# Patient Record
Sex: FEMALE | Race: WHITE | NOT HISPANIC OR LATINO | Employment: OTHER | URBAN - METROPOLITAN AREA
[De-identification: names, ages, dates, MRNs, and addresses within clinical notes are randomized per-mention and may not be internally consistent; named-entity substitution may affect disease eponyms.]

---

## 2017-01-09 ENCOUNTER — TELEPHONE (OUTPATIENT)
Dept: FAMILY MEDICINE | Facility: OTHER | Age: 72
End: 2017-01-09

## 2017-01-09 DIAGNOSIS — K21.9 GASTROESOPHAGEAL REFLUX DISEASE, ESOPHAGITIS PRESENCE NOT SPECIFIED: Primary | ICD-10-CM

## 2017-01-09 RX ORDER — RABEPRAZOLE SODIUM 20 MG/1
20 TABLET, DELAYED RELEASE ORAL DAILY
Qty: 90 TABLET | Refills: 3 | Status: SHIPPED | OUTPATIENT
Start: 2017-01-09 | End: 2017-03-24

## 2017-01-09 NOTE — TELEPHONE ENCOUNTER
Reason for call:  Medication    1. Medication Name?  PRILOSEC) 40 MG   2. Is this request for a refill? No  3. What Pharmacy do you use? On file  4. Have you contacted your pharmacy? No    5. If yes, when? n/a  (Please note that the turn-around-time for prescriptions is 72 business hours; I am sending your request at this time. SEND TO  [facility]  )  Description: Patient is taking PRILOSEC) 40 MG and it is upsetting her stomach.  Is there any other medication she could take? Also, patient states that she has terrible dry mouth could you recommend something for that.  Was an appointment offered for this a call? No   Preferred method for responding to this messageTelephone Call  If we cannot reach you directly, may we leave a detailed response at the number you provided? Yes  Can this message wait until your PCP/Provider returns if not available today? No applicable, provider is in

## 2017-01-10 DIAGNOSIS — F41.9 ANXIETY: Primary | ICD-10-CM

## 2017-01-11 NOTE — TELEPHONE ENCOUNTER
Zoloft     Last Written Prescription Date: 12/30/2016  Last Fill Quantity: 30, # refills: 5  Last Office Visit with [facility] primary care provider:  12/30/2016   Next 5 appointments (look out 90 days)     Mar 24, 2017  8:30 AM   (Arrive by 8:15 AM)   SHORT with Randi Haddad NP   [facility] ([facility] )    [address]  [facility] [address]   [phone]                   Last PHQ-9 score on record=   PHQ-9 SCORE 12/16/2016   Total Score -   Total Score 2

## 2017-02-02 ENCOUNTER — TELEPHONE (OUTPATIENT)
Dept: FAMILY MEDICINE | Facility: OTHER | Age: 72
End: 2017-02-02

## 2017-02-02 DIAGNOSIS — E78.5 HYPERLIPIDEMIA LDL GOAL <100: Primary | ICD-10-CM

## 2017-02-02 RX ORDER — LOVASTATIN 20 MG
20 TABLET ORAL AT BEDTIME
Qty: 90 TABLET | Refills: 1 | Status: SHIPPED | OUTPATIENT
Start: 2017-02-02 | End: 2017-04-24

## 2017-02-02 NOTE — TELEPHONE ENCOUNTER
Looking for alternative does not want to even try 1/2 the dose of crestor. States feels much better off the med

## 2017-02-02 NOTE — TELEPHONE ENCOUNTER
"Reason for call:  Medication    1. Medication Name? rosuvastatin (CRESTOR) 10 MG tablet  2. Is this request for a refill? No  3. What Pharmacy do you use? N/a  4. Have you contacted your pharmacy? N/a    5. If yes, when?  (Please note that the turn-around-time for prescriptions is 72 business hours; I am sending your request at this time. SEND TO  [facility]  )  Description: Pt is calling because she is looking for another alternative medication to the one listed.  Pt states that this medication is making her \"inside parts\" not feel well and she was constantly urinating.  Pt notifies writer that she has been off of the medication for about a week now.  Nurse, please advise.   Was an appointment offered for this a call? No   Preferred method for responding to this messageTelephone Call: [phone]  If we cannot reach you directly, may we leave a detailed response at the number you provided? Yes  Can this message wait until your PCP/Provider returns if not available today? Not applicable, provider Aroldo Lomeli is in today. Pt said that Yani prescribed medication.    "

## 2017-02-02 NOTE — TELEPHONE ENCOUNTER
If she has been off crestor for 1 week, she should be feeling better.  Question UTI.  Should be seen for evaluation.

## 2017-03-24 ENCOUNTER — OFFICE VISIT (OUTPATIENT)
Dept: FAMILY MEDICINE | Facility: OTHER | Age: 72
End: 2017-03-24
Attending: NURSE PRACTITIONER
Payer: COMMERCIAL

## 2017-03-24 VITALS
BODY MASS INDEX: 37.21 KG/M2 | SYSTOLIC BLOOD PRESSURE: 122 MMHG | DIASTOLIC BLOOD PRESSURE: 70 MMHG | HEART RATE: 64 BPM | HEIGHT: 59 IN | WEIGHT: 184.6 LBS | TEMPERATURE: 97.9 F | RESPIRATION RATE: 14 BRPM

## 2017-03-24 DIAGNOSIS — E78.5 HYPERLIPIDEMIA LDL GOAL <100: Primary | ICD-10-CM

## 2017-03-24 DIAGNOSIS — G62.9 NEUROPATHY: ICD-10-CM

## 2017-03-24 DIAGNOSIS — Z79.899 ON STATIN THERAPY: ICD-10-CM

## 2017-03-24 DIAGNOSIS — K21.9 GASTROESOPHAGEAL REFLUX DISEASE WITHOUT ESOPHAGITIS: ICD-10-CM

## 2017-03-24 LAB
ALBUMIN SERPL-MCNC: 3.5 G/DL (ref 3.4–5)
ALP SERPL-CCNC: 110 U/L (ref 40–150)
ALT SERPL W P-5'-P-CCNC: 68 U/L (ref 0–50)
ANION GAP SERPL CALCULATED.3IONS-SCNC: 9 MMOL/L (ref 3–14)
AST SERPL W P-5'-P-CCNC: 46 U/L (ref 0–45)
BILIRUB DIRECT SERPL-MCNC: 0.2 MG/DL (ref 0–0.2)
BILIRUB SERPL-MCNC: 0.7 MG/DL (ref 0.2–1.3)
BUN SERPL-MCNC: 17 MG/DL (ref 7–30)
CALCIUM SERPL-MCNC: 8.8 MG/DL (ref 8.5–10.1)
CHLORIDE SERPL-SCNC: 109 MMOL/L (ref 94–109)
CHOLEST SERPL-MCNC: 160 MG/DL
CO2 SERPL-SCNC: 26 MMOL/L (ref 20–32)
CREAT SERPL-MCNC: 0.96 MG/DL (ref 0.52–1.04)
GFR SERPL CREATININE-BSD FRML MDRD: 57 ML/MIN/1.7M2
GLUCOSE SERPL-MCNC: 98 MG/DL (ref 70–99)
HDLC SERPL-MCNC: 54 MG/DL
LDLC SERPL CALC-MCNC: 76 MG/DL
NONHDLC SERPL-MCNC: 106 MG/DL
POTASSIUM SERPL-SCNC: 4.4 MMOL/L (ref 3.4–5.3)
PROT SERPL-MCNC: 7.2 G/DL (ref 6.8–8.8)
SODIUM SERPL-SCNC: 144 MMOL/L (ref 133–144)
TRIGL SERPL-MCNC: 152 MG/DL
TSH SERPL DL<=0.005 MIU/L-ACNC: 4 MU/L (ref 0.4–4)

## 2017-03-24 PROCEDURE — 99212 OFFICE O/P EST SF 10 MIN: CPT

## 2017-03-24 PROCEDURE — 80061 LIPID PANEL: CPT | Performed by: NURSE PRACTITIONER

## 2017-03-24 PROCEDURE — 80048 BASIC METABOLIC PNL TOTAL CA: CPT | Performed by: NURSE PRACTITIONER

## 2017-03-24 PROCEDURE — 36415 COLL VENOUS BLD VENIPUNCTURE: CPT | Performed by: NURSE PRACTITIONER

## 2017-03-24 PROCEDURE — 99214 OFFICE O/P EST MOD 30 MIN: CPT | Performed by: NURSE PRACTITIONER

## 2017-03-24 PROCEDURE — 80076 HEPATIC FUNCTION PANEL: CPT | Performed by: NURSE PRACTITIONER

## 2017-03-24 PROCEDURE — 84443 ASSAY THYROID STIM HORMONE: CPT | Performed by: NURSE PRACTITIONER

## 2017-03-24 ASSESSMENT — PAIN SCALES - GENERAL: PAINLEVEL: NO PAIN (0)

## 2017-03-24 NOTE — PATIENT INSTRUCTIONS
1. Hyperlipidemia LDL goal <100  - Lipid Profile  - TSH with free T4 reflex  - Basic metabolic panel    2. Neuropathy (H)  Continue current plna    3. Gastroesophageal reflux disease without esophagitis  Start over the counter probiotic per package directions    4. On statin therapy  - Hepatic panel    FUTURE APPOINTMENTS:       - Follow-up visit in 6 months or as needed for acute concerns    Randi Haddad, NP  [facility] ([facility])

## 2017-03-24 NOTE — PROGRESS NOTES
SUBJECTIVE:                                                    Nella Lemon is a 71 year old female who presents to clinic today for the following health issues:      Hyperlipidemia Follow-Up      Rate your low fat/cholesterol diet?: good    Taking statin?  Yes, no muscle aches from statin    Other lipid medications/supplements?:  Fish oil/Omega 3, dose 2 without side effects       Depression and Anxiety Follow-Up    Status since last visit: Improved did not think zoloft was helping so stopped, feels she is doing well at this time    Other associated symptoms:None    Complicating factors:     Significant life event: No     Current substance abuse: None    PHQ-9 SCORE 2/22/2016 6/17/2016 12/16/2016   Total Score - - -   Total Score 2 3 2     LA NENA-7 SCORE 12/16/2016   Total Score 10        PHQ-9  English      PHQ-9   Any Language     GAD7       Amount of exercise or physical activity: walking at times.  Did join [employer] - last week.      Problems taking medications regularly: Yes,  As above    Medication side effects: none    Diet: regular (no restrictions)    GERD:  Stopped aciphex, did not like how it made her feel, does not believe she needs anything else.      Neuropathy - continues to take gabapentin, doing well.          Problem list and histories reviewed & adjusted, as indicated.  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, left     Family history of colon cancer     ACP (advance care planning)     Hyperlipidemia LDL goal <100     Past Surgical History:   Procedure Laterality Date      ADENOIDECTOMY       CHOLECYSTECTOMY  1981     COLONOSCOPY  2002     COLONOSCOPY  2012     COLONOSCOPY N/A 9/22/2014    Procedure: COLONOSCOPY;  Surgeon: Lavell Pavon DO;  Location: [facility]     CYSTOSCOPY  2007    Cystitis chronic     ORTHOPEDIC SURGERY  2002    carpal tunnel; RT, LT     TONSILLECTOMY         Social History   Substance Use Topics     Smoking status: Never Smoker     Smokeless tobacco: Never Used     Alcohol use Yes      Comment: once a year     Family History   Problem Relation Age of Onset     Breast Cancer Mother      Alcohol/Drug Mother      Cirrhosis     Other - See Comments Mother      goiter     CEREBROVASCULAR DISEASE Father      Circulatory Father      Alcohol/Drug Son      Cancer - colorectal Brother      Unknown/Adopted No family hx of      Asthma No family hx of      C.A.D. No family hx of      DIABETES No family hx of      Hypertension No family hx of      Prostate Cancer No family hx of      Allergies No family hx of      Alzheimer Disease No family hx of      Anesthesia Reaction No family hx of      Arthritis No family hx of      Blood Disease No family hx of      Cardiovascular No family hx of      Congenital Anomalies No family hx of      Connective Tissue Disorder No family hx of      Depression No family hx of      Endocrine Disease No family hx of      Eye Disorder No family hx of      Genetic Disorder No family hx of      GASTROINTESTINAL DISEASE No family hx of      Genitourinary Problems No family hx of      Gynecology No family hx of      HEART DISEASE No family hx of      Lipids No family hx of      Musculoskeletal Disorder No family hx of      Neurologic Disorder No family hx of      Obesity No family hx of      OSTEOPOROSIS No family hx of      Psychotic Disorder No family hx of      Respiratory No family hx of      Thyroid Disease No family hx of      Family History Negative No family hx of      Hearing Loss No family hx of          Current Outpatient Prescriptions    Medication Sig Dispense Refill     lovastatin (MEVACOR) 20 MG tablet Take 1 tablet (20 mg) by mouth At Bedtime 90 tablet 1     gabapentin (NEURONTIN) 300 MG capsule TAKE 2 CAPSULES (600 MG) BY MOUTH 4 TIMES DAILY 240 capsule 5     Ginkgo Biloba (GINKOBA PO) Take 40 mg by mouth daily       Glucosamine 750 MG TABS Take 2 tablets by mouth daily       Omega-3 Fatty Acids (OMEGA-3 FISH OIL PO) Take 2 g by mouth daily        Allergies   Allergen Reactions     Atorvastatin      Ezetimibe      Gentamicin      Hydroxyzine      Lorazepam      Ranitidine      Simvastatin      Lopid [Gemfibrozil] GI Disturbance     Bloating, constipation,      Pravastatin Muscle Pain (Myalgia)     Recent Labs   Lab Test  12/30/16   0851  12/16/16   1434  07/20/16   0904  06/22/15   0854  07/12/13   1041   05/15/13   1109   LDL  47   --   97  138*  124   < >   --    HDL  57   --   44*  49*  47   < >   --    TRIG  128   --   223*  112  223*   < >   --    ALT   --   42   --   29  38   --    --    CR   --   0.88  0.84  1.00  0.98   < >   --    GFRESTIMATED   --   63  67  55*  57*   < >   --    GFRESTBLACK   --   76  81  67  69   < >   --    POTASSIUM   --   3.7  4.0  4.1  3.8   < >   --    TSH   --    --   3.21   --    --    --   1.96    < > = values in this interval not displayed.      BP Readings from Last 3 Encounters:   03/24/17 122/70   12/30/16 132/74   12/16/16 122/68    Wt Readings from Last 3 Encounters:   03/24/17 184 lb 9.6 oz (83.7 kg)   12/30/16 183 lb (83 kg)   12/16/16 184 lb (83.5 kg)                    Reviewed and updated as needed this visit by clinical staff  Tobacco  Allergies  Meds  Med Hx  Surg Hx  Fam Hx  Soc Hx      Reviewed and updated as needed this visit by Provider         ROS:  C: NEGATIVE for fever, chills, change in weight  I: NEGATIVE for worrisome rashes, moles or lesions  E: NEGATIVE for vision changes or irritation  E/M: NEGATIVE for ear, mouth and throat problems  R: NEGATIVE for significant cough or  "SOB  CV: NEGATIVE for chest pain, palpitations or peripheral edema  GI: NEGATIVE for nausea, abdominal pain, positive heartburn, less than before but does notice gas and bloating.   : NEGATIVE for frequency, dysuria, or hematuria  M: NEGATIVE for significant arthralgias or myalgia  N: NEGATIVE for weakness, dizziness or paresthesias  E: NEGATIVE for temperature intolerance, skin/hair changes  H: NEGATIVE for bleeding problems  P: NEGATIVE for changes in mood or affect    OBJECTIVE:                                                    /70 (BP Location: Left arm, Patient Position: Chair, Cuff Size: Adult Regular)  Pulse 64  Temp 97.9  F (36.6  C) (Tympanic)  Resp 14  Ht 4' 11\" (1.499 m)  Wt 184 lb 9.6 oz (83.7 kg)  BMI 37.28 kg/m2  Body mass index is 37.28 kg/(m^2).  GENERAL: healthy, alert and no distress  NECK: no adenopathy, no asymmetry, masses, or scars and thyroid normal to palpation, no catroti bruit  RESP: lungs clear to auscultation - no rales, rhonchi or wheezes  CV: regular rate and rhythm, normal S1 S2, no S3 or S4, no murmur, click or rub, no peripheral edema and peripheral pulses strong  PSYCH: mentation appears normal, affect normal/bright         ASSESSMENT/PLAN:                                                    Hyperlipidemia; changed from crestor to mevacor, labs due to day   Plan:  No changes in the patient's current treatment plan  Start 81mg aspirin    Depression; improved   Associated with the following complications:       Plan:  Stop zoloft      BMI:   Estimated body mass index is 37.28 kg/(m^2) as calculated from the following:    Height as of this encounter: 4' 11\" (1.499 m).    Weight as of this encounter: 184 lb 9.6 oz (83.7 kg).   Increase excercise      1. Hyperlipidemia LDL goal <100  - Lipid Profile  - TSH with free T4 reflex  - Basic metabolic panel    2. Neuropathy (H)  Continue current plan    3. Gastroesophageal reflux disease without esophagitis  Start over the counter " probiotic per package directions    4. On statin therapy  - Hepatic panel    FUTURE APPOINTMENTS:       - Follow-up visit in 6 months or as needed for acute concerns    Randi Haddad, NP  [facility]

## 2017-03-24 NOTE — MR AVS SNAPSHOT
After Visit Summary   3/24/2017    Nella Lemon    MRN: [id number]           Patient Information     Date Of Birth          1945        Visit Information        Provider Department      3/24/2017 8:30 AM Randi Haddad, NP [facility]        Today's Diagnoses     Hyperlipidemia LDL goal <100    -  1    Neuropathy (H)        Gastroesophageal reflux disease without esophagitis        On statin therapy          Care Instructions        1. Hyperlipidemia LDL goal <100  - Lipid Profile  - TSH with free T4 reflex  - Basic metabolic panel    2. Neuropathy (H)  Continue current plna    3. Gastroesophageal reflux disease without esophagitis  Start over the counter probiotic per package directions    4. On statin therapy  - Hepatic panel    FUTURE APPOINTMENTS:       - Follow-up visit in 6 months or as needed for acute concerns    Randi Haddad NP  [facility]            Follow-ups after your visit        Follow-up notes from your care team     Return in about 6 months (around 9/24/2017), or if symptoms worsen or fail to improve.      Your next 10 appointments already scheduled     Aug 14, 2017  9:00 AM CDT   Hearing Evaluation with Trini Dougherty, Joss   [facility] ([facility])    [address]  [place] [address]   [phone]           Please see your medical professional for ear cleaning prior to this appointment if you believe wax buildup may be an issue. All patients are required to have a physician's order stating the medical reason for the hearing test. Your doctor can send an electronic order, use their own form or we have provided a form (called Physician's Order for Audiology Services). It states that there is a medical reason for your exam. Without an order you may need to be rescheduled until the order can be obtained.              Who to contact     If you have questions or need follow up information about  "today's clinic visit or your schedule please contact [facility] directly at [phone].  Normal or non-critical lab and imaging results will be communicated to you by MyChart, letter or phone within 4 business days after the clinic has received the results. If you do not hear from us within 7 days, please contact the clinic through MyChart or phone. If you have a critical or abnormal lab result, we will notify you by phone as soon as possible.  Submit refill requests through [employer] or call your pharmacy and they will forward the refill request to us. Please allow 3 business days for your refill to be completed.          Additional Information About Your Visit        [employer] Information     [employer] lets you send messages to your doctor, view your test results, renew your prescriptions, schedule appointments and more. To sign up, go to [email or web address] . Click on \"Log in\" on the left side of the screen, which will take you to the Welcome page. Then click on \"Sign up Now\" on the right side of the page.     You will be asked to enter the access code listed below, as well as some personal information. Please follow the directions to create your username and password.     Your access code is: [email or web address]  Expires: 2017  8:49 AM     Your access code will  in 90 days. If you need help or a new code, please call your [place] clinic or [phone].        Care EveryWhere ID     This is your Care EveryWhere ID. This could be used by other organizations to access your [place] medical records  [id number]        Your Vitals Were     Pulse Temperature Respirations Height BMI (Body Mass Index)       64 97.9  F (36.6  C) (Tympanic) 14 4' 11\" (1.499 m) 37.28 kg/m2        Blood Pressure from Last 3 Encounters:   17 122/70   16 132/74   16 122/68    Weight from Last 3 Encounters:   17 184 lb 9.6 oz (83.7 kg)   16 183 lb (83 kg)   16 184 lb (83.5 kg)            "   We Performed the Following     Basic metabolic panel     Hepatic panel     Lipid Profile     TSH with free T4 reflex          Today's Medication Changes          These changes are accurate as of: 3/24/17  8:49 AM.  If you have any questions, ask your nurse or doctor.               Stop taking these medicines if you haven't already. Please contact your care team if you have questions.     fluticasone 50 MCG/ACT spray   Commonly known as:  FLONASE   Stopped by:  Randi Haddad NP           RABEprazole 20 MG EC tablet   Commonly known as:  ACIPHEX   Stopped by:  Randi Haddad NP           sertraline 50 MG tablet   Commonly known as:  ZOLOFT   Stopped by:  Randi Haddad NP           VITAMIN D3 PO   Stopped by:  Randi Haddad NP                    Primary Care Provider Office Phone # Fax #    Laila PradhanCECILY marc [phone]       [address] [address]        Thank you!     Thank you for choosing [facility]  for your care. Our goal is always to provide you with excellent care. Hearing back from our patients is one way we can continue to improve our services. Please take a few minutes to complete the written survey that you may receive in the mail after your visit with us. Thank you!             Your Updated Medication List - Protect others around you: Learn how to safely use, store and throw away your medicines at www.disposemymeds.org.          This list is accurate as of: 3/24/17  8:49 AM.  Always use your most recent med list.                   Brand Name Dispense Instructions for use    gabapentin 300 MG capsule    NEURONTIN    240 capsule    TAKE 2 CAPSULES (600 MG) BY MOUTH 4 TIMES DAILY       GINKOBA PO      Take 40 mg by mouth daily       Glucosamine 750 MG Tabs      Take 2 tablets by mouth daily       lovastatin 20 MG tablet    MEVACOR    90 tablet    Take 1 tablet (20 mg) by mouth At Bedtime       OMEGA-3 FISH  OIL PO      Take 2 g by mouth daily

## 2017-03-24 NOTE — NURSING NOTE
"Chief Complaint   Patient presents with     Hypertension     Anxiety     stopped zoloft 1 month ago     NEUROPATHY     questions about gabapentin     Lipids     questions about new medication       Initial /70 (BP Location: Left arm, Patient Position: Chair, Cuff Size: Adult Regular)  Pulse 64  Temp 97.9  F (36.6  C) (Tympanic)  Resp 14  Ht 4' 11\" (1.499 m)  Wt 184 lb 9.6 oz (83.7 kg)  BMI 37.28 kg/m2 Estimated body mass index is 37.28 kg/(m^2) as calculated from the following:    Height as of this encounter: 4' 11\" (1.499 m).    Weight as of this encounter: 184 lb 9.6 oz (83.7 kg).  Medication Reconciliation: sanjuana GARNICA      "

## 2017-03-27 ENCOUNTER — TELEPHONE (OUTPATIENT)
Dept: FAMILY MEDICINE | Facility: OTHER | Age: 72
End: 2017-03-27

## 2017-03-27 NOTE — TELEPHONE ENCOUNTER
8:48 AM    Reason for Call: Phone Call    Description: Pt called and states that she went off her cholesterol pills because she feels like this is what is causing her stomach pain, was wondering if she should be concerned about getting off of them, would like a call back regarding this.    Was an appointment offered for this call? No    Preferred method for responding to this message: Telephone Call    If we cannot reach you directly, may we leave a detailed response at the number you provided? Yes    Can this message wait until your PCP/provider returns, if available today? Not applicable,     Jackie Eric

## 2017-03-27 NOTE — TELEPHONE ENCOUNTER
Did abdominal pain resolve?  If not restart and will need to be seen to evaluate abdominal pain.  Lipids are much better since starting statin (mevacor).

## 2017-03-27 NOTE — TELEPHONE ENCOUNTER
"Abdominal pain much better off the statin . Is  using the probiotic you suggested for \"gas\" and feels it is working too.would like to stay off statin for 3 months to see how that goes  "

## 2017-04-24 ENCOUNTER — OFFICE VISIT (OUTPATIENT)
Dept: FAMILY MEDICINE | Facility: OTHER | Age: 72
End: 2017-04-24
Attending: NURSE PRACTITIONER
Payer: COMMERCIAL

## 2017-04-24 VITALS
DIASTOLIC BLOOD PRESSURE: 64 MMHG | HEIGHT: 61 IN | WEIGHT: 190.36 LBS | TEMPERATURE: 97 F | SYSTOLIC BLOOD PRESSURE: 130 MMHG | BODY MASS INDEX: 35.94 KG/M2 | HEART RATE: 64 BPM | RESPIRATION RATE: 14 BRPM

## 2017-04-24 DIAGNOSIS — Z23 NEED FOR VACCINATION: Primary | ICD-10-CM

## 2017-04-24 DIAGNOSIS — H10.31 ACUTE BACTERIAL CONJUNCTIVITIS OF RIGHT EYE: ICD-10-CM

## 2017-04-24 PROCEDURE — 99212 OFFICE O/P EST SF 10 MIN: CPT | Mod: 25

## 2017-04-24 PROCEDURE — G0009 ADMIN PNEUMOCOCCAL VACCINE: HCPCS | Performed by: NURSE PRACTITIONER

## 2017-04-24 PROCEDURE — 99213 OFFICE O/P EST LOW 20 MIN: CPT | Performed by: NURSE PRACTITIONER

## 2017-04-24 PROCEDURE — 90670 PCV13 VACCINE IM: CPT | Performed by: NURSE PRACTITIONER

## 2017-04-24 RX ORDER — CIPROFLOXACIN HYDROCHLORIDE 3.5 MG/ML
1 SOLUTION/ DROPS TOPICAL 4 TIMES DAILY
Qty: 1.4 ML | Refills: 0 | Status: SHIPPED | OUTPATIENT
Start: 2017-04-24 | End: 2017-05-01

## 2017-04-24 ASSESSMENT — PAIN SCALES - GENERAL: PAINLEVEL: MILD PAIN (2)

## 2017-04-24 NOTE — PROGRESS NOTES
SUBJECTIVE:  Nella Lemon, 71 year old, female presents with the following Chief Complaint(s) with HPI to follow:  Chief Complaint   Patient presents with     Red Eye     right eye red denies itching           HPI:  Nella presents today with the above concern.      Right eye:    Onset of symptoms: 3 days ago, symptoms are worsening  Location of symptoms:  Right eye  Timing of symptoms: acute onset, getting worse  Duration: constant  Cause/Injury:  Denies any injury.  Has had this several times in the past, usually about this time of year.  Quality of symptoms: irritated, red eye, ache  Associated symptoms: denies vision changes, thin watery discharge.  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, left     Family history of colon cancer     ACP (advance care planning)     Hyperlipidemia LDL goal <100     Past Surgical History:   Procedure Laterality Date     ADENOIDECTOMY       CHOLECYSTECTOMY  1981     COLONOSCOPY  2002     COLONOSCOPY  2012     COLONOSCOPY N/A 9/22/2014    Procedure: COLONOSCOPY;  Surgeon: Lavell Pavon DO;  Location: [facility]     CYSTOSCOPY  2007    Cystitis chronic     ORTHOPEDIC SURGERY  2002    carpal tunnel; RT, LT     TONSILLECTOMY         Social History   Substance Use Topics     Smoking status: Never Smoker     Smokeless tobacco: Never Used     Alcohol use Yes      Comment: once a year     Family History   Problem Relation Age of Onset     Breast Cancer Mother       Alcohol/Drug Mother      Cirrhosis     Other - See Comments Mother      goiter     CEREBROVASCULAR DISEASE Father      Circulatory Father      Alcohol/Drug Son      Cancer - colorectal Brother      Unknown/Adopted No family hx of      Asthma No family hx of      C.A.D. No family hx of      DIABETES No family hx of      Hypertension No family hx of      Prostate Cancer No family hx of      Allergies No family hx of      Alzheimer Disease No family hx of      Anesthesia Reaction No family hx of      Arthritis No family hx of      Blood Disease No family hx of      Cardiovascular No family hx of      Congenital Anomalies No family hx of      Connective Tissue Disorder No family hx of      Depression No family hx of      Endocrine Disease No family hx of      Eye Disorder No family hx of      Genetic Disorder No family hx of      GASTROINTESTINAL DISEASE No family hx of      Genitourinary Problems No family hx of      Gynecology No family hx of      HEART DISEASE No family hx of      Lipids No family hx of      Musculoskeletal Disorder No family hx of      Neurologic Disorder No family hx of      Obesity No family hx of      OSTEOPOROSIS No family hx of      Psychotic Disorder No family hx of      Respiratory No family hx of      Thyroid Disease No family hx of      Family History Negative No family hx of      Hearing Loss No family hx of          Current Outpatient Prescriptions   Medication Sig Dispense Refill     gabapentin (NEURONTIN) 300 MG capsule TAKE 2 CAPSULES (600 MG) BY MOUTH 4 TIMES DAILY 240 capsule 5     Ginkgo Biloba (GINKOBA PO) Take 40 mg by mouth daily       Glucosamine 750 MG TABS Take 2 tablets by mouth daily       Omega-3 Fatty Acids (OMEGA-3 FISH OIL PO) Take 3 g by mouth daily        Allergies   Allergen Reactions     Atorvastatin      Ezetimibe      Gentamicin      Hydroxyzine      Lorazepam      Ranitidine      Simvastatin      Lopid [Gemfibrozil] GI Disturbance     Bloating, constipation,       "Pravastatin Muscle Pain (Myalgia)     Recent Labs   Lab Test  03/24/17   0848  12/30/16   0851  12/16/16   1434  07/20/16   0904  06/22/15   0854   LDL  76  47   --   97  138*   HDL  54  57   --   44*  49*   TRIG  152*  128   --   223*  112   ALT  68*   --   42   --   29   CR  0.96   --   0.88  0.84  1.00   GFRESTIMATED  57*   --   63  67  55*   GFRESTBLACK  69   --   76  81  67   POTASSIUM  4.4   --   3.7  4.0  4.1   TSH  4.00   --    --   3.21   --       BP Readings from Last 3 Encounters:   04/24/17 130/64   03/24/17 122/70   12/30/16 132/74    Wt Readings from Last 3 Encounters:   04/24/17 190 lb 5.8 oz (86.3 kg)   03/24/17 184 lb 9.6 oz (83.7 kg)   12/30/16 183 lb (83 kg)                    REVIEW OF SYSTEMS  Skin: negative  Eyes: as above  Ears/Nose/Throat: negative  Respiratory: No shortness of breath, dyspnea on exertion, cough, or hemoptysis  Cardiovascular: negative  Gastrointestinal: negative  Genitourinary: negative  Musculoskeletal: negative  Neurologic: negative  Psychiatric: negative  Hematologic/Lymphatic/Immunologic: negative  Endocrine: negative    OBJECTIVE:    /64 (BP Location: Left arm, Patient Position: Chair, Cuff Size: Adult Large)  Pulse 64  Temp 97  F (36.1  C) (Tympanic)  Resp 14  Ht 5' 1\" (1.549 m)  Wt 190 lb 5.8 oz (86.3 kg)  BMI 35.97 kg/m2  Constitutional: healthy, alert and no distress  Head: Normocephalic. No masses, lesions, tenderness or abnormalities  ENT: ENT exam normal, no neck nodes or sinus tenderness  Eyes:  Left normal.  Right sclera erythematous, worse at outer canthus.  Thin stringy drainage noted.  Vision grossly intact.   Neck: neck supple, no lymphadenopathy, trachea midline, thyroid symmetrical with out nodules noted.  Carotid arteries without bruit  Cardiovascular: RRR. No murmurs, clicks gallops or rub  Respiratory:  Good diaphragmatic excursion. Lungs clear  Psychiatric: mentation appears normal and affect normal/bright      ASSESSMENT / PLAN:  1. Need " for vaccination  routie  - Pneumococcal vaccine 13 valent PCV13 IM (Prevnar) [33993]  - ADMIN MEDICARE: Pneumococcal Vaccine ()    2. Acute bacterial conjunctivitis of right eye  Symptomatic  - warm compress  - ciprofloxacin (CILOXAN) 0.3 % ophthalmic solution; Place 1 drop into the right eye 4 times daily for 7 days  Dispense: 1.4 mL; Refill: 0      Follow-up if no improvement or any worsening or as needed for acute concerns    Randi Haddad,   Certified Adult Nurse Practitioner  [phone]

## 2017-04-24 NOTE — MR AVS SNAPSHOT
After Visit Summary   4/24/2017    Nella Lemon    MRN: [id number]           Patient Information     Date Of Birth          1945        Visit Information        Provider Department      4/24/2017 9:30 AM Randi Haddad, NP [facility]        Today's Diagnoses     Need for vaccination    -  1    Acute bacterial conjunctivitis of right eye          Care Instructions      ASSESSMENT / PLAN:  1. Need for vaccination  routie  - Pneumococcal vaccine 13 valent PCV13 IM (Prevnar) [[id number]]  - ADMIN MEDICARE: Pneumococcal Vaccine ()    2. Acute bacterial conjunctivitis of right eye  Symptomatic  - warm compress  - ciprofloxacin (CILOXAN) 0.3 % ophthalmic solution; Place 1 drop into the right eye 4 times daily for 7 days  Dispense: 1.4 mL; Refill: 0      Follow-up if no improvement or any worsening or as needed for acute concerns    Randi Haddad,   Certified Adult Nurse Practitioner  [phone]  Conjunctivitis Caused by Infection  Infections are caused by viruses or germs (bacteria). Treatment includes keeping your eyes and hands clean. Your health care provider may prescribe eye drops, and tell you to stay home from work or school if you re contagious. Untreated infections can be serious. It's important to see your provider for a diagnosis.    Viral infections  A cold, flu, or other virus can spread to your eyes. This causes a watery discharge. Your eyes may burn or itch and get red. Your eyelids may also be puffy and sore.  Treatment  Most viral infections go away on their own. Artificial tears and warm compresses can relieve symptoms. Your provider may also prescribe eye drops. A viral infection can be very contagious and spreads quickly. To prevent this, wash your hands often. Use a separate tissue to wipe each eye. Don t touch your eyes or share bedding or towels.   Bacterial infections  Bacterial infections often occur in one eye. There may be a watery or a  thick discharge from the eye. These infections can cause serious damage to your eye if not treated promptly.  Treatment  Your provider may prescribe eye drops or ointment to kill the bacteria. Warm compresses can help keep the eyelids clean. To keep the bacteria from spreading, wash your hands often. Use a separate tissue to wipe each eye. Don t touch your eyes or share bedding or towels.    6913-1227 [employer]. [address] [place] [address], [place] [address]. All rights reserved. This information is not intended as a substitute for professional medical care. Always follow your healthcare professional's instructions.              Follow-ups after your visit        Your next 10 appointments already scheduled     Aug 14, 2017  9:00 AM CDT   Hearing Evaluation with Joss Palafox   [facility] ([facility])    [address]  [facility] [address]   [phone]           Please see your medical professional for ear cleaning prior to this appointment if you believe wax buildup may be an issue. All patients are required to have a physician's order stating the medical reason for the hearing test. Your doctor can send an electronic order, use their own form or we have provided a form (called Physician's Order for Audiology Services). It states that there is a medical reason for your exam. Without an order you may need to be rescheduled until the order can be obtained.            Sep 25, 2017  8:30 AM CDT   (Arrive by 8:15 AM)   SHORT with Randi Haddad NP   [facility] ([facility] )    [address]  [facility] [address]   [phone]              Who to contact     If you have questions or need follow up information about today's clinic visit or your schedule please contact [facility] directly at [phone].  Normal or non-critical lab and imaging results will be communicated to you by MyChart, letter or phone within 4  "business days after the clinic has received the results. If you do not hear from us within 7 days, please contact the clinic through Pocket or phone. If you have a critical or abnormal lab result, we will notify you by phone as soon as possible.  Submit refill requests through Pocket or call your pharmacy and they will forward the refill request to us. Please allow 3 business days for your refill to be completed.          Additional Information About Your Visit        Pocket Information     Pocket lets you send messages to your doctor, view your test results, renew your prescriptions, schedule appointments and more. To sign up, go to [email or web address] . Click on \"Log in\" on the left side of the screen, which will take you to the Welcome page. Then click on \"Sign up Now\" on the right side of the page.     You will be asked to enter the access code listed below, as well as some personal information. Please follow the directions to create your username and password.     Your access code is: [email or web address]  Expires: 2017  8:49 AM     Your access code will  in 90 days. If you need help or a new code, please call your [facility] clinic or [phone].        Care EveryWhere ID     This is your Care EveryWhere ID. This could be used by other organizations to access your [facility] medical records  [id number]        Your Vitals Were     Pulse Temperature Respirations Height BMI (Body Mass Index)       64 97  F (36.1  C) (Tympanic) 14 5' 1\" (1.549 m) 35.97 kg/m2        Blood Pressure from Last 3 Encounters:   17 130/64   17 122/70   16 132/74    Weight from Last 3 Encounters:   17 190 lb 5.8 oz (86.3 kg)   17 184 lb 9.6 oz (83.7 kg)   16 183 lb (83 kg)              We Performed the Following     ADMIN MEDICARE: Pneumococcal Vaccine ()     Pneumococcal vaccine 13 valent PCV13 IM (Prevnar) [[id number]]          Today's Medication Changes          These changes are accurate " as of: 4/24/17  9:42 AM.  If you have any questions, ask your nurse or doctor.               Start taking these medicines.        Dose/Directions    ciprofloxacin 0.3 % ophthalmic solution   Commonly known as:  CILOXAN   Used for:  Acute bacterial conjunctivitis of right eye   Started by:  Randi Haddad NP        Dose:  1 drop   Place 1 drop into the right eye 4 times daily for 7 days   Quantity:  1.4 mL   Refills:  0         Stop taking these medicines if you haven't already. Please contact your care team if you have questions.     lovastatin 20 MG tablet   Commonly known as:  MEVACOR   Stopped by:  Randi Haddad NP                Where to get your medicines      These medications were sent to [facility] - [place], [place] - [address]  [address] [place] [address]     Phone:  [phone]     ciprofloxacin 0.3 % ophthalmic solution                Primary Care Provider Office Phone # Fax #    Laila CECILY Tyler [phone]       [address] [address]        Thank you!     Thank you for choosing [facility]  for your care. Our goal is always to provide you with excellent care. Hearing back from our patients is one way we can continue to improve our services. Please take a few minutes to complete the written survey that you may receive in the mail after your visit with us. Thank you!             Your Updated Medication List - Protect others around you: Learn how to safely use, store and throw away your medicines at www.disposemymeds.org.          This list is accurate as of: 4/24/17  9:42 AM.  Always use your most recent med list.                   Brand Name Dispense Instructions for use    ciprofloxacin 0.3 % ophthalmic solution    CILOXAN    1.4 mL    Place 1 drop into the right eye 4 times daily for 7 days       gabapentin 300 MG capsule    NEURONTIN    240 capsule    TAKE 2 CAPSULES (600 MG) BY MOUTH 4 TIMES DAILY       GINKOBA PO       Take 40 mg by mouth daily       Glucosamine 750 MG Tabs      Take 2 tablets by mouth daily       OMEGA-3 FISH OIL PO      Take 3 g by mouth daily

## 2017-04-24 NOTE — NURSING NOTE
"Chief Complaint   Patient presents with     Red Eye     right eye red denies itching        Initial /64 (BP Location: Left arm, Patient Position: Chair, Cuff Size: Adult Large)  Pulse 64  Temp 97  F (36.1  C) (Tympanic)  Resp 14  Ht 5' 1\" (1.549 m)  Wt 190 lb 5.8 oz (86.3 kg)  BMI 35.97 kg/m2 Estimated body mass index is 35.97 kg/(m^2) as calculated from the following:    Height as of this encounter: 5' 1\" (1.549 m).    Weight as of this encounter: 190 lb 5.8 oz (86.3 kg).  Medication Reconciliation: sanjuana GARNICA      "

## 2017-04-24 NOTE — PATIENT INSTRUCTIONS
ASSESSMENT / PLAN:  1. Need for vaccination  routie  - Pneumococcal vaccine 13 valent PCV13 IM (Prevnar) [[id number]]  - ADMIN MEDICARE: Pneumococcal Vaccine ()    2. Acute bacterial conjunctivitis of right eye  Symptomatic  - warm compress  - ciprofloxacin (CILOXAN) 0.3 % ophthalmic solution; Place 1 drop into the right eye 4 times daily for 7 days  Dispense: 1.4 mL; Refill: 0      Follow-up if no improvement or any worsening or as needed for acute concerns    Randi Haddad,   Certified Adult Nurse Practitioner  [phone]  Conjunctivitis Caused by Infection  Infections are caused by viruses or germs (bacteria). Treatment includes keeping your eyes and hands clean. Your health care provider may prescribe eye drops, and tell you to stay home from work or school if you re contagious. Untreated infections can be serious. It's important to see your provider for a diagnosis.    Viral infections  A cold, flu, or other virus can spread to your eyes. This causes a watery discharge. Your eyes may burn or itch and get red. Your eyelids may also be puffy and sore.  Treatment  Most viral infections go away on their own. Artificial tears and warm compresses can relieve symptoms. Your provider may also prescribe eye drops. A viral infection can be very contagious and spreads quickly. To prevent this, wash your hands often. Use a separate tissue to wipe each eye. Don t touch your eyes or share bedding or towels.   Bacterial infections  Bacterial infections often occur in one eye. There may be a watery or a thick discharge from the eye. These infections can cause serious damage to your eye if not treated promptly.  Treatment  Your provider may prescribe eye drops or ointment to kill the bacteria. Warm compresses can help keep the eyelids clean. To keep the bacteria from spreading, wash your hands often. Use a separate tissue to wipe each eye. Don t touch your eyes or share bedding or towels.    2209-2200 [facility]  [employer]. [address] [place] [address], [place] [address]. All rights reserved. This information is not intended as a substitute for professional medical care. Always follow your healthcare professional's instructions.

## 2017-06-02 ENCOUNTER — OFFICE VISIT (OUTPATIENT)
Dept: FAMILY MEDICINE | Facility: OTHER | Age: 72
End: 2017-06-02
Attending: NURSE PRACTITIONER
Payer: COMMERCIAL

## 2017-06-02 VITALS
HEIGHT: 61 IN | DIASTOLIC BLOOD PRESSURE: 68 MMHG | BODY MASS INDEX: 34.81 KG/M2 | TEMPERATURE: 98.2 F | OXYGEN SATURATION: 94 % | WEIGHT: 184.4 LBS | HEART RATE: 71 BPM | RESPIRATION RATE: 16 BRPM | SYSTOLIC BLOOD PRESSURE: 130 MMHG

## 2017-06-02 DIAGNOSIS — R07.89 CHEST HEAVINESS: ICD-10-CM

## 2017-06-02 DIAGNOSIS — R42 DIZZINESS: Primary | ICD-10-CM

## 2017-06-02 PROCEDURE — 99214 OFFICE O/P EST MOD 30 MIN: CPT | Performed by: NURSE PRACTITIONER

## 2017-06-02 PROCEDURE — 99212 OFFICE O/P EST SF 10 MIN: CPT | Mod: 25

## 2017-06-02 PROCEDURE — 93010 ELECTROCARDIOGRAM REPORT: CPT | Performed by: INTERNAL MEDICINE

## 2017-06-02 PROCEDURE — 93005 ELECTROCARDIOGRAM TRACING: CPT

## 2017-06-02 PROCEDURE — 71020 ZZHC CHEST TWO VIEWS, FRONT/LAT: CPT | Mod: TC

## 2017-06-02 ASSESSMENT — ANXIETY QUESTIONNAIRES

## 2017-06-02 ASSESSMENT — PATIENT HEALTH QUESTIONNAIRE - PHQ9: 5. POOR APPETITE OR OVEREATING: NOT AT ALL

## 2017-06-02 ASSESSMENT — PAIN SCALES - GENERAL: PAINLEVEL: MILD PAIN (2)

## 2017-06-02 NOTE — NURSING NOTE
"Chief Complaint   Patient presents with     Headache     pulsating in head for a couple months when waking up in am and laying down swishing sound       Initial /68 (BP Location: Left arm, Patient Position: Chair, Cuff Size: Adult Large)  Pulse 71  Temp 98.2  F (36.8  C) (Tympanic)  Resp 16  Ht 5' 1\" (1.549 m)  Wt 184 lb 6.4 oz (83.6 kg)  SpO2 94%  BMI 34.84 kg/m2 Estimated body mass index is 34.84 kg/(m^2) as calculated from the following:    Height as of this encounter: 5' 1\" (1.549 m).    Weight as of this encounter: 184 lb 6.4 oz (83.6 kg).  Medication Reconciliation: complete   Pamela M Lechevalier LPN      "

## 2017-06-02 NOTE — PROGRESS NOTES
"  SUBJECTIVE:                                                    Nella Lemon is a 71 year old female who presents to clinic today for the following health issues:  Chief Complaint   Patient presents with     Headache     pulsating in head for a couple months when waking up in am and laying down swishing sound     Nella comes in today with the above concern.  States she just \"feels weird.\"  She notes chest heaviness, a swishing/pounding in her head that is worse in the morning.  Denies feeling short of breath, denies changes in activity tolerance.  States her breathing just feels heavy.  Denies swelling in foot or ankles.      She also describes pulsating in the back of her head, blurry vision at times - last eye exam was 1 year ago.      Denies syncope but reports dizziness \"like tipping over.\"  Continues stating she \"just don't know how to explain it.\"        Denies ear pain or change in hearing or tinnitus.   Reports post nasal drip but no cough, nasla congestion or sinus pressure.  States all symptoms are vague and that she \"is just not feeling right.\"      Problem list and histories reviewed & adjusted, as indicated.  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, left     Family history of colon cancer     ACP (advance care planning)     Hyperlipidemia LDL goal <100     Past Surgical History:   Procedure Laterality Date     ADENOIDECTOMY       CHOLECYSTECTOMY  1981     COLONOSCOPY  2002     COLONOSCOPY  2012     COLONOSCOPY N/A 9/22/2014    Procedure: COLONOSCOPY;  Surgeon: Lavell Pavon " D, DO;  Location: [facility]     CYSTOSCOPY  2007    Cystitis chronic     ORTHOPEDIC SURGERY  2002    carpal tunnel; RT, LT     TONSILLECTOMY         Social History   Substance Use Topics     Smoking status: Never Smoker     Smokeless tobacco: Never Used     Alcohol use Yes      Comment: once a year     Family History   Problem Relation Age of Onset     Breast Cancer Mother      Alcohol/Drug Mother      Cirrhosis     Other - See Comments Mother      goiter     CEREBROVASCULAR DISEASE Father      Circulatory Father      Alcohol/Drug Son      Cancer - colorectal Brother      Unknown/Adopted No family hx of      Asthma No family hx of      C.A.D. No family hx of      DIABETES No family hx of      Hypertension No family hx of      Prostate Cancer No family hx of      Allergies No family hx of      Alzheimer Disease No family hx of      Anesthesia Reaction No family hx of      Arthritis No family hx of      Blood Disease No family hx of      Cardiovascular No family hx of      Congenital Anomalies No family hx of      Connective Tissue Disorder No family hx of      Depression No family hx of      Endocrine Disease No family hx of      Eye Disorder No family hx of      Genetic Disorder No family hx of      GASTROINTESTINAL DISEASE No family hx of      Genitourinary Problems No family hx of      Gynecology No family hx of      HEART DISEASE No family hx of      Lipids No family hx of      Musculoskeletal Disorder No family hx of      Neurologic Disorder No family hx of      Obesity No family hx of      OSTEOPOROSIS No family hx of      Psychotic Disorder No family hx of      Respiratory No family hx of      Thyroid Disease No family hx of      Family History Negative No family hx of      Hearing Loss No family hx of          Current Outpatient Prescriptions   Medication Sig Dispense Refill     gabapentin (NEURONTIN) 300 MG capsule TAKE 2 CAPSULES (600 MG) BY MOUTH 4 TIMES DAILY 240 capsule 5     Ginkgo Biloba (GINKOBA PO) Take  "40 mg by mouth daily       Glucosamine 750 MG TABS Take 2 tablets by mouth daily       Omega-3 Fatty Acids (OMEGA-3 FISH OIL PO) Take 3 g by mouth daily        Allergies   Allergen Reactions     Atorvastatin      Ezetimibe      Gentamicin      Hydroxyzine      Lorazepam      Ranitidine      Simvastatin      Lopid [Gemfibrozil] GI Disturbance     Bloating, constipation,      Pravastatin Muscle Pain (Myalgia)     Recent Labs   Lab Test  03/24/17   0848  12/30/16   0851  12/16/16   1434  07/20/16   0904  06/22/15   0854   LDL  76  47   --   97  138*   HDL  54  57   --   44*  49*   TRIG  152*  128   --   223*  112   ALT  68*   --   42   --   29   CR  0.96   --   0.88  0.84  1.00   GFRESTIMATED  57*   --   63  67  55*   GFRESTBLACK  69   --   76  81  67   POTASSIUM  4.4   --   3.7  4.0  4.1   TSH  4.00   --    --   3.21   --       BP Readings from Last 3 Encounters:   06/02/17 130/68   04/24/17 130/64   03/24/17 122/70    Wt Readings from Last 3 Encounters:   06/02/17 184 lb 6.4 oz (83.6 kg)   04/24/17 190 lb 5.8 oz (86.3 kg)   03/24/17 184 lb 9.6 oz (83.7 kg)                    Reviewed and updated as needed this visit by clinical staff  Allergies  Meds  Problems       Reviewed and updated as needed this visit by Provider         ROS:  Constitutional, HEENT, cardiovascular, pulmonary, gi and gu systems are negative, except as otherwise noted.    OBJECTIVE:                                                    /68 (BP Location: Left arm, Patient Position: Chair, Cuff Size: Adult Large)  Pulse 71  Temp 98.2  F (36.8  C) (Tympanic)  Resp 16  Ht 5' 1\" (1.549 m)  Wt 184 lb 6.4 oz (83.6 kg)  SpO2 94%  BMI 34.84 kg/m2  Body mass index is 34.84 kg/(m^2).  GENERAL: healthy, alert and no distress  NECK: no adenopathy, no asymmetry, masses, or scars and thyroid normal to palpation  RESP: lungs clear to auscultation - no rales, rhonchi or wheezes  CV: regular rate and rhythm, normal S1 S2, no S3 or S4, no murmur, click " or rub, no peripheral edema and peripheral pulses strong  ABDOMEN: soft, nontender, no hepatosplenomegaly, no masses and bowel sounds normal  MS: no gross musculoskeletal defects noted, no edema  PSYCH: mentation appears normal, affect normal/bright    Results for orders placed or performed in visit on 06/02/17   XR CHEST 2 VW (Clinic Performed)    Narrative    CHEST TWO VIEWS    FINDINGS:  The lungs are clear.  The heart and pulmonary vessels are  within normal limits.    IMPRESSION:  NO EVIDENCE OF ACTIVE CARDIOPULMONARY DISEASE.  Exam Date: Jun 02, 2017 02:48:00 PM  Author: FILI MONSON  This report is final and null       EKG:  No acute ST changes noted.       ASSESSMENT/PLAN:                                                          1. Dizziness  - US Carotid Bilateral    2. Chest heaviness  - XR CHEST 2 VW (Clinic Performed); Future  - EKG 12-lead complete w/read - (Clinic Performed)  - XR CHEST 2 VW (Clinic Performed)  - NM Exercise stress test; Future  - NM Exercise stress test    FUTURE APPOINTMENTS:       - Follow-up visit as needed - to the ED with acute symptoms    Randi Haddad NP  [facility]

## 2017-06-02 NOTE — MR AVS SNAPSHOT
After Visit Summary   6/2/2017    Nella Lemon    MRN: [id number]           Patient Information     Date Of Birth          1945        Visit Information        Provider Department      6/2/2017 2:15 PM Randi Haddad NP [facility]        Today's Diagnoses     Dizziness    -  1    Chest heaviness           Follow-ups after your visit        Your next 10 appointments already scheduled     Aug 14, 2017  9:00 AM CDT   Hearing Evaluation with Joss Palafox   [facility] [place] ([facility] [place] [facility])    [address]  [place] [address]   [phone]           Please see your medical professional for ear cleaning prior to this appointment if you believe wax buildup may be an issue. All patients are required to have a physician's order stating the medical reason for the hearing test. Your doctor can send an electronic order, use their own form or we have provided a form (called Physician's Order for Audiology Services). It states that there is a medical reason for your exam. Without an order you may need to be rescheduled until the order can be obtained.            Sep 25, 2017  8:30 AM CDT   (Arrive by 8:15 AM)   SHORT with Randi Haddad NP   [facility] ([facility] )    [address] [address]   [phone]              Who to contact     If you have questions or need follow up information about today's clinic visit or your schedule please contact [facility] directly at [phone].  Normal or non-critical lab and imaging results will be communicated to you by MyChart, letter or phone within 4 business days after the clinic has received the results. If you do not hear from us within 7 days, please contact the clinic through [employer] or phone. If you have a critical or abnormal lab result, we will notify you by phone as soon as possible.  Submit refill requests through [employer] or  "call your pharmacy and they will forward the refill request to us. Please allow 3 business days for your refill to be completed.          Additional Information About Your Visit        MyChart Information     [employer] lets you send messages to your doctor, view your test results, renew your prescriptions, schedule appointments and more. To sign up, go to [email or web address] . Click on \"Log in\" on the left side of the screen, which will take you to the Welcome page. Then click on \"Sign up Now\" on the right side of the page.     You will be asked to enter the access code listed below, as well as some personal information. Please follow the directions to create your username and password.     Your access code is: [email or web address]  Expires: 2017  8:49 AM     Your access code will  in 90 days. If you need help or a new code, please call your [place] clinic or [phone].        Care EveryWhere ID     This is your [facility] EveryWhere ID. This could be used by other organizations to access your [facility] medical records  [id number]        Your Vitals Were     Pulse Temperature Respirations Height Pulse Oximetry BMI (Body Mass Index)    71 98.2  F (36.8  C) (Tympanic) 16 5' 1\" (1.549 m) 94% 34.84 kg/m2       Blood Pressure from Last 3 Encounters:   17 130/68   17 130/64   17 122/70    Weight from Last 3 Encounters:   17 184 lb 6.4 oz (83.6 kg)   17 190 lb 5.8 oz (86.3 kg)   17 184 lb 9.6 oz (83.7 kg)              We Performed the Following     EKG 12-lead complete w/read - (Clinic Performed)     US Carotid Bilateral     XR CHEST 2 VW (Clinic Performed)        Primary Care Provider Office Phone # Fax #    Laila Tyler [phone]       [facility] [address] [address]        Thank you!     Thank you for choosing [facility]  for your care. Our goal is always to provide you with excellent care. Hearing back from our patients is one " way we can continue to improve our services. Please take a few minutes to complete the written survey that you may receive in the mail after your visit with us. Thank you!             Your Updated Medication List - Protect others around you: Learn how to safely use, store and throw away your medicines at www.disposemymeds.org.          This list is accurate as of: 6/2/17 11:59 PM.  Always use your most recent med list.                   Brand Name Dispense Instructions for use    gabapentin 300 MG capsule    NEURONTIN    240 capsule    TAKE 2 CAPSULES (600 MG) BY MOUTH 4 TIMES DAILY       GINKOBA PO      Take 40 mg by mouth daily       Glucosamine 750 MG Tabs      Take 2 tablets by mouth daily       OMEGA-3 FISH OIL PO      Take 3 g by mouth daily

## 2017-06-03 ASSESSMENT — PATIENT HEALTH QUESTIONNAIRE - PHQ9: SUM OF ALL RESPONSES TO PHQ QUESTIONS 1-9: 1

## 2017-06-03 ASSESSMENT — ANXIETY QUESTIONNAIRES: GAD7 TOTAL SCORE: 0

## 2017-06-07 DIAGNOSIS — M79.10 MYALGIA: ICD-10-CM

## 2017-06-07 RX ORDER — GABAPENTIN 300 MG/1
CAPSULE ORAL
Qty: 240 CAPSULE | Refills: 5 | Status: SHIPPED | OUTPATIENT
Start: 2017-06-07 | End: 2017-11-22

## 2017-06-12 DIAGNOSIS — R07.89 OTHER CHEST PAIN: Primary | ICD-10-CM

## 2017-06-13 ENCOUNTER — HOSPITAL ENCOUNTER (OUTPATIENT)
Dept: NUCLEAR MEDICINE | Facility: HOSPITAL | Age: 72
Discharge: HOME | End: 2017-06-13
Attending: NURSE PRACTITIONER | Admitting: NURSE PRACTITIONER
Payer: COMMERCIAL

## 2017-06-13 PROCEDURE — 93017 CV STRESS TEST TRACING ONLY: CPT | Mod: TC

## 2017-06-13 PROCEDURE — A9500 TC99M SESTAMIBI: HCPCS | Mod: TC

## 2017-06-13 PROCEDURE — 93018 CV STRESS TEST I&R ONLY: CPT | Performed by: INTERNAL MEDICINE

## 2017-06-13 PROCEDURE — 93016 CV STRESS TEST SUPVJ ONLY: CPT | Performed by: INTERNAL MEDICINE

## 2017-06-13 PROCEDURE — 78452 HT MUSCLE IMAGE SPECT MULT: CPT | Mod: TC

## 2017-06-14 ENCOUNTER — HOSPITAL ENCOUNTER (OUTPATIENT)
Dept: ULTRASOUND IMAGING | Facility: HOSPITAL | Age: 72
Discharge: HOME | End: 2017-06-14
Attending: NURSE PRACTITIONER | Admitting: NURSE PRACTITIONER
Payer: COMMERCIAL

## 2017-06-14 ENCOUNTER — APPOINTMENT (OUTPATIENT)
Dept: AUDIOLOGY | Facility: OTHER | Age: 72
End: 2017-06-14
Attending: AUDIOLOGIST
Payer: COMMERCIAL

## 2017-06-14 PROCEDURE — 93880 EXTRACRANIAL BILAT STUDY: CPT | Mod: TC

## 2017-07-10 DIAGNOSIS — H91.93 DECREASED HEARING, BILATERAL: Primary | ICD-10-CM

## 2017-08-25 ENCOUNTER — OFFICE VISIT (OUTPATIENT)
Dept: AUDIOLOGY | Facility: OTHER | Age: 72
End: 2017-08-25
Attending: NURSE PRACTITIONER
Payer: COMMERCIAL

## 2017-08-25 DIAGNOSIS — H90.3 SENSORINEURAL HEARING LOSS, BILATERAL: Primary | ICD-10-CM

## 2017-08-25 PROCEDURE — 92593 ZZHC HEARING AID CHECK, BINAURAL: CPT | Performed by: AUDIOLOGIST

## 2017-08-25 PROCEDURE — 92556 SPEECH AUDIOMETRY COMPLETE: CPT | Performed by: AUDIOLOGIST

## 2017-08-25 PROCEDURE — 92552 PURE TONE AUDIOMETRY AIR: CPT | Performed by: AUDIOLOGIST

## 2017-08-25 NOTE — PROGRESS NOTES
Audiology Evaluation Completed.   Hearing aid check compeltd.  Please refer SCANNED AUDIOGRAM and/or TYMPANOGRAM for BACKGROUND, RESULTS, RECOMMENDATIONS.      Trini BIANCHI, [facility]  Audiologist #[id number]

## 2017-08-25 NOTE — MR AVS SNAPSHOT
"              After Visit Summary   8/25/2017    Nella Lemon    MRN: [id number]           Patient Information     Date Of Birth          1945        Visit Information        Provider Department      8/25/2017 9:15 AM Trini Dougherty AuD [facility]        Today's Diagnoses     Sensorineural hearing loss, bilateral    -  1       Follow-ups after your visit        Your next 10 appointments already scheduled     Aug 25, 2017  9:15 AM CDT   (Arrive by 9:00 AM)   Hearing Eval with Joss Palafox   [facility] ([facility] )    [address]  [facility] [address]   [phone]            Sep 25, 2017  8:30 AM CDT   (Arrive by 8:15 AM)   SHORT with Randi Haddad NP   [facility] ([facility] )    [address] [address]   [phone]              Who to contact     If you have questions or need follow up information about today's clinic visit or your schedule please contact [facility] directly at [phone].  Normal or non-critical lab and imaging results will be communicated to you by MyChart, letter or phone within 4 business days after the clinic has received the results. If you do not hear from us within 7 days, please contact the clinic through MyChart or phone. If you have a critical or abnormal lab result, we will notify you by phone as soon as possible.  Submit refill requests through [employer] or call your pharmacy and they will forward the refill request to us. Please allow 3 business days for your refill to be completed.          Additional Information About Your Visit        MyChart Information     [facility] lets you send messages to your doctor, view your test results, renew your prescriptions, schedule appointments and more. To sign up, go to [email or web address] . Click on \"Log in\" on the left side of the screen, which will take you to the Welcome " "page. Then click on \"Sign up Now\" on the right side of the page.     You will be asked to enter the access code listed below, as well as some personal information. Please follow the directions to create your username and password.     Your access code is: [email or web address]  Expires: 2017  9:11 AM     Your access code will  in 90 days. If you need help or a new code, please call your [place] clinic or [phone].        Care EveryWhere ID     This is your Care EveryWhere ID. This could be used by other organizations to access your [place] medical records  [id number]         Blood Pressure from Last 3 Encounters:   17 130/68   17 130/64   17 122/70    Weight from Last 3 Encounters:   17 184 lb 6.4 oz (83.6 kg)   17 190 lb 5.8 oz (86.3 kg)   17 184 lb 9.6 oz (83.7 kg)              We Performed the Following     AUDIOGRAM/TYMPANOGRAM - INTERFACE        Primary Care Provider Office Phone # Fax #    Randi Haddad [phone]       [facility] [address] [place]  [facility] [address]        Equal Access to Services     LUCIA GREENFIELD AH: Hadii aad ku hadasho Soomaali, waaxda luqadaha, qaybta kaalmada adeegyada, waxay deepthi haymartine raman . So [facility] [phone].    ATENCIÓN: Si habla español, tiene a mcclure disposición servicios gratuitos de asistencia lingüística. Llame al [phone].    We comply with applicable federal civil rights laws and [place] laws. We do not discriminate on the basis of race, color, national origin, age, disability sex, sexual orientation or gender identity.            Thank you!     Thank you for choosing [facility]  [facility] your [facility]. Our goal is always to provide you with excellent care. Hearing back from our patients is one way we can continue to improve our services. Please take a few minutes to complete the written survey that you may receive in the mail after your visit with us. Thank " you!             Your Updated Medication List - Protect others around you: Learn how to safely use, store and throw away your medicines at www.disposemymeds.org.          This list is accurate as of: 8/25/17  9:11 AM.  Always use your most recent med list.                   Brand Name Dispense Instructions for use Diagnosis    gabapentin 300 MG capsule    NEURONTIN    240 capsule    TAKE 2 CAPSULES (600 MG) BY MOUTH 4 TIMES DAILY    Myalgia       GINKOBA PO      Take 40 mg by mouth daily        Glucosamine 750 MG Tabs      Take 2 tablets by mouth daily        OMEGA-3 FISH OIL PO      Take 3 g by mouth daily

## 2017-09-25 ENCOUNTER — OFFICE VISIT (OUTPATIENT)
Dept: FAMILY MEDICINE | Facility: OTHER | Age: 72
End: 2017-09-25
Attending: NURSE PRACTITIONER
Payer: COMMERCIAL

## 2017-09-25 VITALS
WEIGHT: 184 LBS | DIASTOLIC BLOOD PRESSURE: 88 MMHG | HEIGHT: 62 IN | TEMPERATURE: 97.3 F | SYSTOLIC BLOOD PRESSURE: 136 MMHG | BODY MASS INDEX: 33.86 KG/M2 | RESPIRATION RATE: 16 BRPM | OXYGEN SATURATION: 98 % | HEART RATE: 53 BPM

## 2017-09-25 DIAGNOSIS — Z11.59 NEED FOR HEPATITIS C SCREENING TEST: ICD-10-CM

## 2017-09-25 DIAGNOSIS — Z12.31 ENCOUNTER FOR SCREENING MAMMOGRAM FOR BREAST CANCER: ICD-10-CM

## 2017-09-25 DIAGNOSIS — E78.5 HYPERLIPIDEMIA LDL GOAL <100: Primary | ICD-10-CM

## 2017-09-25 DIAGNOSIS — G62.9 NEUROPATHY: ICD-10-CM

## 2017-09-25 LAB
CHOLEST SERPL-MCNC: 202 MG/DL
HDLC SERPL-MCNC: 41 MG/DL
LDLC SERPL CALC-MCNC: 120 MG/DL
NONHDLC SERPL-MCNC: 161 MG/DL
T4 FREE SERPL-MCNC: 0.99 NG/DL (ref 0.76–1.46)
TRIGL SERPL-MCNC: 206 MG/DL
TSH SERPL DL<=0.005 MIU/L-ACNC: 5.63 MU/L (ref 0.4–4)

## 2017-09-25 PROCEDURE — 84443 ASSAY THYROID STIM HORMONE: CPT | Mod: ZL | Performed by: NURSE PRACTITIONER

## 2017-09-25 PROCEDURE — 86803 HEPATITIS C AB TEST: CPT | Mod: ZL | Performed by: NURSE PRACTITIONER

## 2017-09-25 PROCEDURE — 84439 ASSAY OF FREE THYROXINE: CPT | Mod: ZL | Performed by: NURSE PRACTITIONER

## 2017-09-25 PROCEDURE — 99000 SPECIMEN HANDLING OFFICE-LAB: CPT | Performed by: NURSE PRACTITIONER

## 2017-09-25 PROCEDURE — 99212 OFFICE O/P EST SF 10 MIN: CPT

## 2017-09-25 PROCEDURE — 99213 OFFICE O/P EST LOW 20 MIN: CPT

## 2017-09-25 PROCEDURE — 36415 COLL VENOUS BLD VENIPUNCTURE: CPT | Mod: ZL | Performed by: NURSE PRACTITIONER

## 2017-09-25 PROCEDURE — 99214 OFFICE O/P EST MOD 30 MIN: CPT | Performed by: NURSE PRACTITIONER

## 2017-09-25 PROCEDURE — 80061 LIPID PANEL: CPT | Mod: ZL | Performed by: NURSE PRACTITIONER

## 2017-09-25 ASSESSMENT — ANXIETY QUESTIONNAIRES
7. FEELING AFRAID AS IF SOMETHING AWFUL MIGHT HAPPEN: NOT AT ALL
2. NOT BEING ABLE TO STOP OR CONTROL WORRYING: SEVERAL DAYS
3. WORRYING TOO MUCH ABOUT DIFFERENT THINGS: NOT AT ALL
GAD7 TOTAL SCORE: 2
1. FEELING NERVOUS, ANXIOUS, OR ON EDGE: SEVERAL DAYS
6. BECOMING EASILY ANNOYED OR IRRITABLE: NOT AT ALL
5. BEING SO RESTLESS THAT IT IS HARD TO SIT STILL: NOT AT ALL
IF YOU CHECKED OFF ANY PROBLEMS ON THIS QUESTIONNAIRE, HOW DIFFICULT HAVE THESE PROBLEMS MADE IT FOR YOU TO DO YOUR WORK, TAKE CARE OF THINGS AT HOME, OR GET ALONG WITH OTHER PEOPLE: NOT DIFFICULT AT ALL

## 2017-09-25 ASSESSMENT — PAIN SCALES - GENERAL: PAINLEVEL: NO PAIN (0)

## 2017-09-25 ASSESSMENT — PATIENT HEALTH QUESTIONNAIRE - PHQ9
SUM OF ALL RESPONSES TO PHQ QUESTIONS 1-9: 3
5. POOR APPETITE OR OVEREATING: NOT AT ALL

## 2017-09-25 NOTE — PROGRESS NOTES
SUBJECTIVE:   Nella Lemon is a 72 year old female who presents to clinic today for the following health issues:      Hypertension Follow-up      Outpatient blood pressures are not being checked.    Low Salt Diet: not monitoring salt  BP Readings from Last 6 Encounters:   09/25/17 136/88   06/02/17 130/68   04/24/17 130/64   03/24/17 122/70   12/30/16 132/74   12/16/16 122/68                 Anxiety Follow-Up    Status since last visit: No change    Other associated symptoms:None    Complicating factors:   Significant life event: Yes-  Grandson is on the 5th floor in the hospital   Current substance abuse: None  Depression symptoms: No  LA NENA-7 SCORE 12/16/2016 6/2/2017 9/25/2017   Total Score 10 0 2     PHQ-9 SCORE 12/16/2016 6/2/2017 9/25/2017   Total Score - - -   Total Score 2 1 3       GAD7      She has been taking gabapentin for neuropathy which has been helping.  .              Amount of exercise or physical activity: 6-7 days/week for an average of 30-45 minutes    Problems taking medications regularly: No    Medication side effects: none  Diet: regular (no restrictions)    Lipids:  She is due for repeat labs today.  She did not start lipitor, did increase fish oil to three per day.      The 10-year ASCVD risk score (Danie PANCHITO Jr, et al., 2013) is: 11.8%    Values used to calculate the score:      Age: 72 years      Sex: Female      Is Non- : No      Diabetic: No      Tobacco smoker: No      Systolic Blood Pressure: 140 mmHg      Is BP treated: No      HDL Cholesterol: 54 mg/dL      Total Cholesterol: 160 mg/dL      Problem list and histories reviewed & adjusted, as indicated.  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, left     Family history of colon cancer     ACP (advance care planning)     Hyperlipidemia LDL goal <100     Past Surgical History:   Procedure Laterality Date     ADENOIDECTOMY       CHOLECYSTECTOMY  1981     COLONOSCOPY  2002     COLONOSCOPY  2012     COLONOSCOPY N/A 9/22/2014    Procedure: COLONOSCOPY;  Surgeon: Lavell Pavon DO;  Location: [facility]     CYSTOSCOPY  2007    Cystitis chronic     ORTHOPEDIC SURGERY  2002    carpal tunnel; RT, LT     TONSILLECTOMY         Social History   Substance Use Topics     Smoking status: Never Smoker     Smokeless tobacco: Never Used     Alcohol use Yes      Comment: once a year     Family History   Problem Relation Age of Onset     Breast Cancer Mother      Alcohol/Drug Mother      Cirrhosis     Other - See Comments Mother      goiter     CEREBROVASCULAR DISEASE Father      Circulatory Father      Alcohol/Drug Son      Cancer - colorectal Brother      Unknown/Adopted No family hx of      Asthma No family hx of      C.A.D. No family hx of      DIABETES No family hx of      Hypertension No family hx of      Prostate Cancer No family hx of      Allergies No family hx of      Alzheimer Disease No family hx of      Anesthesia Reaction No family hx of      Arthritis No family hx of      Blood Disease No family hx of      Cardiovascular No family hx of      Congenital Anomalies No family hx of      Connective Tissue Disorder No family hx of      Depression No family hx of      Endocrine Disease No family hx of      Eye Disorder No family hx of      Genetic Disorder No family hx of      GASTROINTESTINAL DISEASE No family hx of      Genitourinary Problems No family hx of      Gynecology No family hx of      HEART DISEASE No family hx of      Lipids No family hx of      Musculoskeletal Disorder No family hx of      Neurologic  Disorder No family hx of      Obesity No family hx of      OSTEOPOROSIS No family hx of      Psychotic Disorder No family hx of      Respiratory No family hx of      Thyroid Disease No family hx of      Family History Negative No family hx of      Hearing Loss No family hx of          Current Outpatient Prescriptions   Medication Sig Dispense Refill     Glucosamine Sulfate 1000 MG TABS Take 1,000 mg by mouth       gabapentin (NEURONTIN) 300 MG capsule TAKE 2 CAPSULES (600 MG) BY MOUTH 4 TIMES DAILY 240 capsule 5     Ginkgo Biloba (GINKOBA PO) Take 40 mg by mouth daily       Omega-3 Fatty Acids (OMEGA-3 FISH OIL PO) Take 3 g by mouth daily        Glucosamine 750 MG TABS Take 2 tablets by mouth daily       Allergies   Allergen Reactions     Atorvastatin      Ezetimibe      Gentamicin      Hydroxyzine      Lorazepam      Ranitidine      Simvastatin      Lopid [Gemfibrozil] GI Disturbance     Bloating, constipation,      Pravastatin Muscle Pain (Myalgia)     Recent Labs   Lab Test  03/24/17   0848  12/30/16   0851  12/16/16   1434  07/20/16   0904  06/22/15   0854   LDL  76  47   --   97  138*   HDL  54  57   --   44*  49*   TRIG  152*  128   --   223*  112   ALT  68*   --   42   --   29   CR  0.96   --   0.88  0.84  1.00   GFRESTIMATED  57*   --   63  67  55*   GFRESTBLACK  69   --   76  81  67   POTASSIUM  4.4   --   3.7  4.0  4.1   TSH  4.00   --    --   3.21   --       BP Readings from Last 3 Encounters:   09/25/17 140/84   06/02/17 130/68   04/24/17 130/64    Wt Readings from Last 3 Encounters:   09/25/17 184 lb (83.5 kg)   06/02/17 184 lb 6.4 oz (83.6 kg)   04/24/17 190 lb 5.8 oz (86.3 kg)            Reviewed and updated as needed this visit by clinical staffTobacco  Allergies  Meds  Med Hx  Surg Hx  Fam Hx  Soc Hx      Reviewed and updated as needed this visit by Provider         ROS:  Constitutional, HEENT, cardiovascular, pulmonary, gi and gu systems are negative, except as otherwise  "noted.      OBJECTIVE:   /84 (BP Location: Left arm, Patient Position: Chair, Cuff Size: Adult Regular)  Pulse 53  Temp 97.3  F (36.3  C) (Tympanic)  Resp 16  Ht 5' 2\" (1.575 m)  Wt 184 lb (83.5 kg)  SpO2 98%  BMI 33.65 kg/m2  Body mass index is 33.65 kg/(m^2).  GENERAL: healthy, alert and no distress  NECK: no adenopathy, no asymmetry, masses, or scars, thyroid normal to palpation and no carotid bruits  RESP: lungs clear to auscultation - no rales, rhonchi or wheezes  CV: regular rate and rhythm, normal S1 S2, no S3 or S4, no murmur, click or rub, no peripheral edema and peripheral pulses strong  MS: no gross musculoskeletal defects noted, no edema  PSYCH: mentation appears normal, affect normal/bright      ASSESSMENT/PLAN:       1. Hyperlipidemia LDL goal <100  chronic  - TSH with free T4 reflex  - Lipid Profile    2. Need for hepatitis C screening test  - Hepatitis C Screen Reflex to HCV RNA Quant and Genotype    3. Neuropathy (H)  Chronic, continue current plan    4. Encounter for screening mammogram for breast cancer  Due in October - MA Digital Screening Bilateral      FUTURE APPOINTMENTS:       - Follow-up visit annually or as needed for acute concerns    Randi Haddad NP  [facility]  "

## 2017-09-25 NOTE — MR AVS SNAPSHOT
After Visit Summary   9/25/2017    Nella Lemon    MRN: [id number]           Patient Information     Date Of Birth          1945        Visit Information        Provider Department      9/25/2017 8:30 AM Randi Haddad NP [facility]        Today's Diagnoses     Hyperlipidemia LDL goal <100    -  1    Need for hepatitis C screening test        Neuropathy (H)        Encounter for screening mammogram for breast cancer          Care Instructions        ASSESSMENT/PLAN:       1. Hyperlipidemia LDL goal <100  chronic  - TSH with free T4 reflex  - Lipid Profile    2. Need for hepatitis C screening test  - Hepatitis C Screen Reflex to HCV RNA Quant and Genotype    3. Neuropathy (H)  Chronic, continue current plan    4. Encounter for screening mammogram for breast cancer  Due in October - MA Digital Screening Bilateral      FUTURE APPOINTMENTS:       - Follow-up visit annually or as needed for acute concerns    Randi Haddad NP  [facility]          Follow-ups after your visit        Follow-up notes from your care team     Return in about 1 year (around 9/25/2018), or if symptoms worsen or fail to improve.      Your next 10 appointments already scheduled     Aug 27, 2018  9:30 AM CDT   (Arrive by 9:15 AM)   Hearing Eval with Joss Palafox   [facility] ([facility] )    [address] [place] [address]   [phone]              Who to contact     If you have questions or need follow up information about today's clinic visit or your schedule please contact [facility] directly at [phone].  Normal or non-critical lab and imaging results will be communicated to you by MyChart, letter or phone within 4 business days after the clinic has received the results. If you do not hear from us within 7 days, please contact the clinic through MyChart or phone. If you have a critical or abnormal lab  "result, we will notify you by phone as soon as possible.  Submit refill requests through [employer] or call your pharmacy and they will forward the refill request to us. Please allow 3 business days for your refill to be completed.          Additional Information About Your Visit        [employer] Information     [employer] lets you send messages to your doctor, view your test results, renew your prescriptions, schedule appointments and more. To sign up, go to [email or web address] . Click on \"Log in\" on the left side of the screen, which will take you to the Welcome page. Then click on \"Sign up Now\" on the right side of the page.     You will be asked to enter the access code listed below, as well as some personal information. Please follow the directions to create your username and password.     Your access code is: [email or web address]  Expires: 2017  9:11 AM     Your access code will  in 90 days. If you need help or a new code, please call your [place] clinic or [phone].        Care EveryWhere ID     This is your Care EveryWhere ID. This could be used by other organizations to access your [place] medical records  [id number]        Your Vitals Were     Pulse Temperature Respirations Height Pulse Oximetry BMI (Body Mass Index)    53 97.3  F (36.3  C) (Tympanic) 16 5' 2\" (1.575 m) 98% 33.65 kg/m2       Blood Pressure from Last 3 Encounters:   17 140/84   17 130/68   17 130/64    Weight from Last 3 Encounters:   17 184 lb (83.5 kg)   17 184 lb 6.4 oz (83.6 kg)   17 190 lb 5.8 oz (86.3 kg)              We Performed the Following     Hepatitis C Screen Reflex to HCV RNA Quant and Genotype     Lipid Profile     MA Digital Screening Bilateral     TSH with free T4 reflex        Primary Care Provider Office Phone # Fax #    Randi Haddda [phone]       [facility] [address] [place]  [facility] [address]        Equal Access to Services  "    LUCIA GREENFIELD : Hadii aad ku beena Bal, waaxda luqadaha, qaybta kaalmada deionanders, cristiane deepthi haymartine salinasgabriellapam raman . So [facility] [phone].    ATENCIÓN: Si habla español, tiene a mcclure disposición servicios gratuitos de asistencia lingüística. Llame al [phone].    We comply with applicable federal civil rights laws and [place] laws. We do not discriminate on the basis of race, color, national origin, age, disability sex, sexual orientation or gender identity.            Thank you!     Thank you for choosing [facility]  for your care. Our goal is always to provide you with excellent care. Hearing back from our patients is one way we can continue to improve our services. Please take a few minutes to complete the written survey that you may receive in the mail after your visit with us. Thank you!             Your Updated Medication List - Protect others around you: Learn how to safely use, store and throw away your medicines at www.disposemymeds.org.          This list is accurate as of: 9/25/17  8:53 AM.  Always use your most recent med list.                   Brand Name Dispense Instructions for use Diagnosis    gabapentin 300 MG capsule    NEURONTIN    240 capsule    TAKE 2 CAPSULES (600 MG) BY MOUTH 4 TIMES DAILY    Myalgia       GINKOBA PO      Take 40 mg by mouth daily        Glucosamine 750 MG Tabs      Take 2 tablets by mouth daily        Glucosamine Sulfate 1000 MG Tabs      Take 1,000 mg by mouth        OMEGA-3 FISH OIL PO      Take 3 g by mouth daily

## 2017-09-25 NOTE — PATIENT INSTRUCTIONS
ASSESSMENT/PLAN:       1. Hyperlipidemia LDL goal <100  chronic  - TSH with free T4 reflex  - Lipid Profile    2. Need for hepatitis C screening test  - Hepatitis C Screen Reflex to HCV RNA Quant and Genotype    3. Neuropathy (H)  Chronic, continue current plan    4. Encounter for screening mammogram for breast cancer  Due in October - MA Digital Screening Bilateral      FUTURE APPOINTMENTS:       - Follow-up visit annually or as needed for acute concerns    Randi Haddad NP  [facility]

## 2017-09-25 NOTE — NURSING NOTE
"Chief Complaint   Patient presents with     Hypertension     Follow up     Anxiety     Follow up       Initial /84 (BP Location: Left arm, Patient Position: Chair, Cuff Size: Adult Regular)  Pulse 53  Temp 97.3  F (36.3  C) (Tympanic)  Resp 16  Ht 5' 2\" (1.575 m)  Wt 184 lb (83.5 kg)  SpO2 98%  BMI 33.65 kg/m2 Estimated body mass index is 33.65 kg/(m^2) as calculated from the following:    Height as of this encounter: 5' 2\" (1.575 m).    Weight as of this encounter: 184 lb (83.5 kg).  Medication Reconciliation: complete   Kelli Martinez LPN    "

## 2017-09-26 LAB — HCV AB SERPL QL IA: NONREACTIVE

## 2017-09-26 ASSESSMENT — ANXIETY QUESTIONNAIRES: GAD7 TOTAL SCORE: 2

## 2017-10-03 ENCOUNTER — ALLIED HEALTH/NURSE VISIT (OUTPATIENT)
Dept: FAMILY MEDICINE | Facility: OTHER | Age: 72
End: 2017-10-03
Attending: NURSE PRACTITIONER
Payer: COMMERCIAL

## 2017-10-03 VITALS — DIASTOLIC BLOOD PRESSURE: 84 MMHG | SYSTOLIC BLOOD PRESSURE: 146 MMHG

## 2017-10-03 DIAGNOSIS — Z01.30 BLOOD PRESSURE CHECK: Primary | ICD-10-CM

## 2017-10-03 PROCEDURE — 99207 ZZC NO CHARGE NURSE ONLY: CPT

## 2017-10-03 NOTE — MR AVS SNAPSHOT
After Visit Summary   10/3/2017    Nella Lemon    MRN: [id number]           Patient Information     Date Of Birth          1945        Visit Information        Provider Department      10/3/2017 8:30 AM [facility] NURSE [facility]        Today's Diagnoses     Blood pressure check    -  1       Follow-ups after your visit        Your next 10 appointments already scheduled     Nov 01, 2017  9:30 AM CDT   MA SCREENING DIGITAL BILATERAL with MTMA1   [facility] ([facility] )    [address] [facility] [address]   [phone]           Do not use any powder, lotion or deodorant under your arms or on your breast. If you do, we will ask you to remove it before your exam.  Wear comfortable, two-piece clothing.  If you have any allergies, tell your care team.  Bring any previous mammograms from other facilities or have them mailed to the breast center.            Aug 27, 2018  9:30 AM CDT   (Arrive by 9:15 AM)   Hearing Eval with Joss Palafox   [facility] ([facility] )    [address] [place] [address]   [phone]              Future tests that were ordered for you today     Open Future Orders        Priority Expected Expires Ordered    MA Screening Digital Bilateral Routine  10/2/2018 10/2/2017            Who to contact     If you have questions or need follow up information about today's clinic visit or your schedule please contact [facility] directly at [phone].  Normal or non-critical lab and imaging results will be communicated to you by MyChart, letter or phone within 4 business days after the clinic has received the results. If you do not hear from us within 7 days, please contact the clinic through [employer] or phone. If you have a critical or abnormal lab result, we will notify you by phone as soon as possible.  Submit refill requests through [employer]  "or call your pharmacy and they will forward the refill request to us. Please allow 3 business days for your refill to be completed.          Additional Information About Your Visit        MyChart Information     [employer] lets you send messages to your doctor, view your test results, renew your prescriptions, schedule appointments and more. To sign up, go to [email or web address] . Click on \"Log in\" on the left side of the screen, which will take you to the Welcome page. Then click on \"Sign up Now\" on the right side of the page.     You will be asked to enter the access code listed below, as well as some personal information. Please follow the directions to create your username and password.     Your access code is: [email or web address]  Expires: 2017  9:11 AM     Your access code will  in 90 days. If you need help or a new code, please call your [place] clinic or [phone].        Care EveryWhere ID     This is your Care EveryWhere ID. This could be used by other organizations to access your [place] medical records  [id number]         Blood Pressure from Last 3 Encounters:   10/03/17 146/84   17 136/88   17 130/68    Weight from Last 3 Encounters:   17 184 lb (83.5 kg)   17 184 lb 6.4 oz (83.6 kg)   17 190 lb 5.8 oz (86.3 kg)              Today, you had the following     No orders found for display       Primary Care Provider Office Phone # Fax #    Randi Haddad [phone]       [facility] [address] [place]  [facility] [address]        Equal Access to Services     LUCIA GREENFIELD : Hadii william Bal, wahiral lulindsayadaha, qaybta kaalmada anamaria, cristiane chun. So [facility] [phone].    ATENCIÓN: Si habla español, tiene a mcclure disposición servicios gratuitos de asistencia lingüística. Llame al [phone].    We comply with applicable federal civil rights laws and [place] laws. We do not " discriminate on the basis of race, color, national origin, age, disability, sex, sexual orientation, or gender identity.            Thank you!     Thank you for choosing [facility]  for your care. Our goal is always to provide you with excellent care. Hearing back from our patients is one way we can continue to improve our services. Please take a few minutes to complete the written survey that you may receive in the mail after your visit with us. Thank you!             Your Updated Medication List - Protect others around you: Learn how to safely use, store and throw away your medicines at www.disposemymeds.org.          This list is accurate as of: 10/3/17  8:57 AM.  Always use your most recent med list.                   Brand Name Dispense Instructions for use Diagnosis    gabapentin 300 MG capsule    NEURONTIN    240 capsule    TAKE 2 CAPSULES (600 MG) BY MOUTH 4 TIMES DAILY    Myalgia       GINKOBA PO      Take 40 mg by mouth daily        Glucosamine 750 MG Tabs      Take 2 tablets by mouth daily        Glucosamine Sulfate 1000 MG Tabs      Take 1,000 mg by mouth        OMEGA-3 FISH OIL PO      Take 3 g by mouth daily

## 2017-10-03 NOTE — NURSING NOTE
"Chief Complaint   Patient presents with     Allied Health Visit     BP Check       Initial /84 (BP Location: Left arm, Patient Position: Sitting, Cuff Size: Adult Regular) Estimated body mass index is 33.65 kg/(m^2) as calculated from the following:    Height as of 9/25/17: 5' 2\" (1.575 m).    Weight as of 9/25/17: 184 lb (83.5 kg).  Medication Reconciliation: sanjuana Bosch      "

## 2017-10-09 ENCOUNTER — TELEPHONE (OUTPATIENT)
Dept: FAMILY MEDICINE | Facility: OTHER | Age: 72
End: 2017-10-09

## 2017-10-09 DIAGNOSIS — I10 BENIGN ESSENTIAL HYPERTENSION: Primary | ICD-10-CM

## 2017-10-09 RX ORDER — LOSARTAN POTASSIUM 25 MG/1
12.5 TABLET ORAL DAILY
Qty: 15 TABLET | Refills: 1 | Status: SHIPPED | OUTPATIENT
Start: 2017-10-09 | End: 2017-10-16

## 2017-10-09 NOTE — TELEPHONE ENCOUNTER
Pt called and bp was 170/90 this am was wondering about bp med and also cholesterol medication if needed.  Pamela M Lechevalier LPN

## 2017-10-09 NOTE — TELEPHONE ENCOUNTER
Pt verbalized and will start tomorrow she is in the [facility] today noted to make appointment for follow up stated she dont remember what reaction she had with statin just thinks she wasn't feeling well when on them  Pamela M Lechevalier LPN

## 2017-10-11 ENCOUNTER — HOSPITAL ENCOUNTER (EMERGENCY)
Facility: HOSPITAL | Age: 72
Discharge: HOME | End: 2017-10-11
Admitting: INTERNAL MEDICINE
Payer: COMMERCIAL

## 2017-10-11 VITALS
BODY MASS INDEX: 32.92 KG/M2 | TEMPERATURE: 98.9 F | WEIGHT: 180 LBS | HEART RATE: 98 BPM | DIASTOLIC BLOOD PRESSURE: 102 MMHG | RESPIRATION RATE: 18 BRPM | SYSTOLIC BLOOD PRESSURE: 176 MMHG | OXYGEN SATURATION: 97 %

## 2017-10-11 DIAGNOSIS — Q18.1 EAR CYSTS: ICD-10-CM

## 2017-10-11 DIAGNOSIS — H66.92 ACUTE OTITIS MEDIA, LEFT: ICD-10-CM

## 2017-10-11 PROCEDURE — 99283 EMERGENCY DEPT VISIT LOW MDM: CPT

## 2017-10-11 PROCEDURE — 99284 EMERGENCY DEPT VISIT MOD MDM: CPT | Performed by: INTERNAL MEDICINE

## 2017-10-11 PROCEDURE — 25000132 ZZH RX MED GY IP 250 OP 250 PS 637: Performed by: INTERNAL MEDICINE

## 2017-10-11 RX ORDER — OXYCODONE HYDROCHLORIDE AND ACETAMINOPHEN 5; 325 MG/1; MG/1
1 TABLET ORAL EVERY 8 HOURS PRN
Qty: 6 TABLET | Refills: 0 | Status: SHIPPED | OUTPATIENT
Start: 2017-10-11 | End: 2017-11-22

## 2017-10-11 RX ORDER — OXYCODONE HYDROCHLORIDE AND ACETAMINOPHEN 5; 325 MG/1; MG/1
TABLET ORAL
Status: DISCONTINUED
Start: 2017-10-11 | End: 2017-10-11 | Stop reason: HOSPADM

## 2017-10-11 RX ORDER — NAPROXEN 500 MG/1
500 TABLET ORAL ONCE
Status: COMPLETED | OUTPATIENT
Start: 2017-10-11 | End: 2017-10-11

## 2017-10-11 RX ADMIN — AMOXICILLIN AND CLAVULANATE POTASSIUM 1 TABLET: 875; 125 TABLET, FILM COATED ORAL at 01:58

## 2017-10-11 RX ADMIN — NAPROXEN 500 MG: 500 TABLET ORAL at 01:58

## 2017-10-11 NOTE — DISCHARGE INSTRUCTIONS
Middle Ear Infection (Adult)  You have an infection of the middle ear, the space behind the eardrum. This is also called acute otitis media (AOM). Sometimes it is caused by the common cold. This is because congestion can block the internal passage (eustachian tube) that drains fluid from the middle ear. When the middle ear fills with fluid, bacteria can grow there and cause an infection. Oral antibiotics are used to treat this illness, not ear drops. Symptoms usually start to improve within 1 to 2 days of treatment.    Home care  The following are general care guidelines:    Finish all of the antibiotic medicine given, even though you may feel better after the first few days.    You may use over-the-counter medicine, such as acetaminophen or ibuprofen, to control pain and fever, unless something else was prescribed. If you have chronic liver or kidney disease or have ever had a stomach ulcer or gastrointestinal bleeding, talk with your healthcare provider before using these medicines. Do not give aspirin to anyone under 18 years of age who has a fever. It may cause severe illness or death.  Follow-up care  Follow up with your healthcare provider, or as advised, in 2 weeks if all symptoms have not gotten better, or if hearing doesn't go back to normal within 1 month.  When to seek medical advice  Call your healthcare provider right away if any of these occur:    Ear pain gets worse or does not improve after 3 days of treatment    Unusual drowsiness or confusion    Neck pain, stiff neck, or headache    Fluid or blood draining from the ear canal    Fever of 100.4 F (38 C) or as advised     Seizure  Date Last Reviewed: 6/1/2016 2000-2017 [employer]. [address] [place] [address], [place] [address]. All rights reserved. This information is not intended as a substitute for professional medical care. Always follow your healthcare professional's instructions.

## 2017-10-11 NOTE — ED AVS SNAPSHOT
[facility]    [address] [address]    Phone:  [phone]                                       Nella Lemon   MRN: [id number]    Department:  [facility]   Date of Visit:  10/11/2017           After Visit Summary Signature Page     I have received my discharge instructions, and my questions have been answered. I have discussed any challenges I see with this plan with the nurse or doctor.    ..........................................................................................................................................  Patient/Patient Representative Signature      ..........................................................................................................................................  Patient Representative Print Name and Relationship to Patient    ..................................................               ................................................  Date                                            Time    ..........................................................................................................................................  Reviewed by Signature/Title    ...................................................              ..............................................  Date                                                            Time

## 2017-10-16 ENCOUNTER — ALLIED HEALTH/NURSE VISIT (OUTPATIENT)
Dept: FAMILY MEDICINE | Facility: OTHER | Age: 72
End: 2017-10-16
Attending: NURSE PRACTITIONER
Payer: COMMERCIAL

## 2017-10-16 VITALS — DIASTOLIC BLOOD PRESSURE: 78 MMHG | SYSTOLIC BLOOD PRESSURE: 128 MMHG | HEART RATE: 80 BPM

## 2017-10-16 DIAGNOSIS — I10 BENIGN ESSENTIAL HYPERTENSION: Primary | ICD-10-CM

## 2017-10-16 PROCEDURE — 99207 ZZC NO CHARGE NURSE ONLY: CPT

## 2017-10-16 NOTE — PROGRESS NOTES
Pt here for blood pressure check, started Cozaar 9-11-17 , 12.5mg daily.  Blood pressures at home are running 170/88.  Today 128/78, pulse 80    Adina Morris

## 2017-10-16 NOTE — MR AVS SNAPSHOT
After Visit Summary   10/16/2017    Nella Lemon    MRN: [id number]           Patient Information     Date Of Birth          1945        Visit Information        Provider Department      10/16/2017 9:30 AM [facility] NURSE [facility]        Today's Diagnoses     Benign essential hypertension    -  1       Follow-ups after your visit        Your next 10 appointments already scheduled     Nov 01, 2017  9:30 AM CDT   MA SCREENING DIGITAL BILATERAL with MTMA1   [facility] ([facility] )    [address] [facility] [address]   [phone]           Do not use any powder, lotion or deodorant under your arms or on your breast. If you do, we will ask you to remove it before your exam.  Wear comfortable, two-piece clothing.  If you have any allergies, tell your care team.  Bring any previous mammograms from other facilities or have them mailed to the breast center.            Nov 09, 2017  1:15 PM CST   (Arrive by 1:00 PM)   Return Visit with Mildred Pineda MD   [facility] ([facility] )    [address]  [facility] [address]   [phone]            Aug 27, 2018  9:30 AM CDT   (Arrive by 9:15 AM)   Hearing Eval with Joss Palafox   [facility] )    [address]  [facility] [address]   [phone]              Who to contact     If you have questions or need follow up information about today's clinic visit or your schedule please contact [facility] directly at [phone].  Normal or non-critical lab and imaging results will be communicated to you by MyChart, letter or phone within 4 business days after the clinic has received the results. If you do not hear from us within 7 days, please contact the clinic through MyChart or phone. If you have a critical or abnormal lab result, we will notify you by phone as soon as  "possible.  Submit refill requests through [employer] or call your pharmacy and they will forward the refill request to us. Please allow 3 business days for your refill to be completed.          Additional Information About Your Visit        [employer] Information     [employer] lets you send messages to your doctor, view your test results, renew your prescriptions, schedule appointments and more. To sign up, go to [email or web address] . Click on \"Log in\" on the left side of the screen, which will take you to the Welcome page. Then click on \"Sign up Now\" on the right side of the page.     You will be asked to enter the access code listed below, as well as some personal information. Please follow the directions to create your username and password.     Your access code is: [email or web address]  Expires: 2017  9:11 AM     Your access code will  in 90 days. If you need help or a new code, please call your [place] clinic or [phone].        Care EveryWhere ID     This is your Care EveryWhere ID. This could be used by other organizations to access your [place] medical records  [id number]        Your Vitals Were     Pulse                   80            Blood Pressure from Last 3 Encounters:   10/16/17 128/78   10/11/17 (!) 176/102   10/03/17 146/84    Weight from Last 3 Encounters:   10/11/17 180 lb (81.6 kg)   17 184 lb (83.5 kg)   17 184 lb 6.4 oz (83.6 kg)              Today, you had the following     No orders found for display         Today's Medication Changes          These changes are accurate as of: 10/16/17  4:41 PM.  If you have any questions, ask your nurse or doctor.               These medicines have changed or have updated prescriptions.        Dose/Directions    GLUCOSAMINE SULFATE PO   This may have changed:  Another medication with the same name was removed. Continue taking this medication, and follow the directions you see here.        Dose:  1000 mg   Take 1,000 mg by mouth 2 times " daily   Refills:  0       LOSARTAN POTASSIUM PO   This may have changed:  Another medication with the same name was removed. Continue taking this medication, and follow the directions you see here.        Dose:  25 mg   Take 25 mg by mouth 1/2 tab daily   Refills:  0         Stop taking these medicines if you haven't already. Please contact your care team if you have questions.     Glucosamine 750 MG Tabs                    Primary Care Provider Office Phone # Fax #    Randi REIS CECILY Haddad [phone]       [facility] [address]  [facility] [address]        Equal Access to Services     [facility]: Hadii aad ku hadasho Soomaali, waaxda luqadaha, qaybta kaalmada adeegyada, cristiane raman . So [facility] [phone].    ATENCIÓN: Si habla español, tiene a mcclure disposición servicios gratuitos de asistencia lingüística. [facility] [phone].    We comply with applicable federal civil rights laws and [place] laws. We do not discriminate on the basis of race, color, national origin, age, disability, sex, sexual orientation, or gender identity.            Thank you!     Thank you for choosing [facility]  for your care. Our goal is always to provide you with excellent care. Hearing back from our patients is one way we can continue to improve our services. Please take a few minutes to complete the written survey that you may receive in the mail after your visit with us. Thank you!             Your Updated Medication List - Protect others around you: Learn how to safely use, store and throw away your medicines at www.disposemymeds.org.          This list is accurate as of: 10/16/17  4:41 PM.  Always use your most recent med list.                   Brand Name Dispense Instructions for use Diagnosis    amoxicillin-clavulanate 875-125 MG per tablet    AUGMENTIN    14 tablet    Take 1 tablet by mouth 2 times daily for 7 days        gabapentin 300 MG  capsule    NEURONTIN    240 capsule    TAKE 2 CAPSULES (600 MG) BY MOUTH 4 TIMES DAILY    Myalgia       GINKOBA PO      Take 40 mg by mouth daily        GLUCOSAMINE SULFATE PO      Take 1,000 mg by mouth 2 times daily        LOSARTAN POTASSIUM PO      Take 25 mg by mouth 1/2 tab daily        OMEGA-3 FISH OIL PO      Take 3 g by mouth daily        oxyCODONE-acetaminophen 5-325 MG per tablet    PERCOCET    6 tablet    Take 1 tablet by mouth every 8 hours as needed        VITAMIN D (CHOLECALCIFEROL) PO      Take by mouth daily

## 2017-10-16 NOTE — NURSING NOTE
"Chief Complaint   Patient presents with     Hypertension       Initial There were no vitals taken for this visit. Estimated body mass index is 32.92 kg/(m^2) as calculated from the following:    Height as of 9/25/17: 5' 2\" (1.575 m).    Weight as of 10/11/17: 180 lb (81.6 kg).  Medication Reconciliation: complete     Adina Morris      "

## 2017-10-16 NOTE — PROGRESS NOTES
BP Readings from Last 6 Encounters:   10/16/17 128/78   10/11/17 (!) 176/102   10/03/17 146/84   09/25/17 136/88   06/02/17 130/68   04/24/17 130/64     BP here today is normal, has she brought her cuff in for comparison?     Continue losartan.

## 2017-10-24 ENCOUNTER — OFFICE VISIT (OUTPATIENT)
Dept: OTOLARYNGOLOGY | Facility: OTHER | Age: 72
End: 2017-10-24
Attending: OTOLARYNGOLOGY
Payer: COMMERCIAL

## 2017-10-24 VITALS
DIASTOLIC BLOOD PRESSURE: 76 MMHG | SYSTOLIC BLOOD PRESSURE: 144 MMHG | HEART RATE: 74 BPM | HEIGHT: 61 IN | BODY MASS INDEX: 33.99 KG/M2 | TEMPERATURE: 97.5 F | WEIGHT: 180 LBS

## 2017-10-24 DIAGNOSIS — H69.92 DYSFUNCTION OF LEFT EUSTACHIAN TUBE: ICD-10-CM

## 2017-10-24 DIAGNOSIS — Q18.1 EAR CYSTS: ICD-10-CM

## 2017-10-24 DIAGNOSIS — H90.3 SENSORINEURAL HEARING LOSS (SNHL) OF BOTH EARS: ICD-10-CM

## 2017-10-24 DIAGNOSIS — H65.92 OME (OTITIS MEDIA WITH EFFUSION), LEFT: Primary | ICD-10-CM

## 2017-10-24 DIAGNOSIS — H90.8 MIXED HEARING LOSS, UNILATERAL: ICD-10-CM

## 2017-10-24 PROCEDURE — 2894A VOIDCORRECT: CPT | Mod: 25 | Performed by: OTOLARYNGOLOGY

## 2017-10-24 PROCEDURE — 99212 OFFICE O/P EST SF 10 MIN: CPT

## 2017-10-24 PROCEDURE — 99214 OFFICE O/P EST MOD 30 MIN: CPT | Mod: 25 | Performed by: OTOLARYNGOLOGY

## 2017-10-24 PROCEDURE — 31575 DIAGNOSTIC LARYNGOSCOPY: CPT | Performed by: OTOLARYNGOLOGY

## 2017-10-24 PROCEDURE — 92504 EAR MICROSCOPY EXAMINATION: CPT | Performed by: OTOLARYNGOLOGY

## 2017-10-24 ASSESSMENT — PAIN SCALES - GENERAL: PAINLEVEL: NO PAIN (0)

## 2017-10-24 NOTE — PROGRESS NOTES
"Otolaryngology Consultation    Patient: Nella Lemon  : 1945    Patient presents with:  RECHECK: Left Ear Cyst; Referred back by the ER      HPI:  Nella Lemon is a 72 year old female seen today for a left ear cyst and left hearing loss.  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    I noted \"left pinna and tragus without erythema or tenderness, no preauricular or parotid swelling.  There is a left  1.0 cm cystic mass at the intertragal notch, no ulceration.  Remainder of left canal without edema or erythema, non tender, left TM similarly with anterior retraction that improves with valsalva, normal bony landmarks appreciated. '      Hx of mild sloping to moderate to severe bilateral SNHL, SRT 35 dB  Audiogram 17 reviewed       Received: 2016   Reported: 2016 14:11   Ordering Phy(s): TITA GONZALEZ       SPECIMEN(S):   Skin, left branchial cleft cyst     FINAL DIAGNOSIS:     Skin, left neck, cyst wall, biopsy   - Inflamed squamous mucosa consistent with the clinical history of   branchial cleft cyst     Electronically signed out by:       Current Outpatient Rx   Medication Sig Dispense Refill     GLUCOSAMINE SULFATE PO Take 1,000 mg by mouth 2 times daily       LOSARTAN POTASSIUM PO Take 25 mg by mouth 1/2 tab daily       VITAMIN D, CHOLECALCIFEROL, PO Take by mouth daily       oxyCODONE-acetaminophen (PERCOCET) 5-325 MG per tablet Take 1 tablet by mouth every 8 hours as needed 6 tablet 0     gabapentin (NEURONTIN) 300 MG capsule TAKE 2 CAPSULES (600 MG) BY MOUTH 4 " "TIMES DAILY 240 capsule 5     Ginkgo Biloba (GINKOBA PO) Take 40 mg by mouth daily       Omega-3 Fatty Acids (OMEGA-3 FISH OIL PO) Take 3 g by mouth daily          Allergies: Atorvastatin; Ezetimibe; Gentamicin; Hydroxyzine; Lorazepam; Ranitidine; Simvastatin; Lopid [gemfibrozil]; and Pravastatin     Past Medical History:   Diagnosis Date     Anxiety state, unspecified 09/25/2003     Carpal tunnel syndrome 07/02/2002     Diarrhea 08/23/2002     Endometrial hyperplasia 01/27/2003     Esophageal reflux 09/28/2001     Family history of colon cancer 8/2/2016     Family history of malignant neoplasm of breast 11/13/2006     Hearing problem      Hyperlipidemia LDL goal <100 12/30/2016     Metrorrhagia 10/22/2003     Myalgia and myositis, unspecified 05/21/2002     Nonallopathic lesion of thoracic region, not elsewhere classified 05/19/2003     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     Palpitations 04/11/2001     Postmenopausal bleeding 09/09/2002     Precordial pain 04/05/2001     Unspecified essential hypertension 09/14/2001     Urgency of urination 06/06/2007       Past Surgical History:   Procedure Laterality Date     ADENOIDECTOMY       CHOLECYSTECTOMY  1981     COLONOSCOPY  2002     COLONOSCOPY  2012     COLONOSCOPY N/A 9/22/2014    Procedure: COLONOSCOPY;  Surgeon: Lavell Pavon DO;  Location: [facility]     CYSTOSCOPY  2007    Cystitis chronic     ORTHOPEDIC SURGERY  2002    carpal tunnel; RT, LT     TONSILLECTOMY         ENT family history reviewed    Social History   Substance Use Topics     Smoking status: Never Smoker     Smokeless tobacco: Never Used     Alcohol use Yes      Comment: once a year     10 point ROS negative other than that mentioned in the HPI and joint pain, weight gain    Physical Exam  /76 (Cuff Size: Adult Regular)  Pulse 74  Temp 97.5  F (36.4  C) (Tympanic)  Ht 5' 1\" (1.549 m)  Wt 180 lb (81.6 kg)  BMI 34.01 kg/m2  General - The patient is well nourished and " well developed, and appears to have good nutritional status.  Alert and oriented to person and place, answers questions and cooperates with examination appropriately.   Head and Face - Normocephalic and atraumatic, with no gross asymmetry noted.  The facial nerve is intact, with strong symmetric movements.  Voice and Breathing - The patient was breathing comfortably without the use of accessory muscles. There was no wheezing, stridor, or stertor.  The patients voice was clear and strong, and had appropriate pitch and quality.  Ears -examined under microscopy bilaterally  the external auditory canals are patent, the right tympanic membrane is intact without effusion, retraction or mass.  Bony landmarks are intact.  Left mastoid non tender, pinna non tender, no ear cysts or masses, no parotid masses.  Tragus and intertragal notch without cyst, edema or tenderness  Left EAC clear, no edema or tenderness, no otorrhea  Left MT dull with serous effusion, minimal movement with valsalva  Normal bony landmarks appreciated  Eyes - Extraocular movements intact, and the pupils were reactive to light.  Sclera were not icteric or injected, conjunctiva were pink and moist.  Mouth - Examination of the oral cavity showed pink, healthy oral mucosa. No lesions or ulcerations noted.  The tongue was mobile and midline, and the dentition were in good condition.    Throat - The walls of the oropharynx were smooth, pink, moist, symmetric, and had no lesions or ulcerations.  The tonsillar pillars and soft palate were symmetric.  The uvula was midline on elevation.  Small rest of tonsillar tissue regrowth mid to upper pole, no mass, 1.0 cm patch or so which is erythematous without exudate  Neck - Normal midline excursion of the laryngotracheal complex during swallowing.  Full range of motion on passive movement.  Palpation of the occipital, submental, submandibular, internal jugular chain, and supraclavicular nodes did not demonstrate any  abnormal lymph nodes or masses.  Palpation of the thyroid was soft and smooth, with no nodules or goiter appreciated.  The trachea was mobile and midline.  Nose - External contour is symmetric, no gross deflection or scars.  Nasal mucosa is pink and moist with no abnormal mucus.  The septum and turbinates were evaluated: non obstructive.  No polyps, masses, or purulence noted on examination.    Attempts at mirror laryngoscopy were not possible due to gag reflex.  Therefore I proceeded with a fiberoptic examination after informed consent.  First I sprayed both sides of the nose with a mixture of lidocaine and neosynephrine.  I then passed the scope through the nasal cavity.     The nasopharynx was mucosally covered and symmetric.  There is erythema and mild edema at the ET mucosa, no mass, NP patent.   The eustachian tube openings were unobstructed.  Going further down I had a clear view of the base of tongue which had normal appearing lingual tonsillar tissue.   There is no supraglottic mass or cystic drainage.  The base of tongue was free of lesions, and the vallecula was open.  The epiglottis was smooth and mucosally covered.  The supraglottic larynx was then clearly visualized.  The vocal cords moved smoothly and symmetrically and were pearly white.  The pyriform sinuses were open and without mayra mass or pooling of secretions upon valsalva, and the limited view of the postcricoid region did not show any lesions.  The patient tolerated the procedure well.        Impression and Plan- Nella Lemon is a 72 year old female with:    ICD-10-CM    1. OME (otitis media with effusion), left H65.92    2. Dysfunction of left eustachian tube H69.82    3. Mixed hearing loss, unilateral H90.8    4. Sensorineural hearing loss (SNHL) of both ears H90.3    5. 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   Alternatives to tympanostomy tube insertion were discussed, and are largely limited to surveillance.  Medical therapy (antibiotics, antihistamines, decongestants, systemic steroids, and topical nasal steroids) are ineffective for bilateral chronic otitis media with effusion, and not recommended.  Antibiotics are indicated in recurrent acute otitis media during active infections.  All questions were answered and the patient/and or guardian wishes are to proceed with surgical intervention.     Consider Endoscopic dilation ET, also d/w today, if persistent    No indication of a branchial cleft cyst today, although this could have resolved with abx treatment      Mildred Pineda D.O.  Otolaryngology/Head and Neck Surgery  Allergy

## 2017-10-24 NOTE — NURSING NOTE
"Chief Complaint   Patient presents with     RECHECK     Left Ear Cyst; Referred back by the ER       Initial /76 (Cuff Size: Adult Regular)  Pulse 74  Temp 97.5  F (36.4  C) (Tympanic)  Ht 5' 1\" (1.549 m)  Wt 180 lb (81.6 kg)  BMI 34.01 kg/m2 Estimated body mass index is 34.01 kg/(m^2) as calculated from the following:    Height as of this encounter: 5' 1\" (1.549 m).    Weight as of this encounter: 180 lb (81.6 kg).  Medication Reconciliation: complete   Melissa Bosch      "

## 2017-10-24 NOTE — MR AVS SNAPSHOT
After Visit Summary   10/24/2017    Nella Lemon    MRN: [id number]           Patient Information     Date Of Birth          1945        Visit Information        Provider Department      10/24/2017 1:45 PM Mildred Pineda MD [facility]        Care Instructions    Thank you for allowing Dr. Pineda and our ENT team to participate in your care.  If you have a scheduling or an appointment question please contact [facility] our Health Unit Coordinator at their direct line [phone].   ALL nursing questions or concerns can be directed to your ENT nurse at: [phone] - Melissa    Complete Valsalva Exercises 3-4 times daily  Complete Audiogram if you wish to pursue tubes  Follow up with ENT as needed          Follow-ups after your visit        Your next 10 appointments already scheduled     Nov 01, 2017  9:30 AM CDT   MA SCREENING DIGITAL BILATERAL with MTMA1   [facility] ([facility] )    [address] [facility] [address] [facility] [address]   [phone]           Do not use any powder, lotion or deodorant under your arms or on your breast. If you do, we will ask you to remove it before your exam.  Wear comfortable, two-piece clothing.  If you have any allergies, tell your care team.  Bring any previous mammograms from other facilities or have them mailed to the breast center.            Aug 27, 2018  9:30 AM CDT   (Arrive by 9:15 AM)   Hearing Eval with Joss Palafox   [facility] ([facility] )    [address]  [facility] [address]   [phone]              Who to contact     If you have questions or need follow up information about today's clinic visit or your schedule please contact [facility] directly at [phone].  Normal or non-critical lab and imaging results will be communicated to you by MyChart, letter or phone within 4 business days after the clinic has  "received the results. If you do not hear from us within 7 days, please contact the clinic through [employer] or phone. If you have a critical or abnormal lab result, we will notify you by phone as soon as possible.  Submit refill requests through [employer] or call your pharmacy and they will forward the refill request to us. Please allow 3 business days for your refill to be completed.          Additional Information About Your Visit        [employer] Information     [employer] lets you send messages to your doctor, view your test results, renew your prescriptions, schedule appointments and more. To sign up, go to [email or web address] . Click on \"Log in\" on the left side of the screen, which will take you to the Welcome page. Then click on \"Sign up Now\" on the right side of the page.     You will be asked to enter the access code listed below, as well as some personal information. Please follow the directions to create your username and password.     Your access code is: [email or web address]  Expires: 2017  9:11 AM     Your access code will  in 90 days. If you need help or a new code, please call your [facility] clinic or [phone].        Care EveryWhere ID     This is your Care EveryWhere ID. This could be used by other organizations to access your [facility] medical records  [id number]        Your Vitals Were     Pulse Temperature Height BMI (Body Mass Index)          74 97.5  F (36.4  C) (Tympanic) 5' 1\" (1.549 m) 34.01 kg/m2         Blood Pressure from Last 3 Encounters:   10/24/17 144/76   10/16/17 128/78   10/11/17 (!) 176/102    Weight from Last 3 Encounters:   10/24/17 180 lb (81.6 kg)   10/11/17 180 lb (81.6 kg)   17 184 lb (83.5 kg)              Today, you had the following     No orders found for display       Primary Care Provider Office Phone # Fax #    Randi Haddad [phone]       [facility] [address] [place]  [facility] [address]        Equal Access to " Services     [facility]: Hadii william Bal, waprasadda luqadaha, qaybta kaalmaanders conrad, cristiane raman . So [facility] [phone].    ATENCIÓN: Si habla ruth, tiene a mcclure disposición servicios gratuitos de asistencia lingüística. Llame al [phone].    We comply with applicable federal civil rights laws and [place] laws. We do not discriminate on the basis of race, color, national origin, age, disability, sex, sexual orientation, or gender identity.            Thank you!     Thank you for choosing [facility]  for your care. Our goal is always to provide you with excellent care. Hearing back from our patients is one way we can continue to improve our services. Please take a few minutes to complete the written survey that you may receive in the mail after your visit with us. Thank you!             Your Updated Medication List - Protect others around you: Learn how to safely use, store and throw away your medicines at www.disposemymeds.org.          This list is accurate as of: 10/24/17  2:26 PM.  Always use your most recent med list.                   Brand Name Dispense Instructions for use Diagnosis    gabapentin 300 MG capsule    NEURONTIN    240 capsule    TAKE 2 CAPSULES (600 MG) BY MOUTH 4 TIMES DAILY    Myalgia       GINKOBA PO      Take 40 mg by mouth daily        GLUCOSAMINE SULFATE PO      Take 1,000 mg by mouth 2 times daily        LOSARTAN POTASSIUM PO      Take 25 mg by mouth 1/2 tab daily        OMEGA-3 FISH OIL PO      Take 3 g by mouth daily        oxyCODONE-acetaminophen 5-325 MG per tablet    PERCOCET    6 tablet    Take 1 tablet by mouth every 8 hours as needed        VITAMIN D (CHOLECALCIFEROL) PO      Take by mouth daily

## 2017-10-24 NOTE — PATIENT INSTRUCTIONS
Thank you for allowing Dr. Pineda and our ENT team to participate in your care.  If you have a scheduling or an appointment question please contact [facility] Unit Coordinator at their direct line [phone].   ALL nursing questions or concerns can be directed to your ENT nurse at: [phone] Bridgette Torres    Complete Valsalva Exercises 3-4 times daily  Complete Audiogram if you wish to pursue tubes  Follow up with ENT as needed

## 2017-10-27 ENCOUNTER — TELEPHONE (OUTPATIENT)
Dept: ALLERGY | Facility: OTHER | Age: 72
End: 2017-10-27

## 2017-10-27 NOTE — TELEPHONE ENCOUNTER
Pt called to say that she is doing great with the valsalva exercises.  She does not want to do any further procedures at this time.

## 2017-10-30 ASSESSMENT — ENCOUNTER SYMPTOMS
VOICE CHANGE: 0
WHEEZING: 0
FEVER: 0
DYSURIA: 0
BACK PAIN: 0
SHORTNESS OF BREATH: 0
COLOR CHANGE: 0
CHILLS: 0
ARTHRALGIAS: 0
HEADACHES: 0
VOMITING: 0
DIAPHORESIS: 0
CONFUSION: 0
DIZZINESS: 0
NUMBNESS: 0
COUGH: 0
HEMATURIA: 0
PALPITATIONS: 0
NECK STIFFNESS: 0
ANAL BLEEDING: 0
ABDOMINAL DISTENTION: 0
NECK PAIN: 0
ABDOMINAL PAIN: 0
NAUSEA: 0
MYALGIAS: 0
CHEST TIGHTNESS: 0
LIGHT-HEADEDNESS: 0
BLOOD IN STOOL: 0
FLANK PAIN: 0
WOUND: 0

## 2017-10-31 NOTE — ED PROVIDER NOTES
History     Chief Complaint   Patient presents with     Otalgia     left side     Patient is a 72 year old female presenting with ear pain. The history is provided by the patient.   Ear Problem   Location:  Left  Behind ear:  No abnormality  Quality:  Aching  Onset quality:  Gradual  Timing:  Constant  Chronicity:  Recurrent  Associated symptoms: hearing loss    Associated symptoms: no abdominal pain, no cough, no fever, no headaches, no neck pain, no rash and no vomiting        Problem List:    Patient Active Problem List    Diagnosis Date Noted     Hyperlipidemia LDL goal <100 12/30/2016     Priority: Medium     ACP (advance care planning) 12/16/2016     Priority: Medium     Advance Care Planning 12/16/2016: ACP Review of Chart / Resources Provided:  Reviewed chart for advance care plan.  Nella Lemon has been provided information and resources to begin or update their advance care plan.  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  Nella Lemon has no plan or code status on file however states presence of ACP document. Copy requested. Confirmed code status reflects current choices pending receipt of document/advance care plan review.  Confirmed/documented legally designated decision makers.  Added by Fiona Orellana             Family history of malignant neoplasm of breast 11/13/2006     Priority: Medium     Anxiety state 09/25/2003     Priority: Medium     Neuropathy 05/21/2002      Priority: Medium     Gastroesophageal reflux disease without esophagitis 09/28/2001     Priority: Medium     Benign essential hypertension 09/14/2001     Priority: Medium     Palpitations 04/11/2001     Priority: Medium        Past Medical History:    Past Medical History:   Diagnosis Date     Anxiety state, unspecified 09/25/2003     Carpal tunnel syndrome 07/02/2002     Diarrhea 08/23/2002     Endometrial hyperplasia 01/27/2003     Esophageal reflux 09/28/2001     Family history of colon cancer 8/2/2016     Family history of malignant neoplasm of breast 11/13/2006     Hearing problem      Hyperlipidemia LDL goal <100 12/30/2016     Metrorrhagia 10/22/2003     Myalgia and myositis, unspecified 05/21/2002     Nonallopathic lesion of thoracic region, not elsewhere classified 05/19/2003     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     Palpitations 04/11/2001     Postmenopausal bleeding 09/09/2002     Precordial pain 04/05/2001     Unspecified essential hypertension 09/14/2001     Urgency of urination 06/06/2007       Past Surgical History:    Past Surgical History:   Procedure Laterality Date     ADENOIDECTOMY       CHOLECYSTECTOMY  1981     COLONOSCOPY  2002     COLONOSCOPY  2012     COLONOSCOPY N/A 9/22/2014    Procedure: COLONOSCOPY;  Surgeon: Lavell Pavon DO;  Location: [facility]     CYSTOSCOPY  2007    Cystitis chronic     ORTHOPEDIC SURGERY  2002    carpal tunnel; RT, LT     TONSILLECTOMY         Family History:    Family History   Problem Relation Age of Onset     Breast Cancer Mother      Alcohol/Drug Mother      Cirrhosis     Other - See Comments Mother      goiter     CEREBROVASCULAR DISEASE Father      Circulatory Father      Alcohol/Drug Son      Cancer - colorectal Brother      Unknown/Adopted No family hx of      Asthma No family hx of      C.A.D. No family hx of      DIABETES No family hx of      Hypertension No family hx of      Prostate Cancer No family hx of      Allergies No  family hx of      Alzheimer Disease No family hx of      Anesthesia Reaction No family hx of      Arthritis No family hx of      Blood Disease No family hx of      Cardiovascular No family hx of      Congenital Anomalies No family hx of      Connective Tissue Disorder No family hx of      Depression No family hx of      Endocrine Disease No family hx of      Eye Disorder No family hx of      Genetic Disorder No family hx of      GASTROINTESTINAL DISEASE No family hx of      Genitourinary Problems No family hx of      Gynecology No family hx of      HEART DISEASE No family hx of      Lipids No family hx of      Musculoskeletal Disorder No family hx of      Neurologic Disorder No family hx of      Obesity No family hx of      OSTEOPOROSIS No family hx of      Psychotic Disorder No family hx of      Respiratory No family hx of      Thyroid Disease No family hx of      Family History Negative No family hx of      Hearing Loss No family hx of        Social History:  Marital Status:  Legally  [3]  Social History   Substance Use Topics     Smoking status: Never Smoker     Smokeless tobacco: Never Used     Alcohol use Yes      Comment: once a year        Medications:      VITAMIN D, CHOLECALCIFEROL, PO   oxyCODONE-acetaminophen (PERCOCET) 5-325 MG per tablet   gabapentin (NEURONTIN) 300 MG capsule   Ginkgo Biloba (GINKOBA PO)   Omega-3 Fatty Acids (OMEGA-3 FISH OIL PO)   GLUCOSAMINE SULFATE PO   LOSARTAN POTASSIUM PO         Review of Systems   Constitutional: Negative for chills, diaphoresis and fever.   HENT: Positive for ear pain and hearing loss. Negative for voice change.    Eyes: Negative for visual disturbance.   Respiratory: Negative for cough, chest tightness, shortness of breath and wheezing.    Cardiovascular: Negative for chest pain, palpitations and leg swelling.   Gastrointestinal: Negative for abdominal distention, abdominal pain, anal bleeding, blood in stool, nausea and vomiting.   Genitourinary:  Negative for decreased urine volume, dysuria, flank pain and hematuria.   Musculoskeletal: Negative for arthralgias, back pain, gait problem, myalgias, neck pain and neck stiffness.   Skin: Negative for color change, pallor, rash and wound.   Neurological: Negative for dizziness, syncope, light-headedness, numbness and headaches.   Psychiatric/Behavioral: Negative for confusion and suicidal ideas.       Physical Exam   BP: (!) 176/102  Pulse: 98  Temp: 98.9  F (37.2  C)  Resp: 18  Weight: 81.6 kg (180 lb)  SpO2: 97 %      Physical Exam   Constitutional: She is oriented to person, place, and time. She appears well-developed and well-nourished.   HENT:   Head: Normocephalic and atraumatic.   Right Ear: Tympanic membrane is not erythematous and not retracted. Tympanic membrane mobility is normal.   Left Ear: Tympanic membrane is injected, erythematous and bulging.   Mouth/Throat: No oropharyngeal exudate.   Eyes: Conjunctivae are normal. Pupils are equal, round, and reactive to light.   Neck: Normal range of motion. Neck supple. No JVD present. No tracheal deviation present. No thyromegaly present.   Cardiovascular: Normal rate, regular rhythm, normal heart sounds and intact distal pulses.  Exam reveals no gallop and no friction rub.    No murmur heard.  Pulmonary/Chest: Effort normal and breath sounds normal. No stridor. No respiratory distress. She has no wheezes. She has no rales. She exhibits no tenderness.   Abdominal: Soft. Bowel sounds are normal. She exhibits no distension and no mass. There is no tenderness. There is no rebound and no guarding.   Musculoskeletal: Normal range of motion. She exhibits no edema or tenderness.   Lymphadenopathy:     She has no cervical adenopathy.   Neurological: She is alert and oriented to person, place, and time.   Skin: Skin is warm and dry. No rash noted. No erythema. No pallor.   Psychiatric: Her behavior is normal.   Nursing note and vitals reviewed.      ED Course     ED  Course     Procedures                   Labs Ordered and Resulted from Time of ED Arrival Up to the Time of Departure from the ED - No data to display    Assessments & Plan (with Medical Decision Making)   Left otitis media + left ear cyst  augmentin + pain controll  Fu with ENT clinic  She undrestood and agreed  I have reviewed the nursing notes.    I have reviewed the findings, diagnosis, plan and need for follow up with the patient.      Discharge Medication List as of 10/11/2017  2:01 AM      START taking these medications    Details   oxyCODONE-acetaminophen (PERCOCET) 5-325 MG per tablet Take 1 tablet by mouth every 8 hours as needed, Disp-6 tablet, R-0, Local Print      amoxicillin-clavulanate (AUGMENTIN) 875-125 MG per tablet Take 1 tablet by mouth 2 times daily for 7 days, Disp-14 tablet, R-0, Local Print             Final diagnoses:   Acute otitis media, left   Ear cysts       10/11/2017   [facility]     Nikos Vences MD  10/30/17 [id number]

## 2017-11-01 ENCOUNTER — RADIANT APPOINTMENT (OUTPATIENT)
Dept: MAMMOGRAPHY | Facility: OTHER | Age: 72
End: 2017-11-01
Attending: NURSE PRACTITIONER
Payer: COMMERCIAL

## 2017-11-01 DIAGNOSIS — Z12.31 VISIT FOR SCREENING MAMMOGRAM: ICD-10-CM

## 2017-11-01 PROCEDURE — G0202 SCR MAMMO BI INCL CAD: HCPCS | Mod: TC

## 2017-11-02 ENCOUNTER — TELEPHONE (OUTPATIENT)
Dept: MAMMOGRAPHY | Facility: OTHER | Age: 72
End: 2017-11-02

## 2017-11-02 NOTE — TELEPHONE ENCOUNTER
"Called patient to give her normal screening mammogram results.  She said well, \"sometimes I have discomfort in the right side, a dull ache\".  She said she hasn't talked with Aroldo about this before.  She said if she wears a sports bra it helps with the discomfort.  She also was wondering if it had to do with gas as she has been belchy lately and also says she has had some medication changes.  I transferred patient to [facility] to make an appointment with Aroldo Lomeli.  "

## 2017-11-09 DIAGNOSIS — I10 BENIGN ESSENTIAL HYPERTENSION: ICD-10-CM

## 2017-11-09 NOTE — TELEPHONE ENCOUNTER
Losartan      Last Written Prescription Date: unknown, not prescribed here  Last Fill Quantity: unknown, # refills: unknown  Last Office Visit with [facility], [facility] or [facility] prescribing provider: 9/25/17  Next 5 appointments (look out 90 days)     Nov 22, 2017 10:00 AM CST   (Arrive by 9:45 AM)   SHORT with Randi Haddad NP   [facility] ([facility] )    [address]  [facility] [address]   [phone]                   Potassium   Date Value Ref Range Status   03/24/2017 4.4 3.4 - 5.3 mmol/L Final     Creatinine   Date Value Ref Range Status   03/24/2017 0.96 0.52 - 1.04 mg/dL Final     BP Readings from Last 3 Encounters:   10/24/17 144/76   10/16/17 128/78   10/11/17 (!) 176/102

## 2017-11-13 RX ORDER — LOSARTAN POTASSIUM 25 MG/1
TABLET ORAL
Qty: 15 TABLET | Refills: 1 | Status: SHIPPED | OUTPATIENT
Start: 2017-11-13 | End: 2018-01-03

## 2017-11-22 ENCOUNTER — OFFICE VISIT (OUTPATIENT)
Dept: FAMILY MEDICINE | Facility: OTHER | Age: 72
End: 2017-11-22
Attending: NURSE PRACTITIONER
Payer: COMMERCIAL

## 2017-11-22 VITALS
SYSTOLIC BLOOD PRESSURE: 131 MMHG | RESPIRATION RATE: 18 BRPM | BODY MASS INDEX: 33.99 KG/M2 | WEIGHT: 180 LBS | HEIGHT: 61 IN | DIASTOLIC BLOOD PRESSURE: 75 MMHG | HEART RATE: 81 BPM | OXYGEN SATURATION: 98 % | TEMPERATURE: 97.9 F

## 2017-11-22 DIAGNOSIS — E78.5 HYPERLIPIDEMIA LDL GOAL <100: Primary | ICD-10-CM

## 2017-11-22 DIAGNOSIS — M79.10 MYALGIA: ICD-10-CM

## 2017-11-22 DIAGNOSIS — N64.4 BREAST TENDERNESS IN FEMALE: ICD-10-CM

## 2017-11-22 PROCEDURE — 99214 OFFICE O/P EST MOD 30 MIN: CPT | Performed by: NURSE PRACTITIONER

## 2017-11-22 PROCEDURE — 99212 OFFICE O/P EST SF 10 MIN: CPT

## 2017-11-22 RX ORDER — GABAPENTIN 300 MG/1
CAPSULE ORAL
Qty: 270 CAPSULE | Refills: 5 | Status: SHIPPED | OUTPATIENT
Start: 2017-11-22 | End: 2018-05-07

## 2017-11-22 RX ORDER — ROSUVASTATIN CALCIUM 5 MG/1
5 TABLET, COATED ORAL DAILY
Qty: 90 TABLET | Refills: 1 | Status: SHIPPED | OUTPATIENT
Start: 2017-11-22 | End: 2018-02-13

## 2017-11-22 ASSESSMENT — ANXIETY QUESTIONNAIRES
GAD7 TOTAL SCORE: 3
IF YOU CHECKED OFF ANY PROBLEMS ON THIS QUESTIONNAIRE, HOW DIFFICULT HAVE THESE PROBLEMS MADE IT FOR YOU TO DO YOUR WORK, TAKE CARE OF THINGS AT HOME, OR GET ALONG WITH OTHER PEOPLE: NOT DIFFICULT AT ALL
5. BEING SO RESTLESS THAT IT IS HARD TO SIT STILL: NOT AT ALL
7. FEELING AFRAID AS IF SOMETHING AWFUL MIGHT HAPPEN: NOT AT ALL
3. WORRYING TOO MUCH ABOUT DIFFERENT THINGS: SEVERAL DAYS
4. TROUBLE RELAXING: NOT AT ALL
2. NOT BEING ABLE TO STOP OR CONTROL WORRYING: SEVERAL DAYS
1. FEELING NERVOUS, ANXIOUS, OR ON EDGE: SEVERAL DAYS
6. BECOMING EASILY ANNOYED OR IRRITABLE: NOT AT ALL

## 2017-11-22 ASSESSMENT — PAIN SCALES - GENERAL: PAINLEVEL: NO PAIN (0)

## 2017-11-22 ASSESSMENT — PATIENT HEALTH QUESTIONNAIRE - PHQ9: SUM OF ALL RESPONSES TO PHQ QUESTIONS 1-9: 2

## 2017-11-22 NOTE — PATIENT INSTRUCTIONS
ASSESSMENT/PLAN:       1. Hyperlipidemia LDL goal <100  chronic  - start  rosuvastatin (CRESTOR) 5 MG tablet; Take 1 tablet (5 mg) by mouth daily  Dispense: 90 tablet; Refill: 1    2. Myalgia  chronic  - increase gabapentin (NEURONTIN) 300 MG capsule; Take 3 capsules three times daily  Dispense: 270 capsule; Refill: 5    3. Breast tenderness in female  symptomatic  - US Breast Bilateral Limited 1-3 Quadrants; Future    FUTURE APPOINTMENTS:       - Follow-up visit in 3 months or as needed for acute concerns.     Randi Haddad, NP  [facility]

## 2017-11-22 NOTE — MR AVS SNAPSHOT
After Visit Summary   11/22/2017    Nella Lemon    MRN: [id number]           Patient Information     Date Of Birth          1945        Visit Information        Provider Department      11/22/2017 10:00 AM Randi Haddad NP [facility]        Today's Diagnoses     Hyperlipidemia LDL goal <100    -  1    Myalgia        Breast tenderness in female          Care Instructions        ASSESSMENT/PLAN:       1. Hyperlipidemia LDL goal <100  chronic  - start  rosuvastatin (CRESTOR) 5 MG tablet; Take 1 tablet (5 mg) by mouth daily  Dispense: 90 tablet; Refill: 1    2. Myalgia  chronic  - increase gabapentin (NEURONTIN) 300 MG capsule; Take 3 capsules three times daily  Dispense: 270 capsule; Refill: 5    3. Breast tenderness in female  symptomatic  - US Breast Bilateral Limited 1-3 Quadrants; Future    FUTURE APPOINTMENTS:       - Follow-up visit in 3 months or as needed for acute concerns.     Randi Haddad NP  [facility]              Follow-ups after your visit        Follow-up notes from your care team     Return in about 3 months (around 2/22/2018), or if symptoms worsen or fail to improve.      Your next 10 appointments already scheduled     Feb 26, 2018  8:45 AM CST   (Arrive by 8:30 AM)   SHORT with Randi Haddad NP   [facility] ([facility] )    [address] [address]   [phone]            Aug 27, 2018  9:30 AM CDT   (Arrive by 9:15 AM)   Hearing Eval with Joss Palafox   [facility] ([facility] )    [address] [place] [address]   [phone]              Future tests that were ordered for you today     Open Future Orders        Priority Expected Expires Ordered    US Breast Bilateral Limited 1-3 Quadrants Routine  11/22/2018 11/22/2017            Who to contact     If you have questions or need follow up  "information about today's clinic visit or your schedule please contact [facility] directly at [phone].  Normal or non-critical lab and imaging results will be communicated to you by MyChart, letter or phone within 4 business days after the clinic has received the results. If you do not hear from us within 7 days, please contact the clinic through [employer] or phone. If you have a critical or abnormal lab result, we will notify you by phone as soon as possible.  Submit refill requests through [employer] or call your pharmacy and they will forward the refill request to us. Please allow 3 business days for your refill to be completed.          Additional Information About Your Visit        MyChart Information     [employer] lets you send messages to your doctor, view your test results, renew your prescriptions, schedule appointments and more. To sign up, go to [email or web address] . Click on \"Log in\" on the left side of the screen, which will take you to the Welcome page. Then click on \"Sign up Now\" on the right side of the page.     You will be asked to enter the access code listed below, as well as some personal information. Please follow the directions to create your username and password.     Your access code is: [email or web address]  Expires: 2017  8:11 AM     Your access code will  in 90 days. If you need help or a new code, please call your [place] clinic or [phone].        Care EveryWhere ID     This is your Care EveryWhere ID. This could be used by other organizations to access your [place] medical records  [id number]        Your Vitals Were     Pulse Temperature Respirations Height Pulse Oximetry BMI (Body Mass Index)    81 97.9  F (36.6  C) (Tympanic) 18 5' 1\" (1.549 m) 98% 34.01 kg/m2       Blood Pressure from Last 3 Encounters:   17 131/75   10/24/17 144/76   10/16/17 128/78    Weight from Last 3 Encounters:   17 180 lb (81.6 kg)   10/24/17 180 lb (81.6 kg)   10/11/17 " 180 lb (81.6 kg)                 Today's Medication Changes          These changes are accurate as of: 11/22/17 10:50 AM.  If you have any questions, ask your nurse or doctor.               Start taking these medicines.        Dose/Directions    rosuvastatin 5 MG tablet   Commonly known as:  CRESTOR   Used for:  Hyperlipidemia LDL goal <100   Started by:  Randi Haddad NP        Dose:  5 mg   Take 1 tablet (5 mg) by mouth daily   Quantity:  90 tablet   Refills:  1         These medicines have changed or have updated prescriptions.        Dose/Directions    gabapentin 300 MG capsule   Commonly known as:  NEURONTIN   This may have changed:  additional instructions   Used for:  Myalgia   Changed by:  Randi Haddad NP        Take 3 capsules three times daily   Quantity:  270 capsule   Refills:  5       losartan 25 MG tablet   Commonly known as:  COZAAR   This may have changed:  Another medication with the same name was removed. Continue taking this medication, and follow the directions you see here.   Used for:  Benign essential hypertension   Changed by:  Randi Haddad NP        TAKE 1/2 TABLET (12.5 MG) BY MOUTH DAILY FOR BLOOD PRESSURE   Quantity:  15 tablet   Refills:  1            Where to get your medicines      These medications were sent to [facility] - [place], [place] - [address]  [address] [place] [address]     Phone:  [phone]     gabapentin 300 MG capsule    rosuvastatin 5 MG tablet                Primary Care Provider Office Phone # Fax #    Randi Haddad [phone]       [address] [place] [id number]        Equal Access to Services     [facility] AH: Hadii william hutchison hadasho Soomaali, waaxda luqadaha, qaybta kaalmada adeegyada, cristiane chun. So [facility] [phone].    ATENCIÓN: Si habla español, tiene a mcclure disposición servicios gratuitos de asistencia lingüística. Llame al [phone].    We  comply with applicable federal civil rights laws and [place] laws. We do not discriminate on the basis of race, color, national origin, age, disability, sex, sexual orientation, or gender identity.            Thank you!     Thank you for choosing [facility]  for your care. Our goal is always to provide you with excellent care. Hearing back from our patients is one way we can continue to improve our services. Please take a few minutes to complete the written survey that you may receive in the mail after your visit with us. Thank you!             Your Updated Medication List - Protect others around you: Learn how to safely use, store and throw away your medicines at www.disposemymeds.org.          This list is accurate as of: 11/22/17 10:50 AM.  Always use your most recent med list.                   Brand Name Dispense Instructions for use Diagnosis    gabapentin 300 MG capsule    NEURONTIN    270 capsule    Take 3 capsules three times daily    Myalgia       GINKOBA PO      Take 40 mg by mouth daily        GLUCOSAMINE SULFATE PO      Take 1,000 mg by mouth 2 times daily        losartan 25 MG tablet    COZAAR    15 tablet    TAKE 1/2 TABLET (12.5 MG) BY MOUTH DAILY FOR BLOOD PRESSURE    Benign essential hypertension       OMEGA-3 FISH OIL PO      Take 3 g by mouth daily        rosuvastatin 5 MG tablet    CRESTOR    90 tablet    Take 1 tablet (5 mg) by mouth daily    Hyperlipidemia LDL goal <100       VITAMIN D (CHOLECALCIFEROL) PO      Take by mouth daily

## 2017-11-22 NOTE — NURSING NOTE
"Chief Complaint   Patient presents with     Consult     ache right breast- few months- comes and goes / Mammo normal       Initial /75 (BP Location: Right arm, Patient Position: Chair, Cuff Size: Adult Regular)  Pulse 81  Temp 97.9  F (36.6  C) (Tympanic)  Resp 18  Ht 5' 1\" (1.549 m)  Wt 180 lb (81.6 kg)  SpO2 98%  BMI 34.01 kg/m2 Estimated body mass index is 34.01 kg/(m^2) as calculated from the following:    Height as of this encounter: 5' 1\" (1.549 m).    Weight as of this encounter: 180 lb (81.6 kg).  Medication Reconciliation: complete   Libia Clements    "

## 2017-11-22 NOTE — PROGRESS NOTES
SUBJECTIVE:   Nella Lemon is a 72 year old female who presents to clinic today for the following health issues:      Concern - Ache on right breast  Onset: 9/2017    Description:   Dull aching that comes and goes - states is not a pain, feels more like swelling that is intermittent.  Denies skin changes, nipple discharge.   Mother did have breast cancer.      Intensity: mild    Progression of Symptoms:  same    Accompanying Signs & Symptoms:  None    Previous history of similar problem:   None noted    Precipitating factors:   Worsened by: None     Alleviating factors:  Improved by: None     Therapies Tried and outcome: none      Mammogram current; results as follows:   EXAM: MA SCREENING DIGITAL BILATERAL, 11/1/2017 9:43 AM     COMPARISONS: 2014     HISTORY: Routine screening     FINDINGS: Breasts are heterogeneously dense. Comparison is made with  previous examination 2014. No new dominant masses or malignant  calcifications are seen.     BREAST DENSITY: Heterogeneously dense.         IMPRESSION: BI-RADS CATEGORY: 1 -  Negative.     RECOMMENDED FOLLOW-UP: Annual Mammography.        FILI MONSON MD    Hyperlipidemia Follow-Up      Rate your low fat/cholesterol diet?: fair    Taking statin?  No has tried several and developed myalgia.  She has not tried crestor.      Other lipid medications/supplements?:  none    Hypertension Follow-up      Outpatient blood pressures are being checked at home.  Results are 130/70's.    Low Salt Diet: not monitoring salt  BP Readings from Last 6 Encounters:   11/22/17 131/75   10/24/17 144/76   10/16/17 128/78   10/11/17 (!) 176/102   10/03/17 146/84   09/25/17 136/88           Problem list and histories reviewed & adjusted, as indicated.  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, left     Family history of colon cancer     ACP (advance care planning)     Hyperlipidemia LDL goal <100     Past Surgical History:   Procedure Laterality Date     ADENOIDECTOMY       CHOLECYSTECTOMY  1981     COLONOSCOPY  2002     COLONOSCOPY  2012     COLONOSCOPY N/A 9/22/2014    Procedure: COLONOSCOPY;  Surgeon: Lavell Pavon DO;  Location: [facility]     CYSTOSCOPY  2007    Cystitis chronic     ORTHOPEDIC SURGERY  2002    carpal tunnel; RT, LT     TONSILLECTOMY         Social History   Substance Use Topics     Smoking status: Never Smoker     Smokeless tobacco: Never Used     Alcohol use Yes      Comment: once a year     Family History   Problem Relation Age of Onset     Breast Cancer Mother      Alcohol/Drug Mother      Cirrhosis     Other - See Comments Mother      goiter     CEREBROVASCULAR DISEASE Father      Circulatory Father      Alcohol/Drug Son      Cancer - colorectal Brother      Unknown/Adopted No family hx of      Asthma No family hx of      C.A.D. No family hx of      DIABETES No family hx of      Hypertension No family hx of      Prostate Cancer No family hx of      Allergies No family hx of      Alzheimer Disease No family hx of      Anesthesia Reaction No family hx of      Arthritis No family hx of      Blood Disease No family hx of      Cardiovascular No family hx of      Congenital Anomalies No family hx of      Connective Tissue Disorder No family hx of      Depression No family hx of      Endocrine Disease No family hx of      Eye Disorder No family hx of      Genetic Disorder No family hx of      GASTROINTESTINAL DISEASE No family hx of      Genitourinary Problems No family hx of      Gynecology No family hx of      HEART DISEASE No family hx of      Lipids No family  hx of      Musculoskeletal Disorder No family hx of      Neurologic Disorder No family hx of      Obesity No family hx of      OSTEOPOROSIS No family hx of      Psychotic Disorder No family hx of      Respiratory No family hx of      Thyroid Disease No family hx of      Family History Negative No family hx of      Hearing Loss No family hx of          Current Outpatient Prescriptions   Medication Sig Dispense Refill     losartan (COZAAR) 25 MG tablet TAKE 1/2 TABLET (12.5 MG) BY MOUTH DAILY FOR BLOOD PRESSURE 15 tablet 1     GLUCOSAMINE SULFATE PO Take 1,000 mg by mouth 2 times daily       VITAMIN D, CHOLECALCIFEROL, PO Take by mouth daily       gabapentin (NEURONTIN) 300 MG capsule TAKE 2 CAPSULES (600 MG) BY MOUTH 4 TIMES DAILY 240 capsule 5     Ginkgo Biloba (GINKOBA PO) Take 40 mg by mouth daily       Omega-3 Fatty Acids (OMEGA-3 FISH OIL PO) Take 3 g by mouth daily        [DISCONTINUED] LOSARTAN POTASSIUM PO Take 25 mg by mouth 1/2 tab daily       Allergies   Allergen Reactions     Atorvastatin      Ezetimibe      Gentamicin      Hydroxyzine      Lorazepam      Ranitidine      Simvastatin      Lopid [Gemfibrozil] GI Disturbance     Bloating, constipation,      Pravastatin Muscle Pain (Myalgia)     Recent Labs   Lab Test  09/25/17   0916  03/24/17   0848  12/30/16   0851  12/16/16   1434   06/22/15   0854   LDL  120*  76  47   --    < >  138*   HDL  41*  54  57   --    < >  49*   TRIG  206*  152*  128   --    < >  112   ALT   --   68*   --   42   --   29   CR   --   0.96   --   0.88   < >  1.00   GFRESTIMATED   --   57*   --   63   < >  55*   GFRESTBLACK   --   69   --   76   < >  67   POTASSIUM   --   4.4   --   3.7   < >  4.1   TSH  5.63*  4.00   --    --    < >   --     < > = values in this interval not displayed.      BP Readings from Last 3 Encounters:   11/22/17 131/75   10/24/17 144/76   10/16/17 128/78    Wt Readings from Last 3 Encounters:   11/22/17 180 lb (81.6 kg)   10/24/17 180 lb (81.6 kg)  "  10/11/17 180 lb (81.6 kg)              Reviewed and updated as needed this visit by clinical staffTobacco  Allergies  Meds       Reviewed and updated as needed this visit by Provider         ROS:  Constitutional, HEENT, cardiovascular, pulmonary, gi and gu systems are negative, except as otherwise noted.      OBJECTIVE:   /75 (BP Location: Right arm, Patient Position: Chair, Cuff Size: Adult Regular)  Pulse 81  Temp 97.9  F (36.6  C) (Tympanic)  Resp 18  Ht 5' 1\" (1.549 m)  Wt 180 lb (81.6 kg)  SpO2 98%  BMI 34.01 kg/m2  Body mass index is 34.01 kg/(m^2).  GENERAL: healthy, alert and no distress  NECK: no adenopathy, no asymmetry, masses, or scars and thyroid normal to palpation  RESP: lungs clear to auscultation - no rales, rhonchi or wheezes  BREAST: normal without masses, tenderness or nipple discharge and no palpable axillary masses or adenopathy  CV: regular rate and rhythm, normal S1 S2, no S3 or S4, no murmur, click or rub, no peripheral edema and peripheral pulses strong  ABDOMEN: soft, nontender, no hepatosplenomegaly, no masses and bowel sounds normal  PSYCH: mentation appears normal, affect normal/bright        ASSESSMENT/PLAN:       1. Hyperlipidemia LDL goal <100  chronic  - start  rosuvastatin (CRESTOR) 5 MG tablet; Take 1 tablet (5 mg) by mouth daily  Dispense: 90 tablet; Refill: 1    2. Myalgia  chronic  - increase gabapentin (NEURONTIN) 300 MG capsule; Take 3 capsules three times daily  Dispense: 270 capsule; Refill: 5    3. Breast tenderness in female  Symptomatic - normal mammogram  - US Breast Bilateral Limited 1-3 Quadrants; Future    Declined flu vaccine today    FUTURE APPOINTMENTS:       - Follow-up visit in 3 months or as needed for acute concerns.     Randi Haddad, NP  [facility]    "

## 2017-11-23 ASSESSMENT — ANXIETY QUESTIONNAIRES: GAD7 TOTAL SCORE: 3

## 2018-01-04 ENCOUNTER — TELEPHONE (OUTPATIENT)
Dept: FAMILY MEDICINE | Facility: OTHER | Age: 73
End: 2018-01-04

## 2018-01-04 NOTE — TELEPHONE ENCOUNTER
Talked to kylie and order was put in for bilateral breast us changed to right us   Pamela M Lechevalier LPN

## 2018-01-05 ENCOUNTER — HOSPITAL ENCOUNTER (OUTPATIENT)
Dept: ULTRASOUND IMAGING | Facility: HOSPITAL | Age: 73
Discharge: HOME | End: 2018-01-05
Attending: NURSE PRACTITIONER | Admitting: NURSE PRACTITIONER
Payer: COMMERCIAL

## 2018-01-05 DIAGNOSIS — N64.4 BREAST TENDERNESS IN FEMALE: ICD-10-CM

## 2018-01-05 PROCEDURE — 76642 ULTRASOUND BREAST LIMITED: CPT | Mod: TC,RT

## 2018-01-05 NOTE — LETTER
[facility]  [address] [place] [address] [place] [address]           Nella Jayantlynneclaudianavi  [address] [place] [address]      January 5, 2018  Date of Exam: 1/5/18      Dear Nella:    Thank you for your recent visit.    Breast Imaging Result: Based on your recent breast imaging, you have a suspicious area that usually requires a biopsy, at which time a small tissue sample would be taken from your breast.      Breast Density: Your mammogram shows that you have dense breast tissue. This means you have a slightly higher risk of getting breast cancer. It also means your mammograms will be harder to read, but it doesn't mean that mammograms aren t useful. In fact, yearly mammograms are even more important for women at higher risk.    If you have already made these arrangements, please disregard this letter.    A report of your breast imaging results was sent to: Randi Haddad    Your breast imaging will become part of your medical file here at [place] for at least 10 years. You are responsible for informing any new health care provider or breast imaging facility of the date and location of this examination.    We appreciate the opportunity to participate in your health care.    Sincerely,    Roberto Kennedy MD  Interpreting Radiologist  [facility]

## 2018-01-05 NOTE — PROGRESS NOTES
Dr. Dow notified of patient's biopsy recommendation and family history of breast cancer (mother in 40s).  Ok to schedule biopsy.  Patient education performed on what to expect with breast biopsy.  Questions answered.  Patient requests 1/17/18 for biopsy.  Time given for arrival at 0930.

## 2018-01-11 ENCOUNTER — OFFICE VISIT (OUTPATIENT)
Dept: FAMILY MEDICINE | Facility: OTHER | Age: 73
End: 2018-01-11
Attending: NURSE PRACTITIONER
Payer: COMMERCIAL

## 2018-01-11 VITALS
OXYGEN SATURATION: 98 % | BODY MASS INDEX: 34.55 KG/M2 | HEART RATE: 66 BPM | RESPIRATION RATE: 20 BRPM | DIASTOLIC BLOOD PRESSURE: 72 MMHG | SYSTOLIC BLOOD PRESSURE: 132 MMHG | WEIGHT: 183 LBS | TEMPERATURE: 98 F | HEIGHT: 61 IN

## 2018-01-11 DIAGNOSIS — Z79.899 ON STATIN THERAPY: ICD-10-CM

## 2018-01-11 DIAGNOSIS — K21.9 GASTROESOPHAGEAL REFLUX DISEASE, ESOPHAGITIS PRESENCE NOT SPECIFIED: Primary | ICD-10-CM

## 2018-01-11 DIAGNOSIS — R00.2 PALPITATIONS: ICD-10-CM

## 2018-01-11 DIAGNOSIS — D72.829 LEUKOCYTOSIS, UNSPECIFIED TYPE: ICD-10-CM

## 2018-01-11 DIAGNOSIS — R53.83 FATIGUE, UNSPECIFIED TYPE: ICD-10-CM

## 2018-01-11 DIAGNOSIS — F41.9 ANXIETY: ICD-10-CM

## 2018-01-11 LAB
ALBUMIN SERPL-MCNC: 3.5 G/DL (ref 3.4–5)
ALP SERPL-CCNC: 107 U/L (ref 40–150)
ALT SERPL W P-5'-P-CCNC: 37 U/L (ref 0–50)
ANION GAP SERPL CALCULATED.3IONS-SCNC: 8 MMOL/L (ref 3–14)
AST SERPL W P-5'-P-CCNC: 21 U/L (ref 0–45)
BASOPHILS # BLD AUTO: 0 10E9/L (ref 0–0.2)
BASOPHILS NFR BLD AUTO: 0.2 %
BILIRUB DIRECT SERPL-MCNC: 0.1 MG/DL (ref 0–0.2)
BILIRUB SERPL-MCNC: 0.6 MG/DL (ref 0.2–1.3)
BUN SERPL-MCNC: 12 MG/DL (ref 7–30)
CALCIUM SERPL-MCNC: 8.7 MG/DL (ref 8.5–10.1)
CHLORIDE SERPL-SCNC: 111 MMOL/L (ref 94–109)
CO2 SERPL-SCNC: 24 MMOL/L (ref 20–32)
CREAT SERPL-MCNC: 0.81 MG/DL (ref 0.52–1.04)
DIFFERENTIAL METHOD BLD: ABNORMAL
EOSINOPHIL # BLD AUTO: 0.1 10E9/L (ref 0–0.7)
EOSINOPHIL NFR BLD AUTO: 0.6 %
ERYTHROCYTE [DISTWIDTH] IN BLOOD BY AUTOMATED COUNT: 13.3 % (ref 10–15)
GFR SERPL CREATININE-BSD FRML MDRD: 69 ML/MIN/1.7M2
GLUCOSE SERPL-MCNC: 100 MG/DL (ref 70–99)
HCT VFR BLD AUTO: 40.9 % (ref 35–47)
HGB BLD-MCNC: 13.5 G/DL (ref 11.7–15.7)
LYMPHOCYTES # BLD AUTO: 2.4 10E9/L (ref 0.8–5.3)
LYMPHOCYTES NFR BLD AUTO: 15.7 %
MCH RBC QN AUTO: 28.7 PG (ref 26.5–33)
MCHC RBC AUTO-ENTMCNC: 33 G/DL (ref 31.5–36.5)
MCV RBC AUTO: 87 FL (ref 78–100)
MONOCYTES # BLD AUTO: 0.8 10E9/L (ref 0–1.3)
MONOCYTES NFR BLD AUTO: 5 %
NEUTROPHILS # BLD AUTO: 12.1 10E9/L (ref 1.6–8.3)
NEUTROPHILS NFR BLD AUTO: 78.5 %
PLATELET # BLD AUTO: 352 10E9/L (ref 150–450)
POTASSIUM SERPL-SCNC: 3.9 MMOL/L (ref 3.4–5.3)
PROT SERPL-MCNC: 7.4 G/DL (ref 6.8–8.8)
RBC # BLD AUTO: 4.7 10E12/L (ref 3.8–5.2)
SODIUM SERPL-SCNC: 143 MMOL/L (ref 133–144)
T4 FREE SERPL-MCNC: 1.01 NG/DL (ref 0.76–1.46)
TSH SERPL DL<=0.005 MIU/L-ACNC: 4.02 MU/L (ref 0.4–4)
WBC # BLD AUTO: 15.4 10E9/L (ref 4–11)

## 2018-01-11 PROCEDURE — 36415 COLL VENOUS BLD VENIPUNCTURE: CPT | Mod: ZL | Performed by: NURSE PRACTITIONER

## 2018-01-11 PROCEDURE — 80048 BASIC METABOLIC PNL TOTAL CA: CPT | Mod: ZL | Performed by: NURSE PRACTITIONER

## 2018-01-11 PROCEDURE — 84443 ASSAY THYROID STIM HORMONE: CPT | Mod: ZL | Performed by: NURSE PRACTITIONER

## 2018-01-11 PROCEDURE — 85025 COMPLETE CBC W/AUTO DIFF WBC: CPT | Mod: ZL | Performed by: NURSE PRACTITIONER

## 2018-01-11 PROCEDURE — 84439 ASSAY OF FREE THYROXINE: CPT | Mod: ZL | Performed by: NURSE PRACTITIONER

## 2018-01-11 PROCEDURE — 80076 HEPATIC FUNCTION PANEL: CPT | Mod: ZL | Performed by: NURSE PRACTITIONER

## 2018-01-11 PROCEDURE — G0463 HOSPITAL OUTPT CLINIC VISIT: HCPCS

## 2018-01-11 PROCEDURE — 99214 OFFICE O/P EST MOD 30 MIN: CPT | Performed by: NURSE PRACTITIONER

## 2018-01-11 RX ORDER — OMEPRAZOLE 40 MG/1
40 CAPSULE, DELAYED RELEASE ORAL DAILY
Qty: 90 CAPSULE | Refills: 1 | Status: SHIPPED | OUTPATIENT
Start: 2018-01-11 | End: 2018-04-06

## 2018-01-11 ASSESSMENT — ANXIETY QUESTIONNAIRES
2. NOT BEING ABLE TO STOP OR CONTROL WORRYING: NEARLY EVERY DAY
7. FEELING AFRAID AS IF SOMETHING AWFUL MIGHT HAPPEN: NEARLY EVERY DAY
GAD7 TOTAL SCORE: 12
1. FEELING NERVOUS, ANXIOUS, OR ON EDGE: NEARLY EVERY DAY
4. TROUBLE RELAXING: SEVERAL DAYS
3. WORRYING TOO MUCH ABOUT DIFFERENT THINGS: SEVERAL DAYS
IF YOU CHECKED OFF ANY PROBLEMS ON THIS QUESTIONNAIRE, HOW DIFFICULT HAVE THESE PROBLEMS MADE IT FOR YOU TO DO YOUR WORK, TAKE CARE OF THINGS AT HOME, OR GET ALONG WITH OTHER PEOPLE: SOMEWHAT DIFFICULT
5. BEING SO RESTLESS THAT IT IS HARD TO SIT STILL: SEVERAL DAYS
6. BECOMING EASILY ANNOYED OR IRRITABLE: NOT AT ALL

## 2018-01-11 ASSESSMENT — PATIENT HEALTH QUESTIONNAIRE - PHQ9: SUM OF ALL RESPONSES TO PHQ QUESTIONS 1-9: 6

## 2018-01-11 ASSESSMENT — PAIN SCALES - GENERAL: PAINLEVEL: NO PAIN (0)

## 2018-01-11 NOTE — PATIENT INSTRUCTIONS
ASSESSMENT/PLAN:       1. Gastroesophageal reflux disease, esophagitis presence not specified  symptomatic  - omeprazole (PRILOSEC) 40 MG capsule; Take 1 capsule (40 mg) by mouth daily Take 30-60 minutes before a meal.  Dispense: 90 capsule; Refill: 1    2. Palpitations  symptomatic  - Zio Patch Holter; Future    3. Fatigue, unspecified type  - TSH with free T4 reflex  - Basic metabolic panel  - CBC with platelets differential    4. On statin therapy  - Hepatic panel    5. Anxiety  Restart zoloft  - sertraline (ZOLOFT) 50 MG tablet; Take 1 tablet (50 mg) by mouth daily  Dispense: 30 tablet; Refill: 1        FUTURE APPOINTMENTS:       - Follow-up visit in 1 month or as needed for acute concerns.     Randi Haddad NP  [facility]

## 2018-01-11 NOTE — MR AVS SNAPSHOT
After Visit Summary   1/11/2018    Nella Lemon    MRN: [id number]           Patient Information     Date Of Birth          1945        Visit Information        Provider Department      1/11/2018 9:30 AM Randi Haddad, NP [facility]        Today's Diagnoses     Gastroesophageal reflux disease, esophagitis presence not specified    -  1    Palpitations        Fatigue, unspecified type        On statin therapy        Anxiety          Care Instructions      ASSESSMENT/PLAN:       1. Gastroesophageal reflux disease, esophagitis presence not specified  symptomatic  - omeprazole (PRILOSEC) 40 MG capsule; Take 1 capsule (40 mg) by mouth daily Take 30-60 minutes before a meal.  Dispense: 90 capsule; Refill: 1    2. Palpitations  symptomatic  - Zio Patch Holter; Future    3. Fatigue, unspecified type  - TSH with free T4 reflex  - Basic metabolic panel  - CBC with platelets differential    4. On statin therapy  - Hepatic panel    5. Anxiety  Restart zoloft  - sertraline (ZOLOFT) 50 MG tablet; Take 1 tablet (50 mg) by mouth daily  Dispense: 30 tablet; Refill: 1        FUTURE APPOINTMENTS:       - Follow-up visit in 1 month or as needed for acute concerns.     Randi Haddad, NP  [facility]          Follow-ups after your visit        Follow-up notes from your care team     Return in about 4 weeks (around 2/8/2018), or if symptoms worsen or fail to improve.      Your next 10 appointments already scheduled     Jan 17, 2018 10:00 AM CST   US BREAST BIOPSY VACUUM RIGHT with HIUS1, HIBRRN, HIUSIRRAD   [facility] ([facility] )    [address] [place] [id number] [phone]           Tell us in advance if there s any chance you may be pregnant.  Bring a list of your medicines to the exam. Include vitamins, minerals and over-the-counter drugs.  If you take blood thinners, you may need to stop taking them a few days before treatment.  Talk to your doctor before stopping these medicines. You will need a blood test the morning of your exam.   Stop taking Coumadin (warfarin) 3 days before your exam. Restart the day after your exam.   If you take aspirin, you may need to stop taking it 3 days before your scan.   If you take Plavix, Ticlid, Pletal or Persantine, you may need to stop taking them 5 days before your scan. Please talk to your doctor before stopping these medicines.  If you will receive sedation for this test (medicine to help you relax):   See your family doctor for an exam within 30 days of treatment.   Plan for an adult to drive you home and stay with you for at least 6 hours.   Follow the eating and drinking guidelines checked below:   No eating or drinking for 4 hours before your test. You may take medicine with small sips of water.   If you have diabetes:If you take insulin, call your diabetes care team. Do not take diabetes pills on the morning of your test. If you take metformin (Avandamet, Glucophage, Glucovance, Metaglip) and received contrast, wait 48 hours before re-starting this medicine.  Please call the Imaging Department at your exam site with any questions.            Jan 18, 2018 11:30 AM CST   ZIOPATCH MONITOR with HI STRESS RM1   [facility] ([facility] )    [address] [facility] [address]   [phone]            Feb 12, 2018  9:45 AM CST   (Arrive by 9:30 AM)   SHORT with Randi Haddad NP   [facility] ([facility] )    [address]  [facility] [address]   [phone]            Aug 27, 2018  9:30 AM CDT   (Arrive by 9:15 AM)   Hearing Eval with Joss Palafox   [facility] ([facility] )    [address]  [facility] [address]   [phone]              Future tests that were ordered for you today     Open Future Orders        Priority Expected Expires Ordered    Zio Patch Holter Routine   "2018            Who to contact     If you have questions or need follow up information about today's clinic visit or your schedule please contact [facility] directly at [phone].  Normal or non-critical lab and imaging results will be communicated to you by MyChart, letter or phone within 4 business days after the clinic has received the results. If you do not hear from us within 7 days, please contact the clinic through MyChart or phone. If you have a critical or abnormal lab result, we will notify you by phone as soon as possible.  Submit refill requests through [employer] or call your pharmacy and they will forward the refill request to us. Please allow 3 business days for your refill to be completed.          Additional Information About Your Visit        Getaround[employer]V Information     [employer] lets you send messages to your doctor, view your test results, renew your prescriptions, schedule appointments and more. To sign up, go to [email or web address] . Click on \"Log in\" on the left side of the screen, which will take you to the Welcome page. Then click on \"Sign up Now\" on the right side of the page.     You will be asked to enter the access code listed below, as well as some personal information. Please follow the directions to create your username and password.     Your access code is: [email or web address]  Expires: 2018  1:46 PM     Your access code will  in 90 days. If you need help or a new code, please call your [facility] or [phone].        Care EveryWhere ID     This is your Care EveryWhere ID. This could be used by other organizations to access your [place] medical records  [id number]        Your Vitals Were     Pulse Temperature Respirations Height Pulse Oximetry BMI (Body Mass Index)    66 98  F (36.7  C) (Tympanic) 20 5' 1\" (1.549 m) 98% 34.58 kg/m2       Blood Pressure from Last 3 Encounters:   18 132/72   17 131/75   10/24/17 144/76    Weight " from Last 3 Encounters:   01/11/18 183 lb (83 kg)   11/22/17 180 lb (81.6 kg)   10/24/17 180 lb (81.6 kg)              We Performed the Following     Basic metabolic panel     CBC with platelets differential     DEPRESSION ACTION PLAN (DAP)     Hepatic panel     TSH with free T4 reflex          Today's Medication Changes          These changes are accurate as of: 1/11/18  1:46 PM.  If you have any questions, ask your nurse or doctor.               Start taking these medicines.        Dose/Directions    omeprazole 40 MG capsule   Commonly known as:  priLOSEC   Used for:  Gastroesophageal reflux disease, esophagitis presence not specified   Started by:  Randi Haddad NP        Dose:  40 mg   Take 1 capsule (40 mg) by mouth daily Take 30-60 minutes before a meal.   Quantity:  90 capsule   Refills:  1       sertraline 50 MG tablet   Commonly known as:  ZOLOFT   Used for:  Anxiety   Started by:  Randi Haddad NP        Dose:  50 mg   Take 1 tablet (50 mg) by mouth daily   Quantity:  30 tablet   Refills:  1            Where to get your medicines      These medications were sent to [facility] [place] - [address]  [address] [facility] [address]     Phone:  [phone]     omeprazole 40 MG capsule    sertraline 50 MG tablet                Primary Care Provider Office Phone # Fax #    Randi Haddad [phone] [address] [place] [address]        Equal Access to Services     LUCIA GREENFIELD AH: Hadii william hargrove Soandrews, waaxda luqadaha, qaybta kaalmada adeegyada, cristiane chun. So [facility] [phone].    ATENCIÓN: Si habla español, tiene a mcclure disposición servicios gratuitos de asistencia lingüística. Llame al [phone].    We comply with applicable federal civil rights laws and [place] laws. We do not discriminate on the basis of race, color, national origin, age, disability, sex, sexual orientation, or gender  identity.            Thank you!     Thank you for choosing [facility]  for your care. Our goal is always to provide you with excellent care. Hearing back from our patients is one way we can continue to improve our services. Please take a few minutes to complete the written survey that you may receive in the mail after your visit with us. Thank you!             Your Updated Medication List - Protect others around you: Learn how to safely use, store and throw away your medicines at www.disposemymeds.org.          This list is accurate as of: 1/11/18  1:46 PM.  Always use your most recent med list.                   Brand Name Dispense Instructions for use Diagnosis    gabapentin 300 MG capsule    NEURONTIN    270 capsule    Take 3 capsules three times daily    Myalgia       GINKOBA PO      Take 40 mg by mouth daily        GLUCOSAMINE SULFATE PO      Take 1,000 mg by mouth 2 times daily        losartan 25 MG tablet    COZAAR    15 tablet    TAKE 1/2 TABLET (12.5 MG) BY MOUTH DAILY FOR BLOOD PRESSURE    Benign essential hypertension       OMEGA-3 FISH OIL PO      Take 3 g by mouth daily        omeprazole 40 MG capsule    priLOSEC    90 capsule    Take 1 capsule (40 mg) by mouth daily Take 30-60 minutes before a meal.    Gastroesophageal reflux disease, esophagitis presence not specified       rosuvastatin 5 MG tablet    CRESTOR    90 tablet    Take 1 tablet (5 mg) by mouth daily    Hyperlipidemia LDL goal <100       sertraline 50 MG tablet    ZOLOFT    30 tablet    Take 1 tablet (50 mg) by mouth daily    Anxiety       VITAMIN D (CHOLECALCIFEROL) PO      Take by mouth daily

## 2018-01-11 NOTE — LETTER
My Depression Action Plan  Name: Nella Lemon   Date of Birth 1945  Date: 1/11/2018    My doctor: Randi Haddad   My clinic: [facility]  [address]  [place] [address]  [phone]          GREEN    ZONE   Good Control    What it looks like:     Things are going generally well. You have normal up s and down s. You may even feel depressed from time to time, but bad moods usually last less than a day.   What you need to do:  1. Continue to care for yourself (see self care plan)  2. Check your depression survival kit and update it as needed  3. Follow your physician s recommendations including any medication.  4. Do not stop taking medication unless you consult with your physician first.           YELLOW         ZONE Getting Worse    What it looks like:     Depression is starting to interfere with your life.     It may be hard to get out of bed; you may be starting to isolate yourself from others.    Symptoms of depression are starting to last most all day and this has happened for several days.     You may have suicidal thoughts but they are not constant.   What you need to do:     1. Call your care team, your response to treatment will improve if you keep your care team informed of your progress. Yellow periods are signs an adjustment may need to be made.     2. Continue your self-care, even if you have to fake it!    3. Talk to someone in your support network    4. Open up your depression survival kit           RED    ZONE Medical Alert - Get Help    What it looks like:     Depression is seriously interfering with your life.     You may experience these or other symptoms: You can t get out of bed most days, can t work or engage in other necessary activities, you have trouble taking care of basic hygiene, or basic responsibilities, thoughts of suicide or death that will not go away, self-injurious behavior.     What you need to do:  1. Call your  care team and request a same-day appointment. If they are not available (weekends or after hours) call your local crisis line, emergency room or 911.      Electronically signed by: Pamela M. Lechevalier, January 11, 2018    Depression Self Care Plan / Survival Kit    Self-Care for Depression  Here s the deal. Your body and mind are really not as separate as most people think.  What you do and think affects how you feel and how you feel influences what you do and think. This means if you do things that people who feel good do, it will help you feel better.  Sometimes this is all it takes.  There is also a place for medication and therapy depending on how severe your depression is, so be sure to consult with your medical provider and/ or Behavioral Health Consultant if your symptoms are worsening or not improving.     In order to better manage my stress, I will:    Exercise  Get some form of exercise, every day. This will help reduce pain and release endorphins, the  feel good  chemicals in your brain. This is almost as good as taking antidepressants!  This is not the same as joining a gym and then never going! (they count on that by the way ) It can be as simple as just going for a walk or doing some gardening, anything that will get you moving.      Hygiene   Maintain good hygiene (Get out of bed in the morning, Make your bed, Brush your teeth, Take a shower, and Get dressed like you were going to work, even if you are unemployed).  If your clothes don't fit try to get ones that do.    Diet  I will strive to eat foods that are good for me, drink plenty of water, and avoid excessive sugar, caffeine, alcohol, and other mood-altering substances.  Some foods that are helpful in depression are: complex carbohydrates, B vitamins, flaxseed, fish or fish oil, fresh fruits and vegetables.    Psychotherapy  I agree to participate in Individual Therapy (if recommended).    Medication  If prescribed medications, I agree to take  them.  Missing doses can result in serious side effects.  I understand that drinking alcohol, or other illicit drug use, may cause potential side effects.  I will not stop my medication abruptly without first discussing it with my provider.    Staying Connected With Others  I will stay in touch with my friends, family members, and my primary care provider/team.    Use your imagination  Be creative.  We all have a creative side; it doesn t matter if it s oil painting, sand castles, or mud pies! This will also kick up the endorphins.    Witness Beauty  (AKA stop and smell the roses) Take a look outside, even in mid-winter. Notice colors, textures. Watch the squirrels and birds.     Service to others  Be of service to others.  There is always someone else in need.  By helping others we can  get out of ourselves  and remember the really important things.  This also provides opportunities for practicing all the other parts of the program.    Humor  Laugh and be silly!  Adjust your TV habits for less news and crime-drama and more comedy.    Control your stress  Try breathing deep, massage therapy, biofeedback, and meditation. Find time to relax each day.     My support system    Clinic Contact:  Phone number:    Contact 1:  Phone number:    Contact 2:  Phone number:    Druze/:  Phone number:    Therapist:  Phone number:    Local crisis center:    Phone number:    Other community support:  Phone number:

## 2018-01-11 NOTE — PROGRESS NOTES
SUBJECTIVE:   Nella Lemon is a 72 year old female who presents to clinic today for the following health issues:  Throat issues of no taste and abdominal feels funny    Concern - throat issues with no taste  Onset: a few months ago    Description:   Feels like theres no taste, acid taste in mouth    Intensity: mild    Progression of Symptoms:  worsening    Accompanying Signs & Symptoms:  Bloating, belching and nausea denies cramping.  Reports intermittent heart palpitations.      Previous history of similar problem:   no    Precipitating factors:   Worsened by: nothing    Alleviating factors:  Improved by: eating but nothing taste good    Therapies Tried and outcome: none      She is scheduled for right breast biopsy next week.     Anxiety is worsening; has been on zoloft in the past which did work quite well.  She cannot tolerate benzo's or hydroxyzine.  She would like to restart.      Problem list and histories reviewed & adjusted, as indicated.  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, left     Family history of colon cancer     ACP (advance care planning)     Hyperlipidemia LDL goal <100     Past Surgical History:   Procedure Laterality Date     ADENOIDECTOMY       CHOLECYSTECTOMY  1981     COLONOSCOPY  2002     COLONOSCOPY  2012     COLONOSCOPY N/A 9/22/2014    Procedure: COLONOSCOPY;  Surgeon: Lavell Pavon DO;  Location: [facility]     CYSTOSCOPY  2007    Cystitis chronic     ORTHOPEDIC SURGERY  2002    carpal tunnel; RT, LT     TONSILLECTOMY         Social History   Substance Use Topics      Smoking status: Never Smoker     Smokeless tobacco: Never Used     Alcohol use Yes      Comment: once a year     Family History   Problem Relation Age of Onset     Breast Cancer Mother      Alcohol/Drug Mother      Cirrhosis     Other - See Comments Mother      goiter     CEREBROVASCULAR DISEASE Father      Circulatory Father      Alcohol/Drug Son      Cancer - colorectal Brother      Unknown/Adopted No family hx of      Asthma No family hx of      C.A.D. No family hx of      DIABETES No family hx of      Hypertension No family hx of      Prostate Cancer No family hx of      Allergies No family hx of      Alzheimer Disease No family hx of      Anesthesia Reaction No family hx of      Arthritis No family hx of      Blood Disease No family hx of      Cardiovascular No family hx of      Congenital Anomalies No family hx of      Connective Tissue Disorder No family hx of      Depression No family hx of      Endocrine Disease No family hx of      Eye Disorder No family hx of      Genetic Disorder No family hx of      GASTROINTESTINAL DISEASE No family hx of      Genitourinary Problems No family hx of      Gynecology No family hx of      HEART DISEASE No family hx of      Lipids No family hx of      Musculoskeletal Disorder No family hx of      Neurologic Disorder No family hx of      Obesity No family hx of      OSTEOPOROSIS No family hx of      Psychotic Disorder No family hx of      Respiratory No family hx of      Thyroid Disease No family hx of      Family History Negative No family hx of      Hearing Loss No family hx of          Current Outpatient Prescriptions   Medication Sig Dispense Refill     losartan (COZAAR) 25 MG tablet TAKE 1/2 TABLET (12.5 MG) BY MOUTH DAILY FOR BLOOD PRESSURE 15 tablet 1     rosuvastatin (CRESTOR) 5 MG tablet Take 1 tablet (5 mg) by mouth daily 90 tablet 1     gabapentin (NEURONTIN) 300 MG capsule Take 3 capsules three times daily 270 capsule 5     GLUCOSAMINE SULFATE PO Take 1,000  "mg by mouth 2 times daily       Ginkgo Biloba (GINKOBA PO) Take 40 mg by mouth daily       Omega-3 Fatty Acids (OMEGA-3 FISH OIL PO) Take 3 g by mouth daily        VITAMIN D, CHOLECALCIFEROL, PO Take by mouth daily       Allergies   Allergen Reactions     Atorvastatin      Ezetimibe      Gentamicin      Hydroxyzine      Lorazepam      Ranitidine      Simvastatin      Lopid [Gemfibrozil] GI Disturbance     Bloating, constipation,      Pravastatin Muscle Pain (Myalgia)     Recent Labs   Lab Test  09/25/17   0916  03/24/17   0848  12/30/16   0851  12/16/16   1434   06/22/15   0854   LDL  120*  76  47   --    < >  138*   HDL  41*  54  57   --    < >  49*   TRIG  206*  152*  128   --    < >  112   ALT   --   68*   --   42   --   29   CR   --   0.96   --   0.88   < >  1.00   GFRESTIMATED   --   57*   --   63   < >  55*   GFRESTBLACK   --   69   --   76   < >  67   POTASSIUM   --   4.4   --   3.7   < >  4.1   TSH  5.63*  4.00   --    --    < >   --     < > = values in this interval not displayed.      BP Readings from Last 3 Encounters:   01/11/18 132/72   11/22/17 131/75   10/24/17 144/76    Wt Readings from Last 3 Encounters:   01/11/18 183 lb (83 kg)   11/22/17 180 lb (81.6 kg)   10/24/17 180 lb (81.6 kg)            Reviewed and updated as needed this visit by clinical staffTobacco  Allergies       Reviewed and updated as needed this visit by Provider         ROS:  Constitutional, HEENT, cardiovascular, pulmonary, gi and gu systems are negative, except as otherwise noted.      OBJECTIVE:   /72 (BP Location: Left arm, Patient Position: Chair, Cuff Size: Adult Large)  Pulse 66  Temp 98  F (36.7  C) (Tympanic)  Resp 20  Ht 5' 1\" (1.549 m)  Wt 183 lb (83 kg)  SpO2 98%  BMI 34.58 kg/m2  Body mass index is 34.58 kg/(m^2).  GENERAL: healthy, alert and no distress  NECK: no adenopathy, no asymmetry, masses, or scars and thyroid normal to palpation  RESP: lungs clear to auscultation - no rales, rhonchi or " wheezes  CV: regular rate and rhythm, normal S1 S2, no S3 or S4, no murmur, click or rub, no peripheral edema and peripheral pulses strong  ABDOMEN: bowel sounds normal, tenderness of epigastric area.  No masses or organomegaly noted.   MS: no gross musculoskeletal defects noted, no edema  PSYCH: mentation appears normal, affect normal/bright      ASSESSMENT/PLAN:       1. Gastroesophageal reflux disease, esophagitis presence not specified  symptomatic  - omeprazole (PRILOSEC) 40 MG capsule; Take 1 capsule (40 mg) by mouth daily Take 30-60 minutes before a meal.  Dispense: 90 capsule; Refill: 1    2. Palpitations  symptomatic  - Zio Patch Holter; Future    3. Fatigue, unspecified type  - TSH with free T4 reflex  - Basic metabolic panel  - CBC with platelets differential    4. On statin therapy  - Hepatic panel    5. Anxiety  Restart zoloft  - sertraline (ZOLOFT) 50 MG tablet; Take 1 tablet (50 mg) by mouth daily  Dispense: 30 tablet; Refill: 1        FUTURE APPOINTMENTS:       - Follow-up visit in 1 month or as needed for acute concerns.     Randi Haddad, NP  [facility]

## 2018-01-11 NOTE — NURSING NOTE
"Chief Complaint   Patient presents with     Throat Problem     with funny felling in abd tast in throat not good.        Initial /72 (BP Location: Left arm, Patient Position: Chair, Cuff Size: Adult Large)  Pulse 66  Temp 98  F (36.7  C) (Tympanic)  Resp 20  Ht 5' 1\" (1.549 m)  Wt 183 lb (83 kg)  SpO2 98%  BMI 34.58 kg/m2 Estimated body mass index is 34.58 kg/(m^2) as calculated from the following:    Height as of this encounter: 5' 1\" (1.549 m).    Weight as of this encounter: 183 lb (83 kg).  Medication Reconciliation: complete   Pamela M Lechevalier LPN      "

## 2018-01-12 ASSESSMENT — ANXIETY QUESTIONNAIRES: GAD7 TOTAL SCORE: 12

## 2018-01-17 ENCOUNTER — HOSPITAL ENCOUNTER (OUTPATIENT)
Dept: ULTRASOUND IMAGING | Facility: HOSPITAL | Age: 73
Discharge: HOME | End: 2018-01-17
Attending: SURGERY | Admitting: SURGERY
Payer: COMMERCIAL

## 2018-01-17 DIAGNOSIS — D72.829 LEUKOCYTOSIS, UNSPECIFIED TYPE: ICD-10-CM

## 2018-01-17 DIAGNOSIS — N63.11 MASS OF UPPER OUTER QUADRANT OF RIGHT BREAST: ICD-10-CM

## 2018-01-17 LAB
BASOPHILS # BLD AUTO: 0 10E9/L (ref 0–0.2)
BASOPHILS NFR BLD AUTO: 0.3 %
DIFFERENTIAL METHOD BLD: ABNORMAL
EOSINOPHIL # BLD AUTO: 0.1 10E9/L (ref 0–0.7)
EOSINOPHIL NFR BLD AUTO: 0.8 %
ERYTHROCYTE [DISTWIDTH] IN BLOOD BY AUTOMATED COUNT: 12.8 % (ref 10–15)
HCT VFR BLD AUTO: 43.8 % (ref 35–47)
HGB BLD-MCNC: 14.8 G/DL (ref 11.7–15.7)
IMM GRANULOCYTES # BLD: 0 10E9/L (ref 0–0.4)
IMM GRANULOCYTES NFR BLD: 0.3 %
LYMPHOCYTES # BLD AUTO: 2.3 10E9/L (ref 0.8–5.3)
LYMPHOCYTES NFR BLD AUTO: 16 %
MCH RBC QN AUTO: 28.6 PG (ref 26.5–33)
MCHC RBC AUTO-ENTMCNC: 33.8 G/DL (ref 31.5–36.5)
MCV RBC AUTO: 85 FL (ref 78–100)
MONOCYTES # BLD AUTO: 0.7 10E9/L (ref 0–1.3)
MONOCYTES NFR BLD AUTO: 4.8 %
NEUTROPHILS # BLD AUTO: 11.1 10E9/L (ref 1.6–8.3)
NEUTROPHILS NFR BLD AUTO: 77.8 %
NRBC # BLD AUTO: 0 10*3/UL
NRBC BLD AUTO-RTO: 0 /100
PLATELET # BLD AUTO: 351 10E9/L (ref 150–450)
RBC # BLD AUTO: 5.18 10E12/L (ref 3.8–5.2)
RETICS # AUTO: 83.9 10E9/L (ref 25–95)
RETICS/RBC NFR AUTO: 1.6 % (ref 0.5–2)
WBC # BLD AUTO: 14.3 10E9/L (ref 4–11)

## 2018-01-17 PROCEDURE — 85045 AUTOMATED RETICULOCYTE COUNT: CPT | Mod: ZL | Performed by: NURSE PRACTITIONER

## 2018-01-17 PROCEDURE — 40000847 ZZHCL STATISTIC MORPHOLOGY W/INTERP HISTOLOGY TC 85060: Mod: ZL | Performed by: NURSE PRACTITIONER

## 2018-01-17 PROCEDURE — 85025 COMPLETE CBC W/AUTO DIFF WBC: CPT | Mod: ZL | Performed by: NURSE PRACTITIONER

## 2018-01-17 PROCEDURE — 88173 CYTOPATH EVAL FNA REPORT: CPT | Mod: TC | Performed by: SURGERY

## 2018-01-17 PROCEDURE — 76942 ECHO GUIDE FOR BIOPSY: CPT | Mod: TC

## 2018-01-17 PROCEDURE — 25000125 ZZHC RX 250: Performed by: RADIOLOGY

## 2018-01-17 PROCEDURE — 36415 COLL VENOUS BLD VENIPUNCTURE: CPT | Mod: ZL | Performed by: NURSE PRACTITIONER

## 2018-01-17 RX ORDER — LIDOCAINE HYDROCHLORIDE AND EPINEPHRINE 15; 5 MG/ML; UG/ML
20 INJECTION, SOLUTION EPIDURAL ONCE
Status: DISCONTINUED | OUTPATIENT
Start: 2018-01-17 | End: 2018-01-18 | Stop reason: HOSPADM

## 2018-01-17 RX ORDER — LIDOCAINE HYDROCHLORIDE 10 MG/ML
20 INJECTION, SOLUTION EPIDURAL; INFILTRATION; INTRACAUDAL; PERINEURAL ONCE
Status: COMPLETED | OUTPATIENT
Start: 2018-01-17 | End: 2018-01-17

## 2018-01-17 RX ORDER — LIDOCAINE HYDROCHLORIDE 10 MG/ML
INJECTION, SOLUTION EPIDURAL; INFILTRATION; INTRACAUDAL; PERINEURAL
Status: DISPENSED
Start: 2018-01-17 | End: 2018-01-17

## 2018-01-17 RX ADMIN — LIDOCAINE HYDROCHLORIDE 30 MG: 10 INJECTION, SOLUTION EPIDURAL; INFILTRATION; INTRACAUDAL; PERINEURAL at 10:14

## 2018-01-17 NOTE — IP AVS SNAPSHOT
MRN:[id number]                      After Visit Summary   1/17/2018    Nella Lemon    MRN: [id number]           Visit Information        Provider Department      1/17/2018 10:00 AM VANESSA; [facility]; HIUS1 [facility]           Review of your medicines      UNREVIEWED medicines. Ask your doctor about these medicines        Dose / Directions    gabapentin 300 MG capsule   Commonly known as:  NEURONTIN   Used for:  Myalgia        Take 3 capsules three times daily   Quantity:  270 capsule   Refills:  5       GINKOBA PO        Dose:  40 mg   Take 40 mg by mouth daily   Refills:  0       GLUCOSAMINE SULFATE PO        Dose:  1000 mg   Take 1,000 mg by mouth 2 times daily   Refills:  0       losartan 25 MG tablet   Commonly known as:  COZAAR   Used for:  Benign essential hypertension        TAKE 1/2 TABLET (12.5 MG) BY MOUTH DAILY FOR BLOOD PRESSURE   Quantity:  15 tablet   Refills:  1       OMEGA-3 FISH OIL PO        Dose:  3 g   Take 3 g by mouth daily   Refills:  0       omeprazole 40 MG capsule   Commonly known as:  priLOSEC   Used for:  Gastroesophageal reflux disease, esophagitis presence not specified        Dose:  40 mg   Take 1 capsule (40 mg) by mouth daily Take 30-60 minutes before a meal.   Quantity:  90 capsule   Refills:  1       rosuvastatin 5 MG tablet   Commonly known as:  CRESTOR   Used for:  Hyperlipidemia LDL goal <100        Dose:  5 mg   Take 1 tablet (5 mg) by mouth daily   Quantity:  90 tablet   Refills:  1       sertraline 50 MG tablet   Commonly known as:  ZOLOFT   Used for:  Anxiety        Dose:  50 mg   Take 1 tablet (50 mg) by mouth daily   Quantity:  30 tablet   Refills:  1       VITAMIN D (CHOLECALCIFEROL) PO        Take by mouth daily   Refills:  0                Protect others around you: Learn how to safely use, store and throw away your medicines at www.disposemymeds.org.         Follow-ups after your visit        Your next 10 appointments already scheduled      Jan 17, 2018 10:00 AM CST   US BREAST BIOPSY VACUUM RIGHT with HIUS1, HIBRRN, HIUSIRRAD   [facility] ([facility] )    [address]  [address]  [facility] [address]   [phone]           Tell us in advance if there s any chance you may be pregnant.  Bring a list of your medicines to the exam. Include vitamins, minerals and over-the-counter drugs.  If you take blood thinners, you may need to stop taking them a few days before treatment. Talk to your doctor before stopping these medicines. You will need a blood test the morning of your exam.   Stop taking Coumadin (warfarin) 3 days before your exam. Restart the day after your exam.   If you take aspirin, you may need to stop taking it 3 days before your scan.   If you take Plavix, Ticlid, Pletal or Persantine, you may need to stop taking them 5 days before your scan. Please talk to your doctor before stopping these medicines.  If you will receive sedation for this test (medicine to help you relax):   See your family doctor for an exam within 30 days of treatment.   Plan for an adult to drive you home and stay with you for at least 6 hours.   Follow the eating and drinking guidelines checked below:   No eating or drinking for 4 hours before your test. You may take medicine with small sips of water.   If you have diabetes:If you take insulin, call your diabetes care team. Do not take diabetes pills on the morning of your test. If you take metformin (Avandamet, Glucophage, Glucovance, Metaglip) and received contrast, wait 48 hours before re-starting this medicine.  Please call the Imaging Department at your exam site with any questions.            Jan 18, 2018 11:30 AM CST   ZIOPATCH MONITOR with HI STRESS RM1   [facility] Electrocardiology ([facility] )    [address] [facility] [address]   [phone]            Feb 12, 2018  9:45 AM CST   (Arrive by 9:30 AM)   SHORT with Randi Haddad NP   [facility] )    [address]  [place] [address]   [phone]            Aug 27, 2018  9:30 AM CDT   (Arrive by 9:15 AM)   Hearing Eval with Joss Palafox   [facility] ([facility] )    [address] [place] [address]   [phone]               Care Instructions        Further instructions from your care team         DISCHARGE INSTRUCTIONS FOR  BREAST BIOPSY    BREAST BIOPSY  A needle was used to locate the breast tissue. Tissue was removed to check the cells and be examined by a Pathologist. This will help your doctor plan any further treatment or follow-up. Your doctor will tell you the results of the tests in 3 to 5 days.    Follow these instructions:    Climb stairs slowly and use the hand rail. Avoid extra trips. No running or jumping.    No pushing, pulling or straining (vacuuming, shoveling, sweeping etc.)    You may shower tomorrow, pat the are dry around the tegaderm dressing.    Wear a bra at all times until your breast is fully healed.    Apply ice to the breast during the first few hours following the procedure to relieve swelling and bruising.    Steri strips stay in place for 7-10 days or until they fall off. Do not pull them off.    May remove pressure dressing after 24 hours.    Call your doctor if you have:    increased bleeding or drainage from your incision.    swelling, redness or opening of your incision.    foul smell from your incision or dressing.    red streaks from your incision.    chills or fever over 101 degrees (by mouth).    pain not helped by your pain medication.    trouble or pain with breathing.    any new problems or concerns.     Additional Information About Your Visit        myVBO Information     myVBO lets you send messages to your doctor, view your test results, renew your prescriptions, schedule appointments and more. To sign up, go to [email or web address] . Click on \"Log in\" on the left side of " "the screen, which will take you to the Welcome page. Then click on \"Sign up Now\" on the right side of the page.     You will be asked to enter the access code listed below, as well as some personal information. Please follow the directions to create your username and password.     Your access code is: [email or web address]  Expires: 2018  1:46 PM     Your access code will  in 90 days. If you need help or a new code, please call your [facility] clinic or [phone].        Care EveryWhere ID     This is your Care EveryWhere ID. This could be used by other organizations to access your [facility] medical records  [id number]         Primary Care Provider Office Phone # Fax #    Randi REESEWenceslao Haddad [phone]      Equal Access to Services     [facility]: Hadii william hargrove Soandrews, waaxda luqadaha, qaybta kaalmaanders conrad, cristiane raman . So [facility] [phone].    ATENCIÓN: Si habla español, tiene a mcclure disposición servicios gratuitos de asistencia lingüística. Llame al [phone].    We comply with applicable federal civil rights laws and [place] laws. We do not discriminate on the basis of race, color, national origin, age, disability, sex, sexual orientation, or gender identity.            Thank you!     Thank you for choosing [facility] for your care. Our goal is always to provide you with excellent care. Hearing back from our patients is one way we can continue to improve our services. Please take a few minutes to complete the written survey that you may receive in the mail after you visit with us. Thank you!             Medication List: This is a list of all your medications and when to take them. Check marks below indicate your daily home schedule. Keep this list as a reference.      Medications           Morning Afternoon Evening Bedtime As Needed    gabapentin 300 MG capsule   Commonly known as:  NEURONTIN   Take 3 capsules three times daily          "                       GINKOBA PO   Take 40 mg by mouth daily                                GLUCOSAMINE SULFATE PO   Take 1,000 mg by mouth 2 times daily                                losartan 25 MG tablet   Commonly known as:  COZAAR   TAKE 1/2 TABLET (12.5 MG) BY MOUTH DAILY FOR BLOOD PRESSURE                                OMEGA-3 FISH OIL PO   Take 3 g by mouth daily                                omeprazole 40 MG capsule   Commonly known as:  priLOSEC   Take 1 capsule (40 mg) by mouth daily Take 30-60 minutes before a meal.                                rosuvastatin 5 MG tablet   Commonly known as:  CRESTOR   Take 1 tablet (5 mg) by mouth daily                                sertraline 50 MG tablet   Commonly known as:  ZOLOFT   Take 1 tablet (50 mg) by mouth daily                                VITAMIN D (CHOLECALCIFEROL) PO   Take by mouth daily

## 2018-01-17 NOTE — PROGRESS NOTES
Patient here for ultrasound right breast biopsy.  Procedure explained by myself and by radiologist and all questions answered.  Consent signed.  Pt to biopsy room.  Radioloigist scans and decides he will attempt fine needle aspirations.  Time out done and aspiration performed.   Pressure held to site until bleeding completely stopped.  Bandaid and tegaderm applied to site.  Verbal and written discharge instructions given to patient.  Patient states understanding of all discharge instructions.  Pt was discharged to home in stable condition and without evidence of bleeding.  Lissette CEDENO

## 2018-01-17 NOTE — IP AVS SNAPSHOT
[facility]    [address] [place] [address]    Phone:  [phone]                                       After Visit Summary   1/17/2018    Nella Lemon    MRN: [id number]           After Visit Summary Signature Page     I have received my discharge instructions, and my questions have been answered. I have discussed any challenges I see with this plan with the nurse or doctor.    ..........................................................................................................................................  Patient/Patient Representative Signature      ..........................................................................................................................................  Patient Representative Print Name and Relationship to Patient    ..................................................               ................................................  Date                                            Time    ..........................................................................................................................................  Reviewed by Signature/Title    ...................................................              ..............................................  Date                                                            Time

## 2018-01-18 ENCOUNTER — HOSPITAL ENCOUNTER (OUTPATIENT)
Dept: CARDIOLOGY | Facility: HOSPITAL | Age: 73
Discharge: HOME | End: 2018-01-18
Attending: NURSE PRACTITIONER | Admitting: NURSE PRACTITIONER
Payer: COMMERCIAL

## 2018-01-18 ENCOUNTER — TELEPHONE (OUTPATIENT)
Dept: MAMMOGRAPHY | Facility: OTHER | Age: 73
End: 2018-01-18

## 2018-01-18 DIAGNOSIS — R00.2 PALPITATIONS: ICD-10-CM

## 2018-01-18 PROCEDURE — 0298T ZZC EXT ECG > 48HR TO 21 DAY REVIEW AND INTERPRETATN: CPT | Performed by: INTERNAL MEDICINE

## 2018-01-18 PROCEDURE — 0296T ZIO PATCH HOLTER: CPT

## 2018-01-18 NOTE — TELEPHONE ENCOUNTER
There were  no complications and patient has no symptoms..      Called patient to check post-procedural status.      Procedure: Ultrasound fine needle aspiration breast  Radiologist(s): Dr. Roberto Kennedy  Date of Procedure: Visit date not found    The patient had no questions or concerns.     Post-procedure pain score as of today is: 0    Does the patient need additional interventions? No    Patient will contact provider, Dr. Dow if there are any issues and for the results.    Jeannie Munoz RN

## 2018-01-19 ENCOUNTER — TELEPHONE (OUTPATIENT)
Dept: MAMMOGRAPHY | Facility: OTHER | Age: 73
End: 2018-01-19

## 2018-01-19 LAB
COPATH REPORT: NORMAL
COPATH REPORT: NORMAL

## 2018-01-19 NOTE — TELEPHONE ENCOUNTER
Dr. Dow gives the ok to give patient her results and order 3-6 month follow up ultrasound.  Patient has no questions.  DI schedulers notified to call patient in June to schedule ultrasound (patient will call earlier if she wants to schedule before then).

## 2018-02-02 DIAGNOSIS — F41.9 ANXIETY: ICD-10-CM

## 2018-02-12 ENCOUNTER — OFFICE VISIT (OUTPATIENT)
Dept: FAMILY MEDICINE | Facility: OTHER | Age: 73
End: 2018-02-12
Attending: NURSE PRACTITIONER
Payer: COMMERCIAL

## 2018-02-12 VITALS
RESPIRATION RATE: 14 BRPM | WEIGHT: 177 LBS | OXYGEN SATURATION: 98 % | SYSTOLIC BLOOD PRESSURE: 128 MMHG | HEART RATE: 60 BPM | BODY MASS INDEX: 33.42 KG/M2 | DIASTOLIC BLOOD PRESSURE: 74 MMHG | HEIGHT: 61 IN | TEMPERATURE: 97.2 F

## 2018-02-12 DIAGNOSIS — I10 BENIGN ESSENTIAL HYPERTENSION: Primary | ICD-10-CM

## 2018-02-12 DIAGNOSIS — E03.4 HYPOTHYROIDISM DUE TO ACQUIRED ATROPHY OF THYROID: ICD-10-CM

## 2018-02-12 DIAGNOSIS — Z79.899 ON STATIN THERAPY: ICD-10-CM

## 2018-02-12 DIAGNOSIS — I48.0 PAROXYSMAL ATRIAL FIBRILLATION (H): ICD-10-CM

## 2018-02-12 DIAGNOSIS — K21.9 GASTROESOPHAGEAL REFLUX DISEASE WITHOUT ESOPHAGITIS: ICD-10-CM

## 2018-02-12 DIAGNOSIS — E78.5 HYPERLIPIDEMIA LDL GOAL <100: ICD-10-CM

## 2018-02-12 LAB
ALBUMIN SERPL-MCNC: 3.6 G/DL (ref 3.4–5)
ALP SERPL-CCNC: 105 U/L (ref 40–150)
ALT SERPL W P-5'-P-CCNC: 30 U/L (ref 0–50)
ANION GAP SERPL CALCULATED.3IONS-SCNC: 10 MMOL/L (ref 3–14)
AST SERPL W P-5'-P-CCNC: 20 U/L (ref 0–45)
BILIRUB DIRECT SERPL-MCNC: 0.2 MG/DL (ref 0–0.2)
BILIRUB SERPL-MCNC: 0.6 MG/DL (ref 0.2–1.3)
BUN SERPL-MCNC: 17 MG/DL (ref 7–30)
CALCIUM SERPL-MCNC: 8.9 MG/DL (ref 8.5–10.1)
CHLORIDE SERPL-SCNC: 109 MMOL/L (ref 94–109)
CHOLEST SERPL-MCNC: 148 MG/DL
CO2 SERPL-SCNC: 24 MMOL/L (ref 20–32)
CREAT SERPL-MCNC: 0.87 MG/DL (ref 0.52–1.04)
GFR SERPL CREATININE-BSD FRML MDRD: 64 ML/MIN/1.7M2
GLUCOSE SERPL-MCNC: 91 MG/DL (ref 70–99)
HDLC SERPL-MCNC: 49 MG/DL
LDLC SERPL CALC-MCNC: 71 MG/DL
NONHDLC SERPL-MCNC: 99 MG/DL
POTASSIUM SERPL-SCNC: 4 MMOL/L (ref 3.4–5.3)
PROT SERPL-MCNC: 7.6 G/DL (ref 6.8–8.8)
SODIUM SERPL-SCNC: 143 MMOL/L (ref 133–144)
T4 FREE SERPL-MCNC: 0.96 NG/DL (ref 0.76–1.46)
TRIGL SERPL-MCNC: 138 MG/DL
TSH SERPL DL<=0.005 MIU/L-ACNC: 4.54 MU/L (ref 0.4–4)

## 2018-02-12 PROCEDURE — 84443 ASSAY THYROID STIM HORMONE: CPT | Mod: ZL | Performed by: NURSE PRACTITIONER

## 2018-02-12 PROCEDURE — G0463 HOSPITAL OUTPT CLINIC VISIT: HCPCS

## 2018-02-12 PROCEDURE — 80076 HEPATIC FUNCTION PANEL: CPT | Mod: ZL | Performed by: NURSE PRACTITIONER

## 2018-02-12 PROCEDURE — 80048 BASIC METABOLIC PNL TOTAL CA: CPT | Mod: ZL | Performed by: NURSE PRACTITIONER

## 2018-02-12 PROCEDURE — 84439 ASSAY OF FREE THYROXINE: CPT | Mod: ZL | Performed by: NURSE PRACTITIONER

## 2018-02-12 PROCEDURE — 99213 OFFICE O/P EST LOW 20 MIN: CPT | Performed by: NURSE PRACTITIONER

## 2018-02-12 PROCEDURE — 80061 LIPID PANEL: CPT | Mod: ZL | Performed by: NURSE PRACTITIONER

## 2018-02-12 PROCEDURE — 36415 COLL VENOUS BLD VENIPUNCTURE: CPT | Mod: ZL | Performed by: NURSE PRACTITIONER

## 2018-02-12 RX ORDER — LOSARTAN POTASSIUM 25 MG/1
TABLET ORAL
Qty: 45 TABLET | Refills: 3 | Status: SHIPPED | OUTPATIENT
Start: 2018-02-12 | End: 2018-03-28

## 2018-02-12 ASSESSMENT — ANXIETY QUESTIONNAIRES
7. FEELING AFRAID AS IF SOMETHING AWFUL MIGHT HAPPEN: NOT AT ALL
1. FEELING NERVOUS, ANXIOUS, OR ON EDGE: NOT AT ALL
2. NOT BEING ABLE TO STOP OR CONTROL WORRYING: SEVERAL DAYS
GAD7 TOTAL SCORE: 2
6. BECOMING EASILY ANNOYED OR IRRITABLE: NOT AT ALL
3. WORRYING TOO MUCH ABOUT DIFFERENT THINGS: SEVERAL DAYS
5. BEING SO RESTLESS THAT IT IS HARD TO SIT STILL: NOT AT ALL

## 2018-02-12 ASSESSMENT — PATIENT HEALTH QUESTIONNAIRE - PHQ9: 5. POOR APPETITE OR OVEREATING: NOT AT ALL

## 2018-02-12 NOTE — NURSING NOTE
"Chief Complaint   Patient presents with     Hyperlipidemia     Patient is fasting.     Gastrophageal Reflux     Hypertension     Anxiety       Initial /74 (BP Location: Left arm, Patient Position: Sitting, Cuff Size: Adult Large)  Pulse 60  Temp 97.2  F (36.2  C) (Tympanic)  Resp 14  Ht 5' 1\" (1.549 m)  Wt 177 lb (80.3 kg)  SpO2 98%  BMI 33.44 kg/m2 Estimated body mass index is 33.44 kg/(m^2) as calculated from the following:    Height as of this encounter: 5' 1\" (1.549 m).    Weight as of this encounter: 177 lb (80.3 kg).  Medication Reconciliation: complete   Fiona Orellana      "

## 2018-02-12 NOTE — MR AVS SNAPSHOT
After Visit Summary   2/12/2018    Nella Lemon    MRN: [id number]           Patient Information     Date Of Birth          1945        Visit Information        Provider Department      2/12/2018 9:45 AM Randi Haddad, NP [facility]        Today's Diagnoses     Benign essential hypertension    -  1    Hyperlipidemia LDL goal <100        Gastroesophageal reflux disease without esophagitis        On statin therapy          Care Instructions      ASSESSMENT/PLAN:       1. Benign essential hypertension  chronic  - TSH with free T4 reflex  - Basic metabolic panel    2. Hyperlipidemia LDL goal <100  chronic  - Lipid Profile    3. Gastroesophageal reflux disease without esophagitis  chronic    4. On statin therapy  - Hepatic panel    FUTURE APPOINTMENTS:       - Follow-up visit in 6 months or as needed for acute concerns.     Randi Haddad NP  [facility]              Follow-ups after your visit        Follow-up notes from your care team     Return in about 6 months (around 8/12/2018), or if symptoms worsen or fail to improve.      Your next 10 appointments already scheduled     Aug 27, 2018  9:30 AM CDT   (Arrive by 9:15 AM)   Hearing Eval with Joss Palafox   [facility] ([facility] [place] )    [address] [place] [address] Ave  [place] [address]   [phone]              Who to contact     If you have questions or need follow up information about today's clinic visit or your schedule please contact [facility] directly at [phone].  Normal or non-critical lab and imaging results will be communicated to you by MyChart, letter or phone within 4 business days after the clinic has received the results. If you do not hear from us within 7 days, please contact the clinic through MyChart or phone. If you have a critical or abnormal lab result, we will notify you by phone as soon as possible.  Submit refill requests  "through [employer] or call your pharmacy and they will forward the refill request to us. Please allow 3 business days for your refill to be completed.          Additional Information About Your Visit        [employer] Information     [employer] lets you send messages to your doctor, view your test results, renew your prescriptions, schedule appointments and more. To sign up, go to [email or web address] . Click on \"Log in\" on the left side of the screen, which will take you to the Welcome page. Then click on \"Sign up Now\" on the right side of the page.     You will be asked to enter the access code listed below, as well as some personal information. Please follow the directions to create your username and password.     Your access code is: [email or web address]  Expires: 2018  1:46 PM     Your access code will  in 90 days. If you need help or a new code, please call your [facility] clinic or [phone].        Care EveryWhere ID     This is your Care EveryWhere ID. This could be used by other organizations to access your [facility] medical records  [id number]        Your Vitals Were     Pulse Temperature Respirations Height Pulse Oximetry BMI (Body Mass Index)    60 97.2  F (36.2  C) (Tympanic) 14 5' 1\" (1.549 m) 98% 33.44 kg/m2       Blood Pressure from Last 3 Encounters:   18 128/74   18 132/72   17 131/75    Weight from Last 3 Encounters:   18 177 lb (80.3 kg)   18 183 lb (83 kg)   17 180 lb (81.6 kg)              We Performed the Following     Basic metabolic panel     Hepatic panel     Lipid Profile     TSH with free T4 reflex          Today's Medication Changes          These changes are accurate as of 18  9:47 AM.  If you have any questions, ask your nurse or doctor.               These medicines have changed or have updated prescriptions.        Dose/Directions    losartan 25 MG tablet   Commonly known as:  COZAAR   This may have changed:  See the new instructions.   Used " for:  Benign essential hypertension   Changed by:  Randi Haddad, NP        TAKE 1/2 TABLET (12.5 MG) BY MOUTH DAILY FOR BLOOD PRESSURE   Quantity:  45 tablet   Refills:  3            Where to get your medicines      These medications were sent to [facility] - [place], [place] - [address]  [address] [place] [address]     Phone:  [phone]     losartan 25 MG tablet                Primary Care Provider Office Phone # Fax #    Randi Haddad [phone] [facility] [address]        Equal Access to Services     [facility]: Hadii aad ku hadasho Soomaali, waaxda luqadaha, qaybta kaalmada adeegyada, cristiane raman . So [facility] [phone].    ATENCIÓN: Si habla español, tiene a mcclure disposición servicios gratuitos de asistencia lingüística. Llame al [phone].    We comply with applicable federal civil rights laws and [place] laws. We do not discriminate on the basis of race, color, national origin, age, disability, sex, sexual orientation, or gender identity.            Thank you!     Thank you for choosing [facility]  for your care. Our goal is always to provide you with excellent care. Hearing back from our patients is one way we can continue to improve our services. Please take a few minutes to complete the written survey that you may receive in the mail after your visit with us. Thank you!             Your Updated Medication List - Protect others around you: Learn how to safely use, store and throw away your medicines at www.disposemymeds.org.          This list is accurate as of 2/12/18  9:47 AM.  Always use your most recent med list.                   Brand Name Dispense Instructions for use Diagnosis    gabapentin 300 MG capsule    NEURONTIN    270 capsule    Take 3 capsules three times daily    Myalgia       GINKOBA PO      Take 40 mg by mouth daily        GLUCOSAMINE SULFATE PO      Take 1,000 mg  by mouth 2 times daily        losartan 25 MG tablet    COZAAR    45 tablet    TAKE 1/2 TABLET (12.5 MG) BY MOUTH DAILY FOR BLOOD PRESSURE    Benign essential hypertension       OMEGA-3 FISH OIL PO      Take 3 g by mouth daily        omeprazole 40 MG capsule    priLOSEC    90 capsule    Take 1 capsule (40 mg) by mouth daily Take 30-60 minutes before a meal.    Gastroesophageal reflux disease, esophagitis presence not specified       rosuvastatin 5 MG tablet    CRESTOR    90 tablet    Take 1 tablet (5 mg) by mouth daily    Hyperlipidemia LDL goal <100       sertraline 50 MG tablet    ZOLOFT    30 tablet    TAKE 1 TABLET (50 MG) BY MOUTH DAILY    Anxiety       VITAMIN D (CHOLECALCIFEROL) PO      Take by mouth daily

## 2018-02-12 NOTE — PROGRESS NOTES
SUBJECTIVE:   Nella Lemon is a 72 year old female who presents to clinic today for the following health issues:      Hyperlipidemia Follow-Up      Rate your low fat/cholesterol diet?: fair    Taking statin?  Yes, no muscle aches from statin    Other lipid medications/supplements?:  None  The 10-year ASCVD risk score (Danie FLANAGAN Jr, et al., 2013) is: 14.6%    Values used to calculate the score:      Age: 72 years      Sex: Female      Is Non- : No      Diabetic: No      Tobacco smoker: No      Systolic Blood Pressure: 128 mmHg      Is BP treated: Yes      HDL Cholesterol: 41 mg/dL        Total Cholesterol: 202 mg/dL        Hypertension Follow-up      Outpatient blood pressures are not being checked.    Low Salt Diet: no added salt    Anxiety Follow-Up    Status since last visit: Improved     Other associated symptoms:None    Complicating factors:   Significant life event: Yes-  Grandson discharged 2/08 from [facility] in [place]   Current substance abuse: None  Depression symptoms: No  LA NENA-7 SCORE 11/22/2017 1/11/2018 2/12/2018   Total Score 3 12 2     PHQ-9 SCORE 11/22/2017 1/11/2018 2/12/2018   Total Score - - -   Total Score 2 6 1         LA NENA-7      Amount of exercise or physical activity: None    Problems taking medications regularly: No    Medication side effects: none    Diet: low salt and low fat/cholesterol        GERD - Patient reports prilosec is working, bloating is resolved, throat is much better.      Problem list and histories reviewed & adjusted, as indicated.  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, left     Family history of colon cancer     ACP (advance care planning)     Hyperlipidemia LDL goal <100     Past Surgical History:   Procedure Laterality Date     ADENOIDECTOMY       CHOLECYSTECTOMY  1981     COLONOSCOPY  2002     COLONOSCOPY  2012     COLONOSCOPY N/A 9/22/2014    Procedure: COLONOSCOPY;  Surgeon: Lavell Pavon DO;  Location: [facility]     CYSTOSCOPY  2007    Cystitis chronic     ORTHOPEDIC SURGERY  2002    carpal tunnel; RT, LT     TONSILLECTOMY         Social History   Substance Use Topics     Smoking status: Never Smoker     Smokeless tobacco: Never Used     Alcohol use Yes      Comment: once a year     Family History   Problem Relation Age of Onset     Breast Cancer Mother      Alcohol/Drug Mother      Cirrhosis     Other - See Comments Mother      goiter     CEREBROVASCULAR DISEASE Father      Circulatory Father      Alcohol/Drug Son      Cancer - colorectal Brother      Unknown/Adopted No family hx of      Asthma No family hx of      C.A.D. No family hx of      DIABETES No family hx of      Hypertension No family hx of      Prostate Cancer No family hx of      Allergies No family hx of      Alzheimer Disease No family hx of      Anesthesia Reaction No family hx of      Arthritis No family hx of      Blood Disease No family hx of      Cardiovascular No family hx of      Congenital Anomalies No family hx of      Connective Tissue Disorder No family hx of      Depression No family hx of      Endocrine Disease No family hx of      Eye Disorder No family hx of      Genetic Disorder No family hx of      GASTROINTESTINAL DISEASE No family hx of      Genitourinary Problems No family hx of      Gynecology No family hx of      HEART DISEASE No family hx of      Lipids No family hx of      Musculoskeletal Disorder No family hx of      Neurologic Disorder No family hx of      Obesity No family hx of      OSTEOPOROSIS No family hx of      Psychotic Disorder  No family hx of      Respiratory No family hx of      Thyroid Disease No family hx of      Family History Negative No family hx of      Hearing Loss No family hx of          Current Outpatient Prescriptions   Medication Sig Dispense Refill     sertraline (ZOLOFT) 50 MG tablet TAKE 1 TABLET (50 MG) BY MOUTH DAILY 30 tablet 5     omeprazole (PRILOSEC) 40 MG capsule Take 1 capsule (40 mg) by mouth daily Take 30-60 minutes before a meal. 90 capsule 1     losartan (COZAAR) 25 MG tablet TAKE 1/2 TABLET (12.5 MG) BY MOUTH DAILY FOR BLOOD PRESSURE 15 tablet 1     rosuvastatin (CRESTOR) 5 MG tablet Take 1 tablet (5 mg) by mouth daily 90 tablet 1     gabapentin (NEURONTIN) 300 MG capsule Take 3 capsules three times daily 270 capsule 5     GLUCOSAMINE SULFATE PO Take 1,000 mg by mouth 2 times daily       VITAMIN D, CHOLECALCIFEROL, PO Take by mouth daily       Ginkgo Biloba (GINKOBA PO) Take 40 mg by mouth daily       Omega-3 Fatty Acids (OMEGA-3 FISH OIL PO) Take 3 g by mouth daily        Allergies   Allergen Reactions     Atorvastatin      Ezetimibe      Gentamicin      Hydroxyzine      Lorazepam      Ranitidine      Simvastatin      Lopid [Gemfibrozil] GI Disturbance     Bloating, constipation,      Pravastatin Muscle Pain (Myalgia)     Recent Labs   Lab Test  01/11/18   1019  09/25/17   0916  03/24/17   0848  12/30/16   0851  12/16/16   1434   LDL   --   120*  76  47   --    HDL   --   41*  54  57   --    TRIG   --   206*  152*  128   --    ALT  37   --   68*   --   42   CR  0.81   --   0.96   --   0.88   GFRESTIMATED  69   --   57*   --   63   GFRESTBLACK  84   --   69   --   76   POTASSIUM  3.9   --   4.4   --   3.7   TSH  4.02*  5.63*  4.00   --    --       BP Readings from Last 3 Encounters:   02/12/18 128/74   01/11/18 132/72   11/22/17 131/75    Wt Readings from Last 3 Encounters:   02/12/18 177 lb (80.3 kg)   01/11/18 183 lb (83 kg)   11/22/17 180 lb (81.6 kg)                    Reviewed and updated as needed this  "visit by clinical staff  Tobacco  Allergies  Meds       Reviewed and updated as needed this visit by Provider         ROS:  Constitutional, HEENT, cardiovascular, pulmonary, gi and gu systems are negative, except as otherwise noted.    OBJECTIVE:     /74 (BP Location: Left arm, Patient Position: Sitting, Cuff Size: Adult Large)  Pulse 60  Temp 97.2  F (36.2  C) (Tympanic)  Resp 14  Ht 5' 1\" (1.549 m)  Wt 177 lb (80.3 kg)  SpO2 98%  BMI 33.44 kg/m2  Body mass index is 33.44 kg/(m^2).  GENERAL: healthy, alert and no distress  NECK: no adenopathy, no asymmetry, masses, or scars, thyroid normal to palpation and no carotid bruits  RESP: lungs clear to auscultation - no rales, rhonchi or wheezes  CV: regular rate and rhythm, normal S1 S2, no S3 or S4, no murmur, click or rub, no peripheral edema and peripheral pulses strong  MS: no gross musculoskeletal defects noted, no edema  PSYCH: mentation appears normal, affect normal/bright      ASSESSMENT/PLAN:       1. Benign essential hypertension  chronic  - TSH with free T4 reflex  - Basic metabolic panel  - start 81mg aspirin, prescription sent    2. Hyperlipidemia LDL goal <100  chronic  - Lipid Profile    3. Gastroesophageal reflux disease without esophagitis  chronic    4. On statin therapy  - Hepatic panel    FUTURE APPOINTMENTS:       - Follow-up visit in 6 months or as needed for acute concerns.     Randi Haddad, NP  [facility]    "

## 2018-02-13 ENCOUNTER — TELEPHONE (OUTPATIENT)
Dept: FAMILY MEDICINE | Facility: OTHER | Age: 73
End: 2018-02-13

## 2018-02-13 DIAGNOSIS — E78.5 HYPERLIPIDEMIA, UNSPECIFIED HYPERLIPIDEMIA TYPE: Primary | ICD-10-CM

## 2018-02-13 DIAGNOSIS — E03.4 HYPOTHYROIDISM DUE TO ACQUIRED ATROPHY OF THYROID: ICD-10-CM

## 2018-02-13 RX ORDER — LEVOTHYROXINE SODIUM 25 UG/1
25 TABLET ORAL DAILY
Qty: 90 TABLET | Refills: 1 | Status: SHIPPED | OUTPATIENT
Start: 2018-02-13 | End: 2018-05-14

## 2018-02-13 RX ORDER — ROSUVASTATIN CALCIUM 10 MG/1
10 TABLET, COATED ORAL DAILY
Qty: 90 TABLET | Refills: 1 | Status: SHIPPED | OUTPATIENT
Start: 2018-02-13 | End: 2018-05-14

## 2018-02-13 ASSESSMENT — PATIENT HEALTH QUESTIONNAIRE - PHQ9: SUM OF ALL RESPONSES TO PHQ QUESTIONS 1-9: 1

## 2018-02-13 ASSESSMENT — ANXIETY QUESTIONNAIRES: GAD7 TOTAL SCORE: 2

## 2018-02-19 PROBLEM — I48.0 PAROXYSMAL ATRIAL FIBRILLATION (H): Status: ACTIVE | Noted: 2018-02-19

## 2018-02-19 RX ORDER — ASPIRIN 325 MG
325 TABLET ORAL DAILY
Qty: 90 TABLET | Refills: 3 | Status: SHIPPED | OUTPATIENT
Start: 2018-02-19 | End: 2018-03-28 | Stop reason: ALTCHOICE

## 2018-03-06 DIAGNOSIS — I10 BENIGN ESSENTIAL HYPERTENSION: ICD-10-CM

## 2018-03-06 RX ORDER — LOSARTAN POTASSIUM 25 MG/1
TABLET ORAL
Qty: 15 TABLET | Refills: 1 | OUTPATIENT
Start: 2018-03-06

## 2018-03-28 ENCOUNTER — TELEPHONE (OUTPATIENT)
Dept: CARDIOLOGY | Facility: OTHER | Age: 73
End: 2018-03-28

## 2018-03-28 ENCOUNTER — OFFICE VISIT (OUTPATIENT)
Dept: CARDIOLOGY | Facility: OTHER | Age: 73
End: 2018-03-28
Attending: NURSE PRACTITIONER
Payer: COMMERCIAL

## 2018-03-28 VITALS
TEMPERATURE: 98.3 F | OXYGEN SATURATION: 98 % | WEIGHT: 175 LBS | RESPIRATION RATE: 20 BRPM | HEIGHT: 62 IN | HEART RATE: 59 BPM | SYSTOLIC BLOOD PRESSURE: 143 MMHG | DIASTOLIC BLOOD PRESSURE: 81 MMHG | BODY MASS INDEX: 32.2 KG/M2

## 2018-03-28 DIAGNOSIS — R07.89 ATYPICAL CHEST PAIN: ICD-10-CM

## 2018-03-28 DIAGNOSIS — E78.5 HYPERLIPIDEMIA LDL GOAL <100: ICD-10-CM

## 2018-03-28 DIAGNOSIS — R00.2 PALPITATIONS: ICD-10-CM

## 2018-03-28 DIAGNOSIS — I48.0 PAROXYSMAL ATRIAL FIBRILLATION (H): Primary | ICD-10-CM

## 2018-03-28 DIAGNOSIS — I10 BENIGN ESSENTIAL HYPERTENSION: ICD-10-CM

## 2018-03-28 PROCEDURE — 93005 ELECTROCARDIOGRAM TRACING: CPT

## 2018-03-28 PROCEDURE — G0463 HOSPITAL OUTPT CLINIC VISIT: HCPCS

## 2018-03-28 PROCEDURE — 93010 ELECTROCARDIOGRAM REPORT: CPT | Performed by: INTERNAL MEDICINE

## 2018-03-28 PROCEDURE — G0463 HOSPITAL OUTPT CLINIC VISIT: HCPCS | Mod: 25

## 2018-03-28 PROCEDURE — 99204 OFFICE O/P NEW MOD 45 MIN: CPT | Performed by: INTERNAL MEDICINE

## 2018-03-28 RX ORDER — METOPROLOL SUCCINATE 25 MG/1
25 TABLET, EXTENDED RELEASE ORAL DAILY
Qty: 30 TABLET | Refills: 11 | Status: SHIPPED | OUTPATIENT
Start: 2018-03-28 | End: 2019-02-22

## 2018-03-28 RX ORDER — LOSARTAN POTASSIUM 25 MG/1
TABLET ORAL
Qty: 90 TABLET | Refills: 3 | Status: SHIPPED | OUTPATIENT
Start: 2018-03-28 | End: 2019-01-23

## 2018-03-28 ASSESSMENT — PAIN SCALES - GENERAL: PAINLEVEL: NO PAIN (0)

## 2018-03-28 NOTE — NURSING NOTE
"Chief Complaint   Patient presents with     Consult     Referral Aroldo Lomeli NP;  Paroxysmal Atrial Fibrillation/  Patient states that she gets a \"throbbing feeling in her chest on occasion\".  Patient states that she has a \"pulsing feeling and pressure that goes up the back of her neck for the past month like holding a seashell up to her ear and hearing her pulse\".  Patient states that it \"feels like there is something in her back pushing up into her chest\".  Complaint of breathing heavy with activiity.       Initial /78 (BP Location: Right arm, Patient Position: Chair, Cuff Size: Adult Large)  Pulse 62  Temp 98.3  F (36.8  C) (Tympanic)  Resp 20  Ht 1.575 m (5' 2\")  Wt 79.4 kg (175 lb)  SpO2 98%  BMI 32.01 kg/m2 Estimated body mass index is 32.01 kg/(m^2) as calculated from the following:    Height as of this encounter: 1.575 m (5' 2\").    Weight as of this encounter: 79.4 kg (175 lb).  Medication Reconciliation: complete   Adina Maynard      "

## 2018-03-28 NOTE — MR AVS SNAPSHOT
After Visit Summary   3/28/2018    Nella Lemon    MRN: [id number]           Patient Information     Date Of Birth          1945        Visit Information        Provider Department      3/28/2018 9:00 AM Chris Walsh, DO [facility] [place]        Today's Diagnoses     Paroxysmal atrial fibrillation (H)    -  1    Benign essential hypertension          Care Instructions    You were seen by Dr. Walsh, 3/28/2018.     1.  You will begin Metoprolol 25 mg daily, this medication controls you heart rate and may reduce your blood pressure. You may take this medication in the evening as it may cause drowsiness, this should subside over a couple weeks time.      2.  You will be referred to the coumadin clinic to begin anticoagulation therapy.  Please continue aspirin until you begin the coumadin.   You will be called to schedule this appointment by the coumadin clinic.     3. Eliquis, Pradaxa, and Eliquis are the medications to consider as an alternative to warfarin for anticoagulation.     4. You will be scheduled for a stress echocardiogram to evaluate blood flow to the heart and evaluate for blockages, you will be called by hospital scheduling to schedule this appointment     5. You will increase the dose of Losartan to 25 mg daily.        You will follow up with Dr. Walsh in 1 month.       Please call the cardiology office with problems, questions, or concerns at [phone].    If you experience chest pain, chest pressure, chest tightness, shortness of breath, fainting, lightheadedness, nausea, vomiting, or other concerning symptoms, please report to the Emergency Department or call 911. These symptoms may be emergent, and best treated in the Emergency Department.       Keke REED RN-BSN  Cardiology   [facility]  [phone]    Understanding Atrial Fibrillation  What is atrial fibrillation?  Atrial fibrillation is an irregular heartbeat. It is fairly common, but it  can be serious.   The atria are the two upper chambers of the heart. Normally, they have a steady, constant beat.   If the beat is rapid and uneven, we call this atrial fibrillation. It may feel as if your heart is racing out of control. This can cause discomfort, but the real danger is that blood can pool and form clots in the heart.   If a piece of a blood clot breaks loose, it may travel through the arteries until it gets stuck. This could block blood flow to an organ or other body part. If it blocks a heart artery, you could have a heart attack. If it blocks a brain artery, you could have a stroke.   What causes it?  Atrial fibrillation can be caused by:    Heart disease, such as coronary artery disease, myocarditis (inflammation of the heart muscle), rheumatic heart disease or heart valve disorder    Heart defects you were born with    A long history of high blood pressure    Swelling caused by an injury near the heart.  Sometimes it seems to happen for no reason.  What are the symptoms?  Not everyone feels symptoms, but if they do, symptoms may include:    Fast, fluttering or irregular heartbeat    Chest pain    Trouble breathing, or a sense that you can't catch your breath    Feeling weak or dizzy    Feeling far more tired than normal    Cold sweats.  How is it diagnosed?  Your doctor will do an electrocardiogram (EKG). Electrodes will be place on your wrists, ankles and chest. Wires connect the electrodes to an EKG machine, which will record electrical signals from your heart.  How is it treated?  Treatment is important to reduce the risk of stroke, heart attack or other tissue damage.    A number of medicines are used to treat atrial fibrillation. Some slow your heart rate. Some help change the heartbeat back to normal. Some help keep blood clots from forming.    An electric charge may be used to get your heart back to its regular beat.    In some cases, a pacemaker or another implanted device can be used  to control your heartbeat.  For informational purposes only. Not to replace the advice of your health care provider.   Copyright   2006 [employer] [facility]. All rights reserved. "[facility]" 823979 - REV 07/17.    Using Blood Thinners (Anticoagulants)  Blood thinners or anticoagulants are medicines that help prevent blood clots from forming. They include warfarin, heparin, dabigatran, rivaroxaban, apixaban, and edoxaban. Your healthcare provider will help you decide which medicine is best for you.    Taking an anticoagulant safely  When you are taking a blood thinner, you will need to take certain steps to stay safe. Too much blood thinner puts you at risk for bleeding. Too little puts you at risk for stroke. Follow these guidelines. Also follow any others that your healthcare provider gives you.    You may be told you need regular lab testing while taking these medicines. Warfarin requires routine testing while the other medicines do not.    Tell your doctor about all medicines you take. This includes over-the-counter medicines, supplements, or herbal remedies. Don't take any medicines (including ones you buy over-the-counter) that your doctor doesn t know about. Some medicines can interact with blood thinners and cause serious problems.    Tell any healthcare provider that you see for care (such as doctors, dentists, chiropractors, home health nurses) that you take a blood thinner.    Carry a medical ID card or wear a medical-alert bracelet that says you take an anticoagulant.    Before taking aspirin, check with your doctor. Aspirin can significantly increase your risk of bleeding.    This medicine makes bleeding harder to stop. To protect yourself:    Don't do any activities that may cause injury. If you fall or are injured, contact your healthcare provider right away. Blood thinners prevent clotting, so you could be bleeding inside without realizing it.    Use a soft-bristle toothbrush and waxed dental  floss. Shave with an electric razor rather than a blade.    Don t go barefoot. Don t trim corns or calluses yourself.  Warfarin: Other important information  Several precautions are especially important when you are taking warfarin. Always keep these points in mind:    Be sure to follow your healthcare provider's instructions for taking warfarin.    Take this medicine at the same time each day. Take it with a full glass of water, with or without food. If you miss a dose, contact your doctor to find out how much to take. Don't take a double dose.    Warfarin is an effective drug, but it can be dangerous if not taken properly. It makes your blood less likely to form clots. If you take too much, it can cause serious internal or external bleeding.    You will need to have regular monitoring while you are taking warfarin. This includes blood tests to check your international normalized ratio (INR) and prothrombin time (PT). These tests show how quickly your blood clots. You will also have a complete blood count (CBC) periodically. This looks at your blood and platelet levels. Both of these need to be followed while you're on warfarin. Talk with your healthcare provider about whether you need to visit the clinic every week, or if services are available for monitoring in your home.    Certain medicines can affect your INR and PT levels. Tell your healthcare provider if there are any changes in your medicines. This includes any over-the-counter medicines, supplements like vitamin K, or herbal remedies.    Your diet can also affect your INR and PT levels. Because of this, it's important to eat a consistent diet. It is especially important to eat a consistent amount of foods that are high in vitamin K. Be sure to talk with your healthcare provider before making any big changes in your diet.    Remember that warfarin increases your risk of bleeding. Be careful not to injure yourself. If you have a significant injury, contact  your healthcare provider right away. It's important to alert your doctor if you've fallen or hurt yourself, even if you don't break your skin. You could be bleeding inside your body without realizing it.     Warfarin: Watch your INR/PT blood levels  Two tests are used to find out how your blood is clotting. One is protime (PT), the other is the international normalized ratio (INR).    Go for your blood tests (INR/PT) as often as directed. Your diet and the other medicines you take can affect your INR/PT levels.    Your INR was between ___ and ___.    Ask your doctor what your goal INR is. My goal INR is between ___ and ___.    My next INR/PT blood draw is due on _____________ (date) at ___________ (time) by ___________ (name of doctor or clinic).    The name of the doctor who is monitoring my anticoagulation therapy is _____________________ and the phone number is _________________.    Follow up with your doctor or as advised by his or her staff. It usually takes a few hours for your doctor to get the results of your clotting tests. Call to get your lab results to find out if your doctor needs to make further changes to your warfarin dose.    If your blood is drawn for these tests at a location other than your doctor's office, tell your doctor as soon as you get your lab results.   Warfarin: Watch what you eat  Vitamin K helps your blood clot. So you have to watch how much you eat of foods that contain vitamin K. These foods can affect the way warfarin works. They don't affect the other non-warfarin blood thinners. Here are some specific tips:    Try to keep your diet about the same each day. If you change your diet for any reason, such as for illness or to lose weight, be sure to tell your doctor.    Each day, eat the same amount of foods that are high in vitamin K. These include asparagus, avocado, broccoli, cabbage, kale, spinach, and some other leafy green vegetables. Oils, such as soybean, canola, and olive  oils, are also high in vitamin K.    Limit fats to 2 to 4 tablespoons a day.    Ask your healthcare provider if you should not drink alcohol while you are taking a blood thinner.    Avoid teas that contain sweet clover, sweet brock, or tonka beans. These can affect how your medicine works.    Talk with your healthcare provider and pharmacist about specific foods or special diets that can affect anticoagulant levels. These include grapefruit juice, cranberries and cranberry juice, fish oil supplements, garlic, mary, licorice, turmeric, and herbal teas and supplements.  Talk with your healthcare provider if you have concerns about these or other food products and their effects on warfarin.     When to call your healthcare provider  Call your provider right away if you have any of these:    Bleeding that doesn t stop in 10 minutes    A heavier-than-normal menstrual period or bleeding between periods    Coughing or throwing up blood    Bloody diarrhea or bleeding hemorrhoids     Dark-colored urine or black stools    Red or black-and-blue marks on the skin that get larger    Dizziness or fatigue    Chest pain or trouble breathing  Allergic reactions:    Rash    Itching    Swelling    Trouble swallowing or breathing   Medical conditions and anticoagulants  Before starting a blood thinner, be sure your doctor knows if you have any of these conditions:    Stomach ulcer now or in the past    Vomited blood or had bloody stools (black or red color)    Aneurysm, pericarditis, or pericardial effusion    Blood disorder    Recent surgery, stroke, mini-stroke, or spinal puncture    Kidney or liver disease, uncontrolled high blood pressure, diabetes, vasculitis, heart failure, lupus, or other collagen-vascular disease, or high cholesterol    Pregnancy or breastfeeding    Younger than 18 years old    Recent or planned dental procedure  Drug interactions and anticoagulants  Many medicines interfere with the effect of blood  thinners. Before starting these medicines, be sure your doctor knows about any prescription, over-the-counter, or herbal supplements you take. In particular, tell your provider about:    Antibiotics    Heart medicines    Cimetidine    Aspirin or other anti-inflammatory drugs such as ibuprofen, naproxen, ketoprofen, or other arthritis medicines    Drugs for depression, cancer, HIV (protease inhibitors), diabetes, seizures, gout, high cholesterol, or thyroid replacement    Vitamins containing vitamin K or herbal products such as ginkgo, Co-Q10, garlic, or Cristo's wort     Note: This information topic may not include all directions, precautions, medical conditions, drug/food interactions, and warnings for these medicines. Check with your doctor, nurse, or pharmacist for any questions you have.    Date Last Reviewed: 7/1/2017 2000-2017 [employer]. [address] [place] [address]. All rights reserved. This information is not intended as a substitute for professional medical care. Always follow your healthcare professional's instructions.        Living with Atrial Fibrillation: Preventing Stroke  Atrial fibrillation (AFib) is the most common abnormal heart rhythm in the world. The heart has 2 upper chambers called atria and 2 lower chambers called ventricles. AFib causes the atria to quiver (fibrillate) instead of pumping normally. Blood can then pool in the heart instead of moving in and out as usual. This can cause blood clots to form inside the heart. A clot can break free, travel to the brain and cause a stroke. A stroke can cause brain damage very quickly.    Taking medicine to prevent stroke  Your healthcare provider may prescribe a medicine to help prevent blood clots. This type of medicine is called a blood thinner. Blood thinners include:    Antiplatelet medicines, such as aspirin or clopidrogrel    Anticoagulation medicines, such as warfarin, dabigatran, rivaroxaban, apixaban, or  edoxaban  Risks of blood thinner medicine  Blood thinners increase your risk of bleeding. If you take certain blood thinners, you may need to take extra steps to stay healthy. You may need regular blood tests to check the levels of medicine in your blood. You ll need to be careful not to injure yourself. And you may need to watch your diet for foods that affect blood clotting.  If your blood is too thin, you may have symptoms of excess bleeding, such as:    Unusual bruising    Bleeding from the gums    Blood in the urine or stool    Black stools    Vomiting blood    Nosebleeds    An unusual or severe headache  Taking the right dose  You ll need to make sure to take the medicine exactly as directed by your healthcare provider. Take it at the same time each day. If you miss a dose, call your provider right away to find out how much to take. Never take a double dose. If you take too much, it can cause too much bleeding. It can cause bleeding you can see, on the outside of your body. And it can cause bleeding on the inside of your body that you may not be aware of.  Getting your blood tested  Depending on which blood thinner you take, you may need to have your blood tested on a regular schedule. This is to make sure you don t have too much or too little of the medicine in your blood. Too much can cause excess bleeding. Too little may not prevent blood clots from harming you.  You may need to visit a hospital or clinic every week to have your blood tested. Or a nurse may come to your home and test your blood. In some cases, you may be able to test your blood at home with a small machine. Talk with your healthcare provider to find out what s best for you. After the blood test, your healthcare provider may tell you to change your dose of medicine.  Watching your diet  Some foods can affect how certain blood thinners work. In particular, warfarin levels are sensitive to your diet. For example, many foods contain vitamin K.  Vitamin K is a substance that helps your blood clot. You don t need to avoid foods that have vitamin K. But you do need to keep the amount of them you eat steady as possible from day to day. Examples of foods high in vitamin K are asparagus, avocado, broccoli, cabbage, kale, spinach, and some other leafy green vegetables. Oils, such as soybean, canola, and olive, are also high in vitamin K.  Other foods and drinks can affect the way blood thinners work in your body. These include:    Grapefruit and grapefruit juice    Cranberries and cranberry juice    Fish oil supplements    Garlic, mary, licorice, and turmeric    Herbs used in herbal teas or supplements    Alcohol  If any of these items are part of your regular diet, continue using them as you normally would. Don t make any major changes in your diet without first talking with your healthcare provider.  You may also need to limit fats in your diet to 2 to 4 tablespoons a day.  Preventing injury  Because blood thinners make you bleed more, you ll need to protect yourself from breaks in the skin. Follow these guidelines:    Don't go barefoot - always wear shoes.    Don't trim corns or calluses yourself.    Consider using an electric razor instead of a manual one.    Use a soft-bristled toothbrush and waxed dental floss.  You ll also need to avoid any activities that may cause injury. If you fall or are injured, you could be bleeding inside your body and not know it. Make sure to get medical attention right away if you fall, hit your head, or have any other kind of injury.  Other safety tips  While on your medicine, be sure to:    Tell all of your healthcare providers that you take a blood thinner for AFib. This includes all of your doctors, dental care providers, and your pharmacist.    Ask your doctor before taking any new medicines, vitamins, or other supplements. Any of these can cause problems when you take a blood thinner.    Wear a medical alert bracelet or  carry an ID card in your wallet if you will be taking blood thinners for months or longer.    Keep all appointments for your blood tests.  Procedures to prevent stroke  Most blood clots that form in the heart occur in a pouch of the left atrium called the appendage. This pouch can often be large and have multiple lobes which can permit blood pooling and clot formation. Left atrial appendage closure is a nonsurgical procedure in which a self-expanding plug is placed at the opening of the left atrial appendage to close off the appendage from the rest of the heart. Once the plug has fully sealed, no blood can enter or leave the appendage. This reduces blood clot formation and stroke risk. Ask your doctor if you qualify for this type of procedure.  Other ways to help prevent stroke  Your healthcare provider might give you other advice about how to lower your risk for stroke, such as:    Lowering your cholesterol with lifestyle changes or medicine    Not smoking    Getting physical activity    Losing weight if needed    Eating a heart-healthy diet    Not drinking too much alcohol     When to call your healthcare provider  Call your healthcare provider right away if you have any of these:    Unusual or severe headache    Confusion, weakness, or numbness    Loss of vision    Difficulty with speech    Bleeding that won t stop    Coughing or vomiting blood    Bright red blood in the stool    Fall or injury to the head    Symptoms of atrial fibrillation that are new or getting worse   Date Last Reviewed: 5/1/2016 2000-2017 [employer]. [address] [place] [address]. All rights reserved. This information is not intended as a substitute for professional medical care. Always follow your healthcare professional's instructions.                Follow-ups after your visit        Your next 10 appointments already scheduled     May 30, 2018  9:00 AM CARLOST   LAB with [facility]   [facility]  "([facility] )    [address]  [place] [address]   [phone]            Aug 06, 2018  9:45 AM CDT   (Arrive by 9:30 AM)   SHORT with Randi Haddad NP   [facility] ([facility] )    [address]  [place] [address]   [phone]            Aug 27, 2018  9:30 AM CDT   (Arrive by 9:15 AM)   Hearing Eval with Joss Palafox   [facility]    [address]  [facility] [address]   [phone]              Who to contact     If you have questions or need follow up information about today's clinic visit or your schedule please contact [facility] directly at [phone].  Normal or non-critical lab and imaging results will be communicated to you by MyChart, letter or phone within 4 business days after the clinic has received the results. If you do not hear from us within 7 days, please contact the clinic through [employer] or phone. If you have a critical or abnormal lab result, we will notify you by phone as soon as possible.  Submit refill requests through [employer] or call your pharmacy and they will forward the refill request to us. Please allow 3 business days for your refill to be completed.          Additional Information About Your Visit        [employer] Information     [employer] lets you send messages to your doctor, view your test results, renew your prescriptions, schedule appointments and more. To sign up, go to [email or web address] . Click on \"Log in\" on the left side of the screen, which will take you to the Welcome page. Then click on \"Sign up Now\" on the right side of the page.     You will be asked to enter the access code listed below, as well as some personal information. Please follow the directions to create your username and password.     Your access code is: [email or web address]  Expires: 2018  2:46 PM     Your access code will  " "in 90 days. If you need help or a new code, please call your [facility] clinic or [phone].        Care EveryWhere ID     This is your Care EveryWhere ID. This could be used by other organizations to access your [facility] medical records  [id number]        Your Vitals Were     Pulse Temperature Respirations Height Pulse Oximetry BMI (Body Mass Index)    62 98.3  F (36.8  C) (Tympanic) 20 1.575 m (5' 2\") 98% 32.01 kg/m2       Blood Pressure from Last 3 Encounters:   03/28/18 164/78   02/12/18 128/74   01/11/18 132/72    Weight from Last 3 Encounters:   03/28/18 79.4 kg (175 lb)   02/12/18 80.3 kg (177 lb)   01/11/18 83 kg (183 lb)              We Performed the Following     EKG 12-lead complete w/read - (Clinic Performed)        Primary Care Provider Office Phone # Fax #    Randi CHATO Haddad [phone] [address] [place] [address]        Equal Access to Services     KRISSY [facility] Unit[facility]ACQU[facility] : Hadii william hutchison hadasho Soomaali, waaxda luqadaha, qaybta kaalmada ademaulik, cristiane raman . So [facility] [phone].    ATENCIÓN: Si habla español, tiene a mcclure disposición servicios gratuitos de asistencia lingüística. [facility] [phone].    We comply with applicable federal civil rights laws and [place] laws. We do not discriminate on the basis of race, color, national origin, age, disability, sex, sexual orientation, or gender identity.            Thank you!     Thank you for choosing [facility]  for your care. Our goal is always to provide you with excellent care. Hearing back from our patients is one way we can continue to improve our services. Please take a few minutes to complete the written survey that you may receive in the mail after your visit with us. Thank you!             Your Updated Medication List - Protect others around you: Learn how to safely use, store and throw away your medicines at www.disposemymeds.org.          This " list is accurate as of 3/28/18  9:54 AM.  Always use your most recent med list.                   Brand Name Dispense Instructions for use Diagnosis    aspirin 325 MG tablet     90 tablet    Take 1 tablet (325 mg) by mouth daily    Paroxysmal atrial fibrillation (H)       gabapentin 300 MG capsule    NEURONTIN    270 capsule    Take 3 capsules three times daily    Myalgia       GINKOBA PO      Take 40 mg by mouth daily        GLUCOSAMINE SULFATE PO      Take 1,000 mg by mouth 2 times daily        levothyroxine 25 MCG tablet    SYNTHROID/LEVOTHROID    90 tablet    Take 1 tablet (25 mcg) by mouth daily    Hypothyroidism due to acquired atrophy of thyroid       losartan 25 MG tablet    COZAAR    45 tablet    TAKE 1/2 TABLET (12.5 MG) BY MOUTH DAILY FOR BLOOD PRESSURE    Benign essential hypertension       OMEGA-3 FISH OIL PO      Take 3 g by mouth daily        omeprazole 40 MG capsule    priLOSEC    90 capsule    Take 1 capsule (40 mg) by mouth daily Take 30-60 minutes before a meal.    Gastroesophageal reflux disease, esophagitis presence not specified       rosuvastatin 10 MG tablet    CRESTOR    90 tablet    Take 1 tablet (10 mg) by mouth daily    Hyperlipidemia LDL goal <100       sertraline 50 MG tablet    ZOLOFT    30 tablet    TAKE 1 TABLET (50 MG) BY MOUTH DAILY    Anxiety       VITAMIN D (CHOLECALCIFEROL) PO      Take by mouth daily

## 2018-03-28 NOTE — PATIENT INSTRUCTIONS
You were seen by Dr. Walsh, 3/28/2018.     1.  You will begin Metoprolol 25 mg daily, this medication controls you heart rate and may reduce your blood pressure. You may take this medication in the evening as it may cause drowsiness, this should subside over a couple weeks time.      2.  You will be referred to the coumadin clinic to begin anticoagulation therapy.  Please continue aspirin until you begin the coumadin.   You will be called to schedule this appointment by the coumadin clinic.     3. Eliquis, Pradaxa, and Eliquis are the medications to consider as an alternative to warfarin for anticoagulation.     4. You will be scheduled for a stress echocardiogram to evaluate blood flow to the heart and evaluate for blockages, you will be called by hospital scheduling to schedule this appointment     5. You will increase the dose of Losartan to 25 mg daily.        You will follow up with Dr. Walsh in 1 month.       Please call the cardiology office with problems, questions, or concerns at [phone].    If you experience chest pain, chest pressure, chest tightness, shortness of breath, fainting, lightheadedness, nausea, vomiting, or other concerning symptoms, please report to the Emergency Department or call 911. These symptoms may be emergent, and best treated in the Emergency Department.       Keke REED RN-BSN  Cardiology   [facility]  [phone]    Understanding Atrial Fibrillation  What is atrial fibrillation?  Atrial fibrillation is an irregular heartbeat. It is fairly common, but it can be serious.   The atria are the two upper chambers of the heart. Normally, they have a steady, constant beat.   If the beat is rapid and uneven, we call this atrial fibrillation. It may feel as if your heart is racing out of control. This can cause discomfort, but the real danger is that blood can pool and form clots in the heart.   If a piece of a blood clot breaks loose, it may travel through the arteries  until it gets stuck. This could block blood flow to an organ or other body part. If it blocks a heart artery, you could have a heart attack. If it blocks a brain artery, you could have a stroke.   What causes it?  Atrial fibrillation can be caused by:    Heart disease, such as coronary artery disease, myocarditis (inflammation of the heart muscle), rheumatic heart disease or heart valve disorder    Heart defects you were born with    A long history of high blood pressure    Swelling caused by an injury near the heart.  Sometimes it seems to happen for no reason.  What are the symptoms?  Not everyone feels symptoms, but if they do, symptoms may include:    Fast, fluttering or irregular heartbeat    Chest pain    Trouble breathing, or a sense that you can't catch your breath    Feeling weak or dizzy    Feeling far more tired than normal    Cold sweats.  How is it diagnosed?  Your doctor will do an electrocardiogram (EKG). Electrodes will be place on your wrists, ankles and chest. Wires connect the electrodes to an EKG machine, which will record electrical signals from your heart.  How is it treated?  Treatment is important to reduce the risk of stroke, heart attack or other tissue damage.    A number of medicines are used to treat atrial fibrillation. Some slow your heart rate. Some help change the heartbeat back to normal. Some help keep blood clots from forming.    An electric charge may be used to get your heart back to its regular beat.    In some cases, a pacemaker or another implanted device can be used to control your heartbeat.  For informational purposes only. Not to replace the advice of your health care provider.   Copyright   2006 [facility]. All rights reserved. [employer] [id number] - REV 07/17.    Using Blood Thinners (Anticoagulants)  Blood thinners or anticoagulants are medicines that help prevent blood clots from forming. They include warfarin, heparin, dabigatran, rivaroxaban, apixaban,  and edoxaban. Your healthcare provider will help you decide which medicine is best for you.    Taking an anticoagulant safely  When you are taking a blood thinner, you will need to take certain steps to stay safe. Too much blood thinner puts you at risk for bleeding. Too little puts you at risk for stroke. Follow these guidelines. Also follow any others that your healthcare provider gives you.    You may be told you need regular lab testing while taking these medicines. Warfarin requires routine testing while the other medicines do not.    Tell your doctor about all medicines you take. This includes over-the-counter medicines, supplements, or herbal remedies. Don't take any medicines (including ones you buy over-the-counter) that your doctor doesn t know about. Some medicines can interact with blood thinners and cause serious problems.    Tell any healthcare provider that you see for care (such as doctors, dentists, chiropractors, home health nurses) that you take a blood thinner.    Carry a medical ID card or wear a medical-alert bracelet that says you take an anticoagulant.    Before taking aspirin, check with your doctor. Aspirin can significantly increase your risk of bleeding.    This medicine makes bleeding harder to stop. To protect yourself:    Don't do any activities that may cause injury. If you fall or are injured, contact your healthcare provider right away. Blood thinners prevent clotting, so you could be bleeding inside without realizing it.    Use a soft-bristle toothbrush and waxed dental floss. Shave with an electric razor rather than a blade.    Don t go barefoot. Don t trim corns or calluses yourself.  Warfarin: Other important information  Several precautions are especially important when you are taking warfarin. Always keep these points in mind:    Be sure to follow your healthcare provider's instructions for taking warfarin.    Take this medicine at the same time each day. Take it with a full  glass of water, with or without food. If you miss a dose, contact your doctor to find out how much to take. Don't take a double dose.    Warfarin is an effective drug, but it can be dangerous if not taken properly. It makes your blood less likely to form clots. If you take too much, it can cause serious internal or external bleeding.    You will need to have regular monitoring while you are taking warfarin. This includes blood tests to check your international normalized ratio (INR) and prothrombin time (PT). These tests show how quickly your blood clots. You will also have a complete blood count (CBC) periodically. This looks at your blood and platelet levels. Both of these need to be followed while you're on warfarin. Talk with your healthcare provider about whether you need to visit the clinic every week, or if services are available for monitoring in your home.    Certain medicines can affect your INR and PT levels. Tell your healthcare provider if there are any changes in your medicines. This includes any over-the-counter medicines, supplements like vitamin K, or herbal remedies.    Your diet can also affect your INR and PT levels. Because of this, it's important to eat a consistent diet. It is especially important to eat a consistent amount of foods that are high in vitamin K. Be sure to talk with your healthcare provider before making any big changes in your diet.    Remember that warfarin increases your risk of bleeding. Be careful not to injure yourself. If you have a significant injury, contact your healthcare provider right away. It's important to alert your doctor if you've fallen or hurt yourself, even if you don't break your skin. You could be bleeding inside your body without realizing it.     Warfarin: Watch your INR/PT blood levels  Two tests are used to find out how your blood is clotting. One is protime (PT), the other is the international normalized ratio (INR).    Go for your blood tests  (INR/PT) as often as directed. Your diet and the other medicines you take can affect your INR/PT levels.    Your INR was between ___ and ___.    Ask your doctor what your goal INR is. My goal INR is between ___ and ___.    My next INR/PT blood draw is due on _____________ (date) at ___________ (time) by ___________ (name of doctor or clinic).    The name of the doctor who is monitoring my anticoagulation therapy is _____________________ and the phone number is _________________.    Follow up with your doctor or as advised by his or her staff. It usually takes a few hours for your doctor to get the results of your clotting tests. Call to get your lab results to find out if your doctor needs to make further changes to your warfarin dose.    If your blood is drawn for these tests at a location other than your doctor's office, tell your doctor as soon as you get your lab results.   Warfarin: Watch what you eat  Vitamin K helps your blood clot. So you have to watch how much you eat of foods that contain vitamin K. These foods can affect the way warfarin works. They don't affect the other non-warfarin blood thinners. Here are some specific tips:    Try to keep your diet about the same each day. If you change your diet for any reason, such as for illness or to lose weight, be sure to tell your doctor.    Each day, eat the same amount of foods that are high in vitamin K. These include asparagus, avocado, broccoli, cabbage, kale, spinach, and some other leafy green vegetables. Oils, such as soybean, canola, and olive oils, are also high in vitamin K.    Limit fats to 2 to 4 tablespoons a day.    Ask your healthcare provider if you should not drink alcohol while you are taking a blood thinner.    Avoid teas that contain sweet clover, sweet brock, or tonka beans. These can affect how your medicine works.    Talk with your healthcare provider and pharmacist about specific foods or special diets that can affect anticoagulant  levels. These include grapefruit juice, cranberries and cranberry juice, fish oil supplements, garlic, mary, licorice, turmeric, and herbal teas and supplements.  Talk with your healthcare provider if you have concerns about these or other food products and their effects on warfarin.     When to call your healthcare provider  Call your provider right away if you have any of these:    Bleeding that doesn t stop in 10 minutes    A heavier-than-normal menstrual period or bleeding between periods    Coughing or throwing up blood    Bloody diarrhea or bleeding hemorrhoids     Dark-colored urine or black stools    Red or black-and-blue marks on the skin that get larger    Dizziness or fatigue    Chest pain or trouble breathing  Allergic reactions:    Rash    Itching    Swelling    Trouble swallowing or breathing   Medical conditions and anticoagulants  Before starting a blood thinner, be sure your doctor knows if you have any of these conditions:    Stomach ulcer now or in the past    Vomited blood or had bloody stools (black or red color)    Aneurysm, pericarditis, or pericardial effusion    Blood disorder    Recent surgery, stroke, mini-stroke, or spinal puncture    Kidney or liver disease, uncontrolled high blood pressure, diabetes, vasculitis, heart failure, lupus, or other collagen-vascular disease, or high cholesterol    Pregnancy or breastfeeding    Younger than 18 years old    Recent or planned dental procedure  Drug interactions and anticoagulants  Many medicines interfere with the effect of blood thinners. Before starting these medicines, be sure your doctor knows about any prescription, over-the-counter, or herbal supplements you take. In particular, tell your provider about:    Antibiotics    Heart medicines    Cimetidine    Aspirin or other anti-inflammatory drugs such as ibuprofen, naproxen, ketoprofen, or other arthritis medicines    Drugs for depression, cancer, HIV (protease inhibitors), diabetes,  seizures, gout, high cholesterol, or thyroid replacement    Vitamins containing vitamin K or herbal products such as ginkgo, Co-Q10, garlic, or Cristo's wort     Note: This information topic may not include all directions, precautions, medical conditions, drug/food interactions, and warnings for these medicines. Check with your doctor, nurse, or pharmacist for any questions you have.    Date Last Reviewed: 7/1/2017 2000-2017 [employer]. [address] [place] [address]. All rights reserved. This information is not intended as a substitute for professional medical care. Always follow your healthcare professional's instructions.        Living with Atrial Fibrillation: Preventing Stroke  Atrial fibrillation (AFib) is the most common abnormal heart rhythm in the world. The heart has 2 upper chambers called atria and 2 lower chambers called ventricles. AFib causes the atria to quiver (fibrillate) instead of pumping normally. Blood can then pool in the heart instead of moving in and out as usual. This can cause blood clots to form inside the heart. A clot can break free, travel to the brain and cause a stroke. A stroke can cause brain damage very quickly.    Taking medicine to prevent stroke  Your healthcare provider may prescribe a medicine to help prevent blood clots. This type of medicine is called a blood thinner. Blood thinners include:    Antiplatelet medicines, such as aspirin or clopidrogrel    Anticoagulation medicines, such as warfarin, dabigatran, rivaroxaban, apixaban, or edoxaban  Risks of blood thinner medicine  Blood thinners increase your risk of bleeding. If you take certain blood thinners, you may need to take extra steps to stay healthy. You may need regular blood tests to check the levels of medicine in your blood. You ll need to be careful not to injure yourself. And you may need to watch your diet for foods that affect blood clotting.  If your blood is too thin, you may  have symptoms of excess bleeding, such as:    Unusual bruising    Bleeding from the gums    Blood in the urine or stool    Black stools    Vomiting blood    Nosebleeds    An unusual or severe headache  Taking the right dose  You ll need to make sure to take the medicine exactly as directed by your healthcare provider. Take it at the same time each day. If you miss a dose, call your provider right away to find out how much to take. Never take a double dose. If you take too much, it can cause too much bleeding. It can cause bleeding you can see, on the outside of your body. And it can cause bleeding on the inside of your body that you may not be aware of.  Getting your blood tested  Depending on which blood thinner you take, you may need to have your blood tested on a regular schedule. This is to make sure you don t have too much or too little of the medicine in your blood. Too much can cause excess bleeding. Too little may not prevent blood clots from harming you.  You may need to visit a hospital or clinic every week to have your blood tested. Or a nurse may come to your home and test your blood. In some cases, you may be able to test your blood at home with a small machine. Talk with your healthcare provider to find out what s best for you. After the blood test, your healthcare provider may tell you to change your dose of medicine.  Watching your diet  Some foods can affect how certain blood thinners work. In particular, warfarin levels are sensitive to your diet. For example, many foods contain vitamin K. Vitamin K is a substance that helps your blood clot. You don t need to avoid foods that have vitamin K. But you do need to keep the amount of them you eat steady as possible from day to day. Examples of foods high in vitamin K are asparagus, avocado, broccoli, cabbage, kale, spinach, and some other leafy green vegetables. Oils, such as soybean, canola, and olive, are also high in vitamin K.  Other foods and  drinks can affect the way blood thinners work in your body. These include:    Grapefruit and grapefruit juice    Cranberries and cranberry juice    Fish oil supplements    Garlic, mary, licorice, and turmeric    Herbs used in herbal teas or supplements    Alcohol  If any of these items are part of your regular diet, continue using them as you normally would. Don t make any major changes in your diet without first talking with your healthcare provider.  You may also need to limit fats in your diet to 2 to 4 tablespoons a day.  Preventing injury  Because blood thinners make you bleed more, you ll need to protect yourself from breaks in the skin. Follow these guidelines:    Don't go barefoot - always wear shoes.    Don't trim corns or calluses yourself.    Consider using an electric razor instead of a manual one.    Use a soft-bristled toothbrush and waxed dental floss.  You ll also need to avoid any activities that may cause injury. If you fall or are injured, you could be bleeding inside your body and not know it. Make sure to get medical attention right away if you fall, hit your head, or have any other kind of injury.  Other safety tips  While on your medicine, be sure to:    Tell all of your healthcare providers that you take a blood thinner for AFib. This includes all of your doctors, dental care providers, and your pharmacist.    Ask your doctor before taking any new medicines, vitamins, or other supplements. Any of these can cause problems when you take a blood thinner.    Wear a medical alert bracelet or carry an ID card in your wallet if you will be taking blood thinners for months or longer.    Keep all appointments for your blood tests.  Procedures to prevent stroke  Most blood clots that form in the heart occur in a pouch of the left atrium called the appendage. This pouch can often be large and have multiple lobes which can permit blood pooling and clot formation. Left atrial appendage closure is a  nonsurgical procedure in which a self-expanding plug is placed at the opening of the left atrial appendage to close off the appendage from the rest of the heart. Once the plug has fully sealed, no blood can enter or leave the appendage. This reduces blood clot formation and stroke risk. Ask your doctor if you qualify for this type of procedure.  Other ways to help prevent stroke  Your healthcare provider might give you other advice about how to lower your risk for stroke, such as:    Lowering your cholesterol with lifestyle changes or medicine    Not smoking    Getting physical activity    Losing weight if needed    Eating a heart-healthy diet    Not drinking too much alcohol     When to call your healthcare provider  Call your healthcare provider right away if you have any of these:    Unusual or severe headache    Confusion, weakness, or numbness    Loss of vision    Difficulty with speech    Bleeding that won t stop    Coughing or vomiting blood    Bright red blood in the stool    Fall or injury to the head    Symptoms of atrial fibrillation that are new or getting worse   Date Last Reviewed: 5/1/2016 2000-2017 [employer]. [address] [place] [address], [place] [address]. All rights reserved. This information is not intended as a substitute for professional medical care. Always follow your healthcare professional's instructions.

## 2018-03-28 NOTE — TELEPHONE ENCOUNTER
Patient calls stating she has checked with her insurance and Eliquis, Pradaxa, and Xarelto are covered by her plan. She would like to start Eliquis and forgo Coumadin.   Please advise plan. Order for Eliquis is pending in the meds and orders tab.   Thanks, Keke

## 2018-03-28 NOTE — PROGRESS NOTES
"[facility] HEART CARE   CARDIOLOGY CONSULT     Nella Lemon   1945  [id number]    Couture-Randi Lomeli     Chief Complaint   Patient presents with     Consult     Referral Aroldo Lomeli NP;  Paroxysmal Atrial Fibrillation/  Patient states that she gets a \"throbbing feeling in her chest on occasion\".  Patient states that she has a \"pulsing feeling and pressure that goes up the back of her neck for the past month like holding a seashell up to her ear and hearing her pulse\".  Patient states that it \"feels like there is something in her back pushing up into her chest\".  Complaint of breathing heavy with activiity.          HPI:   Mrs. Lemon is a 72-year-old female who is being seen by cardiology secondary to a new diagnosis of paroxysmal atrial fibrillation.  She also has a history of hyperlipidemia, palpitations, atypical chest pain, and hypertension.    She reports having had palpitations for the last 8 months.  She was been found to have paroxysmal atrial ablation. Her CHADSVASc score is 3.  She has a difficult time describing her symptoms and it is difficult for me to fully assess her symptoms.  She has a discomfort to her chest which sounds like palpitations but she describes them as a \"throbbing\" sensation to her chest.  She will also have some discomfort to the back of her neck.  She describes her neck discomfort much like what you would hear when listening to a sea shell.  She is not sure if her chest discomfort correlates with her throbbing sensation in her chest.  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  She says her symptoms are more noticeable when sitting on her couch at night after she has completed her work.  Her discomfort is seen to be accelerated when sitting on her couch. Sitting on the couch seem to accelerate her symptoms.  Her symptoms are at their worst when she is resting against the crease on the couch.  If she is standing, her symptoms are better.  She does not seem to notice " it when she is busy at work.  Does not seem to be exacerbated by activity.  And multiple different times, she said it was not a pain to her chest.  When asked if her symptoms were a, dull, stabbing, pressure, or a discomfort to her chest she denied any of those.  Over and over again, she said it was difficult to describe her symptoms.  She did have her symptoms when she was wearing the Zio patch in which she did push the trigger.  This did correspond atrial fibrillation.  Her EKG today does support sinus rhythm.  She was started on full dose aspirin at 325 mg but no rate controlling medications.    She did have a Zio patch completed on 18.  This showed an atrial fibrillation burden of 31%, the longest episode of atrial fibrillation lasted 1 day and 20 hours with heart rate ranging from 46 bpm to 167 bpm.  There were 3 triggered events and 3 diary entries which all corresponded to atrial fibrillation.  She also had some supraventricular and ventricular ectopy.    It appears that she has uncontrolled hypertension.  She describes checking her blood pressure last night.  It was 181/??.  Today her blood pressure is 164/70 with a heart rate of 62.  On repeat, her blood pressure is 143/81 with heart rate of 59.  He is currently on losartan 12.5 mg daily.    As noted, she is having discomfort to her chest which is difficult to decipher.  Her responses are somewhat vague in her description.  She did have a stress test on 17 that was without personable disease and an EF of 71%.  These symptoms are new compared to this date.  It was suggested she have a stress test and she was agreeable.  She denies ever using tobacco but was exposed to secondhand smoke, has hypertension, has hyperlipidemia, but denies significant family history for heart disease.        IMAGING RESULTS:   Patient:  Nella Lemon  Chart: [id number]  :  1945  Age:  72 year old  Sex:  female       Procedure:  ZioPatch  Monitor.        Technician performing hook-up:  Kristie Abbott      This is a Zio XT patch report on patient Nella Lemon ordered by Aroldo Haddad for palpitations.    The enrollment period was from 1/18/18 through 2/1/18.  There were 13 days and 20 hours of analysis time available.    Patient has predominantly sinus rhythm.    The minimum heart rate is 46, maximum heart rate 167 and average heart rate 71 bpm.    There is no significant bradycardia pauses or heart block seen.    There is evidence of atrial fibrillation.  During the enrollment.  31% of the time the patient was in atrial fibrillation.  The average heart rate for the A. fib was 88 bpm.  The heart rate varied between  bpm.  The longest period of A. fib lasted 1 day and 20 hours.    There were rare PACs seen.  With 7 runs of supraventricular tachycardia lasting 4 beats or more.  The longest one was for 9 beats with average heart rate of 123 bpm.    There was a very rare PVC seen no V. tach.      There were 3 triggered events all of them she had atrial fibrillation.    There were 3 diary entries also for fluttering and racing all 3 of them also had atrial fibrillation.  The heart rate varied between  bpm.    Sterling Rashid MD      CAROTID ULTRASOUND     FINDINGS:  Duplex ultrasound of the carotids was performed.  There is  minimal plaquing at both carotid bifurcations.     On the right in the common carotid artery, peak systolic velocity  measured 88 cm/second, end diastolic velocity measured 10 cm/second;  in the internal carotid artery, peak systolic velocity measured 54  cm/second, end diastolic velocity measured 15 cm/second.  Forward flow  is demonstrated in the right vertebral.     On the left in the common carotid artery, peak systolic velocity  measured 88 cm/second, end diastolic velocity measured 14 cm/second;  in the internal carotid artery, peak systolic velocity measured 89  cm/second, end diastolic velocity measured 28  cm/second.  Forward flow  is demonstrated in the left vertebral.     IMPRESSION:  NO STENOSES ARE IDENTIFIED IN EITHER CAROTID SYSTEM.  Exam Date: Jun 14, 2017 09:17:00 AM  Author: FILI MONSON  This report is final and signed      Nuclear exercise stress test on 6/13/17.  FINDINGS:   There is excellent radiotracer uptake by the left  ventricular.  The left ventricular volume is normal.  No site of  diminished reversible myocardial perfusion are identified to suggest  myocardial ischemia.  No fixed defects are identified.  Myocardial  motility is preserved.  The ejection fraction is preserved, estimated  at 79%.     IMPRESSION:  Negative myocardial perfusion and gated SPECT imaging.    ALLERGIES:   Allergies   Allergen Reactions     Atorvastatin      Ezetimibe      Gentamicin      Hydroxyzine      Lorazepam      Ranitidine      Simvastatin      Lopid [Gemfibrozil] GI Disturbance     Bloating, constipation,      Pravastatin Muscle Pain (Myalgia)        PAST MEDICAL HISTORY:   Past Medical History:   Diagnosis Date     Anxiety state, unspecified 09/25/2003     Carpal tunnel syndrome 07/02/2002     Diarrhea 08/23/2002     Endometrial hyperplasia 01/27/2003     Esophageal reflux 09/28/2001     Family history of colon cancer 8/2/2016     Family history of malignant neoplasm of breast 11/13/2006     Hearing problem      Hyperlipidemia LDL goal <100 12/30/2016     Metrorrhagia 10/22/2003     Myalgia and myositis, unspecified 05/21/2002     Nonallopathic lesion of thoracic region, not elsewhere classified 05/19/2003     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     Palpitations 04/11/2001     Postmenopausal bleeding 09/09/2002     Precordial pain 04/05/2001     Unspecified essential hypertension 09/14/2001     Urgency of urination 06/06/2007        PAST SURGICAL HISTORY:   Past Surgical History:   Procedure Laterality Date     ADENOIDECTOMY       CHOLECYSTECTOMY  1981     COLONOSCOPY  2002     COLONOSCOPY   2012     COLONOSCOPY N/A 9/22/2014    Procedure: COLONOSCOPY;  Surgeon: Lavell Pavon DO;  Location: [facility] OR     CYSTOSCOPY  2007    Cystitis chronic     ORTHOPEDIC SURGERY  2002    carpal tunnel; RT, LT     TONSILLECTOMY          FAMILY HISTORY:   Family History   Problem Relation Age of Onset     Breast Cancer Mother      Alcohol/Drug Mother      Cirrhosis     Other - See Comments Mother      goiter     CEREBROVASCULAR DISEASE Father      Circulatory Father      Alcohol/Drug Son      Cancer - colorectal Brother      Unknown/Adopted No family hx of      Asthma No family hx of      C.A.D. No family hx of      DIABETES No family hx of      Hypertension No family hx of      Prostate Cancer No family hx of      Allergies No family hx of      Alzheimer Disease No family hx of      Anesthesia Reaction No family hx of      Arthritis No family hx of      Blood Disease No family hx of      Cardiovascular No family hx of      Congenital Anomalies No family hx of      Connective Tissue Disorder No family hx of      Depression No family hx of      Endocrine Disease No family hx of      Eye Disorder No family hx of      Genetic Disorder No family hx of      GASTROINTESTINAL DISEASE No family hx of      Genitourinary Problems No family hx of      Gynecology No family hx of      HEART DISEASE No family hx of      Lipids No family hx of      Musculoskeletal Disorder No family hx of      Neurologic Disorder No family hx of      Obesity No family hx of      OSTEOPOROSIS No family hx of      Psychotic Disorder No family hx of      Respiratory No family hx of      Thyroid Disease No family hx of      Family History Negative No family hx of      Hearing Loss No family hx of         SOCIAL HISTORY:   Social History     Social History     Marital status: Legally      Spouse name: N/A     Number of children: N/A     Years of education: N/A     Occupational History     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 No     Blood Transfusions Yes     Caffeine Concern No     Exercise Yes     walking daily     Social History Narrative         CURRENT MEDICATIONS:   Current Outpatient Prescriptions   Medication     metoprolol succinate (TOPROL-XL) 25 MG 24 hr tablet     losartan (COZAAR) 25 MG tablet     levothyroxine (SYNTHROID/LEVOTHROID) 25 MCG tablet     rosuvastatin (CRESTOR) 10 MG tablet     sertraline (ZOLOFT) 50 MG tablet     omeprazole (PRILOSEC) 40 MG capsule     gabapentin (NEURONTIN) 300 MG capsule     GLUCOSAMINE SULFATE PO     VITAMIN D, CHOLECALCIFEROL, PO     Ginkgo Biloba (GINKOBA PO)     Omega-3 Fatty Acids (OMEGA-3 FISH OIL PO)     [DISCONTINUED] losartan (COZAAR) 25 MG tablet     No current facility-administered medications for this visit.           ROS:   CONSTITUTIONAL: No weight loss, fever, chills, weakness or fatigue.   HEENT: Eyes: No visual changes. Ears, Nose, Throat: No hearing loss, congestion or difficulty swallowing.   CARDIOVASCULAR: No chest pain, chest pressure she does have chest discomfort.  It sounds like she has palpitations but this not completely clear, she does not have lower extremity edema.   RESPIRATORY: (+) shortness of breath, dyspnea upon exertion, cough or sputum production.   GASTROINTESTINAL: Abdominal pain. No anorexia, nausea, vomiting or diarrhea.   NEUROLOGICAL: No headache, lightheadedness, dizziness, syncope, ataxia or weakness.   HEMATOLOGIC: No anemia, bleeding or bruising.   PSYCHIATRIC: No history of depression or anxiety.   ENDOCRINOLOGIC: No reports of sweating, cold or heat intolerance. No polyuria or polydipsia.   SKIN: No abnormal rashes or itching.       PHYSICAL EXAM:   GENERAL: The patient is a well-developed,  well-nourished, in no apparent distress. Alert and oriented x3.   HEENT: Head is normocephalic and atraumatic. Eyes are symmetrical with normal visual tracking.  HEART: Regular rate and rhythm, S1S2 present without murmur, rub or gallop.   LUNGS: Respirations regular and unlabored. Clear to auscultation.   GI: Abdomen is soft and non distended.   EXTREMITIES: No peripheral edema present.   MUSCULOSKELETAL: No joint swelling.   NEUROLOGIC: Alert and oriented X3.    SKIN: No jaundice. No rashes or visible skin lesions present.       EKG:    EKG today 3/20/18.  This shows sinus rhythm without evidence for atrial fibrillation.      LAB RESULTS:   Office Visit on 02/12/2018   Component Date Value Ref Range Status     Cholesterol 02/12/2018 148  <200 mg/dL Final     Triglycerides 02/12/2018 138  <150 mg/dL Final     HDL Cholesterol 02/12/2018 49* >49 mg/dL Final     LDL Cholesterol Calculated 02/12/2018 71  <100 mg/dL Final     Non HDL Cholesterol 02/12/2018 99  <130 mg/dL Final     TSH 02/12/2018 4.54* 0.40 - 4.00 mU/L Final     Sodium 02/12/2018 143  133 - 144 mmol/L Final     Potassium 02/12/2018 4.0  3.4 - 5.3 mmol/L Final     Chloride 02/12/2018 109  94 - 109 mmol/L Final     Carbon Dioxide 02/12/2018 24  20 - 32 mmol/L Final     Anion Gap 02/12/2018 10  3 - 14 mmol/L Final     Glucose 02/12/2018 91  70 - 99 mg/dL Final     Urea Nitrogen 02/12/2018 17  7 - 30 mg/dL Final     Creatinine 02/12/2018 0.87  0.52 - 1.04 mg/dL Final     GFR Estimate 02/12/2018 64  >60 mL/min/1.7m2 Final     GFR Estimate If Black 02/12/2018 77  >60 mL/min/1.7m2 Final     Calcium 02/12/2018 8.9  8.5 - 10.1 mg/dL Final     Bilirubin Direct 02/12/2018 0.2  0.0 - 0.2 mg/dL Final     Bilirubin Total 02/12/2018 0.6  0.2 - 1.3 mg/dL Final     Albumin 02/12/2018 3.6  3.4 - 5.0 g/dL Final     Protein Total 02/12/2018 7.6  6.8 - 8.8 g/dL Final     Alkaline Phosphatase 02/12/2018 105  40 - 150 U/L Final     ALT 02/12/2018 30  0 - 50 U/L Final     AST  02/12/2018 20  0 - 45 U/L Final     T4 Free 02/12/2018 0.96  0.76 - 1.46 ng/dL Final            ASSESSMENT:       ICD-10-CM    1. Paroxysmal atrial fibrillation (H) I48.0 EKG 12-lead complete w/read - (Clinic Performed)     metoprolol succinate (TOPROL-XL) 25 MG 24 hr tablet     INR/ANTICOAG REFERRAL   2. Benign essential hypertension I10 losartan (COZAAR) 25 MG tablet   3. Hyperlipidemia LDL goal <100 E78.5    4. Palpitations R00.2    5. Atypical chest pain R07.89 Exercise Stress Echocardiogram         PLAN:   1.  She was given the option of starting Coumadin versus a DOAC.  Secondary to cost and its long track record, she has chosen Coumadin.  2. She will start Coumadin with a CHADSVASC score of 3.  3.  She will be started on metoprolol 25 mg to be taken daily.  4.  She will discontinue aspirin 325 mg when she starts Coumadin.  5.  She has been referred to the Coumadin clinic.  6.  It is difficult to understand her chest discomfort.  As result, she will do an exercise stress echo.  7.  Losartan will be increased from 12.5 mg to 25 mg daily with her elevated blood pressure.  8.  She will be seen approximately 1 month follow-up.      Thank you for allowing me to participate in the care of your patient. Please do not hesitate to contact me if you have any questions.     Chris Walsh, DO

## 2018-03-29 ENCOUNTER — ANTICOAGULATION THERAPY VISIT (OUTPATIENT)
Dept: ANTICOAGULATION | Facility: OTHER | Age: 73
End: 2018-03-29
Payer: COMMERCIAL

## 2018-03-29 NOTE — PROGRESS NOTES
"  ANTICOAGULATION FOLLOW-UP CLINIC VISIT    Patient Name:  Nella Lemon  Date:  3/29/2018  Contact Type:  Telephone    SUBJECTIVE:     Patient Findings     Comments Call placed to patient and spoke to her re: INR referral. She states that she was told if she went on \"one of the other ones\" that she would not have to have the INR's done. She states her insurance did cover the other anticoagulant so she is actually picking it up today and starting it.  She will not need anticoagulation services.            OBJECTIVE    No results found for: INR, [id number], [id number], 2AGN, F2    ASSESSMENT / PLAN  No question data found.        See the Encounter Report to view Anticoagulation Flowsheet and Dosing Calendar (Go to Encounters tab in chart review, and find the Anticoagulation Therapy Visit)        Scarlet Sanchez RN               "

## 2018-03-29 NOTE — MR AVS SNAPSHOT
Nella Lemon   3/29/2018   Anticoagulation Therapy Visit    Description:  72 year old female   Provider:  Randi Haddad NP   Department:  [facility]agulation

## 2018-04-23 ENCOUNTER — HOSPITAL ENCOUNTER (OUTPATIENT)
Dept: CARDIOLOGY | Facility: HOSPITAL | Age: 73
Discharge: HOME | End: 2018-04-23
Attending: INTERNAL MEDICINE | Admitting: INTERNAL MEDICINE
Payer: COMMERCIAL

## 2018-04-23 DIAGNOSIS — R07.89 ATYPICAL CHEST PAIN: ICD-10-CM

## 2018-04-23 PROCEDURE — 93321 DOPPLER ECHO F-UP/LMTD STD: CPT | Mod: 26 | Performed by: INTERNAL MEDICINE

## 2018-04-23 PROCEDURE — 93018 CV STRESS TEST I&R ONLY: CPT | Performed by: INTERNAL MEDICINE

## 2018-04-23 PROCEDURE — 93325 DOPPLER ECHO COLOR FLOW MAPG: CPT | Mod: 26 | Performed by: INTERNAL MEDICINE

## 2018-04-23 PROCEDURE — 93321 DOPPLER ECHO F-UP/LMTD STD: CPT | Mod: TC

## 2018-04-23 PROCEDURE — 93350 STRESS TTE ONLY: CPT | Mod: 26 | Performed by: INTERNAL MEDICINE

## 2018-04-23 PROCEDURE — 93017 CV STRESS TEST TRACING ONLY: CPT

## 2018-04-23 PROCEDURE — 93016 CV STRESS TEST SUPVJ ONLY: CPT | Performed by: INTERNAL MEDICINE

## 2018-04-25 ENCOUNTER — OFFICE VISIT (OUTPATIENT)
Dept: CARDIOLOGY | Facility: OTHER | Age: 73
End: 2018-04-25
Attending: INTERNAL MEDICINE
Payer: COMMERCIAL

## 2018-04-25 VITALS
SYSTOLIC BLOOD PRESSURE: 133 MMHG | HEIGHT: 62 IN | BODY MASS INDEX: 32.2 KG/M2 | TEMPERATURE: 99 F | RESPIRATION RATE: 16 BRPM | WEIGHT: 175 LBS | HEART RATE: 55 BPM | OXYGEN SATURATION: 93 % | DIASTOLIC BLOOD PRESSURE: 46 MMHG

## 2018-04-25 DIAGNOSIS — I48.0 PAROXYSMAL ATRIAL FIBRILLATION (H): Primary | ICD-10-CM

## 2018-04-25 DIAGNOSIS — E78.5 HYPERLIPIDEMIA LDL GOAL <100: ICD-10-CM

## 2018-04-25 DIAGNOSIS — I10 BENIGN ESSENTIAL HYPERTENSION: ICD-10-CM

## 2018-04-25 DIAGNOSIS — R07.89 ATYPICAL CHEST PAIN: ICD-10-CM

## 2018-04-25 DIAGNOSIS — R00.2 PALPITATIONS: ICD-10-CM

## 2018-04-25 PROCEDURE — 99214 OFFICE O/P EST MOD 30 MIN: CPT | Performed by: INTERNAL MEDICINE

## 2018-04-25 PROCEDURE — G0463 HOSPITAL OUTPT CLINIC VISIT: HCPCS

## 2018-04-25 ASSESSMENT — PAIN SCALES - GENERAL: PAINLEVEL: NO PAIN (0)

## 2018-04-25 NOTE — PATIENT INSTRUCTIONS
You were seen by Dr. Walsh, 4/25/2018.     1.  Please continue all medications as they have been prescribed.      2. If your symptoms become worse or change, please call the cardiology department as you may need to be seen sooner.       You will follow up with Dr. Walsh in 6 months, sooner with concerns.       Please call the cardiology office with problems, questions, or concerns at [phone].    If you experience chest pain, chest pressure, chest tightness, shortness of breath, fainting, lightheadedness, nausea, vomiting, or other concerning symptoms, please report to the Emergency Department or call 911. These symptoms may be emergent, and best treated in the Emergency Department.       Keke REED RN-BSN  Cardiology   [facility]  [phone]    Understanding Atrial Fibrillation  What is atrial fibrillation?  Atrial fibrillation is an irregular heartbeat. It is fairly common, but it can be serious.   The atria are the two upper chambers of the heart. Normally, they have a steady, constant beat.   If the beat is rapid and uneven, we call this atrial fibrillation. It may feel as if your heart is racing out of control. This can cause discomfort, but the real danger is that blood can pool and form clots in the heart.   If a piece of a blood clot breaks loose, it may travel through the arteries until it gets stuck. This could block blood flow to an organ or other body part. If it blocks a heart artery, you could have a heart attack. If it blocks a brain artery, you could have a stroke.   What causes it?  Atrial fibrillation can be caused by:    Heart disease, such as coronary artery disease, myocarditis (inflammation of the heart muscle), rheumatic heart disease or heart valve disorder    Heart defects you were born with    A long history of high blood pressure    Swelling caused by an injury near the heart.  Sometimes it seems to happen for no reason.  What are the symptoms?  Not everyone feels  symptoms, but if they do, symptoms may include:    Fast, fluttering or irregular heartbeat    Chest pain    Trouble breathing, or a sense that you can't catch your breath    Feeling weak or dizzy    Feeling far more tired than normal    Cold sweats.  How is it diagnosed?  Your doctor will do an electrocardiogram (EKG). Electrodes will be place on your wrists, ankles and chest. Wires connect the electrodes to an EKG machine, which will record electrical signals from your heart.  How is it treated?  Treatment is important to reduce the risk of stroke, heart attack or other tissue damage.    A number of medicines are used to treat atrial fibrillation. Some slow your heart rate. Some help change the heartbeat back to normal. Some help keep blood clots from forming.    An electric charge may be used to get your heart back to its regular beat.    In some cases, a pacemaker or another implanted device can be used to control your heartbeat.  For informational purposes only. Not to replace the advice of your health care provider.   Copyright   2006 [facility]. All rights reserved. [employer] [id number] - REV 07/17.

## 2018-04-25 NOTE — MR AVS SNAPSHOT
After Visit Summary   4/25/2018    Nella Lemon    MRN: [id number]           Patient Information     Date Of Birth          1945        Visit Information        Provider Department      4/25/2018 10:30 AM Chris Walsh, DO [facility] [place] [facility]        Care Instructions    You were seen by Dr. Walsh, 4/25/2018.     1.  Please continue all medications as they have been prescribed.      2. If your symptoms become worse or change, please call the cardiology department as you may need to be seen sooner.       You will follow up with Dr. Walsh in 6 months, sooner with concerns.       Please call the cardiology office with problems, questions, or concerns at [phone].    If you experience chest pain, chest pressure, chest tightness, shortness of breath, fainting, lightheadedness, nausea, vomiting, or other concerning symptoms, please report to the Emergency Department or call 911. These symptoms may be emergent, and best treated in the Emergency Department.       Keke REED RN-BSN  Cardiology   [facility]  [phone]    Understanding Atrial Fibrillation  What is atrial fibrillation?  Atrial fibrillation is an irregular heartbeat. It is fairly common, but it can be serious.   The atria are the two upper chambers of the heart. Normally, they have a steady, constant beat.   If the beat is rapid and uneven, we call this atrial fibrillation. It may feel as if your heart is racing out of control. This can cause discomfort, but the real danger is that blood can pool and form clots in the heart.   If a piece of a blood clot breaks loose, it may travel through the arteries until it gets stuck. This could block blood flow to an organ or other body part. If it blocks a heart artery, you could have a heart attack. If it blocks a brain artery, you could have a stroke.   What causes it?  Atrial fibrillation can be caused by:    Heart disease, such as coronary artery disease,  myocarditis (inflammation of the heart muscle), rheumatic heart disease or heart valve disorder    Heart defects you were born with    A long history of high blood pressure    Swelling caused by an injury near the heart.  Sometimes it seems to happen for no reason.  What are the symptoms?  Not everyone feels symptoms, but if they do, symptoms may include:    Fast, fluttering or irregular heartbeat    Chest pain    Trouble breathing, or a sense that you can't catch your breath    Feeling weak or dizzy    Feeling far more tired than normal    Cold sweats.  How is it diagnosed?  Your doctor will do an electrocardiogram (EKG). Electrodes will be place on your wrists, ankles and chest. Wires connect the electrodes to an EKG machine, which will record electrical signals from your heart.  How is it treated?  Treatment is important to reduce the risk of stroke, heart attack or other tissue damage.    A number of medicines are used to treat atrial fibrillation. Some slow your heart rate. Some help change the heartbeat back to normal. Some help keep blood clots from forming.    An electric charge may be used to get your heart back to its regular beat.    In some cases, a pacemaker or another implanted device can be used to control your heartbeat.  For informational purposes only. Not to replace the advice of your health care provider.   Copyright   2006 [employer]. All rights reserved. [employer] [id number] - REV 07/17.            Follow-ups after your visit        Your next 10 appointments already scheduled     May 30, 2018  9:00 AM CDT   LAB with [facility] ([facility] )    [address]  [place] [address]   [phone]            Aug 06, 2018  9:45 AM CDT   (Arrive by 9:30 AM)   SHORT with Randi Haddad NP   [facility] ([facility] )    [address]  [place] [address]   [phone]     "        Aug 27, 2018  9:30 AM CDT   (Arrive by 9:15 AM)   Hearing Eval with Joss Palafox   [facility] ([facility] )    [place] [address]   [phone]              Who to contact     If you have questions or need follow up information about today's clinic visit or your schedule please contact [facility] directly at [phone].  Normal or non-critical lab and imaging results will be communicated to you by MyChart, letter or phone within 4 business days after the clinic has received the results. If you do not hear from us within 7 days, please contact the clinic through BuildForgehart or phone. If you have a critical or abnormal lab result, we will notify you by phone as soon as possible.  Submit refill requests through Riboxx or call your pharmacy and they will forward the refill request to us. Please allow 3 business days for your refill to be completed.          Additional Information About Your Visit        [facility] Information     Riboxx lets you send messages to your doctor, view your test results, renew your prescriptions, schedule appointments and more. To sign up, go to [email or web address] . Click on \"Log in\" on the left side of the screen, which will take you to the Welcome page. Then click on \"Sign up Now\" on the right side of the page.     You will be asked to enter the access code listed below, as well as some personal information. Please follow the directions to create your username and password.     Your access code is: [email or web address]  Expires: 2018 11:15 AM     Your access code will  in 90 days. If you need help or a new code, please call your [place] clinic or [phone].        Care EveryWhere ID     This is your Care EveryWhere ID. This could be used by other organizations to access your [place] medical records  [id number]        Your Vitals Were     Pulse Temperature Respirations Height Pulse Oximetry BMI " "(Body Mass Index)    55 99  F (37.2  C) (Tympanic) 16 1.575 m (5' 2\") 93% 32.01 kg/m2       Blood Pressure from Last 3 Encounters:   04/25/18 133/46   03/28/18 143/81   02/12/18 128/74    Weight from Last 3 Encounters:   04/25/18 79.4 kg (175 lb)   03/28/18 79.4 kg (175 lb)   02/12/18 80.3 kg (177 lb)              Today, you had the following     No orders found for display       Primary Care Provider Office Phone # Fax #    Randi MathiasBridgetteYani, CECILY [phone] [address] [facility] [address]        Equal Access to Services     LUCIA GREENFIELD : Corey hargrove Soandrews, waaxda luqadaha, qaybta kaalmada adeegyada, cristiane raman . So [facility] [phone].    ATENCIÓN: Si habla español, tiene a mcclure disposición servicios gratuitos de asistencia lingüística. Llame al [phone].    We comply with applicable federal civil rights laws and [place] laws. We do not discriminate on the basis of race, color, national origin, age, disability, sex, sexual orientation, or gender identity.            Thank you!     Thank you for choosing [facility]3] [facility]  [facility] your [facility]. Our goal is always to provide you with excellent care. Hearing back from our patients is one way we can continue to improve our services. Please take a few minutes to complete the written survey that you may receive in the mail after your visit with us. Thank you!             Your Updated Medication List - Protect others around you: Learn how to safely use, store and throw away your medicines at www.disposemymeds.org.          This list is accurate as of 4/25/18 11:15 AM.  Always use your most recent med list.                   Brand Name Dispense Instructions for use Diagnosis    apixaban ANTICOAGULANT 5 MG tablet    ELIQUIS    60 tablet    Take 1 tablet (5 mg) by mouth 2 times daily    Paroxysmal atrial fibrillation (H)       gabapentin 300 MG capsule    NEURONTIN    270 capsule    " Take 3 capsules three times daily    Myalgia       GINKOBA PO      Take 40 mg by mouth daily        GLUCOSAMINE SULFATE PO      Take 1,000 mg by mouth 2 times daily        levothyroxine 25 MCG tablet    SYNTHROID/LEVOTHROID    90 tablet    Take 1 tablet (25 mcg) by mouth daily    Hypothyroidism due to acquired atrophy of thyroid       losartan 25 MG tablet    COZAAR    90 tablet    Take 1 tablet by mouth daily.    Benign essential hypertension       metoprolol succinate 25 MG 24 hr tablet    TOPROL-XL    30 tablet    Take 1 tablet (25 mg) by mouth daily    Paroxysmal atrial fibrillation (H)       OMEGA-3 FISH OIL PO      Take 3 g by mouth daily        omeprazole 40 MG capsule    priLOSEC    90 capsule    TAKE 1 CAPSULE (40 MG) BY MOUTH DAILY TAKE 30-60 MINUTES BEFORE A MEAL.    Gastroesophageal reflux disease, esophagitis presence not specified       rosuvastatin 10 MG tablet    CRESTOR    90 tablet    Take 1 tablet (10 mg) by mouth daily    Hyperlipidemia LDL goal <100       sertraline 50 MG tablet    ZOLOFT    30 tablet    TAKE 1 TABLET (50 MG) BY MOUTH DAILY    Anxiety       VITAMIN D (CHOLECALCIFEROL) PO      Take by mouth daily

## 2018-04-25 NOTE — NURSING NOTE
"Chief Complaint   Patient presents with     RECHECK     1 month follow-up       Initial /46 (BP Location: Left arm, Patient Position: Chair, Cuff Size: Adult Large)  Pulse 55  Temp 99  F (37.2  C) (Tympanic)  Resp 16  Ht 1.575 m (5' 2\")  Wt 79.4 kg (175 lb)  SpO2 93%  BMI 32.01 kg/m2 Estimated body mass index is 32.01 kg/(m^2) as calculated from the following:    Height as of this encounter: 1.575 m (5' 2\").    Weight as of this encounter: 79.4 kg (175 lb).  Medication Reconciliation: complete   Adina Maynard      "

## 2018-04-25 NOTE — PROGRESS NOTES
"    Cardiology Progress Note     Assessment & Plan   Nella Lemon is a 72 year old female who is being seen in follow-up to visit from 3/28/18.  She had been seen secondary to paroxysmal atrial fibrillation, hypertension, and atypical chest pain.  She had been previously started on Eliquis and metoprolol 25 mg daily. She was taken off aspirin 325 as well as Coumadin.  Losartan was increased from 12.5 mg at 25 mg.  She is here for one-month follow-up.    Impression:  1.  Paroxysmal atrial fibrillation.  2.  Hypertension.  3.  Hyperlipidemia.  4.  Palpitations.  5.  Atypical chest pain.    Plan:   1.  Now that she is on Eliquis and metoprolol her symptoms are about 85% better.  She is bradycardic with heart rates in the 50's on metoprolol 25 mg.  As result, no changes were made.  2.  She will continue Eliquis 5 mg twice a day and metoprolol 25 mg daily.  3.  Her pressures here and at home have been less than 140 systolically.  She will continue metoprolol 25 mg daily and losartan 25 mg daily.  4.  We did go over her stress echo today.  The stress echo was largely unremarkable.  4.  She was given the option of following up in 6 months or year.  She will be seen in 6 months follow-up to reevaluate her blood pressure as well as treatment for atrial fibrillation.          Chris Walsh    Interval History   Nella is a 72-year-old female who is being seen in follow-up to visit from 3/28/18.  She has been followed by cardiology secondary to atrial fibrillation and hypertension.  She was having some non-specific complaints which involved a \"throbbing\" to her chest.  She said it was not a tightness, pressure, or bandlike symptoms.      She previously had a Zio patch on 2/1/18 that showed an atrial fib burden of 31% with the longest duration being 1 day and 20 hours.  Her events corresponded to atrial fibrillation.    She denied specific complaints of chest discomfort. Her symptoms were somewhat hard to decipher. " " She described her symptoms as a \"throbbing\". She also had some discomfort to her neck.  She was having an unusual sound in her ear.  Her chest symptoms were accelerated when sitting on the couch and particularly leaning against the seem of the cushion.  Her symptoms were better when she was active.  She was given a CHADSVASC score of 3.    Today, with the initiation of Eliquis and metoprolol 25 mg daily.  Her symptoms have declined by about 85%.  She is feeling much better.  She no longer has the symptoms while sitting on her couch.    With the increase of her losartan and the initiation of metoprolol, her blood pressures have been better controlled.  Previously, her blood pressures were as high as 180's.  She has no additional complaints.    Physical Exam   Temp: 99  F (37.2  C) Temp src: Tympanic BP: 133/46 Pulse: 55   Resp: 16 SpO2: 93 %      Vitals:    04/25/18 1027   Weight: 79.4 kg (175 lb)     Vital Signs with Ranges  Temp:  [99  F (37.2  C)] 99  F (37.2  C)  Pulse:  [55] 55  Resp:  [16] 16  BP: (133)/(46) 133/46  SpO2:  [93 %] 93 %  ROS is negative except that which is noted in HPI.         Constitutional: awake, alert, cooperative, no apparent distress, and appears stated age  Eyes: Lids and lashes normal,sclera clear, conjunctiva normal  ENT: Normocephalic, without obvious abnormality, atraumatic.  Respiratory: No increased work of breathing, good air exchange, clear to auscultation bilaterally, no crackles or wheezing  Cardiovascular: Normal apical impulse, regular rate and rhythm, normal S1 and S2, no S3 or S4, and no murmur noted  GI: No scars, normal bowel sounds, soft.  Musculoskeletal: no lower extremity pitting edema present  Neurologic: Awake, alert, oriented to name, place and time.   Neuropsychiatric: General: normal, calm and normal eye contact    Medications         Data   No results found for this or any previous visit (from the past 24 hour(s)).      "

## 2018-05-07 DIAGNOSIS — M79.10 MYALGIA: ICD-10-CM

## 2018-05-07 RX ORDER — GABAPENTIN 300 MG/1
CAPSULE ORAL
Qty: 270 CAPSULE | Refills: 3 | Status: SHIPPED | OUTPATIENT
Start: 2018-05-07 | End: 2018-09-04

## 2018-05-07 NOTE — TELEPHONE ENCOUNTER
Gabapentin  Last Written Prescription Date:  11/22/17  Last Fill Qty:  270, # Refills:  5  Last Office Visit:  2/12/18

## 2018-05-14 DIAGNOSIS — E78.5 HYPERLIPIDEMIA LDL GOAL <100: ICD-10-CM

## 2018-05-14 DIAGNOSIS — E03.4 HYPOTHYROIDISM DUE TO ACQUIRED ATROPHY OF THYROID: ICD-10-CM

## 2018-05-16 RX ORDER — ROSUVASTATIN CALCIUM 10 MG/1
TABLET, COATED ORAL
Qty: 90 TABLET | Refills: 2 | Status: SHIPPED | OUTPATIENT
Start: 2018-05-16 | End: 2019-02-11

## 2018-05-16 RX ORDER — LEVOTHYROXINE SODIUM 25 UG/1
TABLET ORAL
Qty: 90 TABLET | Refills: 1 | Status: SHIPPED | OUTPATIENT
Start: 2018-05-16 | End: 2018-11-12

## 2018-05-30 DIAGNOSIS — E03.4 HYPOTHYROIDISM DUE TO ACQUIRED ATROPHY OF THYROID: ICD-10-CM

## 2018-05-30 DIAGNOSIS — E78.5 HYPERLIPIDEMIA, UNSPECIFIED HYPERLIPIDEMIA TYPE: ICD-10-CM

## 2018-05-30 LAB
CHOLEST SERPL-MCNC: 119 MG/DL
HDLC SERPL-MCNC: 42 MG/DL
LDLC SERPL CALC-MCNC: 52 MG/DL
NONHDLC SERPL-MCNC: 77 MG/DL
TRIGL SERPL-MCNC: 127 MG/DL
TSH SERPL DL<=0.005 MIU/L-ACNC: 3.65 MU/L (ref 0.4–4)

## 2018-05-30 PROCEDURE — 80061 LIPID PANEL: CPT | Mod: ZL | Performed by: NURSE PRACTITIONER

## 2018-05-30 PROCEDURE — 36415 COLL VENOUS BLD VENIPUNCTURE: CPT | Mod: ZL | Performed by: NURSE PRACTITIONER

## 2018-05-30 PROCEDURE — 84443 ASSAY THYROID STIM HORMONE: CPT | Mod: ZL | Performed by: NURSE PRACTITIONER

## 2018-07-05 ENCOUNTER — TELEPHONE (OUTPATIENT)
Dept: FAMILY MEDICINE | Facility: OTHER | Age: 73
End: 2018-07-05

## 2018-07-05 NOTE — TELEPHONE ENCOUNTER
Patient called and she has been taking her BP's at home and have been getting 150's/70's with HR of 55-and higher.She asked to come in for BP tomorrow.Please note.Thank you.

## 2018-07-06 ENCOUNTER — ALLIED HEALTH/NURSE VISIT (OUTPATIENT)
Dept: FAMILY MEDICINE | Facility: OTHER | Age: 73
End: 2018-07-06
Attending: NURSE PRACTITIONER
Payer: COMMERCIAL

## 2018-07-06 VITALS — SYSTOLIC BLOOD PRESSURE: 138 MMHG | HEART RATE: 56 BPM | DIASTOLIC BLOOD PRESSURE: 84 MMHG

## 2018-07-06 DIAGNOSIS — I10 BENIGN ESSENTIAL HYPERTENSION: Primary | ICD-10-CM

## 2018-07-06 PROCEDURE — 99207 ZZC NO CHARGE NURSE ONLY: CPT

## 2018-07-06 NOTE — MR AVS SNAPSHOT
After Visit Summary   7/6/2018    Nella Lemon    MRN: [id number]           Patient Information     Date Of Birth          1945        Visit Information        Provider Department      7/6/2018 8:30 AM [facility] NURSE [facility]        Today's Diagnoses     Benign essential hypertension    -  1       Follow-ups after your visit        Your next 10 appointments already scheduled     Jul 20, 2018 11:15 AM CDT   (Arrive by 11:00 AM)   SHORT with Randi Haddad NP   [facility] ([facility] )    [address]  [facility] [address]   [phone]            Aug 06, 2018  9:45 AM CDT   (Arrive by 9:30 AM)   SHORT with Randi Haddad NP   [facility] ([facility] )    [address]  [facility] [address]   [phone]            Aug 27, 2018  9:30 AM CDT   (Arrive by 9:15 AM)   Hearing Eval with Trini Dougherty, Joss   [facility] [place] ([facility] [place] )    [address]  [place] [address]   [phone]            Oct 29, 2018 10:00 AM CDT   (Arrive by 9:45 AM)   Return Visit with Chris Walsh,    [facility] [place] ([facility] [place] )    [address]  [place] [address]   [phone]              Who to contact     If you have questions or need follow up information about today's clinic visit or your schedule please contact [facility] directly at [phone].  Normal or non-critical lab and imaging results will be communicated to you by MyChart, letter or phone within 4 business days after the clinic has received the results. If you do not hear from us within 7 days, please contact the clinic through MyChart or phone. If you have a critical or abnormal lab result, we will notify you by phone as soon as possible.  Submit refill requests through crossvertiset or call your pharmacy and they will forward  the refill request to us. Please allow 3 business days for your refill to be completed.          Additional Information About Your Visit        Care EveryWhere ID     This is your Care EveryWhere ID. This could be used by other organizations to access your [place] medical records  [id number]        Your Vitals Were     Pulse                   56            Blood Pressure from Last 3 Encounters:   07/06/18 138/84   04/25/18 133/46   03/28/18 143/81    Weight from Last 3 Encounters:   04/25/18 175 lb (79.4 kg)   03/28/18 175 lb (79.4 kg)   02/12/18 177 lb (80.3 kg)              Today, you had the following     No orders found for display       Primary Care Provider Office Phone # Fax #    Randi REESEWenceslao Haddad [phone] [address] [facility] [address]        Equal Access to Services     [facility]: Hadii aad ku hadasho Soomaali, waaxda luqadaha, qaybta kaalmada adeegyada, waxay yaain haymartine raman . So [facility] [phone].    ATENCIÓN: Si habla español, tiene a mcclure disposición servicios gratuitos de asistencia lingüística. Kt al [phone].    We comply with applicable federal civil rights laws and [place] laws. We do not discriminate on the basis of race, color, national origin, age, disability, sex, sexual orientation, or gender identity.            Thank you!     Thank you for choosing [facility]  for your care. Our goal is always to provide you with excellent care. Hearing back from our patients is one way we can continue to improve our services. Please take a few minutes to complete the written survey that you may receive in the mail after your visit with us. Thank you!             Your Updated Medication List - Protect others around you: Learn how to safely use, store and throw away your medicines at www.disposemymeds.org.          This list is accurate as of 7/6/18  8:42 AM.  Always use your most recent med list.                    Brand Name Dispense Instructions for use Diagnosis    apixaban ANTICOAGULANT 5 MG tablet    ELIQUIS    60 tablet    Take 1 tablet (5 mg) by mouth 2 times daily    Paroxysmal atrial fibrillation (H)       gabapentin 300 MG capsule    NEURONTIN    270 capsule    TAKE 3 CAPSULES THREE TIMES DAILY    Myalgia       GINKOBA PO      Take 40 mg by mouth daily        GLUCOSAMINE SULFATE PO      Take 1,000 mg by mouth 2 times daily        levothyroxine 25 MCG tablet    SYNTHROID/LEVOTHROID    90 tablet    TAKE 1 TABLET (25 MCG) BY MOUTH DAILY    Hypothyroidism due to acquired atrophy of thyroid       losartan 25 MG tablet    COZAAR    90 tablet    Take 1 tablet by mouth daily.    Benign essential hypertension       metoprolol succinate 25 MG 24 hr tablet    TOPROL-XL    30 tablet    Take 1 tablet (25 mg) by mouth daily    Paroxysmal atrial fibrillation (H)       OMEGA-3 FISH OIL PO      Take 3 g by mouth daily        omeprazole 40 MG capsule    priLOSEC    90 capsule    TAKE 1 CAPSULE (40 MG) BY MOUTH DAILY TAKE 30-60 MINUTES BEFORE A MEAL.    Gastroesophageal reflux disease, esophagitis presence not specified       rosuvastatin 10 MG tablet    CRESTOR    90 tablet    TAKE 1 TABLET (10 MG) BY MOUTH DAILY    Hyperlipidemia LDL goal <100       sertraline 50 MG tablet    ZOLOFT    30 tablet    TAKE 1 TABLET (50 MG) BY MOUTH DAILY    Anxiety       VITAMIN D (CHOLECALCIFEROL) PO      Take by mouth daily

## 2018-07-20 NOTE — PROGRESS NOTES
SUBJECTIVE:   Nella Lemon is a 73 year old female who presents to clinic today for the following health issues:      Hypertension Follow-up      Outpatient blood pressures are being checked at home.  Results are 160/60.    Low Salt Diet: low salt      Amount of exercise or physical activity: 6-7 days/week for an average of 30-45 minutes    Problems taking medications regularly: No    Medication side effects: none    Diet: regular (no restrictions)    A-Fib:  Is following with cardiology, has been prescribed eliquis.  Has not noticed any episodes of palpitations.     Depression and Anxiety Follow-Up    Status since last visit: stable    Other associated symptoms:None    Complicating factors:     Significant life event: grandson with mental health issues     Current substance abuse: None    PHQ-9 1/11/2018 2/12/2018 7/23/2018   Total Score 6 1 5   Q9: Suicide Ideation Not at all Not at all Not at all     LA NENA-7 SCORE 1/11/2018 2/12/2018 7/23/2018   Total Score 12 2 5     PHQ-9  English  PHQ-9   Any Language  LA NENA-7  Suicide Assessment Five-step Evaluation and Treatment (SAFE-T)      Hypothyroidism Follow-up      Since last visit, patient describes the following symptoms: Weight stable, no hair loss, no skin changes, no constipation, no loose stools      Problem list and histories reviewed & adjusted, as indicated.  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, left     Family history of colon cancer     ACP (advance care planning)     Hyperlipidemia LDL goal <100     Hypothyroidism due to acquired atrophy  of thyroid     Paroxysmal atrial fibrillation (H)     Atypical chest pain     Past Surgical History:   Procedure Laterality Date     ADENOIDECTOMY       CHOLECYSTECTOMY  1981     COLONOSCOPY  2002     COLONOSCOPY  2012     COLONOSCOPY N/A 9/22/2014    Procedure: COLONOSCOPY;  Surgeon: Lavell Pavon DO;  Location: [facility]     CYSTOSCOPY  2007    Cystitis chronic     ORTHOPEDIC SURGERY  2002    carpal tunnel; RT, LT     TONSILLECTOMY         Social History   Substance Use Topics     Smoking status: Never Smoker     Smokeless tobacco: Never Used     Alcohol use Yes      Comment: once a year     Family History   Problem Relation Age of Onset     Breast Cancer Mother      Alcohol/Drug Mother      Cirrhosis     Other - See Comments Mother      goiter     Cerebrovascular Disease Father      Circulatory Father      Alcohol/Drug Son      Cancer - colorectal Brother      Unknown/Adopted No family hx of      Asthma No family hx of      C.A.D. No family hx of      Diabetes No family hx of      Hypertension No family hx of      Prostate Cancer No family hx of      Allergies No family hx of      Alzheimer Disease No family hx of      Anesthesia Reaction No family hx of      Arthritis No family hx of      Blood Disease No family hx of      Cardiovascular No family hx of      Congenital Anomalies No family hx of      Connective Tissue Disorder No family hx of      Depression No family hx of      Endocrine Disease No family hx of      Eye Disorder No family hx of      Genetic Disorder No family hx of      GASTROINTESTINAL DISEASE No family hx of      Genitourinary Problems No family hx of      Gynecology No family hx of      HEART DISEASE No family hx of      Lipids No family hx of      Musculoskeletal Disorder No family hx of      Neurologic Disorder No family hx of      Obesity No family hx of      Osteoperosis No family hx of      Psychotic Disorder No family hx of      Respiratory No family hx of      Thyroid Disease No  family hx of      Family History Negative No family hx of      Hearing Loss No family hx of          Current Outpatient Prescriptions   Medication Sig Dispense Refill     apixaban ANTICOAGULANT (ELIQUIS) 5 MG tablet Take 1 tablet (5 mg) by mouth 2 times daily 60 tablet 11     gabapentin (NEURONTIN) 300 MG capsule TAKE 3 CAPSULES THREE TIMES DAILY 270 capsule 3     Ginkgo Biloba (GINKOBA PO) Take 40 mg by mouth daily       GLUCOSAMINE SULFATE PO Take 1,000 mg by mouth 2 times daily       levothyroxine (SYNTHROID/LEVOTHROID) 25 MCG tablet TAKE 1 TABLET (25 MCG) BY MOUTH DAILY 90 tablet 1     losartan (COZAAR) 25 MG tablet Take 1 tablet by mouth daily. 90 tablet 3     metoprolol succinate (TOPROL-XL) 25 MG 24 hr tablet Take 1 tablet (25 mg) by mouth daily 30 tablet 11     Omega-3 Fatty Acids (OMEGA-3 FISH OIL PO) Take 3 g by mouth daily        omeprazole (PRILOSEC) 40 MG capsule TAKE 1 CAPSULE (40 MG) BY MOUTH DAILY TAKE 30-60 MINUTES BEFORE A MEAL. 90 capsule 2     rosuvastatin (CRESTOR) 10 MG tablet TAKE 1 TABLET (10 MG) BY MOUTH DAILY 90 tablet 2     sertraline (ZOLOFT) 50 MG tablet TAKE 1 TABLET (50 MG) BY MOUTH DAILY 30 tablet 5     VITAMIN D, CHOLECALCIFEROL, PO Take by mouth daily       Allergies   Allergen Reactions     Atorvastatin      Ezetimibe      Gentamicin      Hydroxyzine      Lorazepam      Ranitidine      Simvastatin      Lopid [Gemfibrozil] GI Disturbance     Bloating, constipation,      Pravastatin Muscle Pain (Myalgia)     Recent Labs   Lab Test  05/30/18   0925  02/12/18   0955  01/11/18   1019  09/25/17   0916  03/24/17   0848   LDL  52  71   --   120*  76   HDL  42*  49*   --   41*  54   TRIG  127  138   --   206*  152*   ALT   --   30  37   --   68*   CR   --   0.87  0.81   --   0.96   GFRESTIMATED   --   64  69   --   57*   GFRESTBLACK   --   77  84   --   69   POTASSIUM   --   4.0  3.9   --   4.4   TSH  3.65  4.54*  4.02*  5.63*  4.00      BP Readings from Last 3 Encounters:   07/23/18 120/70  "  07/06/18 138/84   04/25/18 133/46    Wt Readings from Last 3 Encounters:   07/23/18 172 lb 9.6 oz (78.3 kg)   04/25/18 175 lb (79.4 kg)   03/28/18 175 lb (79.4 kg)                    Reviewed and updated as needed this visit by clinical staff       Reviewed and updated as needed this visit by Provider         ROS:  Constitutional, HEENT, cardiovascular, pulmonary, gi and gu systems are negative, except as otherwise noted.    OBJECTIVE:     /70  Pulse 60  Temp 97.9  F (36.6  C)  Ht 5' 2\" (1.575 m)  Wt 172 lb 9.6 oz (78.3 kg)  SpO2 97%  BMI 31.57 kg/m2  Body mass index is 31.57 kg/(m^2).  GENERAL: healthy, alert and no distress  NECK: no adenopathy, no asymmetry, masses, or scars, thyroid normal to palpation and no carotid bruits  RESP: lungs clear to auscultation - no rales, rhonchi or wheezes  CV: regular rate and rhythm, normal S1 S2, no S3 or S4, no murmur, click or rub, no peripheral edema and peripheral pulses strong  MS: no gross musculoskeletal defects noted, no edema  NEURO: Normal strength and tone, mentation intact and speech normal  PSYCH: mentation appears normal, affect normal/bright        ASSESSMENT/PLAN:       1. Hyperlipidemia LDL goal <100  chronic  - Lipid Profile    2. Hypothyroidism due to acquired atrophy of thyroid  chronic  - TSH with free T4 reflex    3. Benign essential hypertension  chronic  - Comprehensive metabolic panel    4. Paroxysmal atrial fibrillation (H)  Continue current plan    5. Gastroesophageal reflux disease without esophagitis  Continue current plan      FUTURE APPOINTMENTS:       - Follow-up visit in 6 months or as needed for acute concerns     Randi Haddad, NP  [facility]  "

## 2018-07-23 ENCOUNTER — OFFICE VISIT (OUTPATIENT)
Dept: FAMILY MEDICINE | Facility: OTHER | Age: 73
End: 2018-07-23
Attending: NURSE PRACTITIONER
Payer: COMMERCIAL

## 2018-07-23 VITALS
BODY MASS INDEX: 31.76 KG/M2 | HEIGHT: 62 IN | DIASTOLIC BLOOD PRESSURE: 70 MMHG | SYSTOLIC BLOOD PRESSURE: 120 MMHG | OXYGEN SATURATION: 97 % | TEMPERATURE: 97.9 F | WEIGHT: 172.6 LBS | HEART RATE: 60 BPM

## 2018-07-23 DIAGNOSIS — I10 BENIGN ESSENTIAL HYPERTENSION: ICD-10-CM

## 2018-07-23 DIAGNOSIS — E78.5 HYPERLIPIDEMIA LDL GOAL <100: Primary | ICD-10-CM

## 2018-07-23 DIAGNOSIS — K21.9 GASTROESOPHAGEAL REFLUX DISEASE WITHOUT ESOPHAGITIS: ICD-10-CM

## 2018-07-23 DIAGNOSIS — E03.4 HYPOTHYROIDISM DUE TO ACQUIRED ATROPHY OF THYROID: ICD-10-CM

## 2018-07-23 DIAGNOSIS — I48.0 PAROXYSMAL ATRIAL FIBRILLATION (H): ICD-10-CM

## 2018-07-23 DIAGNOSIS — H91.93 DECREASED HEARING OF BOTH EARS: Primary | ICD-10-CM

## 2018-07-23 LAB
ALBUMIN SERPL-MCNC: 3.8 G/DL (ref 3.4–5)
ALP SERPL-CCNC: 110 U/L (ref 40–150)
ALT SERPL W P-5'-P-CCNC: 31 U/L (ref 0–50)
ANION GAP SERPL CALCULATED.3IONS-SCNC: 9 MMOL/L (ref 3–14)
AST SERPL W P-5'-P-CCNC: 21 U/L (ref 0–45)
BILIRUB SERPL-MCNC: 0.8 MG/DL (ref 0.2–1.3)
BUN SERPL-MCNC: 15 MG/DL (ref 7–30)
CALCIUM SERPL-MCNC: 8.6 MG/DL (ref 8.5–10.1)
CHLORIDE SERPL-SCNC: 111 MMOL/L (ref 94–109)
CHOLEST SERPL-MCNC: 130 MG/DL
CO2 SERPL-SCNC: 23 MMOL/L (ref 20–32)
CREAT SERPL-MCNC: 0.87 MG/DL (ref 0.52–1.04)
GFR SERPL CREATININE-BSD FRML MDRD: 64 ML/MIN/1.7M2
GLUCOSE SERPL-MCNC: 93 MG/DL (ref 70–99)
HDLC SERPL-MCNC: 49 MG/DL
LDLC SERPL CALC-MCNC: 53 MG/DL
NONHDLC SERPL-MCNC: 81 MG/DL
POTASSIUM SERPL-SCNC: 3.9 MMOL/L (ref 3.4–5.3)
PROT SERPL-MCNC: 7.5 G/DL (ref 6.8–8.8)
SODIUM SERPL-SCNC: 143 MMOL/L (ref 133–144)
TRIGL SERPL-MCNC: 142 MG/DL
TSH SERPL DL<=0.005 MIU/L-ACNC: 3.63 MU/L (ref 0.4–4)

## 2018-07-23 PROCEDURE — G0463 HOSPITAL OUTPT CLINIC VISIT: HCPCS

## 2018-07-23 PROCEDURE — 80061 LIPID PANEL: CPT | Mod: ZL | Performed by: NURSE PRACTITIONER

## 2018-07-23 PROCEDURE — 36415 COLL VENOUS BLD VENIPUNCTURE: CPT | Mod: ZL | Performed by: NURSE PRACTITIONER

## 2018-07-23 PROCEDURE — 80053 COMPREHEN METABOLIC PANEL: CPT | Mod: ZL | Performed by: NURSE PRACTITIONER

## 2018-07-23 PROCEDURE — 84443 ASSAY THYROID STIM HORMONE: CPT | Mod: ZL | Performed by: NURSE PRACTITIONER

## 2018-07-23 PROCEDURE — 99213 OFFICE O/P EST LOW 20 MIN: CPT | Performed by: NURSE PRACTITIONER

## 2018-07-23 ASSESSMENT — ANXIETY QUESTIONNAIRES
7. FEELING AFRAID AS IF SOMETHING AWFUL MIGHT HAPPEN: NOT AT ALL
3. WORRYING TOO MUCH ABOUT DIFFERENT THINGS: MORE THAN HALF THE DAYS
1. FEELING NERVOUS, ANXIOUS, OR ON EDGE: SEVERAL DAYS
GAD7 TOTAL SCORE: 5
4. TROUBLE RELAXING: NOT AT ALL
2. NOT BEING ABLE TO STOP OR CONTROL WORRYING: MORE THAN HALF THE DAYS
6. BECOMING EASILY ANNOYED OR IRRITABLE: NOT AT ALL
5. BEING SO RESTLESS THAT IT IS HARD TO SIT STILL: NOT AT ALL
IF YOU CHECKED OFF ANY PROBLEMS ON THIS QUESTIONNAIRE, HOW DIFFICULT HAVE THESE PROBLEMS MADE IT FOR YOU TO DO YOUR WORK, TAKE CARE OF THINGS AT HOME, OR GET ALONG WITH OTHER PEOPLE: NOT DIFFICULT AT ALL

## 2018-07-23 ASSESSMENT — PAIN SCALES - GENERAL: PAINLEVEL: NO PAIN (0)

## 2018-07-23 NOTE — PATIENT INSTRUCTIONS
ASSESSMENT/PLAN:       1. Hyperlipidemia LDL goal <100  chronic  - Lipid Profile    2. Hypothyroidism due to acquired atrophy of thyroid  chronic  - TSH with free T4 reflex    3. Benign essential hypertension  chronic  - Comprehensive metabolic panel    4. Paroxysmal atrial fibrillation (H)  Continue current plan    5. Gastroesophageal reflux disease without esophagitis  Continue current plan      FUTURE APPOINTMENTS:       - Follow-up visit in 6 months or as needed for acute concerns     Randi Haddad NP  [facility]

## 2018-07-23 NOTE — NURSING NOTE
"Chief Complaint   Patient presents with     Hypertension       Initial /70  Pulse 60  Temp 97.9  F (36.6  C)  Ht 5' 2\" (1.575 m)  Wt 172 lb 9.6 oz (78.3 kg)  SpO2 97%  BMI 31.57 kg/m2 Estimated body mass index is 31.57 kg/(m^2) as calculated from the following:    Height as of this encounter: 5' 2\" (1.575 m).    Weight as of this encounter: 172 lb 9.6 oz (78.3 kg).  Medication Reconciliation: complete    KEVEN Almazan    "
76

## 2018-07-23 NOTE — MR AVS SNAPSHOT
After Visit Summary   7/23/2018    Nella Lemon    MRN: [id number]           Patient Information     Date Of Birth          1945        Visit Information        Provider Department      7/23/2018 10:00 AM Randi Haddad NP [facility]        Today's Diagnoses     Hyperlipidemia LDL goal <100    -  1    Hypothyroidism due to acquired atrophy of thyroid        Benign essential hypertension        Paroxysmal atrial fibrillation (H)        Gastroesophageal reflux disease without esophagitis          Care Instructions        ASSESSMENT/PLAN:       1. Hyperlipidemia LDL goal <100  chronic  - Lipid Profile    2. Hypothyroidism due to acquired atrophy of thyroid  chronic  - TSH with free T4 reflex    3. Benign essential hypertension  chronic  - Comprehensive metabolic panel    4. Paroxysmal atrial fibrillation (H)  Continue current plan    5. Gastroesophageal reflux disease without esophagitis  Continue current plan      FUTURE APPOINTMENTS:       - Follow-up visit in 6 months or as needed for acute concerns     Randi Haddad NP  [facility]          Follow-ups after your visit        Follow-up notes from your care team     Return in about 6 months (around 1/23/2019).      Your next 10 appointments already scheduled     Aug 27, 2018  9:30 AM CDT   (Arrive by 9:15 AM)   Hearing Eval with Joss Palafox   [facility] [place] ([facility] [place] )    [address]  [place] [address]   [phone]            Oct 29, 2018 10:00 AM CDT   (Arrive by 9:45 AM)   Return Visit with Chris Walsh,    [facility] [place] ([facility] [place] )    [address]  [place] [address]   [phone]              Who to contact     If you have questions or need follow up information about today's clinic visit or your schedule please contact [facility] directly at [phone].  Normal or non-critical  "lab and imaging results will be communicated to you by MyChart, letter or phone within 4 business days after the clinic has received the results. If you do not hear from us within 7 days, please contact the clinic through MyChart or phone. If you have a critical or abnormal lab result, we will notify you by phone as soon as possible.  Submit refill requests through [employer] or call your pharmacy and they will forward the refill request to us. Please allow 3 business days for your refill to be completed.          Additional Information About Your Visit        Care EveryWhere ID     This is your Care EveryWhere ID. This could be used by other organizations to access your [facility] medical records  [id number]        Your Vitals Were     Pulse Temperature Height Pulse Oximetry BMI (Body Mass Index)       60 97.9  F (36.6  C) 5' 2\" (1.575 m) 97% 31.57 kg/m2        Blood Pressure from Last 3 Encounters:   07/23/18 120/70   07/06/18 138/84   04/25/18 133/46    Weight from Last 3 Encounters:   07/23/18 172 lb 9.6 oz (78.3 kg)   04/25/18 175 lb (79.4 kg)   03/28/18 175 lb (79.4 kg)              We Performed the Following     Comprehensive metabolic panel     Lipid Profile     TSH with free T4 reflex        Primary Care Provider Office Phone # Fax #    Randi MathiasBridgetteYaniCECILY [phone] Pending sale to [facility] [id number]        Equal Access to Services     KRISSY [facility] HospitalJACQUES : Hadii william gilo Valeriano, waaxda luqadaha, qaybta kaalmada anamaria, cristiane lackey adekatie raman . So [facility] [phone].    ATENCIÓN: Si habla español, tiene a mcclure disposición servicios gratuitos de asistencia lingüística. Kt al [phone].    We comply with applicable federal civil rights laws and [place] laws. We do not discriminate on the basis of race, color, national origin, age, disability, sex, sexual orientation, or gender identity.            Thank you!     Thank you for choosing " [facility]  for your care. Our goal is always to provide you with excellent care. Hearing back from our patients is one way we can continue to improve our services. Please take a few minutes to complete the written survey that you may receive in the mail after your visit with us. Thank you!             Your Updated Medication List - Protect others around you: Learn how to safely use, store and throw away your medicines at www.disposemymeds.org.          This list is accurate as of 7/23/18 10:10 AM.  Always use your most recent med list.                   Brand Name Dispense Instructions for use Diagnosis    apixaban ANTICOAGULANT 5 MG tablet    ELIQUIS    60 tablet    Take 1 tablet (5 mg) by mouth 2 times daily    Paroxysmal atrial fibrillation (H)       gabapentin 300 MG capsule    NEURONTIN    270 capsule    TAKE 3 CAPSULES THREE TIMES DAILY    Myalgia       GINKOBA PO      Take 40 mg by mouth daily        GLUCOSAMINE SULFATE PO      Take 1,000 mg by mouth 2 times daily        levothyroxine 25 MCG tablet    SYNTHROID/LEVOTHROID    90 tablet    TAKE 1 TABLET (25 MCG) BY MOUTH DAILY    Hypothyroidism due to acquired atrophy of thyroid       losartan 25 MG tablet    COZAAR    90 tablet    Take 1 tablet by mouth daily.    Benign essential hypertension       metoprolol succinate 25 MG 24 hr tablet    TOPROL-XL    30 tablet    Take 1 tablet (25 mg) by mouth daily    Paroxysmal atrial fibrillation (H)       OMEGA-3 FISH OIL PO      Take 3 g by mouth daily        omeprazole 40 MG capsule    priLOSEC    90 capsule    TAKE 1 CAPSULE (40 MG) BY MOUTH DAILY TAKE 30-60 MINUTES BEFORE A MEAL.    Gastroesophageal reflux disease, esophagitis presence not specified       rosuvastatin 10 MG tablet    CRESTOR    90 tablet    TAKE 1 TABLET (10 MG) BY MOUTH DAILY    Hyperlipidemia LDL goal <100       sertraline 50 MG tablet    ZOLOFT    30 tablet    TAKE 1 TABLET (50 MG) BY MOUTH DAILY    Anxiety       VITAMIN D  (CHOLECALCIFEROL) PO      Take by mouth daily

## 2018-07-24 ASSESSMENT — ANXIETY QUESTIONNAIRES: GAD7 TOTAL SCORE: 5

## 2018-07-24 ASSESSMENT — PATIENT HEALTH QUESTIONNAIRE - PHQ9: SUM OF ALL RESPONSES TO PHQ QUESTIONS 1-9: 5

## 2018-08-16 ENCOUNTER — HOSPITAL ENCOUNTER (OUTPATIENT)
Dept: ULTRASOUND IMAGING | Facility: HOSPITAL | Age: 73
Discharge: HOME | End: 2018-08-16
Attending: SURGERY | Admitting: SURGERY
Payer: COMMERCIAL

## 2018-08-16 DIAGNOSIS — N63.11 MASS OF UPPER OUTER QUADRANT OF RIGHT BREAST: ICD-10-CM

## 2018-08-16 PROCEDURE — 76642 ULTRASOUND BREAST LIMITED: CPT | Mod: TC,RT

## 2018-08-16 NOTE — LETTER
Nella Lemon  [address] [place] [address]    August 16, 2018  Date of Exam: 8/16/18    Dear Nella:    Thank you for your recent visit.  Breast Imaging Result: We are pleased to inform you that the results of your recent breast imaging show no evidence of malignancy (cancer).    Breast Density: Your mammogram shows that you have dense breast tissue. This means you have a slightly higher risk of getting breast cancer. It also means your mammograms will be harder to read but it doesn't mean that mammograms aren t useful. In fact, yearly mammograms are even more important for women at higher risk.    If you are experiencing any breast problems such as a lump or localized pain we request that you discuss this with your health care provider if you haven t already done so, as additional testing may be necessary.    As you know, early detection of cancer is very important. Although mammography is the most accurate method for early detection, not all cancers are found through mammography. A thorough examination includes a combination of mammography, physical examination and breast self-examination. Currently the American College of Radiology and the Society of Breast Imaging recommend an annual mammogram for all women beginning at the age of 40.    A report of your breast imaging results was sent to: Sandro Dow    Your breast imaging will become part of your medical file here at [place] for at least 10 years. You are responsible for informing any new health care provider or breast imaging facility of the date and location of this examination.    We appreciate the opportunity to participate in your health care.    Sincerely,    Roberto Kennedy MD  Interpreting Radiologist  [facility]

## 2018-08-27 ENCOUNTER — OFFICE VISIT (OUTPATIENT)
Dept: AUDIOLOGY | Facility: OTHER | Age: 73
End: 2018-08-27
Attending: AUDIOLOGIST
Payer: COMMERCIAL

## 2018-08-27 DIAGNOSIS — H90.3 SENSORINEURAL HEARING LOSS, BILATERAL: Primary | ICD-10-CM

## 2018-08-27 PROCEDURE — 92593 ZZHC HEARING AID CHECK, BINAURAL: CPT | Performed by: AUDIOLOGIST

## 2018-08-27 PROCEDURE — V5266 BATTERY FOR HEARING DEVICE: HCPCS | Performed by: AUDIOLOGIST

## 2018-08-27 PROCEDURE — 92567 TYMPANOMETRY: CPT | Performed by: AUDIOLOGIST

## 2018-08-27 PROCEDURE — 92557 COMPREHENSIVE HEARING TEST: CPT | Performed by: AUDIOLOGIST

## 2018-08-27 NOTE — MR AVS SNAPSHOT
After Visit Summary   8/27/2018    Nella Lemon    MRN: [id number]           Patient Information     Date Of Birth          1945        Visit Information        Provider Department      8/27/2018 9:15 AM Trini Dougherty [facility]        Today's Diagnoses     Sensorineural hearing loss, bilateral    -  1       Follow-ups after your visit        Your next 10 appointments already scheduled     Oct 29, 2018 10:00 AM CDT   (Arrive by 9:45 AM)   Return Visit with Chris Walsh,    [facility] ([facility] [place] )    [address]  [facility] [address]   [phone]            Jan 23, 2019  9:45 AM CST   (Arrive by 9:30 AM)   SHORT with Randi Haddad NP   [facility] ([facility] )    [address]  [facility] [address]   [phone]              Who to contact     If you have questions or need follow up information about today's clinic visit or your schedule please contact [facility] directly at [phone].  Normal or non-critical lab and imaging results will be communicated to you by MyChart, letter or phone within 4 business days after the clinic has received the results. If you do not hear from us within 7 days, please contact the clinic through MyChart or phone. If you have a critical or abnormal lab result, we will notify you by phone as soon as possible.  Submit refill requests through [employer] or call your pharmacy and they will forward the refill request to us. Please allow 3 business days for your refill to be completed.          Additional Information About Your Visit        Care EveryWhere ID     This is your Care EveryWhere ID. This could be used by other organizations to access your [place] medical records  [id number]         Blood Pressure from Last 3 Encounters:   07/23/18 120/70   07/06/18 138/84   04/25/18 133/46    Weight from Last 3  Encounters:   07/23/18 172 lb 9.6 oz (78.3 kg)   04/25/18 175 lb (79.4 kg)   03/28/18 175 lb (79.4 kg)              We Performed the Following     AUDIOGRAM/TYMPANOGRAM - INTERFACE        Primary Care Provider Office Phone # Fax #    Randi MathiasBridgetteYaniCECILY [phone] [facility] [address]        Equal Access to Services     LUCIA GREENFIELD : Hadii aad ku hadasho Soomaali, waaxda luqadaha, qaybta kaalmada adeegyada, waxay idiin hayaan adeeg kharash la'cristofern . So [facility] [phone].    ATENCIÓN: Si habla español, tiene a mcclure disposición servicios gratuitos de asistencia lingüística. [facility] [phone].    We comply with applicable federal civil rights laws and [place] laws. We do not discriminate on the basis of race, color, national origin, age, disability, sex, sexual orientation, or gender identity.            Thank you!     [facility]  [facility] your [facility]. Our goal is always to provide you with excellent care. Hearing back from our patients is one way we can continue to improve our services. Please take a few minutes to complete the written survey that you may receive in the mail after your visit with us. Thank you!             Your Updated Medication List - Protect others around you: Learn how to safely use, store and throw away your medicines at www.disposemymeds.org.          This list is accurate as of 8/27/18  9:48 AM.  Always use your most recent med list.                   Brand Name Dispense Instructions for use Diagnosis    apixaban ANTICOAGULANT 5 MG tablet    ELIQUIS    60 tablet    Take 1 tablet (5 mg) by mouth 2 times daily    Paroxysmal atrial fibrillation (H)       gabapentin 300 MG capsule    NEURONTIN    270 capsule    TAKE 3 CAPSULES THREE TIMES DAILY    Myalgia       GINKOBA PO      Take 40 mg by mouth daily        GLUCOSAMINE SULFATE PO      Take 1,000 mg by mouth 2 times daily        levothyroxine 25 MCG  tablet    SYNTHROID/LEVOTHROID    90 tablet    TAKE 1 TABLET (25 MCG) BY MOUTH DAILY    Hypothyroidism due to acquired atrophy of thyroid       losartan 25 MG tablet    COZAAR    90 tablet    Take 1 tablet by mouth daily.    Benign essential hypertension       metoprolol succinate 25 MG 24 hr tablet    TOPROL-XL    30 tablet    Take 1 tablet (25 mg) by mouth daily    Paroxysmal atrial fibrillation (H)       OMEGA-3 FISH OIL PO      Take 3 g by mouth daily        omeprazole 40 MG capsule    priLOSEC    90 capsule    TAKE 1 CAPSULE (40 MG) BY MOUTH DAILY TAKE 30-60 MINUTES BEFORE A MEAL.    Gastroesophageal reflux disease, esophagitis presence not specified       rosuvastatin 10 MG tablet    CRESTOR    90 tablet    TAKE 1 TABLET (10 MG) BY MOUTH DAILY    Hyperlipidemia LDL goal <100       sertraline 50 MG tablet    ZOLOFT    30 tablet    TAKE 1 TABLET (50 MG) BY MOUTH DAILY    Anxiety       VITAMIN D (CHOLECALCIFEROL) PO      Take by mouth daily

## 2018-08-27 NOTE — PROGRESS NOTES
Audiology Evaluation Completed.   Heairng aid check: Good visual inspection and listening check. She asked for batteries reporting she still has [facility] [employer] insurance. 32 cells provided.  She also reported she has gone to [place] to get them to as our records show she has not asked for them for a year.    Please refer SCANNED AUDIOGRAM and/or TYMPANOGRAM for BACKGROUND, RESULTS, RECOMMENDATIONS.  Trini Dougherty M.S., [facility]  Audiologist #[id number]

## 2018-09-04 DIAGNOSIS — M79.10 MYALGIA: ICD-10-CM

## 2018-09-04 NOTE — TELEPHONE ENCOUNTER
Gabapentin  Last Written Prescription Date: 5/7/18  Last Fill Quantity: 270 # of Refills: 3  Last Office Visit: 7/23/18

## 2018-09-06 RX ORDER — GABAPENTIN 300 MG/1
CAPSULE ORAL
Qty: 270 CAPSULE | Refills: 3 | Status: SHIPPED | OUTPATIENT
Start: 2018-09-06 | End: 2019-02-04

## 2018-10-31 ENCOUNTER — OFFICE VISIT (OUTPATIENT)
Dept: CARDIOLOGY | Facility: OTHER | Age: 73
End: 2018-10-31
Attending: INTERNAL MEDICINE
Payer: COMMERCIAL

## 2018-10-31 VITALS
OXYGEN SATURATION: 97 % | HEART RATE: 63 BPM | DIASTOLIC BLOOD PRESSURE: 60 MMHG | SYSTOLIC BLOOD PRESSURE: 130 MMHG | RESPIRATION RATE: 16 BRPM | HEIGHT: 62 IN | WEIGHT: 182 LBS | BODY MASS INDEX: 33.49 KG/M2

## 2018-10-31 DIAGNOSIS — I48.0 PAROXYSMAL ATRIAL FIBRILLATION (H): Primary | ICD-10-CM

## 2018-10-31 DIAGNOSIS — E03.4 HYPOTHYROIDISM DUE TO ACQUIRED ATROPHY OF THYROID: ICD-10-CM

## 2018-10-31 DIAGNOSIS — R00.2 PALPITATIONS: ICD-10-CM

## 2018-10-31 DIAGNOSIS — E78.5 HYPERLIPIDEMIA LDL GOAL <100: ICD-10-CM

## 2018-10-31 DIAGNOSIS — R07.89 ATYPICAL CHEST PAIN: ICD-10-CM

## 2018-10-31 DIAGNOSIS — I10 BENIGN ESSENTIAL HYPERTENSION: ICD-10-CM

## 2018-10-31 PROCEDURE — G0463 HOSPITAL OUTPT CLINIC VISIT: HCPCS

## 2018-10-31 PROCEDURE — 99214 OFFICE O/P EST MOD 30 MIN: CPT | Performed by: INTERNAL MEDICINE

## 2018-10-31 ASSESSMENT — PAIN SCALES - GENERAL: PAINLEVEL: NO PAIN (0)

## 2018-10-31 NOTE — PATIENT INSTRUCTIONS
You were seen by Dr. Walsh, 10/31/2018.     1.  Continue current medications as prescribed.       2. Follow-up in 6 months, certainly sooner if you have any issues or concerns.        You will follow up with Dr. Walsh on May 8, 2019, at 9:15am.       Please call the cardiology office with problems, questions, or concerns at [phone].    If you experience chest pain, chest pressure, chest tightness, shortness of breath, fainting, lightheadedness, nausea, vomiting, or other concerning symptoms, please report to the Emergency Department or call 911. These symptoms may be emergent, and best treated in the Emergency Department.     Petty Ridley RN  Cardiology   [facility]  [phone]

## 2018-10-31 NOTE — MR AVS SNAPSHOT
After Visit Summary   10/31/2018    Nella Lemon    MRN: [id number]           Patient Information     Date Of Birth          1945        Visit Information        Provider Department      10/31/2018 9:30 AM Chris Walsh,  [facility]        Today's Diagnoses     Paroxysmal atrial fibrillation (H)    -  1    Palpitations        Benign essential hypertension        Hyperlipidemia LDL goal <100        Hypothyroidism due to acquired atrophy of thyroid        Atypical chest pain          Care Instructions    You were seen by Dr. Walsh, 10/31/2018.     1.  Continue current medications as prescribed.       2. Follow-up in 6 months, certainly sooner if you have any issues or concerns.        You will follow up with Dr. Walsh on May 8, 2019, at 9:15am.       Please call the cardiology office with problems, questions, or concerns at [phone].    If you experience chest pain, chest pressure, chest tightness, shortness of breath, fainting, lightheadedness, nausea, vomiting, or other concerning symptoms, please report to the Emergency Department or call 911. These symptoms may be emergent, and best treated in the Emergency Department.     Petty Ridley RN  Cardiology   [facility]  [phone]              Follow-ups after your visit        Your next 10 appointments already scheduled     Jan 23, 2019  9:45 AM CST   (Arrive by 9:30 AM)   SHORT with Randi Haddad NP   [facility] ([facility] )    [address] [address]   [phone]            May 08, 2019  9:30 AM CDT   (Arrive by 9:15 AM)   Return Visit with Chris Walsh DO   [facility]    [address] [place] [address]   [phone]            Aug 28, 2019  8:30 AM CDT   (Arrive by 8:15 AM)   Return Visit with Joss Palafox   [facility]  "[facility] - [place] ([facility] )    [address] [place] [address]   [phone]              Who to contact     If you have questions or need follow up information about today's clinic visit or your schedule please contact [facility] directly at [phone].  Normal or non-critical lab and imaging results will be communicated to you by MyChart, letter or phone within 4 business days after the clinic has received the results. If you do not hear from us within 7 days, please contact the clinic through MyChart or phone. If you have a critical or abnormal lab result, we will notify you by phone as soon as possible.  Submit refill requests through [employer] or call your pharmacy and they will forward the refill request to us. Please allow 3 business days for your refill to be completed.          Additional Information About Your Visit        Care EveryWhere ID     This is your Care EveryWhere ID. This could be used by other organizations to access your [facility] medical records  [id number]        Your Vitals Were     Pulse Respirations Height Pulse Oximetry BMI (Body Mass Index)       63 16 1.575 m (5' 2\") 97% 33.29 kg/m2        Blood Pressure from Last 3 Encounters:   10/31/18 130/60   07/23/18 120/70   07/06/18 138/84    Weight from Last 3 Encounters:   10/31/18 82.6 kg (182 lb)   07/23/18 78.3 kg (172 lb 9.6 oz)   04/25/18 79.4 kg (175 lb)              Today, you had the following     No orders found for display       Primary Care Provider Office Phone # Fax #    Randi Haddad [phone] [address] [address]        Equal Access to Services     KRISSY GREENFIELD : Corey Bal, chantale andres, edwardo thompsonalcristiane snyder. So [facility] [phone].    ATENCIÓN: Si habla español, tiene a mcclure disposición servicios gratuitos de asistencia lingüística. Llame " al [phone].    We comply with applicable federal civil rights laws and [place] laws. We do not discriminate on the basis of race, color, national origin, age, disability, sex, sexual orientation, or gender identity.            Thank you!     Thank you for choosing [facility]  for your care. Our goal is always to provide you with excellent care. Hearing back from our patients is one way we can continue to improve our services. Please take a few minutes to complete the written survey that you may receive in the mail after your visit with us. Thank you!             Your Updated Medication List - Protect others around you: Learn how to safely use, store and throw away your medicines at www.disposemymeds.org.          This list is accurate as of 10/31/18 10:13 AM.  Always use your most recent med list.                   Brand Name Dispense Instructions for use Diagnosis    apixaban ANTICOAGULANT 5 MG tablet    ELIQUIS    60 tablet    Take 1 tablet (5 mg) by mouth 2 times daily    Paroxysmal atrial fibrillation (H)       gabapentin 300 MG capsule    NEURONTIN    270 capsule    TAKE 3 CAPSULES THREE TIMES DAILY    Myalgia       GINKOBA PO      Take 40 mg by mouth daily        GLUCOSAMINE SULFATE PO      Take 1,000 mg by mouth 2 times daily        levothyroxine 25 MCG tablet    SYNTHROID/LEVOTHROID    90 tablet    TAKE 1 TABLET (25 MCG) BY MOUTH DAILY    Hypothyroidism due to acquired atrophy of thyroid       losartan 25 MG tablet    COZAAR    90 tablet    Take 1 tablet by mouth daily.    Benign essential hypertension       metoprolol succinate 25 MG 24 hr tablet    TOPROL-XL    30 tablet    Take 1 tablet (25 mg) by mouth daily    Paroxysmal atrial fibrillation (H)       OMEGA-3 FISH OIL PO      Take 3 g by mouth daily        omeprazole 40 MG capsule    priLOSEC    90 capsule    TAKE 1 CAPSULE (40 MG) BY MOUTH DAILY TAKE 30-60 MINUTES BEFORE A MEAL.    Gastroesophageal reflux disease,  esophagitis presence not specified       rosuvastatin 10 MG tablet    CRESTOR    90 tablet    TAKE 1 TABLET (10 MG) BY MOUTH DAILY    Hyperlipidemia LDL goal <100       sertraline 50 MG tablet    ZOLOFT    30 tablet    TAKE 1 TABLET (50 MG) BY MOUTH DAILY    Anxiety       VITAMIN D (CHOLECALCIFEROL) PO      Take by mouth daily

## 2018-10-31 NOTE — NURSING NOTE
"Chief Complaint   Patient presents with     RECHECK     6 month f/u.       Initial /60 (BP Location: Right arm, Patient Position: Chair, Cuff Size: Adult Regular)  Pulse 63  Resp 16  Ht 1.575 m (5' 2\")  Wt 82.6 kg (182 lb)  SpO2 97%  BMI 33.29 kg/m2 Estimated body mass index is 33.29 kg/(m^2) as calculated from the following:    Height as of this encounter: 1.575 m (5' 2\").    Weight as of this encounter: 82.6 kg (182 lb).  Medication Reconciliation: complete    GABRIELLE ZAMORA LPN    "

## 2018-10-31 NOTE — PROGRESS NOTES
"    Cardiology Progress Note     Assessment & Plan   Nella Lemon is a 72 year old female who is being seen in follow-up to visit from 4/25/18. She had been seen secondary to paroxysmal atrial fibrillation, hypertension, and atypical chest pain. She continues on Eliquis and metoprolol 25 mg daily. She was taken off aspirin 325 as well as Coumadin. She remains on losartan 25 mg daily and metoprolol 25 mg daily for hypertension. She is here for a six-month follow-up. Since initiating Eliquis and metoprolol, her symptoms have completely resolved. She has not had any palpitations as her last visit in 4/25/18.  She has no additional concerns or complaints.    Impression:  1.  Paroxysmal atrial fibrillation.  2.  Hypertension.  3.  Hyperlipidemia.  4.  Palpitations.  5.  Atypical chest pain.     Plan:   1.  She will continue Eliquis 5 mgs twice a day and metoprolol 25 mg daily for atrial fibrillation.  2.  She will continue losartan 25 mg daily and metoprolol 25 mg daily for her blood pressure.  3.  We did revisit her Zio patch results from 2/1/18 that shows 100% atrial fibrillation and her stress echo from 4/23/18.  4.  She is to be seen in approximately 6 months follow-up.      Chris Walsh    Interval History   Nella is a 72-year-old female who is being seen in follow-up to visit from 4/25/18. She has been followed by cardiology secondary to atrial fibrillation and hypertension. She was having some non-specific complaints which involved a \"throbbing\" to her chest.  She said it was not a tightness, pressure, or bandlike symptoms.  This \"throbbing\" surrounding her palpitations and has completely resolved.     She previously had a Zio patch on 2/1/18 that showed an atrial fib burden of 31% with the longest duration being 1 day and 20 hours.  Her events corresponded to atrial fibrillation.     She denied specific complaints of chest discomfort. Her symptoms were somewhat hard to decipher.  She described her " "symptoms as a \"throbbing\". She also had some discomfort to her neck.  She was having an unusual sound in her ear.  Her chest symptoms were accelerated when sitting on the couch and particularly leaning against the seem of the cushion.  Her symptoms were better when she was active.  She was given a CHADSVASC score of 3.     She continues on Eliquis and metoprolol 25 mg daily.  Her symptoms have completely resolved.  She is feeling much better.  She no longer has the symptoms while sitting on her couch.  Her chest discomfort is gone.     With the increase of her losartan and the initiation of metoprolol, her blood pressures have been better controlled.  Previously, her blood pressures were as high as 180's now at.  She has no additional complaints.    Physical Exam       BP: 130/60 Pulse: 63   Resp: 16 SpO2: 97 %      Vitals:    10/31/18 0920   Weight: 82.6 kg (182 lb)     Vital Signs with Ranges  Pulse:  [63] 63  Resp:  [16] 16  BP: (130)/(60) 130/60  SpO2:  [97 %] 97 %  ROS is negative except that which was noted in the HPI.         Constitutional: awake, alert, cooperative, no apparent distress, and appears stated age  Eyes: Lids and lashes normal,sclera clear, conjunctiva normal  ENT: Normocephalic, without obvious abnormality, atraumatic.  Respiratory: No increased work of breathing, good air exchange, clear to auscultation bilaterally, no crackles or wheezing  Cardiovascular: Normal apical impulse, regular rate and rhythm, normal S1 and S2, no S3 or S4, and no murmur noted  GI: No scars, normal bowel sounds, soft.  Musculoskeletal: no lower extremity pitting edema present  Neurologic: Awake, alert, oriented to name, place and time.   Neuropsychiatric: General: normal, calm and normal eye contact    Medications         Data   No results found for this or any previous visit (from the past 24 hour(s)).  No results found for this or any previous visit (from the past 24 hour(s)).    "

## 2018-11-23 DIAGNOSIS — I10 BENIGN ESSENTIAL HYPERTENSION: ICD-10-CM

## 2018-11-26 RX ORDER — LOSARTAN POTASSIUM 25 MG/1
TABLET ORAL
Qty: 90 TABLET | Refills: 3 | OUTPATIENT
Start: 2018-11-26

## 2018-11-26 NOTE — TELEPHONE ENCOUNTER
Refill request for: LOSARTAN 25 MG TA 25 TAB   From: TESSA Juarez   Rx written date: 03/28/2018 #90 with 3 refills  LOV: 10/31/2018 with [facility]  Next appt: 05/0/2019 with [facility]  Protocol: Angiotensin-II Receptors Passed     Call to pharmacy who confirm pt has available refills and this requested can be disregarded. Refill refused per pharmacy. Jaycee Faust RN on 11/26/2018 at 10:41 AM

## 2019-01-04 DIAGNOSIS — K21.9 GASTROESOPHAGEAL REFLUX DISEASE, ESOPHAGITIS PRESENCE NOT SPECIFIED: ICD-10-CM

## 2019-01-04 NOTE — TELEPHONE ENCOUNTER
OMEPRAZOLE DR 40 MG CAPSULE 40 CAP      Last Written Prescription Date:  4/9/18  Last Fill Quantity: 90,   # refills: 2  Last Office Visit: 7/23/18  Future Office visit:

## 2019-01-07 RX ORDER — OMEPRAZOLE 40 MG/1
CAPSULE, DELAYED RELEASE ORAL
Qty: 90 CAPSULE | Refills: 1 | Status: SHIPPED | OUTPATIENT
Start: 2019-01-07 | End: 2019-07-05

## 2019-01-21 NOTE — PROGRESS NOTES
SUBJECTIVE:   Nella Lemon is a 73 year old female who presents to clinic today for the following health issues:      Hypertension Follow-up      Outpatient blood pressures are being checked at home.  Results are 150-155\70's.    Low Salt Diet: low salt  BP Readings from Last 3 Encounters:   01/23/19 180/80   10/31/18 130/60   07/23/18 120/70             Amount of exercise or physical activity: 6-7 days/week for an average of 15-30 minutes    Problems taking medications regularly: No    Medication side effects: not sure having issues with bowels     Diet: regular (no restrictions)        Depression and Anxiety Follow-Up    Status since last visit: No change    Other associated symptoms:None    Complicating factors:     Significant life event: No     Current substance abuse: None    PHQ 2/12/2018 7/23/2018 1/23/2019   PHQ-9 Total Score 1 5 3   Q9: Suicide Ideation Not at all Not at all Not at all     LA NENA-7 SCORE 2/12/2018 7/23/2018 1/23/2019   Total Score 2 5 0     PHQ-9  English  PHQ-9   Any Language  LA NENA-7  Suicide Assessment Five-step Evaluation and Treatment (SAFE-T)      Hyperlipidemia Follow-Up      Rate your low fat/cholesterol diet?: good    Taking statin?  Yes, no muscle aches from statin    Other lipid medications/supplements?:  None  The 10-year ASCVD risk score (Danierik FLANAGAN Jr., et al., 2013) is: 26.6%    Values used to calculate the score:      Age: 73 years      Sex: Female      Is Non- : No      Diabetic: No      Tobacco smoker: No      Systolic Blood Pressure: 180 mmHg      Is BP treated: Yes      HDL Cholesterol: 49 mg/dL        Total Cholesterol: 130 mg/dL        Hypothyroidism Follow-up      Since last visit, patient describes the following symptoms: Weight stable, no hair loss, no skin changes, no constipation, no loose stools         Problem list and histories reviewed & adjusted, as indicated.  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, left     Family history of colon cancer     ACP (advance care planning)     Hyperlipidemia LDL goal <100     Hypothyroidism due to acquired atrophy of thyroid     Paroxysmal atrial fibrillation (H)     Atypical chest pain     Past Surgical History:   Procedure Laterality Date     ADENOIDECTOMY       CHOLECYSTECTOMY  1981     COLONOSCOPY  2002     COLONOSCOPY  2012     COLONOSCOPY N/A 9/22/2014    Procedure: COLONOSCOPY;  Surgeon: Lavell Pavon DO;  Location: [facility]     CYSTOSCOPY  2007    Cystitis chronic     ORTHOPEDIC SURGERY  2002    carpal tunnel; RT, LT     TONSILLECTOMY         Social History     Tobacco Use     Smoking status: Never Smoker     Smokeless tobacco: Never Used   Substance Use Topics     Alcohol use: Yes     Comment: once a year     Family History   Problem Relation Age of Onset     Breast Cancer Mother      Alcohol/Drug Mother         Cirrhosis     Other - See Comments Mother         goiter     Cerebrovascular Disease Father      Circulatory Father      Alcohol/Drug Son      Cancer - colorectal Brother      Unknown/Adopted No family hx of      Asthma No family hx of      C.A.D. No family hx of      Diabetes No family hx of      Hypertension No family hx of      Prostate Cancer No family hx of      Allergies No family hx of      Alzheimer Disease No family hx of      Anesthesia Reaction No family hx of      Arthritis No family hx of      Blood Disease No family hx of      Cardiovascular No family hx of      Congenital Anomalies No family hx of      Connective Tissue Disorder No family hx of      Depression  No family hx of      Endocrine Disease No family hx of      Eye Disorder No family hx of      Genetic Disorder No family hx of      Gastrointestinal Disease No family hx of      Genitourinary Problems No family hx of      Gynecology No family hx of      Heart Disease No family hx of      Lipids No family hx of      Musculoskeletal Disorder No family hx of      Neurologic Disorder No family hx of      Obesity No family hx of      Osteoporosis No family hx of      Psychotic Disorder No family hx of      Respiratory No family hx of      Thyroid Disease No family hx of      Family History Negative No family hx of      Hearing Loss No family hx of          Current Outpatient Medications   Medication Sig Dispense Refill     apixaban ANTICOAGULANT (ELIQUIS) 5 MG tablet Take 1 tablet (5 mg) by mouth 2 times daily 60 tablet 11     gabapentin (NEURONTIN) 300 MG capsule TAKE 3 CAPSULES THREE TIMES DAILY 270 capsule 3     Ginkgo Biloba (GINKOBA PO) Take 40 mg by mouth daily       GLUCOSAMINE SULFATE PO Take 1,000 mg by mouth 2 times daily       levothyroxine (SYNTHROID/LEVOTHROID) 25 MCG tablet TAKE 1 TABLET DAILY BEST ON EMPTY STOMACH FOR THYROID 90 tablet 2     losartan (COZAAR) 25 MG tablet Take 1 tablet by mouth daily. 90 tablet 3     metoprolol succinate (TOPROL-XL) 25 MG 24 hr tablet Take 1 tablet (25 mg) by mouth daily 30 tablet 11     Omega-3 Fatty Acids (OMEGA-3 FISH OIL PO) Take 3 g by mouth daily        omeprazole (PRILOSEC) 40 MG DR capsule TAKE 1 CAPSULE (40 MG) BY MOUTH DAILY TAKE 30-60 MINUTES BEFORE A MEAL. 90 capsule 1     rosuvastatin (CRESTOR) 10 MG tablet TAKE 1 TABLET (10 MG) BY MOUTH DAILY 90 tablet 2     sertraline (ZOLOFT) 50 MG tablet TAKE 1 TABLET DAILY 30 tablet 8     VITAMIN D, CHOLECALCIFEROL, PO Take by mouth daily       Allergies   Allergen Reactions     Atorvastatin      Ezetimibe      Gentamicin      Hydroxyzine      Lorazepam      Ranitidine      Simvastatin      Lopid [Gemfibrozil] GI  "Disturbance     Bloating, constipation,      Pravastatin Muscle Pain (Myalgia)     Recent Labs   Lab Test 07/23/18  1013 05/30/18  0925 02/12/18  0955 01/11/18  1019   LDL 53 52 71  --    HDL 49* 42* 49*  --    TRIG 142 127 138  --    ALT 31  --  30 37   CR 0.87  --  0.87 0.81   GFRESTIMATED 64  --  64 69   GFRESTBLACK 77  --  77 84   POTASSIUM 3.9  --  4.0 3.9   TSH 3.63 3.65 4.54* 4.02*      BP Readings from Last 3 Encounters:   01/23/19 180/80   10/31/18 130/60   07/23/18 120/70    Wt Readings from Last 3 Encounters:   01/23/19 85.5 kg (188 lb 6.4 oz)   10/31/18 82.6 kg (182 lb)   07/23/18 78.3 kg (172 lb 9.6 oz)                    Reviewed and updated as needed this visit by clinical staff       Reviewed and updated as needed this visit by Provider         ROS:  Constitutional, HEENT, cardiovascular, pulmonary, gi and gu systems are negative, except as otherwise noted.    OBJECTIVE:     /80 (BP Location: Right arm, Patient Position: Chair, Cuff Size: Adult Large)   Pulse 58   Resp 16   Ht 1.575 m (5' 2\")   Wt 85.5 kg (188 lb 6.4 oz)   SpO2 96%   BMI 34.46 kg/m    Body mass index is 34.46 kg/m .  GENERAL: healthy, alert and no distress  NECK: no adenopathy, no asymmetry, masses, or scars, thyroid normal to palpation and no carotid bruits  RESP: lungs clear to auscultation - no rales, rhonchi or wheezes  CV: regular rate and rhythm, normal S1 S2, no S3 or S4, no murmur, click or rub, no peripheral edema and peripheral pulses strong  ABDOMEN: soft, nontender, no hepatosplenomegaly, no masses and bowel sounds normal  MS: no gross musculoskeletal defects noted, no edema  PSYCH: mentation appears normal, affect normal/bright        ASSESSMENT/PLAN:       1. Benign essential hypertension  Increase losartan, continue metoprolol 25mg daily  - losartan (COZAAR) 50 MG tablet; Take 1 tablet by mouth daily.  Dispense: 90 tablet; Refill: 1  - Comprehensive metabolic panel  - CBC with platelets    2. Hyperlipidemia " LDL goal <100  chronic  - Lipid Profile    3. Hypothyroidism due to acquired atrophy of thyroid  chronic  - TSH with free T4 reflex    4. Paroxysmal atrial fibrillation (H)  Continue eliquis    5. On statin therapy  - Comprehensive metabolic panel    6. Urinary urgency  If no infection will consider medicaiton  - *UA reflex to Microscopic and Culture - [facility]    7. Breast cancer screening  - [place] Screen Bilateral w/[place]; Future    Of note, at repeat BP check she requested varicella titer as she does not know if she has had chicken pox - considering vaccine.      FUTURE APPOINTMENTS:       - Follow-up visit in 1 month or as needed for acute concerns    Randi Haddad NP  [facility] [place] [facility]

## 2019-01-23 ENCOUNTER — OFFICE VISIT (OUTPATIENT)
Dept: FAMILY MEDICINE | Facility: OTHER | Age: 74
End: 2019-01-23
Attending: NURSE PRACTITIONER
Payer: COMMERCIAL

## 2019-01-23 VITALS
HEART RATE: 58 BPM | HEIGHT: 62 IN | RESPIRATION RATE: 16 BRPM | BODY MASS INDEX: 34.67 KG/M2 | SYSTOLIC BLOOD PRESSURE: 180 MMHG | WEIGHT: 188.4 LBS | DIASTOLIC BLOOD PRESSURE: 80 MMHG | OXYGEN SATURATION: 96 %

## 2019-01-23 DIAGNOSIS — Z12.39 BREAST CANCER SCREENING: ICD-10-CM

## 2019-01-23 DIAGNOSIS — Z01.84 IMMUNITY STATUS TESTING: ICD-10-CM

## 2019-01-23 DIAGNOSIS — E78.5 HYPERLIPIDEMIA LDL GOAL <100: Primary | ICD-10-CM

## 2019-01-23 DIAGNOSIS — I10 BENIGN ESSENTIAL HYPERTENSION: ICD-10-CM

## 2019-01-23 DIAGNOSIS — D72.820 LYMPHOCYTOSIS: ICD-10-CM

## 2019-01-23 DIAGNOSIS — Z79.899 ON STATIN THERAPY: ICD-10-CM

## 2019-01-23 DIAGNOSIS — I48.0 PAROXYSMAL ATRIAL FIBRILLATION (H): ICD-10-CM

## 2019-01-23 DIAGNOSIS — E03.4 HYPOTHYROIDISM DUE TO ACQUIRED ATROPHY OF THYROID: ICD-10-CM

## 2019-01-23 DIAGNOSIS — R39.15 URINARY URGENCY: ICD-10-CM

## 2019-01-23 LAB
ALBUMIN SERPL-MCNC: 3.4 G/DL (ref 3.4–5)
ALBUMIN UR-MCNC: NEGATIVE MG/DL
ALP SERPL-CCNC: 121 U/L (ref 40–150)
ALT SERPL W P-5'-P-CCNC: 26 U/L (ref 0–50)
ANION GAP SERPL CALCULATED.3IONS-SCNC: 8 MMOL/L (ref 3–14)
APPEARANCE UR: CLEAR
AST SERPL W P-5'-P-CCNC: 13 U/L (ref 0–45)
BACTERIA #/AREA URNS HPF: ABNORMAL /HPF
BILIRUB SERPL-MCNC: 0.6 MG/DL (ref 0.2–1.3)
BILIRUB UR QL STRIP: NEGATIVE
BUN SERPL-MCNC: 11 MG/DL (ref 7–30)
CALCIUM SERPL-MCNC: 8.4 MG/DL (ref 8.5–10.1)
CHLORIDE SERPL-SCNC: 108 MMOL/L (ref 94–109)
CHOLEST SERPL-MCNC: 125 MG/DL
CO2 SERPL-SCNC: 26 MMOL/L (ref 20–32)
COLOR UR AUTO: YELLOW
CREAT SERPL-MCNC: 0.87 MG/DL (ref 0.52–1.04)
ERYTHROCYTE [DISTWIDTH] IN BLOOD BY AUTOMATED COUNT: 13 % (ref 10–15)
GFR SERPL CREATININE-BSD FRML MDRD: 66 ML/MIN/{1.73_M2}
GLUCOSE SERPL-MCNC: 102 MG/DL (ref 70–99)
GLUCOSE UR STRIP-MCNC: NEGATIVE MG/DL
HCT VFR BLD AUTO: 42.2 % (ref 35–47)
HDLC SERPL-MCNC: 47 MG/DL
HGB BLD-MCNC: 13.8 G/DL (ref 11.7–15.7)
HGB UR QL STRIP: NEGATIVE
KETONES UR STRIP-MCNC: NEGATIVE MG/DL
LDLC SERPL CALC-MCNC: 56 MG/DL
LEUKOCYTE ESTERASE UR QL STRIP: ABNORMAL
MCH RBC QN AUTO: 27.9 PG (ref 26.5–33)
MCHC RBC AUTO-ENTMCNC: 32.7 G/DL (ref 31.5–36.5)
MCV RBC AUTO: 85 FL (ref 78–100)
MUCOUS THREADS #/AREA URNS LPF: PRESENT /LPF
NITRATE UR QL: NEGATIVE
NON-SQ EPI CELLS #/AREA URNS LPF: ABNORMAL /LPF
NONHDLC SERPL-MCNC: 78 MG/DL
PH UR STRIP: 6 PH (ref 5–7)
PLATELET # BLD AUTO: 326 10E9/L (ref 150–450)
POTASSIUM SERPL-SCNC: 4.1 MMOL/L (ref 3.4–5.3)
PROT SERPL-MCNC: 6.9 G/DL (ref 6.8–8.8)
RBC # BLD AUTO: 4.94 10E12/L (ref 3.8–5.2)
RBC #/AREA URNS AUTO: ABNORMAL /HPF
SODIUM SERPL-SCNC: 142 MMOL/L (ref 133–144)
SOURCE: ABNORMAL
SP GR UR STRIP: 1.02 (ref 1–1.03)
T4 FREE SERPL-MCNC: 1.02 NG/DL (ref 0.76–1.46)
TRIGL SERPL-MCNC: 112 MG/DL
TSH SERPL DL<=0.005 MIU/L-ACNC: 4.41 MU/L (ref 0.4–4)
UROBILINOGEN UR STRIP-ACNC: 0.2 EU/DL (ref 0.2–1)
WBC # BLD AUTO: 13.4 10E9/L (ref 4–11)
WBC #/AREA URNS AUTO: ABNORMAL /HPF

## 2019-01-23 PROCEDURE — 80053 COMPREHEN METABOLIC PANEL: CPT | Mod: ZL | Performed by: NURSE PRACTITIONER

## 2019-01-23 PROCEDURE — 81001 URINALYSIS AUTO W/SCOPE: CPT | Mod: ZL | Performed by: NURSE PRACTITIONER

## 2019-01-23 PROCEDURE — 85027 COMPLETE CBC AUTOMATED: CPT | Mod: ZL | Performed by: NURSE PRACTITIONER

## 2019-01-23 PROCEDURE — 36415 COLL VENOUS BLD VENIPUNCTURE: CPT | Mod: ZL | Performed by: NURSE PRACTITIONER

## 2019-01-23 PROCEDURE — 84443 ASSAY THYROID STIM HORMONE: CPT | Mod: ZL | Performed by: NURSE PRACTITIONER

## 2019-01-23 PROCEDURE — 99213 OFFICE O/P EST LOW 20 MIN: CPT | Performed by: NURSE PRACTITIONER

## 2019-01-23 PROCEDURE — 80061 LIPID PANEL: CPT | Mod: ZL | Performed by: NURSE PRACTITIONER

## 2019-01-23 PROCEDURE — 84439 ASSAY OF FREE THYROXINE: CPT | Mod: ZL | Performed by: NURSE PRACTITIONER

## 2019-01-23 PROCEDURE — 99000 SPECIMEN HANDLING OFFICE-LAB: CPT | Performed by: NURSE PRACTITIONER

## 2019-01-23 PROCEDURE — 86787 VARICELLA-ZOSTER ANTIBODY: CPT | Mod: ZL | Performed by: NURSE PRACTITIONER

## 2019-01-23 PROCEDURE — G0463 HOSPITAL OUTPT CLINIC VISIT: HCPCS

## 2019-01-23 RX ORDER — LOSARTAN POTASSIUM 50 MG/1
TABLET ORAL
Qty: 90 TABLET | Refills: 1 | Status: SHIPPED | OUTPATIENT
Start: 2019-01-23 | End: 2019-02-25

## 2019-01-23 ASSESSMENT — ANXIETY QUESTIONNAIRES
3. WORRYING TOO MUCH ABOUT DIFFERENT THINGS: NOT AT ALL
GAD7 TOTAL SCORE: 0
IF YOU CHECKED OFF ANY PROBLEMS ON THIS QUESTIONNAIRE, HOW DIFFICULT HAVE THESE PROBLEMS MADE IT FOR YOU TO DO YOUR WORK, TAKE CARE OF THINGS AT HOME, OR GET ALONG WITH OTHER PEOPLE: NOT DIFFICULT AT ALL
6. BECOMING EASILY ANNOYED OR IRRITABLE: NOT AT ALL
7. FEELING AFRAID AS IF SOMETHING AWFUL MIGHT HAPPEN: NOT AT ALL
4. TROUBLE RELAXING: NOT AT ALL
5. BEING SO RESTLESS THAT IT IS HARD TO SIT STILL: NOT AT ALL
2. NOT BEING ABLE TO STOP OR CONTROL WORRYING: NOT AT ALL
1. FEELING NERVOUS, ANXIOUS, OR ON EDGE: NOT AT ALL

## 2019-01-23 ASSESSMENT — PAIN SCALES - GENERAL: PAINLEVEL: NO PAIN (0)

## 2019-01-23 ASSESSMENT — MIFFLIN-ST. JEOR: SCORE: 1317.83

## 2019-01-23 ASSESSMENT — PATIENT HEALTH QUESTIONNAIRE - PHQ9: SUM OF ALL RESPONSES TO PHQ QUESTIONS 1-9: 3

## 2019-01-23 NOTE — PATIENT INSTRUCTIONS
ASSESSMENT/PLAN:       1. Benign essential hypertension  Increase losartan, continue metoprolol 25mg daily  - losartan (COZAAR) 50 MG tablet; Take 1 tablet by mouth daily.  Dispense: 90 tablet; Refill: 1  - Comprehensive metabolic panel  - CBC with platelets    2. Hyperlipidemia LDL goal <100  chronic  - Lipid Profile    3. Hypothyroidism due to acquired atrophy of thyroid  chronic  - TSH with free T4 reflex    4. Paroxysmal atrial fibrillation (H)  Continue eliquis    5. On statin therapy  - Comprehensive metabolic panel    6. Urinary urgency  If no infection iwll consider medicaiton  - *UA reflex to Microscopic and Culture - [facility]/[place]    7. Breast cancer screening  - [place] Screen Bilateral w/[place]; Future    FUTURE APPOINTMENTS:       - Follow-up visit in 1 month or as needed for acute concerns    Randi Haddad NP  [facility] [place] [facility]

## 2019-01-23 NOTE — LETTER
My Depression Action Plan  Name: Nella Lemon   Date of Birth 1945  Date: 1/21/2019    My doctor: Randi Haddad   My clinic: [facility]  [address]  [place] [address]  [phone]          GREEN    ZONE   Good Control    What it looks like:     Things are going generally well. You have normal up s and down s. You may even feel depressed from time to time, but bad moods usually last less than a day.   What you need to do:  1. Continue to care for yourself (see self care plan)  2. Check your depression survival kit and update it as needed  3. Follow your physician s recommendations including any medication.  4. Do not stop taking medication unless you consult with your physician first.           YELLOW         ZONE Getting Worse    What it looks like:     Depression is starting to interfere with your life.     It may be hard to get out of bed; you may be starting to isolate yourself from others.    Symptoms of depression are starting to last most all day and this has happened for several days.     You may have suicidal thoughts but they are not constant.   What you need to do:     1. Call your care team, your response to treatment will improve if you keep your care team informed of your progress. Yellow periods are signs an adjustment may need to be made.     2. Continue your self-care, even if you have to fake it!    3. Talk to someone in your support network    4. Open up your depression survival kit           RED    ZONE Medical Alert - Get Help    What it looks like:     Depression is seriously interfering with your life.     You may experience these or other symptoms: You can t get out of bed most days, can t work or engage in other necessary activities, you have trouble taking care of basic hygiene, or basic responsibilities, thoughts of suicide or death that will not go away, self-injurious behavior.     What you need to  do:  1. Call your care team and request a same-day appointment. If they are not available (weekends or after hours) call your local crisis line, emergency room or 911.            Depression Self Care Plan / Survival Kit    Self-Care for Depression  Here s the deal. Your body and mind are really not as separate as most people think.  What you do and think affects how you feel and how you feel influences what you do and think. This means if you do things that people who feel good do, it will help you feel better.  Sometimes this is all it takes.  There is also a place for medication and therapy depending on how severe your depression is, so be sure to consult with your medical provider and/ or Behavioral Health Consultant if your symptoms are worsening or not improving.     In order to better manage my stress, I will:    Exercise  Get some form of exercise, every day. This will help reduce pain and release endorphins, the  feel good  chemicals in your brain. This is almost as good as taking antidepressants!  This is not the same as joining a gym and then never going! (they count on that by the way ) It can be as simple as just going for a walk or doing some gardening, anything that will get you moving.      Hygiene   Maintain good hygiene (Get out of bed in the morning, Make your bed, Brush your teeth, Take a shower, and Get dressed like you were going to work, even if you are unemployed).  If your clothes don't fit try to get ones that do.    Diet  I will strive to eat foods that are good for me, drink plenty of water, and avoid excessive sugar, caffeine, alcohol, and other mood-altering substances.  Some foods that are helpful in depression are: complex carbohydrates, B vitamins, flaxseed, fish or fish oil, fresh fruits and vegetables.    Psychotherapy  I agree to participate in Individual Therapy (if recommended).    Medication  If prescribed medications, I agree to take them.  Missing doses can result in serious  side effects.  I understand that drinking alcohol, or other illicit drug use, may cause potential side effects.  I will not stop my medication abruptly without first discussing it with my provider.    Staying Connected With Others  I will stay in touch with my friends, family members, and my primary care provider/team.    Use your imagination  Be creative.  We all have a creative side; it doesn t matter if it s oil painting, sand castles, or mud pies! This will also kick up the endorphins.    Witness Beauty  (AKA stop and smell the roses) Take a look outside, even in mid-winter. Notice colors, textures. Watch the squirrels and birds.     Service to others  Be of service to others.  There is always someone else in need.  By helping others we can  get out of ourselves  and remember the really important things.  This also provides opportunities for practicing all the other parts of the program.    Humor  Laugh and be silly!  Adjust your TV habits for less news and crime-drama and more comedy.    Control your stress  Try breathing deep, massage therapy, biofeedback, and meditation. Find time to relax each day.     My support system    Clinic Contact:  Phone number:    Contact 1:  Phone number:    Contact 2:  Phone number:    Congregational/:  Phone number:    Therapist:  Phone number:    Local crisis center:    Phone number:    Other community support:  Phone number:

## 2019-01-23 NOTE — Clinical Note
Went to close but could not - notification of incomplete note by you???  I could not find it.  If you do, please close...thanks!

## 2019-01-23 NOTE — NURSING NOTE
"Chief Complaint   Patient presents with     Depression     Anxiety     Hypertension       Initial /80 (BP Location: Right arm, Patient Position: Chair, Cuff Size: Adult Large)   Pulse 58   Resp 16   Ht 1.575 m (5' 2\")   Wt 85.5 kg (188 lb 6.4 oz)   SpO2 96%   BMI 34.46 kg/m   Estimated body mass index is 34.46 kg/m  as calculated from the following:    Height as of this encounter: 1.575 m (5' 2\").    Weight as of this encounter: 85.5 kg (188 lb 6.4 oz).  Medication Reconciliation: complete    Pamela M. Lechevalier, LPN      "

## 2019-01-24 LAB — VZV IGG SER QL IA: 3.6 AI (ref 0–0.8)

## 2019-01-24 ASSESSMENT — ANXIETY QUESTIONNAIRES: GAD7 TOTAL SCORE: 0

## 2019-01-28 DIAGNOSIS — D72.820 LYMPHOCYTOSIS: ICD-10-CM

## 2019-01-28 LAB
BASOPHILS # BLD AUTO: 0 10E9/L (ref 0–0.2)
BASOPHILS NFR BLD AUTO: 0.3 %
DIFFERENTIAL METHOD BLD: ABNORMAL
EOSINOPHIL # BLD AUTO: 0.2 10E9/L (ref 0–0.7)
EOSINOPHIL NFR BLD AUTO: 1.2 %
ERYTHROCYTE [DISTWIDTH] IN BLOOD BY AUTOMATED COUNT: 13.2 % (ref 10–15)
HCT VFR BLD AUTO: 44.8 % (ref 35–47)
HGB BLD-MCNC: 14.5 G/DL (ref 11.7–15.7)
LYMPHOCYTES # BLD AUTO: 2.6 10E9/L (ref 0.8–5.3)
LYMPHOCYTES NFR BLD AUTO: 20.2 %
MCH RBC QN AUTO: 27.8 PG (ref 26.5–33)
MCHC RBC AUTO-ENTMCNC: 32.4 G/DL (ref 31.5–36.5)
MCV RBC AUTO: 86 FL (ref 78–100)
MONOCYTES # BLD AUTO: 0.6 10E9/L (ref 0–1.3)
MONOCYTES NFR BLD AUTO: 4.6 %
NEUTROPHILS # BLD AUTO: 9.4 10E9/L (ref 1.6–8.3)
NEUTROPHILS NFR BLD AUTO: 73.7 %
PLATELET # BLD AUTO: 342 10E9/L (ref 150–450)
RBC # BLD AUTO: 5.22 10E12/L (ref 3.8–5.2)
RETICS # AUTO: 111.1 10E9/L (ref 25–95)
RETICS/RBC NFR AUTO: 2.1 % (ref 0.5–2)
WBC # BLD AUTO: 12.8 10E9/L (ref 4–11)

## 2019-01-28 PROCEDURE — 40000847 ZZHCL STATISTIC MORPHOLOGY W/INTERP HISTOLOGY TC 85060: Mod: ZL | Performed by: NURSE PRACTITIONER

## 2019-01-28 PROCEDURE — 85025 COMPLETE CBC W/AUTO DIFF WBC: CPT | Mod: ZL | Performed by: NURSE PRACTITIONER

## 2019-01-28 PROCEDURE — 85045 AUTOMATED RETICULOCYTE COUNT: CPT | Mod: ZL | Performed by: NURSE PRACTITIONER

## 2019-01-28 PROCEDURE — 36415 COLL VENOUS BLD VENIPUNCTURE: CPT | Mod: ZL | Performed by: NURSE PRACTITIONER

## 2019-01-29 LAB — COPATH REPORT: NORMAL

## 2019-02-04 DIAGNOSIS — M79.10 MYALGIA: ICD-10-CM

## 2019-02-04 RX ORDER — GABAPENTIN 300 MG/1
CAPSULE ORAL
Qty: 270 CAPSULE | Refills: 0 | Status: SHIPPED | OUTPATIENT
Start: 2019-02-04 | End: 2019-03-07

## 2019-02-04 NOTE — TELEPHONE ENCOUNTER
Gabapentin  Last Written Prescription Date: 9/6/18  Last Fill Quantity: 270 # of Refills: 3  Last Office Visit: 1/23/19

## 2019-02-11 DIAGNOSIS — E78.5 HYPERLIPIDEMIA LDL GOAL <100: ICD-10-CM

## 2019-02-11 RX ORDER — ROSUVASTATIN CALCIUM 10 MG/1
TABLET, COATED ORAL
Qty: 90 TABLET | Refills: 2 | Status: SHIPPED | OUTPATIENT
Start: 2019-02-11 | End: 2019-08-12 | Stop reason: ALTCHOICE

## 2019-02-11 NOTE — TELEPHONE ENCOUNTER
Crestor  Last Written Prescription Date: 5/16/18  Last Fill Quantity: 90 # of Refills: 2  Last Office Visit: 1/23/19

## 2019-02-20 NOTE — PROGRESS NOTES
"  SUBJECTIVE:                                                    Nella Lemon is a 73 year old female who presents to clinic today for the following health issues:    Hyperlipidemia Follow-Up      Rate your low fat/cholesterol diet?: fair    Taking statin?  Yes, no muscle aches from statin    Other lipid medications/supplements?:  none    Hypertension Follow-up      Outpatient blood pressures are being checked at home.  Results are .  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      Problem list and histories reviewed & adjusted, as indicated.  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, left     Family history of colon cancer     ACP (advance care planning)     Hyperlipidemia LDL goal <100     Hypothyroidism due to acquired atrophy of thyroid     Paroxysmal atrial fibrillation (H) "     Atypical chest pain     Past Surgical History:   Procedure Laterality Date     ADENOIDECTOMY       CHOLECYSTECTOMY  1981     COLONOSCOPY  2002     COLONOSCOPY  2012     COLONOSCOPY N/A 9/22/2014    Procedure: COLONOSCOPY;  Surgeon: Lavell Pavon DO;  Location: [facility] OR     CYSTOSCOPY  2007    Cystitis chronic     ORTHOPEDIC SURGERY  2002    carpal tunnel; RT, LT     TONSILLECTOMY         Social History     Tobacco Use     Smoking status: Never Smoker     Smokeless tobacco: Never Used   Substance Use Topics     Alcohol use: Yes     Comment: once a year     Family History   Problem Relation Age of Onset     Breast Cancer Mother      Alcohol/Drug Mother         Cirrhosis     Other - See Comments Mother         goiter     Cerebrovascular Disease Father      Circulatory Father      Alcohol/Drug Son      Cancer - colorectal Brother      Unknown/Adopted No family hx of      Asthma No family hx of      C.A.D. No family hx of      Diabetes No family hx of      Hypertension No family hx of      Prostate Cancer No family hx of      Allergies No family hx of      Alzheimer Disease No family hx of      Anesthesia Reaction No family hx of      Arthritis No family hx of      Blood Disease No family hx of      Cardiovascular No family hx of      Congenital Anomalies No family hx of      Connective Tissue Disorder No family hx of      Depression No family hx of      Endocrine Disease No family hx of      Eye Disorder No family hx of      Genetic Disorder No family hx of      Gastrointestinal Disease No family hx of      Genitourinary Problems No family hx of      Gynecology No family hx of      Heart Disease No family hx of      Lipids No family hx of      Musculoskeletal Disorder No family hx of      Neurologic Disorder No family hx of      Obesity No family hx of      Osteoporosis No family hx of      Psychotic Disorder No family hx of      Respiratory No family hx of      Thyroid Disease No family hx of      Family  History Negative No family hx of      Hearing Loss No family hx of          Current Outpatient Medications   Medication Sig Dispense Refill     apixaban ANTICOAGULANT (ELIQUIS) 5 MG tablet Take 1 tablet (5 mg) by mouth 2 times daily 60 tablet 11     gabapentin (NEURONTIN) 300 MG capsule TAKE 3 CAPSULES THREE TIMES DAILY 270 capsule 0     Ginkgo Biloba (GINKOBA PO) Take 40 mg by mouth daily       GLUCOSAMINE SULFATE PO Take 1,000 mg by mouth 2 times daily       levothyroxine (SYNTHROID/LEVOTHROID) 25 MCG tablet TAKE 1 TABLET DAILY BEST ON EMPTY STOMACH FOR THYROID 90 tablet 2     losartan (COZAAR) 50 MG tablet Take 1 tablet by mouth daily. 90 tablet 1     metoprolol succinate (TOPROL-XL) 25 MG 24 hr tablet Take 1 tablet (25 mg) by mouth daily 30 tablet 11     Omega-3 Fatty Acids (OMEGA-3 FISH OIL PO) Take 3 g by mouth daily        omeprazole (PRILOSEC) 40 MG DR capsule TAKE 1 CAPSULE (40 MG) BY MOUTH DAILY TAKE 30-60 MINUTES BEFORE A MEAL. 90 capsule 1     rosuvastatin (CRESTOR) 10 MG tablet TAKE 1 TABLET DAILY TO HELP LOWER CHOLESTEROL 90 tablet 2     sertraline (ZOLOFT) 50 MG tablet TAKE 1 TABLET DAILY 30 tablet 8     VITAMIN D, CHOLECALCIFEROL, PO Take by mouth daily       Allergies   Allergen Reactions     Atorvastatin      Ezetimibe      Gentamicin      Hydroxyzine      Lorazepam      Ranitidine      Simvastatin      Lopid [Gemfibrozil] GI Disturbance     Bloating, constipation,      Pravastatin Muscle Pain (Myalgia)     Recent Labs   Lab Test 01/23/19  0952 07/23/18  1013 05/30/18  0925 02/12/18  0955   LDL 56 53 52 71   HDL 47* 49* 42* 49*   TRIG 112 142 127 138   ALT 26 31  --  30   CR 0.87 0.87  --  0.87   GFRESTIMATED 66 64  --  64   GFRESTBLACK 77 77  --  77   POTASSIUM 4.1 3.9  --  4.0   TSH 4.41* 3.63 3.65 4.54*      BP Readings from Last 3 Encounters:   02/25/19 178/78   01/23/19 180/80   10/31/18 130/60    Wt Readings from Last 3 Encounters:   02/25/19 81.6 kg (180 lb)   01/23/19 85.5 kg (188 lb 6.4  "oz)   10/31/18 82.6 kg (182 lb)                    ROS:  Constitutional, HEENT, cardiovascular, pulmonary, gi and gu systems are negative, except as otherwise noted.    OBJECTIVE:     /78 (BP Location: Left arm, Patient Position: Chair, Cuff Size: Adult Regular)   Pulse 55   Temp 97.7  F (36.5  C) (Tympanic)   Ht 1.575 m (5' 2\")   Wt 81.6 kg (180 lb)   SpO2 96%   BMI 32.92 kg/m    Body mass index is 32.92 kg/m .  GENERAL: healthy, alert and no distress  NECK: no adenopathy, no asymmetry, masses, or scars and thyroid normal to palpation  RESP: lungs clear to auscultation - no rales, rhonchi or wheezes  CV: regular rate and rhythm, normal S1 S2, no S3 or S4, no murmur, click or rub, no peripheral edema and peripheral pulses strong  MS: left knee has cyctic mass just inferior to patella.    PSYCH: mentation appears normal, affect normal/bright    PROCEDURE:  XR KNEE BILATERAL G/E 4 VW     HISTORY: bilateral knee pain, fell a few months ago; Chronic pain of  both knees; Chronic pain of both knees; Chronic pain of both knees;  Mass of knee, left     COMPARISON:  None.     TECHNIQUE:  3 views of the both knees were obtained.     FINDINGS:  No fracture or dislocation is identified. The joint spaces  are preserved.  There are no knee effusions. There are some  calcifications seen in the distal quadriceps tendon on the right..  There are rounded calcifications seen in the posterior aspect of the  right knee possibly intra-articular loose bodies.                                                                      IMPRESSION: Possible intra-articular loose bodies right knee. No knee  effusions.       FILI MONSON MD    ASSESSMENT/PLAN:       1. Leukocytosis, unspecified type  - ONC/HEME ADULT REFERRAL    2. Benign essential hypertension  continue metoprolol 25mg once daily  - increase losartan (COZAAR) 100 MG tablet; Take 1 tablet by mouth daily.  Dispense: 90 tablet; Refill: 1    3. Chronic pain of both " knees  - XR Knee Bilateral G/E 4 Views  - ORTHOPEDICS ADULT REFERRAL    4. Mass of knee, left  - XR Knee Bilateral G/E 4 Views  - ORTHOPEDICS ADULT REFERRAL    FUTURE APPOINTMENTS:       - Follow-up visit in 1 month or as needed for acute concerns     Randi Haddad, NP  [facility]

## 2019-02-22 DIAGNOSIS — I48.0 PAROXYSMAL ATRIAL FIBRILLATION (H): ICD-10-CM

## 2019-02-25 ENCOUNTER — ANCILLARY PROCEDURE (OUTPATIENT)
Dept: MAMMOGRAPHY | Facility: OTHER | Age: 74
End: 2019-02-25
Attending: NURSE PRACTITIONER
Payer: COMMERCIAL

## 2019-02-25 ENCOUNTER — ANCILLARY PROCEDURE (OUTPATIENT)
Dept: GENERAL RADIOLOGY | Facility: OTHER | Age: 74
End: 2019-02-25
Attending: NURSE PRACTITIONER
Payer: COMMERCIAL

## 2019-02-25 ENCOUNTER — OFFICE VISIT (OUTPATIENT)
Dept: FAMILY MEDICINE | Facility: OTHER | Age: 74
End: 2019-02-25
Attending: NURSE PRACTITIONER
Payer: COMMERCIAL

## 2019-02-25 VITALS
DIASTOLIC BLOOD PRESSURE: 78 MMHG | OXYGEN SATURATION: 96 % | TEMPERATURE: 97.7 F | SYSTOLIC BLOOD PRESSURE: 178 MMHG | WEIGHT: 180 LBS | BODY MASS INDEX: 33.13 KG/M2 | HEART RATE: 55 BPM | HEIGHT: 62 IN

## 2019-02-25 DIAGNOSIS — R22.42 MASS OF KNEE, LEFT: ICD-10-CM

## 2019-02-25 DIAGNOSIS — I10 BENIGN ESSENTIAL HYPERTENSION: ICD-10-CM

## 2019-02-25 DIAGNOSIS — M25.561 CHRONIC PAIN OF BOTH KNEES: ICD-10-CM

## 2019-02-25 DIAGNOSIS — D72.829 LEUKOCYTOSIS, UNSPECIFIED TYPE: Primary | ICD-10-CM

## 2019-02-25 DIAGNOSIS — G89.29 CHRONIC PAIN OF BOTH KNEES: ICD-10-CM

## 2019-02-25 DIAGNOSIS — M25.562 CHRONIC PAIN OF BOTH KNEES: ICD-10-CM

## 2019-02-25 DIAGNOSIS — Z12.39 BREAST CANCER SCREENING: ICD-10-CM

## 2019-02-25 PROCEDURE — 77063 BREAST TOMOSYNTHESIS BI: CPT | Mod: TC

## 2019-02-25 PROCEDURE — G0463 HOSPITAL OUTPT CLINIC VISIT: HCPCS

## 2019-02-25 PROCEDURE — 99214 OFFICE O/P EST MOD 30 MIN: CPT | Performed by: NURSE PRACTITIONER

## 2019-02-25 PROCEDURE — 73564 X-RAY EXAM KNEE 4 OR MORE: CPT | Mod: TC,50,FY

## 2019-02-25 RX ORDER — LOSARTAN POTASSIUM 100 MG/1
TABLET ORAL
Qty: 90 TABLET | Refills: 1 | Status: SHIPPED | OUTPATIENT
Start: 2019-02-25 | End: 2019-08-17

## 2019-02-25 RX ORDER — APIXABAN 5 MG/1
TABLET, FILM COATED ORAL
Qty: 60 TABLET | Refills: 0 | Status: SHIPPED | OUTPATIENT
Start: 2019-02-25 | End: 2019-04-23

## 2019-02-25 RX ORDER — METOPROLOL SUCCINATE 25 MG/1
25 TABLET, EXTENDED RELEASE ORAL DAILY
Qty: 30 TABLET | Refills: 0 | Status: SHIPPED | OUTPATIENT
Start: 2019-02-25 | End: 2019-03-27

## 2019-02-25 ASSESSMENT — PAIN SCALES - GENERAL: PAINLEVEL: MILD PAIN (2)

## 2019-02-25 ASSESSMENT — MIFFLIN-ST. JEOR: SCORE: 1279.72

## 2019-02-25 NOTE — NURSING NOTE
"Chief Complaint   Patient presents with     Hypertension     Hyperlipidemia     Thyroid Problem       Initial /78 (BP Location: Left arm, Patient Position: Chair, Cuff Size: Adult Regular)   Pulse 55   Temp 97.7  F (36.5  C) (Tympanic)   Ht 1.575 m (5' 2\")   Wt 81.6 kg (180 lb)   SpO2 96%   BMI 32.92 kg/m   Estimated body mass index is 32.92 kg/m  as calculated from the following:    Height as of this encounter: 1.575 m (5' 2\").    Weight as of this encounter: 81.6 kg (180 lb).  Medication Reconciliation: complete    Shelbie Allred LPN  "

## 2019-02-25 NOTE — PATIENT INSTRUCTIONS
ASSESSMENT/PLAN:       1. Leukocytosis, unspecified type  - ONC/HEME ADULT REFERRAL    2. Benign essential hypertension  continue metoprolol 25mg once daily  - increase losartan (COZAAR) 100 MG tablet; Take 1 tablet by mouth daily.  Dispense: 90 tablet; Refill: 1    3. Chronic pain of both knees  - XR Knee Bilateral G/E 4 Views  - ORTHOPEDICS ADULT REFERRAL    4. Mass of knee, left  - XR Knee Bilateral G/E 4 Views  - ORTHOPEDICS ADULT REFERRAL    FUTURE APPOINTMENTS:       - Follow-up visit in 1 month or as needed for acute concerns     Randi Haddad, NP  [facility]

## 2019-03-01 ENCOUNTER — TRANSFERRED RECORDS (OUTPATIENT)
Dept: HEALTH INFORMATION MANAGEMENT | Facility: CLINIC | Age: 74
End: 2019-03-01

## 2019-03-07 DIAGNOSIS — M79.10 MYALGIA: ICD-10-CM

## 2019-03-07 NOTE — TELEPHONE ENCOUNTER
gabapentin      Last Written Prescription Date:  2/4/19  Last Fill Quantity: 270,   # refills: 0  Last Office Visit: 2/25/19  Future Office visit:    Next 5 appointments (look out 90 days)    Mar 27, 2019 10:00 AM CDT  (Arrive by 9:45 AM)  SHORT with Randi Haddad NP  [facility] ([facility] ) [address] [address]  [phone]   May 08, 2019  9:30 AM CDT  (Arrive by 9:15 AM)  Return Visit with Chris Walsh DO  [facility] ([facility] ) [address]  [place] [address]  [phone]

## 2019-03-08 RX ORDER — GABAPENTIN 300 MG/1
CAPSULE ORAL
Qty: 270 CAPSULE | Refills: 0 | Status: SHIPPED | OUTPATIENT
Start: 2019-03-08 | End: 2019-04-03

## 2019-03-12 NOTE — PROGRESS NOTES
SUBJECTIVE:   Nella Lemon is a 73 year old female who presents to clinic today for the following health issues:      Hypertension Follow-up      Outpatient blood pressures 170S OR HIGFHER    Low Salt Diet: no added salt    Depression Followup    Status since last visit: Stable     See PHQ-9 for current symptoms.  Other associated symptoms: None    Complicating factors:   Significant life event:  No   Current substance abuse:  None  Anxiety or Panic symptoms:  No    PHQ 2/12/2018 7/23/2018 1/23/2019   PHQ-9 Total Score 1 5 3   Q9: Suicide Ideation Not at all Not at all Not at all       PHQ-9  English  PHQ-9   Any Language  Suicide Assessment Five-step Evaluation and Treatment (SAFE-T)    Amount of exercise or physical activity: 1 day/week for an average of less than 15 minutes    Problems taking medications regularly: No    Medication side effects: muscle aches - shoulders as below.  Feels it started after crestor was increased.     Diet: regular (no restrictions)        Musculoskeletal problem/pain      Duration: 2 MONTHS    Description  Location: BILATERAL SHOULDERS    Intensity:  8/10    Accompanying signs and symptoms: none    History  Previous similar problem: no   Previous evaluation:  none    Precipitating or alleviating factors:  Trauma or overuse: no   Aggravating factors include: Intermittent pain but driving makes it worse    Therapies tried and outcome: rest/inactivity      Problem list and histories reviewed & adjusted, as indicated.  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, left      Family history of colon cancer     ACP (advance care planning)     Hyperlipidemia LDL goal <100     Hypothyroidism due to acquired atrophy of thyroid     Paroxysmal atrial fibrillation (H)     Atypical chest pain     Past Surgical History:   Procedure Laterality Date     ADENOIDECTOMY       CHOLECYSTECTOMY  1981     COLONOSCOPY  2002     COLONOSCOPY  2012     COLONOSCOPY N/A 9/22/2014    Procedure: COLONOSCOPY;  Surgeon: Lavell Pavon DO;  Location: [facility]     CYSTOSCOPY  2007    Cystitis chronic     ORTHOPEDIC SURGERY  2002    carpal tunnel; RT, LT     TONSILLECTOMY         Social History     Tobacco Use     Smoking status: Never Smoker     Smokeless tobacco: Never Used   Substance Use Topics     Alcohol use: Yes     Comment: once a year     Family History   Problem Relation Age of Onset     Breast Cancer Mother      Alcohol/Drug Mother         Cirrhosis     Other - See Comments Mother         goiter     Cerebrovascular Disease Father      Circulatory Father      Alcohol/Drug Son      Cancer - colorectal Brother      Unknown/Adopted No family hx of      Asthma No family hx of      C.A.D. No family hx of      Diabetes No family hx of      Hypertension No family hx of      Prostate Cancer No family hx of      Allergies No family hx of      Alzheimer Disease No family hx of      Anesthesia Reaction No family hx of      Arthritis No family hx of      Blood Disease No family hx of      Cardiovascular No family hx of      Congenital Anomalies No family hx of      Connective Tissue Disorder No family hx of      Depression No family hx of      Endocrine Disease No family hx of      Eye Disorder No family hx of      Genetic Disorder No family hx of      Gastrointestinal Disease No family hx of      Genitourinary Problems No family hx of      Gynecology No family hx of      Heart Disease No family hx of      Lipids No family hx of      Musculoskeletal Disorder No family hx of      Neurologic Disorder No family hx of       Obesity No family hx of      Osteoporosis No family hx of      Psychotic Disorder No family hx of      Respiratory No family hx of      Thyroid Disease No family hx of      Family History Negative No family hx of      Hearing Loss No family hx of          Current Outpatient Medications   Medication Sig Dispense Refill     ELIQUIS 5 MG tablet TAKE 1 TABLET (5 MG) BY MOUTH 2 TIMES DAILY 60 tablet 0     gabapentin (NEURONTIN) 300 MG capsule TAKE 3 CAPSULES THREE TIMES DAILY 270 capsule 0     GLUCOSAMINE SULFATE PO Take 1,000 mg by mouth 2 times daily       levothyroxine (SYNTHROID/LEVOTHROID) 25 MCG tablet TAKE 1 TABLET DAILY BEST ON EMPTY STOMACH FOR THYROID 90 tablet 2     losartan (COZAAR) 100 MG tablet Take 1 tablet by mouth daily. 90 tablet 1     metoprolol succinate ER (TOPROL-XL) 25 MG 24 hr tablet TAKE 1 TABLET (25 MG) BY MOUTH DAILY 30 tablet 0     Omega-3 Fatty Acids (OMEGA-3 FISH OIL PO) Take 3 g by mouth daily        omeprazole (PRILOSEC) 40 MG DR capsule TAKE 1 CAPSULE (40 MG) BY MOUTH DAILY TAKE 30-60 MINUTES BEFORE A MEAL. 90 capsule 1     rosuvastatin (CRESTOR) 10 MG tablet TAKE 1 TABLET DAILY TO HELP LOWER CHOLESTEROL 90 tablet 2     sertraline (ZOLOFT) 50 MG tablet TAKE 1 TABLET DAILY 30 tablet 8     VITAMIN D, CHOLECALCIFEROL, PO Take by mouth daily       Allergies   Allergen Reactions     Atorvastatin      Ezetimibe      Gentamicin      Hydroxyzine      Lorazepam      Ranitidine      Simvastatin      Lopid [Gemfibrozil] GI Disturbance     Bloating, constipation,      Pravastatin Muscle Pain (Myalgia)     Recent Labs   Lab Test 01/23/19  0952 07/23/18  1013 05/30/18  0925 02/12/18  0955   LDL 56 53 52 71   HDL 47* 49* 42* 49*   TRIG 112 142 127 138   ALT 26 31  --  30   CR 0.87 0.87  --  0.87   GFRESTIMATED 66 64  --  64   GFRESTBLACK 77 77  --  77   POTASSIUM 4.1 3.9  --  4.0   TSH 4.41* 3.63 3.65 4.54*      BP Readings from Last 3 Encounters:   03/27/19 142/74   02/25/19 178/78   01/23/19  "180/80    Wt Readings from Last 3 Encounters:   03/27/19 85 kg (187 lb 8 oz)   02/25/19 81.6 kg (180 lb)   01/23/19 85.5 kg (188 lb 6.4 oz)               Reviewed and updated as needed this visit by clinical staff       Reviewed and updated as needed this visit by Provider         ROS:  Constitutional, HEENT, cardiovascular, pulmonary, gi and gu systems are negative, except as otherwise noted.    OBJECTIVE:     /74 (BP Location: Left arm, Patient Position: Sitting, Cuff Size: Adult Regular)   Pulse 76   Temp 98.7  F (37.1  C) (Tympanic)   Ht 1.575 m (5' 2\")   Wt 85 kg (187 lb 8 oz)   BMI 34.29 kg/m    Body mass index is 34.29 kg/m .  GENERAL: healthy, alert and no distress  NECK: no adenopathy, no asymmetry, masses, or scars and thyroid normal to palpation  RESP: lungs clear to auscultation - no rales, rhonchi or wheezes  CV: regular rate and rhythm, normal S1 S2, no S3 or S4, no murmur, click or rub, no peripheral edema and peripheral pulses strong  MS: no gross musculoskeletal defects noted, ROM of bilateral shoulders are intact - side arch, internal and external rotation, empty can test and cross body are all normal    PSYCH: mentation appears normal, affect normal/bright        ASSESSMENT/PLAN:     1. Paroxysmal atrial fibrillation (H)  - metoprolol succinate ER (TOPROL-XL) 50 MG 24 hr tablet; Take 1 tablet (50 mg) by mouth daily  Dispense: 30 tablet; Refill: 1    2. Myalgia  -  CK total  - Erythrocyte sedimentation rate auto  - CRP inflammation    3. Benign essential hypertension  Increase:   - metoprolol succinate ER (TOPROL-XL) 50 MG 24 hr tablet; Take 1 tablet (50 mg) by mouth daily  Dispense: 30 tablet; Refill: 1      FUTURE APPOINTMENTS:       - Follow-up visit in 1 month or as needed for acute concerns.     Randi Haddad, NP  [facility]  "

## 2019-03-27 ENCOUNTER — OFFICE VISIT (OUTPATIENT)
Dept: FAMILY MEDICINE | Facility: OTHER | Age: 74
End: 2019-03-27
Attending: NURSE PRACTITIONER
Payer: COMMERCIAL

## 2019-03-27 VITALS
DIASTOLIC BLOOD PRESSURE: 74 MMHG | HEIGHT: 62 IN | HEART RATE: 76 BPM | TEMPERATURE: 98.7 F | BODY MASS INDEX: 34.5 KG/M2 | SYSTOLIC BLOOD PRESSURE: 142 MMHG | WEIGHT: 187.5 LBS

## 2019-03-27 DIAGNOSIS — I48.0 PAROXYSMAL ATRIAL FIBRILLATION (H): ICD-10-CM

## 2019-03-27 DIAGNOSIS — I10 BENIGN ESSENTIAL HYPERTENSION: ICD-10-CM

## 2019-03-27 DIAGNOSIS — M79.10 MYALGIA: Primary | ICD-10-CM

## 2019-03-27 LAB
CK SERPL-CCNC: 162 U/L (ref 30–225)
CRP SERPL-MCNC: 3.1 MG/L (ref 0–8)
ERYTHROCYTE [SEDIMENTATION RATE] IN BLOOD BY WESTERGREN METHOD: 11 MM/H (ref 0–30)

## 2019-03-27 PROCEDURE — 36415 COLL VENOUS BLD VENIPUNCTURE: CPT | Mod: ZL | Performed by: NURSE PRACTITIONER

## 2019-03-27 PROCEDURE — G0463 HOSPITAL OUTPT CLINIC VISIT: HCPCS

## 2019-03-27 PROCEDURE — 85652 RBC SED RATE AUTOMATED: CPT | Mod: ZL | Performed by: NURSE PRACTITIONER

## 2019-03-27 PROCEDURE — 99214 OFFICE O/P EST MOD 30 MIN: CPT | Performed by: NURSE PRACTITIONER

## 2019-03-27 PROCEDURE — 86140 C-REACTIVE PROTEIN: CPT | Mod: ZL | Performed by: NURSE PRACTITIONER

## 2019-03-27 PROCEDURE — 82550 ASSAY OF CK (CPK): CPT | Mod: ZL | Performed by: NURSE PRACTITIONER

## 2019-03-27 RX ORDER — METOPROLOL SUCCINATE 50 MG/1
50 TABLET, EXTENDED RELEASE ORAL DAILY
Qty: 30 TABLET | Refills: 1 | Status: SHIPPED | OUTPATIENT
Start: 2019-03-27 | End: 2019-04-23

## 2019-03-27 ASSESSMENT — ANXIETY QUESTIONNAIRES
3. WORRYING TOO MUCH ABOUT DIFFERENT THINGS: NOT AT ALL
2. NOT BEING ABLE TO STOP OR CONTROL WORRYING: MORE THAN HALF THE DAYS
IF YOU CHECKED OFF ANY PROBLEMS ON THIS QUESTIONNAIRE, HOW DIFFICULT HAVE THESE PROBLEMS MADE IT FOR YOU TO DO YOUR WORK, TAKE CARE OF THINGS AT HOME, OR GET ALONG WITH OTHER PEOPLE: NOT DIFFICULT AT ALL
1. FEELING NERVOUS, ANXIOUS, OR ON EDGE: NOT AT ALL
6. BECOMING EASILY ANNOYED OR IRRITABLE: NOT AT ALL
7. FEELING AFRAID AS IF SOMETHING AWFUL MIGHT HAPPEN: NOT AT ALL
5. BEING SO RESTLESS THAT IT IS HARD TO SIT STILL: NOT AT ALL
GAD7 TOTAL SCORE: 2

## 2019-03-27 ASSESSMENT — PATIENT HEALTH QUESTIONNAIRE - PHQ9
SUM OF ALL RESPONSES TO PHQ QUESTIONS 1-9: 2
5. POOR APPETITE OR OVEREATING: NOT AT ALL

## 2019-03-27 ASSESSMENT — PAIN SCALES - GENERAL: PAINLEVEL: EXTREME PAIN (8)

## 2019-03-27 ASSESSMENT — MIFFLIN-ST. JEOR: SCORE: 1313.74

## 2019-03-27 NOTE — NURSING NOTE
"Chief Complaint   Patient presents with     Hypertension     Musculoskeletal Problem       Initial /74 (BP Location: Left arm, Patient Position: Sitting, Cuff Size: Adult Regular)   Pulse 76   Temp 98.7  F (37.1  C) (Tympanic)   Ht 1.575 m (5' 2\")   Wt 85 kg (187 lb 8 oz)   BMI 34.29 kg/m   Estimated body mass index is 34.29 kg/m  as calculated from the following:    Height as of this encounter: 1.575 m (5' 2\").    Weight as of this encounter: 85 kg (187 lb 8 oz).  Medication Reconciliation: complete    Patricia Field LPN    "

## 2019-03-27 NOTE — PATIENT INSTRUCTIONS
ASSESSMENT/PLAN:     1. Paroxysmal atrial fibrillation (H)  - metoprolol succinate ER (TOPROL-XL) 50 MG 24 hr tablet; Take 1 tablet (50 mg) by mouth daily  Dispense: 30 tablet; Refill: 1    2. Myalgia  -  CK total  - Erythrocyte sedimentation rate auto  - CRP inflammation    3. Benign essential hypertension  Increase:   - metoprolol succinate ER (TOPROL-XL) 50 MG 24 hr tablet; Take 1 tablet (50 mg) by mouth daily  Dispense: 30 tablet; Refill: 1  - continue losartan 100mg daily       FUTURE APPOINTMENTS:       - Follow-up visit in 1 month or as needed for acute concerns.     Randi Haddad, NP  [facility]

## 2019-03-28 ASSESSMENT — ANXIETY QUESTIONNAIRES: GAD7 TOTAL SCORE: 2

## 2019-04-03 DIAGNOSIS — M79.10 MYALGIA: ICD-10-CM

## 2019-04-03 RX ORDER — GABAPENTIN 300 MG/1
CAPSULE ORAL
Qty: 270 CAPSULE | Refills: 0 | Status: SHIPPED | OUTPATIENT
Start: 2019-04-03 | End: 2019-05-02

## 2019-04-05 ENCOUNTER — TRANSFERRED RECORDS (OUTPATIENT)
Dept: HEALTH INFORMATION MANAGEMENT | Facility: CLINIC | Age: 74
End: 2019-04-05

## 2019-04-15 ENCOUNTER — ONCOLOGY VISIT (OUTPATIENT)
Dept: ONCOLOGY | Facility: OTHER | Age: 74
End: 2019-04-15
Attending: NURSE PRACTITIONER
Payer: COMMERCIAL

## 2019-04-15 VITALS
RESPIRATION RATE: 18 BRPM | HEIGHT: 62 IN | HEART RATE: 59 BPM | WEIGHT: 188 LBS | TEMPERATURE: 98.1 F | DIASTOLIC BLOOD PRESSURE: 62 MMHG | SYSTOLIC BLOOD PRESSURE: 128 MMHG | BODY MASS INDEX: 34.6 KG/M2 | OXYGEN SATURATION: 95 %

## 2019-04-15 DIAGNOSIS — D72.829 LEUKOCYTOSIS, UNSPECIFIED TYPE: Primary | ICD-10-CM

## 2019-04-15 LAB
ALBUMIN SERPL-MCNC: 3.7 G/DL (ref 3.4–5)
ALP SERPL-CCNC: 118 U/L (ref 40–150)
ALT SERPL W P-5'-P-CCNC: 27 U/L (ref 0–50)
ANION GAP SERPL CALCULATED.3IONS-SCNC: 3 MMOL/L (ref 3–14)
AST SERPL W P-5'-P-CCNC: 16 U/L (ref 0–45)
BASOPHILS # BLD AUTO: 0 10E9/L (ref 0–0.2)
BASOPHILS NFR BLD AUTO: 0.3 %
BILIRUB SERPL-MCNC: 0.8 MG/DL (ref 0.2–1.3)
BUN SERPL-MCNC: 16 MG/DL (ref 7–30)
CALCIUM SERPL-MCNC: 8.8 MG/DL (ref 8.5–10.1)
CHLORIDE SERPL-SCNC: 109 MMOL/L (ref 94–109)
CO2 SERPL-SCNC: 27 MMOL/L (ref 20–32)
CREAT SERPL-MCNC: 0.91 MG/DL (ref 0.52–1.04)
DIFFERENTIAL METHOD BLD: ABNORMAL
EOSINOPHIL # BLD AUTO: 0.1 10E9/L (ref 0–0.7)
EOSINOPHIL NFR BLD AUTO: 0.9 %
ERYTHROCYTE [DISTWIDTH] IN BLOOD BY AUTOMATED COUNT: 13.1 % (ref 10–15)
ERYTHROCYTE [SEDIMENTATION RATE] IN BLOOD BY WESTERGREN METHOD: 10 MM/H (ref 0–30)
GFR SERPL CREATININE-BSD FRML MDRD: 62 ML/MIN/{1.73_M2}
GLUCOSE SERPL-MCNC: 95 MG/DL (ref 70–99)
HCT VFR BLD AUTO: 42.9 % (ref 35–47)
HGB BLD-MCNC: 14.4 G/DL (ref 11.7–15.7)
IMM GRANULOCYTES # BLD: 0 10E9/L (ref 0–0.4)
IMM GRANULOCYTES NFR BLD: 0.3 %
LDH SERPL L TO P-CCNC: 219 U/L (ref 81–234)
LYMPHOCYTES # BLD AUTO: 2.1 10E9/L (ref 0.8–5.3)
LYMPHOCYTES NFR BLD AUTO: 17 %
MCH RBC QN AUTO: 28.5 PG (ref 26.5–33)
MCHC RBC AUTO-ENTMCNC: 33.6 G/DL (ref 31.5–36.5)
MCV RBC AUTO: 85 FL (ref 78–100)
MONOCYTES # BLD AUTO: 0.7 10E9/L (ref 0–1.3)
MONOCYTES NFR BLD AUTO: 5.5 %
NEUTROPHILS # BLD AUTO: 9.5 10E9/L (ref 1.6–8.3)
NEUTROPHILS NFR BLD AUTO: 76 %
NRBC # BLD AUTO: 0 10*3/UL
NRBC BLD AUTO-RTO: 0 /100
PLATELET # BLD AUTO: 308 10E9/L (ref 150–450)
POTASSIUM SERPL-SCNC: 4.4 MMOL/L (ref 3.4–5.3)
PROT SERPL-MCNC: 7.2 G/DL (ref 6.8–8.8)
RBC # BLD AUTO: 5.06 10E12/L (ref 3.8–5.2)
SODIUM SERPL-SCNC: 139 MMOL/L (ref 133–144)
WBC # BLD AUTO: 12.5 10E9/L (ref 4–11)

## 2019-04-15 PROCEDURE — 83615 LACTATE (LD) (LDH) ENZYME: CPT | Mod: ZL | Performed by: INTERNAL MEDICINE

## 2019-04-15 PROCEDURE — 99203 OFFICE O/P NEW LOW 30 MIN: CPT | Performed by: INTERNAL MEDICINE

## 2019-04-15 PROCEDURE — 86618 LYME DISEASE ANTIBODY: CPT | Mod: ZL | Performed by: INTERNAL MEDICINE

## 2019-04-15 PROCEDURE — 86039 ANTINUCLEAR ANTIBODIES (ANA): CPT | Mod: ZL | Performed by: INTERNAL MEDICINE

## 2019-04-15 PROCEDURE — G0463 HOSPITAL OUTPT CLINIC VISIT: HCPCS

## 2019-04-15 PROCEDURE — 86665 EPSTEIN-BARR CAPSID VCA: CPT | Mod: ZL | Performed by: INTERNAL MEDICINE

## 2019-04-15 PROCEDURE — 81207 BCR/ABL1 GENE MINOR BP: CPT | Mod: ZL | Performed by: INTERNAL MEDICINE

## 2019-04-15 PROCEDURE — 86038 ANTINUCLEAR ANTIBODIES: CPT | Mod: ZL | Performed by: INTERNAL MEDICINE

## 2019-04-15 PROCEDURE — 85025 COMPLETE CBC W/AUTO DIFF WBC: CPT | Mod: ZL | Performed by: INTERNAL MEDICINE

## 2019-04-15 PROCEDURE — 85652 RBC SED RATE AUTOMATED: CPT | Mod: ZL | Performed by: INTERNAL MEDICINE

## 2019-04-15 PROCEDURE — 00000402 ZZHCL STATISTIC TOTAL PROTEIN: Mod: ZL | Performed by: INTERNAL MEDICINE

## 2019-04-15 PROCEDURE — 36415 COLL VENOUS BLD VENIPUNCTURE: CPT | Mod: ZL | Performed by: INTERNAL MEDICINE

## 2019-04-15 PROCEDURE — 86431 RHEUMATOID FACTOR QUANT: CPT | Mod: ZL | Performed by: INTERNAL MEDICINE

## 2019-04-15 PROCEDURE — 86665 EPSTEIN-BARR CAPSID VCA: CPT | Mod: ZL,59 | Performed by: INTERNAL MEDICINE

## 2019-04-15 PROCEDURE — 99000 SPECIMEN HANDLING OFFICE-LAB: CPT | Performed by: INTERNAL MEDICINE

## 2019-04-15 PROCEDURE — 81206 BCR/ABL1 GENE MAJOR BP: CPT | Mod: ZL | Performed by: INTERNAL MEDICINE

## 2019-04-15 PROCEDURE — 84165 PROTEIN E-PHORESIS SERUM: CPT | Mod: ZL | Performed by: INTERNAL MEDICINE

## 2019-04-15 PROCEDURE — 80053 COMPREHEN METABOLIC PANEL: CPT | Mod: ZL | Performed by: INTERNAL MEDICINE

## 2019-04-15 SDOH — HEALTH STABILITY: MENTAL HEALTH: HOW OFTEN DO YOU HAVE A DRINK CONTAINING ALCOHOL?: NEVER

## 2019-04-15 ASSESSMENT — MIFFLIN-ST. JEOR: SCORE: 1316.01

## 2019-04-15 ASSESSMENT — PAIN SCALES - GENERAL: PAINLEVEL: NO PAIN (0)

## 2019-04-15 ASSESSMENT — PATIENT HEALTH QUESTIONNAIRE - PHQ9: SUM OF ALL RESPONSES TO PHQ QUESTIONS 1-9: 0

## 2019-04-15 NOTE — PATIENT INSTRUCTIONS
We will see you back in 2-3 weeks. You have been scheduled for a CT Scan of you chest abdomen and pelvis. We will see you after this to go over results.

## 2019-04-15 NOTE — NURSING NOTE
"Chief Complaint   Patient presents with     Consult     leukocytosis / Aroldo antoine referring       Initial /62   Pulse 59   Temp 98.1  F (36.7  C) (Tympanic)   Resp 18   Ht 1.575 m (5' 2\")   Wt 85.3 kg (188 lb)   SpO2 95%   BMI 34.39 kg/m   Estimated body mass index is 34.39 kg/m  as calculated from the following:    Height as of this encounter: 1.575 m (5' 2\").    Weight as of this encounter: 85.3 kg (188 lb).  Medication Reconciliation: complete     Patient was assessed using the NCCN psychosocial distress thermometer. Patient rated the score as a 0/10. Patient rated current stressors as none. Stressors will be brought to the attention of provider or Oncology RN Care Coordinator for a score of 6 or greater or per nurses discretion.         Dyan Spence, RN    "

## 2019-04-16 LAB
ALBUMIN SERPL ELPH-MCNC: 4 G/DL (ref 3.7–5.1)
ALPHA1 GLOB SERPL ELPH-MCNC: 0.3 G/DL (ref 0.2–0.4)
ALPHA2 GLOB SERPL ELPH-MCNC: 0.8 G/DL (ref 0.5–0.9)
ANA PAT SER IF-IMP: ABNORMAL
ANA SER QL IF: ABNORMAL
ANA TITR SER IF: ABNORMAL {TITER}
B BURGDOR IGG+IGM SER QL: <0.01 (ref 0–0.89)
B-GLOBULIN SERPL ELPH-MCNC: 0.8 G/DL (ref 0.6–1)
EBV VCA IGG SER QL IA: >8 AI (ref 0–0.8)
EBV VCA IGM SER QL IA: <0.2 AI (ref 0–0.8)
GAMMA GLOB SERPL ELPH-MCNC: 1 G/DL (ref 0.7–1.6)
M PROTEIN SERPL ELPH-MCNC: 0 G/DL
PROT PATTERN SERPL ELPH-IMP: NORMAL
RHEUMATOID FACT SER NEPH-ACNC: <20 IU/ML (ref 0–20)

## 2019-04-17 LAB — COPATH REPORT: NORMAL

## 2019-04-19 LAB — COPATH REPORT: NORMAL

## 2019-04-23 DIAGNOSIS — I48.0 PAROXYSMAL ATRIAL FIBRILLATION (H): ICD-10-CM

## 2019-04-24 RX ORDER — APIXABAN 5 MG/1
TABLET, FILM COATED ORAL
Qty: 60 TABLET | Refills: 1 | Status: SHIPPED | OUTPATIENT
Start: 2019-04-24 | End: 2019-05-22

## 2019-04-25 NOTE — PROGRESS NOTES
SUBJECTIVE:   Nella Lemon is a 73 year old female who presents to clinic today for the following   health issues:      Hypertension Follow-up      Outpatient blood pressures are being checked at home.  Results are 150/70's pulse 59-61    Low Salt Diet: low salt  The ASCVD Risk score (Danie FLANAGAN Jr., et al., 2013) failed to calculate for the following reasons:      The valid total cholesterol range is 130 to 320 mg/dL  BP Readings from Last 6 Encounters:   04/30/19 122/60   04/15/19 128/62   03/27/19 142/74   02/25/19 178/78   01/23/19 180/80   10/31/18 130/60             Depression and Anxiety Follow-Up    Status since last visit: Worsened a little more nervous     Other associated symptoms:None    Complicating factors:     Significant life event: NO     Current substance abuse: None    PHQ 3/27/2019 4/15/2019 4/30/2019   PHQ-9 Total Score 2 0 1   Q9: Thoughts of better off dead/self-harm past 2 weeks Not at all Not at all Not at all     LA NENA-7 SCORE 1/23/2019 3/27/2019 4/30/2019   Total Score 0 2 3     PHQ-9  English  PHQ-9   Any Language  LA NENA-7  Suicide Assessment Five-step Evaluation and Treatment (SAFE-T)    Amount of exercise or physical activity: 6-7 days/week for an average of 15-30 minutes    Problems taking medications regularly: No    Medication side effects: none    Diet: regular (no restrictions)      Additional history: as documented    Reviewed  and updated as needed this visit by clinical staff         Reviewed and updated as needed this visit by Provider         Patient Active Problem List   Diagnosis     Nasal tenderness     Nasal turbinate hypertrophy     Advanced care planning/counseling discussion     Anxiety state     Benign essential hypertension     Gastroesophageal reflux disease without esophagitis     Neuropathy     Family history of malignant neoplasm of breast     Palpitations     First branchial cleft cyst     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, left     Family  history of colon cancer     ACP (advance care planning)     Hyperlipidemia LDL goal <100     Hypothyroidism due to acquired atrophy of thyroid     Paroxysmal atrial fibrillation (H)     Atypical chest pain     Past Surgical History:   Procedure Laterality Date     ADENOIDECTOMY       CHOLECYSTECTOMY  1981     COLONOSCOPY  2002     COLONOSCOPY  2012     COLONOSCOPY N/A 9/22/2014    Procedure: COLONOSCOPY;  Surgeon: Lavell Pavon DO;  Location: [facility]     CYSTOSCOPY  2007    Cystitis chronic     ORTHOPEDIC SURGERY  2002    carpal tunnel; RT, LT     TONSILLECTOMY         Social History     Tobacco Use     Smoking status: Never Smoker     Smokeless tobacco: Never Used   Substance Use Topics     Alcohol use: Never     Frequency: Never     Family History   Problem Relation Age of Onset     Breast Cancer Mother      Alcohol/Drug Mother         Cirrhosis     Other - See Comments Mother         goiter     Cerebrovascular Disease Father      Circulatory Father      Alcohol/Drug Son      Cancer - colorectal Brother      Unknown/Adopted No family hx of      Asthma No family hx of      C.A.D. No family hx of      Diabetes No family hx of      Hypertension No family hx of      Prostate Cancer No family hx of      Allergies No family hx of      Alzheimer Disease No family hx of      Anesthesia Reaction No family hx of      Arthritis No family hx of      Blood Disease No family hx of      Cardiovascular No family hx of      Congenital Anomalies No family hx of      Connective Tissue Disorder No family hx of      Depression No family hx of      Endocrine Disease No family hx of      Eye Disorder No family hx of      Genetic Disorder No family hx of      Gastrointestinal Disease No family hx of      Genitourinary Problems No family hx of      Gynecology No family hx of      Heart Disease No family hx of      Lipids No family hx of      Musculoskeletal Disorder No family hx of      Neurologic Disorder No family hx of       Obesity No family hx of      Osteoporosis No family hx of      Psychotic Disorder No family hx of      Respiratory No family hx of      Thyroid Disease No family hx of      Family History Negative No family hx of      Hearing Loss No family hx of          Current Outpatient Medications   Medication Sig Dispense Refill     ELIQUIS 5 MG tablet TAKE 1 TABLET (5 MG) BY MOUTH 2 TIMES DAILY 60 tablet 1     gabapentin (NEURONTIN) 300 MG capsule TAKE 3 CAPSULES THREE TIMES DAILY 270 capsule 0     GLUCOSAMINE SULFATE PO Take 1,000 mg by mouth 2 times daily       levothyroxine (SYNTHROID/LEVOTHROID) 25 MCG tablet TAKE 1 TABLET DAILY BEST ON EMPTY STOMACH FOR THYROID 90 tablet 2     losartan (COZAAR) 100 MG tablet Take 1 tablet by mouth daily. 90 tablet 1     metoprolol succinate ER (TOPROL-XL) 50 MG 24 hr tablet TAKE 1 TABLET (50 MG) BY MOUTH DAILY 30 tablet 0     Omega-3 Fatty Acids (OMEGA-3 FISH OIL PO) Take 3 g by mouth daily        omeprazole (PRILOSEC) 40 MG DR capsule TAKE 1 CAPSULE (40 MG) BY MOUTH DAILY TAKE 30-60 MINUTES BEFORE A MEAL. 90 capsule 1     rosuvastatin (CRESTOR) 10 MG tablet TAKE 1 TABLET DAILY TO HELP LOWER CHOLESTEROL 90 tablet 2     sertraline (ZOLOFT) 50 MG tablet TAKE 1 TABLET DAILY 30 tablet 8     VITAMIN D, CHOLECALCIFEROL, PO Take by mouth daily       Allergies   Allergen Reactions     Atorvastatin      Ezetimibe      Gentamicin      Hydroxyzine      Lorazepam      Ranitidine      Simvastatin      Lopid [Gemfibrozil] GI Disturbance     Bloating, constipation,      Pravastatin Muscle Pain (Myalgia)     Recent Labs   Lab Test 04/15/19  1149 01/23/19  0952 07/23/18  1013 05/30/18  0925   LDL  --  56 53 52   HDL  --  47* 49* 42*   TRIG  --  112 142 127   ALT 27 26 31  --    CR 0.91 0.87 0.87  --    GFRESTIMATED 62 66 64  --    GFRESTBLACK 72 77 77  --    POTASSIUM 4.4 4.1 3.9  --    TSH  --  4.41* 3.63 3.65      BP Readings from Last 3 Encounters:   04/30/19 122/60   04/15/19 128/62   03/27/19 142/74  "   Wt Readings from Last 3 Encounters:   04/30/19 85.7 kg (189 lb)   04/15/19 85.3 kg (188 lb)   03/27/19 85 kg (187 lb 8 oz)                    ROS:  Constitutional, HEENT, cardiovascular, pulmonary, gi and gu systems are negative, except as otherwise noted.    OBJECTIVE:     /60 (BP Location: Left arm, Patient Position: Chair, Cuff Size: Adult Large)   Pulse 53   Resp 18   Ht 1.575 m (5' 2\")   Wt 85.7 kg (189 lb)   SpO2 97%   BMI 34.57 kg/m    Body mass index is 34.57 kg/m .  GENERAL: healthy, alert and no distress  NECK: no adenopathy, no asymmetry, masses, or scars, thyroid normal to palpation and no carotid bruits  RESP: lungs clear to auscultation - no rales, rhonchi or wheezes  CV: regular rate and rhythm, normal S1 S2, no S3 or S4, no murmur, click or rub, no peripheral edema and peripheral pulses strong  ABDOMEN: soft, nontender, no hepatosplenomegaly, no masses and bowel sounds normal  MS: no gross musculoskeletal defects noted, no edema  PSYCH: mentation appears normal, affect normal/bright      ASSESSMENT/PLAN:       1. Hyperlipidemia LDL goal <100  chronic    2. Paroxysmal atrial fibrillation (H)  chronic    3. Hypothyroidism due to acquired atrophy of thyroid  chronic    4. Benign essential hypertension  Chronic    Labs are current, continue following with cardiology as scheduled (next week).      FUTURE APPOINTMENTS:       - Follow-up visit in 6 months or as needed for acute concerns.    Randi Haddad, NP  [facility]          "

## 2019-04-29 ENCOUNTER — HOSPITAL ENCOUNTER (OUTPATIENT)
Dept: CT IMAGING | Facility: HOSPITAL | Age: 74
Discharge: HOME | End: 2019-04-29
Attending: INTERNAL MEDICINE | Admitting: INTERNAL MEDICINE
Payer: COMMERCIAL

## 2019-04-29 DIAGNOSIS — D72.829 LEUKOCYTOSIS, UNSPECIFIED TYPE: ICD-10-CM

## 2019-04-29 PROCEDURE — 25500064 ZZH RX 255 OP 636: Performed by: RADIOLOGY

## 2019-04-29 PROCEDURE — 71260 CT THORAX DX C+: CPT | Mod: TC

## 2019-04-29 PROCEDURE — 74177 CT ABD & PELVIS W/CONTRAST: CPT | Mod: TC

## 2019-04-29 RX ORDER — IOPAMIDOL 612 MG/ML
100 INJECTION, SOLUTION INTRAVASCULAR ONCE
Status: COMPLETED | OUTPATIENT
Start: 2019-04-29 | End: 2019-04-29

## 2019-04-29 RX ADMIN — DIATRIZOATE MEGLUMINE AND DIATRIZOATE SODIUM 30 ML: 660; 100 SOLUTION ORAL; RECTAL at 11:42

## 2019-04-29 RX ADMIN — IOPAMIDOL 100 ML: 612 INJECTION, SOLUTION INTRAVENOUS at 11:43

## 2019-04-30 ENCOUNTER — OFFICE VISIT (OUTPATIENT)
Dept: FAMILY MEDICINE | Facility: OTHER | Age: 74
End: 2019-04-30
Attending: NURSE PRACTITIONER
Payer: COMMERCIAL

## 2019-04-30 VITALS
SYSTOLIC BLOOD PRESSURE: 122 MMHG | RESPIRATION RATE: 18 BRPM | HEART RATE: 53 BPM | HEIGHT: 62 IN | BODY MASS INDEX: 34.78 KG/M2 | OXYGEN SATURATION: 97 % | WEIGHT: 189 LBS | DIASTOLIC BLOOD PRESSURE: 60 MMHG

## 2019-04-30 DIAGNOSIS — E03.4 HYPOTHYROIDISM DUE TO ACQUIRED ATROPHY OF THYROID: ICD-10-CM

## 2019-04-30 DIAGNOSIS — I10 BENIGN ESSENTIAL HYPERTENSION: ICD-10-CM

## 2019-04-30 DIAGNOSIS — I48.0 PAROXYSMAL ATRIAL FIBRILLATION (H): ICD-10-CM

## 2019-04-30 DIAGNOSIS — E78.5 HYPERLIPIDEMIA LDL GOAL <100: Primary | ICD-10-CM

## 2019-04-30 PROCEDURE — G0463 HOSPITAL OUTPT CLINIC VISIT: HCPCS

## 2019-04-30 PROCEDURE — 99213 OFFICE O/P EST LOW 20 MIN: CPT | Performed by: NURSE PRACTITIONER

## 2019-04-30 ASSESSMENT — MIFFLIN-ST. JEOR: SCORE: 1320.55

## 2019-04-30 ASSESSMENT — ANXIETY QUESTIONNAIRES
7. FEELING AFRAID AS IF SOMETHING AWFUL MIGHT HAPPEN: NOT AT ALL
6. BECOMING EASILY ANNOYED OR IRRITABLE: NOT AT ALL
3. WORRYING TOO MUCH ABOUT DIFFERENT THINGS: SEVERAL DAYS
4. TROUBLE RELAXING: NOT AT ALL
IF YOU CHECKED OFF ANY PROBLEMS ON THIS QUESTIONNAIRE, HOW DIFFICULT HAVE THESE PROBLEMS MADE IT FOR YOU TO DO YOUR WORK, TAKE CARE OF THINGS AT HOME, OR GET ALONG WITH OTHER PEOPLE: NOT DIFFICULT AT ALL
1. FEELING NERVOUS, ANXIOUS, OR ON EDGE: SEVERAL DAYS
2. NOT BEING ABLE TO STOP OR CONTROL WORRYING: SEVERAL DAYS
GAD7 TOTAL SCORE: 3
5. BEING SO RESTLESS THAT IT IS HARD TO SIT STILL: NOT AT ALL

## 2019-04-30 ASSESSMENT — PATIENT HEALTH QUESTIONNAIRE - PHQ9: SUM OF ALL RESPONSES TO PHQ QUESTIONS 1-9: 1

## 2019-04-30 ASSESSMENT — PAIN SCALES - GENERAL: PAINLEVEL: NO PAIN (0)

## 2019-04-30 NOTE — LETTER
My Depression Action Plan  Name: Nella Lemon   Date of Birth 1945  Date: 4/29/2019    My doctor: Randi Haddad   My clinic: [facility]  [address]  [place] [address]  [phone]          GREEN    ZONE   Good Control    What it looks like:     Things are going generally well. You have normal up s and down s. You may even feel depressed from time to time, but bad moods usually last less than a day.   What you need to do:  1. Continue to care for yourself (see self care plan)  2. Check your depression survival kit and update it as needed  3. Follow your physician s recommendations including any medication.  4. Do not stop taking medication unless you consult with your physician first.           YELLOW         ZONE Getting Worse    What it looks like:     Depression is starting to interfere with your life.     It may be hard to get out of bed; you may be starting to isolate yourself from others.    Symptoms of depression are starting to last most all day and this has happened for several days.     You may have suicidal thoughts but they are not constant.   What you need to do:     1. Call your care team, your response to treatment will improve if you keep your care team informed of your progress. Yellow periods are signs an adjustment may need to be made.     2. Continue your self-care, even if you have to fake it!    3. Talk to someone in your support network    4. Open up your depression survival kit           RED    ZONE Medical Alert - Get Help    What it looks like:     Depression is seriously interfering with your life.     You may experience these or other symptoms: You can t get out of bed most days, can t work or engage in other necessary activities, you have trouble taking care of basic hygiene, or basic responsibilities, thoughts of suicide or death that will not go away, self-injurious behavior.     What you need to  do:  1. Call your care team and request a same-day appointment. If they are not available (weekends or after hours) call your local crisis line, emergency room or 911.            Depression Self Care Plan / Survival Kit    Self-Care for Depression  Here s the deal. Your body and mind are really not as separate as most people think.  What you do and think affects how you feel and how you feel influences what you do and think. This means if you do things that people who feel good do, it will help you feel better.  Sometimes this is all it takes.  There is also a place for medication and therapy depending on how severe your depression is, so be sure to consult with your medical provider and/ or Behavioral Health Consultant if your symptoms are worsening or not improving.     In order to better manage my stress, I will:    Exercise  Get some form of exercise, every day. This will help reduce pain and release endorphins, the  feel good  chemicals in your brain. This is almost as good as taking antidepressants!  This is not the same as joining a gym and then never going! (they count on that by the way ) It can be as simple as just going for a walk or doing some gardening, anything that will get you moving.      Hygiene   Maintain good hygiene (Get out of bed in the morning, Make your bed, Brush your teeth, Take a shower, and Get dressed like you were going to work, even if you are unemployed).  If your clothes don't fit try to get ones that do.    Diet  I will strive to eat foods that are good for me, drink plenty of water, and avoid excessive sugar, caffeine, alcohol, and other mood-altering substances.  Some foods that are helpful in depression are: complex carbohydrates, B vitamins, flaxseed, fish or fish oil, fresh fruits and vegetables.    Psychotherapy  I agree to participate in Individual Therapy (if recommended).    Medication  If prescribed medications, I agree to take them.  Missing doses can result in serious  side effects.  I understand that drinking alcohol, or other illicit drug use, may cause potential side effects.  I will not stop my medication abruptly without first discussing it with my provider.    Staying Connected With Others  I will stay in touch with my friends, family members, and my primary care provider/team.    Use your imagination  Be creative.  We all have a creative side; it doesn t matter if it s oil painting, sand castles, or mud pies! This will also kick up the endorphins.    Witness Beauty  (AKA stop and smell the roses) Take a look outside, even in mid-winter. Notice colors, textures. Watch the squirrels and birds.     Service to others  Be of service to others.  There is always someone else in need.  By helping others we can  get out of ourselves  and remember the really important things.  This also provides opportunities for practicing all the other parts of the program.    Humor  Laugh and be silly!  Adjust your TV habits for less news and crime-drama and more comedy.    Control your stress  Try breathing deep, massage therapy, biofeedback, and meditation. Find time to relax each day.     My support system    Clinic Contact:  Phone number:    Contact 1:  Phone number:    Contact 2:  Phone number:    Anabaptism/:  Phone number:    Therapist:  Phone number:    Local crisis center:    Phone number:    Other community support:  Phone number:

## 2019-04-30 NOTE — NURSING NOTE
"Chief Complaint   Patient presents with     Hypertension       Initial /60 (BP Location: Left arm, Patient Position: Chair, Cuff Size: Adult Large)   Pulse 53   Resp 18   Ht 1.575 m (5' 2\")   Wt 85.7 kg (189 lb)   SpO2 97%   BMI 34.57 kg/m   Estimated body mass index is 34.57 kg/m  as calculated from the following:    Height as of this encounter: 1.575 m (5' 2\").    Weight as of this encounter: 85.7 kg (189 lb).  Medication Reconciliation: complete    Pamela M. Lechevalier, LPN    "

## 2019-05-01 ASSESSMENT — ANXIETY QUESTIONNAIRES: GAD7 TOTAL SCORE: 3

## 2019-05-02 DIAGNOSIS — M79.10 MYALGIA: ICD-10-CM

## 2019-05-02 RX ORDER — GABAPENTIN 300 MG/1
CAPSULE ORAL
Qty: 270 CAPSULE | Refills: 0 | Status: SHIPPED | OUTPATIENT
Start: 2019-05-02 | End: 2019-06-03

## 2019-05-02 NOTE — TELEPHONE ENCOUNTER
gabapentin (NEURONTIN) 300 MG capsule      Last Written Prescription Date:  4-3-19  Last Fill Quantity: 270,   # refills: 0  Last Office Visit: 4-30-19  Future Office visit:    Next 5 appointments (look out 90 days)    May 03, 2019  9:30 AM CDT  (Arrive by 9:15 AM)  Return Visit with Rosa Venegas MD  [facility] [address]  [facility] [place] [facility] [address]  [phone]   May 08, 2019  9:30 AM CDT  (Arrive by 9:15 AM)  Return Visit with Chris Walsh DO  [facility] ([facility] ) [address]  [place] [facility] [place] [facility] [address]  [phone]           Routing refill request to provider for review/approval because:  Drug not on the G, P or [facility] refill protocol or controlled substance

## 2019-05-03 ENCOUNTER — ONCOLOGY VISIT (OUTPATIENT)
Dept: ONCOLOGY | Facility: OTHER | Age: 74
End: 2019-05-03
Attending: INTERNAL MEDICINE
Payer: COMMERCIAL

## 2019-05-03 VITALS
SYSTOLIC BLOOD PRESSURE: 122 MMHG | HEART RATE: 60 BPM | WEIGHT: 188.2 LBS | TEMPERATURE: 98.2 F | HEIGHT: 62 IN | DIASTOLIC BLOOD PRESSURE: 78 MMHG | RESPIRATION RATE: 18 BRPM | BODY MASS INDEX: 34.63 KG/M2 | OXYGEN SATURATION: 98 %

## 2019-05-03 DIAGNOSIS — D72.829 LEUKOCYTOSIS, UNSPECIFIED TYPE: ICD-10-CM

## 2019-05-03 DIAGNOSIS — E04.1 THYROID NODULE: Primary | ICD-10-CM

## 2019-05-03 PROCEDURE — G0463 HOSPITAL OUTPT CLINIC VISIT: HCPCS

## 2019-05-03 PROCEDURE — 99214 OFFICE O/P EST MOD 30 MIN: CPT | Performed by: INTERNAL MEDICINE

## 2019-05-03 ASSESSMENT — PAIN SCALES - GENERAL: PAINLEVEL: MILD PAIN (2)

## 2019-05-03 ASSESSMENT — PATIENT HEALTH QUESTIONNAIRE - PHQ9: SUM OF ALL RESPONSES TO PHQ QUESTIONS 1-9: 2

## 2019-05-03 ASSESSMENT — MIFFLIN-ST. JEOR: SCORE: 1316.92

## 2019-05-03 NOTE — NURSING NOTE
"Chief Complaint   Patient presents with     RECHECK     leukocystosis       Initial /78   Pulse 60   Temp 98.2  F (36.8  C)   Resp 18   Ht 1.575 m (5' 2\")   Wt 85.4 kg (188 lb 3.2 oz)   SpO2 98%   BMI 34.42 kg/m   Estimated body mass index is 34.42 kg/m  as calculated from the following:    Height as of this encounter: 1.575 m (5' 2\").    Weight as of this encounter: 85.4 kg (188 lb 3.2 oz).  Medication Reconciliation: complete    Eleanor Mullins RN    "

## 2019-05-03 NOTE — PATIENT INSTRUCTIONS
We would like to see you back in three weeks after your thyroid ultrasound with labs the same day. Please come 30minute(s) prior for lab work.  We will call you with your appointments.Your prescription has been sent to:   [facility] - [place], [place] - [address]  [address] [address]  Phone: [phone] Fax: [phone]  When you are in need of a refill of your medications, please call your pharmacy and they will send us the request. If you have any questions please call [phone]

## 2019-05-03 NOTE — PROGRESS NOTES
Visit Date:   05/03/2019      HEMATOLOGY/ONCOLOGY CLINIC NOTE      HISTORY OF PRESENT ILLNESS:  Ms. Lemon returns for followup of leukocytosis.  I had seen the patient in consultation at the request of Randi Haddad, nurse practitioner on 04/15/2019.  At that time, she was a 73-year-old white female with a history of hypothyroidism, atrial fibrillation and hypertension whom we were asked to evaluate concerning diagnosis of leukocytosis.  According to the patient, she had this chronically for years.  She has been seen by Dr. Presley approximately 5 years ago.  We noted an elevated white count and treated her empirically with antibiotics.  She said her white count went down, and then it would go back up.  Most recently, she said her white count had been evaluated by Randi Haddad, nurse practitioner, who saw the patient on 06/28/2018 and noted that her white count was elevated on 01/23.  It was drawn, and her white count was 13.4, hemoglobin 13.8, hematocrit 42.2, platelet count 326.  She ordered a peripheral blood smear, and this came back that there was mild neutrophilia, reactive lymphocytes, and mild reticulocytosis.  The patient had a CBC repeated 01/28 and at that time, her white count had dropped to 12.8 with 73.7% neutrophils consistent with neutrophilia.  Hemoglobin was 14.5, hematocrit 44.8, platelet count 342.  The patient did complain of a constant runny nose.  She denied tobacco abuse or secondhand smoke.  She did note that she had palpitations from atrial fibrillation.  There was no family history of leukemia or blood disorders.  When we saw the patient, we felt that she likely had neutrophilia secondary to reactive process, i.e., semi-allergic rhinitis.  Nonetheless, we wanted to rule out other etiologies.  We obtained a BCR-ABL transcript, and this came back negative.  We also obtained a sed rate, rheumatoid factor, MALIA, serum protein, all of which were negative.  Lyme titers were  negative.  Wendy-Barr virus profile was negative.  CT chest, abdomen and pelvis was negative except for there was a 1 cm heavily calcified nodule within the left lobe of the thyroid.  As recommended, a thyroid ultrasound would be obtained.  The patient otherwise states she has essentially had chronic fatigue.  She denies any fevers, night sweats, weight loss, abdominal pain, chest pain, headaches, or bone pain.      PHYSICAL EXAMINATION:   GENERAL:  She is an elderly white female in no acute distress.   VITAL SIGNS:  Blood pressure is 122/78, pulse 60, respirations 18, temperature 98.2.   HEENT:  Atraumatic, normocephalic.  Oropharynx is nonerythematous.   NECK:  Supple.   LUNGS:  Clear to auscultation and percussion.   HEART:  Regular rhythm, S1, S2 normal.   ABDOMEN:  Soft, normoactive bowel sounds.  No mass, nontender.   LYMPHATICS:  No cervical, supraclavicular, axillary or inguinal nodes.   EXTREMITIES:  No edema.   NEUROLOGIC:  Nonfocal.      LABORATORY DATA:  Reveal CBC with white count of 12.5 with 76% neutrophils, H and H of 14.4 and 42.9, platelet count 308.  BUN is 16, creatinine 0.91.  LFTs are normal.      IMPRESSION:     1.  Neutrophilia.  This has improved; suspect reactive process.  We will continue to monitor CBC.   2.  Thyroid nodule.  We will obtain a thyroid ultrasound.  Otherwise, will repeat CBC in approximately 3 weeks.  If white count remains stable, the patient likely has a reactive process.      Forty minutes was spent with the patient with greater than half the time spent in counseling and coordination of care.         JULIO PARSON MD             D: 2019   T: 2019   MT:       Name:     BULL ELI   MRN:      [id number]        Account:      [id number]   :      1945           Visit Date:   2019      Document: [id number]       cc: Randi Haddad NP

## 2019-05-08 ENCOUNTER — OFFICE VISIT (OUTPATIENT)
Dept: CARDIOLOGY | Facility: OTHER | Age: 74
End: 2019-05-08
Attending: INTERNAL MEDICINE
Payer: COMMERCIAL

## 2019-05-08 ENCOUNTER — HOSPITAL ENCOUNTER (OUTPATIENT)
Dept: ULTRASOUND IMAGING | Facility: HOSPITAL | Age: 74
Discharge: HOME | End: 2019-05-08
Attending: INTERNAL MEDICINE | Admitting: INTERNAL MEDICINE
Payer: COMMERCIAL

## 2019-05-08 VITALS
WEIGHT: 189 LBS | RESPIRATION RATE: 20 BRPM | BODY MASS INDEX: 34.78 KG/M2 | HEIGHT: 62 IN | OXYGEN SATURATION: 97 % | SYSTOLIC BLOOD PRESSURE: 145 MMHG | DIASTOLIC BLOOD PRESSURE: 75 MMHG | TEMPERATURE: 97.7 F | HEART RATE: 56 BPM

## 2019-05-08 DIAGNOSIS — E04.1 THYROID NODULE: ICD-10-CM

## 2019-05-08 DIAGNOSIS — R00.2 PALPITATIONS: ICD-10-CM

## 2019-05-08 DIAGNOSIS — R07.89 ATYPICAL CHEST PAIN: ICD-10-CM

## 2019-05-08 DIAGNOSIS — I48.0 PAROXYSMAL ATRIAL FIBRILLATION (H): Primary | ICD-10-CM

## 2019-05-08 DIAGNOSIS — I10 BENIGN ESSENTIAL HYPERTENSION: ICD-10-CM

## 2019-05-08 DIAGNOSIS — N18.2 CKD (CHRONIC KIDNEY DISEASE) STAGE 2, GFR 60-89 ML/MIN: ICD-10-CM

## 2019-05-08 DIAGNOSIS — D72.829 LEUKOCYTOSIS, UNSPECIFIED TYPE: ICD-10-CM

## 2019-05-08 DIAGNOSIS — K21.9 GASTROESOPHAGEAL REFLUX DISEASE WITHOUT ESOPHAGITIS: ICD-10-CM

## 2019-05-08 DIAGNOSIS — E78.2 MIXED HYPERLIPIDEMIA: ICD-10-CM

## 2019-05-08 PROCEDURE — G0463 HOSPITAL OUTPT CLINIC VISIT: HCPCS | Mod: 25

## 2019-05-08 PROCEDURE — 99214 OFFICE O/P EST MOD 30 MIN: CPT | Performed by: INTERNAL MEDICINE

## 2019-05-08 PROCEDURE — G0463 HOSPITAL OUTPT CLINIC VISIT: HCPCS

## 2019-05-08 PROCEDURE — 76536 US EXAM OF HEAD AND NECK: CPT | Mod: TC

## 2019-05-08 ASSESSMENT — MIFFLIN-ST. JEOR: SCORE: 1320.55

## 2019-05-08 ASSESSMENT — PAIN SCALES - GENERAL: PAINLEVEL: NO PAIN (0)

## 2019-05-08 NOTE — PROGRESS NOTES
"    Cardiology Progress Note     Assessment & Plan   Nella Lemon is a 73 year old female who is being seen in follow-up to visit from 10/31/18.     She had been seen secondary to paroxysmal atrial fibrillation, hypertension, and atypical chest pain. She continues on Eliquis and metoprolol 50 mg daily.  Previously, she was on aspirin 325 grams as well as Coumadin. Since initiating Eliquis and metoprolol, her palpitations have improved substantially.  Since last being seen, she has had 2 mild episodes at night.  She has had no episodes during the day.    She remains on losartan 100 mg daily and metoprolol 50 mg XL daily for hypertension.   Forgot to take her blood pressure medications today.  Her blood pressure is elevated.  She does check her blood pressure at home but is uncertain if her cuff is functioning.    She has no additional concerns or complaints.    Impression:  1.  Paroxysmal atrial fibrillation.  2.  Hypertension-controlled.  3.  Hyperlipidemia-controlled on 1/23/2019.  4.  Palpitations.  5.  Atypical chest pain.  6.  CKD 2.  7.  GERD.     Plan:   1.  She will continue metoprolol 50 mg XL daily with a heart rate in the 50's and Eliquis 5 mg twice a day.  2.  She is to continue to check her blood pressure at home and let us know if it remains elevated.  She is maxed on losartan 100 mg the and secondary to low heart rates, metoprolol may be difficult to increase.  She will likely need to be on a third medication which could be hydrochlorothiazide or Norvasc.  3.  She will be seen in 6 months follow-up or sooner with problems.      Chris Walsh    Interval History   Nella is a 72-year-old female who has been followed by cardiology secondary to atrial fibrillation and hypertension.     She was having some non-specific complaints which involved a \"throbbing\" to her chest. She said it was not a tightness, pressure, or bandlike symptoms.  This \"throbbing\" surrounding her palpitations and has " "completely resolved.    Her symptoms were somewhat hard to decipher.  She described her symptoms as a \"throbbing\". She also had some discomfort to her neck.  She was having an unusual sound in her ear.  Her chest symptoms were accelerated when sitting on the couch and particularly leaning against the seem of the cushion.  Her symptoms were better when she was active.     She previously had a Zio patch on 2/1/18 that showed an atrial fib burden of 31% with the longest duration being 1 day and 20 hours. Her events corresponded to atrial fibrillation.  She is currently on Eliquis 5 mg twice a day. She has a CHADSVASC score of 3.     With being on Eliquis and metoprolol 25 mg daily.  Her symptoms have completely resolved.  She is feeling much better.  She no longer has the symptoms while sitting on her couch.  Her chest discomfort is gone.     With the increase of her losartan and the initiation of metoprolol, her blood pressures have been better controlled.  Previously, her blood pressures were as high as 180's.        Physical Exam                      There were no vitals filed for this visit.  Vital Signs with Ranges     ROS is negative except that which was noted in the HPI.         Constitutional: awake, alert, cooperative, no apparent distress, and appears stated age  Eyes: Lids and lashes normal,sclera clear, conjunctiva normal  ENT: Normocephalic, without obvious abnormality, atraumatic.  Respiratory: No increased work of breathing, good air exchange, clear to auscultation bilaterally, no crackles or wheezing  Cardiovascular: Normal apical impulse, regular rate and rhythm, normal S1 and S2, no S3 or S4, and no murmur noted  GI: No scars, normal bowel sounds, soft.  Musculoskeletal: no lower extremity pitting edema present  Neurologic: Awake, alert, oriented to name, place and time.   Neuropsychiatric: General: normal, calm and normal eye contact    Medications         Data   No results found for this or any " previous visit (from the past 24 hour(s)).  No results found for this or any previous visit (from the past 24 hour(s)).

## 2019-05-08 NOTE — PATIENT INSTRUCTIONS
You were seen by Dr. Walsh, 5/8/2019.     1.  Continue Eliquis as prescribed.  This medication reduces the risk of stroke related to the atrial fibrilation.     2.  Please continue all medication as prescribed.  If you develop new or worsening symptoms please call the cardiology office as you may need to be seen sooner.     3. Please monitor your blood pressure at home.  If you have increased blood pressures >140/90 please report these findings to the cardiology nurse as you may need to have an adjustment in your medications.       You will follow up with Dr. Walsh 6 months.       Please call the cardiology office with problems, questions, or concerns at [phone].    If you experience chest pain, chest pressure, chest tightness, shortness of breath, fainting, lightheadedness, nausea, vomiting, or other concerning symptoms, please report to the Emergency Department or call 911. These symptoms may be emergent, and best treated in the Emergency Department.       Keke REED RN-BSN  Cardiology   [facility]  [phone]

## 2019-05-08 NOTE — NURSING NOTE
"Chief Complaint   Patient presents with     RECHECK     6 month follow-up;  Patient states she \"did not take her medications this morning because she was in a hurry\".  Patient states her \"home BP readings have been high but they have been good when she's been in the clinic recently\".        Initial /78 (BP Location: Left arm, Patient Position: Chair, Cuff Size: Adult Large)   Pulse 55   Temp 97.7  F (36.5  C) (Tympanic)   Resp 20   Ht 1.575 m (5' 2\")   Wt 85.7 kg (189 lb)   SpO2 97%   BMI 34.57 kg/m   Estimated body mass index is 34.57 kg/m  as calculated from the following:    Height as of this encounter: 1.575 m (5' 2\").    Weight as of this encounter: 85.7 kg (189 lb).  Medication Reconciliation: complete    Adina Maynard LPN    "

## 2019-05-22 DIAGNOSIS — I48.0 PAROXYSMAL ATRIAL FIBRILLATION (H): ICD-10-CM

## 2019-05-23 RX ORDER — APIXABAN 5 MG/1
TABLET, FILM COATED ORAL
Qty: 60 TABLET | Refills: 1 | Status: SHIPPED | OUTPATIENT
Start: 2019-05-23 | End: 2019-07-22

## 2019-05-31 ENCOUNTER — ONCOLOGY VISIT (OUTPATIENT)
Dept: ONCOLOGY | Facility: OTHER | Age: 74
End: 2019-05-31
Attending: INTERNAL MEDICINE
Payer: COMMERCIAL

## 2019-05-31 ENCOUNTER — TELEPHONE (OUTPATIENT)
Dept: INTERVENTIONAL RADIOLOGY/VASCULAR | Facility: HOSPITAL | Age: 74
End: 2019-05-31

## 2019-05-31 VITALS
TEMPERATURE: 97.3 F | OXYGEN SATURATION: 97 % | SYSTOLIC BLOOD PRESSURE: 142 MMHG | HEIGHT: 62 IN | DIASTOLIC BLOOD PRESSURE: 78 MMHG | HEART RATE: 54 BPM | RESPIRATION RATE: 20 BRPM | WEIGHT: 189 LBS | BODY MASS INDEX: 34.78 KG/M2

## 2019-05-31 DIAGNOSIS — D72.828 OTHER ELEVATED WHITE BLOOD CELL (WBC) COUNT: ICD-10-CM

## 2019-05-31 DIAGNOSIS — D72.829 LEUKOCYTOSIS, UNSPECIFIED TYPE: ICD-10-CM

## 2019-05-31 DIAGNOSIS — E04.1 THYROID NODULE: ICD-10-CM

## 2019-05-31 DIAGNOSIS — E04.1 THYROID NODULE: Primary | ICD-10-CM

## 2019-05-31 LAB
ALBUMIN SERPL-MCNC: 3.6 G/DL (ref 3.4–5)
ALP SERPL-CCNC: 123 U/L (ref 40–150)
ALT SERPL W P-5'-P-CCNC: 29 U/L (ref 0–50)
ANION GAP SERPL CALCULATED.3IONS-SCNC: 6 MMOL/L (ref 3–14)
AST SERPL W P-5'-P-CCNC: 15 U/L (ref 0–45)
BASOPHILS # BLD AUTO: 0 10E9/L (ref 0–0.2)
BASOPHILS NFR BLD AUTO: 0.4 %
BILIRUB SERPL-MCNC: 0.8 MG/DL (ref 0.2–1.3)
BUN SERPL-MCNC: 16 MG/DL (ref 7–30)
CALCIUM SERPL-MCNC: 8.3 MG/DL (ref 8.5–10.1)
CHLORIDE SERPL-SCNC: 110 MMOL/L (ref 94–109)
CO2 SERPL-SCNC: 27 MMOL/L (ref 20–32)
CREAT SERPL-MCNC: 0.96 MG/DL (ref 0.52–1.04)
DIFFERENTIAL METHOD BLD: NORMAL
EOSINOPHIL # BLD AUTO: 0.1 10E9/L (ref 0–0.7)
EOSINOPHIL NFR BLD AUTO: 1.2 %
ERYTHROCYTE [DISTWIDTH] IN BLOOD BY AUTOMATED COUNT: 12.9 % (ref 10–15)
GFR SERPL CREATININE-BSD FRML MDRD: 59 ML/MIN/{1.73_M2}
GLUCOSE SERPL-MCNC: 104 MG/DL (ref 70–99)
HCT VFR BLD AUTO: 42.6 % (ref 35–47)
HGB BLD-MCNC: 14.4 G/DL (ref 11.7–15.7)
IMM GRANULOCYTES # BLD: 0 10E9/L (ref 0–0.4)
IMM GRANULOCYTES NFR BLD: 0.4 %
LDH SERPL L TO P-CCNC: 197 U/L (ref 81–234)
LYMPHOCYTES # BLD AUTO: 2 10E9/L (ref 0.8–5.3)
LYMPHOCYTES NFR BLD AUTO: 18.1 %
MCH RBC QN AUTO: 28.3 PG (ref 26.5–33)
MCHC RBC AUTO-ENTMCNC: 33.8 G/DL (ref 31.5–36.5)
MCV RBC AUTO: 84 FL (ref 78–100)
MONOCYTES # BLD AUTO: 0.6 10E9/L (ref 0–1.3)
MONOCYTES NFR BLD AUTO: 5.9 %
NEUTROPHILS # BLD AUTO: 8.1 10E9/L (ref 1.6–8.3)
NEUTROPHILS NFR BLD AUTO: 74 %
NRBC # BLD AUTO: 0 10*3/UL
NRBC BLD AUTO-RTO: 0 /100
PLATELET # BLD AUTO: 297 10E9/L (ref 150–450)
POTASSIUM SERPL-SCNC: 3.7 MMOL/L (ref 3.4–5.3)
PROT SERPL-MCNC: 7.1 G/DL (ref 6.8–8.8)
RBC # BLD AUTO: 5.09 10E12/L (ref 3.8–5.2)
SODIUM SERPL-SCNC: 143 MMOL/L (ref 133–144)
T4 FREE SERPL-MCNC: 1 NG/DL (ref 0.76–1.46)
TSH SERPL DL<=0.005 MIU/L-ACNC: 4.9 MU/L (ref 0.4–4)
WBC # BLD AUTO: 10.9 10E9/L (ref 4–11)

## 2019-05-31 PROCEDURE — 36415 COLL VENOUS BLD VENIPUNCTURE: CPT | Mod: ZL | Performed by: INTERNAL MEDICINE

## 2019-05-31 PROCEDURE — 85025 COMPLETE CBC W/AUTO DIFF WBC: CPT | Mod: ZL | Performed by: INTERNAL MEDICINE

## 2019-05-31 PROCEDURE — 99213 OFFICE O/P EST LOW 20 MIN: CPT | Performed by: INTERNAL MEDICINE

## 2019-05-31 PROCEDURE — 80053 COMPREHEN METABOLIC PANEL: CPT | Mod: ZL | Performed by: INTERNAL MEDICINE

## 2019-05-31 PROCEDURE — G0463 HOSPITAL OUTPT CLINIC VISIT: HCPCS

## 2019-05-31 PROCEDURE — 84443 ASSAY THYROID STIM HORMONE: CPT | Mod: ZL | Performed by: INTERNAL MEDICINE

## 2019-05-31 PROCEDURE — 83615 LACTATE (LD) (LDH) ENZYME: CPT | Mod: ZL | Performed by: INTERNAL MEDICINE

## 2019-05-31 PROCEDURE — 84439 ASSAY OF FREE THYROXINE: CPT | Mod: ZL | Performed by: INTERNAL MEDICINE

## 2019-05-31 RX ORDER — LIDOCAINE HYDROCHLORIDE 10 MG/ML
10 INJECTION, SOLUTION EPIDURAL; INFILTRATION; INTRACAUDAL; PERINEURAL ONCE
Status: CANCELLED | OUTPATIENT
Start: 2019-06-21

## 2019-05-31 ASSESSMENT — MIFFLIN-ST. JEOR: SCORE: 1320.55

## 2019-05-31 ASSESSMENT — PAIN SCALES - GENERAL: PAINLEVEL: NO PAIN (0)

## 2019-05-31 ASSESSMENT — PATIENT HEALTH QUESTIONNAIRE - PHQ9: SUM OF ALL RESPONSES TO PHQ QUESTIONS 1-9: 2

## 2019-05-31 NOTE — NURSING NOTE
"Chief Complaint   Patient presents with     RECHECK     Folllow up Leukocytosis       Initial /78   Pulse 54   Temp 97.3  F (36.3  C) (Tympanic)   Resp 20   Ht 1.575 m (5' 2\")   Wt 85.7 kg (189 lb)   SpO2 97%   BMI 34.57 kg/m   Estimated body mass index is 34.57 kg/m  as calculated from the following:    Height as of this encounter: 1.575 m (5' 2\").    Weight as of this encounter: 85.7 kg (189 lb).  Medication Reconciliation: complete.  Immunizations and advance directives status reviewed. Pain scale 0 , PHQ-9 =2.              Jina Juarez LPN    "

## 2019-05-31 NOTE — PROGRESS NOTES
Visit Date:   05/31/2019      HEMATOLOGY/ONCOLOGY CLINIC NOTE      HISTORY OF PRESENT ILLNESS:        HISTORY OF PRESENT ILLNESS:  Ms. Lemon returns for followup of leukocytosis.  I had seen the patient in consultation at the request of Randi Haddad NP on 04/15/2019.  At that time, she was a 73-year-old white female with a history of hypothyroidism, atrial fibrillation and hypertension whom we were asked to evaluate concerning diagnosis of leukocytosis.  According to the patient, she had this chronically for years.  She has been seen by Dr. Presley approximately 5 years ago who noted an elevated white count and treated her empirically with antibiotics.  She said her white count went down, and then it would go back up.  Most recently, she said her white count had been evaluated by Randi Haddad NP who saw the patient on 06/28/2018 and noted that her white count was elevated.  On 01/23, CBC was drawn, and her white count was 13.4, hemoglobin 13.8, hematocrit 42.2, platelet count 326.  She ordered a peripheral blood smear and this came back that there was mild neutrophilia, reactive lymphocytes, and mild reticulocytosis.  The patient had a CBC repeated on 01/28 and at that time, her white count had dropped to 12.8 with 73.7% neutrophils consistent with neutrophilia.  Hemoglobin was 14.5, hematocrit 44.8, platelet count 342.  The patient did complain of a constant runny nose.  She denied tobacco abuse or secondhand smoke.  She did note that she had palpitations from atrial fibrillation.  There was no family history of leukemia or blood disorders.        When we saw the patient, we felt that she likely had neutrophilia secondary to reactive process, i.e., allergic rhinitis.  Nonetheless, we wanted to rule out other etiologies.  We obtained a BCR-ABL transcript and this came back negative.  We also obtained a sed rate, rheumatoid factor, MALIA, serum protein electrophoresis, all of which were negative.   Lyme titers were negative.  Wendy-Barr virus profile was negative.  CT chest, abdomen and pelvis was negative except for there was a 1 cm heavily calcified nodule within the left lobe of the thyroid.  Thyroid ultrasound was recommended.  The patient underwent a thyroid ultrasound and it revealed a left thyroid nodule; malignancy could not be ruled out.  Otherwise, the patient states she is doing well.  She has no complaints of fevers, night sweats, weight loss, fatigue.  She says she continues on Synthroid.       PHYSICAL EXAMINATION:   GENERAL:  She is an elderly white female in no acute distress.   VITAL SIGNS:  Blood pressure 142/78, pulse 54, respirations 20, temperature 97.3.   HEENT:  Atraumatic, normocephalic.  Oropharynx nonerythematous.   NECK:  Supple.   LUNGS:  Clear to auscultation and percussion.   HEART:  Regular rhythm, S1, S2 normal.   ABDOMEN:  Soft, normoactive bowel sounds.  No mass or tenderness.   LYMPHATICS:  No cervical, supraclavicular, axillary or inguinal nodes.   EXTREMITIES:  No edema.   NEUROLOGIC:  Nonfocal.      LABORATORY DATA:  CBC with white count 10.9, H and H 14.4 and 42.6, platelet count 297.      IMPRESSION:     1.  Neutrophilia, essentially resolved.  White count is normal.   2.  Thyroid nodule.  We will obtain an ultrasound-guided FNA of the left thyroid nodule.      We will see the patient in approximately 4-5 weeks to discuss results.      Forty minutes were spent with the patient, greater than half that time was spent in counseling and coordination of care.         JULIO PARSON MD             D: 2019   T: 2019   MT: ALLA      Name:     BULL ELI   MRN:      [id number]        Account:      [id number]   :      1945           Visit Date:   2019      Document: [id number]       cc: Randi Haddad NP

## 2019-05-31 NOTE — PATIENT INSTRUCTIONS
Ultrasound-guided FNA of left thyroid nodule to be scheduled in 3 weeks.  Follow up with Dr Venegas in 5 weeks.    Please call [phone] with any questions.

## 2019-06-03 DIAGNOSIS — M79.10 MYALGIA: ICD-10-CM

## 2019-06-05 RX ORDER — GABAPENTIN 300 MG/1
CAPSULE ORAL
Qty: 270 CAPSULE | Refills: 1 | Status: SHIPPED | OUTPATIENT
Start: 2019-06-05 | End: 2019-08-02

## 2019-06-05 NOTE — TELEPHONE ENCOUNTER
Gabapentin  Last Written Prescription Date: 5/2/19  Last Fill Quantity: 270 # of Refills: 0  Last Office Visit: 4/30/19

## 2019-06-14 RX ORDER — LIDOCAINE HYDROCHLORIDE 10 MG/ML
10 INJECTION, SOLUTION EPIDURAL; INFILTRATION; INTRACAUDAL; PERINEURAL ONCE
Status: CANCELLED | OUTPATIENT
Start: 2019-06-21

## 2019-06-18 ENCOUNTER — ANCILLARY PROCEDURE (OUTPATIENT)
Dept: GENERAL RADIOLOGY | Facility: OTHER | Age: 74
End: 2019-06-18
Attending: NURSE PRACTITIONER
Payer: COMMERCIAL

## 2019-06-18 ENCOUNTER — TELEPHONE (OUTPATIENT)
Dept: ONCOLOGY | Facility: OTHER | Age: 74
End: 2019-06-18

## 2019-06-18 ENCOUNTER — OFFICE VISIT (OUTPATIENT)
Dept: FAMILY MEDICINE | Facility: OTHER | Age: 74
End: 2019-06-18
Attending: NURSE PRACTITIONER
Payer: COMMERCIAL

## 2019-06-18 VITALS
SYSTOLIC BLOOD PRESSURE: 128 MMHG | HEART RATE: 56 BPM | DIASTOLIC BLOOD PRESSURE: 70 MMHG | WEIGHT: 194 LBS | BODY MASS INDEX: 35.48 KG/M2 | RESPIRATION RATE: 14 BRPM | OXYGEN SATURATION: 96 % | TEMPERATURE: 97 F

## 2019-06-18 DIAGNOSIS — R60.0 PEDAL EDEMA: ICD-10-CM

## 2019-06-18 DIAGNOSIS — R60.0 PEDAL EDEMA: Primary | ICD-10-CM

## 2019-06-18 LAB
ALBUMIN SERPL-MCNC: 3.4 G/DL (ref 3.4–5)
ALP SERPL-CCNC: 120 U/L (ref 40–150)
ALT SERPL W P-5'-P-CCNC: 30 U/L (ref 0–50)
ANION GAP SERPL CALCULATED.3IONS-SCNC: 9 MMOL/L (ref 3–14)
AST SERPL W P-5'-P-CCNC: 19 U/L (ref 0–45)
BASOPHILS # BLD AUTO: 0 10E9/L (ref 0–0.2)
BASOPHILS NFR BLD AUTO: 0.3 %
BILIRUB SERPL-MCNC: 0.7 MG/DL (ref 0.2–1.3)
BUN SERPL-MCNC: 14 MG/DL (ref 7–30)
CALCIUM SERPL-MCNC: 8.7 MG/DL (ref 8.5–10.1)
CHLORIDE SERPL-SCNC: 111 MMOL/L (ref 94–109)
CO2 SERPL-SCNC: 25 MMOL/L (ref 20–32)
CREAT SERPL-MCNC: 0.96 MG/DL (ref 0.52–1.04)
DIFFERENTIAL METHOD BLD: ABNORMAL
EOSINOPHIL # BLD AUTO: 0.1 10E9/L (ref 0–0.7)
EOSINOPHIL NFR BLD AUTO: 1.1 %
ERYTHROCYTE [DISTWIDTH] IN BLOOD BY AUTOMATED COUNT: 13.2 % (ref 10–15)
GFR SERPL CREATININE-BSD FRML MDRD: 59 ML/MIN/{1.73_M2}
GLUCOSE SERPL-MCNC: 98 MG/DL (ref 70–99)
HCT VFR BLD AUTO: 41.4 % (ref 35–47)
HGB BLD-MCNC: 13.9 G/DL (ref 11.7–15.7)
LYMPHOCYTES # BLD AUTO: 2.2 10E9/L (ref 0.8–5.3)
LYMPHOCYTES NFR BLD AUTO: 18.5 %
MCH RBC QN AUTO: 29.1 PG (ref 26.5–33)
MCHC RBC AUTO-ENTMCNC: 33.6 G/DL (ref 31.5–36.5)
MCV RBC AUTO: 87 FL (ref 78–100)
MONOCYTES # BLD AUTO: 0.6 10E9/L (ref 0–1.3)
MONOCYTES NFR BLD AUTO: 5.4 %
NEUTROPHILS # BLD AUTO: 8.8 10E9/L (ref 1.6–8.3)
NEUTROPHILS NFR BLD AUTO: 74.7 %
PLATELET # BLD AUTO: 281 10E9/L (ref 150–450)
POTASSIUM SERPL-SCNC: 4.1 MMOL/L (ref 3.4–5.3)
PROT SERPL-MCNC: 6.9 G/DL (ref 6.8–8.8)
RBC # BLD AUTO: 4.78 10E12/L (ref 3.8–5.2)
SODIUM SERPL-SCNC: 145 MMOL/L (ref 133–144)
WBC # BLD AUTO: 11.8 10E9/L (ref 4–11)

## 2019-06-18 PROCEDURE — 99214 OFFICE O/P EST MOD 30 MIN: CPT | Mod: 25 | Performed by: NURSE PRACTITIONER

## 2019-06-18 PROCEDURE — 93005 ELECTROCARDIOGRAM TRACING: CPT

## 2019-06-18 PROCEDURE — 80053 COMPREHEN METABOLIC PANEL: CPT | Mod: ZL | Performed by: NURSE PRACTITIONER

## 2019-06-18 PROCEDURE — 93010 ELECTROCARDIOGRAM REPORT: CPT | Performed by: INTERNAL MEDICINE

## 2019-06-18 PROCEDURE — G0463 HOSPITAL OUTPT CLINIC VISIT: HCPCS | Mod: 25

## 2019-06-18 PROCEDURE — 71046 X-RAY EXAM CHEST 2 VIEWS: CPT | Mod: TC,FY

## 2019-06-18 PROCEDURE — 85025 COMPLETE CBC W/AUTO DIFF WBC: CPT | Mod: ZL | Performed by: NURSE PRACTITIONER

## 2019-06-18 PROCEDURE — G0463 HOSPITAL OUTPT CLINIC VISIT: HCPCS

## 2019-06-18 PROCEDURE — 36415 COLL VENOUS BLD VENIPUNCTURE: CPT | Mod: ZL | Performed by: NURSE PRACTITIONER

## 2019-06-18 RX ORDER — FUROSEMIDE 20 MG
20 TABLET ORAL 2 TIMES DAILY
Qty: 10 TABLET | Refills: 0 | Status: SHIPPED | OUTPATIENT
Start: 2019-06-18 | End: 2019-07-10

## 2019-06-18 NOTE — TELEPHONE ENCOUNTER
Called patient back and her feet have been slightly swollen all week. They are both more swollen today. She is seeing Onc for leukocytosis. Pt can see white finger print when asked to touch both top of feet. Denies SOB. Note she is on BP pills.She has not checked her BP's. She states she is urinating. Advised her to see PCP and schedule appt. Please note.PCP PALMER Masterson.

## 2019-06-18 NOTE — TELEPHONE ENCOUNTER
Pt left voice message stating that she has a biopsy scheduled for this Friday.  She notes that her feet have been swelling; wonders if this is concerning with her treatment plan?  Will forward message to Dr Venegas and PAO Webster.  Pt would appreciate a phone call back at [phone].  Adina Plasencia

## 2019-06-18 NOTE — Clinical Note
Pt is scheduled for a thyroid bx this Friday.She is here today with 1+ pitting edema - ankles, feet, calves.  No SOB or orthopnea.  CXR - mild cardiomegaly, EKG sinus bradycardia - labs are pending. I am treating her with lasix 20 BID for 5 days.  May want to push off her Bx, up to you.

## 2019-06-18 NOTE — PROGRESS NOTES
"Nella Lemon is a 74 year old female who presents to clinic today for the following health issues:      Concern - swelling/edema  Onset: 4 days    Description:   bilat lower legs, slight pitting    States both  legs feel tight    Intensity: moderate    Progression of Symptoms:  worsening    Accompanying Signs & Symptoms:  Sore muscles   Gaining wt - Is normally in the 170s lbs, is 194 lbs today  No SOB    Previous history of similar problem:   NO    Precipitating factors:   Worsened by: worse in the morning when wakes up    Alleviating factors:  Improved by: elevating   feet slight relief       Sees Dr. Walsh  for cardiovascular management, atrial Fibrillation  Stress ECHO was completed 4/23/19      Has been waking up with headaches every morning for the past few days  Vision has been more \"filmy, blurry\" - was supposed to go see eye doctor today but had to cancel - was told to follow up for assessment r/t cataracts      Seeing Dr.Provatas Stark for thyroid biopsy      Reviewed and updated as needed this visit by Provider         Review of Systems   ROS COMP: Constitutional, HEENT, cardiovascular, pulmonary, gi and gu systems are negative, except as otherwise noted.        Objective    /70 (BP Location: Left arm, Patient Position: Sitting, Cuff Size: Adult Large)   Pulse 56   Temp 97  F (36.1  C)   Resp 14   Wt 88 kg (194 lb)   SpO2 96%   BMI 35.48 kg/m    Body mass index is 35.48 kg/m .        6/18/19 5/31/19 5/8/19 5/3/19 4/30/19   /70 142/78 145/75 122/78 122/60   Pulse 56 54 56 60 53   Resp 14 20 20 18 18   Temp 97  F (36.1  C) 97.3  F (36.3  C) 97.7  F (36.5  C) 98.2  F (36.8  C) --   Temp src -- Tympanic Tympanic -- --   SpO2 96 % 97 % 97 % 98 % 97 %   Height -- 1.575 m (5' 2\") 1.575 m (5' 2\") 1.575 m (5' 2\") 1.575 m (5' 2\")   Weight 88 kg (194 lb) 85.7 kg (189 lb) 85.7 kg (189 lb) 85.4 kg (188 lb 3.2 oz) 85.7 kg (189 lb)   Pain Score -- 0 (None) 0 (None) 2 (Mild) 0 (None)   Pain Loc " -- -- -- KNEE [right] --   BMI (Calculated) -- 34.57 34.57 34.42 34.57               Physical Exam   GENERAL: healthy, alert and no distress  EYES: Eyes grossly normal to inspection, PERRL and conjunctivae and sclerae normal  NECK: no adenopathy, no asymmetry, masses, or scars and thyroid normal to palpation  RESP: lungs clear to auscultation - no rales, rhonchi or wheezes  CV: regular rate and rhythm, normal S1 S2, no S3 or S4, no murmur, click or rub, no peripheral edema and peripheral pulses strong  CV: regular rates and rhythm and 1+ bilateral - feet, ankles, calves.         PROCEDURE:  XR CHEST 2 VW - 6/18/19     HISTORY:  Pedal edema.      COMPARISON:  None.     FINDINGS:   There is borderline cardiomegaly. The pulmonary vasculature is normal.   There is some linear atelectasis or scarring at the left lung base.  No pleural effusion or pneumothorax.                                                                      IMPRESSION:  Borderline cardiomegaly       FILI MONSON MD              Assessment & Plan     1. Pedal edema  - Comprehensive metabolic panel (BMP + Alb, Alk Phos, ALT, AST, Total. Bili, TP)  - EKG 12-lead complete w/read - (Clinic Performed)  - XR CHEST 2 VW (Clinic Performed); Future  - CBC with platelets differential  - furosemide (LASIX) 20 MG tablet; Take 1 tablet (20 mg) by mouth 2 times daily for 5 days  Dispense: 10 tablet; Refill: 0  - Low salt diet  - Elevate legs as able  - To ER with any new or increased symptoms  - Insure adequate dietary potassium intake    Ii have messaged Dr Hyman and Dr. Walsh regarding todays findings and treatment plan          Laila Tyler NP  [facility]

## 2019-06-18 NOTE — PATIENT INSTRUCTIONS
Assessment & Plan     1. Pedal edema  - Comprehensive metabolic panel (BMP + Alb, Alk Phos, ALT, AST, Total. Bili, TP)  - EKG 12-lead complete w/read - (Clinic Performed)  - XR CHEST 2 VW (Clinic Performed); Future  - CBC with platelets differential  - furosemide (LASIX) 20 MG tablet; Take 1 tablet (20 mg) by mouth 2 times daily for 5 days  Dispense: 10 tablet; Refill: 0  - Low salt diet  - Elevate legs as able  - To ER with any new or increased symptoms  - Insure adequate dietary potassium intake    Ii have messaged Dr Hyman and Dr. Walsh regarding todays findings and treatment plan          Laila Tyler NP  [facility] [place] [facility]

## 2019-06-18 NOTE — PROGRESS NOTES
"Chief Complaint   Patient presents with     Swelling       Initial /70 (BP Location: Left arm, Patient Position: Sitting, Cuff Size: Adult Large)   Pulse 56   Temp 97  F (36.1  C)   Resp 14   Wt 88 kg (194 lb)   SpO2 96%   BMI 35.48 kg/m   Estimated body mass index is 35.48 kg/m  as calculated from the following:    Height as of 5/31/19: 1.575 m (5' 2\").    Weight as of this encounter: 88 kg (194 lb).  Medication Reconciliation: complete     Adina Morris          "

## 2019-06-19 ENCOUNTER — TELEPHONE (OUTPATIENT)
Dept: ONCOLOGY | Facility: OTHER | Age: 74
End: 2019-06-19

## 2019-06-19 NOTE — RESULT ENCOUNTER NOTE
Low sodium diet  WBC count elevated - she did not present with any symptoms of infection.  I treated her with Lasix, Dr. Walsh and Boogie were updated.  If she has any symptoms of infection, fever, or other, please let me know.    She has a short term Follow-up visit with PCP, Aroldo Lomeli, next week.    [facility]  [phone]

## 2019-06-19 NOTE — TELEPHONE ENCOUNTER
Returned pt phone call.  States her appointment with Laila Tyler yesterday went well; all testing was normal.  Placed on Lasix and her feet swelling has gone down; although she did have some cramping last night. She will proceed with Friday's procedure; ultrasound FNA of thyroid.  Adina Plasencia

## 2019-06-20 ENCOUNTER — TELEPHONE (OUTPATIENT)
Dept: INTERVENTIONAL RADIOLOGY/VASCULAR | Facility: HOSPITAL | Age: 74
End: 2019-06-20

## 2019-06-21 ENCOUNTER — HOSPITAL ENCOUNTER (OUTPATIENT)
Dept: ULTRASOUND IMAGING | Facility: HOSPITAL | Age: 74
Discharge: HOME | End: 2019-06-21
Attending: INTERNAL MEDICINE | Admitting: INTERNAL MEDICINE
Payer: COMMERCIAL

## 2019-06-21 VITALS
OXYGEN SATURATION: 94 % | DIASTOLIC BLOOD PRESSURE: 86 MMHG | RESPIRATION RATE: 16 BRPM | TEMPERATURE: 98.3 F | HEART RATE: 56 BPM | SYSTOLIC BLOOD PRESSURE: 128 MMHG

## 2019-06-21 DIAGNOSIS — E04.1 THYROID NODULE: ICD-10-CM

## 2019-06-21 PROCEDURE — 88173 CYTOPATH EVAL FNA REPORT: CPT | Mod: TC | Performed by: RADIOLOGY

## 2019-06-21 PROCEDURE — 10005 FNA BX W/US GDN 1ST LES: CPT | Mod: TC

## 2019-06-21 PROCEDURE — 25000125 ZZHC RX 250

## 2019-06-21 PROCEDURE — 88305 TISSUE EXAM BY PATHOLOGIST: CPT | Mod: TC | Performed by: RADIOLOGY

## 2019-06-21 PROCEDURE — 00000155 ZZHCL STATISTIC H-CELL BLOCK W/STAIN: Performed by: RADIOLOGY

## 2019-06-21 PROCEDURE — 88172 CYTP DX EVAL FNA 1ST EA SITE: CPT | Mod: TC | Performed by: RADIOLOGY

## 2019-06-21 RX ORDER — LIDOCAINE HYDROCHLORIDE 10 MG/ML
INJECTION, SOLUTION EPIDURAL; INFILTRATION; INTRACAUDAL; PERINEURAL
Status: COMPLETED
Start: 2019-06-21 | End: 2019-06-21

## 2019-06-21 RX ORDER — LIDOCAINE HYDROCHLORIDE 10 MG/ML
10 INJECTION, SOLUTION EPIDURAL; INFILTRATION; INTRACAUDAL; PERINEURAL ONCE
Status: COMPLETED | OUTPATIENT
Start: 2019-06-21 | End: 2019-06-21

## 2019-06-21 RX ADMIN — LIDOCAINE HYDROCHLORIDE 3 ML: 10 INJECTION, SOLUTION EPIDURAL; INFILTRATION; INTRACAUDAL; PERINEURAL at 12:48

## 2019-06-21 NOTE — IP AVS SNAPSHOT
[facility]  [address] [place] [address]  Phone:  [phone]                                    After Visit Summary   6/21/2019    Nella Lemon    MRN: [id number]           After Visit Summary Signature Page    I have received my discharge instructions, and my questions have been answered. I have discussed any challenges I see with this plan with the nurse or doctor.    ..........................................................................................................................................  Patient/Patient Representative Signature      ..........................................................................................................................................  Patient Representative Print Name and Relationship to Patient    ..................................................               ................................................  Date                                   Time    ..........................................................................................................................................  Reviewed by Signature/Title    ...................................................              ..............................................  Date                                               Time          22EPIC Rev 08/18

## 2019-06-21 NOTE — IP AVS SNAPSHOT
MRN:[id number]                      After Visit Summary   6/21/2019    Nella Lemon    MRN: [id number]           Visit Information        Provider Department      6/21/2019 12:00 PM [facility]; [facility]; [facility]           Review of your medicines      UNREVIEWED medicines. Ask your doctor about these medicines       Dose / Directions   ELIQUIS 5 MG tablet  Used for:  Paroxysmal atrial fibrillation (H)  Generic drug:  apixaban ANTICOAGULANT      TAKE 1 TABLET (5 MG) BY MOUTH 2 TIMES DAILY  Quantity:  60 tablet  Refills:  1     furosemide 20 MG tablet  Commonly known as:  LASIX  Used for:  Pedal edema      Dose:  20 mg  Take 1 tablet (20 mg) by mouth 2 times daily for 5 days  Quantity:  10 tablet  Refills:  0     gabapentin 300 MG capsule  Commonly known as:  NEURONTIN  Used for:  Myalgia      TAKE 3 CAPSULES THREE TIMES DAILY  Quantity:  270 capsule  Refills:  1     GLUCOSAMINE SULFATE PO      Dose:  1000 mg  Take 1,000 mg by mouth 2 times daily  Refills:  0     levothyroxine 25 MCG tablet  Commonly known as:  SYNTHROID/LEVOTHROID  Used for:  Hypothyroidism due to acquired atrophy of thyroid      TAKE 1 TABLET DAILY BEST ON EMPTY STOMACH FOR THYROID  Quantity:  90 tablet  Refills:  2     losartan 100 MG tablet  Commonly known as:  COZAAR  Used for:  Benign essential hypertension      Take 1 tablet by mouth daily.  Quantity:  90 tablet  Refills:  1     metoprolol succinate ER 50 MG 24 hr tablet  Commonly known as:  TOPROL-XL  Used for:  Paroxysmal atrial fibrillation (H)      Dose:  50 mg  TAKE 1 TABLET (50 MG) BY MOUTH DAILY  Quantity:  30 tablet  Refills:  0     OMEGA-3 FISH OIL PO      Dose:  3 g  Take 3 g by mouth daily  Refills:  0     omeprazole 40 MG DR capsule  Commonly known as:  priLOSEC  Used for:  Gastroesophageal reflux disease, esophagitis presence not specified      TAKE 1 CAPSULE (40 MG) BY MOUTH DAILY TAKE 30-60 MINUTES BEFORE A MEAL.  Quantity:  90 capsule  Refills:   1     rosuvastatin 10 MG tablet  Commonly known as:  CRESTOR  Used for:  Hyperlipidemia LDL goal <100      TAKE 1 TABLET DAILY TO HELP LOWER CHOLESTEROL  Quantity:  90 tablet  Refills:  2     sertraline 50 MG tablet  Commonly known as:  ZOLOFT  Used for:  Anxiety      TAKE 1 TABLET DAILY  Quantity:  30 tablet  Refills:  8     VITAMIN D (CHOLECALCIFEROL) PO      Take by mouth daily  Refills:  0              Protect others around you: Learn how to safely use, store and throw away your medicines at www.disposemymeds.org.       Follow-ups after your visit       Your next 10 appointments already scheduled    Jul 10, 2019  8:15 AM CDT  (Arrive by 8:00 AM)  SHORT with Randi Haddad NP  [facility] ([facility] ) [address]  [place] [address]  [phone]      Jul 22, 2019 10:00 AM CDT  (Arrive by 9:45 AM)  Return Visit with Rosa Venegas MD  [facility] ([facility] [address]  [place] [address]  [phone]      Aug 28, 2019  8:30 AM CDT  (Arrive by 8:15 AM)  Return Visit with Joss Palafox  [facility] ([facility] ) [address]  [place] [address]  [phone]      Oct 30, 2019  9:45 AM CDT  (Arrive by 9:30 AM)  SHORT with Randi Haddad NP  [facility] ([facility] ) [address]  [place] [address]  [phone]      Nov 11, 2019 10:00 AM CST  (Arrive by 9:45 AM)  Return Visit with Chris Walsh DO  [facility] ([facility] ) [address]  [place] [address]  [phone]         Care Instructions       Further instructions from your care team           DISCHARGE INSTRUCTIONS  Needle Aspiration      IMPORTANT: As you prepare for discharge, the following information will help you return to your best level of  "health.               This Information Is About Your Follow Up Care     Call your doctor if you do not get better. Call sooner if you feel worse. You can reach your doctor by calling their clinic phone number.                                    This Information Is About Procedures      NEEDLE ASPIRATION  You have had a needle aspiration.  A needle aspiration (also called a \"fine needle biopsy\") is a procedure used to take a sample of cells or tissues from a lump or mass or other area of concern.  The sample of cells taken during the procedure can then be checked in the lab under a microscope to find out if there is cancer or other diseases.   Sometimes a needle aspiration is done to check the effect of treatment on a known tumor.    When you go home, follow these instructions:    Check the site frequently for bleeding, swelling, redness, or drainage.    Use an ice pack at the site for 20 minutes at a time if needed for pain.    Rest for the remainder of the day.    Don't drive for 24 hours if you received medicine to relax you during the procedure.    Return to your usual diet.    Do not take aspirin or anti-inflammatory medicines like ibuprofen or naproxen (check with your doctor if you take aspirin for heart disease or stroke).    You may take acetaminophen (Tylenol) for pain if needed.    Do not take blood thinner medicines until your doctor tells you to restart them.    Call your doctor if you have:    Continued bleeding    Swelling or a lump at the needle site    Pain not made better by acetaminophen    Fever or chills    Redness or drainage from the needle site    Chest pain or trouble breathing    Any questions or concerns                      Additional Information About Your Visit       [employer] Res[employer] Information    [employer] lets you send messages to your doctor, view your test results, renew your prescriptions, schedule appointments and more. To sign up, go to [email or web address] . Click on \"Log in\" on the " "left side of the screen, which will take you to the Welcome page. Then click on \"Sign up Now\" on the right side of the page.     You will be asked to enter the access code listed below, as well as some personal information. Please follow the directions to create your username and password.     Your access code is: [email or web address]  Expires: 2019  9:45 AM     Your access code will  in 60 days. If you need help or a new code, please call your [facility] clinic or [phone].       Care EveryWhere ID    This is your Care EveryWhere ID. This could be used by other organizations to access your [facility] medical records  [id number]        Primary Care Provider Office Phone # Fax #    Randi REESEWenceslao Yostutanali, CECILY [phone]      Equal Access to Services    [facility]: Hadii william gilo Soandrews, waaxda luqadaha, qaybta kaalmada anamaria, cristiane raman . So [facility] [phone].    ATENCIÓN: Si habla español, tiene a mcclure disposición servicios gratuitos de asistencia lingüística. Kt al [phone].    We comply with applicable federal civil rights laws and [place] laws. We do not discriminate on the basis of race, color, national origin, age, disability, sex, sexual orientation, or gender identity.           Thank you!    Thank you for choosing [facility] for your care. Our goal is always to provide you with excellent care. Hearing back from our patients is one way we can continue to improve our services. Please take a few minutes to complete the written survey that you may receive in the mail after you visit with us. Thank you!            Medication List      ASK your doctor about these medications          Morning Afternoon Evening Bedtime As Needed    ELIQUIS 5 MG tablet  INSTRUCTIONS:  TAKE 1 TABLET (5 MG) BY MOUTH 2 TIMES DAILY  Generic drug:  apixaban ANTICOAGULANT                     furosemide 20 MG tablet  Also known as:  LASIX  INSTRUCTIONS:  Take 1 " tablet (20 mg) by mouth 2 times daily for 5 days                     gabapentin 300 MG capsule  Also known as:  NEURONTIN  INSTRUCTIONS:  TAKE 3 CAPSULES THREE TIMES DAILY                     GLUCOSAMINE SULFATE PO  INSTRUCTIONS:  Take 1,000 mg by mouth 2 times daily                     levothyroxine 25 MCG tablet  Also known as:  SYNTHROID/LEVOTHROID  INSTRUCTIONS:  TAKE 1 TABLET DAILY BEST ON EMPTY STOMACH FOR THYROID                     losartan 100 MG tablet  Also known as:  COZAAR  INSTRUCTIONS:  Take 1 tablet by mouth daily.  Doctor's comments:  Dose increase                     metoprolol succinate ER 50 MG 24 hr tablet  Also known as:  TOPROL-XL  INSTRUCTIONS:  TAKE 1 TABLET (50 MG) BY MOUTH DAILY                     OMEGA-3 FISH OIL PO  INSTRUCTIONS:  Take 3 g by mouth daily                     omeprazole 40 MG DR capsule  Also known as:  priLOSEC  INSTRUCTIONS:  TAKE 1 CAPSULE (40 MG) BY MOUTH DAILY TAKE 30-60 MINUTES BEFORE A MEAL.                     rosuvastatin 10 MG tablet  Also known as:  CRESTOR  INSTRUCTIONS:  TAKE 1 TABLET DAILY TO HELP LOWER CHOLESTEROL                     sertraline 50 MG tablet  Also known as:  ZOLOFT  INSTRUCTIONS:  TAKE 1 TABLET DAILY                     VITAMIN D (CHOLECALCIFEROL) PO  INSTRUCTIONS:  Take by mouth daily

## 2019-06-21 NOTE — DISCHARGE INSTRUCTIONS
"    DISCHARGE INSTRUCTIONS  Needle Aspiration      IMPORTANT: As you prepare for discharge, the following information will help you return to your best level of health.               This Information Is About Your Follow Up Care     Call your doctor if you do not get better. Call sooner if you feel worse. You can reach your doctor by calling their clinic phone number.                                    This Information Is About Procedures      NEEDLE ASPIRATION  You have had a needle aspiration.  A needle aspiration (also called a \"fine needle biopsy\") is a procedure used to take a sample of cells or tissues from a lump or mass or other area of concern.  The sample of cells taken during the procedure can then be checked in the lab under a microscope to find out if there is cancer or other diseases.   Sometimes a needle aspiration is done to check the effect of treatment on a known tumor.    When you go home, follow these instructions:    Check the site frequently for bleeding, swelling, redness, or drainage.    Use an ice pack at the site for 20 minutes at a time if needed for pain.    Rest for the remainder of the day.    Don't drive for 24 hours if you received medicine to relax you during the procedure.    Return to your usual diet.    Do not take aspirin or anti-inflammatory medicines like ibuprofen or naproxen (check with your doctor if you take aspirin for heart disease or stroke).    You may take acetaminophen (Tylenol) for pain if needed.    Do not take blood thinner medicines until your doctor tells you to restart them.    Call your doctor if you have:    Continued bleeding    Swelling or a lump at the needle site    Pain not made better by acetaminophen    Fever or chills    Redness or drainage from the needle site    Chest pain or trouble breathing    Any questions or concerns                    "

## 2019-06-24 ENCOUNTER — TELEPHONE (OUTPATIENT)
Dept: INTERVENTIONAL RADIOLOGY/VASCULAR | Facility: HOSPITAL | Age: 74
End: 2019-06-24

## 2019-06-24 LAB — COPATH REPORT: NORMAL

## 2019-06-24 NOTE — TELEPHONE ENCOUNTER
Pt states she is a little black and blue and that she is a little sore but tolerable.  Pt had no further questions or concerns at this time.

## 2019-06-27 ENCOUNTER — TELEPHONE (OUTPATIENT)
Dept: ONCOLOGY | Facility: OTHER | Age: 74
End: 2019-06-27

## 2019-06-27 NOTE — TELEPHONE ENCOUNTER
Pt needs to see Dr Pineda regarding thyroid per Dr Venegas.  Will make referral when she follows up with Dr MYRNA Plasencia

## 2019-06-28 ASSESSMENT — MIFFLIN-ST. JEOR: SCORE: 1323.71

## 2019-06-28 ASSESSMENT — PAIN SCALES - GENERAL: PAINLEVEL: NO PAIN (0)

## 2019-07-01 ENCOUNTER — PATIENT OUTREACH (OUTPATIENT)
Dept: ONCOLOGY | Facility: OTHER | Age: 74
End: 2019-07-01

## 2019-07-01 ENCOUNTER — ONCOLOGY VISIT (OUTPATIENT)
Dept: ONCOLOGY | Facility: OTHER | Age: 74
End: 2019-07-01
Attending: INTERNAL MEDICINE
Payer: COMMERCIAL

## 2019-07-01 DIAGNOSIS — C73 PAPILLARY THYROID CARCINOMA (H): Primary | ICD-10-CM

## 2019-07-01 DIAGNOSIS — E04.1 THYROID NODULE: Primary | ICD-10-CM

## 2019-07-01 LAB
ALBUMIN SERPL-MCNC: 3.5 G/DL (ref 3.4–5)
ALP SERPL-CCNC: 120 U/L (ref 40–150)
ALT SERPL W P-5'-P-CCNC: 29 U/L (ref 0–50)
ANION GAP SERPL CALCULATED.3IONS-SCNC: 5 MMOL/L (ref 3–14)
AST SERPL W P-5'-P-CCNC: 20 U/L (ref 0–45)
BASOPHILS # BLD AUTO: 0.1 10E9/L (ref 0–0.2)
BASOPHILS NFR BLD AUTO: 0.4 %
BILIRUB SERPL-MCNC: 0.9 MG/DL (ref 0.2–1.3)
BUN SERPL-MCNC: 17 MG/DL (ref 7–30)
CALCIUM SERPL-MCNC: 8.7 MG/DL (ref 8.5–10.1)
CHLORIDE SERPL-SCNC: 110 MMOL/L (ref 94–109)
CO2 SERPL-SCNC: 26 MMOL/L (ref 20–32)
CREAT SERPL-MCNC: 0.93 MG/DL (ref 0.52–1.04)
DIFFERENTIAL METHOD BLD: ABNORMAL
EOSINOPHIL # BLD AUTO: 0.1 10E9/L (ref 0–0.7)
EOSINOPHIL NFR BLD AUTO: 1 %
ERYTHROCYTE [DISTWIDTH] IN BLOOD BY AUTOMATED COUNT: 12.6 % (ref 10–15)
GFR SERPL CREATININE-BSD FRML MDRD: 61 ML/MIN/{1.73_M2}
GLUCOSE SERPL-MCNC: 95 MG/DL (ref 70–99)
HCT VFR BLD AUTO: 43.3 % (ref 35–47)
HGB BLD-MCNC: 14.4 G/DL (ref 11.7–15.7)
IMM GRANULOCYTES # BLD: 0 10E9/L (ref 0–0.4)
IMM GRANULOCYTES NFR BLD: 0.2 %
LDH SERPL L TO P-CCNC: 201 U/L (ref 81–234)
LYMPHOCYTES # BLD AUTO: 2.1 10E9/L (ref 0.8–5.3)
LYMPHOCYTES NFR BLD AUTO: 18.2 %
MCH RBC QN AUTO: 28.2 PG (ref 26.5–33)
MCHC RBC AUTO-ENTMCNC: 33.3 G/DL (ref 31.5–36.5)
MCV RBC AUTO: 85 FL (ref 78–100)
MONOCYTES # BLD AUTO: 0.6 10E9/L (ref 0–1.3)
MONOCYTES NFR BLD AUTO: 5.3 %
NEUTROPHILS # BLD AUTO: 8.6 10E9/L (ref 1.6–8.3)
NEUTROPHILS NFR BLD AUTO: 74.9 %
NRBC # BLD AUTO: 0 10*3/UL
NRBC BLD AUTO-RTO: 0 /100
PLATELET # BLD AUTO: 328 10E9/L (ref 150–450)
POTASSIUM SERPL-SCNC: 4.1 MMOL/L (ref 3.4–5.3)
PROT SERPL-MCNC: 7.2 G/DL (ref 6.8–8.8)
RBC # BLD AUTO: 5.11 10E12/L (ref 3.8–5.2)
SODIUM SERPL-SCNC: 141 MMOL/L (ref 133–144)
WBC # BLD AUTO: 11.5 10E9/L (ref 4–11)

## 2019-07-01 PROCEDURE — G0463 HOSPITAL OUTPT CLINIC VISIT: HCPCS

## 2019-07-01 PROCEDURE — 86800 THYROGLOBULIN ANTIBODY: CPT | Mod: ZL | Performed by: INTERNAL MEDICINE

## 2019-07-01 PROCEDURE — 85025 COMPLETE CBC W/AUTO DIFF WBC: CPT | Mod: ZL | Performed by: INTERNAL MEDICINE

## 2019-07-01 PROCEDURE — 80053 COMPREHEN METABOLIC PANEL: CPT | Mod: ZL | Performed by: INTERNAL MEDICINE

## 2019-07-01 PROCEDURE — 99000 SPECIMEN HANDLING OFFICE-LAB: CPT | Performed by: INTERNAL MEDICINE

## 2019-07-01 PROCEDURE — 99214 OFFICE O/P EST MOD 30 MIN: CPT | Performed by: INTERNAL MEDICINE

## 2019-07-01 PROCEDURE — 36415 COLL VENOUS BLD VENIPUNCTURE: CPT | Mod: ZL | Performed by: INTERNAL MEDICINE

## 2019-07-01 PROCEDURE — 83615 LACTATE (LD) (LDH) ENZYME: CPT | Mod: ZL | Performed by: INTERNAL MEDICINE

## 2019-07-01 ASSESSMENT — ACTIVITIES OF DAILY LIVING (ADL): DEPENDENT_IADLS:: INDEPENDENT

## 2019-07-01 ASSESSMENT — PATIENT HEALTH QUESTIONNAIRE - PHQ9: SUM OF ALL RESPONSES TO PHQ QUESTIONS 1-9: 1

## 2019-07-01 NOTE — PROGRESS NOTES
Visit Date:   2019      HEMATOLOGY/ONCOLOGY CLINIC NOTE      HISTORY OF PRESENT ILLNESS:  Ms. Eli returns for followup of neutrophilia, which essentially has resolved.  For details of history, see previous notes.  She is here now to discuss results of thyroid nodule biopsy, which came back as papillary thyroid carcinoma.  The patient otherwise is asymptomatic.  She does have some fatigue, but no fevers, night sweats, weight loss, shortness of breath, headaches, palpitations.      PHYSICAL EXAMINATION:   GENERAL:  She is an elderly white female in no acute distress.   VITAL SIGNS:  Blood pressure is 154/80, pulse 64, temp 97.1.   HEENT:  Atraumatic, normocephalic.  Oropharynx nonerythematous.   NECK:  Supple.  There is no thyromegaly.   LUNGS:  Clear to auscultation and percussion.   HEART:  Regular rhythm, S1, S2 normal.   ABDOMEN:  Soft, normoactive bowel sounds.  No mass, nontender.   BACK:  No cervical, supraclavicular, axillary or inguinal nodes.   EXTREMITIES:  No edema.   NEUROLOGIC:  Nonfocal.      LABORATORY DATA:  CBC:  White count 11.5, H and H 14.4 and 43.3, platelet count 328.  LFTs are normal.      IMPRESSION:     1.  Neutrophilia reactive, essentially resolved.   2.  Papillary thyroid carcinoma.  We will refer the patient to ENT, Dr. Mildred Pineda, for further management.  Otherwise, we will see the patient in 10 weeks.  Obtain CBC, CMP, LDH and thyroglobulin level.      Forty minutes was spent with the patient, greater than half the time spent in counseling and coordination of care.         JULIO PARSON MD             D: 2019   T: 2019   MT: ALLA      Name:     BULL ELI   MRN:      [id number]        Account:      [id number]   :      1945           Visit Date:   2019      Document: [id number]       cc: Randi Pineda DO

## 2019-07-01 NOTE — PROGRESS NOTES
Face to Face Oncology Visit      The list of resources in the New Patient Folder, represent the items given to the patient to assist the patient during their cancer journey.  During the face to face visit, with the Oncology RN Care Coordination, the patient was given an explanation of the resources.        New Patient Folder -  o Social Work versus Medical Care Coordination Information - discussion  o Listing of Area Psychologist/Counselors  o Honoring Choices - Your Rights Information on advanced health care directives   o Welcome to [facility]   o ACS card  o Palliative Care for Those with Advanced Illness  o [facility] Grid of Services  o [facility]   o Nutrition Eating before, during and after chemotherapy (ACS)  o Aromatherapy   o Understanding Vaccinations and Cancer  o [facility]  o Life After Treatment - The Next Chapter in Your Survivorship Journey (ACS)  o How to Be a Friend to Someone with Cancer (ACS)  o Being a Caregiver (ACS)  o Understanding Clinical Trials  o [facility]      ONCOLOGY DEPARTMENT CONTACT INFORMATION    [facility] -Onc CC business card given.         Visit  o No visit  o [facility] Application - benefit and purpose discussed  o [facility] Application ([facility] only) - benefit and purpose discussed -filled out and will send      Spiritual needs      Nutritional Concerns (describe)  o Weight loss / gain / nausea / vomiting / poor appetite  o Nutrition Referral placed       Pharmacy  o No updated her that she will need to updated ENT that she is on blood thinner and possible Endocrinology appt will be to help manage thyroid medications.      Physical Health Concerns    None    Clinical Trials explained and discussed   Encouraged to call with questions or concerns.    Eleanor Mullins RN   Oncology Care Coordinatior              Rev 4/9/19/[place]

## 2019-07-01 NOTE — NURSING NOTE
"Chief Complaint   Patient presents with     RECHECK     leukocytosis       Initial /80   Pulse 64   Temp 97.1  F (36.2  C) (Tympanic)   Ht 1.575 m (5' 2\")   Wt 86.5 kg (190 lb 12.8 oz)   SpO2 96%   BMI 34.90 kg/m   Estimated body mass index is 34.9 kg/m  as calculated from the following:    Height as of this encounter: 1.575 m (5' 2\").    Weight as of this encounter: 86.5 kg (190 lb 12.8 oz).  Medication Reconciliation: complete   Immunizations reviewed; phq9=1; pain=0; AHD on file.  Adina Plasencia  "

## 2019-07-01 NOTE — PATIENT INSTRUCTIONS
Labs done today.  Dr Pineda appt- ENT.    Follow up with Dr Venegas after.    Please call with any questions at [phone].    Thank you.

## 2019-07-02 VITALS
DIASTOLIC BLOOD PRESSURE: 80 MMHG | TEMPERATURE: 97.1 F | SYSTOLIC BLOOD PRESSURE: 154 MMHG | BODY MASS INDEX: 35.11 KG/M2 | WEIGHT: 190.8 LBS | HEIGHT: 62 IN | HEART RATE: 64 BPM | OXYGEN SATURATION: 96 % | RESPIRATION RATE: 16 BRPM

## 2019-07-02 LAB — THYROGLOB AB SERPL IA-ACNC: <20 IU/ML (ref 0–40)

## 2019-07-09 NOTE — PROGRESS NOTES
Subjective     Nella Lemon is a 74 year old female who presents to clinic today for the following health issues:    HPI   Concern - foot swelling   Onset:     Description: 6/18/19  Pedal edema - saw Laila cohen, was prescribed lasix for a few days, symptoms have resolved.      Intensity: mild        Progression of Symptoms:  improving    Accompanying Signs & Symptoms:  none    Previous history of similar problem:   no    Precipitating factors:   Worsened by: none     Alleviating factors:  Improved by: lasix    Therapies Tried and outcome: lasix helped.  Weight is improved.      Patient would like to discuss thyroid results.  She has been following with Dr Venegas - was diagnosed with left lobe thyroid cancer.  She is scheduled to see Dr Pineda at the end of this month to determine treatment plan.      Blood pressure is again elevated today - will adjust therapy.      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, left     Family history of colon cancer     ACP (advance care planning)     Mixed hyperlipidemia     Hypothyroidism due to acquired atrophy of thyroid     Paroxysmal atrial fibrillation (H)     Atypical chest pain     CKD (chronic kidney disease) stage 2, GFR 60-89 ml/min     Leukocytosis     Obesity (BMI 35.0-39.9) with comorbidity (H)     Past Surgical History:   Procedure Laterality Date     ADENOIDECTOMY       CHOLECYSTECTOMY  1981     COLONOSCOPY  2002     COLONOSCOPY  2012     COLONOSCOPY N/A 9/22/2014    Procedure: COLONOSCOPY;  Surgeon: Lavell Pavon DO;  Location: [facility]     CYSTOSCOPY  2007    Cystitis chronic     ORTHOPEDIC SURGERY  2002    carpal tunnel;  RT, LT     TONSILLECTOMY         Social History     Tobacco Use     Smoking status: Never Smoker     Smokeless tobacco: Never Used   Substance Use Topics     Alcohol use: Never     Frequency: Never     Family History   Problem Relation Age of Onset     Breast Cancer Mother      Alcohol/Drug Mother         Cirrhosis     Other - See Comments Mother         goiter     Cerebrovascular Disease Father      Circulatory Father      Alcohol/Drug Son      Cancer - colorectal Brother      Unknown/Adopted No family hx of      Asthma No family hx of      C.A.D. No family hx of      Diabetes No family hx of      Hypertension No family hx of      Prostate Cancer No family hx of      Allergies No family hx of      Alzheimer Disease No family hx of      Anesthesia Reaction No family hx of      Arthritis No family hx of      Blood Disease No family hx of      Cardiovascular No family hx of      Congenital Anomalies No family hx of      Connective Tissue Disorder No family hx of      Depression No family hx of      Endocrine Disease No family hx of      Eye Disorder No family hx of      Genetic Disorder No family hx of      Gastrointestinal Disease No family hx of      Genitourinary Problems No family hx of      Gynecology No family hx of      Heart Disease No family hx of      Lipids No family hx of      Musculoskeletal Disorder No family hx of      Neurologic Disorder No family hx of      Obesity No family hx of      Osteoporosis No family hx of      Psychotic Disorder No family hx of      Respiratory No family hx of      Thyroid Disease No family hx of      Family History Negative No family hx of      Hearing Loss No family hx of          Current Outpatient Medications   Medication Sig Dispense Refill     ELIQUIS 5 MG tablet TAKE 1 TABLET (5 MG) BY MOUTH 2 TIMES DAILY 60 tablet 1     gabapentin (NEURONTIN) 300 MG capsule TAKE 3 CAPSULES THREE TIMES DAILY 270 capsule 1     GLUCOSAMINE SULFATE PO Take 1,000 mg by mouth 2 times daily        levothyroxine (SYNTHROID/LEVOTHROID) 25 MCG tablet TAKE 1 TABLET DAILY BEST ON EMPTY STOMACH FOR THYROID 90 tablet 2     losartan (COZAAR) 100 MG tablet Take 1 tablet by mouth daily. 90 tablet 1     metoprolol succinate ER (TOPROL-XL) 50 MG 24 hr tablet TAKE 1 TABLET (50 MG) BY MOUTH DAILY 30 tablet 0     Omega-3 Fatty Acids (OMEGA-3 FISH OIL PO) Take 3 g by mouth daily        omeprazole (PRILOSEC) 40 MG DR capsule TAKE 1 CAPSULE (40 MG) BY MOUTH DAILY TAKE 30-60 MINUTES BEFORE A MEAL. 90 capsule 1     rosuvastatin (CRESTOR) 10 MG tablet TAKE 1 TABLET DAILY TO HELP LOWER CHOLESTEROL 90 tablet 2     sertraline (ZOLOFT) 50 MG tablet TAKE 1 TABLET DAILY 30 tablet 8     VITAMIN D, CHOLECALCIFEROL, PO Take by mouth daily       Allergies   Allergen Reactions     Atorvastatin      Ezetimibe      Gentamicin      Hydroxyzine      Lorazepam      Ranitidine      Simvastatin      Lopid [Gemfibrozil] GI Disturbance     Bloating, constipation,      Pravastatin Muscle Pain (Myalgia)     Recent Labs   Lab Test 07/01/19  1142 06/18/19  1022 05/31/19  0928  01/23/19  0952 07/23/18  1013 05/30/18  0925   LDL  --   --   --   --  56 53 52   HDL  --   --   --   --  47* 49* 42*   TRIG  --   --   --   --  112 142 127   ALT 29 30 29   < > 26 31  --    CR 0.93 0.96 0.96   < > 0.87 0.87  --    GFRESTIMATED 61 59* 59*   < > 66 64  --    GFRESTBLACK 71 68 68   < > 77 77  --    POTASSIUM 4.1 4.1 3.7   < > 4.1 3.9  --    TSH  --   --  4.90*  --  4.41* 3.63 3.65    < > = values in this interval not displayed.      BP Readings from Last 3 Encounters:   07/10/19 164/74   06/28/19 154/80   06/21/19 128/86    Wt Readings from Last 3 Encounters:   07/10/19 87.1 kg (192 lb)   06/28/19 86.5 kg (190 lb 12.8 oz)   06/18/19 88 kg (194 lb)               Reviewed and updated as needed this visit by Provider         Review of Systems   ROS COMP: Constitutional, HEENT, cardiovascular, pulmonary, gi and gu systems are negative, except as otherwise noted.     "  Objective    /74 (BP Location: Left arm, Patient Position: Sitting, Cuff Size: Adult Regular)   Pulse 58   Temp 97.1  F (36.2  C) (Tympanic)   Resp 14   Ht 1.575 m (5' 2\")   Wt 87.1 kg (192 lb)   SpO2 97%   BMI 35.12 kg/m    Body mass index is 35.12 kg/m .  Physical Exam   GENERAL: healthy, alert and no distress  NECK: no adenopathy, no asymmetry, masses, or scars, thyroid normal to palpation and no carotid bruits  RESP: lungs clear to auscultation - no rales, rhonchi or wheezes  CV: regular rate and rhythm, normal S1 S2, no S3 or S4, no murmur, click or rub, no peripheral edema and peripheral pulses strong  MS: no gross musculoskeletal defects noted, no edema  PSYCH: mentation appears normal, affect normal/bright          Assessment & Plan     1. Chronic pain of right knee  Dr vallejo  - ORTHOPEDICS ADULT REFERRAL    2. Benign essential hypertension  Continue metoprolol and losartan  Edema is resolved.   - amLODIPine (NORVASC) 5 MG tablet; Take 1 tablet (5 mg) by mouth daily  Dispense: 30 tablet; Refill: 1     BMI:   Estimated body mass index is 35.12 kg/m  as calculated from the following:    Height as of this encounter: 1.575 m (5' 2\").    Weight as of this encounter: 87.1 kg (192 lb).           FUTURE APPOINTMENTS:       - Follow-up visit in 2-3 weeks for repeat blood pressure check or as needed for acute concerns       Randi Haddad NP  [facility]      "

## 2019-07-10 ENCOUNTER — OFFICE VISIT (OUTPATIENT)
Dept: FAMILY MEDICINE | Facility: OTHER | Age: 74
End: 2019-07-10
Attending: NURSE PRACTITIONER
Payer: COMMERCIAL

## 2019-07-10 VITALS
DIASTOLIC BLOOD PRESSURE: 74 MMHG | WEIGHT: 192 LBS | RESPIRATION RATE: 14 BRPM | HEART RATE: 58 BPM | TEMPERATURE: 97.1 F | HEIGHT: 62 IN | BODY MASS INDEX: 35.33 KG/M2 | SYSTOLIC BLOOD PRESSURE: 164 MMHG | OXYGEN SATURATION: 97 %

## 2019-07-10 DIAGNOSIS — M25.561 CHRONIC PAIN OF RIGHT KNEE: Primary | ICD-10-CM

## 2019-07-10 DIAGNOSIS — G89.29 CHRONIC PAIN OF RIGHT KNEE: Primary | ICD-10-CM

## 2019-07-10 DIAGNOSIS — I10 BENIGN ESSENTIAL HYPERTENSION: ICD-10-CM

## 2019-07-10 PROCEDURE — G0463 HOSPITAL OUTPT CLINIC VISIT: HCPCS

## 2019-07-10 PROCEDURE — 99213 OFFICE O/P EST LOW 20 MIN: CPT | Performed by: NURSE PRACTITIONER

## 2019-07-10 RX ORDER — AMLODIPINE BESYLATE 5 MG/1
5 TABLET ORAL DAILY
Qty: 30 TABLET | Refills: 1 | Status: SHIPPED | OUTPATIENT
Start: 2019-07-10 | End: 2019-08-02

## 2019-07-10 ASSESSMENT — PAIN SCALES - GENERAL: PAINLEVEL: MILD PAIN (2)

## 2019-07-10 ASSESSMENT — MIFFLIN-ST. JEOR: SCORE: 1329.16

## 2019-07-10 NOTE — PATIENT INSTRUCTIONS
"  Assessment & Plan     1. Chronic pain of right knee  Dr vallejo  - ORTHOPEDICS ADULT REFERRAL    2. Benign essential hypertension  Continue metoprolol and losartan  Edema is resolved.   - amLODIPine (NORVASC) 5 MG tablet; Take 1 tablet (5 mg) by mouth daily  Dispense: 30 tablet; Refill: 1     BMI:   Estimated body mass index is 35.12 kg/m  as calculated from the following:    Height as of this encounter: 1.575 m (5' 2\").    Weight as of this encounter: 87.1 kg (192 lb).           FUTURE APPOINTMENTS:       - Follow-up visit in 2-3 weeks or as needed for acute concerns       Randi Haddad NP  [facility]        "

## 2019-07-10 NOTE — NURSING NOTE
"Chief Complaint   Patient presents with     RECHECK     Edema     Hypertension     Results       Initial /74 (BP Location: Left arm, Patient Position: Sitting, Cuff Size: Adult Regular)   Pulse 58   Temp 97.1  F (36.2  C) (Tympanic)   Resp 14   Ht 1.575 m (5' 2\")   Wt 87.1 kg (192 lb)   SpO2 97%   BMI 35.12 kg/m   Estimated body mass index is 35.12 kg/m  as calculated from the following:    Height as of this encounter: 1.575 m (5' 2\").    Weight as of this encounter: 87.1 kg (192 lb).  Medication Reconciliation: complete   Fiona Orellana      "

## 2019-07-22 DIAGNOSIS — I48.0 PAROXYSMAL ATRIAL FIBRILLATION (H): ICD-10-CM

## 2019-07-22 RX ORDER — APIXABAN 5 MG/1
TABLET, FILM COATED ORAL
Qty: 60 TABLET | Refills: 1 | Status: SHIPPED | OUTPATIENT
Start: 2019-07-22 | End: 2019-09-30

## 2019-07-23 DIAGNOSIS — H91.93 DECREASED HEARING OF BOTH EARS: Primary | ICD-10-CM

## 2019-07-24 NOTE — PROGRESS NOTES
Otolaryngology Consultation    Patient: Nella Lemon  : 1945    CC: thyroid nodule  Referral:  Dr. Venegas    HPI:  Nella Lemon is a 74 year old female seen today at the request of Dr. Venegas  for evaluation of a thyroid nodule.    This nodule was found incidentally during a chest CT.  A 1 cm calcified nodule was noted in the left lobe.  The patient denies dysphonia, dysphagia or any other compressive symptoms.  She has noted fatigue.   There is no weight loss.  Positive weight gain  There is no family history of thyroid cancer, and no personal history of head or neck irradiation.  No anesthesia or bleeding complications with past surgeries      She has been followed by Dr. Venegas for leukocytosis.    The thyroid ultrasound was personally reviewed dated 19  The dominant nodule(s) measures 1.5 cm LEFT lobe with calcifications    I recommended FNAB prior to her visit with me.  This was performed on  and revealed papillary thyroid carcinoma    Thyroid labs were reviewed.  No recent TSH.  The patient denies tremor, hair loss or weight loss.     History of hypothyroidism    Calcium was 8.7 on     Thyroglobulin Antibody <20    FNA thyroid left lobe.:   Positive for malignancy.   Papillary thyroid carcinoma         PROCEDURE: US THYROID 2019 8:41 AM     HISTORY: Thyroid nodule; Leukocytosis, unspecified type     COMPARISONS: None.     TECHNIQUE: Ultrasound of the thyroid     FINDINGS: The right lobe of the thyroid measures 5.3 x 2.0 x 2.2 cm.  The left lobe of the thyroid measures 5.1 x 1.9 x 1.7 cm. There is a  dominant calcified nodule in the left lobe of the thyroid. This nodule  measures 1.5 x 1.1 x 0.7 cm. The thyroid gland is diffusely  heterogeneous in echotexture.                                                                        IMPRESSION: Calcified left thyroid nodule with acoustic shadowing.  Both benign or malignant thyroid nodules could produce this  appearance     FILI MONSON MD      CYTOLOGIC INTERPRETATION:      FNA-thyroid left lobe.:   Positive for malignancy.   Papillary thyroid carcinoma         Current Outpatient Rx   Medication Sig Dispense Refill     amLODIPine (NORVASC) 5 MG tablet Take 1 tablet (5 mg) by mouth daily 30 tablet 1     ELIQUIS 5 MG tablet TAKE 1 TABLET (5 MG) BY MOUTH 2 TIMES DAILY 60 tablet 1     gabapentin (NEURONTIN) 300 MG capsule TAKE 3 CAPSULES THREE TIMES DAILY 270 capsule 1     GLUCOSAMINE SULFATE PO Take 1,000 mg by mouth 2 times daily       levothyroxine (SYNTHROID/LEVOTHROID) 25 MCG tablet TAKE 1 TABLET DAILY BEST ON EMPTY STOMACH FOR THYROID 90 tablet 2     losartan (COZAAR) 100 MG tablet Take 1 tablet by mouth daily. 90 tablet 1     metoprolol succinate ER (TOPROL-XL) 50 MG 24 hr tablet TAKE 1 TABLET (50 MG) BY MOUTH DAILY 30 tablet 0     Omega-3 Fatty Acids (OMEGA-3 FISH OIL PO) Take 3 g by mouth daily        omeprazole (PRILOSEC) 40 MG DR capsule TAKE 1 CAPSULE (40 MG) BY MOUTH DAILY TAKE 30-60 MINUTES BEFORE A MEAL. 90 capsule 1     rosuvastatin (CRESTOR) 10 MG tablet TAKE 1 TABLET DAILY TO HELP LOWER CHOLESTEROL 90 tablet 2     sertraline (ZOLOFT) 50 MG tablet TAKE 1 TABLET DAILY 30 tablet 8     VITAMIN D, CHOLECALCIFEROL, PO Take by mouth daily         Allergies: Atorvastatin; Ezetimibe; Gentamicin; Hydroxyzine; Lorazepam; Ranitidine; Simvastatin; Lopid [gemfibrozil]; and Pravastatin     Past Medical History:   Diagnosis Date     Anxiety state, unspecified 09/25/2003     Arthritis      Carpal tunnel syndrome 07/02/2002     Coronary artery disease      Endometrial hyperplasia 01/27/2003     Family history of colon cancer 8/2/2016     Family history of malignant neoplasm of breast 11/13/2006     Hearing problem      Hyperlipidemia LDL goal <100 12/30/2016     Metrorrhagia 10/22/2003     Myalgia and myositis, unspecified 05/21/2002     Nonallopathic lesion of thoracic region, not elsewhere classified 05/19/2003      "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     Palpitations 04/11/2001     Postmenopausal bleeding 09/09/2002     Precordial pain 04/05/2001     Thyroid disease      Unspecified essential hypertension 09/14/2001     Urgency of urination 06/06/2007       Past Surgical History:   Procedure Laterality Date     ADENOIDECTOMY       CHOLECYSTECTOMY  1981     COLONOSCOPY  2002     COLONOSCOPY  2012     COLONOSCOPY N/A 9/22/2014    Procedure: COLONOSCOPY;  Surgeon: Lavell Pavon DO;  Location: [facility]     CYSTOSCOPY  2007    Cystitis chronic     ORTHOPEDIC SURGERY  2002    carpal tunnel; RT, LT     TONSILLECTOMY         ENT family history reviewed    Social History     Tobacco Use     Smoking status: Never Smoker     Smokeless tobacco: Never Used   Substance Use Topics     Alcohol use: Never     Frequency: Never     Drug use: No       Review of Systems  ROS: 10 point ROS neg other than the symptoms noted above in the HPI and post nasal drainage , rhinorrhea, hearing loss, palpitations    Physical Exam  /66   Pulse 83   Temp 98  F (36.7  C) (Tympanic)   Resp 16   Ht 1.575 m (5' 2\")   Wt 83 kg (183 lb)   SpO2 94%   BMI 33.47 kg/m    General - The patient is well nourished and well developed, and appears to have good nutritional status.  Alert and oriented to person and place, answers questions and cooperates with examination appropriately.   Head and Face - Normocephalic and atraumatic, with no gross asymmetry noted.  The facial nerve is intact, with strong symmetric movements.  Voice and Breathing - The patient was breathing comfortably without the use of accessory muscles. There was no wheezing, stridor, or stertor.  The patients voice was clear and strong, and had appropriate pitch and quality.  Ears -The external auditory canals are patent, the tympanic membranes are intact without effusion, retraction or mass.  Bony landmarks are intact.  Small retraction pocket post inferior left ear, " normalizes with valsalva  Eyes - Extraocular movements intact, and the pupils were reactive to light.  Sclera were not icteric or injected, conjunctiva were pink and moist.   Mouth - Examination of the oral cavity showed pink, healthy oral mucosa. No lesions or ulcerations noted.  The tongue was mobile and midline, and the dentition were in good condition.    Throat - The walls of the oropharynx were smooth, pink, moist, symmetric, and had no lesions or ulcerations.  The tonsillar pillars and soft palate were symmetric.  The uvula was midline on elevation.    Neck - thick neck.  Normal midline excursion of the laryngotracheal complex during swallowing.  Full range of motion on passive movement.  Palpation of the occipital, submental, submandibular, internal jugular chain, and supraclavicular nodes did not demonstrate any abnormal lymph nodes or masses.  Palpation of the thyroid was without fixed palpable mass.  The trachea was mobile and midline.  Nose - External contour is symmetric, no gross deflection or scars.  Nasal mucosa is pink and moist with no abnormal mucus.  The septum was intact, turbinates of normal size and position.  No polyps, masses, or purulence noted on examination.    Attempts at mirror laryngoscopy were not possible due to gag reflex.  Therefore I proceeded with a fiberoptic examination after informed consent.  First I sprayed both sides of the nose with a mixture of lidocaine and neosynephrine.  I then passed the scope through the nasal cavity.  The nasal cavity was unremarkable.  The nasopharynx was mucosally covered and symmetric.  The eustachian tube openings were unobstructed.  Going further, the base of tongue was free of lesions, and the vallecula was open.  The epiglottis was smooth and mucosally covered.  The supraglottic larynx was then clearly visualized.  The vocal cords moved smoothly and symmetrically.  The pyriform sinuses were open and without mayra mass or pooling of  secretions, and the limited view of the postcricoid region did not show any lesions.  The patient tolerated the procedure well.      Impression and Plan- Nella Lemon is a 74 year old female with:    ICD-10-CM    1. Thyroid nodule, left E04.1 TSH with free T4 reflex     US Head Neck Soft Tissue     Calcium     Vitamin D Deficiency   2. Left papillary thyroid carcinoma (H) C73    3. 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  She is aware that she has a small PTC.  Most thyroid cancers have a very good prognosis with over 95% 5-year survival.      T1 , Stage I  papillary thyroid carcinoma     TSH with reflex pending  Repeat serum Calcium  Vitamin D  Neck US to evaluate for lymphandenopathy    Risks and complications of LEFT thyroid lobectomy with frozens possible total thyroidectomy were discussed including anesthesia, bleeding, infection, injury to the recurrent laryngeal nerve and resulting hoarseness or change in voice, calcium metabolism problems, scar formation: hypertrophic or keloid, benign versus malignant pathology and need for further surgery.  All questions were answered and wishes are proceed with surgery.      If a total thyroidectomy is necessary, which is not expected, she would be started on thyroid hormone replacement (synthroid) and additional oral calcium and vitamin D.  She may require hospitalization.  After recovery from surgery, she may need to see an endocrinologist and if the tumor is larger than expected, discuss radioactive iodine.      Thyroid anatomy was discussed, and thyroid surgery was  discussed.      Eliquis will need to be held preop and  prefer to hold 1 week post op    Audiogram pending, annual  Continue delisa    Spouse, Ben or daughter Mariia would be with her at surgery      Mildred Pineda D.O.  Otolaryngology/Head and Neck Surgery  Allergy

## 2019-07-25 ENCOUNTER — OFFICE VISIT (OUTPATIENT)
Dept: OTOLARYNGOLOGY | Facility: OTHER | Age: 74
End: 2019-07-25
Attending: INTERNAL MEDICINE
Payer: COMMERCIAL

## 2019-07-25 ENCOUNTER — TELEPHONE (OUTPATIENT)
Dept: FAMILY MEDICINE | Facility: OTHER | Age: 74
End: 2019-07-25

## 2019-07-25 VITALS
HEIGHT: 62 IN | SYSTOLIC BLOOD PRESSURE: 132 MMHG | TEMPERATURE: 98 F | HEART RATE: 83 BPM | DIASTOLIC BLOOD PRESSURE: 66 MMHG | BODY MASS INDEX: 33.68 KG/M2 | OXYGEN SATURATION: 94 % | RESPIRATION RATE: 16 BRPM | WEIGHT: 183 LBS

## 2019-07-25 DIAGNOSIS — H73.892 TYMPANIC MEMBRANE RETRACTION, LEFT: ICD-10-CM

## 2019-07-25 DIAGNOSIS — C73 PAPILLARY THYROID CARCINOMA (H): ICD-10-CM

## 2019-07-25 DIAGNOSIS — C73: Primary | ICD-10-CM

## 2019-07-25 DIAGNOSIS — E04.1 THYROID NODULE: Primary | ICD-10-CM

## 2019-07-25 LAB
CALCIUM SERPL-MCNC: 9.1 MG/DL (ref 8.5–10.1)
TSH SERPL DL<=0.005 MIU/L-ACNC: 2.88 MU/L (ref 0.4–4)

## 2019-07-25 PROCEDURE — 84443 ASSAY THYROID STIM HORMONE: CPT | Mod: ZL | Performed by: OTOLARYNGOLOGY

## 2019-07-25 PROCEDURE — 99000 SPECIMEN HANDLING OFFICE-LAB: CPT | Performed by: OTOLARYNGOLOGY

## 2019-07-25 PROCEDURE — 99203 OFFICE O/P NEW LOW 30 MIN: CPT | Mod: 25 | Performed by: OTOLARYNGOLOGY

## 2019-07-25 PROCEDURE — 31575 DIAGNOSTIC LARYNGOSCOPY: CPT | Performed by: OTOLARYNGOLOGY

## 2019-07-25 PROCEDURE — 82310 ASSAY OF CALCIUM: CPT | Mod: ZL | Performed by: OTOLARYNGOLOGY

## 2019-07-25 PROCEDURE — 36415 COLL VENOUS BLD VENIPUNCTURE: CPT | Mod: ZL | Performed by: OTOLARYNGOLOGY

## 2019-07-25 PROCEDURE — G0463 HOSPITAL OUTPT CLINIC VISIT: HCPCS | Mod: 25

## 2019-07-25 PROCEDURE — 82306 VITAMIN D 25 HYDROXY: CPT | Mod: ZL | Performed by: OTOLARYNGOLOGY

## 2019-07-25 ASSESSMENT — MIFFLIN-ST. JEOR: SCORE: 1288.33

## 2019-07-25 ASSESSMENT — PAIN SCALES - GENERAL: PAINLEVEL: NO PAIN (0)

## 2019-07-25 NOTE — PROGRESS NOTES
SUBJECTIVE:   Nella Lemon is a 74 year old female who presents to clinic today for the following   health issues:      Hyperlipidemia Follow-Up      Are you having any of the following symptoms? (Select all that apply)  No complaints of shortness of breath, chest pain or pressure.  No increased sweating or nausea with activity.  No left-sided neck or arm pain.  No complaints of pain in calves when walking 1-2 blocks.    Are you regularly taking any medication or supplement to lower your cholesterol?   Yes- Crestor    Are you having muscle aches or other side effects that you think could be caused by your cholesterol lowering medication?  No   The ASCVD Risk score (Danierik FLANAGAN Jr., et al., 2013) failed to calculate for the following reasons:      The patient has a prior MI or stroke diagnosis          Hypertension Follow-up      Do you check your blood pressure regularly outside of the clinic? No     Are you following a low salt diet? Yes    Are your blood pressures ever more than 140 on the top number (systolic) OR more   than 90 on the bottom number (diastolic), for example 140/90? Yes  Hypothyroidism Follow-up      Since last visit, patient describes the following symptoms: Weight stable, no hair loss, no skin changes, no constipation, no loose stools      Amount of exercise or physical activity: 6-7 days/week for an average of 15-30 minutes    Problems taking medications regularly: No    Medication side effects: none    Diet: low salt    She is scheduled for a pre-op on 8/12/2019 for upcoming for a left lobe thyroidectomy.      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, left      Family history of colon cancer     ACP (advance care planning)     Mixed hyperlipidemia     Hypothyroidism due to acquired atrophy of thyroid     Paroxysmal atrial fibrillation (H)     Atypical chest pain     CKD (chronic kidney disease) stage 2, GFR 60-89 ml/min     Leukocytosis     Obesity (BMI 35.0-39.9) with comorbidity (H)     Past Surgical History:   Procedure Laterality Date     ADENOIDECTOMY       BIOPSY      FNA left thyroid     CHOLECYSTECTOMY  1981     COLONOSCOPY  2002     COLONOSCOPY  2012     COLONOSCOPY N/A 9/22/2014    Procedure: COLONOSCOPY;  Surgeon: Lavell Pavon DO;  Location: [facility]     CYSTOSCOPY  2007    Cystitis chronic     ORTHOPEDIC SURGERY  2002    carpal tunnel; RT, LT     TONSILLECTOMY         Social History     Tobacco Use     Smoking status: Never Smoker     Smokeless tobacco: Never Used   Substance Use Topics     Alcohol use: Never     Frequency: Never     Family History   Problem Relation Age of Onset     Breast Cancer Mother      Alcohol/Drug Mother         Cirrhosis     Other - See Comments Mother         goiter     Cerebrovascular Disease Father      Circulatory Father      Alcohol/Drug Son      Cancer - colorectal Brother      Unknown/Adopted No family hx of      Asthma No family hx of      C.A.D. No family hx of      Diabetes No family hx of      Hypertension No family hx of      Prostate Cancer No family hx of      Allergies No family hx of      Alzheimer Disease No family hx of      Anesthesia Reaction No family hx of      Arthritis No family hx of      Blood Disease No family hx of      Cardiovascular No family hx of      Congenital Anomalies No family hx of      Connective Tissue Disorder No family hx of      Depression No family hx of      Endocrine Disease No family hx of      Eye Disorder No family hx of      Genetic Disorder No family hx of      Gastrointestinal Disease No family hx of      Genitourinary Problems No family hx of      Gynecology No family hx of       Heart Disease No family hx of      Lipids No family hx of      Musculoskeletal Disorder No family hx of      Neurologic Disorder No family hx of      Obesity No family hx of      Osteoporosis No family hx of      Psychotic Disorder No family hx of      Respiratory No family hx of      Thyroid Disease No family hx of      Family History Negative No family hx of      Hearing Loss No family hx of          Current Outpatient Medications   Medication Sig Dispense Refill     amLODIPine (NORVASC) 5 MG tablet Take 1 tablet (5 mg) by mouth daily 30 tablet 1     ELIQUIS 5 MG tablet TAKE 1 TABLET (5 MG) BY MOUTH 2 TIMES DAILY 60 tablet 1     gabapentin (NEURONTIN) 300 MG capsule TAKE 3 CAPSULES THREE TIMES DAILY 270 capsule 1     GLUCOSAMINE SULFATE PO Take 1,000 mg by mouth 2 times daily       levothyroxine (SYNTHROID/LEVOTHROID) 25 MCG tablet TAKE 1 TABLET DAILY BEST ON EMPTY STOMACH FOR THYROID 90 tablet 2     losartan (COZAAR) 100 MG tablet Take 1 tablet by mouth daily. 90 tablet 1     metoprolol succinate ER (TOPROL-XL) 50 MG 24 hr tablet TAKE 1 TABLET (50 MG) BY MOUTH DAILY 30 tablet 0     Omega-3 Fatty Acids (OMEGA-3 FISH OIL PO) Take 3 g by mouth daily        omeprazole (PRILOSEC) 40 MG DR capsule TAKE 1 CAPSULE (40 MG) BY MOUTH DAILY TAKE 30-60 MINUTES BEFORE A MEAL. 90 capsule 1     rosuvastatin (CRESTOR) 10 MG tablet TAKE 1 TABLET DAILY TO HELP LOWER CHOLESTEROL 90 tablet 2     sertraline (ZOLOFT) 50 MG tablet TAKE 1 TABLET DAILY 30 tablet 8     VITAMIN D, CHOLECALCIFEROL, PO Take by mouth daily       Allergies   Allergen Reactions     Atorvastatin      Ezetimibe      Gentamicin      Hydroxyzine      Lorazepam      Ranitidine      Simvastatin      Lopid [Gemfibrozil] GI Disturbance     Bloating, constipation,      Pravastatin Muscle Pain (Myalgia)     Recent Labs   Lab Test 07/25/19  1230 07/01/19  1142 06/18/19  1022 05/31/19  0928  01/23/19  0952 07/23/18  1013 05/30/18  0925   LDL  --   --   --   --   --  56 53  "52   HDL  --   --   --   --   --  47* 49* 42*   TRIG  --   --   --   --   --  112 142 127   ALT  --  29 30 29   < > 26 31  --    CR  --  0.93 0.96 0.96   < > 0.87 0.87  --    GFRESTIMATED  --  61 59* 59*   < > 66 64  --    GFRESTBLACK  --  71 68 68   < > 77 77  --    POTASSIUM  --  4.1 4.1 3.7   < > 4.1 3.9  --    TSH 2.88  --   --  4.90*  --  4.41* 3.63 3.65    < > = values in this interval not displayed.      BP Readings from Last 3 Encounters:   07/30/19 134/70   07/25/19 132/66   07/10/19 164/74    Wt Readings from Last 3 Encounters:   07/30/19 85.9 kg (189 lb 4.8 oz)   07/25/19 83 kg (183 lb)   07/10/19 87.1 kg (192 lb)               Reviewed and updated as needed this visit by Provider         Review of Systems   ROS COMP: Constitutional, HEENT, cardiovascular, pulmonary, gi and gu systems are negative, except as otherwise noted.      Objective    /70 (BP Location: Left arm, Patient Position: Sitting, Cuff Size: Adult Regular)   Pulse 59   Temp 97.2  F (36.2  C) (Tympanic)   Resp 16   Ht 1.575 m (5' 2\")   Wt 85.9 kg (189 lb 4.8 oz)   SpO2 98%   BMI 34.62 kg/m    Body mass index is 34.62 kg/m .  Physical Exam   GENERAL: healthy, alert and no distress  RESP: lungs clear to auscultation - no rales, rhonchi or wheezes  CV: regular rate and rhythm, normal S1 S2, no S3 or S4, no murmur, click or rub, no peripheral edema and peripheral pulses strong  MS: no gross musculoskeletal defects noted, no edema  PSYCH: mentation appears normal, affect normal/bright            Assessment & Plan     1. Hypothyroidism due to acquired atrophy of thyroid    2. Mixed hyperlipidemia    3. Paroxysmal atrial fibrillation (H)    4. Benign essential hypertension      We will complete labs at her upcoming pre-op appt as well as another EKG.  No changes to medications or plan at this time.         BMI:   Estimated body mass index is 34.62 kg/m  as calculated from the following:    Height as of this encounter: 1.575 m (5' " "2\").    Weight as of this encounter: 85.9 kg (189 lb 4.8 oz).           FUTURE APPOINTMENTS:       - Follow-up visit as scheduled.       Randi Haddad NP  [facility]        "

## 2019-07-25 NOTE — NURSING NOTE
"Chief Complaint   Patient presents with     Consult     Thyroid nodule (left) per Provatas.       Initial /66   Pulse 83   Temp 98  F (36.7  C) (Tympanic)   Resp 16   Ht 1.575 m (5' 2\")   Wt 83 kg (183 lb)   SpO2 94%   BMI 33.47 kg/m   Estimated body mass index is 33.47 kg/m  as calculated from the following:    Height as of this encounter: 1.575 m (5' 2\").    Weight as of this encounter: 83 kg (183 lb).  Medication Reconciliation: complete    "

## 2019-07-25 NOTE — LETTER
2019         RE: Nella Lemon  [address] [facility] [address]        Dear Colleague,    Thank you for referring your patient, Nella Lemon, to the [facility]. Please see a copy of my visit note below.      Otolaryngology Consultation    Patient: Nella Lemon  : 1945    CC: thyroid nodule  Referral:  Dr. Venegas    HPI:  Nella Lemon is a 74 year old female seen today at the request of Dr. Venegas  for evaluation of a thyroid nodule.    This nodule was found incidentally during a chest CT.  A 1 cm calcified nodule was noted in the left lobe.  The patient denies dysphonia, dysphagia or any other compressive symptoms.  She has noted fatigue.   There is no weight loss.  Positive weight gain  There is no family history of thyroid cancer, and no personal history of head or neck irradiation.  No anesthesia or bleeding complications with past surgeries      She has been followed by Dr. Venegas for leukocytosis.    The thyroid ultrasound was personally reviewed dated 19  The dominant nodule(s) measures 1.5 cm LEFT lobe with calcifications    I recommended FNAB prior to her visit with me.  This was performed on  and revealed papillary thyroid carcinoma    Thyroid labs were reviewed.  No recent TSH.  The patient denies tremor, hair loss or weight loss.     History of hypothyroidism    Calcium was 8.7 on     Thyroglobulin Antibody <20    FNA thyroid left lobe.:   Positive for malignancy.   Papillary thyroid carcinoma         PROCEDURE: US THYROID 2019 8:41 AM     HISTORY: Thyroid nodule; Leukocytosis, unspecified type     COMPARISONS: None.     TECHNIQUE: Ultrasound of the thyroid     FINDINGS: The right lobe of the thyroid measures 5.3 x 2.0 x 2.2 cm.  The left lobe of the thyroid measures 5.1 x 1.9 x 1.7 cm. There is a  dominant calcified nodule in the left lobe of the thyroid. This nodule  measures 1.5 x 1.1 x 0.7 cm. The  thyroid gland is diffusely  heterogeneous in echotexture.                                                                        IMPRESSION: Calcified left thyroid nodule with acoustic shadowing.  Both benign or malignant thyroid nodules could produce this appearance     FILI MONSON MD      CYTOLOGIC INTERPRETATION:      FNA-thyroid left lobe.:   Positive for malignancy.   Papillary thyroid carcinoma         Current Outpatient Rx   Medication Sig Dispense Refill     amLODIPine (NORVASC) 5 MG tablet Take 1 tablet (5 mg) by mouth daily 30 tablet 1     ELIQUIS 5 MG tablet TAKE 1 TABLET (5 MG) BY MOUTH 2 TIMES DAILY 60 tablet 1     gabapentin (NEURONTIN) 300 MG capsule TAKE 3 CAPSULES THREE TIMES DAILY 270 capsule 1     GLUCOSAMINE SULFATE PO Take 1,000 mg by mouth 2 times daily       levothyroxine (SYNTHROID/LEVOTHROID) 25 MCG tablet TAKE 1 TABLET DAILY BEST ON EMPTY STOMACH FOR THYROID 90 tablet 2     losartan (COZAAR) 100 MG tablet Take 1 tablet by mouth daily. 90 tablet 1     metoprolol succinate ER (TOPROL-XL) 50 MG 24 hr tablet TAKE 1 TABLET (50 MG) BY MOUTH DAILY 30 tablet 0     Omega-3 Fatty Acids (OMEGA-3 FISH OIL PO) Take 3 g by mouth daily        omeprazole (PRILOSEC) 40 MG DR capsule TAKE 1 CAPSULE (40 MG) BY MOUTH DAILY TAKE 30-60 MINUTES BEFORE A MEAL. 90 capsule 1     rosuvastatin (CRESTOR) 10 MG tablet TAKE 1 TABLET DAILY TO HELP LOWER CHOLESTEROL 90 tablet 2     sertraline (ZOLOFT) 50 MG tablet TAKE 1 TABLET DAILY 30 tablet 8     VITAMIN D, CHOLECALCIFEROL, PO Take by mouth daily         Allergies: Atorvastatin; Ezetimibe; Gentamicin; Hydroxyzine; Lorazepam; Ranitidine; Simvastatin; Lopid [gemfibrozil]; and Pravastatin     Past Medical History:   Diagnosis Date     Anxiety state, unspecified 09/25/2003     Arthritis      Carpal tunnel syndrome 07/02/2002     Coronary artery disease      Endometrial hyperplasia 01/27/2003     Family history of colon cancer 8/2/2016     Family history of malignant  "neoplasm of breast 11/13/2006     Hearing problem      Hyperlipidemia LDL goal <100 12/30/2016     Metrorrhagia 10/22/2003     Myalgia and myositis, unspecified 05/21/2002     Nonallopathic lesion of thoracic region, not elsewhere classified 05/19/2003     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     Palpitations 04/11/2001     Postmenopausal bleeding 09/09/2002     Precordial pain 04/05/2001     Thyroid disease      Unspecified essential hypertension 09/14/2001     Urgency of urination 06/06/2007       Past Surgical History:   Procedure Laterality Date     ADENOIDECTOMY       CHOLECYSTECTOMY  1981     COLONOSCOPY  2002     COLONOSCOPY  2012     COLONOSCOPY N/A 9/22/2014    Procedure: COLONOSCOPY;  Surgeon: Lavell Pavon DO;  Location: [facility]     CYSTOSCOPY  2007    Cystitis chronic     ORTHOPEDIC SURGERY  2002    carpal tunnel; RT, LT     TONSILLECTOMY         ENT family history reviewed    Social History     Tobacco Use     Smoking status: Never Smoker     Smokeless tobacco: Never Used   Substance Use Topics     Alcohol use: Never     Frequency: Never     Drug use: No       Review of Systems  ROS: 10 point ROS neg other than the symptoms noted above in the HPI and post nasal drainage , rhinorrhea, hearing loss, palpitations    Physical Exam  /66   Pulse 83   Temp 98  F (36.7  C) (Tympanic)   Resp 16   Ht 1.575 m (5' 2\")   Wt 83 kg (183 lb)   SpO2 94%   BMI 33.47 kg/m     General - The patient is well nourished and well developed, and appears to have good nutritional status.  Alert and oriented to person and place, answers questions and cooperates with examination appropriately.   Head and Face - Normocephalic and atraumatic, with no gross asymmetry noted.  The facial nerve is intact, with strong symmetric movements.  Voice and Breathing - The patient was breathing comfortably without the use of accessory muscles. There was no wheezing, stridor, or stertor.  The patients voice was " clear and strong, and had appropriate pitch and quality.  Ears -The external auditory canals are patent, the tympanic membranes are intact without effusion, retraction or mass.  Bony landmarks are intact.  Small retraction pocket post inferior left ear, normalizes with valsalva  Eyes - Extraocular movements intact, and the pupils were reactive to light.  Sclera were not icteric or injected, conjunctiva were pink and moist.   Mouth - Examination of the oral cavity showed pink, healthy oral mucosa. No lesions or ulcerations noted.  The tongue was mobile and midline, and the dentition were in good condition.    Throat - The walls of the oropharynx were smooth, pink, moist, symmetric, and had no lesions or ulcerations.  The tonsillar pillars and soft palate were symmetric.  The uvula was midline on elevation.    Neck - thick neck.  Normal midline excursion of the laryngotracheal complex during swallowing.  Full range of motion on passive movement.  Palpation of the occipital, submental, submandibular, internal jugular chain, and supraclavicular nodes did not demonstrate any abnormal lymph nodes or masses.  Palpation of the thyroid was without fixed palpable mass.  The trachea was mobile and midline.  Nose - External contour is symmetric, no gross deflection or scars.  Nasal mucosa is pink and moist with no abnormal mucus.  The septum was intact, turbinates of normal size and position.  No polyps, masses, or purulence noted on examination.    Attempts at mirror laryngoscopy were not possible due to gag reflex.  Therefore I proceeded with a fiberoptic examination after informed consent.  First I sprayed both sides of the nose with a mixture of lidocaine and neosynephrine.  I then passed the scope through the nasal cavity.  The nasal cavity was unremarkable.  The nasopharynx was mucosally covered and symmetric.  The eustachian tube openings were unobstructed.  Going further, the base of tongue was free of lesions, and the  vallecula was open.  The epiglottis was smooth and mucosally covered.  The supraglottic larynx was then clearly visualized.  The vocal cords moved smoothly and symmetrically.  The pyriform sinuses were open and without mayra mass or pooling of secretions, and the limited view of the postcricoid region did not show any lesions.  The patient tolerated the procedure well.      Impression and Plan- Nella Lemon is a 74 year old female with:    ICD-10-CM    1. Thyroid nodule, left E04.1 TSH with free T4 reflex     US Head Neck Soft Tissue     Calcium     Vitamin D Deficiency   2. Left papillary thyroid carcinoma (H) C73    3. 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  She is aware that she has a small PTC.  Most thyroid cancers have a very good prognosis with over 95% 5-year survival.      T1 , Stage I  papillary thyroid carcinoma     TSH with reflex pending  Repeat serum Calcium  Vitamin D  Neck US to evaluate for lymphandenopathy    Risks and complications of LEFT thyroid lobectomy with frozens possible total thyroidectomy were discussed including anesthesia, bleeding, infection, injury to the recurrent laryngeal nerve and resulting hoarseness or change in voice, calcium metabolism problems, scar formation: hypertrophic or keloid, benign versus malignant pathology and need for further surgery.  All questions were answered and wishes are proceed with surgery.      If a total thyroidectomy is necessary, which is not expected, she would be started on thyroid hormone replacement (synthroid) and additional oral calcium and vitamin D.  She may require hospitalization.  After recovery from surgery, she may need to see an endocrinologist and if the tumor is larger than expected, discuss radioactive iodine.      Thyroid anatomy was discussed, and thyroid surgery was  discussed.      Eliquis will need to be held preop and  prefer to hold 1 week  post op    Audiogram pending, annual  Continue valsalva    Spouse, Ben or daughter Mariia would be with her at surgery      Mildred Pineda D.O.  Otolaryngology/Head and Neck Surgery  Allergy          Again, thank you for allowing me to participate in the care of your patient.        Sincerely,        Mildred Pineda MD

## 2019-07-25 NOTE — PATIENT INSTRUCTIONS
HOW TO PREPARE-      You need to have a scheduled Pre-Op with your primary care physician within 30 days of your scheduled surgery. You should be set up with this before you leave today.       You need a friend or family member available to drive you home AND stay with you for 24 hours after you leave the hospital. You will not be allowed to drive yourself. IF you need to take a taxi or the bus you MUST have a responsible person to ride with you. YOUR PROCEDURE WILL BE CANCELLED IF YOU DO NOT HAVE A RESPONSIBLE ADULT TO DRIVE YOU HOME.       You CANNOT have anything to eat or drink after midnight the night before your surgery, including water and coffee. Your stomach needs to be completely empty. Do NOT chew gum, suck on hard candy, or smoke. You can brush your teeth the morning of surgery.       The Surgery Education Nurses will call you  1-2 weeks prior to your surgery date at  [phone] or toll free [phone]. Please have your medication and allergy lists ready.      Stop your aspirin or other NSAIDs(Ibuprofen, Motrin, Aleve, Celebrex, Naproxen, etc...) 7 days before your surgery.      Hospital admitting will call you the day before your surgery with your exact arrival time.       Please call your primary care physician if you should become ill within 24 hours of scheduled surgery. (ex.vomiting, diarrhea, fever)          You will need to wash the night before AND the morning of you procedure with a liquid antibacterial soap, like Dial.     Thank you for allowing Dr. Pineda and our ENT team to participate in your care.  If your medications are too expensive, please give the nurse a call.  We can possibly change this medication.  If you have a scheduling or an appointment question please contact our Health Unit Coordinator at their direct line [phone].   ALL nursing questions or concerns can be directed to your ENT nurse at: [phone] Bridgette Torres

## 2019-07-30 ENCOUNTER — OFFICE VISIT (OUTPATIENT)
Dept: FAMILY MEDICINE | Facility: OTHER | Age: 74
End: 2019-07-30
Attending: NURSE PRACTITIONER
Payer: COMMERCIAL

## 2019-07-30 VITALS
BODY MASS INDEX: 34.83 KG/M2 | TEMPERATURE: 97.2 F | HEIGHT: 62 IN | WEIGHT: 189.3 LBS | DIASTOLIC BLOOD PRESSURE: 70 MMHG | SYSTOLIC BLOOD PRESSURE: 134 MMHG | OXYGEN SATURATION: 98 % | HEART RATE: 59 BPM | RESPIRATION RATE: 16 BRPM

## 2019-07-30 DIAGNOSIS — I48.0 PAROXYSMAL ATRIAL FIBRILLATION (H): ICD-10-CM

## 2019-07-30 DIAGNOSIS — E78.2 MIXED HYPERLIPIDEMIA: ICD-10-CM

## 2019-07-30 DIAGNOSIS — E03.4 HYPOTHYROIDISM DUE TO ACQUIRED ATROPHY OF THYROID: Primary | ICD-10-CM

## 2019-07-30 DIAGNOSIS — I10 BENIGN ESSENTIAL HYPERTENSION: ICD-10-CM

## 2019-07-30 LAB — DEPRECATED CALCIDIOL+CALCIFEROL SERPL-MC: 19 UG/L (ref 20–75)

## 2019-07-30 PROCEDURE — 99214 OFFICE O/P EST MOD 30 MIN: CPT | Performed by: NURSE PRACTITIONER

## 2019-07-30 PROCEDURE — G0463 HOSPITAL OUTPT CLINIC VISIT: HCPCS

## 2019-07-30 ASSESSMENT — MIFFLIN-ST. JEOR: SCORE: 1316.91

## 2019-07-30 ASSESSMENT — PAIN SCALES - GENERAL: PAINLEVEL: NO PAIN (0)

## 2019-07-30 NOTE — NURSING NOTE
"Chief Complaint   Patient presents with     Lipids     Hypertension     Thyroid Problem       Initial /70 (BP Location: Left arm, Patient Position: Sitting, Cuff Size: Adult Regular)   Pulse 59   Temp 97.2  F (36.2  C) (Tympanic)   Resp 16   Ht 1.575 m (5' 2\")   Wt 85.9 kg (189 lb 4.8 oz)   SpO2 98%   BMI 34.62 kg/m   Estimated body mass index is 34.62 kg/m  as calculated from the following:    Height as of this encounter: 1.575 m (5' 2\").    Weight as of this encounter: 85.9 kg (189 lb 4.8 oz).  Medication Reconciliation: complete  "

## 2019-07-30 NOTE — TELEPHONE ENCOUNTER
Per Dr. Castellon recent clinic note: Neck US to evaluate for lymphandenopathy. Looks like she did not want just a thyroid US to my understanding. Let me know if I can do anything else, or if you have any other questions!

## 2019-08-01 ENCOUNTER — TELEPHONE (OUTPATIENT)
Dept: OTOLARYNGOLOGY | Facility: OTHER | Age: 74
End: 2019-08-01

## 2019-08-01 NOTE — TELEPHONE ENCOUNTER
Maintain good water intake. Throat should heal and return to baseline in short term.    Does she wish to have lidocaine sent in for short term?     TR

## 2019-08-01 NOTE — TELEPHONE ENCOUNTER
Pt was seen on 7/25 with Dr. Pineda and was scoped.  She calls today and states that ever since then, her throat is really sore and is even wakes her up.  She is wondering what she should do?  Please advise.

## 2019-08-01 NOTE — TELEPHONE ENCOUNTER
Pt was notified.  She states that she is going to wait and see how she does, but will call if she wants something called in.

## 2019-08-02 DIAGNOSIS — M79.10 MYALGIA: ICD-10-CM

## 2019-08-02 DIAGNOSIS — F41.9 ANXIETY: ICD-10-CM

## 2019-08-02 DIAGNOSIS — I10 BENIGN ESSENTIAL HYPERTENSION: ICD-10-CM

## 2019-08-02 DIAGNOSIS — E03.4 HYPOTHYROIDISM DUE TO ACQUIRED ATROPHY OF THYROID: ICD-10-CM

## 2019-08-02 RX ORDER — GABAPENTIN 300 MG/1
CAPSULE ORAL
Qty: 270 CAPSULE | Refills: 1 | Status: SHIPPED | OUTPATIENT
Start: 2019-08-02 | End: 2019-09-05

## 2019-08-02 RX ORDER — LEVOTHYROXINE SODIUM 25 UG/1
TABLET ORAL
Qty: 90 TABLET | Refills: 2 | Status: SHIPPED | OUTPATIENT
Start: 2019-08-02 | End: 2019-10-31

## 2019-08-02 RX ORDER — AMLODIPINE BESYLATE 5 MG/1
5 TABLET ORAL DAILY
Qty: 30 TABLET | Refills: 1 | Status: SHIPPED | OUTPATIENT
Start: 2019-08-02 | End: 2019-10-07

## 2019-08-02 NOTE — TELEPHONE ENCOUNTER
gabapentin (NEURONTIN) 300 MG capsule  Last Written Prescription Date:  6/5/2019  Last Fill Quantity: 270,   # refills: 1  Last Office Visit: 7/30/2019  Future Office visit:    Next 5 appointments (look out 90 days)    Aug 12, 2019 10:30 AM CDT  (Arrive by 10:15 AM)  Pre-Op physical with Randi Haddad NP  [facility] ([facility] ) [address]  [place] [address]  [phone]   Aug 28, 2019  8:30 AM CDT  (Arrive by 8:15 AM)  Return Visit with Joss Palafox  [facility] ([facility] ) [address]  [place] [address]  [phone]   Sep 04, 2019 10:30 AM CDT  (Arrive by 10:15 AM)  Pre-Op physical with Randi Haddad NP  [facility] ([facility] ) [address]  [place] [address]  [phone]   Sep 09, 2019  9:30 AM CDT  (Arrive by 9:15 AM)  Return Visit with Rosa Venegas MD  [facility] ([facility] ) [address]  [place] [address]  [phone]   Oct 30, 2019  9:45 AM CDT  (Arrive by 9:30 AM)  SHORT with Randi Haddad NP  [facility] ([facility] ) [address]  [place] [address]  [phone]           Routing refill request to provider for review/approval because:  Drug not on the [facility], [facility] or  Health refill protocol or controlled substance

## 2019-08-06 NOTE — PROGRESS NOTES
[facility]  [address]  [place] [address]  [phone]  Dept: [phone]    PRE-OP EVALUATION:  Today's date: 2019    Nella Lemon (: 1945) presents for pre-operative evaluation assessment as requested by Dr. Pineda.  She requires evaluation and anesthesia risk assessment prior to undergoing surgery/procedure for treatment ofLEFT THYROID LOBECTOMY WITH FROZENS, POSSIBLE TOTAL THYROIDECTOMY  .    Proposed Surgery/ ProcedureLEFT THYROID LOBECTOMY WITH FROZENS, POSSIBLE TOTAL THYROIDECTOMY:   Date of Surgery/ Procedure: 19  Time of Surgery/ Procedure: [facility]  [place]urgical [place] [facility] [place]    Primary Physician: Randi Haddad  Type of Anesthesia Anticipated: to be determined    Patient has a Health Care Directive or Living Will:  YES     1. NO - Do you have a history of heart attack, stroke, stent, bypass or surgery on an artery in the head, neck, heart or legs?  2. NO - Do you ever have any pain or discomfort in your chest?  3. NO - Do you have a history of  Heart Failure?  4. NO - Are you troubled by shortness of breath when: walking on the level, up a slight hill or at night?  5. YES - Do you currently have a cold, bronchitis or other respiratory infection?  6. YES - Do you have a cough, shortness of breath or wheezing? Since   7. NO - Do you sometimes get pains in the calves of your legs when you walk?  8. YES - Do you or anyone in your family have previous history of blood clots?  9. NO - Do you or does anyone in your family have a serious bleeding problem such as prolonged bleeding following surgeries or cuts?  10. NO - Have you ever had problems with anemia or been told to take iron pills?  11. NO - Have you had any abnormal blood loss such as black, tarry or bloody stools, or abnormal vaginal bleeding?  12. NO - Have you ever had a blood transfusion?  13. NO - Have you or any of your relatives ever had problems with  anesthesia?  14. YES - Do you have sleep apnea, excessive snoring or daytime drowsiness?  15. NO - Do you have any prosthetic heart valves?  16. NO - Do you have prosthetic joints?  17. NO - Is there any chance that you may be pregnant?      HPI:     HPI related to upcoming procedure: thyroid carcinoma.  The decision has been made to proceed with surgical correction.        CAD - Patient has a longstanding history of moderate-severe CAD. Patient denies recent chest pain or NTG use, denies exercise induced dyspnea or PND. Last Stress test 4/2018/,     HYPERLIPIDEMIA - Patient has a long history of significant Hyperlipidemia requiring medication for treatment with recent good control. Patient reports no problems or side effects with the medication.   The ASCVD Risk score ([place] PANCHITO Jr., et al., 2013) failed to calculate for the following reasons:    The patient has a prior MI or stroke diagnosis      HYPERTENSION - Patient has longstanding history of HTN , currently denies any symptoms referable to elevated blood pressure. Specifically denies chest pain, palpitations, dyspnea, orthopnea, PND or peripheral edema. Blood pressure readings have been in normal range. Current medication regimen is as listed below. Patient denies any side effects of medication.     HYPOTHYROIDISM - Patient has a longstanding history of chronic Hypothyroidism. Patient has been doing well, noting no tremor, insomnia, hair loss or changes in skin texture. Continues to take medications as directed, without adverse reactions or side effects. Last TSH   Lab Results   Component Value Date    TSH 2.88 07/25/2019   .      Of note, she has been coughing, denies fever but feels run down.  Mild sore throat with moderate ear pain.  She has not been taking anything to reduce symptoms that started a few weeks ago.        MEDICAL HISTORY:     Patient Active Problem List    Diagnosis Date Noted     Obesity (BMI 35.0-39.9) with comorbidity (H) 06/18/2019      Priority: Medium     Leukocytosis 05/31/2019     Priority: Medium     CKD (chronic kidney disease) stage 2, GFR 60-89 ml/min 05/07/2019     Priority: Medium     Atypical chest pain 03/28/2018     Priority: Medium     Paroxysmal atrial fibrillation (H) 02/19/2018     Priority: Medium     Hypothyroidism due to acquired atrophy of thyroid 02/13/2018     Priority: Medium     Mixed hyperlipidemia 12/30/2016     Priority: Medium     ACP (advance care planning) 12/16/2016     Priority: Medium     Advance Care Planning 12/16/2016: ACP Review of Chart / Resources Provided:  Reviewed chart for advance care plan.  Nella Turpinwardpierre has been provided information and resources to begin or update their advance care plan.  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  Nella DANIELSON Luis Albertopierre has no plan or code status on file however states presence of ACP document. Copy requested. Confirmed code status reflects current choices pending receipt of document/advance care plan review.  Confirmed/documented legally designated decision makers.  Added by Fiona Orellana             Family history of malignant neoplasm of breast 11/13/2006     Priority: Medium     Anxiety state 09/25/2003     Priority: Medium     Neuropathy 05/21/2002     Priority: Medium     GERD 09/28/2001     Priority: Medium     Benign essential hypertension 09/14/2001     Priority: Medium     Palpitations 04/11/2001     Priority: Medium      Past  Medical History:   Diagnosis Date     Anxiety state, unspecified 09/25/2003     Arthritis      Carpal tunnel syndrome 07/02/2002     Coronary artery disease      Endometrial hyperplasia 01/27/2003     Family history of colon cancer 8/2/2016     Family history of malignant neoplasm of breast 11/13/2006     Hearing problem      Hyperlipidemia LDL goal <100 12/30/2016     Metrorrhagia 10/22/2003     Myalgia and myositis, unspecified 05/21/2002     Nonallopathic lesion of thoracic region, not elsewhere classified 05/19/2003     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     Palpitations 04/11/2001     Postmenopausal bleeding 09/09/2002     Precordial pain 04/05/2001     Thyroid disease      Unspecified essential hypertension 09/14/2001     Urgency of urination 06/06/2007     Past Surgical History:   Procedure Laterality Date     ADENOIDECTOMY       BIOPSY      FNA left thyroid     CHOLECYSTECTOMY  1981     COLONOSCOPY  2002     COLONOSCOPY  2012     COLONOSCOPY N/A 9/22/2014    Procedure: COLONOSCOPY;  Surgeon: Lavell Pavon DO;  Location: [facility] OR     CYSTOSCOPY  2007    Cystitis chronic     ORTHOPEDIC SURGERY  2002    carpal tunnel; RT, LT     TONSILLECTOMY       Current Outpatient Medications   Medication Sig Dispense Refill     amLODIPine (NORVASC) 5 MG tablet TAKE 1 TABLET (5 MG) BY MOUTH DAILY 30 tablet 1     ELIQUIS 5 MG tablet TAKE 1 TABLET (5 MG) BY MOUTH 2 TIMES DAILY 60 tablet 1     gabapentin (NEURONTIN) 300 MG capsule TAKE 3 CAPSULES THREE TIMES DAILY 270 capsule 1     GLUCOSAMINE SULFATE PO Take 1,000 mg by mouth 2 times daily       levothyroxine (SYNTHROID/LEVOTHROID) 25 MCG tablet TAKE 1 TABLET DAILY BEST ON EMPTY STOMACH FOR THYROID 90 tablet 2     losartan (COZAAR) 100 MG tablet Take 1 tablet by mouth daily. 90 tablet 1     metoprolol succinate ER (TOPROL-XL) 50 MG 24 hr tablet TAKE 1 TABLET (50 MG) BY MOUTH DAILY 30 tablet 0     Omega-3 Fatty Acids (OMEGA-3 FISH OIL PO) Take 3 g by  "mouth daily        omeprazole (PRILOSEC) 40 MG DR capsule TAKE 1 CAPSULE (40 MG) BY MOUTH DAILY TAKE 30-60 MINUTES BEFORE A MEAL. 90 capsule 1     pravastatin (PRAVACHOL) 20 MG tablet Take 1 tablet (20 mg) by mouth daily 90 tablet 1     sertraline (ZOLOFT) 50 MG tablet TAKE 1 TABLET DAILY 30 tablet 3     sulfamethoxazole-trimethoprim (BACTRIM DS/SEPTRA DS) 800-160 MG tablet Take 1 tablet by mouth 2 times daily for 10 days 20 tablet 0     VITAMIN D, CHOLECALCIFEROL, PO Take by mouth daily       ketorolac (ACULAR) 0.5 % ophthalmic solution INSTILL 1 DROP INTO LEFT EYE FOUR TIMES DAILY. BEGIN 1 DAY PRIOR TO PROCEDURE  0     moxifloxacin (VIGAMOX) 0.5 % ophthalmic solution INSTILL 1 DROP INTO RIGHT EYE THREE TIMES A DAY BEGIN 1 DAY PRIOR TO PROCEDURE  0     prednisoLONE acetate (PRED FORTE) 1 % ophthalmic suspension INSTILL 1 DROP INTO THE LEFT EYE FOUR TIMES DAILY. BEGIN 1 DAY PRIOR TO PROCEDURE.  0     OTC products: None, except as noted above, no recent use of OTC ASA, NSAIDS or Steroids and no use of herbal medications or other supplements    Allergies   Allergen Reactions     Atorvastatin      Ezetimibe      Gentamicin      Hydroxyzine      Lorazepam      Ranitidine      Simvastatin      Lopid [Gemfibrozil] GI Disturbance     Bloating, constipation,      Pravastatin Muscle Pain (Myalgia)      Latex Allergy: NO    Social History     Tobacco Use     Smoking status: Never Smoker     Smokeless tobacco: Never Used   Substance Use Topics     Alcohol use: Never     Frequency: Never     History   Drug Use No       REVIEW OF SYSTEMS:   Constitutional, neuro, ENT, endocrine, pulmonary, cardiac, gastrointestinal, genitourinary, musculoskeletal, integument and psychiatric systems are negative, except as otherwise noted.    EXAM:   /68 (BP Location: Right arm, Patient Position: Sitting, Cuff Size: Adult Regular)   Pulse 60   Temp 98.1  F (36.7  C) (Tympanic)   Resp 14   Ht 1.575 m (5' 2\")   Wt 85.3 kg (188 lb)   " SpO2 97%   BMI 34.39 kg/m      GENERAL APPEARANCE: healthy, alert and no distress     EYES: EOMI, PERRL     HENT: bilateral TM are erythematous     NECK: no adenopathy, no asymmetry, masses, or scars and thyroid normal to palpation     RESP: mild wheezing with bronchospasm on inspiration.       CV: regular rates and rhythm, normal S1 S2, no S3 or S4 and no murmur, click or rub     ABDOMEN:  soft, nontender, no HSM or masses and bowel sounds normal     MS: extremities normal- no gross deformities noted, no evidence of inflammation in joints, FROM in all extremities.     SKIN: no suspicious lesions or rashes     NEURO: Normal strength and tone, sensory exam grossly normal, mentation intact and speech normal     PSYCH: mentation appears normal. and affect normal/bright    DIAGNOSTICS:     Results for orders placed or performed in visit on 08/12/19   CBC with platelets differential   Result Value Ref Range    WBC 12.2 (H) 4.0 - 11.0 10e9/L    RBC Count 5.12 3.8 - 5.2 10e12/L    Hemoglobin 14.6 11.7 - 15.7 g/dL    Hematocrit 42.6 35.0 - 47.0 %    MCV 83 78 - 100 fl    MCH 28.5 26.5 - 33.0 pg    MCHC 34.3 31.5 - 36.5 g/dL    RDW 13.3 10.0 - 15.0 %    Platelet Count 313 150 - 450 10e9/L    % Neutrophils 71.4 %    % Lymphocytes 20.0 %    % Monocytes 7.0 %    % Eosinophils 1.3 %    % Basophils 0.3 %    Absolute Neutrophil 8.7 (H) 1.6 - 8.3 10e9/L    Absolute Lymphocytes 2.4 0.8 - 5.3 10e9/L    Absolute Monocytes 0.9 0.0 - 1.3 10e9/L    Absolute Eosinophils 0.2 0.0 - 0.7 10e9/L    Absolute Basophils 0.0 0.0 - 0.2 10e9/L    Diff Method Automated Method    Comprehensive metabolic panel   Result Value Ref Range    Sodium 141 133 - 144 mmol/L    Potassium 3.9 3.4 - 5.3 mmol/L    Chloride 108 94 - 109 mmol/L    Carbon Dioxide 24 20 - 32 mmol/L    Anion Gap 9 3 - 14 mmol/L    Glucose 88 70 - 99 mg/dL    Urea Nitrogen 15 7 - 30 mg/dL    Creatinine 0.85 0.52 - 1.04 mg/dL    GFR Estimate 68 >60 mL/min/[1.73_m2]    GFR Estimate If  Black 79 >60 mL/min/[1.73_m2]    Calcium 9.0 8.5 - 10.1 mg/dL    Bilirubin Total 0.6 0.2 - 1.3 mg/dL    Albumin 3.6 3.4 - 5.0 g/dL    Protein Total 7.3 6.8 - 8.8 g/dL    Alkaline Phosphatase 127 40 - 150 U/L    ALT 41 0 - 50 U/L    AST 24 0 - 45 U/L   *UA reflex to Microscopic and Culture - [facility]   Result Value Ref Range    Color Urine Yellow     Appearance Urine Clear     Glucose Urine Negative NEG^Negative mg/dL    Bilirubin Urine Negative NEG^Negative    Ketones Urine Negative NEG^Negative mg/dL    Specific Gravity Urine 1.020 1.003 - 1.035    Blood Urine Trace (A) NEG^Negative    pH Urine 5.5 5.0 - 7.0 pH    Protein Albumin Urine Negative NEG^Negative mg/dL    Urobilinogen Urine 0.2 0.2 - 1.0 EU/dL    Nitrite Urine Negative NEG^Negative    Leukocyte Esterase Urine Large (A) NEG^Negative    Source Midstream Urine    Urine Microscopic   Result Value Ref Range    WBC Urine 25-50 (A) OTO5^0 - 5 /HPF    RBC Urine O - 2 OTO2^O - 2 /HPF    Squamous Epithelial /LPF Urine Few FEW^Few /LPF    Bacteria Urine Few (A) NEG^Negative /HPF   Urine Culture Aerobic Bacterial   Result Value Ref Range    Specimen Description Midstream Urine     Culture Micro       10,000 to 50,000 colonies/mL  mixed urogenital linda  No further identification or sensitivity done           Recent Labs   Lab Test 07/01/19  1142 06/18/19  1022   HGB 14.4 13.9    281    145*   POTASSIUM 4.1 4.1   CR 0.93 0.96        IMPRESSION:   Reason for surgery/procedure: thyroid carcinoma  Diagnosis/reason for consult: anesthesia risk assessment.      The proposed surgical procedure is considered INTERMEDIATE risk.    REVISED CARDIAC RISK INDEX  The patient has the following serious cardiovascular risks for perioperative complications such as (MI, PE, VFib and 3  AV Block):  A fib, CKD  INTERPRETATION: 2 risks: Class III (moderate risk - 6.6% complication rate)    The patient has the following additional risks for perioperative  complications:  No identified additional risks  The ASCVD Risk score ([place] PANCHITO Jr., et al., 2013) failed to calculate for the following reasons:    The patient has a prior MI or stroke diagnosis      ICD-10-CM    1. Preop general physical exam Z01.818 CBC with platelets differential     Comprehensive metabolic panel     *UA reflex to Microscopic and Culture - [facility]     XR CHEST 2 VW (Clinic Performed)     EKG 12-lead complete w/read - (Clinic Performed)     Urine Microscopic   2. Malignant neoplasm of thyroid gland (H) C73    3. Hypothyroidism due to acquired atrophy of thyroid E03.4    4. Paroxysmal atrial fibrillation (H) I48.0    5. Mixed hyperlipidemia E78.2 pravastatin (PRAVACHOL) 20 MG tablet   6. CKD (chronic kidney disease) stage 2, GFR 60-89 ml/min N18.2    7. Acute bronchitis with symptoms greater than 10 days J20.9 sulfamethoxazole-trimethoprim (BACTRIM DS/SEPTRA DS) 800-160 MG tablet     DISCONTINUED: azithromycin (ZITHROMAX) 250 MG tablet   8. Abnormal urine findings R82.90 Urine Culture Aerobic Bacterial   9. Acute cystitis without hematuria N30.00 sulfamethoxazole-trimethoprim (BACTRIM DS/SEPTRA DS) 800-160 MG tablet       RECOMMENDATIONS:       Cardiovascular Risk  Performs 4 METs exercise without symptoms (Climb a flight of stairs) .       --Patient is to HOLD all scheduled medications on the day of surgery EXCEPT for modifications listed below.    Not Approved, will return next week for final clearance.       Signed Electronically by: Randi Haddad NP    Copy of this evaluation report is provided to requesting physician.    [facility] Preop Guidelines    Revised Cardiac Risk Index

## 2019-08-06 NOTE — PATIENT INSTRUCTIONS
1. Preop general physical exam  - CBC with platelets differential  - Comprehensive metabolic panel  - *UA reflex to Microscopic and Culture - [facility]  - XR CHEST 2 VW (Clinic Performed); Future  - EKG 12-lead complete w/read - (Clinic Performed)    2. Malignant neoplasm of thyroid gland (H)    3. Hypothyroidism due to acquired atrophy of thyroid    4. Paroxysmal atrial fibrillation (H)    5. Mixed hyperlipidemia  - pravastatin (PRAVACHOL) 20 MG tablet; Take 1 tablet (20 mg) by mouth daily  Dispense: 90 tablet; Refill: 1    6. CKD (chronic kidney disease) stage 2, GFR 60-89 ml/min    7. Acute bronchitis with symptoms greater than 10 days  - azithromycin (ZITHROMAX) 250 MG tablet; Two tablets first day, then one tablet daily for eight days.  Dispense: 10 tablet; Refill: 0'  Before Your Surgery      Call your surgeon if there is any change in your health. This includes signs of a cold or flu (such as a sore throat, runny nose, cough, rash or fever).    Do not smoke, drink alcohol or take over the counter medicine (unless your surgeon or primary care doctor tells you to) for the 24 hours before and after surgery.    If you take prescribed drugs: Follow your doctor s orders about which medicines to take and which to stop until after surgery.    Eating and drinking prior to surgery: follow the instructions from your surgeon    Take a shower or bath the night before surgery. Use the soap your surgeon gave you to gently clean your skin. If you do not have soap from your surgeon, use your regular soap. Do not shave or scrub the surgery site.  Wear clean pajamas and have clean sheets on your bed.

## 2019-08-07 ENCOUNTER — HOSPITAL ENCOUNTER (OUTPATIENT)
Dept: ULTRASOUND IMAGING | Facility: HOSPITAL | Age: 74
Discharge: HOME | End: 2019-08-07
Attending: OTOLARYNGOLOGY | Admitting: OTOLARYNGOLOGY
Payer: COMMERCIAL

## 2019-08-07 DIAGNOSIS — E04.1 THYROID NODULE: ICD-10-CM

## 2019-08-07 PROCEDURE — 76536 US EXAM OF HEAD AND NECK: CPT | Mod: TC

## 2019-08-12 ENCOUNTER — OFFICE VISIT (OUTPATIENT)
Dept: FAMILY MEDICINE | Facility: OTHER | Age: 74
End: 2019-08-12
Attending: NURSE PRACTITIONER
Payer: COMMERCIAL

## 2019-08-12 ENCOUNTER — ANCILLARY PROCEDURE (OUTPATIENT)
Dept: GENERAL RADIOLOGY | Facility: OTHER | Age: 74
End: 2019-08-12
Attending: NURSE PRACTITIONER
Payer: COMMERCIAL

## 2019-08-12 VITALS
HEIGHT: 62 IN | BODY MASS INDEX: 34.6 KG/M2 | WEIGHT: 188 LBS | DIASTOLIC BLOOD PRESSURE: 68 MMHG | HEART RATE: 60 BPM | TEMPERATURE: 98.1 F | OXYGEN SATURATION: 97 % | SYSTOLIC BLOOD PRESSURE: 126 MMHG | RESPIRATION RATE: 14 BRPM

## 2019-08-12 DIAGNOSIS — R82.90 ABNORMAL URINE FINDINGS: ICD-10-CM

## 2019-08-12 DIAGNOSIS — E78.2 MIXED HYPERLIPIDEMIA: ICD-10-CM

## 2019-08-12 DIAGNOSIS — E03.4 HYPOTHYROIDISM DUE TO ACQUIRED ATROPHY OF THYROID: ICD-10-CM

## 2019-08-12 DIAGNOSIS — N18.2 CKD (CHRONIC KIDNEY DISEASE) STAGE 2, GFR 60-89 ML/MIN: ICD-10-CM

## 2019-08-12 DIAGNOSIS — Z01.818 PREOP GENERAL PHYSICAL EXAM: ICD-10-CM

## 2019-08-12 DIAGNOSIS — J20.9 ACUTE BRONCHITIS WITH SYMPTOMS GREATER THAN 10 DAYS: ICD-10-CM

## 2019-08-12 DIAGNOSIS — N30.00 ACUTE CYSTITIS WITHOUT HEMATURIA: ICD-10-CM

## 2019-08-12 DIAGNOSIS — I48.0 PAROXYSMAL ATRIAL FIBRILLATION (H): ICD-10-CM

## 2019-08-12 DIAGNOSIS — C73 MALIGNANT NEOPLASM OF THYROID GLAND (H): ICD-10-CM

## 2019-08-12 DIAGNOSIS — Z01.818 PREOP GENERAL PHYSICAL EXAM: Primary | ICD-10-CM

## 2019-08-12 LAB
ALBUMIN SERPL-MCNC: 3.6 G/DL (ref 3.4–5)
ALBUMIN UR-MCNC: NEGATIVE MG/DL
ALP SERPL-CCNC: 127 U/L (ref 40–150)
ALT SERPL W P-5'-P-CCNC: 41 U/L (ref 0–50)
ANION GAP SERPL CALCULATED.3IONS-SCNC: 9 MMOL/L (ref 3–14)
APPEARANCE UR: CLEAR
AST SERPL W P-5'-P-CCNC: 24 U/L (ref 0–45)
BACTERIA #/AREA URNS HPF: ABNORMAL /HPF
BASOPHILS # BLD AUTO: 0 10E9/L (ref 0–0.2)
BASOPHILS NFR BLD AUTO: 0.3 %
BILIRUB SERPL-MCNC: 0.6 MG/DL (ref 0.2–1.3)
BILIRUB UR QL STRIP: NEGATIVE
BUN SERPL-MCNC: 15 MG/DL (ref 7–30)
CALCIUM SERPL-MCNC: 9 MG/DL (ref 8.5–10.1)
CHLORIDE SERPL-SCNC: 108 MMOL/L (ref 94–109)
CO2 SERPL-SCNC: 24 MMOL/L (ref 20–32)
COLOR UR AUTO: YELLOW
CREAT SERPL-MCNC: 0.85 MG/DL (ref 0.52–1.04)
DIFFERENTIAL METHOD BLD: ABNORMAL
EOSINOPHIL # BLD AUTO: 0.2 10E9/L (ref 0–0.7)
EOSINOPHIL NFR BLD AUTO: 1.3 %
ERYTHROCYTE [DISTWIDTH] IN BLOOD BY AUTOMATED COUNT: 13.3 % (ref 10–15)
GFR SERPL CREATININE-BSD FRML MDRD: 68 ML/MIN/{1.73_M2}
GLUCOSE SERPL-MCNC: 88 MG/DL (ref 70–99)
GLUCOSE UR STRIP-MCNC: NEGATIVE MG/DL
HCT VFR BLD AUTO: 42.6 % (ref 35–47)
HGB BLD-MCNC: 14.6 G/DL (ref 11.7–15.7)
HGB UR QL STRIP: ABNORMAL
KETONES UR STRIP-MCNC: NEGATIVE MG/DL
LEUKOCYTE ESTERASE UR QL STRIP: ABNORMAL
LYMPHOCYTES # BLD AUTO: 2.4 10E9/L (ref 0.8–5.3)
LYMPHOCYTES NFR BLD AUTO: 20 %
MCH RBC QN AUTO: 28.5 PG (ref 26.5–33)
MCHC RBC AUTO-ENTMCNC: 34.3 G/DL (ref 31.5–36.5)
MCV RBC AUTO: 83 FL (ref 78–100)
MONOCYTES # BLD AUTO: 0.9 10E9/L (ref 0–1.3)
MONOCYTES NFR BLD AUTO: 7 %
NEUTROPHILS # BLD AUTO: 8.7 10E9/L (ref 1.6–8.3)
NEUTROPHILS NFR BLD AUTO: 71.4 %
NITRATE UR QL: NEGATIVE
NON-SQ EPI CELLS #/AREA URNS LPF: ABNORMAL /LPF
PH UR STRIP: 5.5 PH (ref 5–7)
PLATELET # BLD AUTO: 313 10E9/L (ref 150–450)
POTASSIUM SERPL-SCNC: 3.9 MMOL/L (ref 3.4–5.3)
PROT SERPL-MCNC: 7.3 G/DL (ref 6.8–8.8)
RBC # BLD AUTO: 5.12 10E12/L (ref 3.8–5.2)
RBC #/AREA URNS AUTO: ABNORMAL /HPF
SODIUM SERPL-SCNC: 141 MMOL/L (ref 133–144)
SOURCE: ABNORMAL
SP GR UR STRIP: 1.02 (ref 1–1.03)
UROBILINOGEN UR STRIP-ACNC: 0.2 EU/DL (ref 0.2–1)
WBC # BLD AUTO: 12.2 10E9/L (ref 4–11)
WBC #/AREA URNS AUTO: ABNORMAL /HPF

## 2019-08-12 PROCEDURE — 80053 COMPREHEN METABOLIC PANEL: CPT | Mod: ZL | Performed by: NURSE PRACTITIONER

## 2019-08-12 PROCEDURE — 87086 URINE CULTURE/COLONY COUNT: CPT | Mod: ZL | Performed by: NURSE PRACTITIONER

## 2019-08-12 PROCEDURE — 85025 COMPLETE CBC W/AUTO DIFF WBC: CPT | Mod: ZL | Performed by: NURSE PRACTITIONER

## 2019-08-12 PROCEDURE — G0463 HOSPITAL OUTPT CLINIC VISIT: HCPCS

## 2019-08-12 PROCEDURE — 93005 ELECTROCARDIOGRAM TRACING: CPT | Performed by: INTERNAL MEDICINE

## 2019-08-12 PROCEDURE — G0463 HOSPITAL OUTPT CLINIC VISIT: HCPCS | Mod: 25

## 2019-08-12 PROCEDURE — 36415 COLL VENOUS BLD VENIPUNCTURE: CPT | Mod: ZL | Performed by: NURSE PRACTITIONER

## 2019-08-12 PROCEDURE — 81001 URINALYSIS AUTO W/SCOPE: CPT | Mod: ZL | Performed by: NURSE PRACTITIONER

## 2019-08-12 PROCEDURE — 99214 OFFICE O/P EST MOD 30 MIN: CPT | Performed by: NURSE PRACTITIONER

## 2019-08-12 PROCEDURE — 71046 X-RAY EXAM CHEST 2 VIEWS: CPT | Mod: TC

## 2019-08-12 PROCEDURE — 93010 ELECTROCARDIOGRAM REPORT: CPT | Performed by: INTERNAL MEDICINE

## 2019-08-12 RX ORDER — SULFAMETHOXAZOLE/TRIMETHOPRIM 800-160 MG
1 TABLET ORAL 2 TIMES DAILY
Qty: 20 TABLET | Refills: 0 | Status: SHIPPED | OUTPATIENT
Start: 2019-08-12 | End: 2019-08-22

## 2019-08-12 RX ORDER — AZITHROMYCIN 250 MG/1
TABLET, FILM COATED ORAL
Qty: 10 TABLET | Refills: 0 | Status: SHIPPED | OUTPATIENT
Start: 2019-08-12 | End: 2019-08-12 | Stop reason: ALTCHOICE

## 2019-08-12 RX ORDER — PRAVASTATIN SODIUM 20 MG
20 TABLET ORAL DAILY
Qty: 90 TABLET | Refills: 1 | Status: SHIPPED | OUTPATIENT
Start: 2019-08-12 | End: 2019-08-28 | Stop reason: SINTOL

## 2019-08-12 RX ORDER — MOXIFLOXACIN 5 MG/ML
SOLUTION/ DROPS OPHTHALMIC
Refills: 0 | COMMUNITY
Start: 2019-08-05 | End: 2019-08-22

## 2019-08-12 RX ORDER — KETOROLAC TROMETHAMINE 5 MG/ML
SOLUTION OPHTHALMIC
Refills: 0 | COMMUNITY
Start: 2019-08-05 | End: 2019-10-30

## 2019-08-12 RX ORDER — PREDNISOLONE ACETATE 10 MG/ML
SUSPENSION/ DROPS OPHTHALMIC
Refills: 0 | COMMUNITY
Start: 2019-08-05 | End: 2019-11-13

## 2019-08-12 ASSESSMENT — MIFFLIN-ST. JEOR: SCORE: 1311.01

## 2019-08-12 ASSESSMENT — PAIN SCALES - GENERAL: PAINLEVEL: NO PAIN (0)

## 2019-08-12 NOTE — NURSING NOTE
"Chief Complaint   Patient presents with     Pre-Op Exam       Initial /68 (BP Location: Right arm, Patient Position: Sitting, Cuff Size: Adult Regular)   Pulse 60   Temp 98.1  F (36.7  C) (Tympanic)   Resp 14   Ht 1.575 m (5' 2\")   Wt 85.3 kg (188 lb)   SpO2 97%   BMI 34.39 kg/m   Estimated body mass index is 34.39 kg/m  as calculated from the following:    Height as of this encounter: 1.575 m (5' 2\").    Weight as of this encounter: 85.3 kg (188 lb).  Medication Reconciliation: complete   Fiona Orellana      "

## 2019-08-14 LAB
BACTERIA SPEC CULT: NORMAL
SPECIMEN SOURCE: NORMAL

## 2019-08-17 DIAGNOSIS — I10 BENIGN ESSENTIAL HYPERTENSION: ICD-10-CM

## 2019-08-17 DIAGNOSIS — I48.0 PAROXYSMAL ATRIAL FIBRILLATION (H): ICD-10-CM

## 2019-08-19 NOTE — PROGRESS NOTES
Subjective     Nella Lemon is a 74 year old female who presents to clinic today for the following health issues:    HPI     She is here today for final clearance of scheduled left thyroid lobectomy on 8/27/2019 with Dr Pineda at [facility].      URINARY TRACT SYMPTOMS  Onset: follow up release for surgery     Description:   Painful urination (Dysuria): no            Frequency: no   Blood in urine (Hematuria): no   Delay in urine (Hesitency): no     Intensity: none    Progression of Symptoms:  No current symptoms    Accompanying Signs & Symptoms:  Fever/chills: no   Flank pain no   Nausea and vomiting: no   Any vaginal symptoms: none  Abdominal/Pelvic Pain: no     History:   History of frequent UTI's: no   History of kidney stones: no   Sexually Active: no   Possibility of pregnancy: No    Precipitating factors:   none    Therapies Tried and outcome: antibiotics complete      All respiratory symptoms have resolved, she is feeling well.      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, left     Family history of colon cancer     ACP (advance care planning)     Mixed hyperlipidemia     Hypothyroidism due to acquired atrophy of thyroid     Paroxysmal atrial fibrillation (H)     Atypical chest pain     CKD (chronic kidney disease) stage 2, GFR 60-89 ml/min     Leukocytosis     Obesity (BMI 35.0-39.9) with comorbidity (H)     Past Surgical History:   Procedure Laterality Date     ADENOIDECTOMY       BIOPSY      FNA left thyroid     CHOLECYSTECTOMY  1981     COLONOSCOPY  2002     COLONOSCOPY  2012     COLONOSCOPY N/A 9/22/2014    Procedure: COLONOSCOPY;  Surgeon:  Lavell Pavon, DO;  Location: [facility]     CYSTOSCOPY  2007    Cystitis chronic     ORTHOPEDIC SURGERY  2002    carpal tunnel; RT, LT     TONSILLECTOMY         Social History     Tobacco Use     Smoking status: Never Smoker     Smokeless tobacco: Never Used   Substance Use Topics     Alcohol use: Never     Frequency: Never     Family History   Problem Relation Age of Onset     Breast Cancer Mother      Alcohol/Drug Mother         Cirrhosis     Other - See Comments Mother         goiter     Cerebrovascular Disease Father      Circulatory Father      Alcohol/Drug Son      Cancer - colorectal Brother      Unknown/Adopted No family hx of      Asthma No family hx of      C.A.D. No family hx of      Diabetes No family hx of      Hypertension No family hx of      Prostate Cancer No family hx of      Allergies No family hx of      Alzheimer Disease No family hx of      Anesthesia Reaction No family hx of      Arthritis No family hx of      Blood Disease No family hx of      Cardiovascular No family hx of      Congenital Anomalies No family hx of      Connective Tissue Disorder No family hx of      Depression No family hx of      Endocrine Disease No family hx of      Eye Disorder No family hx of      Genetic Disorder No family hx of      Gastrointestinal Disease No family hx of      Genitourinary Problems No family hx of      Gynecology No family hx of      Heart Disease No family hx of      Lipids No family hx of      Musculoskeletal Disorder No family hx of      Neurologic Disorder No family hx of      Obesity No family hx of      Osteoporosis No family hx of      Psychotic Disorder No family hx of      Respiratory No family hx of      Thyroid Disease No family hx of      Family History Negative No family hx of      Hearing Loss No family hx of          Current Outpatient Medications   Medication Sig Dispense Refill     amLODIPine (NORVASC) 5 MG tablet TAKE 1 TABLET (5 MG) BY MOUTH DAILY 30 tablet 1     ELIQUIS 5 MG  tablet TAKE 1 TABLET (5 MG) BY MOUTH 2 TIMES DAILY 60 tablet 1     gabapentin (NEURONTIN) 300 MG capsule TAKE 3 CAPSULES THREE TIMES DAILY 270 capsule 1     GLUCOSAMINE SULFATE PO Take 1,000 mg by mouth 2 times daily       levothyroxine (SYNTHROID/LEVOTHROID) 25 MCG tablet TAKE 1 TABLET DAILY BEST ON EMPTY STOMACH FOR THYROID 90 tablet 2     losartan (COZAAR) 100 MG tablet TAKE 1 TABLET BY MOUTH DAILY. 90 tablet 0     metoprolol succinate ER (TOPROL-XL) 50 MG 24 hr tablet TAKE 1 TABLET (50 MG) BY MOUTH DAILY 30 tablet 2     Omega-3 Fatty Acids (OMEGA-3 FISH OIL PO) Take 3 g by mouth daily        omeprazole (PRILOSEC) 40 MG DR capsule TAKE 1 CAPSULE (40 MG) BY MOUTH DAILY TAKE 30-60 MINUTES BEFORE A MEAL. 90 capsule 1     pravastatin (PRAVACHOL) 20 MG tablet Take 1 tablet (20 mg) by mouth daily 90 tablet 1     sertraline (ZOLOFT) 50 MG tablet TAKE 1 TABLET DAILY 30 tablet 3     VITAMIN D, CHOLECALCIFEROL, PO Take by mouth daily       ketorolac (ACULAR) 0.5 % ophthalmic solution INSTILL 1 DROP INTO LEFT EYE FOUR TIMES DAILY. BEGIN 1 DAY PRIOR TO PROCEDURE  0     moxifloxacin (VIGAMOX) 0.5 % ophthalmic solution INSTILL 1 DROP INTO RIGHT EYE THREE TIMES A DAY BEGIN 1 DAY PRIOR TO PROCEDURE  0     prednisoLONE acetate (PRED FORTE) 1 % ophthalmic suspension INSTILL 1 DROP INTO THE LEFT EYE FOUR TIMES DAILY. BEGIN 1 DAY PRIOR TO PROCEDURE.  0     Allergies   Allergen Reactions     Atorvastatin      Ezetimibe      Gentamicin      Hydroxyzine      Lorazepam      Ranitidine      Simvastatin      Lopid [Gemfibrozil] GI Disturbance     Bloating, constipation,      Pravastatin Muscle Pain (Myalgia)     Recent Labs   Lab Test 08/12/19  1120 07/25/19  1230 07/01/19  1142 06/18/19  1022 05/31/19  0928  01/23/19  0952 07/23/18  1013 05/30/18  0925   LDL  --   --   --   --   --   --  56 53 52   HDL  --   --   --   --   --   --  47* 49* 42*   TRIG  --   --   --   --   --   --  112 142 127   ALT 41  --  29 30 29   < > 26 31  --    CR  "0.85  --  0.93 0.96 0.96   < > 0.87 0.87  --    GFRESTIMATED 68  --  61 59* 59*   < > 66 64  --    GFRESTBLACK 79  --  71 68 68   < > 77 77  --    POTASSIUM 3.9  --  4.1 4.1 3.7   < > 4.1 3.9  --    TSH  --  2.88  --   --  4.90*  --  4.41* 3.63 3.65    < > = values in this interval not displayed.      BP Readings from Last 3 Encounters:   08/22/19 124/64   08/12/19 126/68   07/30/19 134/70    Wt Readings from Last 3 Encounters:   08/22/19 84.8 kg (187 lb)   08/12/19 85.3 kg (188 lb)   07/30/19 85.9 kg (189 lb 4.8 oz)                 Reviewed and updated as needed this visit by Provider         Review of Systems   ROS COMP: Constitutional, HEENT, cardiovascular, pulmonary, gi and gu systems are negative, except as otherwise noted.      Objective    /64 (BP Location: Right arm, Patient Position: Sitting, Cuff Size: Adult Regular)   Pulse 57   Temp 97.2  F (36.2  C) (Tympanic)   Resp 14   Ht 1.575 m (5' 2\")   Wt 84.8 kg (187 lb)   SpO2 95%   BMI 34.20 kg/m    Body mass index is 34.2 kg/m .  Physical Exam   GENERAL: healthy, alert and no distress  NECK: no adenopathy, no asymmetry, masses, or scars, thyroid normal to palpation and no carotid bruits  RESP: lungs clear to auscultation - no rales, rhonchi or wheezes  CV: regular rate and rhythm, normal S1 S2, no S3 or S4, no murmur, click or rub, no peripheral edema and peripheral pulses strong  MS: no gross musculoskeletal defects noted, no edema  PSYCH: mentation appears normal, affect normal/bright            Assessment & Plan     1. Abnormal urine findings  resolved  - [facility]sc[facility]  - Urine Culture Aerobic Bacterial    2. Benign essential hypertension  - ASPIRIN NOT PRESCRIBED (INTENTIONAL); Please choose reason not prescribed, below    3. Acute bronchitis, unspecified organism  Resolved    4. Thyroid carcinoma (H)  Approval for scheduled procedure next week with Dr Pineda.  Please see Pre-op dated " "8/12/2019.           BMI:   Estimated body mass index is 34.2 kg/m  as calculated from the following:    Height as of this encounter: 1.575 m (5' 2\").    Weight as of this encounter: 84.8 kg (187 lb).         No follow-ups on file.    Randi Haddad, NP  [facility]      "

## 2019-08-20 ENCOUNTER — ANESTHESIA EVENT (OUTPATIENT)
Dept: SURGERY | Facility: HOSPITAL | Age: 74
End: 2019-08-20
Payer: COMMERCIAL

## 2019-08-20 RX ORDER — METOPROLOL SUCCINATE 50 MG/1
50 TABLET, EXTENDED RELEASE ORAL DAILY
Qty: 30 TABLET | Refills: 2 | Status: SHIPPED | OUTPATIENT
Start: 2019-08-20 | End: 2019-10-19

## 2019-08-20 RX ORDER — LOSARTAN POTASSIUM 100 MG/1
TABLET ORAL
Qty: 90 TABLET | Refills: 0 | Status: SHIPPED | OUTPATIENT
Start: 2019-08-20 | End: 2019-11-13

## 2019-08-20 ASSESSMENT — ENCOUNTER SYMPTOMS: DYSRHYTHMIAS: 1

## 2019-08-20 NOTE — ANESTHESIA PREPROCEDURE EVALUATION
Anesthesia Pre-Procedure Evaluation    Patient: Nella Lemon   MRN: [id number] : 1945          Preoperative Diagnosis: CARCINOMA OF THYROID    Procedure(s):  LEFT THYROID LOBECTOMY WITH FROZENS, POSSIBLE TOTAL THYROIDECTOMY    Past Medical History:   Diagnosis Date     Anxiety state, unspecified 2003     Arthritis      Carpal tunnel syndrome 2002     Coronary artery disease      Endometrial hyperplasia 2003     Family history of colon cancer 2016     Family history of malignant neoplasm of breast 2006     Hearing problem      Hyperlipidemia LDL goal <100 2016     Metrorrhagia 10/22/2003     Myalgia and myositis, unspecified 2002     Nonallopathic lesion of thoracic region, not elsewhere classified 2003     Other specified disease of white blood cells 2006     Palpitations 2001     Postmenopausal bleeding 2002     Precordial pain 2001     Thyroid disease      Unspecified essential hypertension 2001     Urgency of urination 2007     Past Surgical History:   Procedure Laterality Date     ADENOIDECTOMY       BIOPSY      FNA left thyroid     CHOLECYSTECTOMY       COLONOSCOPY       COLONOSCOPY       COLONOSCOPY N/A 2014    Procedure: COLONOSCOPY;  Surgeon: Lavell Pavon DO;  Location: [facility]     CYSTOSCOPY      Cystitis chronic     ORTHOPEDIC SURGERY      carpal tunnel; RT, LT     TONSILLECTOMY         Anesthesia Evaluation     . Pt has had prior anesthetic.     No history of anesthetic complications          ROS/MED HX    ENT/Pulmonary:     (+)ZACHARIAH risk factors hypertension, obese, , . .    Neurologic:  - neg neurologic ROS     Cardiovascular:     (+) Dyslipidemia, hypertension--CAD, angina--. Taking blood thinners : Instructions Given to patient: NOÉ. . . :. dysrhythmias a-fib, Irregular Heartbeat/Palpitations, . Previous cardiac testing date:results:date: results:ECG reviewed date:19  results:Sinus roxanne nonspecific st and t wave abnormalities date: results:          METS/Exercise Tolerance:     Hematologic:  - neg hematologic  ROS       Musculoskeletal:   (+) arthritis,  other musculoskeletal- Fibromyalgia, Chronic Thoracic Back Pain      GI/Hepatic:     (+) GERD       Renal/Genitourinary: Comment: CKD stage 2    (+) chronic renal disease, type: CRI,       Endo:     (+) thyroid problem hypothyroidism, Obesity, .      Psychiatric:  - neg psychiatric ROS       Infectious Disease:  - neg infectious disease ROS       Malignancy:   (+) Malignancy History of Other  Other CA Thyroid CA Active status post         Other:    - neg other ROS                      Physical Exam  Normal systems: cardiovascular and pulmonary    Airway   Mallampati: II  TM distance: >3 FB  Neck ROM: full    Dental   (+) upper dentures, partials and missing    Cardiovascular   Rhythm and rate: regular and normal      Pulmonary    breath sounds clear to auscultation            Lab Results   Component Value Date    WBC 12.2 (H) 08/12/2019    HGB 14.6 08/12/2019    HCT 42.6 08/12/2019     08/12/2019    CRP 3.1 03/27/2019    SED 10 04/15/2019     08/12/2019    POTASSIUM 3.9 08/12/2019    CHLORIDE 108 08/12/2019    CO2 24 08/12/2019    BUN 15 08/12/2019    CR 0.85 08/12/2019    GLC 88 08/12/2019    THOMAS 9.0 08/12/2019    ALBUMIN 3.6 08/12/2019    PROTTOTAL 7.3 08/12/2019    ALT 41 08/12/2019    AST 24 08/12/2019    ALKPHOS 127 08/12/2019    BILITOTAL 0.6 08/12/2019    TSH 2.88 07/25/2019    T4 1.00 05/31/2019       Preop Vitals  BP Readings from Last 3 Encounters:   08/12/19 126/68   07/30/19 134/70   07/25/19 132/66    Pulse Readings from Last 3 Encounters:   08/12/19 60   07/30/19 59   07/25/19 83      Resp Readings from Last 3 Encounters:   08/12/19 14   07/30/19 16   07/25/19 16    SpO2 Readings from Last 3 Encounters:   08/12/19 97%   07/30/19 98%   07/25/19 94%      Temp Readings from Last 1 Encounters:   08/12/19  "98.1  F (36.7  C) (Tympanic)    Ht Readings from Last 1 Encounters:   08/12/19 1.575 m (5' 2\")      Wt Readings from Last 1 Encounters:   08/12/19 85.3 kg (188 lb)    Estimated body mass index is 34.39 kg/m  as calculated from the following:    Height as of 8/12/19: 1.575 m (5' 2\").    Weight as of 8/12/19: 85.3 kg (188 lb).       Anesthesia Plan      History & Physical Review  History and physical reviewed and following examination; no interval change.    ASA Status:  3 .        Plan for General and ETT with Intravenous and Propofol induction. Maintenance will be Balanced.    PONV prophylaxis:  Ondansetron (or other 5HT-3) and Dexamethasone or Solumedrol       Postoperative Care  Postoperative pain management:  IV analgesics and Oral pain medications.      Consents  Anesthetic plan, risks, benefits and alternatives discussed with:  Patient..                 KG Gu CRNA  "

## 2019-08-22 ENCOUNTER — OFFICE VISIT (OUTPATIENT)
Dept: FAMILY MEDICINE | Facility: OTHER | Age: 74
End: 2019-08-22
Attending: NURSE PRACTITIONER
Payer: COMMERCIAL

## 2019-08-22 VITALS
TEMPERATURE: 97.2 F | OXYGEN SATURATION: 95 % | SYSTOLIC BLOOD PRESSURE: 124 MMHG | DIASTOLIC BLOOD PRESSURE: 64 MMHG | HEART RATE: 57 BPM | WEIGHT: 187 LBS | RESPIRATION RATE: 14 BRPM | BODY MASS INDEX: 34.41 KG/M2 | HEIGHT: 62 IN

## 2019-08-22 DIAGNOSIS — C73 THYROID CARCINOMA (H): ICD-10-CM

## 2019-08-22 DIAGNOSIS — I10 BENIGN ESSENTIAL HYPERTENSION: ICD-10-CM

## 2019-08-22 DIAGNOSIS — R82.90 ABNORMAL URINE FINDINGS: Primary | ICD-10-CM

## 2019-08-22 DIAGNOSIS — J20.9 ACUTE BRONCHITIS, UNSPECIFIED ORGANISM: ICD-10-CM

## 2019-08-22 LAB
ALBUMIN UR-MCNC: NEGATIVE MG/DL
APPEARANCE UR: CLEAR
BACTERIA #/AREA URNS HPF: ABNORMAL /HPF
BILIRUB UR QL STRIP: NEGATIVE
COLOR UR AUTO: YELLOW
GLUCOSE UR STRIP-MCNC: NEGATIVE MG/DL
HGB UR QL STRIP: NEGATIVE
KETONES UR STRIP-MCNC: NEGATIVE MG/DL
LEUKOCYTE ESTERASE UR QL STRIP: ABNORMAL
NITRATE UR QL: NEGATIVE
NON-SQ EPI CELLS #/AREA URNS LPF: ABNORMAL /LPF
PH UR STRIP: 6 PH (ref 5–7)
RBC #/AREA URNS AUTO: ABNORMAL /HPF
SOURCE: ABNORMAL
SP GR UR STRIP: 1.02 (ref 1–1.03)
UROBILINOGEN UR STRIP-ACNC: 0.2 EU/DL (ref 0.2–1)
WBC #/AREA URNS AUTO: ABNORMAL /HPF

## 2019-08-22 PROCEDURE — 87086 URINE CULTURE/COLONY COUNT: CPT | Mod: ZL | Performed by: NURSE PRACTITIONER

## 2019-08-22 PROCEDURE — 99214 OFFICE O/P EST MOD 30 MIN: CPT | Performed by: NURSE PRACTITIONER

## 2019-08-22 PROCEDURE — 81001 URINALYSIS AUTO W/SCOPE: CPT | Mod: ZL | Performed by: NURSE PRACTITIONER

## 2019-08-22 PROCEDURE — G0463 HOSPITAL OUTPT CLINIC VISIT: HCPCS

## 2019-08-22 RX ORDER — MOXIFLOXACIN 5 MG/ML
SOLUTION/ DROPS OPHTHALMIC
Qty: 3 ML | Refills: 0 | COMMUNITY
Start: 2019-08-22 | End: 2019-10-30

## 2019-08-22 ASSESSMENT — MIFFLIN-ST. JEOR: SCORE: 1306.48

## 2019-08-22 ASSESSMENT — PAIN SCALES - GENERAL: PAINLEVEL: NO PAIN (0)

## 2019-08-22 NOTE — NURSING NOTE
"Chief Complaint   Patient presents with     Pre-Op Exam     Patient is here for a pre-op follow up       Initial /64 (BP Location: Right arm, Patient Position: Sitting, Cuff Size: Adult Regular)   Pulse 57   Temp 97.2  F (36.2  C) (Tympanic)   Resp 14   Ht 1.575 m (5' 2\")   Wt 84.8 kg (187 lb)   SpO2 95%   BMI 34.20 kg/m   Estimated body mass index is 34.2 kg/m  as calculated from the following:    Height as of this encounter: 1.575 m (5' 2\").    Weight as of this encounter: 84.8 kg (187 lb).  Medication Reconciliation: complete   Fiona Orellana LPN      "

## 2019-08-24 LAB
BACTERIA SPEC CULT: NORMAL
SPECIMEN SOURCE: NORMAL

## 2019-08-27 ENCOUNTER — ANESTHESIA (OUTPATIENT)
Dept: SURGERY | Facility: HOSPITAL | Age: 74
End: 2019-08-27
Payer: COMMERCIAL

## 2019-08-27 ENCOUNTER — TELEPHONE (OUTPATIENT)
Dept: FAMILY MEDICINE | Facility: OTHER | Age: 74
End: 2019-08-27

## 2019-08-27 ENCOUNTER — HOSPITAL ENCOUNTER (OUTPATIENT)
Facility: HOSPITAL | Age: 74
Discharge: HOME | End: 2019-08-27
Attending: OTOLARYNGOLOGY | Admitting: OTOLARYNGOLOGY
Payer: COMMERCIAL

## 2019-08-27 VITALS
OXYGEN SATURATION: 92 % | RESPIRATION RATE: 16 BRPM | TEMPERATURE: 97.5 F | DIASTOLIC BLOOD PRESSURE: 76 MMHG | SYSTOLIC BLOOD PRESSURE: 156 MMHG

## 2019-08-27 DIAGNOSIS — E78.2 MIXED HYPERLIPIDEMIA: Primary | ICD-10-CM

## 2019-08-27 DIAGNOSIS — E89.0 S/P THYROIDECTOMY: Primary | ICD-10-CM

## 2019-08-27 PROCEDURE — 99100 ANES PT EXTEME AGE<1 YR&>70: CPT | Performed by: NURSE ANESTHETIST, CERTIFIED REGISTERED

## 2019-08-27 PROCEDURE — 88331 PATH CONSLTJ SURG 1 BLK 1SPC: CPT | Mod: TC | Performed by: OTOLARYNGOLOGY

## 2019-08-27 PROCEDURE — 25000128 H RX IP 250 OP 636: Performed by: NURSE ANESTHETIST, CERTIFIED REGISTERED

## 2019-08-27 PROCEDURE — 60210 PARTIAL THYROID EXCISION: CPT | Performed by: ANESTHESIOLOGY

## 2019-08-27 PROCEDURE — 37000008 ZZH ANESTHESIA TECHNICAL FEE, 1ST 30 MIN: Performed by: OTOLARYNGOLOGY

## 2019-08-27 PROCEDURE — 99135 ANES COMP CTRLD HYPOTENSION: CPT | Performed by: NURSE ANESTHETIST, CERTIFIED REGISTERED

## 2019-08-27 PROCEDURE — 71000015 ZZH RECOVERY PHASE 1 LEVEL 2 EA ADDTL HR: Performed by: OTOLARYNGOLOGY

## 2019-08-27 PROCEDURE — 27210995 ZZH RX 272: Performed by: OTOLARYNGOLOGY

## 2019-08-27 PROCEDURE — 36000063 ZZH SURGERY LEVEL 4 EA 15 ADDTL MIN: Performed by: OTOLARYNGOLOGY

## 2019-08-27 PROCEDURE — 36000093 ZZH SURGERY LEVEL 4 1ST 30 MIN: Performed by: OTOLARYNGOLOGY

## 2019-08-27 PROCEDURE — 27110028 ZZH OR GENERAL SUPPLY NON-STERILE: Performed by: OTOLARYNGOLOGY

## 2019-08-27 PROCEDURE — 60220 PARTIAL REMOVAL OF THYROID: CPT | Performed by: OTOLARYNGOLOGY

## 2019-08-27 PROCEDURE — 25000125 ZZHC RX 250: Performed by: NURSE ANESTHETIST, CERTIFIED REGISTERED

## 2019-08-27 PROCEDURE — 40000306 ZZH STATISTIC PRE PROC ASSESS II: Performed by: OTOLARYNGOLOGY

## 2019-08-27 PROCEDURE — 88311 DECALCIFY TISSUE: CPT | Mod: TC | Performed by: OTOLARYNGOLOGY

## 2019-08-27 PROCEDURE — 25000132 ZZH RX MED GY IP 250 OP 250 PS 637: Performed by: ANESTHESIOLOGY

## 2019-08-27 PROCEDURE — 27210794 ZZH OR GENERAL SUPPLY STERILE: Performed by: OTOLARYNGOLOGY

## 2019-08-27 PROCEDURE — 71000014 ZZH RECOVERY PHASE 1 LEVEL 2 FIRST HR: Performed by: OTOLARYNGOLOGY

## 2019-08-27 PROCEDURE — 37000009 ZZH ANESTHESIA TECHNICAL FEE, EACH ADDTL 15 MIN: Performed by: OTOLARYNGOLOGY

## 2019-08-27 PROCEDURE — 88307 TISSUE EXAM BY PATHOLOGIST: CPT | Mod: TC | Performed by: OTOLARYNGOLOGY

## 2019-08-27 PROCEDURE — 25000125 ZZHC RX 250: Performed by: OTOLARYNGOLOGY

## 2019-08-27 PROCEDURE — 25800030 ZZH RX IP 258 OP 636: Performed by: NURSE ANESTHETIST, CERTIFIED REGISTERED

## 2019-08-27 PROCEDURE — 71000027 ZZH RECOVERY PHASE 2 EACH 15 MINS: Performed by: OTOLARYNGOLOGY

## 2019-08-27 PROCEDURE — 25000128 H RX IP 250 OP 636: Performed by: ANESTHESIOLOGY

## 2019-08-27 PROCEDURE — 60210 PARTIAL THYROID EXCISION: CPT | Performed by: NURSE ANESTHETIST, CERTIFIED REGISTERED

## 2019-08-27 RX ORDER — SODIUM CHLORIDE, SODIUM LACTATE, POTASSIUM CHLORIDE, CALCIUM CHLORIDE 600; 310; 30; 20 MG/100ML; MG/100ML; MG/100ML; MG/100ML
INJECTION, SOLUTION INTRAVENOUS CONTINUOUS
Status: DISCONTINUED | OUTPATIENT
Start: 2019-08-27 | End: 2019-08-27 | Stop reason: HOSPADM

## 2019-08-27 RX ORDER — FENTANYL CITRATE 50 UG/ML
25-50 INJECTION, SOLUTION INTRAMUSCULAR; INTRAVENOUS
Status: DISCONTINUED | OUTPATIENT
Start: 2019-08-27 | End: 2019-08-27

## 2019-08-27 RX ORDER — ONDANSETRON 4 MG/1
4 TABLET, ORALLY DISINTEGRATING ORAL EVERY 30 MIN PRN
Status: DISCONTINUED | OUTPATIENT
Start: 2019-08-27 | End: 2019-08-27

## 2019-08-27 RX ORDER — NALOXONE HYDROCHLORIDE 0.4 MG/ML
.1-.4 INJECTION, SOLUTION INTRAMUSCULAR; INTRAVENOUS; SUBCUTANEOUS
Status: DISCONTINUED | OUTPATIENT
Start: 2019-08-27 | End: 2019-08-27 | Stop reason: HOSPADM

## 2019-08-27 RX ORDER — ONDANSETRON 2 MG/ML
4 INJECTION INTRAMUSCULAR; INTRAVENOUS EVERY 30 MIN PRN
Status: DISCONTINUED | OUTPATIENT
Start: 2019-08-27 | End: 2019-08-27 | Stop reason: HOSPADM

## 2019-08-27 RX ORDER — DEXAMETHASONE SODIUM PHOSPHATE 10 MG/ML
INJECTION, SOLUTION INTRAMUSCULAR; INTRAVENOUS PRN
Status: DISCONTINUED | OUTPATIENT
Start: 2019-08-27 | End: 2019-08-27

## 2019-08-27 RX ORDER — ONDANSETRON 2 MG/ML
INJECTION INTRAMUSCULAR; INTRAVENOUS PRN
Status: DISCONTINUED | OUTPATIENT
Start: 2019-08-27 | End: 2019-08-27

## 2019-08-27 RX ORDER — ONDANSETRON 4 MG/1
4 TABLET, ORALLY DISINTEGRATING ORAL EVERY 30 MIN PRN
Status: DISCONTINUED | OUTPATIENT
Start: 2019-08-27 | End: 2019-08-27 | Stop reason: HOSPADM

## 2019-08-27 RX ORDER — FENTANYL CITRATE 50 UG/ML
25-50 INJECTION, SOLUTION INTRAMUSCULAR; INTRAVENOUS
Status: DISCONTINUED | OUTPATIENT
Start: 2019-08-27 | End: 2019-08-27 | Stop reason: HOSPADM

## 2019-08-27 RX ORDER — LABETALOL 20 MG/4 ML (5 MG/ML) INTRAVENOUS SYRINGE
PRN
Status: DISCONTINUED | OUTPATIENT
Start: 2019-08-27 | End: 2019-08-27

## 2019-08-27 RX ORDER — ALBUTEROL SULFATE 0.83 MG/ML
2.5 SOLUTION RESPIRATORY (INHALATION) EVERY 4 HOURS PRN
Status: DISCONTINUED | OUTPATIENT
Start: 2019-08-27 | End: 2019-08-27 | Stop reason: HOSPADM

## 2019-08-27 RX ORDER — FENTANYL CITRATE 50 UG/ML
INJECTION, SOLUTION INTRAMUSCULAR; INTRAVENOUS PRN
Status: DISCONTINUED | OUTPATIENT
Start: 2019-08-27 | End: 2019-08-27

## 2019-08-27 RX ORDER — HYDROMORPHONE HYDROCHLORIDE 1 MG/ML
.3-.5 INJECTION, SOLUTION INTRAMUSCULAR; INTRAVENOUS; SUBCUTANEOUS EVERY 10 MIN PRN
Status: DISCONTINUED | OUTPATIENT
Start: 2019-08-27 | End: 2019-08-27

## 2019-08-27 RX ORDER — HYDRALAZINE HYDROCHLORIDE 20 MG/ML
2.5-5 INJECTION INTRAMUSCULAR; INTRAVENOUS EVERY 10 MIN PRN
Status: DISCONTINUED | OUTPATIENT
Start: 2019-08-27 | End: 2019-08-27 | Stop reason: HOSPADM

## 2019-08-27 RX ORDER — LIDOCAINE 40 MG/G
CREAM TOPICAL
Status: DISCONTINUED | OUTPATIENT
Start: 2019-08-27 | End: 2019-08-27 | Stop reason: HOSPADM

## 2019-08-27 RX ORDER — OXYMETAZOLINE HYDROCHLORIDE 0.05 G/100ML
3 SPRAY NASAL
Status: COMPLETED | OUTPATIENT
Start: 2019-08-27 | End: 2019-08-27

## 2019-08-27 RX ORDER — HYDROCODONE BITARTRATE AND ACETAMINOPHEN 7.5; 325 MG/1; MG/1
1 TABLET ORAL EVERY 6 HOURS PRN
Qty: 10 TABLET | Refills: 0 | Status: SHIPPED | OUTPATIENT
Start: 2019-08-27 | End: 2019-09-03

## 2019-08-27 RX ORDER — PROPOFOL 10 MG/ML
INJECTION, EMULSION INTRAVENOUS PRN
Status: DISCONTINUED | OUTPATIENT
Start: 2019-08-27 | End: 2019-08-27

## 2019-08-27 RX ORDER — MEPERIDINE HYDROCHLORIDE 50 MG/ML
12.5 INJECTION INTRAMUSCULAR; INTRAVENOUS; SUBCUTANEOUS
Status: DISCONTINUED | OUTPATIENT
Start: 2019-08-27 | End: 2019-08-27

## 2019-08-27 RX ORDER — MEPERIDINE HYDROCHLORIDE 50 MG/ML
12.5 INJECTION INTRAMUSCULAR; INTRAVENOUS; SUBCUTANEOUS
Status: DISCONTINUED | OUTPATIENT
Start: 2019-08-27 | End: 2019-08-27 | Stop reason: HOSPADM

## 2019-08-27 RX ORDER — ACETAMINOPHEN 325 MG/1
975 TABLET ORAL
Status: DISCONTINUED | OUTPATIENT
Start: 2019-08-27 | End: 2019-08-27 | Stop reason: HOSPADM

## 2019-08-27 RX ORDER — DEXAMETHASONE SODIUM PHOSPHATE 4 MG/ML
4 INJECTION, SOLUTION INTRA-ARTICULAR; INTRALESIONAL; INTRAMUSCULAR; INTRAVENOUS; SOFT TISSUE EVERY 10 MIN PRN
Status: DISCONTINUED | OUTPATIENT
Start: 2019-08-27 | End: 2019-08-27 | Stop reason: HOSPADM

## 2019-08-27 RX ORDER — HYDROMORPHONE HYDROCHLORIDE 1 MG/ML
.3-.5 INJECTION, SOLUTION INTRAMUSCULAR; INTRAVENOUS; SUBCUTANEOUS EVERY 10 MIN PRN
Status: DISCONTINUED | OUTPATIENT
Start: 2019-08-27 | End: 2019-08-27 | Stop reason: HOSPADM

## 2019-08-27 RX ORDER — ONDANSETRON 2 MG/ML
4 INJECTION INTRAMUSCULAR; INTRAVENOUS EVERY 30 MIN PRN
Status: DISCONTINUED | OUTPATIENT
Start: 2019-08-27 | End: 2019-08-27

## 2019-08-27 RX ORDER — LABETALOL 20 MG/4 ML (5 MG/ML) INTRAVENOUS SYRINGE
10
Status: COMPLETED | OUTPATIENT
Start: 2019-08-27 | End: 2019-08-27

## 2019-08-27 RX ORDER — LIDOCAINE HYDROCHLORIDE 20 MG/ML
INJECTION, SOLUTION INFILTRATION; PERINEURAL PRN
Status: DISCONTINUED | OUTPATIENT
Start: 2019-08-27 | End: 2019-08-27

## 2019-08-27 RX ORDER — OXYCODONE HYDROCHLORIDE 5 MG/1
5 TABLET ORAL EVERY 4 HOURS PRN
Status: DISCONTINUED | OUTPATIENT
Start: 2019-08-27 | End: 2019-08-27 | Stop reason: CLARIF

## 2019-08-27 RX ORDER — HYDROCODONE BITARTRATE AND ACETAMINOPHEN 7.5; 325 MG/1; MG/1
1 TABLET ORAL ONCE
Status: COMPLETED | OUTPATIENT
Start: 2019-08-27 | End: 2019-08-27

## 2019-08-27 RX ADMIN — FENTANYL CITRATE 50 MCG: 50 INJECTION, SOLUTION INTRAMUSCULAR; INTRAVENOUS at 11:40

## 2019-08-27 RX ADMIN — SODIUM CHLORIDE, POTASSIUM CHLORIDE, SODIUM LACTATE AND CALCIUM CHLORIDE: 600; 310; 30; 20 INJECTION, SOLUTION INTRAVENOUS at 10:22

## 2019-08-27 RX ADMIN — FENTANYL CITRATE 50 MCG: 50 INJECTION, SOLUTION INTRAMUSCULAR; INTRAVENOUS at 11:27

## 2019-08-27 RX ADMIN — OXYMETAZOLINE HYDROCHLORIDE 3 SPRAY: 0.05 SPRAY NASAL at 08:01

## 2019-08-27 RX ADMIN — FENTANYL CITRATE 50 MCG: 50 INJECTION, SOLUTION INTRAMUSCULAR; INTRAVENOUS at 08:40

## 2019-08-27 RX ADMIN — LIDOCAINE HYDROCHLORIDE 40 MG: 20 INJECTION, SOLUTION INFILTRATION; PERINEURAL at 08:47

## 2019-08-27 RX ADMIN — SODIUM CHLORIDE, POTASSIUM CHLORIDE, SODIUM LACTATE AND CALCIUM CHLORIDE 1000 ML: 600; 310; 30; 20 INJECTION, SOLUTION INTRAVENOUS at 07:51

## 2019-08-27 RX ADMIN — FENTANYL CITRATE 25 MCG: 50 INJECTION, SOLUTION INTRAMUSCULAR; INTRAVENOUS at 09:10

## 2019-08-27 RX ADMIN — HYDROCODONE BITARTRATE AND ACETAMINOPHEN 1 TABLET: 7.5; 325 TABLET ORAL at 12:58

## 2019-08-27 RX ADMIN — OXYMETAZOLINE HYDROCHLORIDE 3 SPRAY: 0.05 SPRAY NASAL at 08:12

## 2019-08-27 RX ADMIN — LABETALOL 20 MG/4 ML (5 MG/ML) INTRAVENOUS SYRINGE 10 MG: at 11:45

## 2019-08-27 RX ADMIN — FENTANYL CITRATE 25 MCG: 50 INJECTION, SOLUTION INTRAMUSCULAR; INTRAVENOUS at 08:47

## 2019-08-27 RX ADMIN — Medication 100 MG: at 08:47

## 2019-08-27 RX ADMIN — PROPOFOL 200 MG: 10 INJECTION, EMULSION INTRAVENOUS at 08:47

## 2019-08-27 RX ADMIN — ONDANSETRON 4 MG: 2 INJECTION INTRAMUSCULAR; INTRAVENOUS at 10:08

## 2019-08-27 RX ADMIN — LABETALOL 20 MG/4 ML (5 MG/ML) INTRAVENOUS SYRINGE 10 MG: at 09:36

## 2019-08-27 RX ADMIN — FENTANYL CITRATE 12.5 MCG: 50 INJECTION, SOLUTION INTRAMUSCULAR; INTRAVENOUS at 13:38

## 2019-08-27 RX ADMIN — DEXAMETHASONE SODIUM PHOSPHATE 12 MG: 10 INJECTION, SOLUTION INTRAMUSCULAR; INTRAVENOUS at 09:17

## 2019-08-27 NOTE — ANESTHESIA CARE TRANSFER NOTE
Patient: Nella Lemon    Procedure(s):  LEFT THYROID LOBECTOMY AND ISTHMUS WITH FROZENS    Diagnosis: CARCINOMA OF THYROID  Diagnosis Additional Information: No value filed.    Anesthesia Type:   General, ETT     Note:  Airway :Face Mask and Oral Airway  Patient transferred to:PACU  Handoff Report: Identifed the Patient, Identified the Reponsible Provider, Reviewed the pertinent medical history, Discussed the surgical course, Reviewed Intra-OP anesthesia mangement and issues during anesthesia, Set expectations for post-procedure period and Allowed opportunity for questions and acknowledgement of understanding      Vitals: (Last set prior to Anesthesia Care Transfer)    CRNA VITALS  8/27/2019 1032 - 8/27/2019 1111      8/27/2019             Resp Rate (set):  8                Electronically Signed By: KG Joaquin CRNA  August 27, 2019  11:11 AM

## 2019-08-27 NOTE — ANESTHESIA POSTPROCEDURE EVALUATION
Patient: Nella Lemon    Procedure(s):  LEFT THYROID LOBECTOMY AND ISTHMUS WITH FROZENS    Diagnosis:CARCINOMA OF THYROID  Diagnosis Additional Information: No value filed.    Anesthesia Type:  General, ETT    Note:  Anesthesia Post Evaluation    Patient location during evaluation: PACU, Phase 2 and Bedside  Patient participation: Able to fully participate in evaluation  Level of consciousness: awake and alert  Pain management: adequate  Airway patency: patent  Cardiovascular status: acceptable  Respiratory status: acceptable  Hydration status: stable  PONV: none     Anesthetic complications: None          Last vitals:  Vitals:    08/27/19 1235 08/27/19 1245 08/27/19 1300   BP: 128/99 157/84 160/77   Resp: 16 16    Temp:      SpO2: 94% 95% 96%         Electronically Signed By: Waylon Argawal MD  August 27, 2019  1:19 PM

## 2019-08-27 NOTE — OP NOTE
Otolaryngology Operative Note     Pre-op Diagnosis: 1.  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  Left thyroid lobectomy  2.  1 hour nerve monitoring of the recurrent laryngeal nerve  Surgeon:  Mildred Pineda D.O.  Anesthesia:  General endotracheal  EBL:  15 ml  Findings: 1.5 cm papillary thyroid carcinoma, no extracapsular invasion  T1NxM0, Stage I PTC  Complications:  none  Condition:  stable     Description of the Procedure  After surgical consent was obtained patient was brought back to the upper room and laid in a comfortable and supine position.  She was administered a general anesthetic by member of anesthesia.  A timeout was taken.  The neck was placed in gentle extension the skin was cleansed with alcohol and I demarcated collar incision.  I anesthetized the skin with 1% lidocaine with 1-200,000 epinephrine.   She was prepped and draped in normal  sterile fashion.  Surgical loops were worn throughout the procedure.  I used a 15 blade to incise the skin down through subcutaneous tissue.  I used electrocautery to continue dissecting deep to platysma.  I then  the strap muscles in the midline and  the sternothyroid muscle from the left thyroid lobe.  I used an Allis to grasp the left thyroid lobe and retracted medially.  I continued to dissect the muscle away from the gland and turned my attention to the superior pole.  I grasped the superior pole with an Allis and retracted gently inferior and medial to identify the avascular plane.  I dissected superficial to the cricothyroid muscle and a capsular fashion and ligated the superior thyroid vessels flush with the thyroid capsule.  The cricothyroid muscle had a strong twitch with stimulation.  Next I placed another Allis and retracted the gland medial.  I was able to palpate a firm nodule in the mid to lower portion of the left lobe.   I  the  visceral fascia bluntly and sharply into the neck and continued to dissect in the capsular plane until I identified the inferior thyroid artery which was quite prominent.  The superior parathyroid gland was identified and preserved the inferior parathyroid gland was identified and preserved.  The recurrent laryngeal nerve was located just above the inferior parathyroid and deep to the inferior thyroid artery.  I continued to dissect keeping the nerve in sight up to its entrance into the neck at the cricothyroid articulation.  I then dissected through the ligament of Berry and included the isthmus.  There was a nodularity at the inferior portion of the isthmus.  The entire left lobe and isthmus was included in the dissection and sent as specimen.  I palpated the specimen there is a nodule within the left lobe which is 2 cm or less firm but there is no extracapsular extension.  Frozen section confirmed papillary thyroid carcinoma without evidence of mayra extracapsular invasion.  Preoperative ultrasound and intraoperative palpation of the nodule revealed this mass was less than 2 cm.  I palpated the right lobe there is no concerning nodularity and preoperative ultrasound revealed no right-sided nodules.  Hemostasis was achieved with scant use of bipolar cautery and is adequate the wound was irrigated and gently suctioned.  The recurrent laryngeal nerve was again visualized and stimulated briskly with the prass probe.  The inferior and superior parathyroid glands are intact with good color.  Half piece of surgical was placed into the wound bed and the strap muscles were reapproximated with 3-0 Vicryl.  The platysma was reapproximated with 4-0 Vicryl.  The subcutaneous tissue was reapproximated 4-0 Vicryl.  The skin was cleansed and dried and closed with Dermabond.  Half-inch Steri-Strips were placed.  The patient was handed over to anesthesia awakened and brought to the recovery room having tolerated the procedure  well.  In the recovery room I performed flexible nasolaryngoscopy and found normal vocal cord mobility with phonation and respiration.

## 2019-08-27 NOTE — OR NURSING
Patient and responsible adult given discharge instructions with no questions regarding instructions. Rani score 20. Pain level 3/10.  Discharged from unit via w/c. Patient discharged to home.

## 2019-08-27 NOTE — DISCHARGE INSTRUCTIONS
Post-Anesthesia Patient Instructions    IMMEDIATELY FOLLOWING SURGERY:  Do not drive or operate machinery for the first twenty four hours after surgery.  Do not make any important decisions for twenty four hours after surgery or while taking narcotic pain medications or sedatives.  If you develop intractable nausea and vomiting or a severe headache please notify your doctor immediately.    FOLLOW-UP:  Please make an appointment with your surgeon as instructed. You do not need to follow up with anesthesia unless specifically instructed to do so.    WOUND CARE INSTRUCTIONS (if applicable):  Keep a dry clean dressing on the anesthesia/puncture wound site if there is drainage.  Once the wound has quit draining you may leave it open to air.  Generally you should leave the bandage intact for twenty four hours unless there is drainage.  If the epidural site drains for more than 36-48 hours please call the anesthesia department.    QUESTIONS?:  Please feel free to call your physician or the hospital  if you have any questions, and they will be happy to assist you.   Thyroidectomy Instructions    Follow up in 1 week with JOJO Landeros PA     Keep the incision and dressing dry and do not soak the wound.    Keep pressure dressing in place for 48 hours.  You may then remove the dressing and replace it with clean fluffs and tape.     Leave pressure dressing on as much as possible x 1 week    Leave the steri-strip bandage in place.  This will be removed at follow up.  You may trim the edges of the steri strip with a clean cosmetic scissors if it starts to curl.    Your sutures are dissolvable.    Take ibuprofen alternated with tylenol as needed for pain.  If necessary, you were sent home with a stronger pain medication.  If an antibiotic was prescribed, complete this as indicated.     No lifting over 10 pounds for 2 weeks.   No heavy cardiovascular activity for 2 weeks.    For any heavy bleeding or excessive swelling  please contact our office at [phone] or present to the emergency room.

## 2019-08-27 NOTE — TELEPHONE ENCOUNTER
3:54 PM    Reason for Call: Phone Call    Description: Nella states that her Pravastatin is giving her muscle pain. Please call Nella to advise    Was an appointment offered for this call?  No    Preferred method for responding to this message: [phone]    If we cannot reach you directly, may we leave a detailed response at the number you provided? Yes        Scarlet Collier

## 2019-08-28 LAB — COPATH REPORT: NORMAL

## 2019-09-03 ENCOUNTER — OFFICE VISIT (OUTPATIENT)
Dept: FAMILY MEDICINE | Facility: OTHER | Age: 74
End: 2019-09-03
Attending: NURSE PRACTITIONER
Payer: COMMERCIAL

## 2019-09-03 VITALS
TEMPERATURE: 97.7 F | DIASTOLIC BLOOD PRESSURE: 66 MMHG | HEIGHT: 62 IN | WEIGHT: 185 LBS | OXYGEN SATURATION: 96 % | SYSTOLIC BLOOD PRESSURE: 126 MMHG | BODY MASS INDEX: 34.04 KG/M2 | RESPIRATION RATE: 14 BRPM | HEART RATE: 66 BPM

## 2019-09-03 DIAGNOSIS — N18.2 CKD (CHRONIC KIDNEY DISEASE) STAGE 2, GFR 60-89 ML/MIN: ICD-10-CM

## 2019-09-03 DIAGNOSIS — E03.4 HYPOTHYROIDISM DUE TO ACQUIRED ATROPHY OF THYROID: ICD-10-CM

## 2019-09-03 DIAGNOSIS — H25.9 AGE-RELATED CATARACT OF BOTH EYES, UNSPECIFIED AGE-RELATED CATARACT TYPE: ICD-10-CM

## 2019-09-03 DIAGNOSIS — Z01.818 PREOP GENERAL PHYSICAL EXAM: Primary | ICD-10-CM

## 2019-09-03 DIAGNOSIS — E78.2 MIXED HYPERLIPIDEMIA: ICD-10-CM

## 2019-09-03 DIAGNOSIS — N39.46 MIXED INCONTINENCE URGE AND STRESS (MALE)(FEMALE): ICD-10-CM

## 2019-09-03 DIAGNOSIS — I48.0 PAROXYSMAL ATRIAL FIBRILLATION (H): ICD-10-CM

## 2019-09-03 DIAGNOSIS — C73 MALIGNANT NEOPLASM OF THYROID GLAND (H): ICD-10-CM

## 2019-09-03 PROCEDURE — 99214 OFFICE O/P EST MOD 30 MIN: CPT | Performed by: NURSE PRACTITIONER

## 2019-09-03 PROCEDURE — G0463 HOSPITAL OUTPT CLINIC VISIT: HCPCS

## 2019-09-03 RX ORDER — SOLIFENACIN SUCCINATE 5 MG/1
5 TABLET, FILM COATED ORAL DAILY
Qty: 90 TABLET | Refills: 1 | Status: SHIPPED | OUTPATIENT
Start: 2019-09-03 | End: 2019-12-05 | Stop reason: ALTCHOICE

## 2019-09-03 ASSESSMENT — PAIN SCALES - GENERAL: PAINLEVEL: NO PAIN (0)

## 2019-09-03 ASSESSMENT — MIFFLIN-ST. JEOR: SCORE: 1297.4

## 2019-09-03 NOTE — PATIENT INSTRUCTIONS
1. Preop general physical exam    2. Age-related cataract of both eyes, unspecified age-related cataract type    3. Malignant neoplasm of thyroid gland (H)    4. Mixed hyperlipidemia    5. Hypothyroidism due to acquired atrophy of thyroid    6. Paroxysmal atrial fibrillation (H)    7. CKD (chronic kidney disease) stage 2, GFR 60-89 ml/min    8. Mixed incontinence urge and stress (male)(female)  - solifenacin (VESICARE) 5 MG tablet; Take 1 tablet (5 mg) by mouth daily  Dispense: 90 tablet; Refill: 1    Before Your Surgery      Call your surgeon if there is any change in your health. This includes signs of a cold or flu (such as a sore throat, runny nose, cough, rash or fever).    Do not smoke, drink alcohol or take over the counter medicine (unless your surgeon or primary care doctor tells you to) for the 24 hours before and after surgery.    If you take prescribed drugs: Follow your doctor s orders about which medicines to take and which to stop until after surgery.    Eating and drinking prior to surgery: follow the instructions from your surgeon    Take a shower or bath the night before surgery. Use the soap your surgeon gave you to gently clean your skin. If you do not have soap from your surgeon, use your regular soap. Do not shave or scrub the surgery site.  Wear clean pajamas and have clean sheets on your bed.

## 2019-09-03 NOTE — PROGRESS NOTES
[facility]  [address]  [place] [address]  [phone]  Dept: [phone]    PRE-OP EVALUATION:  Today's date: 9/3/2019    Nella Lemon (: 1945) presents for pre-operative evaluation assessment as requested by Dr. Liu.  She requires evaluation and anesthesia risk assessment prior to undergoing surgery/procedure for treatment of cataracts .    Proposed Surgery/ Procedure: cataract removal  Date of Surgery/ Procedure: Right eye - 9/10/19 and left eye - 19  Time of Surgery/ Procedure: to be determined  Hospital/Surgical Facility: [place], [place]    Primary Physician: Randi Haddad  Type of Anesthesia Anticipated: to be determined    Patient has a Health Care Directive or Living Will:  YES on file    1. NO - Do you have a history of heart attack, stroke, stent, bypass or surgery on an artery in the head, neck, heart or legs?  2. NO - Do you ever have any pain or discomfort in your chest?  3. NO - Do you have a history of  Heart Failure?  4. NO - Are you troubled by shortness of breath when: walking on the level, up a slight hill or at night?  5. NO - Do you currently have a cold, bronchitis or other respiratory infection?  6. NO - Do you have a cough, shortness of breath or wheezing?  7. NO - Do you sometimes get pains in the calves of your legs when you walk?  8. YES - Do you or anyone in your family have previous history of blood clots? Father   9. NO - Do you or does anyone in your family have a serious bleeding problem such as prolonged bleeding following surgeries or cuts?  10. NO - Have you ever had problems with anemia or been told to take iron pills?  11. NO - Have you had any abnormal blood loss such as black, tarry or bloody stools, or abnormal vaginal bleeding?  12. NO - Have you ever had a blood transfusion?  13. NO - Have you or any of your relatives ever had problems with anesthesia?  14. YES - Do you  have sleep apnea, excessive snoring or daytime drowsiness?  15. NO - Do you have any prosthetic heart valves?  16. NO - Do you have prosthetic joints?  17. NO - Is there any chance that you may be pregnant?      HPI:     HPI related to upcoming procedure: worsening vision over the past several months such that symptoms are now interfering with vision.        HYPERLIPIDEMIA - Patient has a long history of significant Hyperlipidemia requiring medication for treatment with recent good control.  She cannot tolerate statins.     The ASCVD Risk score (Danie PANCHITO Jr., et al., 2013) failed to calculate for the following reasons:    The patient has a prior MI or stroke diagnosis      HYPERTENSION - Patient has longstanding history of HTN , currently denies any symptoms referable to elevated blood pressure. Specifically denies chest pain, palpitations, dyspnea, orthopnea, PND or peripheral edema. Blood pressure readings have been in normal range. Current medication regimen is as listed below. Patient denies any side effects of medication.   BP Readings from Last 3 Encounters:   09/03/19 126/66   08/27/19 156/76   08/22/19 124/64         A. FIB - well controlled with metoprolol. Is taking eliquis daily.     HYPOTHYROIDISM - Patient has a longstanding history of chronic Hypothyroidism. Patient has been doing well, noting no tremor, insomnia, hair loss or changes in skin texture. Continues to take medications as directed, without adverse reactions or side effects. Last TSH   Lab Results   Component Value Date    TSH 2.88 07/25/2019   .      RENAL INSUFFICIENCY - Patient has a longstanding history of moderate-severe chronic renal insufficiency. Last Cr 0.85.       MEDICAL HISTORY:     Patient Active Problem List    Diagnosis Date Noted     Obesity (BMI 35.0-39.9) with comorbidity (H) 06/18/2019     Priority: Medium     Leukocytosis 05/31/2019     Priority: Medium     CKD (chronic kidney disease) stage 2, GFR 60-89 ml/min 05/07/2019      Priority: Medium     Atypical chest pain 03/28/2018     Priority: Medium     Paroxysmal atrial fibrillation (H) 02/19/2018     Priority: Medium     Hypothyroidism due to acquired atrophy of thyroid 02/13/2018     Priority: Medium     Mixed hyperlipidemia 12/30/2016     Priority: Medium     ACP (advance care planning) 12/16/2016     Priority: Medium     Advance Care Planning 12/16/2016: ACP Review of Chart / Resources Provided:  Reviewed chart for advance care plan.  Nella DANIELSON Luis Albertopierre has been provided information and resources to begin or update their advance care plan.  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  Nella DANIELSON Luis Albertopierre has no plan or code status on file however states presence of ACP document. Copy requested. Confirmed code status reflects current choices pending receipt of document/advance care plan review.  Confirmed/documented legally designated decision makers.  Added by Fiona Orellana             Family history of malignant neoplasm of breast 11/13/2006     Priority: Medium     Anxiety state 09/25/2003     Priority: Medium     Neuropathy 05/21/2002     Priority: Medium     GERD 09/28/2001     Priority: Medium     Benign essential hypertension 09/14/2001     Priority: Medium     Palpitations 04/11/2001     Priority: Medium      Past Medical History:   Diagnosis Date     Anxiety state, unspecified 09/25/2003     Arthritis      Carpal tunnel syndrome 07/02/2002     Coronary  artery disease      Endometrial hyperplasia 01/27/2003     Family history of colon cancer 8/2/2016     Family history of malignant neoplasm of breast 11/13/2006     Hearing problem      Hyperlipidemia LDL goal <100 12/30/2016     Metrorrhagia 10/22/2003     Myalgia and myositis, unspecified 05/21/2002     Nonallopathic lesion of thoracic region, not elsewhere classified 05/19/2003     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     Palpitations 04/11/2001     Postmenopausal bleeding 09/09/2002     Precordial pain 04/05/2001     Thyroid disease      Unspecified essential hypertension 09/14/2001     Urgency of urination 06/06/2007     Past Surgical History:   Procedure Laterality Date     ADENOIDECTOMY       BIOPSY      FNA left thyroid     CHOLECYSTECTOMY  1981     COLONOSCOPY  2002     COLONOSCOPY  2012     COLONOSCOPY N/A 9/22/2014    Procedure: COLONOSCOPY;  Surgeon: Lavell Pavon DO;  Location: [facility]     CYSTOSCOPY  2007    Cystitis chronic     ORTHOPEDIC SURGERY  2002    carpal tunnel; RT, LT     THYROIDECTOMY Left 8/27/2019    Procedure: LEFT THYROID LOBECTOMY AND ISTHMUS WITH FROZENS;  Surgeon: Mildred Pineda MD;  Location: [facility] OR     TONSILLECTOMY       Current Outpatient Medications   Medication Sig Dispense Refill     amLODIPine (NORVASC) 5 MG tablet TAKE 1 TABLET (5 MG) BY MOUTH DAILY 30 tablet 1     ELIQUIS 5 MG tablet TAKE 1 TABLET (5 MG) BY MOUTH 2 TIMES DAILY 60 tablet 1     gabapentin (NEURONTIN) 300 MG capsule TAKE 3 CAPSULES THREE TIMES DAILY 270 capsule 1     GLUCOSAMINE SULFATE PO Take 1,000 mg by mouth 2 times daily       levothyroxine (SYNTHROID/LEVOTHROID) 25 MCG tablet TAKE 1 TABLET DAILY BEST ON EMPTY STOMACH FOR THYROID 90 tablet 2     losartan (COZAAR) 100 MG tablet TAKE 1 TABLET BY MOUTH DAILY. 90 tablet 0     metoprolol succinate ER (TOPROL-XL) 50 MG 24 hr tablet TAKE 1 TABLET (50 MG) BY MOUTH DAILY 30 tablet 2     Omega-3 Fatty Acids (OMEGA-3 FISH OIL PO) Take 3  g by mouth daily        omeprazole (PRILOSEC) 40 MG DR capsule TAKE 1 CAPSULE (40 MG) BY MOUTH DAILY TAKE 30-60 MINUTES BEFORE A MEAL. 90 capsule 1     sertraline (ZOLOFT) 50 MG tablet TAKE 1 TABLET DAILY 30 tablet 3     solifenacin (VESICARE) 5 MG tablet Take 1 tablet (5 mg) by mouth daily 90 tablet 1     STATIN NOT PRESCRIBED (INTENTIONAL) Please choose reason not prescribed, below       VITAMIN D, CHOLECALCIFEROL, PO Take by mouth daily       ketorolac (ACULAR) 0.5 % ophthalmic solution INSTILL 1 DROP INTO LEFT EYE FOUR TIMES DAILY. BEGIN 1 DAY PRIOR TO PROCEDURE  0     moxifloxacin (VIGAMOX) 0.5 % ophthalmic solution INSTILL 1 DROP INTO LEFT EYE THREE TIMES A DAY BEGIN 1 DAY PRIOR TO PROCEDURE 3 mL 0     prednisoLONE acetate (PRED FORTE) 1 % ophthalmic suspension INSTILL 1 DROP INTO THE LEFT EYE FOUR TIMES DAILY. BEGIN 1 DAY PRIOR TO PROCEDURE.  0     OTC products: None, except as noted above, no recent use of OTC ASA, NSAIDS or Steroids and no use of herbal medications or other supplements    Allergies   Allergen Reactions     Atorvastatin      Ezetimibe      Gentamicin      Hydroxyzine      Lorazepam      Ranitidine      Simvastatin      Lopid [Gemfibrozil] GI Disturbance     Bloating, constipation,      Pravastatin Muscle Pain (Myalgia)      Latex Allergy: NO    Social History     Tobacco Use     Smoking status: Never Smoker     Smokeless tobacco: Never Used   Substance Use Topics     Alcohol use: Never     Frequency: Never     History   Drug Use No       REVIEW OF SYSTEMS:   CONSTITUTIONAL: NEGATIVE for fever, chills, change in weight  INTEGUMENTARY/SKIN: NEGATIVE for worrisome rashes, moles or lesions  EYES: blurry vision due to cataracts   ENT/MOUTH: NEGATIVE for ear, mouth and throat problems  RESP: NEGATIVE for significant cough or SOB  CV: NEGATIVE for chest pain, palpitations or peripheral edema  GI: NEGATIVE for nausea, abdominal pain, heartburn, or change in bowel habits  : NEGATIVE for frequency,  "dysuria, or hematuria  MUSCULOSKELETAL: NEGATIVE for significant arthralgias or myalgia  NEURO: NEGATIVE for weakness, dizziness or paresthesias  ENDOCRINE: NEGATIVE for temperature intolerance, skin/hair changes  HEME: NEGATIVE for bleeding problems  PSYCHIATRIC: NEGATIVE for changes in mood or affect    EXAM:   /66 (BP Location: Right arm, Patient Position: Sitting, Cuff Size: Adult Regular)   Pulse 66   Temp 97.7  F (36.5  C) (Tympanic)   Resp 14   Ht 1.575 m (5' 2\")   Wt 83.9 kg (185 lb)   SpO2 96%   BMI 33.84 kg/m      GENERAL APPEARANCE: healthy, alert and no distress     EYES: EOMI, PERRL     HENT: ear canals and TM's normal and nose and mouth without ulcers or lesions     NECK: no adenopathy, no asymmetry, masses, or scars and thyroid normal to palpation     RESP: lungs clear to auscultation - no rales, rhonchi or wheezes     CV: regular rates and rhythm, normal S1 S2, no S3 or S4 and no murmur, click or rub     MS: extremities normal- no gross deformities noted, no evidence of inflammation in joints, FROM in all extremities.     SKIN: no suspicious lesions or rashes     NEURO: Normal strength and tone, sensory exam grossly normal, mentation intact and speech normal     PSYCH: mentation appears normal. and affect normal/bright        DIAGNOSTICS:       Recent Labs   Lab Test 08/12/19  1120 07/01/19  1142   HGB 14.6 14.4    328    141   POTASSIUM 3.9 4.1   CR 0.85 0.93        Component      Latest Ref Rng & Units 8/12/2019   WBC      4.0 - 11.0 10e9/L 12.2 (H)   RBC Count      3.8 - 5.2 10e12/L 5.12   Hemoglobin      11.7 - 15.7 g/dL 14.6   Hematocrit      35.0 - 47.0 % 42.6   MCV      78 - 100 fl 83   MCH      26.5 - 33.0 pg 28.5   MCHC      31.5 - 36.5 g/dL 34.3   RDW      10.0 - 15.0 % 13.3   Platelet Count      150 - 450 10e9/L 313   % Neutrophils      % 71.4   % Lymphocytes      % 20.0   % Monocytes      % 7.0   % Eosinophils      % 1.3   % Basophils      % 0.3   Absolute " Neutrophil      1.6 - 8.3 10e9/L 8.7 (H)   Absolute Lymphocytes      0.8 - 5.3 10e9/L 2.4   Absolute Monocytes      0.0 - 1.3 10e9/L 0.9   Absolute Eosinophils      0.0 - 0.7 10e9/L 0.2   Absolute Basophils      0.0 - 0.2 10e9/L 0.0   Diff Method       Automated Method   Sodium      133 - 144 mmol/L 141   Potassium      3.4 - 5.3 mmol/L 3.9   Chloride      94 - 109 mmol/L 108   Carbon Dioxide      20 - 32 mmol/L 24   Anion Gap      3 - 14 mmol/L 9   Glucose      70 - 99 mg/dL 88   Urea Nitrogen      7 - 30 mg/dL 15   Creatinine      0.52 - 1.04 mg/dL 0.85   GFR Estimate      >60 mL/min/1.73:m2 68   GFR Estimate If Black      >60 mL/min/1.73:m2 79   Calcium      8.5 - 10.1 mg/dL 9.0   Bilirubin Total      0.2 - 1.3 mg/dL 0.6   Albumin      3.4 - 5.0 g/dL 3.6   Protein Total      6.8 - 8.8 g/dL 7.3   Alkaline Phosphatase      40 - 150 U/L 127   ALT      0 - 50 U/L 41   AST      0 - 45 U/L 24     IMPRESSION:   Reason for surgery/procedure: bilateral cataracts  Diagnosis/reason for consult: anesthesia risk assessment     The proposed surgical procedure is considered LOW risk.    REVISED CARDIAC RISK INDEX  The patient has the following serious cardiovascular risks for perioperative complications such as (MI, PE, VFib and 3  AV Block):  Coronary Artery Disease  INTERPRETATION: 1 risks: Class II (low risk - 0.9% complication rate)    The patient has the following additional risks for perioperative complications:  No identified additional risks      ICD-10-CM    1. Preop general physical exam Z01.818    2. Age-related cataract of both eyes, unspecified age-related cataract type H25.9    3. Malignant neoplasm of thyroid gland (H) C73    4. Mixed hyperlipidemia E78.2    5. Hypothyroidism due to acquired atrophy of thyroid E03.4    6. Paroxysmal atrial fibrillation (H) I48.0    7. CKD (chronic kidney disease) stage 2, GFR 60-89 ml/min N18.2    8. Mixed incontinence urge and stress (male)(female) N39.46 solifenacin (VESICARE)  5 MG tablet       RECOMMENDATIONS:       Cardiovascular Risk  Performs 4 METs exercise without symptoms (Climb a flight of stairs) .       --Patient is to take all scheduled medications on the day of surgery EXCEPT for modifications listed below.    APPROVAL GIVEN to proceed with proposed procedure, without further diagnostic evaluation       Signed Electronically by: Randi Haddad NP    Copy of this evaluation report is provided to requesting physician.    [facility] [place] Preop Guidelines    Revised Cardiac Risk Index

## 2019-09-03 NOTE — NURSING NOTE
"No chief complaint on file.      Initial /66 (BP Location: Right arm, Patient Position: Sitting, Cuff Size: Adult Regular)   Pulse 66   Temp 97.7  F (36.5  C) (Tympanic)   Resp 14   Ht 1.575 m (5' 2\")   Wt 83.9 kg (185 lb)   SpO2 96%   BMI 33.84 kg/m   Estimated body mass index is 33.84 kg/m  as calculated from the following:    Height as of this encounter: 1.575 m (5' 2\").    Weight as of this encounter: 83.9 kg (185 lb).  Medication Reconciliation: complete   Fiona Orellana LPN      "

## 2019-09-04 ENCOUNTER — OFFICE VISIT (OUTPATIENT)
Dept: OTOLARYNGOLOGY | Facility: OTHER | Age: 74
End: 2019-09-04
Attending: NURSE PRACTITIONER
Payer: COMMERCIAL

## 2019-09-04 VITALS
OXYGEN SATURATION: 96 % | BODY MASS INDEX: 34.04 KG/M2 | SYSTOLIC BLOOD PRESSURE: 128 MMHG | HEART RATE: 60 BPM | WEIGHT: 185 LBS | TEMPERATURE: 97.2 F | DIASTOLIC BLOOD PRESSURE: 64 MMHG | HEIGHT: 62 IN

## 2019-09-04 DIAGNOSIS — C73 PAPILLARY THYROID CARCINOMA (H): ICD-10-CM

## 2019-09-04 DIAGNOSIS — Z98.890 STATUS POST THYROID SURGERY: Primary | ICD-10-CM

## 2019-09-04 PROCEDURE — 99024 POSTOP FOLLOW-UP VISIT: CPT | Performed by: PHYSICIAN ASSISTANT

## 2019-09-04 PROCEDURE — G0463 HOSPITAL OUTPT CLINIC VISIT: HCPCS

## 2019-09-04 ASSESSMENT — PAIN SCALES - GENERAL: PAINLEVEL: NO PAIN (0)

## 2019-09-04 ASSESSMENT — MIFFLIN-ST. JEOR: SCORE: 1297.4

## 2019-09-04 NOTE — LETTER
9/4/2019         RE: Nella Lemon  [address] [facility] [address]        Dear Colleague,    Thank you for referring your patient, Nella Lemon, to [facility]. Please see a copy of my visit note below.      Chief Complaint   Patient presents with     Surgical Followup     Left thyroid lobectomy  8/27/19       [facility] - Nella Lemon is here for first postoperative visit, status post left thyroid lobectomy performed on 8/27/19.  There was the expected amount of discomfort, but controlled with pain medication.    there have been no fevers or chills since surgery.   No bleeding or swelling from incision site.   Normal vocal cord mobility in the recovery room.     Operative Note  Pre-op Diagnosis: 1.  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  Left thyroid lobectomy  2.  1 hour nerve monitoring of the recurrent laryngeal nerve  Surgeon:  Mildred Pineda DMALATHI  Anesthesia:  General endotracheal  EBL:  15 ml  Findings: 1.5 cm papillary thyroid carcinoma, no extracapsular invasion  T1NxM0, Stage I PTC  Complications:  none  Condition:  stable      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     Palpitations 04/11/2001     Postmenopausal bleeding 09/09/2002     Precordial pain 04/05/2001     Thyroid disease      Unspecified essential  "hypertension 09/14/2001     Urgency of urination 06/06/2007        Allergies   Allergen Reactions     Atorvastatin      Ezetimibe      Gentamicin      Hydroxyzine      Lorazepam      Ranitidine      Simvastatin      Lopid [Gemfibrozil] GI Disturbance     Bloating, constipation,      Pravastatin Muscle Pain (Myalgia)     Current Outpatient Medications   Medication     amLODIPine (NORVASC) 5 MG tablet     ELIQUIS 5 MG tablet     gabapentin (NEURONTIN) 300 MG capsule     GLUCOSAMINE SULFATE PO     ketorolac (ACULAR) 0.5 % ophthalmic solution     levothyroxine (SYNTHROID/LEVOTHROID) 25 MCG tablet     losartan (COZAAR) 100 MG tablet     metoprolol succinate ER (TOPROL-XL) 50 MG 24 hr tablet     moxifloxacin (VIGAMOX) 0.5 % ophthalmic solution     Omega-3 Fatty Acids (OMEGA-3 FISH OIL PO)     omeprazole (PRILOSEC) 40 MG DR capsule     prednisoLONE acetate (PRED FORTE) 1 % ophthalmic suspension     sertraline (ZOLOFT) 50 MG tablet     solifenacin (VESICARE) 5 MG tablet     STATIN NOT PRESCRIBED (INTENTIONAL)     VITAMIN D, CHOLECALCIFEROL, PO     No current facility-administered medications for this visit.       ROS: 10 point ROS neg other than the symptoms noted above in the HPI.  /64   Pulse 60   Temp 97.2  F (36.2  C) (Tympanic)   Ht 1.575 m (5' 2\")   Wt 83.9 kg (185 lb)   SpO2 96%   BMI 33.84 kg/m       General - The patient is awake and alert, and answers questions appropriately during the history and physical.  The vocal quality is hypernasal, but there is no dyspnea or stridor noted.  Eyes - The EOMI, there is no conjuncitval or scleral injection.  Pupils are equally round and reactive to light.  Oral - The oral mucosa is pink and moist.  The tongue is mobile and midline on protrusion, no edema noted.  Neck-  Pressure dressing removed. Steri Strips removed from site. Well approximated surgical incision. No seroma or wound. Surgical site looks great. She is happy with results.   Ears- Ear canals clear. " TM's are intact without effusion or retraction.     Patient declined scope.     ASSESSMENT:    ICD-10-CM    1. Status post left thyroid lobectomy Z98.890 US Thyroid   2. Papillary thyroid carcinoma (H) C73     Left nodule- excised.          Reviewed pathology.   Annual Thyroid US and ENT follow up.    Continue with postop instructions.   Neck incision site looks well.   No swelling. Healing well.   May use Aquaphor to site   Apply vitamin E and massage in 5 weeks at incision site.       Rosemary Flower PA-C  ENT  [facility], [place]  [phone]        Again, thank you for allowing me to participate in the care of your patient.        Sincerely,        Rosemary Flower PA-C

## 2019-09-04 NOTE — NURSING NOTE
"Chief Complaint   Patient presents with     Surgical Followup     Left thyroid lobectomy  8/27/19       Initial /64   Pulse 60   Temp 97.2  F (36.2  C) (Tympanic)   Ht 1.575 m (5' 2\")   Wt 83.9 kg (185 lb)   SpO2 96%   BMI 33.84 kg/m   Estimated body mass index is 33.84 kg/m  as calculated from the following:    Height as of this encounter: 1.575 m (5' 2\").    Weight as of this encounter: 83.9 kg (185 lb).  Medication Reconciliation: complete  "

## 2019-09-04 NOTE — PROGRESS NOTES
Chief Complaint   Patient presents with     Surgical Followup     Left thyroid lobectomy  8/27/19       [facility] Nella Lemon is here for first postoperative visit, status post left thyroid lobectomy performed on 8/27/19.  There was the expected amount of discomfort, but controlled with pain medication.    there have been no fevers or chills since surgery.   No bleeding or swelling from incision site.   Normal vocal cord mobility in the recovery room.     Operative Note  Pre-op Diagnosis: 1.  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  Left thyroid lobectomy  2.  1 hour nerve monitoring of the recurrent laryngeal nerve  Surgeon:  Mildred Pineda D.O.  Anesthesia:  General endotracheal  EBL:  15 ml  Findings: 1.5 cm papillary thyroid carcinoma, no extracapsular invasion  T1NxM0, Stage I PTC  Complications:  none  Condition:  stable     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     Palpitations 04/11/2001     Postmenopausal bleeding 09/09/2002     Precordial pain 04/05/2001     Thyroid disease      Unspecified essential hypertension 09/14/2001     Urgency of urination 06/06/2007        Allergies   Allergen Reactions     Atorvastatin      Ezetimibe      Gentamicin      Hydroxyzine      Lorazepam      Ranitidine      Simvastatin      Lopid [Gemfibrozil] GI Disturbance     Bloating,  "constipation,      Pravastatin Muscle Pain (Myalgia)     Current Outpatient Medications   Medication     amLODIPine (NORVASC) 5 MG tablet     ELIQUIS 5 MG tablet     gabapentin (NEURONTIN) 300 MG capsule     GLUCOSAMINE SULFATE PO     ketorolac (ACULAR) 0.5 % ophthalmic solution     levothyroxine (SYNTHROID/LEVOTHROID) 25 MCG tablet     losartan (COZAAR) 100 MG tablet     metoprolol succinate ER (TOPROL-XL) 50 MG 24 hr tablet     moxifloxacin (VIGAMOX) 0.5 % ophthalmic solution     Omega-3 Fatty Acids (OMEGA-3 FISH OIL PO)     omeprazole (PRILOSEC) 40 MG DR capsule     prednisoLONE acetate (PRED FORTE) 1 % ophthalmic suspension     sertraline (ZOLOFT) 50 MG tablet     solifenacin (VESICARE) 5 MG tablet     STATIN NOT PRESCRIBED (INTENTIONAL)     VITAMIN D, CHOLECALCIFEROL, PO     No current facility-administered medications for this visit.       ROS: 10 point ROS neg other than the symptoms noted above in the HPI.  /64   Pulse 60   Temp 97.2  F (36.2  C) (Tympanic)   Ht 1.575 m (5' 2\")   Wt 83.9 kg (185 lb)   SpO2 96%   BMI 33.84 kg/m      General - The patient is awake and alert, and answers questions appropriately during the history and physical.  The vocal quality is hypernasal, but there is no dyspnea or stridor noted.  Eyes - The EOMI, there is no conjuncitval or scleral injection.  Pupils are equally round and reactive to light.  Oral - The oral mucosa is pink and moist.  The tongue is mobile and midline on protrusion, no edema noted.  Neck-  Pressure dressing removed. Steri Strips removed from site. Well approximated surgical incision. No seroma or wound. Surgical site looks great. She is happy with results.   Ears- Ear canals clear. TM's are intact without effusion or retraction.     Patient declined scope.     ASSESSMENT:    ICD-10-CM    1. Status post left thyroid lobectomy Z98.890 US Thyroid   2. Papillary thyroid carcinoma (H) C73     Left nodule- excised.          Reviewed pathology. "   Annual Thyroid US and ENT follow up.    Continue with postop instructions.   Neck incision site looks well.   No swelling. Healing well.   May use Aquaphor to site   Apply vitamin E and massage in 5 weeks at incision site.       Rosemary Flower PA-C  ENT  [facility]  [phone]

## 2019-09-04 NOTE — PATIENT INSTRUCTIONS
Continue w/ postoperative instructions.   Neck incision site looks well.   No swelling. Healing well.   May use Aquaphor to site   Apply vitamin E and massage in 5 weeks at incision site.     Annual Thyroid US and ENT follow up.       Thank you for allowing Rosemary Flower PA-C and our ENT team to participate in your care.  If your medications are too expensive, please give the nurse a call.  We can possibly change this medication.  If you have a scheduling or an appointment question please contact our Health Unit Coordinator at their direct line [phone].   ALL nursing questions or concerns can be directed to your ENT nurse at: [phone] Candida

## 2019-09-05 DIAGNOSIS — M79.10 MYALGIA: ICD-10-CM

## 2019-09-05 RX ORDER — GABAPENTIN 300 MG/1
CAPSULE ORAL
Qty: 270 CAPSULE | Refills: 1 | Status: SHIPPED | OUTPATIENT
Start: 2019-09-05 | End: 2019-10-31

## 2019-09-05 NOTE — TELEPHONE ENCOUNTER
gabapentin (NEURONTIN) 300 MG capsule  Last Written Prescription Date:  8/2/2019  Last Fill Quantity: 270,   # refills: 1  Last Office Visit: 9/4/2019  Future Office visit:    Next 5 appointments (look out 90 days)    Sep 20, 2019  9:30 AM CDT  (Arrive by 9:15 AM)  Return Visit with Rosa Venegas MD  [facility] ([facility] ) [address]  [place] [address]  [phone]   Oct 04, 2019 10:15 AM CDT  (Arrive by 10:00 AM)  Nurse Only with Alis Pitt  [facility] ([facility] ) [address]  [place] [address]  [phone]   Oct 30, 2019  9:45 AM CDT  (Arrive by 9:30 AM)  SHORT with Randi Haddad NP  [facility] ([facility] ) [address] [facility] [address]  [phone]   Nov 11, 2019 10:00 AM CST  (Arrive by 9:45 AM)  Return Visit with Chris Walsh DO  [facility] ([facility] ) [address]  [place] [address]  [phone]           Routing refill request to provider for review/approval because:  Drug not on the [facility], P or  Health refill protocol or controlled substance

## 2019-09-17 ENCOUNTER — TELEPHONE (OUTPATIENT)
Dept: FAMILY MEDICINE | Facility: OTHER | Age: 74
End: 2019-09-17

## 2019-09-17 DIAGNOSIS — R60.0 LOWER EXTREMITY EDEMA: Primary | ICD-10-CM

## 2019-09-17 RX ORDER — POTASSIUM CHLORIDE 1500 MG/1
20 TABLET, EXTENDED RELEASE ORAL DAILY
Qty: 5 TABLET | Refills: 0 | Status: SHIPPED | OUTPATIENT
Start: 2019-09-17 | End: 2019-11-13

## 2019-09-17 RX ORDER — FUROSEMIDE 20 MG
20 TABLET ORAL DAILY
Qty: 5 TABLET | Refills: 0 | Status: SHIPPED | OUTPATIENT
Start: 2019-09-17 | End: 2019-10-30

## 2019-09-17 NOTE — TELEPHONE ENCOUNTER
1:43 PM    Reason for Call: Phone Call    Description: Pt has some questions about the swelling of her feet, would like a call back     Was an appointment offered for this call? No  If yes : Appointment type              Date    Preferred method for responding to this message: Telephone Call  What is your phone number ? [phone] Nella     If we cannot reach you directly, may we leave a detailed response at the number you provided? Yes    Can this message wait until your PCP/provider returns, if available today? Not applicable, PCP is in     [facility] Emilie Parra

## 2019-09-17 NOTE — TELEPHONE ENCOUNTER
Patient states she has been having swelling in her feet the last couple of days. She said this happened once before and was given a water pill which seemed to help. She is wondering what could be causing this and what she can do to help with the swelling.    She also mentioned that she has been having a hard time swallowing and it feels as though there is something stuck in her throat. She did have thyroid surgery a few weeks ago and wasn't sure if that could be why or not, states its like a lot of phlegm is stuck there. Please Advise.  Ashley LeChevalier LPN

## 2019-09-19 ASSESSMENT — PAIN SCALES - GENERAL: PAINLEVEL: NO PAIN (0)

## 2019-09-19 ASSESSMENT — MIFFLIN-ST. JEOR: SCORE: 1321.45

## 2019-09-20 ENCOUNTER — ONCOLOGY VISIT (OUTPATIENT)
Dept: ONCOLOGY | Facility: OTHER | Age: 74
End: 2019-09-20
Attending: INTERNAL MEDICINE
Payer: COMMERCIAL

## 2019-09-20 VITALS
HEART RATE: 66 BPM | HEIGHT: 62 IN | OXYGEN SATURATION: 97 % | WEIGHT: 190.3 LBS | DIASTOLIC BLOOD PRESSURE: 62 MMHG | RESPIRATION RATE: 20 BRPM | TEMPERATURE: 97.7 F | SYSTOLIC BLOOD PRESSURE: 110 MMHG | BODY MASS INDEX: 35.02 KG/M2

## 2019-09-20 DIAGNOSIS — C73 PAPILLARY THYROID CARCINOMA (H): Primary | ICD-10-CM

## 2019-09-20 DIAGNOSIS — D72.829 LEUKOCYTOSIS, UNSPECIFIED TYPE: ICD-10-CM

## 2019-09-20 DIAGNOSIS — E04.1 THYROID NODULE: ICD-10-CM

## 2019-09-20 LAB
ALBUMIN SERPL-MCNC: 3.6 G/DL (ref 3.4–5)
ALP SERPL-CCNC: 138 U/L (ref 40–150)
ALT SERPL W P-5'-P-CCNC: 40 U/L (ref 0–50)
ANION GAP SERPL CALCULATED.3IONS-SCNC: 5 MMOL/L (ref 3–14)
AST SERPL W P-5'-P-CCNC: 22 U/L (ref 0–45)
BASOPHILS # BLD AUTO: 0 10E9/L (ref 0–0.2)
BASOPHILS NFR BLD AUTO: 0.4 %
BILIRUB SERPL-MCNC: 0.5 MG/DL (ref 0.2–1.3)
BUN SERPL-MCNC: 9 MG/DL (ref 7–30)
CALCIUM SERPL-MCNC: 8.8 MG/DL (ref 8.5–10.1)
CHLORIDE SERPL-SCNC: 107 MMOL/L (ref 94–109)
CO2 SERPL-SCNC: 30 MMOL/L (ref 20–32)
CREAT SERPL-MCNC: 0.84 MG/DL (ref 0.52–1.04)
DIFFERENTIAL METHOD BLD: NORMAL
EOSINOPHIL # BLD AUTO: 0.2 10E9/L (ref 0–0.7)
EOSINOPHIL NFR BLD AUTO: 1.7 %
ERYTHROCYTE [DISTWIDTH] IN BLOOD BY AUTOMATED COUNT: 13.4 % (ref 10–15)
GFR SERPL CREATININE-BSD FRML MDRD: 69 ML/MIN/{1.73_M2}
GLUCOSE SERPL-MCNC: 101 MG/DL (ref 70–99)
HCT VFR BLD AUTO: 44.2 % (ref 35–47)
HGB BLD-MCNC: 14.2 G/DL (ref 11.7–15.7)
IMM GRANULOCYTES # BLD: 0 10E9/L (ref 0–0.4)
IMM GRANULOCYTES NFR BLD: 0.4 %
LDH SERPL L TO P-CCNC: 205 U/L (ref 81–234)
LYMPHOCYTES # BLD AUTO: 2.2 10E9/L (ref 0.8–5.3)
LYMPHOCYTES NFR BLD AUTO: 20.1 %
MCH RBC QN AUTO: 27.6 PG (ref 26.5–33)
MCHC RBC AUTO-ENTMCNC: 32.1 G/DL (ref 31.5–36.5)
MCV RBC AUTO: 86 FL (ref 78–100)
MONOCYTES # BLD AUTO: 0.7 10E9/L (ref 0–1.3)
MONOCYTES NFR BLD AUTO: 6.8 %
NEUTROPHILS # BLD AUTO: 7.6 10E9/L (ref 1.6–8.3)
NEUTROPHILS NFR BLD AUTO: 70.6 %
NRBC # BLD AUTO: 0 10*3/UL
NRBC BLD AUTO-RTO: 0 /100
PLATELET # BLD AUTO: 350 10E9/L (ref 150–450)
POTASSIUM SERPL-SCNC: 4.3 MMOL/L (ref 3.4–5.3)
PROT SERPL-MCNC: 7.3 G/DL (ref 6.8–8.8)
RBC # BLD AUTO: 5.14 10E12/L (ref 3.8–5.2)
SODIUM SERPL-SCNC: 142 MMOL/L (ref 133–144)
T4 FREE SERPL-MCNC: 0.88 NG/DL (ref 0.76–1.46)
TSH SERPL DL<=0.005 MIU/L-ACNC: 9.61 MU/L (ref 0.4–4)
WBC # BLD AUTO: 10.7 10E9/L (ref 4–11)

## 2019-09-20 PROCEDURE — 85025 COMPLETE CBC W/AUTO DIFF WBC: CPT | Mod: ZL | Performed by: INTERNAL MEDICINE

## 2019-09-20 PROCEDURE — 84439 ASSAY OF FREE THYROXINE: CPT | Mod: ZL | Performed by: INTERNAL MEDICINE

## 2019-09-20 PROCEDURE — 83615 LACTATE (LD) (LDH) ENZYME: CPT | Mod: ZL | Performed by: INTERNAL MEDICINE

## 2019-09-20 PROCEDURE — 84443 ASSAY THYROID STIM HORMONE: CPT | Mod: ZL | Performed by: INTERNAL MEDICINE

## 2019-09-20 PROCEDURE — 84432 ASSAY OF THYROGLOBULIN: CPT | Mod: ZL | Performed by: INTERNAL MEDICINE

## 2019-09-20 PROCEDURE — 80053 COMPREHEN METABOLIC PANEL: CPT | Mod: ZL | Performed by: INTERNAL MEDICINE

## 2019-09-20 PROCEDURE — 99000 SPECIMEN HANDLING OFFICE-LAB: CPT | Performed by: INTERNAL MEDICINE

## 2019-09-20 PROCEDURE — 86800 THYROGLOBULIN ANTIBODY: CPT | Mod: ZL | Performed by: INTERNAL MEDICINE

## 2019-09-20 PROCEDURE — 36415 COLL VENOUS BLD VENIPUNCTURE: CPT | Mod: ZL | Performed by: INTERNAL MEDICINE

## 2019-09-20 PROCEDURE — G0463 HOSPITAL OUTPT CLINIC VISIT: HCPCS

## 2019-09-20 PROCEDURE — 99213 OFFICE O/P EST LOW 20 MIN: CPT | Performed by: INTERNAL MEDICINE

## 2019-09-20 NOTE — PATIENT INSTRUCTIONS
Schedule appt with ENT for swallowing difficulty.    Follow up in 4 months with labs.    Please call with any questions at [phone].      Thank you

## 2019-09-20 NOTE — NURSING NOTE
"Chief Complaint   Patient presents with     RECHECK     leukocytosis; papillary thyroid carcinoma       Initial /62   Pulse 66   Temp 97.7  F (36.5  C) (Tympanic)   Resp 20   Ht 1.575 m (5' 2\")   Wt 86.3 kg (190 lb 4.8 oz)   SpO2 97%   BMI 34.81 kg/m   Estimated body mass index is 34.81 kg/m  as calculated from the following:    Height as of this encounter: 1.575 m (5' 2\").    Weight as of this encounter: 86.3 kg (190 lb 4.8 oz).  Medication Reconciliation: complete.  Immunizations and advance directives status reviewed. Pain scale 0 , PHQ-9 = Declined.            Jina Juarez LPN    "

## 2019-09-21 NOTE — PROGRESS NOTES
Visit Date:   09/20/2019      HEMATOLOGY ONCOLOGY CLINIC NOTE   HISTORY OF PRESENT ILLNESS:  Mrs. Lemon returns for followup of leukocytosis and newly diagnosed papillary thyroid carcinoma.  We had seen the patient in consultation at the request of Randi Haddad, nurse practitioner, 04/15/2019.  At that time, she was a 73-year-old white female with history of hypothyroidism, atrial fibrillation, hypertension, we were asked to evaluate concerning a diagnosis of leukocytosis.  According to patient, she had this chronically for years.  She had been seen by Dr. Presley approximately 5 years ago who noted an elevated white count and treated her empirically with antibiotics.  She said her white count went down and then it would go back up.  Most recently she had a white count evaluated by Randi Haddad, nurse practitioner, who saw the patient on 06/28/2018 and noted that her white count was elevated.  On 01/23 CBC was drawn.  Her white count was 13.4, hemoglobin 13.8, hematocrit 42.2, platelet count 326.  She ordered peripheral blood smear.  This came back; there was mild neutrophilia, reactive lymphocytes, and mild reticulocytosis.  The patient had a CBC repeated on 01/28.  At that time, her white count had dropped to 12.8 with 73.7% neutrophils consistent with neutrophilia.  Hemoglobin was 14.5, hematocrit 44.8, platelet count 342.  When we saw the patient, we felt she had likely neutrophilia secondary to reactive process, i.e., allergic rhinitis.  Nonetheless, we wanted to rule out other etiologies.  We obtained a BCR-ABL transcript and this came back negative.  We also obtained a sed rate, rheumatoid factor, MALIA, serum protein electrophoresis, all of which were negative.  Lyme titers were negative.  Wendy-Barr virus profile was negative.  CT chest, abdomen and pelvis was negative except that there was a 1 cm heavily calcified nodule within the left lobe of the thyroid.  Thyroid ultrasound was  recommended.  The patient underwent a thyroid ultrasound that revealed a left thyroid nodule; malignancy could not be ruled out.  Subsequently the patient underwent a biopsy which came back as papillary thyroid carcinoma and referred the patient to Dr. Pineda for further management.  The patient underwent a left hemithyroidectomy performed on 08/27 and the findings were that the patient had papillary thyroid carcinoma that measured 1 x 1 x 1 cm.  There was lymphocytic thyroiditis, severe, widespread.  There was no evidence of angiolymphatic invasion.  There was no extrathyroidal extension.  The patient was staged pathologic T1a NX.  The patient comes in today.  She says she is doing relatively well postoperatively.  She had cataract surgery and was able to see from her right eye.  She states she has trouble swallowing pills; they get held up.  She plans to see Dr. Pineda for this.  Otherwise, she continues on Synthroid.  She has not had a recent TSH or thyroglobulin.  Otherwise, she offers no other complaints of shortness of breath, chest pain, abdominal pain, fevers, night sweats, weight loss.   PHYSICAL EXAMINATION:   GENERAL:  She is an elderly white female in no acute distress.   VITAL SIGNS:  Blood pressure 110/62, pulse 66, respirations 20, temperature 97.7.   HEENT:  Atraumatic, normocephalic.   NECK:  Reveals a well-healed thyroidectomy scar.   LUNGS:  Clear to auscultation and percussion.   HEART:  Regular rhythm, S1, S2 normal.   ABDOMEN:  Soft, normoactive bowel sounds, no masses, nontender.   LYMPHATICS:  No cervical, supraclavicular, axillary or inguinal nodes.   EXTREMITIES:  Without edema.   NEUROLOGIC:  Nonfocal.      LABORATORY DATA:  Laboratories reveal a CBC that is within normal limits; white count 10.7, H and H 14.2 and 44.2, platelet count 350.  BUN 9, creatinine 0.84.  LFTs are normal.  LDH is 205.  TSH is 9.61.  Free T4 is 0.88.      IMPRESSION:   1.  Neutrophilia, reactive,  essentially resolved.   2.  Papillary thyroid carcinoma with pathologic T1a status post left hemithyroidectomy.  The patient has no evidence of distant disease.  She continues on Synthroid.  We will monitor thyroglobulin levels every 4 months.  Otherwise, she will follow up with Dr. Pineda for surveillance.  She elected to be due for an ultrasound in a year.  We will see the patient in 4 months.  Obtain CBC, CMP, LDH, thyroglobulin level, TSH, T4.      Forty minutes was spent with the patient, greater than half the time spent in counseling and coordination of care.         JULIO PARSON MD             D: 2019   T: 2019   MT: JOSH      Name:     BULL ELI   MRN:      [id number]        Account:      [id number]   :      1945           Visit Date:   2019      Document: [id number]       cc: Randi Pineda DO

## 2019-09-24 ENCOUNTER — PATIENT OUTREACH (OUTPATIENT)
Dept: ONCOLOGY | Facility: OTHER | Age: 74
End: 2019-09-24

## 2019-09-26 ENCOUNTER — OFFICE VISIT (OUTPATIENT)
Dept: OTOLARYNGOLOGY | Facility: OTHER | Age: 74
End: 2019-09-26
Attending: PHYSICIAN ASSISTANT
Payer: COMMERCIAL

## 2019-09-26 VITALS
HEART RATE: 62 BPM | RESPIRATION RATE: 16 BRPM | TEMPERATURE: 97.7 F | SYSTOLIC BLOOD PRESSURE: 120 MMHG | DIASTOLIC BLOOD PRESSURE: 58 MMHG | OXYGEN SATURATION: 96 %

## 2019-09-26 DIAGNOSIS — Z98.890 STATUS POST THYROID SURGERY: ICD-10-CM

## 2019-09-26 DIAGNOSIS — J39.2 THROAT IRRITATION: ICD-10-CM

## 2019-09-26 DIAGNOSIS — R13.14 PHARYNGOESOPHAGEAL DYSPHAGIA: Primary | ICD-10-CM

## 2019-09-26 DIAGNOSIS — R49.0 HOARSENESS OF VOICE: ICD-10-CM

## 2019-09-26 DIAGNOSIS — C73 PAPILLARY THYROID CARCINOMA (H): ICD-10-CM

## 2019-09-26 PROCEDURE — 99212 OFFICE O/P EST SF 10 MIN: CPT | Mod: 24 | Performed by: PHYSICIAN ASSISTANT

## 2019-09-26 PROCEDURE — 31575 DIAGNOSTIC LARYNGOSCOPY: CPT | Performed by: PHYSICIAN ASSISTANT

## 2019-09-26 PROCEDURE — G0463 HOSPITAL OUTPT CLINIC VISIT: HCPCS | Mod: 25

## 2019-09-26 ASSESSMENT — PAIN SCALES - GENERAL: PAINLEVEL: NO PAIN (0)

## 2019-09-26 NOTE — PROGRESS NOTES
aware of TSH level 9.20.19. No new orders.    Eleanor Mullins RN   Oncology Care Coordinator

## 2019-09-26 NOTE — PROGRESS NOTES
Chief Complaint   Patient presents with     Surgical Followup     S/P thyroidectomy 08/37/2019. Having problems swallowing.        Reports hx of a few years ago, she developed congestion and throat clearing.   Prior to surgery, she had flexible nasopharyngoscope and developed sore throat. She had trouble and went in. She went into her PCP and treated w/ Abx.   Since her surgery, she feels like her voice has been hoarse.   She feels like her medications are getting caught in her throat. She feels like it up high.   She is able to drink liquids and eat solids w/o concerns.   Her pills are the only concern for swallowing at this point.     Nella has continued incision site soreness and irritation. No pain with neck movement.   She feels 'irritation/ lump' in her throat.       No prior concerns in past regarding swallowing.   No aphonia. No fevers, night sweats or weight loss.   Never used tobacco.   She has been Prilosec daily. No breakthrough reflux.   Increase in burping after taking medications.       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     Palpitations 04/11/2001     Postmenopausal bleeding 09/09/2002     Precordial pain 04/05/2001     Thyroid disease      Unspecified essential hypertension 09/14/2001     Urgency of urination 06/06/2007        Allergies   Allergen Reactions     Atorvastatin      Ezetimibe      Gentamicin      Hydroxyzine      Lorazepam      Ranitidine       Simvastatin      Lopid [Gemfibrozil] GI Disturbance     Bloating, constipation,      Pravastatin Muscle Pain (Myalgia)     Current Outpatient Medications   Medication     amLODIPine (NORVASC) 5 MG tablet     ELIQUIS 5 MG tablet     gabapentin (NEURONTIN) 300 MG capsule     GLUCOSAMINE SULFATE PO     ketorolac (ACULAR) 0.5 % ophthalmic solution     levothyroxine (SYNTHROID/LEVOTHROID) 25 MCG tablet     losartan (COZAAR) 100 MG tablet     metoprolol succinate ER (TOPROL-XL) 50 MG 24 hr tablet     moxifloxacin (VIGAMOX) 0.5 % ophthalmic solution     Omega-3 Fatty Acids (OMEGA-3 FISH OIL PO)     omeprazole (PRILOSEC) 40 MG DR capsule     prednisoLONE acetate (PRED FORTE) 1 % ophthalmic suspension     sertraline (ZOLOFT) 50 MG tablet     solifenacin (VESICARE) 5 MG tablet     STATIN NOT PRESCRIBED (INTENTIONAL)     VITAMIN D, CHOLECALCIFEROL, PO     furosemide (LASIX) 20 MG tablet     No current facility-administered medications for this visit.       ROS: 10 point ROS neg other than the symptoms noted above in the HPI.  /58   Pulse 62   Temp 97.7  F (36.5  C) (Tympanic)   Resp 16   SpO2 96%     General - The patient is well nourished and well developed, and appears to have good nutritional status.  Alert and oriented to person and place, answers questions and cooperates with examination appropriately.   Head and Face - Normocephalic and atraumatic, with no gross asymmetry noted.  The facial nerve is intact, with strong symmetric movements.  Voice and Breathing - The patient was breathing comfortably without the use of accessory muscles. There was no wheezing, stridor, or stertor.  The patients voice was clear and strong, and had appropriate pitch and quality.  Ears -The external auditory canals are patent, the tympanic membranes are intact without effusion, retraction or mass.  Bony landmarks are intact.  Small retraction pocket post inferior left ear, normalizes with valsalva  Eyes - Extraocular movements  intact, and the pupils were reactive to light.  Sclera were not icteric or injected, conjunctiva were pink and moist.   Mouth - Examination of the oral cavity showed pink, healthy oral mucosa. No lesions or ulcerations noted.  The tongue was mobile and midline, and the dentition were in good condition.    Throat - The walls of the oropharynx were smooth, pink, moist, symmetric, and had no lesions or ulcerations.  The tonsillar pillars and soft palate were symmetric.  The uvula was midline on elevation.    Neck - thick neck.  Normal midline excursion of the laryngotracheal complex during swallowing.  Full range of motion on passive movement.  Palpation of the occipital, submental, submandibular, internal jugular chain, and supraclavicular nodes did not demonstrate any abnormal lymph nodes or masses.  Palpation of the thyroid was without fixed palpable mass. Well healed incision site, c/w thyroid lobectomy.  The trachea was mobile and midline.  Nose - External contour is symmetric, no gross deflection or scars.  Nasal mucosa is pink and moist with no abnormal mucus.  The septum was intact, turbinates of normal size and position.  No polyps, masses, or purulence noted on examination.     Attempts at mirror laryngoscopy were not possible due to gag reflex.  Therefore I proceeded with a fiberoptic examination after informed consent.  First I sprayed both sides of the nose with a mixture of lidocaine and neosynephrine.  I then passed the scope through the nasal cavity.  The nasal cavity was unremarkable.  The nasopharynx was mucosally covered and symmetric.  The eustachian tube openings were unobstructed.  Going further, the base of tongue was free of lesions, and the vallecula was open.  The epiglottis was smooth and mucosally covered.  The supraglottic larynx was then clearly visualized.  The vocal cords moved smoothly and symmetrically.  The pyriform sinuses were open and without mayra mass or pooling of secretions,  and the limited view of the postcricoid region did not show any lesions.  The patient tolerated the procedure well.      ASSESSMENT:    ICD-10-CM    1. Pharyngoesophageal dysphagia R13.14 magic mouthwash (ENTER INGREDIENTS IN COMMENTS) suspension     XR Esophagram   2. Throat irritation J39.2    3. Hoarseness of voice R49.0    4. Status post left thyroid lobectomy Z98.890    5. Papillary thyroid carcinoma (H) C73        Complete barium swallow.   Continue with prilosec.   Start Magic mouthwash for short term trial for 5-7 days. Then start Soda Salt rinse.     Maintain good water intake.   Follow LPR precautions.     Pending BS results, consider ST. Rosemary Flower PA-C  ENT  [facility] [place]  [phone]

## 2019-09-26 NOTE — PATIENT INSTRUCTIONS
Complete barium swallow.   Continue with prilosec.   Start Magic mouthwash for short term trial for 5-7 days. Then start Soda Salt rinse.     Maintain good water intake.   Follow LPR precautions.       Thank you for allowing Rosemary Flower PA-C and our ENT team to participate in your care.  If your medications are too expensive, please give the nurse a call.  We can possibly change this medication.  If you have a scheduling or an appointment question please contact our Health Unit Coordinator at their direct line [phone].   ALL nursing questions or concerns can be directed to your ENT nurse at: [phone] [facility]    Adult lifestyle changes to prevent LPR reviewed      Avoid eating and drinking within two to three hours prior to bedtime    Do not drink alcohol    Eat small meals and slowly    Limit problem foods:    o Caffeine  o Carbonated drinks  o Chocolate  o Peppermint  o Tomato  o Citrus fruits  o Fatty and fried foods      Lose weight    Quit smoking    Wear loose clothing

## 2019-09-26 NOTE — NURSING NOTE
"Chief Complaint   Patient presents with     Surgical Followup     S/P thyroidectomy 08/37/2019. Having problems swallowing.        Initial /58   Pulse 62   Temp 97.7  F (36.5  C) (Tympanic)   Resp 16   SpO2 96%  Estimated body mass index is 34.81 kg/m  as calculated from the following:    Height as of 9/19/19: 1.575 m (5' 2\").    Weight as of 9/19/19: 86.3 kg (190 lb 4.8 oz).  Medication Reconciliation: complete  Tessie Mayorga LPN    "

## 2019-09-26 NOTE — LETTER
9/26/2019         RE: Nella Lemon  [address] [place] [address]        Dear Colleague,    Thank you for referring your patient, Nella Lemon, to the [facility]. Please see a copy of my visit note below.    Chief Complaint   Patient presents with     Surgical Followup     S/P thyroidectomy 08/37/2019. Having problems swallowing.        Reports hx of a few years ago, she developed congestion and throat clearing.   Prior to surgery, she had flexible nasopharyngoscope and developed sore throat. She had trouble and went in. She went into her PCP and treated w/ Abx.   Since her surgery, she feels like her voice has been hoarse.   She feels like her medications are getting caught in her throat. She feels like it up high.   She is able to drink liquids and eat solids w/o concerns.   Her pills are the only concern for swallowing at this point.     Nella has continued incision site soreness and irritation. No pain with neck movement.   She feels 'irritation/ lump' in her throat.       No prior concerns in past regarding swallowing.   No aphonia. No fevers, night sweats or weight loss.   Never used tobacco.   She has been Prilosec daily. No breakthrough reflux.   Increase in burping after taking medications.       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     Palpitations 04/11/2001     Postmenopausal bleeding 09/09/2002      Precordial pain 04/05/2001     Thyroid disease      Unspecified essential hypertension 09/14/2001     Urgency of urination 06/06/2007        Allergies   Allergen Reactions     Atorvastatin      Ezetimibe      Gentamicin      Hydroxyzine      Lorazepam      Ranitidine      Simvastatin      Lopid [Gemfibrozil] GI Disturbance     Bloating, constipation,      Pravastatin Muscle Pain (Myalgia)     Current Outpatient Medications   Medication     amLODIPine (NORVASC) 5 MG tablet     ELIQUIS 5 MG tablet     gabapentin (NEURONTIN) 300 MG capsule     GLUCOSAMINE SULFATE PO     ketorolac (ACULAR) 0.5 % ophthalmic solution     levothyroxine (SYNTHROID/LEVOTHROID) 25 MCG tablet     losartan (COZAAR) 100 MG tablet     metoprolol succinate ER (TOPROL-XL) 50 MG 24 hr tablet     moxifloxacin (VIGAMOX) 0.5 % ophthalmic solution     Omega-3 Fatty Acids (OMEGA-3 FISH OIL PO)     omeprazole (PRILOSEC) 40 MG DR capsule     prednisoLONE acetate (PRED FORTE) 1 % ophthalmic suspension     sertraline (ZOLOFT) 50 MG tablet     solifenacin (VESICARE) 5 MG tablet     STATIN NOT PRESCRIBED (INTENTIONAL)     VITAMIN D, CHOLECALCIFEROL, PO     furosemide (LASIX) 20 MG tablet     No current facility-administered medications for this visit.       ROS: 10 point ROS neg other than the symptoms noted above in the HPI.  /58   Pulse 62   Temp 97.7  F (36.5  C) (Tympanic)   Resp 16   SpO2 96%     General - The patient is well nourished and well developed, and appears to have good nutritional status.  Alert and oriented to person and place, answers questions and cooperates with examination appropriately.   Head and Face - Normocephalic and atraumatic, with no gross asymmetry noted.  The facial nerve is intact, with strong symmetric movements.  Voice and Breathing - The patient was breathing comfortably without the use of accessory muscles. There was no wheezing, stridor, or stertor.  The patients voice was clear and strong, and had appropriate  pitch and quality.  Ears -The external auditory canals are patent, the tympanic membranes are intact without effusion, retraction or mass.  Bony landmarks are intact.  Small retraction pocket post inferior left ear, normalizes with valsalva  Eyes - Extraocular movements intact, and the pupils were reactive to light.  Sclera were not icteric or injected, conjunctiva were pink and moist.   Mouth - Examination of the oral cavity showed pink, healthy oral mucosa. No lesions or ulcerations noted.  The tongue was mobile and midline, and the dentition were in good condition.    Throat - The walls of the oropharynx were smooth, pink, moist, symmetric, and had no lesions or ulcerations.  The tonsillar pillars and soft palate were symmetric.  The uvula was midline on elevation.    Neck - thick neck.  Normal midline excursion of the laryngotracheal complex during swallowing.  Full range of motion on passive movement.  Palpation of the occipital, submental, submandibular, internal jugular chain, and supraclavicular nodes did not demonstrate any abnormal lymph nodes or masses.  Palpation of the thyroid was without fixed palpable mass.  Well healed incision site, c/w thyroid lobectomy.  The trachea was mobile and midline.  Nose - External contour is symmetric, no gross deflection or scars.  Nasal mucosa is pink and moist with no abnormal mucus.  The septum was intact, turbinates of normal size and position.  No polyps, masses, or purulence noted on examination.     Attempts at mirror laryngoscopy were not possible due to gag reflex.  Therefore I proceeded with a fiberoptic examination after informed consent.  First I sprayed both sides of the nose with a mixture of lidocaine and neosynephrine.  I then passed the scope through the nasal cavity.  The nasal cavity was unremarkable.  The nasopharynx was mucosally covered and symmetric.  The eustachian tube openings were unobstructed.  Going further, the base of tongue was free of  lesions, and the vallecula was open.  The epiglottis was smooth and mucosally covered.  The supraglottic larynx was then clearly visualized.  The vocal cords moved smoothly and symmetrically.  The pyriform sinuses were open and without mayra mass or pooling of secretions, and the limited view of the postcricoid region did not show any lesions.  The patient tolerated the procedure well.      ASSESSMENT:    ICD-10-CM    1. Pharyngoesophageal dysphagia R13.14 magic mouthwash (ENTER INGREDIENTS IN COMMENTS) suspension     XR Esophagram   2. Throat irritation J39.2    3. Hoarseness of voice R49.0    4. Status post left thyroid lobectomy Z98.890    5. Papillary thyroid carcinoma (H) C73        Complete barium swallow.   Continue with prilosec.   Start Magic mouthwash for short term trial for 5-7 days. Then start Soda Salt rinse.     Maintain good water intake.   Follow LPR precautions.     Pending BS results, consider ST.         Rosemary Flower PA-C  ENT  [facility]  [phone]      Again, thank you for allowing me to participate in the care of your patient.        Sincerely,        Rosemary Flower PA-C

## 2019-09-30 DIAGNOSIS — I48.0 PAROXYSMAL ATRIAL FIBRILLATION (H): ICD-10-CM

## 2019-10-02 RX ORDER — APIXABAN 5 MG/1
TABLET, FILM COATED ORAL
Qty: 60 TABLET | Refills: 1 | Status: SHIPPED | OUTPATIENT
Start: 2019-10-02 | End: 2019-11-13

## 2019-10-03 ENCOUNTER — HOSPITAL ENCOUNTER (OUTPATIENT)
Dept: GENERAL RADIOLOGY | Facility: HOSPITAL | Age: 74
Discharge: HOME | End: 2019-10-03
Attending: PHYSICIAN ASSISTANT | Admitting: PHYSICIAN ASSISTANT
Payer: COMMERCIAL

## 2019-10-03 DIAGNOSIS — R13.14 PHARYNGOESOPHAGEAL DYSPHAGIA: ICD-10-CM

## 2019-10-03 PROCEDURE — 74220 X-RAY XM ESOPHAGUS 1CNTRST: CPT | Mod: TC

## 2019-10-04 ENCOUNTER — ALLIED HEALTH/NURSE VISIT (OUTPATIENT)
Dept: AUDIOLOGY | Facility: OTHER | Age: 74
End: 2019-10-04
Attending: AUDIOLOGIST
Payer: COMMERCIAL

## 2019-10-04 DIAGNOSIS — H90.3 SENSORINEURAL HEARING LOSS (SNHL) OF BOTH EARS: Primary | ICD-10-CM

## 2019-10-04 DIAGNOSIS — H90.3 SENSORINEURAL HEARING LOSS, BILATERAL: Primary | ICD-10-CM

## 2019-10-04 PROCEDURE — 92556 SPEECH AUDIOMETRY COMPLETE: CPT | Performed by: AUDIOLOGIST

## 2019-10-04 PROCEDURE — V5266 BATTERY FOR HEARING DEVICE: HCPCS

## 2019-10-04 PROCEDURE — 92552 PURE TONE AUDIOMETRY AIR: CPT | Performed by: AUDIOLOGIST

## 2019-10-04 NOTE — PROGRESS NOTES
HEARING AID CHECK    BACKGROUND:  Nella Lemon, a 74 year old female, was seen today for an hearing aid check.  Ms. Lemon wears [employer] Fit 700 hearing aids.  Ms. Lemon reported noises from both hearing aids.    FINDINGS:  Visual inspection and cleaning provided.   C-stop changed with new. Medium open domes changed with new. Battery was tested and good. Good listening check.    Reviewed cleaning, troubleshooting, and preventative care with patient. Any cleaning tools used were provided as customer courtesy.    Services performed and warranty reviewed with patient.    PLAN:  Patient seen next by audiologist. Ms. Lemon will return to clinic in 12 months, sooner if needed. Patient had no further questions and reports satisfaction.         Berenice Pitt  Audiology Assistant  [facility]  [phone]      Per guidelines of patient's insurance, 32 units of size 312 batteries were dispensed today, Battery Modifier: NU (New). Reviewed that patent is eligible for batteries once every 90 days, and how they can request new batteries.    Berenice Pitt  Audiology Assistant  [facility]  [phone]

## 2019-10-04 NOTE — PROGRESS NOTES
Audiology Evaluation Completed. Please refer SCANNED AUDIOGRAM and/or TYMPANOGRAM for BACKGROUND, RESULTS, RECOMMENDATIONS.      Trini BIANCHI, [facility]  Audiologist #[id number]

## 2019-10-07 DIAGNOSIS — I10 BENIGN ESSENTIAL HYPERTENSION: ICD-10-CM

## 2019-10-08 RX ORDER — AMLODIPINE BESYLATE 5 MG/1
5 TABLET ORAL DAILY
Qty: 30 TABLET | Refills: 0 | Status: SHIPPED | OUTPATIENT
Start: 2019-10-08 | End: 2019-11-13

## 2019-10-17 LAB — LAB SCANNED RESULT: ABNORMAL

## 2019-10-19 DIAGNOSIS — I48.0 PAROXYSMAL ATRIAL FIBRILLATION (H): ICD-10-CM

## 2019-10-23 RX ORDER — METOPROLOL SUCCINATE 50 MG/1
50 TABLET, EXTENDED RELEASE ORAL DAILY
Qty: 30 TABLET | Refills: 2 | Status: SHIPPED | OUTPATIENT
Start: 2019-10-23 | End: 2019-11-13

## 2019-10-28 NOTE — PROGRESS NOTES
Subjective     Nella Lemon is a 74 year old female who presents to clinic today for the following health issues:    HPI   Hypertension Follow-up      Do you check your blood pressure regularly outside of the clinic? No     Are you following a low salt diet? Yes    Are your blood pressures ever more than 140 on the top number (systolic) OR more   than 90 on the bottom number (diastolic), for example 140/90? No      How many servings of fruits and vegetables do you eat daily?  2-3    On average, how many sweetened beverages do you drink each day (soda, juice, sweet tea, etc)?   1    How many days per week do you miss taking your medication? 0        Hyperlipidemia Follow-Up      Are you having any of the following symptoms? (Select all that apply)  No complaints of shortness of breath, chest pain or pressure.  No increased sweating or nausea with activity.  No left-sided neck or arm pain.  No complaints of pain in calves when walking 1-2 blocks.    Are you regularly taking any medication or supplement to lower your cholesterol?   No    Are you having muscle aches or other side effects that you think could be caused by your cholesterol lowering medication?  No is taking fish oil.    Stopped pravachol in August, due for repeat labs today.    The ASCVD Risk score ([place] PANCHITO Jr., et al., 2013) failed to calculate for the following reasons:    The patient has a prior MI or stroke diagnosis        Vascular Disease Follow-up      Are you having any of the following symptoms? (Select all that apply) No complaints of shortness of breath, chest pain or pressure.  No increased sweating or nausea with activity.  No left-sided neck or arm pain.  No complaints of pain in calves when walking 1-2 blocks.    How often do you take nitroglycerin? Never    Do you take an aspirin every day? No    Depression and Anxiety Follow-Up    How are you doing with your depression since your last visit? stable    How are you doing with your  anxiety since your last visit?  stable    Are you having other symptoms that might be associated with depression or anxiety? No    Have you had a significant life event? Grandson with mental health issues and drug abuse     Do you have any concerns with your use of alcohol or other drugs? No    Social History     Tobacco Use     Smoking status: Never Smoker     Smokeless tobacco: Never Used   Substance Use Topics     Alcohol use: Never     Frequency: Never     Drug use: No     PHQ 5/3/2019 5/31/2019 7/1/2019   PHQ-9 Total Score 2 2 1   Q9: Thoughts of better off dead/self-harm past 2 weeks Not at all Not at all Not at all     LA NENA-7 SCORE 1/23/2019 3/27/2019 4/30/2019   Total Score 0 2 3       Suicide Assessment Five-step Evaluation and Treatment (SAFE-T)    Hypothyroidism Follow-up      Since last visit, patient describes the following symptoms: Weight stable, no hair loss, no skin changes, no constipation, no loose stools      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, left     Family history of colon cancer     ACP (advance care planning)     Mixed hyperlipidemia     Hypothyroidism due to acquired atrophy of thyroid     Paroxysmal atrial fibrillation (H)     Atypical chest pain     CKD (chronic kidney disease) stage 2, GFR 60-89 ml/min     Leukocytosis     Obesity (BMI 35.0-39.9) with comorbidity (H)     Past Surgical History:   Procedure Laterality Date     ADENOIDECTOMY       BIOPSY      FNA left thyroid     CHOLECYSTECTOMY  1981     COLONOSCOPY  2002     COLONOSCOPY  2012     COLONOSCOPY N/A 9/22/2014    Procedure: COLONOSCOPY;  Surgeon: Lavell Pavon, DO;   Location: [facility] OR     CYSTOSCOPY  2007    Cystitis chronic     EYE SURGERY      right cataract     ORTHOPEDIC SURGERY  2002    carpal tunnel; RT, LT     THYROIDECTOMY Left 8/27/2019    Procedure: LEFT THYROID LOBECTOMY AND ISTHMUS WITH FROZENS;  Surgeon: Mildred Pineda MD;  Location: [facility] OR     TONSILLECTOMY         Social History     Tobacco Use     Smoking status: Never Smoker     Smokeless tobacco: Never Used   Substance Use Topics     Alcohol use: Never     Frequency: Never     Family History   Problem Relation Age of Onset     Breast Cancer Mother      Alcohol/Drug Mother         Cirrhosis     Other - See Comments Mother         goiter     Cerebrovascular Disease Father      Circulatory Father      Alcohol/Drug Son      Cancer - colorectal Brother      Unknown/Adopted No family hx of      Asthma No family hx of      C.A.D. No family hx of      Diabetes No family hx of      Hypertension No family hx of      Prostate Cancer No family hx of      Allergies No family hx of      Alzheimer Disease No family hx of      Anesthesia Reaction No family hx of      Arthritis No family hx of      Blood Disease No family hx of      Cardiovascular No family hx of      Congenital Anomalies No family hx of      Connective Tissue Disorder No family hx of      Depression No family hx of      Endocrine Disease No family hx of      Eye Disorder No family hx of      Genetic Disorder No family hx of      Gastrointestinal Disease No family hx of      Genitourinary Problems No family hx of      Gynecology No family hx of      Heart Disease No family hx of      Lipids No family hx of      Musculoskeletal Disorder No family hx of      Neurologic Disorder No family hx of      Obesity No family hx of      Osteoporosis No family hx of      Psychotic Disorder No family hx of      Respiratory No family hx of      Thyroid Disease No family hx of      Family History Negative No family hx of      Hearing Loss No family hx of          Current  Outpatient Medications   Medication Sig Dispense Refill     amLODIPine (NORVASC) 5 MG tablet TAKE 1 TABLET (5 MG) BY MOUTH DAILY 30 tablet 0     ELIQUIS ANTICOAGULANT 5 MG tablet TAKE 1 TABLET (5 MG) BY MOUTH 2 TIMES DAILY 60 tablet 1     gabapentin (NEURONTIN) 300 MG capsule TAKE 3 CAPSULES THREE TIMES DAILY 270 capsule 1     GLUCOSAMINE SULFATE PO Take 1,000 mg by mouth 2 times daily       levothyroxine (SYNTHROID/LEVOTHROID) 25 MCG tablet TAKE 1 TABLET DAILY BEST ON EMPTY STOMACH FOR THYROID 90 tablet 2     losartan (COZAAR) 100 MG tablet TAKE 1 TABLET BY MOUTH DAILY. 90 tablet 0     magic mouthwash (ENTER INGREDIENTS IN COMMENTS) suspension Take 10 mLs by mouth every 4 hours as needed (throat irritation) 240 mL 1     metoprolol succinate ER (TOPROL-XL) 50 MG 24 hr tablet TAKE 1 TABLET (50 MG) BY MOUTH DAILY 30 tablet 2     Omega-3 Fatty Acids (OMEGA-3 FISH OIL PO) Take 3 g by mouth daily        omeprazole (PRILOSEC) 40 MG DR capsule TAKE 1 CAPSULE (40 MG) BY MOUTH DAILY TAKE 30-60 MINUTES BEFORE A MEAL. 90 capsule 1     prednisoLONE acetate (PRED FORTE) 1 % ophthalmic suspension INSTILL 1 DROP INTO THE LEFT EYE FOUR TIMES DAILY. BEGIN 1 DAY PRIOR TO PROCEDURE.  0     sertraline (ZOLOFT) 50 MG tablet TAKE 1 TABLET DAILY 30 tablet 3     solifenacin (VESICARE) 5 MG tablet Take 1 tablet (5 mg) by mouth daily 90 tablet 1     STATIN NOT PRESCRIBED (INTENTIONAL) Please choose reason not prescribed, below       VITAMIN D, CHOLECALCIFEROL, PO Take by mouth daily       Allergies   Allergen Reactions     Atorvastatin      Ezetimibe      Gentamicin      Hydroxyzine      Lorazepam      Ranitidine      Simvastatin      Lopid [Gemfibrozil] GI Disturbance     Bloating, constipation,      Pravastatin Muscle Pain (Myalgia)     Recent Labs   Lab Test 09/20/19  0855 08/12/19  1120 07/25/19  1230 07/01/19  1142  01/23/19  0952 07/23/18  1013 05/30/18  0925   LDL  --   --   --   --   --  56 53 52   HDL  --   --   --   --   --  47*  "49* 42*   TRIG  --   --   --   --   --  112 142 127   ALT 40 41  --  29   < > 26 31  --    CR 0.84 0.85  --  0.93   < > 0.87 0.87  --    GFRESTIMATED 69 68  --  61   < > 66 64  --    GFRESTBLACK 80 79  --  71   < > 77 77  --    POTASSIUM 4.3 3.9  --  4.1   < > 4.1 3.9  --    TSH 9.61*  --  2.88  --    < > 4.41* 3.63 3.65    < > = values in this interval not displayed.      BP Readings from Last 3 Encounters:   10/30/19 134/68   09/26/19 120/58   09/19/19 110/62    Wt Readings from Last 3 Encounters:   10/30/19 87.5 kg (193 lb)   09/19/19 86.3 kg (190 lb 4.8 oz)   09/04/19 83.9 kg (185 lb)               Reviewed and updated as needed this visit by Provider         Review of Systems   ROS COMP: Constitutional, HEENT, cardiovascular, pulmonary, gi and gu systems are negative, except as otherwise noted.      Objective    /68 (BP Location: Left arm, Patient Position: Sitting, Cuff Size: Adult Regular)   Pulse 55   Temp 97.1  F (36.2  C) (Tympanic)   Resp 18   Ht 1.575 m (5' 2\")   Wt 87.5 kg (193 lb)   SpO2 97%   BMI 35.30 kg/m    Body mass index is 35.3 kg/m .  Physical Exam   GENERAL: healthy, alert and no distress  NECK: no adenopathy, no asymmetry, masses, or scars, thyroid normal to palpation and no carotid bruits  RESP: lungs clear to auscultation - no rales, rhonchi or wheezes  CV: regular rate and rhythm, normal S1 S2, no S3 or S4, no murmur, click or rub, no peripheral edema and peripheral pulses strong  MS: no gross musculoskeletal defects noted, no edema  PSYCH: mentation appears normal, affect normal/bright            Assessment & Plan     1. Mixed hyperlipidemia  Will recheck lipids, if elevated, will restart pravachol.    - Lipid Profile    2. Hypothyroidism due to acquired atrophy of thyroid  - TSH with free T4 reflex    3. GERD  Continue current plan    4. Paroxysmal atrial fibrillation (H)  Continue eliquis    5. Benign essential hypertension  - Comprehensive metabolic panel    6. CKD " "(chronic kidney disease) stage 2, GFR 60-89 ml/min  Do not use NSAIDS - ibuprofen or naproxen.         BMI:   Estimated body mass index is 35.3 kg/m  as calculated from the following:    Height as of this encounter: 1.575 m (5' 2\").    Weight as of this encounter: 87.5 kg (193 lb).   Weight management plan: Discussed healthy diet and exercise guidelines    Declined flu vaccine today.     Return in about 6 months (around 4/30/2020) for hypertension and lipids, thyroid.    Randi Haddad, NP  [facility]      "

## 2019-10-30 ENCOUNTER — OFFICE VISIT (OUTPATIENT)
Dept: FAMILY MEDICINE | Facility: OTHER | Age: 74
End: 2019-10-30
Attending: NURSE PRACTITIONER
Payer: COMMERCIAL

## 2019-10-30 VITALS
WEIGHT: 193 LBS | RESPIRATION RATE: 18 BRPM | HEIGHT: 62 IN | HEART RATE: 55 BPM | DIASTOLIC BLOOD PRESSURE: 68 MMHG | TEMPERATURE: 97.1 F | OXYGEN SATURATION: 97 % | BODY MASS INDEX: 35.51 KG/M2 | SYSTOLIC BLOOD PRESSURE: 134 MMHG

## 2019-10-30 DIAGNOSIS — I10 BENIGN ESSENTIAL HYPERTENSION: ICD-10-CM

## 2019-10-30 DIAGNOSIS — K21.9 GASTROESOPHAGEAL REFLUX DISEASE WITHOUT ESOPHAGITIS: ICD-10-CM

## 2019-10-30 DIAGNOSIS — N18.2 CKD (CHRONIC KIDNEY DISEASE) STAGE 2, GFR 60-89 ML/MIN: ICD-10-CM

## 2019-10-30 DIAGNOSIS — E78.2 MIXED HYPERLIPIDEMIA: Primary | ICD-10-CM

## 2019-10-30 DIAGNOSIS — E03.4 HYPOTHYROIDISM DUE TO ACQUIRED ATROPHY OF THYROID: ICD-10-CM

## 2019-10-30 DIAGNOSIS — I48.0 PAROXYSMAL ATRIAL FIBRILLATION (H): ICD-10-CM

## 2019-10-30 LAB
ALBUMIN SERPL-MCNC: 3.4 G/DL (ref 3.4–5)
ALP SERPL-CCNC: 128 U/L (ref 40–150)
ALT SERPL W P-5'-P-CCNC: 45 U/L (ref 0–50)
ANION GAP SERPL CALCULATED.3IONS-SCNC: 6 MMOL/L (ref 3–14)
AST SERPL W P-5'-P-CCNC: 26 U/L (ref 0–45)
BILIRUB SERPL-MCNC: 0.6 MG/DL (ref 0.2–1.3)
BUN SERPL-MCNC: 14 MG/DL (ref 7–30)
CALCIUM SERPL-MCNC: 8.3 MG/DL (ref 8.5–10.1)
CHLORIDE SERPL-SCNC: 111 MMOL/L (ref 94–109)
CHOLEST SERPL-MCNC: 224 MG/DL
CO2 SERPL-SCNC: 25 MMOL/L (ref 20–32)
CREAT SERPL-MCNC: 0.83 MG/DL (ref 0.52–1.04)
GFR SERPL CREATININE-BSD FRML MDRD: 70 ML/MIN/{1.73_M2}
GLUCOSE SERPL-MCNC: 93 MG/DL (ref 70–99)
HDLC SERPL-MCNC: 41 MG/DL
LDLC SERPL CALC-MCNC: 145 MG/DL
NONHDLC SERPL-MCNC: 183 MG/DL
POTASSIUM SERPL-SCNC: 3.9 MMOL/L (ref 3.4–5.3)
PROT SERPL-MCNC: 6.9 G/DL (ref 6.8–8.8)
SODIUM SERPL-SCNC: 142 MMOL/L (ref 133–144)
T4 FREE SERPL-MCNC: 0.87 NG/DL (ref 0.76–1.46)
TRIGL SERPL-MCNC: 192 MG/DL
TSH SERPL DL<=0.005 MIU/L-ACNC: 10.11 MU/L (ref 0.4–4)

## 2019-10-30 PROCEDURE — 99214 OFFICE O/P EST MOD 30 MIN: CPT | Performed by: NURSE PRACTITIONER

## 2019-10-30 PROCEDURE — G0463 HOSPITAL OUTPT CLINIC VISIT: HCPCS

## 2019-10-30 PROCEDURE — 84439 ASSAY OF FREE THYROXINE: CPT | Mod: ZL | Performed by: NURSE PRACTITIONER

## 2019-10-30 PROCEDURE — 80053 COMPREHEN METABOLIC PANEL: CPT | Mod: ZL | Performed by: NURSE PRACTITIONER

## 2019-10-30 PROCEDURE — 80061 LIPID PANEL: CPT | Mod: ZL | Performed by: NURSE PRACTITIONER

## 2019-10-30 PROCEDURE — 84443 ASSAY THYROID STIM HORMONE: CPT | Mod: ZL | Performed by: NURSE PRACTITIONER

## 2019-10-30 PROCEDURE — 36415 COLL VENOUS BLD VENIPUNCTURE: CPT | Mod: ZL | Performed by: NURSE PRACTITIONER

## 2019-10-30 ASSESSMENT — ANXIETY QUESTIONNAIRES
4. TROUBLE RELAXING: NOT AT ALL
3. WORRYING TOO MUCH ABOUT DIFFERENT THINGS: SEVERAL DAYS
IF YOU CHECKED OFF ANY PROBLEMS ON THIS QUESTIONNAIRE, HOW DIFFICULT HAVE THESE PROBLEMS MADE IT FOR YOU TO DO YOUR WORK, TAKE CARE OF THINGS AT HOME, OR GET ALONG WITH OTHER PEOPLE: NOT DIFFICULT AT ALL
2. NOT BEING ABLE TO STOP OR CONTROL WORRYING: SEVERAL DAYS
6. BECOMING EASILY ANNOYED OR IRRITABLE: NOT AT ALL
5. BEING SO RESTLESS THAT IT IS HARD TO SIT STILL: NOT AT ALL
GAD7 TOTAL SCORE: 4
1. FEELING NERVOUS, ANXIOUS, OR ON EDGE: SEVERAL DAYS
7. FEELING AFRAID AS IF SOMETHING AWFUL MIGHT HAPPEN: SEVERAL DAYS

## 2019-10-30 ASSESSMENT — PATIENT HEALTH QUESTIONNAIRE - PHQ9: SUM OF ALL RESPONSES TO PHQ QUESTIONS 1-9: 1

## 2019-10-30 ASSESSMENT — PAIN SCALES - GENERAL: PAINLEVEL: NO PAIN (0)

## 2019-10-30 ASSESSMENT — MIFFLIN-ST. JEOR: SCORE: 1333.69

## 2019-10-30 NOTE — PATIENT INSTRUCTIONS
"        Assessment & Plan     1. Mixed hyperlipidemia  Will recheck lipids, if elevated, will restart pravachol.    - Lipid Profile    2. Hypothyroidism due to acquired atrophy of thyroid  - TSH with free T4 reflex    3. GERD  Continue current plan    4. Paroxysmal atrial fibrillation (H)  Continue eliquis    5. Benign essential hypertension  - Comprehensive metabolic panel    6. CKD (chronic kidney disease) stage 2, GFR 60-89 ml/min  Do not use NSAIDS - ibuprofen or naproxen.         BMI:   Estimated body mass index is 35.3 kg/m  as calculated from the following:    Height as of this encounter: 1.575 m (5' 2\").    Weight as of this encounter: 87.5 kg (193 lb).   Weight management plan: Discussed healthy diet and exercise guidelines    Declined flu vaccine today.     Return in about 6 months (around 4/30/2020) for hypertension and lipids, thyroid.    Randi Haddad NP  [facility]      "

## 2019-10-30 NOTE — NURSING NOTE
"Chief Complaint   Patient presents with     Hypertension       Initial /68 (BP Location: Left arm, Patient Position: Sitting, Cuff Size: Adult Regular)   Pulse 55   Temp 97.1  F (36.2  C) (Tympanic)   Resp 18   Ht 1.575 m (5' 2\")   Wt 87.5 kg (193 lb)   SpO2 97%   BMI 35.30 kg/m   Estimated body mass index is 35.3 kg/m  as calculated from the following:    Height as of this encounter: 1.575 m (5' 2\").    Weight as of this encounter: 87.5 kg (193 lb).  Medication Reconciliation: complete  Garima Fink MA  "

## 2019-10-31 DIAGNOSIS — M79.10 MYALGIA: ICD-10-CM

## 2019-10-31 RX ORDER — LEVOTHYROXINE SODIUM 50 UG/1
50 TABLET ORAL DAILY
Qty: 90 TABLET | Refills: 0 | Status: SHIPPED | OUTPATIENT
Start: 2019-10-31 | End: 2019-12-05

## 2019-10-31 RX ORDER — GABAPENTIN 300 MG/1
CAPSULE ORAL
Qty: 270 CAPSULE | Refills: 1 | Status: SHIPPED | OUTPATIENT
Start: 2019-10-31 | End: 2020-01-03

## 2019-10-31 ASSESSMENT — ANXIETY QUESTIONNAIRES: GAD7 TOTAL SCORE: 4

## 2019-10-31 NOTE — TELEPHONE ENCOUNTER
gabapentin (NEURONTIN) 300 MG capsule      Last Written Prescription Date:  9-5-19  Last Fill Quantity: 270,   # refills: 1  Last Office Visit: 10-30-19  Future Office visit:    Next 5 appointments (look out 90 days)    Nov 13, 2019  9:00 AM CST  (Arrive by 8:45 AM)  [facility] [address]  [facility] [address]  [phone]   Jan 20, 2020 10:30 AM CST  (Arrive by 10:15 AM)  Return Visit with Rosa Venegas MD  [facility] [address]  [facility] [address]  [phone]           Routing refill request to provider for review/approval because:  Drug not on the G, P or [facility] refill protocol or controlled substance

## 2019-11-13 ENCOUNTER — OFFICE VISIT (OUTPATIENT)
Dept: CARDIOLOGY | Facility: OTHER | Age: 74
End: 2019-11-13
Attending: INTERNAL MEDICINE
Payer: COMMERCIAL

## 2019-11-13 VITALS
BODY MASS INDEX: 35.7 KG/M2 | WEIGHT: 194 LBS | SYSTOLIC BLOOD PRESSURE: 129 MMHG | OXYGEN SATURATION: 97 % | HEART RATE: 50 BPM | HEIGHT: 62 IN | DIASTOLIC BLOOD PRESSURE: 74 MMHG | TEMPERATURE: 96.9 F

## 2019-11-13 DIAGNOSIS — K21.9 GASTROESOPHAGEAL REFLUX DISEASE WITHOUT ESOPHAGITIS: ICD-10-CM

## 2019-11-13 DIAGNOSIS — I10 BENIGN ESSENTIAL HYPERTENSION: ICD-10-CM

## 2019-11-13 DIAGNOSIS — G47.33 OSA (OBSTRUCTIVE SLEEP APNEA): Primary | ICD-10-CM

## 2019-11-13 DIAGNOSIS — R60.0 LOWER EXTREMITY EDEMA: ICD-10-CM

## 2019-11-13 DIAGNOSIS — I48.0 PAROXYSMAL ATRIAL FIBRILLATION (H): ICD-10-CM

## 2019-11-13 DIAGNOSIS — N18.2 CKD (CHRONIC KIDNEY DISEASE) STAGE 2, GFR 60-89 ML/MIN: ICD-10-CM

## 2019-11-13 DIAGNOSIS — I50.9 CONGESTIVE HEART FAILURE, UNSPECIFIED HF CHRONICITY, UNSPECIFIED HEART FAILURE TYPE (H): ICD-10-CM

## 2019-11-13 DIAGNOSIS — E66.01 MORBID OBESITY (H): ICD-10-CM

## 2019-11-13 DIAGNOSIS — E78.2 MIXED HYPERLIPIDEMIA: ICD-10-CM

## 2019-11-13 DIAGNOSIS — R00.2 PALPITATIONS: ICD-10-CM

## 2019-11-13 PROCEDURE — 99214 OFFICE O/P EST MOD 30 MIN: CPT | Performed by: INTERNAL MEDICINE

## 2019-11-13 PROCEDURE — G0463 HOSPITAL OUTPT CLINIC VISIT: HCPCS

## 2019-11-13 RX ORDER — AMLODIPINE BESYLATE 5 MG/1
5 TABLET ORAL DAILY
Qty: 90 TABLET | Refills: 3 | Status: SHIPPED | OUTPATIENT
Start: 2019-11-13 | End: 2020-08-28

## 2019-11-13 RX ORDER — LOSARTAN POTASSIUM 100 MG/1
100 TABLET ORAL DAILY
Qty: 90 TABLET | Refills: 3 | Status: SHIPPED | OUTPATIENT
Start: 2019-11-13 | End: 2020-08-17

## 2019-11-13 RX ORDER — METOPROLOL SUCCINATE 50 MG/1
50 TABLET, EXTENDED RELEASE ORAL DAILY
Qty: 90 TABLET | Refills: 3 | Status: SHIPPED | OUTPATIENT
Start: 2019-11-13 | End: 2020-08-18

## 2019-11-13 ASSESSMENT — PAIN SCALES - GENERAL: PAINLEVEL: NO PAIN (0)

## 2019-11-13 ASSESSMENT — MIFFLIN-ST. JEOR: SCORE: 1338.23

## 2019-11-13 NOTE — PROGRESS NOTES
Cardiology Progress Note     Assessment & Plan   Nella Lemon is a 74 year old female who is being seen in follow-up to visit from 5/8/19.     She had been seen secondary to paroxysmal atrial fibrillation, hypertension, and atypical chest pain. She continues on Eliquis and metoprolol 50 mg XL daily.  Previously, she was on aspirin 325 mgs as well as Coumadin. Since initiating Eliquis and metoprolol, her palpitations have improved substantially.  Since last being seen, she has had x1 of brief palpitations.  She has had no episodes during the day.    She remains on losartan 100 mg daily, Norvasc 5 mg daily, and metoprolol 50 mg XL daily for hypertension. Her blood pressure is elevated.  She does check her blood pressure at home but is uncertain if her cuff is functioning.    11/13/2019:  Since last being seen, she had one episode of palpitations. Otherwise, she has been free of palpitations with a history of atrial fibrillation. She has not had any problems with bleeding on Eliquis 5 mg daily. She has done well on metoprolol 50 mg XL daily.  She was recently told that she snores at night with potential apneic episodes.  She is due to have her cardiac meds refilled.  She has been having lower extremity edema which could be secondary to amlodipine versus heart failure.  An echo was recommended which she was agreeable to.        Impression:  1.  Paroxysmal atrial fibrillation.  2.  Hypertension-controlled.  3.  Hyperlipidemia-uncontrolled on 10/30/19.  4.  Palpitations.  5.  Atypical chest pain.  6.  CKD 2.  7.  GERD.  8.  Lower extremity edema.  9.  Concern for ZACHARIAH with referral for sleep study 11/13/2019.  10.  CHADSVASC score of 3.     Plan:   1.  She will continue metoprolol 50 mg XL daily with a heart rate in the 50's and Eliquis 5 mg twice a day.  2.  We will refill her cardiac meds which include amlodipine 5 mg daily, Eliquis 5 mg twice a day, losartan 100 mg daily, and metoprolol 50 mg XL daily.  3.   "She will be referred for consideration of a sleep study.  4.  With lower extremity edema, she will have an echocardiogram.  She does not have a history of heart failure.  5.  She will be seen in 6 months follow-up or sooner with problems.      Chris Walsh    Interval History   Nella is a 72-year-old female who has been followed by cardiology secondary to atrial fibrillation and hypertension.     She was having some non-specific complaints which involved a \"throbbing\" to her chest. She said it was not a tightness, pressure, or bandlike symptoms.  This \"throbbing\" around her palpitations and has completely resolved.    Her symptoms were somewhat hard to decipher.  She described her symptoms as a \"throbbing\". She also had some discomfort to her neck.  She was having an unusual sound in her ear.  Her chest symptoms were accelerated when sitting on the couch and particularly leaning against the seem of the cushion.  Her symptoms were better when she was active.     She previously had a Zio patch on 2/1/18 that showed an atrial fib burden of 31% with the longest duration being 1 day and 20 hours. Her events corresponded to atrial fibrillation.  She is currently on Eliquis 5 mg twice a day. She has a CHADSVASC score of 3.     With being on Eliquis and metoprolol 25 mg daily, her symptoms have completely resolved.  She is feeling much better.  She no longer has the symptoms while sitting on her couch.  Her chest discomfort is gone.          Physical Exam   Temp: 96.9  F (36.1  C) Temp src: Tympanic BP: 129/74 Pulse: 50     SpO2: 97 %      Vitals:    11/13/19 0848   Weight: 88 kg (194 lb)     Vital Signs with Ranges  Temp:  [96.9  F (36.1  C)] 96.9  F (36.1  C)  Pulse:  [50] 50  BP: (129-148)/(73-74) 129/74  SpO2:  [97 %] 97 %  ROS is negative except that which was noted in the HPI.    Peripheral IV 08/27/19 Left Wrist (Active)   Site Assessment WDL 8/27/2019 12:30 PM   Line Status Infusing 8/27/2019 12:30 PM "   Phlebitis Scale 0-->no symptoms 8/27/2019 12:30 PM   Infiltration Scale 0 8/27/2019 12:30 PM   Infiltration Site Treatment Method  None 8/27/2019 12:30 PM   Extravasation? No 8/27/2019 12:30 PM   Dressing Intervention New dressing  8/27/2019  7:50 AM   Number of days: 78       Incision/Surgical Site 08/27/19 Neck (Active)   Incision Assessment UTV 8/27/2019  2:09 PM   Closure Sutures;Adhesive strip(s) 8/27/2019 10:33 AM   Dressing Intervention Clean, dry, intact 8/27/2019  2:09 PM   Number of days: 78       Constitutional: awake, alert, cooperative, no apparent distress, and appears stated age  Eyes: Lids and lashes normal,sclera clear, conjunctiva normal  ENT: Normocephalic, without obvious abnormality, atraumatic.  Respiratory: No increased work of breathing, good air exchange, clear to auscultation bilaterally, no crackles or wheezing  Cardiovascular: Normal apical impulse, regular rate and rhythm, normal S1 and S2, no S3 or S4, and no murmur noted  GI: No scars, normal bowel sounds, soft.  Musculoskeletal: no lower extremity pitting edema present  Neurologic: Awake, alert, oriented to name, place and time.   Neuropsychiatric: General: normal, calm and normal eye contact    Medications         Data   No results found for this or any previous visit (from the past 24 hour(s)).  No results found for this or any previous visit (from the past 24 hour(s)).

## 2019-11-13 NOTE — NURSING NOTE
"Chief Complaint   Patient presents with     RECHECK     Six month Recheck       Initial /74   Pulse 50   Temp 96.9  F (36.1  C) (Tympanic)   Ht 1.575 m (5' 2\")   Wt 88 kg (194 lb)   SpO2 97%   BMI 35.48 kg/m   Estimated body mass index is 35.48 kg/m  as calculated from the following:    Height as of this encounter: 1.575 m (5' 2\").    Weight as of this encounter: 88 kg (194 lb).  Medication Reconciliation: complete  Misty Pineda LPN  "

## 2019-11-13 NOTE — PATIENT INSTRUCTIONS
You were seen by Dr. aWlsh, 11/13/2019.     1.  Please continue all medication as prescribed.      2.  If you develop new or worsening symptoms please call the cardiology office as you may need to be seen sooner.     3.  You will be referred for evaluation of sleep apnea. You will be contacted for an appointment.     4.  Your medications will be refilled to your pharmacy.     5. You will have an echocardiogram performed.  This is an ultrasound of the heart, that evaluates heart function.  The hospital scheduling department will call to schedule you for this test.       You will follow up with Dr. Walsh in 6 months.       Please call the cardiology office with problems, questions, or concerns at [phone].    If you experience chest pain, chest pressure, chest tightness, shortness of breath, fainting, lightheadedness, nausea, vomiting, or other concerning symptoms, please report to the Emergency Department or call 911. These symptoms may be emergent, and best treated in the Emergency Department.       Keke REED RN-BSN  Cardiology   [facility]  [phone]

## 2019-11-20 ENCOUNTER — HOSPITAL ENCOUNTER (OUTPATIENT)
Dept: CARDIOLOGY | Facility: HOSPITAL | Age: 74
Discharge: HOME | End: 2019-11-20
Attending: INTERNAL MEDICINE | Admitting: INTERNAL MEDICINE
Payer: COMMERCIAL

## 2019-11-20 DIAGNOSIS — R60.0 LOWER EXTREMITY EDEMA: ICD-10-CM

## 2019-11-20 DIAGNOSIS — I50.9 CONGESTIVE HEART FAILURE, UNSPECIFIED HF CHRONICITY, UNSPECIFIED HEART FAILURE TYPE (H): ICD-10-CM

## 2019-11-20 PROCEDURE — 93306 TTE W/DOPPLER COMPLETE: CPT | Mod: TC

## 2019-11-20 PROCEDURE — 93306 TTE W/DOPPLER COMPLETE: CPT | Mod: 26 | Performed by: INTERNAL MEDICINE

## 2019-11-26 ENCOUNTER — TELEPHONE (OUTPATIENT)
Dept: FAMILY MEDICINE | Facility: OTHER | Age: 74
End: 2019-11-26

## 2019-11-26 DIAGNOSIS — R60.0 LOWER EXTREMITY EDEMA: Primary | ICD-10-CM

## 2019-11-26 RX ORDER — FUROSEMIDE 20 MG
20 TABLET ORAL DAILY
Qty: 30 TABLET | Refills: 1 | Status: SHIPPED | OUTPATIENT
Start: 2019-11-26 | End: 2020-05-13

## 2019-11-26 RX ORDER — POTASSIUM CHLORIDE 1500 MG/1
20 TABLET, EXTENDED RELEASE ORAL DAILY
Qty: 30 TABLET | Refills: 1 | Status: SHIPPED | OUTPATIENT
Start: 2019-11-26 | End: 2020-11-02

## 2019-11-26 NOTE — TELEPHONE ENCOUNTER
Lower leg edema still and feels bloated and was wondering if this could be form her medication. Is elevating her legs now  Pamela M Lechevalier LPN

## 2019-11-26 NOTE — TELEPHONE ENCOUNTER
Start lasix 20mg once daily along with 20 Meq potassium once daily - follow-up next week. Please call to schedule appt.    Low sodium diet, walking as above, elevate lower extremities when sitting.  Does she have compression stockings?    ECHO current and EF 60-65%

## 2019-11-29 DIAGNOSIS — I48.0 PAROXYSMAL ATRIAL FIBRILLATION (H): ICD-10-CM

## 2019-11-29 NOTE — PROGRESS NOTES
Subjective     Nella Lemon is a 74 year old female who presents to clinic today for the following health issues:    HPI   Concern - lower leg edema   Onset: 10-12 days    Description:   Lower leg edema    Intensity: moderate    Progression of Symptoms:  improving    Accompanying Signs & Symptoms:  none    Previous history of similar problem:   Yes    Precipitating factors:   Worsened by: none    Alleviating factors:  Improved by: medication - started lasix daily.      Therapies Tried and outcome: diuretic relief noted    Last echo was done 11/20/2019 - normal EF of 60-65% and no valvular abnormality noted. Has been watching sodium intake, does not have compression stockings.      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, left     Family history of colon cancer     ACP (advance care planning)     Mixed hyperlipidemia     Hypothyroidism due to acquired atrophy of thyroid     Paroxysmal atrial fibrillation (H)     Atypical chest pain     CKD (chronic kidney disease) stage 2, GFR 60-89 ml/min     Leukocytosis     Obesity (BMI 35.0-39.9) with comorbidity (H)     ZACHARIAH (obstructive sleep apnea)     Lower extremity edema     Past Surgical History:   Procedure Laterality Date     ADENOIDECTOMY       BIOPSY      FNA left thyroid     CHOLECYSTECTOMY  1981     COLONOSCOPY  2002     COLONOSCOPY  2012     COLONOSCOPY N/A 9/22/2014    Procedure: COLONOSCOPY;  Surgeon: Lavell Pavon DO;  Location: [facility]     CYSTOSCOPY  2007    Cystitis chronic     EYE SURGERY      right cataract     ORTHOPEDIC SURGERY  2002    carpal tunnel; RT, LT     THYROIDECTOMY Left 8/27/2019     Procedure: LEFT THYROID LOBECTOMY AND ISTHMUS WITH FROZENS;  Surgeon: Mildred Pineda MD;  Location: [facility]     TONSILLECTOMY         Social History     Tobacco Use     Smoking status: Never Smoker     Smokeless tobacco: Never Used   Substance Use Topics     Alcohol use: Never     Frequency: Never     Family History   Problem Relation Age of Onset     Breast Cancer Mother      Alcohol/Drug Mother         Cirrhosis     Other - See Comments Mother         goiter     Cerebrovascular Disease Father      Circulatory Father      Alcohol/Drug Son      Cancer - colorectal Brother      Unknown/Adopted No family hx of      Asthma No family hx of      C.A.D. No family hx of      Diabetes No family hx of      Hypertension No family hx of      Prostate Cancer No family hx of      Allergies No family hx of      Alzheimer Disease No family hx of      Anesthesia Reaction No family hx of      Arthritis No family hx of      Blood Disease No family hx of      Cardiovascular No family hx of      Congenital Anomalies No family hx of      Connective Tissue Disorder No family hx of      Depression No family hx of      Endocrine Disease No family hx of      Eye Disorder No family hx of      Genetic Disorder No family hx of      Gastrointestinal Disease No family hx of      Genitourinary Problems No family hx of      Gynecology No family hx of      Heart Disease No family hx of      Lipids No family hx of      Musculoskeletal Disorder No family hx of      Neurologic Disorder No family hx of      Obesity No family hx of      Osteoporosis No family hx of      Psychotic Disorder No family hx of      Respiratory No family hx of      Thyroid Disease No family hx of      Family History Negative No family hx of      Hearing Loss No family hx of          Current Outpatient Medications   Medication Sig Dispense Refill     amLODIPine (NORVASC) 5 MG tablet Take 1 tablet (5 mg) by mouth daily 90 tablet 3     apixaban ANTICOAGULANT (ELIQUIS  ANTICOAGULANT) 5 MG tablet TAKE 1 TABLET (5 MG) BY MOUTH 2 TIMES DAILY 180 tablet 3     ELIQUIS ANTICOAGULANT 5 MG tablet TAKE 1 TABLET (5 MG) BY MOUTH 2 TIMES DAILY 60 tablet 9     furosemide (LASIX) 20 MG tablet Take 1 tablet (20 mg) by mouth daily 30 tablet 1     gabapentin (NEURONTIN) 300 MG capsule TAKE 3 CAPSULES THREE TIMES DAILY 270 capsule 1     GLUCOSAMINE SULFATE PO Take 1,000 mg by mouth 2 times daily       levothyroxine (SYNTHROID/LEVOTHROID) 50 MCG tablet Take 1 tablet (50 mcg) by mouth daily 90 tablet 0     losartan (COZAAR) 100 MG tablet Take 1 tablet (100 mg) by mouth daily 90 tablet 3     metoprolol succinate ER (TOPROL-XL) 50 MG 24 hr tablet Take 1 tablet (50 mg) by mouth daily 90 tablet 3     Omega-3 Fatty Acids (OMEGA-3 FISH OIL PO) Take 3 g by mouth daily        omeprazole (PRILOSEC) 40 MG DR capsule TAKE 1 CAPSULE (40 MG) BY MOUTH DAILY TAKE 30-60 MINUTES BEFORE A MEAL. 90 capsule 1     potassium chloride ER (K-TAB) 20 MEQ CR tablet Take 1 tablet (20 mEq) by mouth daily 30 tablet 1     sertraline (ZOLOFT) 50 MG tablet TAKE 1 TABLET DAILY 30 tablet 3     STATIN NOT PRESCRIBED (INTENTIONAL) Please choose reason not prescribed, below       VITAMIN D, CHOLECALCIFEROL, PO Take by mouth daily       Allergies   Allergen Reactions     Atorvastatin      Ezetimibe      Gentamicin      Hydroxyzine      Lorazepam      Ranitidine      Simvastatin      Lopid [Gemfibrozil] GI Disturbance     Bloating, constipation,      Pravastatin Muscle Pain (Myalgia)     Recent Labs   Lab Test 10/30/19  1026 09/20/19  0855 08/12/19  1120  01/23/19  0952 07/23/18  1013   *  --   --   --  56 53   HDL 41*  --   --   --  47* 49*   TRIG 192*  --   --   --  112 142   ALT 45 40 41   < > 26 31   CR 0.83 0.84 0.85   < > 0.87 0.87   GFRESTIMATED 70 69 68   < > 66 64   GFRESTBLACK 81 80 79   < > 77 77   POTASSIUM 3.9 4.3 3.9   < > 4.1 3.9   TSH 10.11* 9.61*  --    < > 4.41* 3.63    < > = values in this interval not displayed.  "     BP Readings from Last 3 Encounters:   12/05/19 118/70   11/13/19 129/74   10/30/19 134/68    Wt Readings from Last 3 Encounters:   12/05/19 87.5 kg (193 lb)   11/13/19 88 kg (194 lb)   10/30/19 87.5 kg (193 lb)            Reviewed and updated as needed this visit by Provider         Review of Systems   ROS COMP: Constitutional, HEENT, cardiovascular, pulmonary, gi and gu systems are negative, except as otherwise noted.    Of note, stopped vesicare as she was not feeling well, feels better now.       Objective    /70 (BP Location: Left arm, Patient Position: Sitting, Cuff Size: Adult Regular)   Pulse 51   Temp 96  F (35.6  C) (Tympanic)   Resp 14   Ht 1.575 m (5' 2\")   Wt 87.5 kg (193 lb)   SpO2 97%   BMI 35.30 kg/m    Body mass index is 35.3 kg/m .  Physical Exam   GENERAL: healthy, alert and no distress  NECK: no adenopathy, no asymmetry, masses, or scars, thyroid normal to palpation and no carotid bruits  RESP: lungs clear to auscultation - no rales, rhonchi or wheezes  CV: regular rate and rhythm, normal S1 S2, no S3 or S4, no murmur, click or rub, no peripheral edema and peripheral pulses strong  MS: no gross musculoskeletal defects noted, no edema  PSYCH: mentation appears normal, affect normal/bright    Results for orders placed or performed in visit on 12/05/19   TSH with free T4 reflex     Status: Abnormal   Result Value Ref Range    TSH 15.93 (H) 0.40 - 4.00 mU/L   Basic metabolic panel     Status: Abnormal   Result Value Ref Range    Sodium 143 133 - 144 mmol/L    Potassium 4.1 3.4 - 5.3 mmol/L    Chloride 109 94 - 109 mmol/L    Carbon Dioxide 27 20 - 32 mmol/L    Anion Gap 7 3 - 14 mmol/L    Glucose 99 70 - 99 mg/dL    Urea Nitrogen 17 7 - 30 mg/dL    Creatinine 1.04 0.52 - 1.04 mg/dL    GFR Estimate 53 (L) >60 mL/min/[1.73_m2]    GFR Estimate If Black 62 >60 mL/min/[1.73_m2]    Calcium 9.2 8.5 - 10.1 mg/dL   BNP-N terminal pro     Status: None   Result Value Ref Range    N-Terminal Pro " "Bnp 41 0 - 125 pg/mL   T4 free     Status: None   Result Value Ref Range    T4 Free 0.89 0.76 - 1.46 ng/dL             Assessment & Plan     1. Chronic heart failure with preserved ejection fraction (H)  Continue lasix 20mg daily along with potassium  Continue daily weight - if increases by 2 pounds overnight or 5 pounds in a week call for lasix dose adjustment  Continue less than 2000mg sodium daily  Elevate feet when sitting  Script for compression stockings is provided  - Basic metabolic panel  - BNP-N terminal pro    2. Hypothyroidism due to acquired atrophy of thyroid  Weight gain, will see if TSH is returning to normal  - TSH with free T4 reflex    3. Urinary urgency  Stop vesicare for now,        BMI:   Estimated body mass index is 35.3 kg/m  as calculated from the following:    Height as of this encounter: 1.575 m (5' 2\").    Weight as of this encounter: 87.5 kg (193 lb).   Weight management plan: Discussed healthy diet and exercise guidelines        Return in about 3 months (around 3/5/2020) for hypertension and lipids, thyroid.    Randi Haddad NP  [facility]      "

## 2019-12-02 RX ORDER — APIXABAN 5 MG/1
TABLET, FILM COATED ORAL
Qty: 60 TABLET | Refills: 9 | Status: SHIPPED | OUTPATIENT
Start: 2019-12-02 | End: 2020-03-16

## 2019-12-05 ENCOUNTER — OFFICE VISIT (OUTPATIENT)
Dept: FAMILY MEDICINE | Facility: OTHER | Age: 74
End: 2019-12-05
Attending: NURSE PRACTITIONER
Payer: COMMERCIAL

## 2019-12-05 VITALS
SYSTOLIC BLOOD PRESSURE: 118 MMHG | HEIGHT: 62 IN | WEIGHT: 193 LBS | RESPIRATION RATE: 14 BRPM | HEART RATE: 51 BPM | DIASTOLIC BLOOD PRESSURE: 70 MMHG | OXYGEN SATURATION: 97 % | BODY MASS INDEX: 35.51 KG/M2 | TEMPERATURE: 96 F

## 2019-12-05 DIAGNOSIS — E03.4 HYPOTHYROIDISM DUE TO ACQUIRED ATROPHY OF THYROID: ICD-10-CM

## 2019-12-05 DIAGNOSIS — I50.32 CHRONIC HEART FAILURE WITH PRESERVED EJECTION FRACTION (H): Primary | ICD-10-CM

## 2019-12-05 DIAGNOSIS — R39.15 URINARY URGENCY: ICD-10-CM

## 2019-12-05 LAB
ANION GAP SERPL CALCULATED.3IONS-SCNC: 7 MMOL/L (ref 3–14)
BUN SERPL-MCNC: 17 MG/DL (ref 7–30)
CALCIUM SERPL-MCNC: 9.2 MG/DL (ref 8.5–10.1)
CHLORIDE SERPL-SCNC: 109 MMOL/L (ref 94–109)
CO2 SERPL-SCNC: 27 MMOL/L (ref 20–32)
CREAT SERPL-MCNC: 1.04 MG/DL (ref 0.52–1.04)
GFR SERPL CREATININE-BSD FRML MDRD: 53 ML/MIN/{1.73_M2}
GLUCOSE SERPL-MCNC: 99 MG/DL (ref 70–99)
NT-PROBNP SERPL-MCNC: 41 PG/ML (ref 0–125)
POTASSIUM SERPL-SCNC: 4.1 MMOL/L (ref 3.4–5.3)
SODIUM SERPL-SCNC: 143 MMOL/L (ref 133–144)
T4 FREE SERPL-MCNC: 0.89 NG/DL (ref 0.76–1.46)
TSH SERPL DL<=0.005 MIU/L-ACNC: 15.93 MU/L (ref 0.4–4)

## 2019-12-05 PROCEDURE — 80048 BASIC METABOLIC PNL TOTAL CA: CPT | Mod: ZL | Performed by: NURSE PRACTITIONER

## 2019-12-05 PROCEDURE — 36415 COLL VENOUS BLD VENIPUNCTURE: CPT | Mod: ZL | Performed by: NURSE PRACTITIONER

## 2019-12-05 PROCEDURE — 99214 OFFICE O/P EST MOD 30 MIN: CPT | Performed by: NURSE PRACTITIONER

## 2019-12-05 PROCEDURE — 84443 ASSAY THYROID STIM HORMONE: CPT | Mod: ZL | Performed by: NURSE PRACTITIONER

## 2019-12-05 PROCEDURE — 83880 ASSAY OF NATRIURETIC PEPTIDE: CPT | Mod: ZL | Performed by: NURSE PRACTITIONER

## 2019-12-05 PROCEDURE — G0463 HOSPITAL OUTPT CLINIC VISIT: HCPCS

## 2019-12-05 PROCEDURE — 84439 ASSAY OF FREE THYROXINE: CPT | Mod: ZL | Performed by: NURSE PRACTITIONER

## 2019-12-05 RX ORDER — LEVOTHYROXINE SODIUM 100 UG/1
100 TABLET ORAL DAILY
Qty: 90 TABLET | Refills: 1 | Status: SHIPPED | OUTPATIENT
Start: 2019-12-05 | End: 2020-06-03

## 2019-12-05 ASSESSMENT — MIFFLIN-ST. JEOR: SCORE: 1333.69

## 2019-12-05 ASSESSMENT — PAIN SCALES - GENERAL: PAINLEVEL: NO PAIN (0)

## 2019-12-05 NOTE — PATIENT INSTRUCTIONS
"        Assessment & Plan     1. Chronic heart failure with preserved ejection fraction (H)  Continue lasix 20mg daily along with potassium  Continue daily weight - if increases by 2 pounds overnight or 5 pounds in a week call for lasix dose adjustment  Continue less than 2000mg sodium daily  Elevate feet when sitting  Script for compression stockings is provided  - Basic metabolic panel  - BNP-N terminal pro    2. Hypothyroidism due to acquired atrophy of thyroid  Weight gain, will see if TSH is returning to normal  - TSH with free T4 reflex    3. Urinary urgency  Stop vesicare for now,        BMI:   Estimated body mass index is 35.3 kg/m  as calculated from the following:    Height as of this encounter: 1.575 m (5' 2\").    Weight as of this encounter: 87.5 kg (193 lb).   Weight management plan: Discussed healthy diet and exercise guidelines        Return in about 3 months (around 3/5/2020) for hypertension and lipids, thyroid.    Randi Haddad NP  [facility]      "

## 2019-12-05 NOTE — NURSING NOTE
"Chief Complaint   Patient presents with     Leg Swelling       Initial /70 (BP Location: Left arm, Patient Position: Sitting, Cuff Size: Adult Regular)   Pulse 51   Temp 96  F (35.6  C) (Tympanic)   Resp 14   Ht 1.575 m (5' 2\")   Wt 87.5 kg (193 lb)   SpO2 97%   BMI 35.30 kg/m   Estimated body mass index is 35.3 kg/m  as calculated from the following:    Height as of this encounter: 1.575 m (5' 2\").    Weight as of this encounter: 87.5 kg (193 lb).  Medication Reconciliation: complete  Fiona Orellana LPN    "

## 2019-12-11 ENCOUNTER — TELEPHONE (OUTPATIENT)
Dept: FAMILY MEDICINE | Facility: OTHER | Age: 74
End: 2019-12-11

## 2019-12-11 NOTE — TELEPHONE ENCOUNTER
Patient called stating she just had a BM and there was bright red blood.  States this happened a few years ago and it resolved it's self.   Patient states she is on a blood thinner and she wasn't the last time.  She is wondering if she needs to go in or if its ok to wait to see if it happens again.  Advised to  but she would like to speak to nurse..  Please call her back     [phone]

## 2019-12-11 NOTE — TELEPHONE ENCOUNTER
Left message to call back per pcp wait to see if it happens again if so to call us   Pamela M Lechevalier LPN

## 2019-12-31 DIAGNOSIS — M79.10 MYALGIA: ICD-10-CM

## 2019-12-31 NOTE — TELEPHONE ENCOUNTER
gabapentin (NEURONTIN) 300 MG capsule  Last visit date with prescribing provider: 12-5-2019  Last refill date: 10-  Quantity: 270, Refills: 1    Adina Morris LPN      Next 5 appointments (look out 90 days)    Jan 20, 2020 10:30 AM CST  (Arrive by 10:15 AM)  Return Visit with Rosa Venegas MD  [facility] ([facility] ) [address] [place] [id number]  [phone]   Mar 16, 2020  8:45 AM CDT  (Arrive by 8:30 AM)  SHORT with Randi Haddad NP  [facility] ([facility] ) [address]  [facility] [address]  [phone]

## 2020-01-03 RX ORDER — GABAPENTIN 300 MG/1
CAPSULE ORAL
Qty: 270 CAPSULE | Refills: 1 | Status: SHIPPED | OUTPATIENT
Start: 2020-01-03 | End: 2020-03-10

## 2020-01-10 ENCOUNTER — PATIENT OUTREACH (OUTPATIENT)
Dept: ONCOLOGY | Facility: OTHER | Age: 75
End: 2020-01-10

## 2020-01-14 PROBLEM — F41.1 GAD (GENERALIZED ANXIETY DISORDER): Chronic | Status: ACTIVE | Noted: 2020-01-14

## 2020-01-14 PROBLEM — D72.829 LEUKOCYTOSIS: Chronic | Status: ACTIVE | Noted: 2019-05-31

## 2020-01-14 PROBLEM — I48.0 PAROXYSMAL ATRIAL FIBRILLATION (H): Chronic | Status: ACTIVE | Noted: 2018-02-19

## 2020-01-14 PROBLEM — E03.4 HYPOTHYROIDISM DUE TO ACQUIRED ATROPHY OF THYROID: Status: ACTIVE | Noted: 2018-02-13

## 2020-01-14 PROBLEM — E66.812 CLASS 2 SEVERE OBESITY DUE TO EXCESS CALORIES WITH SERIOUS COMORBIDITY AND BODY MASS INDEX (BMI) OF 35.0 TO 35.9 IN ADULT (H): Status: ACTIVE | Noted: 2019-06-18

## 2020-01-14 PROBLEM — E03.4 HYPOTHYROIDISM DUE TO ACQUIRED ATROPHY OF THYROID: Chronic | Status: ACTIVE | Noted: 2018-02-13

## 2020-01-14 PROBLEM — E66.812 CLASS 2 SEVERE OBESITY DUE TO EXCESS CALORIES WITH SERIOUS COMORBIDITY AND BODY MASS INDEX (BMI) OF 35.0 TO 35.9 IN ADULT (H): Chronic | Status: ACTIVE | Noted: 2019-06-18

## 2020-01-14 PROBLEM — N18.2 CKD (CHRONIC KIDNEY DISEASE) STAGE 2, GFR 60-89 ML/MIN: Status: ACTIVE | Noted: 2019-05-07

## 2020-01-14 PROBLEM — R60.0 LOWER EXTREMITY EDEMA: Status: ACTIVE | Noted: 2019-11-13

## 2020-01-14 PROBLEM — E66.01 CLASS 2 SEVERE OBESITY DUE TO EXCESS CALORIES WITH SERIOUS COMORBIDITY AND BODY MASS INDEX (BMI) OF 35.0 TO 35.9 IN ADULT (H): Status: ACTIVE | Noted: 2019-06-18

## 2020-01-14 PROBLEM — R07.89 ATYPICAL CHEST PAIN: Status: RESOLVED | Noted: 2018-03-28 | Resolved: 2020-01-14

## 2020-01-14 PROBLEM — C73 PAPILLARY THYROID CARCINOMA (H): Chronic | Status: ACTIVE | Noted: 2020-01-14

## 2020-01-14 PROBLEM — E66.01 CLASS 2 SEVERE OBESITY DUE TO EXCESS CALORIES WITH SERIOUS COMORBIDITY AND BODY MASS INDEX (BMI) OF 35.0 TO 35.9 IN ADULT (H): Chronic | Status: ACTIVE | Noted: 2019-06-18

## 2020-01-14 PROBLEM — D72.829 LEUKOCYTOSIS: Status: ACTIVE | Noted: 2019-05-31

## 2020-01-14 PROBLEM — H90.3 SENSORINEURAL HEARING LOSS (SNHL) OF BOTH EARS: Chronic | Status: ACTIVE | Noted: 2020-01-14

## 2020-01-14 PROBLEM — N18.2 CKD (CHRONIC KIDNEY DISEASE) STAGE 2, GFR 60-89 ML/MIN: Chronic | Status: ACTIVE | Noted: 2019-05-07

## 2020-01-20 ENCOUNTER — ONCOLOGY VISIT (OUTPATIENT)
Dept: ONCOLOGY | Facility: OTHER | Age: 75
End: 2020-01-20
Attending: INTERNAL MEDICINE
Payer: COMMERCIAL

## 2020-01-20 VITALS
BODY MASS INDEX: 35.21 KG/M2 | HEART RATE: 54 BPM | SYSTOLIC BLOOD PRESSURE: 112 MMHG | WEIGHT: 191.36 LBS | DIASTOLIC BLOOD PRESSURE: 64 MMHG | HEIGHT: 62 IN | TEMPERATURE: 97.7 F | OXYGEN SATURATION: 97 %

## 2020-01-20 DIAGNOSIS — D72.829 LEUKOCYTOSIS, UNSPECIFIED TYPE: ICD-10-CM

## 2020-01-20 DIAGNOSIS — C73 PAPILLARY THYROID CARCINOMA (H): Primary | ICD-10-CM

## 2020-01-20 DIAGNOSIS — C73 PAPILLARY THYROID CARCINOMA (H): ICD-10-CM

## 2020-01-20 LAB
ALBUMIN SERPL-MCNC: 3.6 G/DL (ref 3.4–5)
ALP SERPL-CCNC: 129 U/L (ref 40–150)
ALT SERPL W P-5'-P-CCNC: 67 U/L (ref 0–50)
ANION GAP SERPL CALCULATED.3IONS-SCNC: 3 MMOL/L (ref 3–14)
AST SERPL W P-5'-P-CCNC: 31 U/L (ref 0–45)
BASOPHILS # BLD AUTO: 0 10E9/L (ref 0–0.2)
BASOPHILS NFR BLD AUTO: 0.4 %
BILIRUB SERPL-MCNC: 0.5 MG/DL (ref 0.2–1.3)
BUN SERPL-MCNC: 18 MG/DL (ref 7–30)
CALCIUM SERPL-MCNC: 8.6 MG/DL (ref 8.5–10.1)
CHLORIDE SERPL-SCNC: 111 MMOL/L (ref 94–109)
CO2 SERPL-SCNC: 28 MMOL/L (ref 20–32)
CREAT SERPL-MCNC: 0.92 MG/DL (ref 0.52–1.04)
DIFFERENTIAL METHOD BLD: ABNORMAL
EOSINOPHIL # BLD AUTO: 0.1 10E9/L (ref 0–0.7)
EOSINOPHIL NFR BLD AUTO: 1 %
ERYTHROCYTE [DISTWIDTH] IN BLOOD BY AUTOMATED COUNT: 13.3 % (ref 10–15)
GFR SERPL CREATININE-BSD FRML MDRD: 61 ML/MIN/{1.73_M2}
GLUCOSE SERPL-MCNC: 105 MG/DL (ref 70–99)
HCT VFR BLD AUTO: 42.8 % (ref 35–47)
HGB BLD-MCNC: 14.1 G/DL (ref 11.7–15.7)
IMM GRANULOCYTES # BLD: 0 10E9/L (ref 0–0.4)
IMM GRANULOCYTES NFR BLD: 0.4 %
LDH SERPL L TO P-CCNC: 205 U/L (ref 81–234)
LYMPHOCYTES # BLD AUTO: 1.7 10E9/L (ref 0.8–5.3)
LYMPHOCYTES NFR BLD AUTO: 15.3 %
MCH RBC QN AUTO: 28 PG (ref 26.5–33)
MCHC RBC AUTO-ENTMCNC: 32.9 G/DL (ref 31.5–36.5)
MCV RBC AUTO: 85 FL (ref 78–100)
MONOCYTES # BLD AUTO: 0.6 10E9/L (ref 0–1.3)
MONOCYTES NFR BLD AUTO: 5.2 %
NEUTROPHILS # BLD AUTO: 8.7 10E9/L (ref 1.6–8.3)
NEUTROPHILS NFR BLD AUTO: 77.7 %
NRBC # BLD AUTO: 0 10*3/UL
NRBC BLD AUTO-RTO: 0 /100
PLATELET # BLD AUTO: 307 10E9/L (ref 150–450)
POTASSIUM SERPL-SCNC: 4 MMOL/L (ref 3.4–5.3)
PROT SERPL-MCNC: 7.3 G/DL (ref 6.8–8.8)
RBC # BLD AUTO: 5.04 10E12/L (ref 3.8–5.2)
SODIUM SERPL-SCNC: 142 MMOL/L (ref 133–144)
T4 FREE SERPL-MCNC: 1.03 NG/DL (ref 0.76–1.46)
TSH SERPL DL<=0.005 MIU/L-ACNC: 4.51 MU/L (ref 0.4–4)
WBC # BLD AUTO: 11.2 10E9/L (ref 4–11)

## 2020-01-20 PROCEDURE — 84432 ASSAY OF THYROGLOBULIN: CPT | Mod: ZL | Performed by: INTERNAL MEDICINE

## 2020-01-20 PROCEDURE — 85025 COMPLETE CBC W/AUTO DIFF WBC: CPT | Mod: ZL | Performed by: INTERNAL MEDICINE

## 2020-01-20 PROCEDURE — 84443 ASSAY THYROID STIM HORMONE: CPT | Mod: ZL | Performed by: INTERNAL MEDICINE

## 2020-01-20 PROCEDURE — 36415 COLL VENOUS BLD VENIPUNCTURE: CPT | Mod: ZL | Performed by: INTERNAL MEDICINE

## 2020-01-20 PROCEDURE — G0463 HOSPITAL OUTPT CLINIC VISIT: HCPCS

## 2020-01-20 PROCEDURE — 99000 SPECIMEN HANDLING OFFICE-LAB: CPT | Performed by: INTERNAL MEDICINE

## 2020-01-20 PROCEDURE — 99213 OFFICE O/P EST LOW 20 MIN: CPT | Performed by: INTERNAL MEDICINE

## 2020-01-20 PROCEDURE — 84439 ASSAY OF FREE THYROXINE: CPT | Mod: ZL | Performed by: INTERNAL MEDICINE

## 2020-01-20 PROCEDURE — 80053 COMPREHEN METABOLIC PANEL: CPT | Mod: ZL | Performed by: INTERNAL MEDICINE

## 2020-01-20 PROCEDURE — 86800 THYROGLOBULIN ANTIBODY: CPT | Mod: ZL | Performed by: INTERNAL MEDICINE

## 2020-01-20 PROCEDURE — 83615 LACTATE (LD) (LDH) ENZYME: CPT | Mod: ZL | Performed by: INTERNAL MEDICINE

## 2020-01-20 ASSESSMENT — MIFFLIN-ST. JEOR: SCORE: 1326.25

## 2020-01-20 ASSESSMENT — PAIN SCALES - GENERAL: PAINLEVEL: NO PAIN (0)

## 2020-01-20 NOTE — PROGRESS NOTES
Visit Date:   01/20/2020      HEMATOLOGY ONCOLOGY CLINIC NOTE      HISTORY OF PRESENT ILLNESS:  Mrs. Lemon returns for followup of leukocytosis and newly diagnosed papillary thyroid carcinoma.  We had seen the patient in consultation at the request of Randi Haddad, nurse practitioner, 04/15/2019.  At that time, she was a 73-year-old white female with history of hypothyroidism, atrial fibrillation, hypertension, we were asked to evaluate concerning a diagnosis of leukocytosis.  According to patient, she had this chronically for years.  She had been seen by Dr. Presley approximately 5 years prior who noted an elevated white count and treated her empirically with antibiotics.  She said her white count went down and then it would go back up.  Most recently she had a white count evaluated by Randi Haddad, nurse practitioner, who noted the patient on 06/28/2018 had a white count that was elevated.  On 01/23 CBC was drawn.  Her white count was 13.4, hemoglobin 13.8, hematocrit 42.2, platelet count 326.  She ordered peripheral blood smear.  This came back; there was mild neutrophilia, reactive lymphocytes, and mild reticulocytosis.  The patient had a CBC repeated on 01/28.  At that time, her white count had dropped to 12.8 with 73.7% neutrophils consistent with neutrophilia.  Hemoglobin was 14.5, hematocrit 44.8, platelet count 342.  When we saw the patient, we felt she had likely neutrophilia secondary to reactive process, i.e., allergic rhinitis.  Nonetheless, we wanted to rule out other etiologies.  We obtained a BCR-ABL transcript and this came back negative.  We also obtained a sed rate, rheumatoid factor, MALIA, serum protein electrophoresis, all of which were negative.  Lyme titers were negative.  Wendy-Barr virus profile was negative.  CT chest, abdomen and pelvis was negative except that there was a 1 cm heavily calcified nodule within the left lobe of the thyroid.  Thyroid ultrasound was  recommended.  The patient underwent a thyroid ultrasound that revealed a left thyroid nodule; malignancy could not be ruled out.  Subsequently the patient underwent a biopsy which came back as papillary thyroid carcinoma and we referred the patient to Dr. Pineda for further management.  The patient underwent a left hemithyroidectomy performed on 08/27 and the findings were that the patient had papillary thyroid carcinoma that measured 1 x 1 x 1 cm.  There was lymphocytic thyroiditis, severe, widespread.  There was no evidence of angiolymphatic invasion.  There was no extrathyroidal extension.  The patient was staged pathologic T1a NX.  The patient underwent recent cataract surgery.   Otherwise, the plan was for a thyroid ultrasound to be done in August of this year.  The patient otherwise denies any fevers, night sweats, weight loss, shortness of breath.  Overall, she is doing well.      PHYSICAL EXAMINATION:   GENERAL:  She is an elderly white female in no acute distress.   VITAL SIGNS:  Blood pressure 110/64, pulse 64, temp 97.7.   HEENT:  Atraumatic, normocephalic.  Oropharynx nonerythematous.   NECK:  Supple.   LUNGS:  Clear to auscultation and percussion.   HEART:  Regular rhythm, S1, S2 normal.   ABDOMEN:  Soft, normoactive bowel sounds, no masses, nontender.   LYMPHATICS:  No cervical, supraclavicular or axillary nodes.   EXTREMITIES:  No edema.   NEUROLOGIC:  Nonfocal.      LABORATORY DATA:  Laboratories reveal CBC with white count 11.2, H and H 14.1 and 42.8, platelet count is 307.  BUN is 10, creatinine 0.92.  LFTs are normal except for ALT of 67.  LDH is normal.  Thyroglobulin antibody is pending.      IMPRESSION:   1.  Neutrophilia reactive, essentially resolved.   2.  Papillary thyroid carcinoma, pathologic T1a, status post left hemithyroidectomy.        The patient has no evidence of distant disease.  She continues on Synthroid.  The patient is scheduled to have an ultrasound of the thyroid in  August of this year.  We will see the patient afterwards.  Obtain CBC, CMP, LDH, TSH, and T4.      Forty minutes was spent with the patient, greater than half the time was spent in counseling and coordination of care.         JULIO PARSON MD             D: 2020   T: 2020   MT: NTS      Name:     BULL ELI   MRN:      [id number]        Account:      [id number]   :      1945           Visit Date:   2020      Document: [id number]       cc: Randi Pineda DO

## 2020-01-20 NOTE — NURSING NOTE
"Chief Complaint   Patient presents with     RECHECK     leukocytosis; papillary thyroid carcinoma       Initial /64   Pulse 54   Temp 97.7  F (36.5  C) (Tympanic)   Ht 1.575 m (5' 2\")   Wt 86.8 kg (191 lb 5.8 oz)   SpO2 97%   BMI 35.00 kg/m   Estimated body mass index is 35 kg/m  as calculated from the following:    Height as of this encounter: 1.575 m (5' 2\").    Weight as of this encounter: 86.8 kg (191 lb 5.8 oz).  Medication Reconciliation: complete   Immunizations and advance directives status reviewed. Pain scale 0 , PHQ-9 -.          Adina Plasencia LPN  "

## 2020-01-23 ENCOUNTER — TELEPHONE (OUTPATIENT)
Dept: SLEEP MEDICINE | Facility: HOSPITAL | Age: 75
End: 2020-01-23

## 2020-01-31 ENCOUNTER — TELEPHONE (OUTPATIENT)
Dept: FAMILY MEDICINE | Facility: OTHER | Age: 75
End: 2020-01-31

## 2020-01-31 DIAGNOSIS — Z12.11 ENCOUNTER FOR SCREENING COLONOSCOPY: Primary | ICD-10-CM

## 2020-01-31 LAB — LAB SCANNED RESULT: ABNORMAL

## 2020-02-19 DIAGNOSIS — E03.4 HYPOTHYROIDISM DUE TO ACQUIRED ATROPHY OF THYROID: ICD-10-CM

## 2020-02-21 ENCOUNTER — PREP FOR PROCEDURE (OUTPATIENT)
Dept: SURGERY | Facility: OTHER | Age: 75
End: 2020-02-21

## 2020-02-21 ENCOUNTER — TELEPHONE (OUTPATIENT)
Dept: SURGERY | Facility: OTHER | Age: 75
End: 2020-02-21

## 2020-02-21 DIAGNOSIS — Z12.11 SPECIAL SCREENING FOR MALIGNANT NEOPLASMS, COLON: Primary | ICD-10-CM

## 2020-02-21 DIAGNOSIS — Z80.0 FAMILY HISTORY OF COLON CANCER: ICD-10-CM

## 2020-02-21 DIAGNOSIS — Z12.11 COLON CANCER SCREENING: Primary | ICD-10-CM

## 2020-02-21 RX ORDER — LEVOTHYROXINE SODIUM 100 UG/1
100 TABLET ORAL DAILY
Qty: 90 TABLET | Refills: 1 | OUTPATIENT
Start: 2020-02-21

## 2020-02-21 NOTE — TELEPHONE ENCOUNTER
Referral received for colonoscopy. Last colonoscopy 2014; family history of colon cancer.   Patient scheduled for colonoscopy on 3/25/2020 with Dr. Pavon at [facility] with Golytely bowel prep due to CKD.   Instructions given via phone and instructions mailed to patient with surgery handbook. Advised patient to get a preop.    Please sign RX for colon prep in this encounter.

## 2020-02-21 NOTE — LETTER
"February 21, 2020          Nella Lemon  [address] [place] [address]      Dear Nella,       We want your Colonoscopy to be as pleasant as possible. Please review the instructions below. If you have questions, you may contact us at any of the following numbers:     [facility] Coordinator: [phone]  Clinic Nurse (Geeta): [phone]  Surgery Education Nurse: [phone]    Date of Procedure: 3/25/2020 with Dr. Pavon  Admit time: Surgery Department will call you the day before your procedure by 5pm with your admit time. If your surgery is on Monday, expect a call on Friday.  If you are not contacted by 5PM, please call admitting at [phone].   After hours or on weekends, call [phone] to postpone.   Call the surgery nurse if you become ill within 1 week of your procedure to reschedule.   Preop appointment needed within the 30 days prior to your procedure--call Aroldo Lomeli's office to see if can use your 3/16/2020 appointment for a preop.    7 DAYS BEFORE THE EXAM:  Fill your prescription(s) at the pharmacy as soon as possible. (call ahead if more than 1 week)  Call the Surgery Education Nurse at [phone] and have a medication list ready.  No Aspirin or NSAIDS (Ibuprofen, Celebrex, Naproxen, etc) 7 days before surgery.   Stop fiber supplements, vitamins, iron and herbals. Do not eat corn, nuts or seeds.  If you are prescribed blood thinners or insulin, talk to your primary care provider for instructions on these medications.    2 DAYS BEFORE THE EXAM:   Low fiber diet. See list of low fiber foods on page 3 of \"Split-Dose Golytely\" packet. Nothing red or purple.   Drink 4-6 large glasses of sports drink today and tomorrow.          AT BEDTIME: take 2 Dulcolax tablets.       1 DAY BEFORE THE EXAM:  No solid foods or milk products after 12:01 am. Drink only clear liquids all day.   See list of clear liquids on page 2 of \"Split-Dose Golytely\" packet. No red, purple, or alcohol.   "       AT 3:00 PM: Take 2 Dulcolax tablets.         AT 6:00 PM: Drink one 8 oz. glass of Golytely every 10-15 minutes until the jug is half empty. Drink each glass quickly. Store the rest in the refrigerator. Stay near a toilet.    DAY OF COLONOSCOPY:           6 HOURS PRIOR TO THE EXAM (set an alarm):  Drink the other half of the Golytely jug-one 8 oz glass every 10-15 minutes until gone.   You may have clear liquids up until 2 hours before your exam. If you must take medicine, you may take it with a sip of water.   Shower before arrival and wear clean,comfortable clothes.   No jewelry, make-up, nail polish, hair spray, lotions, or perfumes.   [place] in Admitting through the [place] Entrance.  You must have a responsible adult available to drive and stay with you for 4 hours at home or you will be cancelled.     TIPS FOR COLON CLEANSING BEFORE YOUR COLONOSCOPY  To get accurate results from your exam, your colon must be completely empty or you may need to repeat the colon prep and exam. If you followed instructions and your stool is clear or yellow liquid, you are ready. If you are not sure if your colon is clean, please call the clinic nurse.    You may use Tucks wipes, hemorrhoid treatments, hydrocortisone cream or alcohol-free baby wipes to ease anal irritation. You may also use Vaseline to help protect the skin.     You can squeeze some lemon juice or add a packet of Crystal Light lemon-lime or ice tea flavor into your preparation. You may try sucking on hard candy or a lemon or lime wedge; or washing your mouth out with water, clear soda or mouthwash.    To chill the solution, put it in your refrigerator or set it in a bowl of ice. Do not add ice in your glass. Remove from the refrigerator 15 minutes before drinking.    Quickly drink each glass. It may help to use a timer. If the liquid is too salty, use a straw. Even when you are sitting on the toilet, keep drinking every 10-15 minutes. If you have nausea or  vomiting, take a break for 15 to 30 minutes and then resume drinking.    You will have loose watery stools and may also have chills. Dress for comfort. Expect to feel bloating, nausea and other discomfort until the stool clears from your colon.       Sincerely,      CHATO Pavon DO/Bhumika SALDANA LPN

## 2020-02-24 ENCOUNTER — HOSPITAL ENCOUNTER (OUTPATIENT)
Facility: HOSPITAL | Age: 75
End: 2020-02-24
Attending: SURGERY | Admitting: SURGERY
Payer: COMMERCIAL

## 2020-02-24 DIAGNOSIS — Z12.11 COLON CANCER SCREENING: ICD-10-CM

## 2020-02-24 RX ORDER — BISACODYL 5 MG/1
TABLET, DELAYED RELEASE ORAL
Qty: 4 TABLET | Refills: 0 | Status: SHIPPED | OUTPATIENT
Start: 2020-02-24 | End: 2020-05-13

## 2020-02-27 ENCOUNTER — TELEPHONE (OUTPATIENT)
Dept: SLEEP MEDICINE | Facility: HOSPITAL | Age: 75
End: 2020-02-27

## 2020-03-05 DIAGNOSIS — M79.10 MYALGIA: ICD-10-CM

## 2020-03-06 NOTE — TELEPHONE ENCOUNTER
Gabapentin 300 MG      Last Written Prescription Date:  1-3-2020  Last Fill Quantity: 270,   # refills: 1  Last Office Visit: 12-5-19  Future Office visit:    Next 5 appointments (look out 90 days)    Mar 16, 2020  8:45 AM CDT  (Arrive by 8:30 AM)  Pre-Op physical with Randi Haddad NP  [facility] ([facility] ) [address]  [place] [address]  [phone]   May 13, 2020 10:00 AM CDT  (Arrive by 9:45 AM)  Return Visit with Chris Walsh DO  [facility] [place] ([facility] ) [address]  [place] [address]  [phone]           Routing refill request to provider for review/approval because:

## 2020-03-10 RX ORDER — GABAPENTIN 300 MG/1
CAPSULE ORAL
Qty: 270 CAPSULE | Refills: 1 | Status: SHIPPED | OUTPATIENT
Start: 2020-03-10 | End: 2020-05-06

## 2020-03-16 ENCOUNTER — OFFICE VISIT (OUTPATIENT)
Dept: FAMILY MEDICINE | Facility: OTHER | Age: 75
End: 2020-03-16
Attending: NURSE PRACTITIONER
Payer: COMMERCIAL

## 2020-03-16 VITALS
WEIGHT: 195 LBS | DIASTOLIC BLOOD PRESSURE: 64 MMHG | OXYGEN SATURATION: 95 % | TEMPERATURE: 97.4 F | HEART RATE: 58 BPM | HEIGHT: 62 IN | BODY MASS INDEX: 35.88 KG/M2 | SYSTOLIC BLOOD PRESSURE: 126 MMHG | RESPIRATION RATE: 14 BRPM

## 2020-03-16 DIAGNOSIS — I48.0 PAROXYSMAL ATRIAL FIBRILLATION (H): Chronic | ICD-10-CM

## 2020-03-16 DIAGNOSIS — Z01.818 PREOP GENERAL PHYSICAL EXAM: Primary | ICD-10-CM

## 2020-03-16 DIAGNOSIS — F41.1 GAD (GENERALIZED ANXIETY DISORDER): Chronic | ICD-10-CM

## 2020-03-16 DIAGNOSIS — R82.79 ABNORMAL FINDINGS ON MICROBIOLOGICAL EXAMINATION OF URINE: ICD-10-CM

## 2020-03-16 DIAGNOSIS — Z12.11 COLON CANCER SCREENING: ICD-10-CM

## 2020-03-16 DIAGNOSIS — I10 BENIGN ESSENTIAL HYPERTENSION: Chronic | ICD-10-CM

## 2020-03-16 DIAGNOSIS — N18.2 CKD (CHRONIC KIDNEY DISEASE) STAGE 2, GFR 60-89 ML/MIN: Chronic | ICD-10-CM

## 2020-03-16 DIAGNOSIS — E78.2 MIXED HYPERLIPIDEMIA: Chronic | ICD-10-CM

## 2020-03-16 DIAGNOSIS — E03.4 HYPOTHYROIDISM DUE TO ACQUIRED ATROPHY OF THYROID: ICD-10-CM

## 2020-03-16 LAB
ALBUMIN UR-MCNC: NEGATIVE MG/DL
ANION GAP SERPL CALCULATED.3IONS-SCNC: 8 MMOL/L (ref 3–14)
APPEARANCE UR: CLEAR
BACTERIA #/AREA URNS HPF: ABNORMAL /HPF
BILIRUB UR QL STRIP: NEGATIVE
BUN SERPL-MCNC: 10 MG/DL (ref 7–30)
CALCIUM SERPL-MCNC: 8.6 MG/DL (ref 8.5–10.1)
CHLORIDE SERPL-SCNC: 109 MMOL/L (ref 94–109)
CO2 SERPL-SCNC: 24 MMOL/L (ref 20–32)
COLOR UR AUTO: YELLOW
CREAT SERPL-MCNC: 0.92 MG/DL (ref 0.52–1.04)
ERYTHROCYTE [DISTWIDTH] IN BLOOD BY AUTOMATED COUNT: 13.9 % (ref 10–15)
GFR SERPL CREATININE-BSD FRML MDRD: 62 ML/MIN/{1.73_M2}
GLUCOSE SERPL-MCNC: 108 MG/DL (ref 70–99)
GLUCOSE UR STRIP-MCNC: NEGATIVE MG/DL
HCT VFR BLD AUTO: 42.2 % (ref 35–47)
HGB BLD-MCNC: 13.9 G/DL (ref 11.7–15.7)
HGB UR QL STRIP: NEGATIVE
KETONES UR STRIP-MCNC: NEGATIVE MG/DL
LEUKOCYTE ESTERASE UR QL STRIP: ABNORMAL
MCH RBC QN AUTO: 28 PG (ref 26.5–33)
MCHC RBC AUTO-ENTMCNC: 32.9 G/DL (ref 31.5–36.5)
MCV RBC AUTO: 85 FL (ref 78–100)
NITRATE UR QL: NEGATIVE
NON-SQ EPI CELLS #/AREA URNS LPF: ABNORMAL /LPF
PH UR STRIP: 6 PH (ref 5–7)
PLATELET # BLD AUTO: 292 10E9/L (ref 150–450)
POTASSIUM SERPL-SCNC: 3.9 MMOL/L (ref 3.4–5.3)
RBC # BLD AUTO: 4.97 10E12/L (ref 3.8–5.2)
RBC #/AREA URNS AUTO: ABNORMAL /HPF
SODIUM SERPL-SCNC: 141 MMOL/L (ref 133–144)
SOURCE: ABNORMAL
SP GR UR STRIP: 1.02 (ref 1–1.03)
T4 FREE SERPL-MCNC: 1.17 NG/DL (ref 0.76–1.46)
TSH SERPL DL<=0.005 MIU/L-ACNC: 6.02 MU/L (ref 0.4–4)
UROBILINOGEN UR STRIP-ACNC: 0.2 EU/DL (ref 0.2–1)
WBC # BLD AUTO: 11.6 10E9/L (ref 4–11)
WBC #/AREA URNS AUTO: ABNORMAL /HPF

## 2020-03-16 PROCEDURE — 87086 URINE CULTURE/COLONY COUNT: CPT | Mod: ZL | Performed by: NURSE PRACTITIONER

## 2020-03-16 PROCEDURE — 80048 BASIC METABOLIC PNL TOTAL CA: CPT | Mod: ZL | Performed by: NURSE PRACTITIONER

## 2020-03-16 PROCEDURE — 93005 ELECTROCARDIOGRAM TRACING: CPT

## 2020-03-16 PROCEDURE — 84443 ASSAY THYROID STIM HORMONE: CPT | Mod: ZL | Performed by: NURSE PRACTITIONER

## 2020-03-16 PROCEDURE — 81001 URINALYSIS AUTO W/SCOPE: CPT | Mod: ZL | Performed by: NURSE PRACTITIONER

## 2020-03-16 PROCEDURE — G0463 HOSPITAL OUTPT CLINIC VISIT: HCPCS

## 2020-03-16 PROCEDURE — 99214 OFFICE O/P EST MOD 30 MIN: CPT | Performed by: NURSE PRACTITIONER

## 2020-03-16 PROCEDURE — 36415 COLL VENOUS BLD VENIPUNCTURE: CPT | Mod: ZL | Performed by: NURSE PRACTITIONER

## 2020-03-16 PROCEDURE — 84439 ASSAY OF FREE THYROXINE: CPT | Mod: ZL | Performed by: NURSE PRACTITIONER

## 2020-03-16 PROCEDURE — G0463 HOSPITAL OUTPT CLINIC VISIT: HCPCS | Mod: 25

## 2020-03-16 PROCEDURE — 85027 COMPLETE CBC AUTOMATED: CPT | Mod: ZL | Performed by: NURSE PRACTITIONER

## 2020-03-16 PROCEDURE — 93010 ELECTROCARDIOGRAM REPORT: CPT | Performed by: INTERNAL MEDICINE

## 2020-03-16 RX ORDER — SOLIFENACIN SUCCINATE 5 MG/1
5 TABLET, FILM COATED ORAL DAILY
COMMUNITY
End: 2020-06-01

## 2020-03-16 ASSESSMENT — MIFFLIN-ST. JEOR: SCORE: 1342.76

## 2020-03-16 ASSESSMENT — PAIN SCALES - GENERAL: PAINLEVEL: NO PAIN (0)

## 2020-03-16 NOTE — NURSING NOTE
"Chief Complaint   Patient presents with     Pre-Op Exam       Initial /64 (BP Location: Right arm, Patient Position: Sitting, Cuff Size: Adult Regular)   Pulse 58   Temp 97.4  F (36.3  C) (Tympanic)   Resp 14   Ht 1.575 m (5' 2\")   Wt 88.5 kg (195 lb)   SpO2 95%   BMI 35.67 kg/m   Estimated body mass index is 35.67 kg/m  as calculated from the following:    Height as of this encounter: 1.575 m (5' 2\").    Weight as of this encounter: 88.5 kg (195 lb).  Medication Reconciliation: complete  Fiona Orellana LPN    "

## 2020-03-16 NOTE — PROGRESS NOTES
[facility]  [address]  [place] [address]  [phone]  Dept: [phone]    PRE-OP EVALUATION:  Today's date: 3/16/2020    Nella Lemon (: 1945) presents for pre-operative evaluation assessment as requested by Dr. Pavon.  She requires evaluation and anesthesia risk assessment prior to undergoing surgery/procedure for treatment of colon screen .    Proposed Surgery/ Procedure: colonoscopy  Date of Surgery/ Procedure: 3/27/20  Time of Surgery/ Procedure: to be determined  Hospital/Surgical Facility: [facility] [place]    Primary Physician: Randi Haddad  Type of Anesthesia Anticipated: to be determined    Patient has a Health Care Directive or Living Will:  YES     1. NO - Do you have a history of heart attack, stroke, stent, bypass or surgery on an artery in the head, neck, heart or legs?  2. NO - Do you ever have any pain or discomfort in your chest?  3. NO - Do you have a history of  Heart Failure?  4. NO - Are you troubled by shortness of breath when: walking on the level, up a slight hill or at night?  5. NO - Do you currently have a cold, bronchitis or other respiratory infection?  6. NO - Do you have a cough, shortness of breath or wheezing?  7. NO - Do you sometimes get pains in the calves of your legs when you walk?  8. YES - Do you or anyone in your family have previous history of blood clots?  9. NO - Do you or does anyone in your family have a serious bleeding problem such as prolonged bleeding following surgeries or cuts?  10. NO - Have you ever had problems with anemia or been told to take iron pills?  11. NO - Have you had any abnormal blood loss such as black, tarry or bloody stools, or abnormal vaginal bleeding?  12. NO - Have you ever had a blood transfusion?  13. NO - Have you or any of your relatives ever had problems with anesthesia?  14. NO - Do you have sleep apnea, excessive snoring or daytime  drowsiness?  15. NO - Do you have any prosthetic heart valves?  16. NO - Do you have prosthetic joints?  17. NO - Is there any chance that you may be pregnant?      HPI:     HPI related to upcoming procedure: Need for colon cancer screening.      ANXIETY - Patient has a long history of anxiety of moderate severity requiring medication for control with recent symptoms being stable.    HYPERLIPIDEMIA - Patient has a long history of significant Hyperlipidemia   The ASCVD Risk score ([place] PANCHITO Jr., et al., 2013) failed to calculate for the following reasons:    The patient has a prior MI or stroke diagnosis      HYPERTENSION - Patient has longstanding history of HTN , currently denies any symptoms referable to elevated blood pressure. Specifically denies chest pain, palpitations, dyspnea, orthopnea, PND or peripheral edema. Blood pressure readings have been in normal range. Current medication regimen is as listed below. Patient denies any side effects of medication.     HYPOTHYROIDISM - Patient has a longstanding history of chronic Hypothyroidism. Patient has been doing well, noting no tremor, insomnia, hair loss or changes in skin texture. Continues to take medications as directed, without adverse reactions or side effects. Last TSH   Lab Results   Component Value Date    TSH 4.51 (H) 01/20/2020   .    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       Colon cancer screening 02/24/2020     Priority: Medium     Added automatically from request for surgery [id number]       LA NENA (generalized anxiety disorder) 01/14/2020     Priority: Medium     Lower extremity edema 11/13/2019     Priority: Medium     Class 2 severe obesity due to excess calories with serious comorbidity and body mass index (BMI) of  35.0 to 35.9 in adult (H) 06/18/2019     Priority: Medium     Leukocytosis 05/31/2019     Priority: Medium     Chronic, mild since at least 2012       CKD (chronic kidney disease) stage 2, GFR 60-89 ml/min 05/07/2019     Priority: Medium     Paroxysmal atrial fibrillation (H) 02/19/2018     Priority: Medium     Mixed hyperlipidemia 12/30/2016     Priority: Medium     ACP (advance care planning) 12/16/2016     Priority: Medium     Advance Care Planning 12/16/2016: ACP Review of Chart / Resources Provided:  Reviewed chart for advance care plan.  Nella Lemon has been provided information and resources to begin or update their advance care plan.  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, left 02/23/2016     Priority: Medium     Nasal turbinate hypertrophy 06/07/2013     Priority: Medium     Family history of malignant neoplasm of breast 11/13/2006     Priority: Medium     Neuropathy 05/21/2002     Priority: Medium     Fibromyalgia 05/21/2002     Priority: Medium     GERD 09/28/2001     Priority: Medium     Benign essential hypertension 09/14/2001     Priority: Medium     Sensorineural hearing loss (SNHL) of both ears 01/14/2020     Priority: Low      Past Medical History:   Diagnosis Date     Anxiety state, unspecified 09/25/2003     Atypical chest pain 3/28/2018     Carpal tunnel syndrome 07/02/2002     Endometrial hyperplasia 01/27/2003     Metrorrhagia 10/22/2003     Nonallopathic lesion of thoracic region, not elsewhere classified 05/19/2003     Palpitations 04/11/2001     Postmenopausal bleeding 09/09/2002     Precordial pain 04/05/2001     Urgency of urination 06/06/2007     Past Surgical History:   Procedure Laterality Date     ADENOIDECTOMY       BIOPSY  2019    FNA left thyroid     CHOLECYSTECTOMY  1981     COLONOSCOPY  2002      COLONOSCOPY  2012     COLONOSCOPY N/A 9/22/2014    Procedure: COLONOSCOPY;  Surgeon: Lavell Pavon DO;  Location: [facility] OR     CYSTOSCOPY  2007    Cystitis chronic     EYE SURGERY      right cataract     ORTHOPEDIC SURGERY  2002    carpal tunnel; RT, LT     THYROIDECTOMY Left 8/27/2019    Procedure: LEFT THYROID LOBECTOMY AND ISTHMUS WITH FROZENS;  Surgeon: Mildred Pineda MD;  Location: [facility] OR     TONSILLECTOMY       Current Outpatient Medications   Medication Sig Dispense Refill     amLODIPine (NORVASC) 5 MG tablet Take 1 tablet (5 mg) by mouth daily 90 tablet 3     apixaban ANTICOAGULANT (ELIQUIS ANTICOAGULANT) 5 MG tablet TAKE 1 TABLET (5 MG) BY MOUTH 2 TIMES DAILY 180 tablet 3     bisacodyl (BISACODYL LAXATIVE) 5 MG EC tablet 2 tabs po at hs 2 night before surgery. 2 tabs at 3pm day before surgery. 4 tablet 0     furosemide (LASIX) 20 MG tablet Take 1 tablet (20 mg) by mouth daily 30 tablet 1     gabapentin (NEURONTIN) 300 MG capsule TAKE 3 CAPSULES THREE TIMES DAILY 270 capsule 1     GLUCOSAMINE SULFATE PO Take 1,000 mg by mouth 2 times daily       levothyroxine (SYNTHROID/LEVOTHROID) 100 MCG tablet Take 1 tablet (100 mcg) by mouth daily 90 tablet 1     losartan (COZAAR) 100 MG tablet Take 1 tablet (100 mg) by mouth daily 90 tablet 3     metoprolol succinate ER (TOPROL-XL) 50 MG 24 hr tablet Take 1 tablet (50 mg) by mouth daily 90 tablet 3     Omega-3 Fatty Acids (OMEGA-3 FISH OIL PO) Take 3 g by mouth daily        omeprazole (PRILOSEC) 40 MG DR capsule TAKE 1 CAPSULE (40 MG) BY MOUTH DAILY TAKE 30-60 MINUTES BEFORE A MEAL. 90 capsule 1     polyethylene glycol (GOLYTELY) 236 g suspension Drink 8 oz q 10 mins until jug is 1/2 empty 6pm night prior to surgery. Refrigerate. Repeat 6 hours prior to surgery. 4000 mL 0     potassium chloride ER (K-TAB) 20 MEQ CR tablet Take 1 tablet (20 mEq) by mouth daily 30 tablet 1     sertraline (ZOLOFT) 50 MG tablet TAKE 1 TABLET DAILY 30 tablet 3     solifenacin  "(VESICARE) 5 MG tablet Take 5 mg by mouth daily       STATIN NOT PRESCRIBED (INTENTIONAL) Please choose reason not prescribed, below       VITAMIN D, CHOLECALCIFEROL, PO Take by mouth daily       OTC products: None, except as noted above, no recent use of OTC ASA, NSAIDS or Steroids and no use of herbal medications or other supplements    Allergies   Allergen Reactions     Atorvastatin      Ezetimibe      Gentamicin      Hydroxyzine      Lorazepam      Ranitidine      Simvastatin      Lopid [Gemfibrozil] GI Disturbance     Bloating, constipation,      Pravastatin Muscle Pain (Myalgia)      Latex Allergy: NO    Social History     Tobacco Use     Smoking status: Never Smoker     Smokeless tobacco: Never Used   Substance Use Topics     Alcohol use: Never     Frequency: Never     History   Drug Use No       REVIEW OF SYSTEMS:   CONSTITUTIONAL: NEGATIVE for fever, chills, change in weight  INTEGUMENTARY/SKIN: NEGATIVE for worrisome rashes, moles or lesions  EYES: NEGATIVE for vision changes or irritation  ENT/MOUTH: NEGATIVE for ear, mouth and throat problems  RESP: NEGATIVE for significant cough or SOB  BREAST: NEGATIVE for masses, tenderness or discharge  CV: NEGATIVE for chest pain, palpitations or peripheral edema  GI: NEGATIVE for nausea, abdominal pain, heartburn, or change in bowel habits  : NEGATIVE for frequency, dysuria, or hematuria  MUSCULOSKELETAL: NEGATIVE for significant arthralgias or myalgia  NEURO: NEGATIVE for weakness, dizziness or paresthesias  ENDOCRINE: NEGATIVE for temperature intolerance, skin/hair changes  HEME: NEGATIVE for bleeding problems  PSYCHIATRIC: NEGATIVE for changes in mood or affect    EXAM:   /64 (BP Location: Right arm, Patient Position: Sitting, Cuff Size: Adult Regular)   Pulse 58   Temp 97.4  F (36.3  C) (Tympanic)   Resp 14   Ht 1.575 m (5' 2\")   Wt 88.5 kg (195 lb)   SpO2 95%   BMI 35.67 kg/m      GENERAL APPEARANCE: healthy, alert and no distress     EYES: " EOMI, PERRL     HENT: ear canals and TM's normal and nose and mouth without ulcers or lesions     NECK: no adenopathy, no asymmetry, masses, or scars and thyroid normal to palpation     RESP: lungs clear to auscultation - no rales, rhonchi or wheezes     CV: regular rates and rhythm, normal S1 S2, no S3 or S4 and no murmur, click or rub     ABDOMEN:  soft, nontender, no HSM or masses and bowel sounds normal     MS: extremities normal- no gross deformities noted, no evidence of inflammation in joints, FROM in all extremities.     SKIN: no suspicious lesions or rashes     NEURO: Normal strength and tone, sensory exam grossly normal, mentation intact and speech normal     PSYCH: mentation appears normal. and affect normal/bright     LYMPHATICS: No cervical adenopathy    DIAGNOSTICS:     Results for orders placed or performed in visit on 03/16/20   Basic metabolic panel     Status: Abnormal   Result Value Ref Range    Sodium 141 133 - 144 mmol/L    Potassium 3.9 3.4 - 5.3 mmol/L    Chloride 109 94 - 109 mmol/L    Carbon Dioxide 24 20 - 32 mmol/L    Anion Gap 8 3 - 14 mmol/L    Glucose 108 (H) 70 - 99 mg/dL    Urea Nitrogen 10 7 - 30 mg/dL    Creatinine 0.92 0.52 - 1.04 mg/dL    GFR Estimate 62 >60 mL/min/[1.73_m2]    GFR Estimate If Black 71 >60 mL/min/[1.73_m2]    Calcium 8.6 8.5 - 10.1 mg/dL   CBC with platelets     Status: Abnormal   Result Value Ref Range    WBC 11.6 (H) 4.0 - 11.0 10e9/L    RBC Count 4.97 3.8 - 5.2 10e12/L    Hemoglobin 13.9 11.7 - 15.7 g/dL    Hematocrit 42.2 35.0 - 47.0 %    MCV 85 78 - 100 fl    MCH 28.0 26.5 - 33.0 pg    MCHC 32.9 31.5 - 36.5 g/dL    RDW 13.9 10.0 - 15.0 %    Platelet Count 292 150 - 450 10e9/L   *UA reflex to Microscopic and Culture - [facility]     Status: Abnormal    Specimen: Midstream Urine   Result Value Ref Range    Color Urine Yellow     Appearance Urine Clear     Glucose Urine Negative NEG^Negative mg/dL    Bilirubin Urine Negative NEG^Negative    Ketones Urine  Negative NEG^Negative mg/dL    Specific Gravity Urine 1.025 1.003 - 1.035    Blood Urine Negative NEG^Negative    pH Urine 6.0 5.0 - 7.0 pH    Protein Albumin Urine Negative NEG^Negative mg/dL    Urobilinogen Urine 0.2 0.2 - 1.0 EU/dL    Nitrite Urine Negative NEG^Negative    Leukocyte Esterase Urine Moderate (A) NEG^Negative    Source Midstream Urine    TSH with free T4 reflex     Status: Abnormal   Result Value Ref Range    TSH 6.02 (H) 0.40 - 4.00 mU/L   Urine Microscopic     Status: Abnormal   Result Value Ref Range    WBC Urine 10-25 (A) OTO5^0 - 5 /HPF    RBC Urine O - 2 OTO2^O - 2 /HPF    Squamous Epithelial /LPF Urine Few FEW^Few /LPF    Bacteria Urine Few (A) NEG^Negative /HPF   T4 free     Status: None   Result Value Ref Range    T4 Free 1.17 0.76 - 1.46 ng/dL         Recent Labs   Lab Test 01/20/20  1015 12/05/19  0846  09/20/19  0855   HGB 14.1  --   --  14.2     --   --  350    143   < > 142   POTASSIUM 4.0 4.1   < > 4.3   CR 0.92 1.04   < > 0.84    < > = values in this interval not displayed.       Interpretation Summary  No pericardial effusion is present.  Global and regional left ventricular function is normal with an EF of 60-65%.  The right ventricle is normal size.  Mild left atrial enlargement is present.  Mild mitral insufficiency is present.  Aortic valve is normal in structure and function.  The aortic valve is tricuspid.  Trace aortic insufficiency is present.  Right ventricular systolic pressure is 30mmHg above the right atrial pressure.  Mild tricuspid insufficiency is present.  The pulmonic valve is normal.  The aorta root is normal.  _____________________________________________________________________________         EKG Interpretation:      Interpreted by Randi Haddad NP    Symptoms at time of EKG: None   Rhythm: Sinus bradycardia  Rate: 56  Axis: Normal  Ectopy: None  Conduction: Normal  ST Segments/ T Waves: Non-specific ST-T wave changes  Q Waves:  None  Comparison to prior: Unchanged from 2019    Clinical Impression: no acute changes, sinus bradycardia.         IMPRESSION:   Reason for surgery/procedure: need for screening colonoscopy  Diagnosis/reason for consult: anesthesia risk assessment     The proposed surgical procedure is considered INTERMEDIATE risk.    REVISED CARDIAC RISK INDEX  The patient has the following serious cardiovascular risks for perioperative complications such as (MI, PE, VFib and 3  AV Block):  Paroxysmal atrial fibrillation.     INTERPRETATION: 1 risks: Class II (low risk - 0.9% complication rate)    The patient has the following additional risks for perioperative complications:  No identified additional risks  The ASCVD Risk score ([place] FLANAGAN Jr., et al., 2013) failed to calculate for the following reasons:    The patient has a prior MI or stroke diagnosis      ICD-10-CM    1. Preop general physical exam  Z01.818 Basic metabolic panel     CBC with platelets     *[facility]     EKG 12-lead complete w/read - (Clinic Performed)   2. Colon cancer screening  Z12.11    3. Mixed hyperlipidemia  E78.2    4. Benign essential hypertension  I10    5. Paroxysmal atrial fibrillation (H)  I48.0    6. CKD (chronic kidney disease) stage 2, GFR 60-89 ml/min  N18.2    7. LA NENA (generalized anxiety disorder)  F41.1        RECOMMENDATIONS:       Cardiovascular Risk  Performs 4 METs exercise without symptoms (Climb a flight of stairs) .       --Patient is to HOLD eliquis 24 hours prior to scheduled procedure.  Hold all medications the day of her procedure.      APPROVAL GIVEN to proceed with proposed procedure, without further diagnostic evaluation       Signed Electronically by: Randi Haddad NP    Copy of this evaluation report is provided to requesting physician.    [facility] Preop Guidelines    Revised Cardiac Risk Index

## 2020-03-16 NOTE — Clinical Note
Please review, she is having a colonoscopy - EKG was sinus roxanne today - does she need to be bridged with Lovenox or stop eliquis for 5 days prior?   Thanks!

## 2020-03-18 LAB
BACTERIA SPEC CULT: ABNORMAL
SPECIMEN SOURCE: ABNORMAL

## 2020-03-19 ENCOUNTER — TELEPHONE (OUTPATIENT)
Dept: SURGERY | Facility: OTHER | Age: 75
End: 2020-03-19

## 2020-03-19 NOTE — TELEPHONE ENCOUNTER
Rescheduled patient for 4/22/2020 due to Covid-19 , patient did have a pre-op prior to writer knowing of rescheduling so writer will try keeping close to pre op date.

## 2020-03-24 ENCOUNTER — TELEPHONE (OUTPATIENT)
Dept: FAMILY MEDICINE | Facility: OTHER | Age: 75
End: 2020-03-24

## 2020-03-24 NOTE — TELEPHONE ENCOUNTER
8:20 AM    Reason for Call: Phone Call    Description: Pt called to ask to please let her provider Randi Haddad know that she had to start taking her prilosec again for her acid reflex. You can call pt back if needed, otherwise she just wanted to inform the provider of this change.    Was an appointment offered for this call? No  If yes : Appointment type              Date    Preferred method for responding to this message: Telephone Call  What is your phone number [phone]    If we cannot reach you directly, may we leave a detailed response at the number you provided? Yes    Can this message wait until your PCP/provider returns, if available today? YES    Adelita Rehman

## 2020-03-30 ENCOUNTER — TELEPHONE (OUTPATIENT)
Dept: SURGERY | Facility: OTHER | Age: 75
End: 2020-03-30

## 2020-03-30 NOTE — TELEPHONE ENCOUNTER
Reminder Patient would like a call back from writer on when a day is okay patient will need pre-op prior to procedure will set both dates when able to do procedure.  Patient would like any day for preop but prefers morning.

## 2020-04-02 DIAGNOSIS — F41.9 ANXIETY: ICD-10-CM

## 2020-04-02 NOTE — TELEPHONE ENCOUNTER
Zoloft  Last Written Prescription Date: 12/5/19  Last Fill Quantity: 30 # of Refills: 3  Last Office Visit: 3/16/20

## 2020-05-05 DIAGNOSIS — M79.10 MYALGIA: ICD-10-CM

## 2020-05-05 NOTE — TELEPHONE ENCOUNTER
gabapentin  Last Written Prescription Date:  3/10/20  Last Fill Quantity: 270,   # refills: 1  Last Office Visit: 3/16/20  Future Office visit:    Next 5 appointments (look out 90 days)    May 13, 2020 10:00 AM CDT  Telephone Visit with Chris Walsh DO  [facility] [address]  [place] [address]  [phone]           Routing refill request to provider for review/approval because:  Drug not on the [facility], [facility] or [facility] refill protocol or controlled substance

## 2020-05-06 ENCOUNTER — TELEPHONE (OUTPATIENT)
Dept: FAMILY MEDICINE | Facility: OTHER | Age: 75
End: 2020-05-06

## 2020-05-06 DIAGNOSIS — I48.0 PAROXYSMAL ATRIAL FIBRILLATION (H): Chronic | ICD-10-CM

## 2020-05-06 DIAGNOSIS — I10 BENIGN ESSENTIAL HYPERTENSION: Primary | Chronic | ICD-10-CM

## 2020-05-06 RX ORDER — GABAPENTIN 300 MG/1
CAPSULE ORAL
Qty: 270 CAPSULE | Refills: 1 | Status: SHIPPED | OUTPATIENT
Start: 2020-05-06 | End: 2020-07-07

## 2020-05-06 NOTE — TELEPHONE ENCOUNTER
Chris Walsh, Adina Garvin, LPN; Adina Araya 8 minutes ago (2:12 PM)      Rx for blood pressure monitor was sent to [facility].  Thanks!     Dr. Walsh    Message text

## 2020-05-06 NOTE — TELEPHONE ENCOUNTER
Patient called in and stated she was in contact with her insurance agency and would like to get an rx sent in for a bp machine. Please call patient back at [phone]

## 2020-05-11 ENCOUNTER — TELEPHONE (OUTPATIENT)
Dept: FAMILY MEDICINE | Facility: OTHER | Age: 75
End: 2020-05-11

## 2020-05-11 DIAGNOSIS — I10 BENIGN ESSENTIAL HYPERTENSION: Primary | Chronic | ICD-10-CM

## 2020-05-11 NOTE — TELEPHONE ENCOUNTER
To: Aroldo Lomeli nurse  Please call patient back today (5/11/2020) to discuss the below.  Thank you

## 2020-05-13 ENCOUNTER — VIRTUAL VISIT (OUTPATIENT)
Dept: CARDIOLOGY | Facility: OTHER | Age: 75
End: 2020-05-13
Attending: INTERNAL MEDICINE
Payer: COMMERCIAL

## 2020-05-13 VITALS — BODY MASS INDEX: 34.75 KG/M2 | WEIGHT: 190 LBS

## 2020-05-13 DIAGNOSIS — N18.2 CKD (CHRONIC KIDNEY DISEASE) STAGE 2, GFR 60-89 ML/MIN: Chronic | ICD-10-CM

## 2020-05-13 DIAGNOSIS — E66.812 CLASS 2 SEVERE OBESITY DUE TO EXCESS CALORIES WITH SERIOUS COMORBIDITY AND BODY MASS INDEX (BMI) OF 35.0 TO 35.9 IN ADULT (H): Chronic | ICD-10-CM

## 2020-05-13 DIAGNOSIS — I10 BENIGN ESSENTIAL HYPERTENSION: Chronic | ICD-10-CM

## 2020-05-13 DIAGNOSIS — R60.0 LOWER EXTREMITY EDEMA: ICD-10-CM

## 2020-05-13 DIAGNOSIS — E66.01 CLASS 2 SEVERE OBESITY DUE TO EXCESS CALORIES WITH SERIOUS COMORBIDITY AND BODY MASS INDEX (BMI) OF 35.0 TO 35.9 IN ADULT (H): Chronic | ICD-10-CM

## 2020-05-13 DIAGNOSIS — I48.0 PAROXYSMAL ATRIAL FIBRILLATION (H): Primary | Chronic | ICD-10-CM

## 2020-05-13 DIAGNOSIS — E78.2 MIXED HYPERLIPIDEMIA: Chronic | ICD-10-CM

## 2020-05-13 PROCEDURE — 99214 OFFICE O/P EST MOD 30 MIN: CPT | Mod: 95 | Performed by: INTERNAL MEDICINE

## 2020-05-13 ASSESSMENT — PAIN SCALES - GENERAL: PAINLEVEL: NO PAIN (0)

## 2020-05-13 NOTE — NURSING NOTE
"Chief Complaint   Patient presents with     RECHECK     6 month        Initial Wt 86.2 kg (190 lb)   BMI 34.75 kg/m   Estimated body mass index is 34.75 kg/m  as calculated from the following:    Height as of 3/16/20: 1.575 m (5' 2\").    Weight as of this encounter: 86.2 kg (190 lb).  Medication Reconciliation: complete  Geeta Galdamez LPN    "

## 2020-05-13 NOTE — PATIENT INSTRUCTIONS
Thank you for allowing Dr. Walsh and our  team to participate in your care. Please call our office at [phone] with scheduling questions or with any other questions or concerns.       If you experience chest pain, chest pressure, chest tightness, shortness of breath, fainting, lightheadedness, nausea, vomiting, or other concerning symptoms, please report to the Emergency Department or call 911. These symptoms may be emergent, and best treated in the Emergency Department.    Follow up in

## 2020-05-13 NOTE — PROGRESS NOTES
"Nella Lemon is a 74 year old female who is being evaluated via a billable telephone visit.      The patient has been notified of following:     \"This telephone visit will be conducted via a call between you and your physician/provider. We have found that certain health care needs can be provided without the need for a physical exam.  This service lets us provide the care you need with a short phone conversation.  If a prescription is necessary we can send it directly to your pharmacy.  If lab work is needed we can place an order for that and you can then stop by our lab to have the test done at a later time.    Telephone visits are billed at different rates depending on your insurance coverage. During this emergency period, for some insurers they may be billed the same as an in-person visit.  Please reach out to your insurance provider with any questions.    If during the course of the call the physician/provider feels a telephone visit is not appropriate, you will not be charged for this service.\"    Patient has given verbal consent for Telephone visit?  Yes    What phone number would you like to be contacted at? [phone]    How would you like to obtain your AVS? Mail a copy     Nella continues to do well.  Since last being seen on 11/13/2019, she has had no palpitations or subjective evidence for atrial fibrillation.  She has been managed well on metoprolol 50 mg XL daily and Eliquis 5 mg twice a day.  She has had no concerns for bleeding.  She has not been able to check her blood pressure and plans to get a prescription for a blood pressure cuff from her primary.  She feels she snores and stops breathing when laying flat. She has been sleeping in a recliner and her snoring and apneic episodes have declined.  She states she feels better.  She has not woken up short of breath with laying flat but feels a tightness to her chest when sleeping in bed.  She was previously referred for a sleep study but " I do not believe that this has been completed at this point.  This is something we should revisit post COVID-19.  She does not need refills today.  Her labs as checked on 3/16/2020 came back normal.  She has no additional concerns.      Impression:  1.  Paroxysmal atrial fibrillation.  2.  Hypertension-controlled.  3.  Hyperlipidemia-uncontrolled on 10/30/19.  4.  Palpitations.  5.  Atypical chest pain.  6.  CKD 2.  7.  GERD.  8.  Lower extremity edema.  9.  Concern for ZACHARIAH with referral for sleep study 11/13/2019.  10.  CHADSVASC score of 3.    Plan:  1.  No changes today.  2.  Continue on amlodipine 5 mg daily, Eliquis 5 mg twice a day, losartan 100 mg daily, metoprolol 50 mg XL daily, and potassium 20 mEq daily.  3.  She plans to obtain a blood pressure monitor to check her blood pressure.  She plans to obtain this through her primary.  4.  Follow-up in 6 months or sooner with issues.    Phone call duration: 13 minutes    Chris Walsh,

## 2020-05-28 DIAGNOSIS — N39.46 MIXED INCONTINENCE URGE AND STRESS (MALE)(FEMALE): Primary | ICD-10-CM

## 2020-05-28 NOTE — TELEPHONE ENCOUNTER
[facility]      Last Written Prescription Date:  3/16/2020  Last Fill Quantity: 0,   # refills: 0  Last Office Visit: 5/13/*2020    Patient reported taking

## 2020-06-01 DIAGNOSIS — E03.4 HYPOTHYROIDISM DUE TO ACQUIRED ATROPHY OF THYROID: ICD-10-CM

## 2020-06-01 RX ORDER — SOLIFENACIN SUCCINATE 5 MG/1
TABLET, FILM COATED ORAL
Qty: 90 TABLET | Refills: 1 | Status: SHIPPED | OUTPATIENT
Start: 2020-06-01 | End: 2020-08-28

## 2020-06-01 NOTE — TELEPHONE ENCOUNTER
[facility]          Routing refill request to provider for review/approval because:  Medication is reported/historical

## 2020-06-02 NOTE — TELEPHONE ENCOUNTER
levothyroxine      Last Written Prescription Date:  12/05/2019  Last Fill Quantity: 90,   # refills: 1  Last Office Visit: 03/16/2020  Future Office visit:

## 2020-06-03 RX ORDER — LEVOTHYROXINE SODIUM 100 UG/1
100 TABLET ORAL DAILY
Qty: 90 TABLET | Refills: 0 | Status: SHIPPED | OUTPATIENT
Start: 2020-06-03 | End: 2020-08-28

## 2020-06-03 NOTE — TELEPHONE ENCOUNTER
TSH   Date Value Ref Range Status   03/16/2020 6.02 (H) 0.40 - 4.00 mU/L Final     See above.    Eleanor Mullins RN

## 2020-06-29 DIAGNOSIS — K21.9 GASTROESOPHAGEAL REFLUX DISEASE, ESOPHAGITIS PRESENCE NOT SPECIFIED: ICD-10-CM

## 2020-06-30 NOTE — TELEPHONE ENCOUNTER
prilosec      Last Written Prescription Date:  12.26.19  Last Fill Quantity: 90,   # refills: 1  Last Office Visit: 3.16.2020

## 2020-07-01 RX ORDER — OMEPRAZOLE 40 MG/1
CAPSULE, DELAYED RELEASE ORAL
Qty: 90 CAPSULE | Refills: 1 | Status: SHIPPED | OUTPATIENT
Start: 2020-07-01 | End: 2020-12-22

## 2020-07-07 DIAGNOSIS — M79.10 MYALGIA: ICD-10-CM

## 2020-07-07 RX ORDER — GABAPENTIN 300 MG/1
CAPSULE ORAL
Qty: 270 CAPSULE | Refills: 0 | Status: SHIPPED | OUTPATIENT
Start: 2020-07-07 | End: 2020-09-08

## 2020-07-07 NOTE — TELEPHONE ENCOUNTER
PCP is Aroldo Lomeli.  Medication is pended if you approve.  Thank you.      gabapentin (NEURONTIN) 300 MG capsule      Last Written Prescription Date:  5/6/20  Last Fill Quantity: 270,   # refills: 1  Last Office Visit: 3/16/20  Future Office visit:    Next 5 appointments (look out 90 days)    Sep 21, 2020 10:30 AM CDT  (Arrive by 10:15 AM)  Return Visit with Rosa Venegas MD  [facility] ) [address] [place] [id number]  [phone]           Routing refill request to provider for review/approval because:  Drug not on the [facility], [facility] or  Health refill protocol or controlled substance

## 2020-07-08 ENCOUNTER — TELEPHONE (OUTPATIENT)
Dept: OTOLARYNGOLOGY | Facility: OTHER | Age: 75
End: 2020-07-08

## 2020-07-08 NOTE — LETTER
July 8, 2020      Nella Lemon  [address] [facility] [id number]        We have been trying to reach you.  We would like you to schedule an annual thyroid ultrasound.  Please call us back at [phone] to have this scheduled.        Sincerely,        Rosemary Flower PA-C

## 2020-08-12 ENCOUNTER — HOSPITAL ENCOUNTER (OUTPATIENT)
Dept: ULTRASOUND IMAGING | Facility: HOSPITAL | Age: 75
Discharge: HOME | End: 2020-08-12
Attending: PHYSICIAN ASSISTANT | Admitting: PHYSICIAN ASSISTANT
Payer: COMMERCIAL

## 2020-08-12 DIAGNOSIS — Z98.890 STATUS POST THYROID SURGERY: Primary | ICD-10-CM

## 2020-08-12 DIAGNOSIS — Z98.890 STATUS POST THYROID SURGERY: ICD-10-CM

## 2020-08-12 DIAGNOSIS — C73 PAPILLARY THYROID CARCINOMA (H): ICD-10-CM

## 2020-08-12 PROCEDURE — 76536 US EXAM OF HEAD AND NECK: CPT | Mod: TC

## 2020-08-17 DIAGNOSIS — I48.0 PAROXYSMAL ATRIAL FIBRILLATION (H): ICD-10-CM

## 2020-08-17 DIAGNOSIS — I10 BENIGN ESSENTIAL HYPERTENSION: ICD-10-CM

## 2020-08-17 DIAGNOSIS — N18.2 CKD (CHRONIC KIDNEY DISEASE) STAGE 2, GFR 60-89 ML/MIN: Primary | Chronic | ICD-10-CM

## 2020-08-17 RX ORDER — LOSARTAN POTASSIUM 100 MG/1
100 TABLET ORAL DAILY
Qty: 90 TABLET | Refills: 3 | Status: SHIPPED | OUTPATIENT
Start: 2020-08-17 | End: 2021-08-10

## 2020-08-17 NOTE — TELEPHONE ENCOUNTER
losartan  Last Written Prescription Date: 11/13/2019  Last Fill Quantity: 90 # of Refills: 3  Last Office Visit: 5/13/2020

## 2020-08-18 RX ORDER — METOPROLOL SUCCINATE 50 MG/1
50 TABLET, EXTENDED RELEASE ORAL DAILY
Qty: 90 TABLET | Refills: 1 | Status: SHIPPED | OUTPATIENT
Start: 2020-08-18 | End: 2021-02-09

## 2020-08-18 NOTE — TELEPHONE ENCOUNTER
Metoprolol      Last Written Prescription Date:  11.13.19  Last Fill Quantity: 90,   # refills: 3  Last Office Visit: 03.16.2020

## 2020-08-26 DIAGNOSIS — E03.4 HYPOTHYROIDISM DUE TO ACQUIRED ATROPHY OF THYROID: ICD-10-CM

## 2020-08-26 DIAGNOSIS — N39.46 MIXED INCONTINENCE URGE AND STRESS (MALE)(FEMALE): ICD-10-CM

## 2020-08-27 NOTE — TELEPHONE ENCOUNTER
Synthroid       Last Written Prescription Date:  6/3/2020  Last Fill Quantity: 90,   # refills: 0    Vesicare       Last Written Prescription Date:  6/1/2020  Last Fill Quantity: 90,   # refills: 1  Last Office Visit: 5/13/2020  Future Office visit:    Next 5 appointments (look out 90 days)    Sep 21, 2020 10:30 AM CDT  (Arrive by 10:15 AM)  Return Visit with Rosa Venegas MD  [facility] ([facility] ) [address] [address]  [phone]   Oct 16, 2020 11:00 AM CDT  (Arrive by 10:45 AM)  Nurse Only with Alis Pitt  [facility] ([facility] ) [address] [address]  [phone]

## 2020-08-28 DIAGNOSIS — I10 BENIGN ESSENTIAL HYPERTENSION: ICD-10-CM

## 2020-08-28 RX ORDER — LEVOTHYROXINE SODIUM 100 UG/1
100 TABLET ORAL DAILY
Qty: 90 TABLET | Refills: 0 | Status: SHIPPED | OUTPATIENT
Start: 2020-08-28 | End: 2020-11-25

## 2020-08-28 RX ORDER — AMLODIPINE BESYLATE 5 MG/1
5 TABLET ORAL DAILY
Qty: 90 TABLET | Refills: 3 | Status: SHIPPED | OUTPATIENT
Start: 2020-08-28 | End: 2021-08-30

## 2020-08-28 RX ORDER — SOLIFENACIN SUCCINATE 5 MG/1
TABLET, FILM COATED ORAL
Qty: 90 TABLET | Refills: 2 | Status: SHIPPED | OUTPATIENT
Start: 2020-08-28 | End: 2021-05-21

## 2020-08-28 NOTE — TELEPHONE ENCOUNTER
amlodipine      Last Written Prescription Date:  11/13/19  Last Fill Quantity: 90,   # refills: 3  Last Office Visit: 5/13/20  Future Office visit:    Next 5 appointments (look out 90 days)    Sep 21, 2020 10:30 AM CDT  (Arrive by 10:15 AM)  Return Visit with Rosa Venegas MD  [facility] ([facility] ) [address] [address]  [phone]   Oct 16, 2020 11:00 AM CDT  (Arrive by 10:45 AM)  Nurse Only with Alis Pitt  [facility] ([facility] ) [address] [address]  [phone]

## 2020-09-04 ENCOUNTER — ALLIED HEALTH/NURSE VISIT (OUTPATIENT)
Dept: AUDIOLOGY | Facility: OTHER | Age: 75
End: 2020-09-04
Attending: AUDIOLOGIST
Payer: COMMERCIAL

## 2020-09-04 DIAGNOSIS — H90.3 SENSORINEURAL HEARING LOSS (SNHL) OF BOTH EARS: Primary | ICD-10-CM

## 2020-09-04 PROCEDURE — V5266 BATTERY FOR HEARING DEVICE: HCPCS

## 2020-09-04 NOTE — PROGRESS NOTES
Per guidelines of patient's insurance, 32 units of size 312 batteries were dispensed today, Battery Modifier: NU (New). Reviewed that patent is eligible for batteries once every 90 days, and how they can request new batteries.    Berenice Pitt  Audiology Assistant  [facility]  [phone]

## 2020-09-05 DIAGNOSIS — M79.10 MYALGIA: ICD-10-CM

## 2020-09-07 NOTE — TELEPHONE ENCOUNTER
gabapentin      Last Written Prescription Date:  7/7/20  Last Fill Quantity: 270,   # refills: 0  Last Office Visit: 3/16/20  Future Office visit:    Next 5 appointments (look out 90 days)    Sep 21, 2020 10:30 AM CDT  (Arrive by 10:15 AM)  Return Visit with Rosa Venegas MD  [facility] ([facility] ) [address] [address]  [phone]   Oct 14, 2020  9:45 AM CDT  (Arrive by 9:30 AM)  Nurse Only with Alis Pitt  [facility] ([facility] ) [address] [address]  [phone]           Routing refill request to provider for review/approval because:  Drug not on the [facility], [facility] or [facility] refill protocol or controlled substance

## 2020-09-08 RX ORDER — GABAPENTIN 300 MG/1
CAPSULE ORAL
Qty: 270 CAPSULE | Refills: 0 | Status: SHIPPED | OUTPATIENT
Start: 2020-09-08 | End: 2020-10-07

## 2020-09-24 DIAGNOSIS — H91.93 DECREASED HEARING OF BOTH EARS: Primary | ICD-10-CM

## 2020-09-28 DIAGNOSIS — F41.9 ANXIETY: ICD-10-CM

## 2020-10-06 DIAGNOSIS — M79.10 MYALGIA: ICD-10-CM

## 2020-10-06 NOTE — TELEPHONE ENCOUNTER
Gabapentin   Last Written Prescription Date: 9/8/20  Last Fill Quantity: 270 # of Refills: 0  Last Office Visit: 3/16/20

## 2020-10-07 RX ORDER — GABAPENTIN 300 MG/1
CAPSULE ORAL
Qty: 270 CAPSULE | Refills: 0 | Status: SHIPPED | OUTPATIENT
Start: 2020-10-07 | End: 2020-10-29

## 2020-10-14 ENCOUNTER — OFFICE VISIT (OUTPATIENT)
Dept: AUDIOLOGY | Facility: OTHER | Age: 75
End: 2020-10-14
Attending: AUDIOLOGIST
Payer: COMMERCIAL

## 2020-10-14 DIAGNOSIS — H91.93 DECREASED HEARING OF BOTH EARS: ICD-10-CM

## 2020-10-14 DIAGNOSIS — H90.3 SENSORINEURAL HEARING LOSS (SNHL) OF BOTH EARS: Primary | ICD-10-CM

## 2020-10-14 PROCEDURE — V5299 HEARING SERVICE: HCPCS

## 2020-10-14 PROCEDURE — 92550 TYMPANOMETRY & REFLEX THRESH: CPT | Performed by: AUDIOLOGIST

## 2020-10-14 PROCEDURE — 92557 COMPREHENSIVE HEARING TEST: CPT | Performed by: AUDIOLOGIST

## 2020-10-14 NOTE — PROGRESS NOTES
Audiology Evaluation Completed. Please refer SCANNED AUDIOGRAM and/or TYMPANOGRAM for BACKGROUND, RESULTS, RECOMMENDATIONS.      Trini BIANCHI, [facility]  Audiologist #[id number]

## 2020-10-14 NOTE — PROGRESS NOTES
HEARING AID CHECK    BACKGROUND:  Nella Lemon, a 75 year old female, was seen today for an hearing aid check.  Ms. Lemon wears Binaural [employer] [place] 700 hearing aids.  Ms. Lemon reported no concerns.    FINDINGS:  Visual inspection and cleaning provided.   C-stop changed with new. Medium open domes changed with new. Battery was tested and good. Good listening check.    Reviewed cleaning, troubleshooting, and preventative care with patient. Any cleaning tools used were provided as customer courtesy.    Services performed and warranty reviewed with patient.    PLAN:  Based on patient report, no audiology appointment for adjustments needed.Ms. Lemon will return to clinic in 12 months, sooner if needed. Patient had no further questions and reports satisfaction.         Berenice Pitt  Audiology Assistant  [facility]  [phone]

## 2020-10-15 ENCOUNTER — TELEPHONE (OUTPATIENT)
Dept: FAMILY MEDICINE | Facility: OTHER | Age: 75
End: 2020-10-15
Payer: COMMERCIAL

## 2020-10-15 NOTE — TELEPHONE ENCOUNTER
8:50 AM    Reason for Call: Phone Call    Description: Pt called and states she was in the [place] clinic yesterday for an Audiology appt and when she got her temp taken, it was pretty low. She states she has been having dizzy spells as well so is wondering if Aroldo Lomeli is able to put in some lab orders for her to make sure everything is okay. Please call patient back.    Was an appointment offered for this call? No  If yes : Appointment type              Date    Preferred method for responding to this message: Telephone Call  What is your phone number ?[phone]    If we cannot reach you directly, may we leave a detailed response at the number you provided? Yes    Can this message wait until your PCP/provider returns, if available today? YES    Elizabeth Green

## 2020-10-15 NOTE — TELEPHONE ENCOUNTER
If this was the temp taken for the screening process, those thermometers are reading low.  This can wait for Aroldo to return next week to address.

## 2020-10-19 NOTE — TELEPHONE ENCOUNTER
Chalo have been fine at all visits here.  She is due for routine follow-up of chronic conditions.  Please schedule appt.  We can discuss her concerns further if she would like.

## 2020-10-22 NOTE — PROGRESS NOTES
Subjective     Nella Lemon is a 75 year old female who presents to clinic today for the following health issues:    HPI         Hyperlipidemia Follow-Up      Are you regularly taking any medication or supplement to lower your cholesterol?   No    Are you having muscle aches or other side effects that you think could be caused by your cholesterol lowering medication?  No    Hypertension Follow-up      Do you check your blood pressure regularly outside of the clinic? Yes     Are you following a low salt diet? Yes    Are your blood pressures ever more than 140 on the top number (systolic) OR more   than 90 on the bottom number (diastolic), for example 140/90? Yes    Chronic Kidney Disease Follow-up    Do you take any over the counter pain medicine?: Yes  What over the counter medicine are you taking for your pain?:  Ibuprofen       How often do you take this medicine?:  rarely       How many servings of fruits and vegetables do you eat daily?  2-3    On average, how many sweetened beverages do you drink each day (Examples: soda, juice, sweet tea, etc.  Do NOT count diet or artificially sweetened beverages)?   0    How many days per week do you exercise enough to make your heart beat faster? 7    How many minutes a day do you exercise enough to make your heart beat faster? 30 - 60    How many days per week do you miss taking your medication? 0    Hypothyroidism Follow-up      Since last visit, patient describes the following symptoms: weight loss of 5 lbs in 6 months, cold sensitivity, and hair loss.     Had previous low temperature taken at audiology clinic 2 weeks ago and at home. She does not remember the numeric value of these temperatures.       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, left     Family history of colon cancer     ACP (advance care planning)     Mixed hyperlipidemia     Paroxysmal atrial fibrillation (H)     CKD (chronic kidney disease) stage 2, GFR 60-89 ml/min     Leukocytosis     Class 2 severe obesity due to excess calories with serious comorbidity and body mass index (BMI) of 35.0 to 35.9 in adult (H)     Lower extremity edema     Fibromyalgia     Sensorineural hearing loss (SNHL) of both ears     Papillary thyroid carcinoma (H)     LA NENA (generalized anxiety disorder)     Colon cancer screening     Past Surgical History:   Procedure Laterality Date     ADENOIDECTOMY       BIOPSY  2019    FNA left thyroid     CHOLECYSTECTOMY  1981     COLONOSCOPY  2002     COLONOSCOPY  2012     COLONOSCOPY N/A 9/22/2014    Procedure: COLONOSCOPY;  Surgeon: Lavell Pavon DO;  Location: [facility] OR     CYSTOSCOPY  2007    Cystitis chronic     EYE SURGERY      right cataract     ORTHOPEDIC SURGERY  2002    carpal tunnel; RT, LT     THYROIDECTOMY Left 8/27/2019    Procedure: LEFT THYROID LOBECTOMY AND ISTHMUS WITH FROZENS;  Surgeon: Mildred Pineda MD;  Location: [facility] OR     TONSILLECTOMY         Social History     Tobacco Use     Smoking status: Never Smoker     Smokeless tobacco: Never Used   Substance Use Topics     Alcohol use: Never     Frequency: Never     Family History   Problem Relation Age of Onset     Breast Cancer Mother      Alcohol/Drug Mother         Cirrhosis     Other - See Comments Mother         goiter     Cerebrovascular Disease Father      Circulatory Father      Alcohol/Drug Son      Cancer - colorectal Brother      Unknown/Adopted No family hx of      Asthma No family hx of      C.A.D. No family hx of      Diabetes No family hx of      Hypertension No family hx of      Prostate Cancer No family hx of      Allergies No family hx of      Alzheimer Disease No family hx of      Anesthesia Reaction No family hx of      Arthritis No family hx of      Blood Disease No  family hx of      Cardiovascular No family hx of      Congenital Anomalies No family hx of      Connective Tissue Disorder No family hx of      Depression No family hx of      Endocrine Disease No family hx of      Eye Disorder No family hx of      Genetic Disorder No family hx of      Gastrointestinal Disease No family hx of      Genitourinary Problems No family hx of      Gynecology No family hx of      Heart Disease No family hx of      Lipids No family hx of      Musculoskeletal Disorder No family hx of      Neurologic Disorder No family hx of      Obesity No family hx of      Osteoporosis No family hx of      Psychotic Disorder No family hx of      Respiratory No family hx of      Thyroid Disease No family hx of      Family History Negative No family hx of      Hearing Loss No family hx of          Current Outpatient Medications   Medication Sig Dispense Refill     amLODIPine (NORVASC) 5 MG tablet TAKE 1 TABLET (5 MG) BY MOUTH DAILY 90 tablet 3     apixaban ANTICOAGULANT (ELIQUIS ANTICOAGULANT) 5 MG tablet TAKE 1 TABLET (5 MG) BY MOUTH 2 TIMES DAILY 180 tablet 3     gabapentin (NEURONTIN) 300 MG capsule TAKE 3 CAPSULES THREE TIMES DAILY 270 capsule 0     GLUCOSAMINE SULFATE PO Take 1,000 mg by mouth 2 times daily       levothyroxine (SYNTHROID/LEVOTHROID) 100 MCG tablet TAKE 1 TABLET (100 MCG) BY MOUTH DAILY 90 tablet 0     losartan (COZAAR) 100 MG tablet TAKE 1 TABLET (100 MG) BY MOUTH DAILY 90 tablet 3     metoprolol succinate ER (TOPROL-XL) 50 MG 24 hr tablet TAKE 1 TABLET (50 MG) BY MOUTH DAILY 90 tablet 1     Multiple Vitamins-Minerals (PRESERVISION AREDS 2 PO)        Omega-3 Fatty Acids (OMEGA-3 FISH OIL PO) Take 3 g by mouth daily        omeprazole (PRILOSEC) 40 MG DR capsule TAKE 1 CAPSULE (40 MG) BY MOUTH DAILY TAKE 30-60 MINUTES BEFORE A MEAL. 90 capsule 1     sertraline (ZOLOFT) 50 MG tablet Take 1 tablet (50 mg) by mouth daily 90 tablet 1     solifenacin (VESICARE) 5 MG tablet TAKE 1 TABLET (5 MG)  "BY MOUTH DAILY 90 tablet 2     STATIN NOT PRESCRIBED (INTENTIONAL) Please choose reason not prescribed, below       VITAMIN D, CHOLECALCIFEROL, PO Take by mouth daily       potassium chloride ER (K-TAB) 20 MEQ CR tablet Take 1 tablet (20 mEq) by mouth daily (Patient not taking: Reported on 5/13/2020) 30 tablet 1     Allergies   Allergen Reactions     Atorvastatin      Ezetimibe      Gentamicin      Hydroxyzine      Lorazepam      Ranitidine      Simvastatin      Lopid [Gemfibrozil] GI Disturbance     Bloating, constipation,      Pravastatin Muscle Pain (Myalgia)     Recent Labs   Lab Test 03/16/20  0856 01/20/20  1015 10/30/19  1026 10/30/19  1026 09/20/19  0855 01/23/19  0952 01/23/19  0952 07/23/18  1013   LDL  --   --   --  145*  --   --  56 53   HDL  --   --   --  41*  --   --  47* 49*   TRIG  --   --   --  192*  --   --  112 142   ALT  --  67*  --  45 40   < > 26 31   CR 0.92 0.92   < > 0.83 0.84   < > 0.87 0.87   GFRESTIMATED 62 61   < > 70 69   < > 66 64   GFRESTBLACK 71 71   < > 81 80   < > 77 77   POTASSIUM 3.9 4.0   < > 3.9 4.3   < > 4.1 3.9   TSH 6.02* 4.51*   < > 10.11* 9.61*   < > 4.41* 3.63    < > = values in this interval not displayed.      BP Readings from Last 3 Encounters:   10/29/20 126/62   03/16/20 126/64   01/20/20 112/64    Wt Readings from Last 3 Encounters:   10/29/20 84.5 kg (186 lb 4.8 oz)   05/13/20 86.2 kg (190 lb)   03/16/20 88.5 kg (195 lb)           Review of Systems   Constitutional, HEENT, cardiovascular, pulmonary, gi and gu systems are negative, except as otherwise noted.      Objective    /62 (BP Location: Left arm, Patient Position: Chair, Cuff Size: Adult Large)   Pulse 64   Temp 97.1  F (36.2  C) (Tympanic)   Resp 16   Ht 1.575 m (5' 2\")   Wt 84.5 kg (186 lb 4.8 oz)   SpO2 97%   BMI 34.07 kg/m    Body mass index is 34.07 kg/m .  Physical Exam   GENERAL: healthy, alert and no distress  HENT: Moist mucous membranes. Nose and mouth without ulcers or lesions.   NECK: " Neck with well healed surgical scar to left side. No adenopathy, no asymmetry, masses, or scars and thyroid normal to palpation.   RESP: Respirations regular and unlabored. lungs clear to auscultation - no rales, rhonchi or wheezes  CV: regular rate and rhythm, normal S1 S2, no S3 or S4, no murmur, click or rub and peripheral pulses strong. No peripheral edema.  MS: no gross musculoskeletal defects noted  SKIN: no suspicious lesions or rashes.   PSYCH: mentation appears normal, affect normal/bright    Results for orders placed or performed in visit on 10/29/20 (from the past 24 hour(s))   Comprehensive metabolic panel   Result Value Ref Range    Sodium 143 133 - 144 mmol/L    Potassium 4.1 3.4 - 5.3 mmol/L    Chloride 111 (H) 94 - 109 mmol/L    Carbon Dioxide 25 20 - 32 mmol/L    Anion Gap 7 3 - 14 mmol/L    Glucose 110 (H) 70 - 99 mg/dL    Urea Nitrogen 17 7 - 30 mg/dL    Creatinine 1.00 0.52 - 1.04 mg/dL    GFR Estimate 55 (L) >60 mL/min/[1.73_m2]    GFR Estimate If Black 64 >60 mL/min/[1.73_m2]    Calcium 8.5 8.5 - 10.1 mg/dL    Bilirubin Total 0.7 0.2 - 1.3 mg/dL    Albumin 3.4 3.4 - 5.0 g/dL    Protein Total 7.5 6.8 - 8.8 g/dL    Alkaline Phosphatase 124 40 - 150 U/L    ALT 34 0 - 50 U/L    AST 22 0 - 45 U/L   TSH with free T4 reflex   Result Value Ref Range    TSH 3.67 0.40 - 4.00 mU/L   Lipid Profile (Chol, Trig, HDL, LDL calc)   Result Value Ref Range    Cholesterol 200 (H) <200 mg/dL    Triglycerides 138 <150 mg/dL    HDL Cholesterol 42 (L) >49 mg/dL    LDL Cholesterol Calculated 130 (H) <100 mg/dL    Non HDL Cholesterol 158 (H) <130 mg/dL   CBC with platelets   Result Value Ref Range    WBC 13.1 (H) 4.0 - 11.0 10e9/L    RBC Count 5.07 3.8 - 5.2 10e12/L    Hemoglobin 13.9 11.7 - 15.7 g/dL    Hematocrit 44.0 35.0 - 47.0 %    MCV 87 78 - 100 fl    MCH 27.4 26.5 - 33.0 pg    MCHC 31.6 31.5 - 36.5 g/dL    RDW 13.8 10.0 - 15.0 %    Platelet Count 328 150 - 450 10e9/L     Results for orders placed or performed in  visit on 10/29/20   Comprehensive metabolic panel     Status: Abnormal   Result Value Ref Range    Sodium 143 133 - 144 mmol/L    Potassium 4.1 3.4 - 5.3 mmol/L    Chloride 111 (H) 94 - 109 mmol/L    Carbon Dioxide 25 20 - 32 mmol/L    Anion Gap 7 3 - 14 mmol/L    Glucose 110 (H) 70 - 99 mg/dL    Urea Nitrogen 17 7 - 30 mg/dL    Creatinine 1.00 0.52 - 1.04 mg/dL    GFR Estimate 55 (L) >60 mL/min/[1.73_m2]    GFR Estimate If Black 64 >60 mL/min/[1.73_m2]    Calcium 8.5 8.5 - 10.1 mg/dL    Bilirubin Total 0.7 0.2 - 1.3 mg/dL    Albumin 3.4 3.4 - 5.0 g/dL    Protein Total 7.5 6.8 - 8.8 g/dL    Alkaline Phosphatase 124 40 - 150 U/L    ALT 34 0 - 50 U/L    AST 22 0 - 45 U/L   TSH with free T4 reflex     Status: None   Result Value Ref Range    TSH 3.67 0.40 - 4.00 mU/L   Lipid Profile (Chol, Trig, HDL, LDL calc)     Status: Abnormal   Result Value Ref Range    Cholesterol 200 (H) <200 mg/dL    Triglycerides 138 <150 mg/dL    HDL Cholesterol 42 (L) >49 mg/dL    LDL Cholesterol Calculated 130 (H) <100 mg/dL    Non HDL Cholesterol 158 (H) <130 mg/dL   CBC with platelets     Status: Abnormal   Result Value Ref Range    WBC 13.1 (H) 4.0 - 11.0 10e9/L    RBC Count 5.07 3.8 - 5.2 10e12/L    Hemoglobin 13.9 11.7 - 15.7 g/dL    Hematocrit 44.0 35.0 - 47.0 %    MCV 87 78 - 100 fl    MCH 27.4 26.5 - 33.0 pg    MCHC 31.6 31.5 - 36.5 g/dL    RDW 13.8 10.0 - 15.0 %    Platelet Count 328 150 - 450 10e9/L           Assessment & Plan     1. Benign essential hypertension  - Comprehensive metabolic panel  - CBC with platelets    2. Mixed hyperlipidemia  - Lipid Profile (Chol, Trig, HDL, LDL calc)    3. CKD (chronic kidney disease) stage 2, GFR 60-89 ml/min  -Comprehensive metabolic panel    4. Hypothyroidism due to acquired atrophy of thyroid  - TSH with free T4 reflex    5. Papillary thyroid carcinoma (H)  - TSH with free T4 reflex    6. Myalgia  - gabapentin (NEURONTIN) 300 MG capsule; TAKE 3 CAPSULES THREE TIMES DAILY  Dispense: 270  "capsule; Refill: 0    7. Anxiety  - sertraline (ZOLOFT) 50 MG tablet; Take 1 tablet (50 mg) by mouth daily  Dispense: 90 tablet; Refill: 1    8. Menopause  - DX Hip/Pelvis/Spine    9. Encounter for screening mammogram for breast cancer  - MA Screen Bilateral w/Osito; Future    10. Encounter for screening colonoscopy  - GENERAL SURG ADULT REFERRAL    11. Immunity status testing  - Varicella Zoster Virus Antibody IgG         BMI:   Estimated body mass index is 34.07 kg/m  as calculated from the following:    Height as of this encounter: 1.575 m (5' 2\").    Weight as of this encounter: 84.5 kg (186 lb 4.8 oz).   Weight management plan: Discussed healthy diet and exercise guidelines         Declined flu vaccine today.     Return in about 6 months (around 4/29/2021) for hypertension and lipids, thyroid.    DANNY Reed student    Patient is seen in conjunction with NP student.  History is reviewed with patient and pertinent portions of the exam are repeated.  Assessment and plan is reviewed with the patient.        Randi Haddad, NP  [facility] [place] [facility]    "

## 2020-10-29 ENCOUNTER — OFFICE VISIT (OUTPATIENT)
Dept: FAMILY MEDICINE | Facility: OTHER | Age: 75
End: 2020-10-29
Attending: NURSE PRACTITIONER
Payer: COMMERCIAL

## 2020-10-29 VITALS
OXYGEN SATURATION: 97 % | TEMPERATURE: 97.1 F | RESPIRATION RATE: 16 BRPM | BODY MASS INDEX: 34.28 KG/M2 | WEIGHT: 186.3 LBS | HEART RATE: 64 BPM | DIASTOLIC BLOOD PRESSURE: 62 MMHG | HEIGHT: 62 IN | SYSTOLIC BLOOD PRESSURE: 126 MMHG

## 2020-10-29 DIAGNOSIS — M79.10 MYALGIA: ICD-10-CM

## 2020-10-29 DIAGNOSIS — Z12.11 ENCOUNTER FOR SCREENING COLONOSCOPY: ICD-10-CM

## 2020-10-29 DIAGNOSIS — Z12.31 ENCOUNTER FOR SCREENING MAMMOGRAM FOR BREAST CANCER: ICD-10-CM

## 2020-10-29 DIAGNOSIS — Z01.84 IMMUNITY STATUS TESTING: ICD-10-CM

## 2020-10-29 DIAGNOSIS — E03.4 HYPOTHYROIDISM DUE TO ACQUIRED ATROPHY OF THYROID: ICD-10-CM

## 2020-10-29 DIAGNOSIS — F41.9 ANXIETY: ICD-10-CM

## 2020-10-29 DIAGNOSIS — C73 PAPILLARY THYROID CARCINOMA (H): Chronic | ICD-10-CM

## 2020-10-29 DIAGNOSIS — I10 BENIGN ESSENTIAL HYPERTENSION: Primary | Chronic | ICD-10-CM

## 2020-10-29 DIAGNOSIS — E78.2 MIXED HYPERLIPIDEMIA: Chronic | ICD-10-CM

## 2020-10-29 DIAGNOSIS — N18.2 CKD (CHRONIC KIDNEY DISEASE) STAGE 2, GFR 60-89 ML/MIN: Chronic | ICD-10-CM

## 2020-10-29 DIAGNOSIS — Z78.0 MENOPAUSE: ICD-10-CM

## 2020-10-29 LAB
ALBUMIN SERPL-MCNC: 3.4 G/DL (ref 3.4–5)
ALP SERPL-CCNC: 124 U/L (ref 40–150)
ALT SERPL W P-5'-P-CCNC: 34 U/L (ref 0–50)
ANION GAP SERPL CALCULATED.3IONS-SCNC: 7 MMOL/L (ref 3–14)
AST SERPL W P-5'-P-CCNC: 22 U/L (ref 0–45)
BILIRUB SERPL-MCNC: 0.7 MG/DL (ref 0.2–1.3)
BUN SERPL-MCNC: 17 MG/DL (ref 7–30)
CALCIUM SERPL-MCNC: 8.5 MG/DL (ref 8.5–10.1)
CHLORIDE SERPL-SCNC: 111 MMOL/L (ref 94–109)
CHOLEST SERPL-MCNC: 200 MG/DL
CO2 SERPL-SCNC: 25 MMOL/L (ref 20–32)
CREAT SERPL-MCNC: 1 MG/DL (ref 0.52–1.04)
ERYTHROCYTE [DISTWIDTH] IN BLOOD BY AUTOMATED COUNT: 13.8 % (ref 10–15)
GFR SERPL CREATININE-BSD FRML MDRD: 55 ML/MIN/{1.73_M2}
GLUCOSE SERPL-MCNC: 110 MG/DL (ref 70–99)
HCT VFR BLD AUTO: 44 % (ref 35–47)
HDLC SERPL-MCNC: 42 MG/DL
HGB BLD-MCNC: 13.9 G/DL (ref 11.7–15.7)
LDLC SERPL CALC-MCNC: 130 MG/DL
MCH RBC QN AUTO: 27.4 PG (ref 26.5–33)
MCHC RBC AUTO-ENTMCNC: 31.6 G/DL (ref 31.5–36.5)
MCV RBC AUTO: 87 FL (ref 78–100)
NONHDLC SERPL-MCNC: 158 MG/DL
PLATELET # BLD AUTO: 328 10E9/L (ref 150–450)
POTASSIUM SERPL-SCNC: 4.1 MMOL/L (ref 3.4–5.3)
PROT SERPL-MCNC: 7.5 G/DL (ref 6.8–8.8)
RBC # BLD AUTO: 5.07 10E12/L (ref 3.8–5.2)
SODIUM SERPL-SCNC: 143 MMOL/L (ref 133–144)
TRIGL SERPL-MCNC: 138 MG/DL
TSH SERPL DL<=0.005 MIU/L-ACNC: 3.67 MU/L (ref 0.4–4)
WBC # BLD AUTO: 13.1 10E9/L (ref 4–11)

## 2020-10-29 PROCEDURE — 85027 COMPLETE CBC AUTOMATED: CPT | Mod: ZL | Performed by: NURSE PRACTITIONER

## 2020-10-29 PROCEDURE — 80053 COMPREHEN METABOLIC PANEL: CPT | Mod: ZL | Performed by: NURSE PRACTITIONER

## 2020-10-29 PROCEDURE — G0463 HOSPITAL OUTPT CLINIC VISIT: HCPCS

## 2020-10-29 PROCEDURE — 80061 LIPID PANEL: CPT | Mod: ZL | Performed by: NURSE PRACTITIONER

## 2020-10-29 PROCEDURE — 36415 COLL VENOUS BLD VENIPUNCTURE: CPT | Mod: ZL | Performed by: NURSE PRACTITIONER

## 2020-10-29 PROCEDURE — 86787 VARICELLA-ZOSTER ANTIBODY: CPT | Mod: ZL | Performed by: NURSE PRACTITIONER

## 2020-10-29 PROCEDURE — 99214 OFFICE O/P EST MOD 30 MIN: CPT | Performed by: NURSE PRACTITIONER

## 2020-10-29 PROCEDURE — G0463 HOSPITAL OUTPT CLINIC VISIT: HCPCS | Mod: 25

## 2020-10-29 PROCEDURE — 84443 ASSAY THYROID STIM HORMONE: CPT | Mod: ZL | Performed by: NURSE PRACTITIONER

## 2020-10-29 RX ORDER — GABAPENTIN 300 MG/1
CAPSULE ORAL
Qty: 270 CAPSULE | Refills: 0 | Status: SHIPPED | OUTPATIENT
Start: 2020-10-29 | End: 2020-12-03

## 2020-10-29 ASSESSMENT — ANXIETY QUESTIONNAIRES
2. NOT BEING ABLE TO STOP OR CONTROL WORRYING: NOT AT ALL
1. FEELING NERVOUS, ANXIOUS, OR ON EDGE: NOT AT ALL
6. BECOMING EASILY ANNOYED OR IRRITABLE: NOT AT ALL
GAD7 TOTAL SCORE: 0
5. BEING SO RESTLESS THAT IT IS HARD TO SIT STILL: NOT AT ALL
3. WORRYING TOO MUCH ABOUT DIFFERENT THINGS: NOT AT ALL
7. FEELING AFRAID AS IF SOMETHING AWFUL MIGHT HAPPEN: NOT AT ALL
4. TROUBLE RELAXING: NOT AT ALL
IF YOU CHECKED OFF ANY PROBLEMS ON THIS QUESTIONNAIRE, HOW DIFFICULT HAVE THESE PROBLEMS MADE IT FOR YOU TO DO YOUR WORK, TAKE CARE OF THINGS AT HOME, OR GET ALONG WITH OTHER PEOPLE: NOT DIFFICULT AT ALL

## 2020-10-29 ASSESSMENT — PATIENT HEALTH QUESTIONNAIRE - PHQ9: SUM OF ALL RESPONSES TO PHQ QUESTIONS 1-9: 0

## 2020-10-29 ASSESSMENT — PAIN SCALES - GENERAL: PAINLEVEL: NO PAIN (0)

## 2020-10-29 ASSESSMENT — MIFFLIN-ST. JEOR: SCORE: 1298.3

## 2020-10-29 NOTE — NURSING NOTE
"Chief Complaint   Patient presents with     Hypertension     Lipids     Chronic Disease Management       Initial /62 (BP Location: Left arm, Patient Position: Chair, Cuff Size: Adult Large)   Pulse 64   Temp 97.1  F (36.2  C) (Tympanic)   Resp 16   Ht 1.575 m (5' 2\")   Wt 84.5 kg (186 lb 4.8 oz)   SpO2 97%   BMI 34.07 kg/m   Estimated body mass index is 34.07 kg/m  as calculated from the following:    Height as of this encounter: 1.575 m (5' 2\").    Weight as of this encounter: 84.5 kg (186 lb 4.8 oz).  Medication Reconciliation: complete  Pamela M. Lechevalier, LPN    "

## 2020-10-29 NOTE — PATIENT INSTRUCTIONS
"  Assessment & Plan       1. Benign essential hypertension  - Comprehensive metabolic panel  - CBC with platelets    2. Anxiety  - sertraline (ZOLOFT) 50 MG tablet; Take 1 tablet (50 mg) by mouth daily  Dispense: 90 tablet; Refill: 1    3. Papillary thyroid carcinoma (H)  - TSH with free T4 reflex    4. LA NENA (generalized anxiety disorder)    5. Myalgia  - gabapentin (NEURONTIN) 300 MG capsule; TAKE 3 CAPSULES THREE TIMES DAILY  Dispense: 270 capsule; Refill: 0    6. Encounter for screening mammogram for breast cancer  - MA Screen Bilateral w/Osito; Future    7. CKD (chronic kidney disease) stage 2, GFR 60-89 ml/min    8. Mixed hyperlipidemia  - Lipid Profile (Chol, Trig, HDL, LDL calc)    9. Neuropathy    10. Hypothyroidism due to acquired atrophy of thyroid  - TSH with free T4 reflex    11. Encounter for screening colonoscopy  - GENERAL SURG ADULT REFERRAL    12. Menopause  - DX Hip/Pelvis/Spine    13. Immunity status testing  - Varicella Zoster Virus Antibody IgG       BMI:   Estimated body mass index is 34.07 kg/m  as calculated from the following:    Height as of this encounter: 1.575 m (5' 2\").    Weight as of this encounter: 84.5 kg (186 lb 4.8 oz).   Weight management plan: Discussed healthy diet and exercise guidelines         Declined flu vaccine today.     Return in about 6 months (around 4/29/2021) for hypertension and lipids, thyroid.    Melissa Mayorga, DAINAP student    Randi Haddad, NP  [facility]    "

## 2020-10-30 LAB — VZV IGG SER QL IA: 4 AI (ref 0–0.8)

## 2020-10-30 ASSESSMENT — ANXIETY QUESTIONNAIRES: GAD7 TOTAL SCORE: 0

## 2020-11-02 ENCOUNTER — ONCOLOGY VISIT (OUTPATIENT)
Dept: ONCOLOGY | Facility: OTHER | Age: 75
End: 2020-11-02
Attending: INTERNAL MEDICINE
Payer: COMMERCIAL

## 2020-11-02 VITALS
OXYGEN SATURATION: 96 % | HEIGHT: 62 IN | RESPIRATION RATE: 18 BRPM | SYSTOLIC BLOOD PRESSURE: 110 MMHG | WEIGHT: 188.71 LBS | DIASTOLIC BLOOD PRESSURE: 64 MMHG | HEART RATE: 66 BPM | TEMPERATURE: 97.8 F | BODY MASS INDEX: 34.73 KG/M2

## 2020-11-02 DIAGNOSIS — C73 PAPILLARY THYROID CARCINOMA (H): ICD-10-CM

## 2020-11-02 DIAGNOSIS — C73 PAPILLARY THYROID CARCINOMA (H): Primary | Chronic | ICD-10-CM

## 2020-11-02 LAB
ALBUMIN SERPL-MCNC: 3.3 G/DL (ref 3.4–5)
ALP SERPL-CCNC: 119 U/L (ref 40–150)
ALT SERPL W P-5'-P-CCNC: 32 U/L (ref 0–50)
ANION GAP SERPL CALCULATED.3IONS-SCNC: 5 MMOL/L (ref 3–14)
AST SERPL W P-5'-P-CCNC: 17 U/L (ref 0–45)
BASOPHILS # BLD AUTO: 0.1 10E9/L (ref 0–0.2)
BASOPHILS NFR BLD AUTO: 0.4 %
BILIRUB SERPL-MCNC: 0.5 MG/DL (ref 0.2–1.3)
BUN SERPL-MCNC: 11 MG/DL (ref 7–30)
CALCIUM SERPL-MCNC: 8.2 MG/DL (ref 8.5–10.1)
CHLORIDE SERPL-SCNC: 110 MMOL/L (ref 94–109)
CO2 SERPL-SCNC: 27 MMOL/L (ref 20–32)
CREAT SERPL-MCNC: 0.93 MG/DL (ref 0.52–1.04)
DIFFERENTIAL METHOD BLD: ABNORMAL
EOSINOPHIL # BLD AUTO: 0.1 10E9/L (ref 0–0.7)
EOSINOPHIL NFR BLD AUTO: 1 %
ERYTHROCYTE [DISTWIDTH] IN BLOOD BY AUTOMATED COUNT: 13.1 % (ref 10–15)
GFR SERPL CREATININE-BSD FRML MDRD: 60 ML/MIN/{1.73_M2}
GLUCOSE SERPL-MCNC: 97 MG/DL (ref 70–99)
HCT VFR BLD AUTO: 43 % (ref 35–47)
HGB BLD-MCNC: 13.9 G/DL (ref 11.7–15.7)
IMM GRANULOCYTES # BLD: 0.1 10E9/L (ref 0–0.4)
IMM GRANULOCYTES NFR BLD: 0.5 %
LDH SERPL L TO P-CCNC: 191 U/L (ref 81–234)
LYMPHOCYTES # BLD AUTO: 2 10E9/L (ref 0.8–5.3)
LYMPHOCYTES NFR BLD AUTO: 15 %
MCH RBC QN AUTO: 27.4 PG (ref 26.5–33)
MCHC RBC AUTO-ENTMCNC: 32.3 G/DL (ref 31.5–36.5)
MCV RBC AUTO: 85 FL (ref 78–100)
MONOCYTES # BLD AUTO: 0.6 10E9/L (ref 0–1.3)
MONOCYTES NFR BLD AUTO: 4.7 %
NEUTROPHILS # BLD AUTO: 10.2 10E9/L (ref 1.6–8.3)
NEUTROPHILS NFR BLD AUTO: 78.4 %
NRBC # BLD AUTO: 0 10*3/UL
NRBC BLD AUTO-RTO: 0 /100
PLATELET # BLD AUTO: 351 10E9/L (ref 150–450)
POTASSIUM SERPL-SCNC: 4.3 MMOL/L (ref 3.4–5.3)
PROT SERPL-MCNC: 7.2 G/DL (ref 6.8–8.8)
RBC # BLD AUTO: 5.08 10E12/L (ref 3.8–5.2)
SODIUM SERPL-SCNC: 142 MMOL/L (ref 133–144)
T4 FREE SERPL-MCNC: 1.2 NG/DL (ref 0.76–1.46)
TSH SERPL DL<=0.005 MIU/L-ACNC: 1.42 MU/L (ref 0.4–4)
WBC # BLD AUTO: 13.1 10E9/L (ref 4–11)

## 2020-11-02 PROCEDURE — 84443 ASSAY THYROID STIM HORMONE: CPT | Mod: ZL | Performed by: INTERNAL MEDICINE

## 2020-11-02 PROCEDURE — 84439 ASSAY OF FREE THYROXINE: CPT | Mod: ZL | Performed by: INTERNAL MEDICINE

## 2020-11-02 PROCEDURE — 99214 OFFICE O/P EST MOD 30 MIN: CPT | Performed by: INTERNAL MEDICINE

## 2020-11-02 PROCEDURE — 36415 COLL VENOUS BLD VENIPUNCTURE: CPT | Mod: ZL | Performed by: INTERNAL MEDICINE

## 2020-11-02 PROCEDURE — 80053 COMPREHEN METABOLIC PANEL: CPT | Mod: ZL | Performed by: INTERNAL MEDICINE

## 2020-11-02 PROCEDURE — 83615 LACTATE (LD) (LDH) ENZYME: CPT | Mod: ZL | Performed by: INTERNAL MEDICINE

## 2020-11-02 PROCEDURE — G0463 HOSPITAL OUTPT CLINIC VISIT: HCPCS

## 2020-11-02 PROCEDURE — 85025 COMPLETE CBC W/AUTO DIFF WBC: CPT | Mod: ZL | Performed by: INTERNAL MEDICINE

## 2020-11-02 ASSESSMENT — MIFFLIN-ST. JEOR: SCORE: 1309.25

## 2020-11-02 ASSESSMENT — PAIN SCALES - GENERAL: PAINLEVEL: NO PAIN (0)

## 2020-11-02 NOTE — NURSING NOTE
"Chief Complaint   Patient presents with     RECHECK     leukocytosis/ papillary thyroid carcinoma       Initial /64   Pulse 66   Temp 97.8  F (36.6  C) (Tympanic)   Resp 18   Ht 1.575 m (5' 2\")   Wt 85.6 kg (188 lb 11.4 oz)   SpO2 96%   BMI 34.52 kg/m   Estimated body mass index is 34.52 kg/m  as calculated from the following:    Height as of this encounter: 1.575 m (5' 2\").    Weight as of this encounter: 85.6 kg (188 lb 11.4 oz).  Medication Reconciliation: complete.  Immunizations and advance directives status reviewed. Pain scale =0 , PHQ-2=0.            Jina Juarez LPN    "

## 2020-11-02 NOTE — PATIENT INSTRUCTIONS
We would like to see you back in 6 months with labs the same day . Please come 30minute(s) prior for lab work.  When you are in need of a refill of your medications, please call your pharmacy and they will send us the request. If you have any questions please call [phone]

## 2020-11-03 NOTE — PROGRESS NOTES
Visit Date:   11/02/2020      HEMATOLOGY/ONCOLOGY CLINIC NOTE      HISTORY OF PRESENT ILLNESS:  Mrs. Lemon returns for followup of leukocytosis and newly diagnosed papillary thyroid carcinoma.  We had seen the patient in consultation at the request of Randi Haddad, nurse practitioner, on 04/15/2019.  At that time she was a 73-year-old white female with history of hypothyroidism, atrial fibrillation and hypertension, we were asked to evaluate concerning a diagnosis of leukocytosis.  According to the patient, she has had this chronically for years.  She had been seen by Dr. Presley approximately 5 years prior who noted an elevated white count and treated her empirically with antibiotics.  She stated her white count went down and then it would go back up.  Also, she had a white count evaluated by Aroldo Haddad, nurse practitioner, who noted the patient on 06/28/2014, had a white count was elevated.   On 01/23 CBC was drawn.  Her white count 13.4, hemoglobin 13.8, hematocrit 42.2.  She ordered a peripheral blood smear.  This came back that there was mild neutrophilia, reactive lymphocytes, and mild reticulocytosis.  The patient had a CBC repeated on 01/28.  At that time her white count had dropped to 12.8 with 72.7% neutrophils, consistent with neutrophilia.  Hemoglobin was 14.5, hematocrit 44.8, platelet count 342.  When we saw the patient, we felt she had likely neutrophilia secondary to reactive process, i.e., allergic rhinitis.  Nonetheless, we wanted to rule out other etiologies.  We obtained BCR-ABL transcript.  This came back negative.  We also obtained a sed rate, rheumatoid factor, MALIA, serum protein electrophoresis, all of which were negative.  Lyme titers were negative.  Wendy-Barr virus profile was negative.  CT chest, abdomen and pelvis were negative except that there was a 1 cm heavily calcified nodule within the left lobe of the thyroid.  Thyroid ultrasound was recommended.  The  patient underwent a thyroid ultrasound that revealed a left thyroid nodule; malignancy could not be ruled out.  Subsequently, the patient underwent a biopsy, which came back as papillary thyroid carcinoma.  I referred the patient to Dr. Pineda for further management.  The patient underwent a left hemithyroidectomy performed on 08/27.  The findings were the patient had papillary thyroid carcinoma that measured 1 x 1 x1 cm.  There was lymphocytic thyroiditis, severe, widespread.  There was no evidence of angiolymphatic invasion.  There was no extranodal extension.  The patient was staged pathologic T1a NX.  The patient underwent cataract surgery.  Otherwise, the plan was for a thyroid ultrasound to be done in 08/2020.  The patient apparently missed her appointment to follow up with us, but nonetheless did have a thyroid ultrasound on 08/12/2020, and the findings were that there was heterogeneous right lobe without discrete nodule noted.  The patient otherwise offers no complaints of fevers, night sweats, weight loss, abdominal pain, chest pain, headaches, bone pain.      PHYSICAL EXAMINATION:   GENERAL:  She is an elderly white female in no acute distress.   VITAL SIGNS:  Blood pressure 110/64, pulse 66, respirations 18, temperature 97.8.   HEENT:  Atraumatic, normocephalic.  Oropharynx nonerythematous.   NECK:  Supple.   LUNGS:  Clear to auscultation and percussion.   HEART:  Regular rhythm, S1, S2 normal.   ABDOMEN:  Soft, normoactive bowel sounds.  No masses, nontender.   LYMPHATICS:  No cervical, supraclavicular, axillary or inguinal nodes.   EXTREMITIES:  Without edema.   NEUROLOGIC:  Nonfocal.      LABORATORY DATA:  Laboratories reveal CBC with white count 13.1 with 78.4% neutrophils, 10.2, neutrophils, H and H 13.9 and 43.0, platelet count 351.  LFTs are normal.  LDH is normal.      IMPRESSION:   1.  Neutrophilia, likely reactive.  We will monitor.  If white count rises above 20 we may consider bone marrow  aspiration biopsy.  For now we will monitor.   2.  Papillary thyroid carcinoma, pathologic T1a, status post left hemithyroidectomy.  Thyroid ultrasound is stable.  The plan is to continue Synthroid.  Otherwise, we will see the patient in 6 months.  Obtain CBC, CMP, LDH.      Forty minutes was spent with the patient, greater than half the time was spent in counseling and coordinating of care.         JULIO PARSON MD             D: 2020   T: 2020   MT: JOSH      Name:     BULL ELI   MRN:      [id number]        Account:      [id number]   :      1945           Visit Date:   2020      Document: [id number]       cc: Randi Pineda DO

## 2020-11-16 ENCOUNTER — TELEPHONE (OUTPATIENT)
Dept: SURGERY | Facility: OTHER | Age: 75
End: 2020-11-16

## 2020-11-16 NOTE — TELEPHONE ENCOUNTER
Referral received for colonoscopy.   Family history of colon cancer in brother.  This patient was NOT approved by surgery education nurses for meet and greet colonoscopy and will  need a preop appointment with Aroldo Haddad.   Patient is on ELIQUIS.   Has CKD 2-will need Golytely.  1st attempt to call patient to schedule. No answer. Left message for patient to return call.    She was previously scheduled for March 2020, but cancelled due to COVID pandemic. She was prescribed Golytely at that time--may still have her prep.

## 2020-11-24 DIAGNOSIS — E03.4 HYPOTHYROIDISM DUE TO ACQUIRED ATROPHY OF THYROID: ICD-10-CM

## 2020-11-25 RX ORDER — LEVOTHYROXINE SODIUM 100 UG/1
100 TABLET ORAL DAILY
Qty: 90 TABLET | Refills: 0 | Status: SHIPPED | OUTPATIENT
Start: 2020-11-25 | End: 2021-03-04

## 2020-12-03 DIAGNOSIS — M79.10 MYALGIA: ICD-10-CM

## 2020-12-03 RX ORDER — GABAPENTIN 300 MG/1
CAPSULE ORAL
Qty: 270 CAPSULE | Refills: 0 | Status: SHIPPED | OUTPATIENT
Start: 2020-12-03 | End: 2021-01-04

## 2020-12-03 NOTE — TELEPHONE ENCOUNTER
GABAPENTIN 300 MG CAPS 300 Capsule        Last Written Prescription Date:  10/29/20  Last Fill Quantity: 270,   # refills: 0  Last Office Visit: 10/29/20  Future Office visit:       Routing refill request to provider for review/approval because:    Drug not on the [facility], P or [facility] refill protocol or controlled substance

## 2020-12-21 ENCOUNTER — HOSPITAL ENCOUNTER (OUTPATIENT)
Dept: BONE DENSITY | Facility: HOSPITAL | Age: 75
End: 2020-12-21
Attending: NURSE PRACTITIONER
Payer: COMMERCIAL

## 2020-12-21 ENCOUNTER — ANCILLARY PROCEDURE (OUTPATIENT)
Dept: MAMMOGRAPHY | Facility: OTHER | Age: 75
End: 2020-12-21
Attending: NURSE PRACTITIONER
Payer: COMMERCIAL

## 2020-12-21 DIAGNOSIS — K21.9 GASTROESOPHAGEAL REFLUX DISEASE: ICD-10-CM

## 2020-12-21 DIAGNOSIS — Z12.31 ENCOUNTER FOR SCREENING MAMMOGRAM FOR BREAST CANCER: ICD-10-CM

## 2020-12-21 PROCEDURE — 77080 DXA BONE DENSITY AXIAL: CPT

## 2020-12-21 PROCEDURE — 77063 BREAST TOMOSYNTHESIS BI: CPT | Mod: TC

## 2020-12-22 RX ORDER — OMEPRAZOLE 40 MG/1
CAPSULE, DELAYED RELEASE ORAL
Qty: 90 CAPSULE | Refills: 1 | Status: SHIPPED | OUTPATIENT
Start: 2020-12-22 | End: 2021-04-28

## 2020-12-24 DIAGNOSIS — F41.9 ANXIETY: ICD-10-CM

## 2020-12-24 NOTE — TELEPHONE ENCOUNTER
Zoloft  Last Written Prescription Date: 10/29/20  Last Fill Quantity: 90 # of Refills: 1  Last Office Visit: 10/29/20

## 2020-12-28 DIAGNOSIS — N18.2 CKD (CHRONIC KIDNEY DISEASE) STAGE 2, GFR 60-89 ML/MIN: Primary | Chronic | ICD-10-CM

## 2020-12-28 DIAGNOSIS — I48.0 PAROXYSMAL ATRIAL FIBRILLATION (H): ICD-10-CM

## 2020-12-29 RX ORDER — APIXABAN 5 MG/1
TABLET, FILM COATED ORAL
Qty: 180 TABLET | Refills: 3 | Status: SHIPPED | OUTPATIENT
Start: 2020-12-29 | End: 2021-12-01

## 2020-12-29 NOTE — TELEPHONE ENCOUNTER
Eliquis      Last Written Prescription Date:  11/13/19  Last Fill Quantity: 180,   # refills: 3  Last Office Visit: 5/13/20  Future Office visit:       Routing refill request to provider for review/approval because:

## 2021-01-02 DIAGNOSIS — M79.10 MYALGIA: ICD-10-CM

## 2021-01-03 NOTE — TELEPHONE ENCOUNTER
Gabapentin 300 mg      Last Written Prescription Date:  12-3-2020  Last Fill Quantity: 270,   # refills: 0  Last Office Visit: 10-

## 2021-01-04 RX ORDER — GABAPENTIN 300 MG/1
CAPSULE ORAL
Qty: 270 CAPSULE | Refills: 0 | Status: SHIPPED | OUTPATIENT
Start: 2021-01-04 | End: 2021-02-03

## 2021-01-13 ENCOUNTER — PREP FOR PROCEDURE (OUTPATIENT)
Dept: SURGERY | Facility: OTHER | Age: 76
End: 2021-01-13

## 2021-01-13 ENCOUNTER — TELEPHONE (OUTPATIENT)
Dept: SURGERY | Facility: OTHER | Age: 76
End: 2021-01-13

## 2021-01-13 DIAGNOSIS — Z12.11 COLON CANCER SCREENING: Primary | ICD-10-CM

## 2021-01-13 DIAGNOSIS — Z01.818 PREOP TESTING: Primary | ICD-10-CM

## 2021-01-13 NOTE — TELEPHONE ENCOUNTER
Returned call to patient. She was previously scheduled for colonoscopy last winter, but was cancelled due to COVID-19. She still has her golytely prep and states it is not  and she still has her written instructions. She states she does not need updated instructions.    Scheduled for colonoscopy 2021 with Dr. Dow. COVID test scheduled.

## 2021-01-14 PROBLEM — Z12.11 COLON CANCER SCREENING: Status: ACTIVE | Noted: 2020-02-24

## 2021-01-25 ENCOUNTER — TELEPHONE (OUTPATIENT)
Dept: SURGERY | Facility: OTHER | Age: 76
End: 2021-01-25

## 2021-01-25 NOTE — TELEPHONE ENCOUNTER
Referral received for colonoscopy.   This patient was approved by surgery education nurses for meet and greet colonoscopy and will need a preop appointment.   Second attempt to call patient to schedule. No answer. Letter being sent. Katie Saavedra LPN

## 2021-01-26 NOTE — H&P (VIEW-ONLY)
[facility]  [address]  [facility] [address]  Phone: [phone]  Primary Provider: Jeremy Guzman  Pre-op Performing Provider: JEREMY GUZMAN    PREOPERATIVE EVALUATION:  Today's date: 2/1/2021    Nella Lemon is a 75 year old female who presents for a preoperative evaluation.    Surgical Information:  Surgery/Procedure: Colonoscopy   Surgery Location: [facility]  Surgeon: Paloma  Surgery Date: 2/11/21  Time of Surgery: TBD  Where patient plans to recover: At home with family  Fax number for surgical facility: Note does not need to be faxed, will be available electronically in Epic.    Type of Anesthesia Anticipated: to be determined    Subjective     HPI related to upcoming procedure: due for screening colonoscopy.        Preop Questions 2/1/2021   1. Have you ever had a heart attack or stroke? No   2. Have you ever had surgery on your heart or blood vessels, such as a stent placement, a coronary artery bypass, or surgery on an artery in your head, neck, heart, or legs? No   3. Do you have chest pain with activity? No   4. Do you have a history of  heart failure? No   5. Do you currently have a cold, bronchitis or symptoms of other infection? No   6. Do you have a cough, shortness of breath, or wheezing? No   7. Do you or anyone in your family have previous history of blood clots? YES - father    8. Do you or does anyone in your family have a serious bleeding problem such as prolonged bleeding following surgeries or cuts? No   9. Have you ever had problems with anemia or been told to take iron pills? No   10. Have you had any abnormal blood loss such as black, tarry or bloody stools, or abnormal vaginal bleeding? No   11. Have you ever had a blood transfusion? No   12. Are you willing to have a blood transfusion if it is medically needed before, during, or after your surgery? Yes   13. Have you or any of your  relatives ever had problems with anesthesia? No   14. Do you have sleep apnea, excessive snoring or daytime drowsiness? No   15. Do you have any artifical heart valves or other implanted medical devices like a pacemaker, defibrillator, or continuous glucose monitor? No   16. Do you have artificial joints? No   17. Are you allergic to latex? No     Health Care Directive:  Patient has a Health Care Directive on file      Preoperative Review of :   reviewed - no record of controlled substances prescribed.      Status of Chronic Conditions:  A-FIB - Patient has a longstanding history of chronic A-fib currently on rate control. Current treatment regimen includes Apixaban for stroke prevention and denies significant symptoms of lightheadedness, palpitations or dyspnea.     HYPERLIPIDEMIA - Patient has a long history of significant Hyperlipidemia requiring medication for treatment with recent good control.   The ASCVD Risk score ([place] PANCHITO Jr., et al., 2013) failed to calculate for the following reasons:    The patient has a prior MI or stroke diagnosis      HYPERTENSION - Patient has longstanding history of HTN , currently denies any symptoms referable to elevated blood pressure. Specifically denies chest pain, palpitations, dyspnea, orthopnea, PND or peripheral edema. Blood pressure readings have been in normal range. Current medication regimen is as listed below. Patient denies any side effects of medication.   BP Readings from Last 3 Encounters:   02/01/21 118/68   11/02/20 110/64   10/29/20 126/62         HYPOTHYROIDISM - Patient has a longstanding history of chronic Hypothyroidism. Patient has been doing well, noting no tremor, insomnia, hair loss or changes in skin texture. Continues to take medications as directed, without adverse reactions or side effects. Last TSH   Lab Results   Component Value Date    TSH 1.42 11/02/2020   .      RENAL INSUFFICIENCY - Patient has a longstanding history of moderate-severe  chronic renal insufficiency.   Creatinine   Date Value Ref Range Status   02/01/2021 1.00 0.52 - 1.04 mg/dL Final          COVID test is scheduled for Sunday 7th, 2021.       Review of Systems  CONSTITUTIONAL: NEGATIVE for fever, chills, change in weight  INTEGUMENTARY/SKIN: NEGATIVE for worrisome rashes, moles or lesions  EYES: NEGATIVE for vision changes or irritation  ENT/MOUTH: NEGATIVE for ear, mouth and throat problems  RESP: NEGATIVE for significant cough or SOB  BREAST: NEGATIVE for masses, tenderness or discharge  CV: NEGATIVE for chest pain, palpitations or peripheral edema  GI: NEGATIVE for nausea, abdominal pain, heartburn, or change in bowel habits  : NEGATIVE for frequency, dysuria, or hematuria  MUSCULOSKELETAL: NEGATIVE for significant arthralgias or myalgia  NEURO: NEGATIVE for weakness, dizziness or paresthesias  ENDOCRINE: NEGATIVE for temperature intolerance, skin/hair changes  HEME: NEGATIVE for bleeding problems  PSYCHIATRIC: NEGATIVE for changes in mood or affect    Patient Active Problem List    Diagnosis Date Noted     Papillary thyroid carcinoma (H) 01/14/2020     Priority: High      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       Colon cancer screening 02/24/2020     Priority: Medium     Added automatically from request for surgery [id number]       LA NENA (generalized anxiety disorder) 01/14/2020     Priority: Medium     Lower extremity edema 11/13/2019     Priority: Medium     Class 2 severe obesity due to excess calories with serious comorbidity and body mass index (BMI) of 35.0 to 35.9 in adult (H) 06/18/2019     Priority: Medium     Leukocytosis 05/31/2019     Priority: Medium     Chronic, mild since at least 2012       CKD (chronic kidney disease) stage 2, GFR 60-89 ml/min 05/07/2019     Priority: Medium     Paroxysmal atrial fibrillation (H) 02/19/2018     Priority: Medium     Mixed hyperlipidemia 12/30/2016     Priority: Medium     ACP  (advance care planning) 12/16/2016     Priority: Medium     Advance Care Planning 12/16/2016: ACP Review of Chart / Resources Provided:  Reviewed chart for advance care plan.  Nella Lemon has been provided information and resources to begin or update their advance care plan.  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, left 02/23/2016     Priority: Medium     Nasal turbinate hypertrophy 06/07/2013     Priority: Medium     Family history of malignant neoplasm of breast 11/13/2006     Priority: Medium     Neuropathy 05/21/2002     Priority: Medium     Fibromyalgia 05/21/2002     Priority: Medium     GERD 09/28/2001     Priority: Medium     Benign essential hypertension 09/14/2001     Priority: Medium     Sensorineural hearing loss (SNHL) of both ears 01/14/2020     Priority: Low      Past Medical History:   Diagnosis Date     Anxiety state, unspecified 09/25/2003     Atypical chest pain 3/28/2018     Carpal tunnel syndrome 07/02/2002     Endometrial hyperplasia 01/27/2003     Metrorrhagia 10/22/2003     Nonallopathic lesion of thoracic region, not elsewhere classified 05/19/2003     Palpitations 04/11/2001     Postmenopausal bleeding 09/09/2002     Precordial pain 04/05/2001     Urgency of urination 06/06/2007     Past Surgical History:   Procedure Laterality Date     ADENOIDECTOMY       BIOPSY  2019    FNA left thyroid     CHOLECYSTECTOMY  1981     COLONOSCOPY  2002     COLONOSCOPY  2012     COLONOSCOPY N/A 9/22/2014    Procedure: COLONOSCOPY;  Surgeon: Lavell Pavon DO;  Location: [facility] OR     CYSTOSCOPY  2007    Cystitis chronic     EYE SURGERY      right cataract     ORTHOPEDIC SURGERY  2002    carpal tunnel; RT, LT     THYROIDECTOMY Left 8/27/2019    Procedure: LEFT THYROID LOBECTOMY AND ISTHMUS WITH FROZENS;  Surgeon: Miriam  MD Mildred;  Location: [facility] OR     TONSILLECTOMY       Current Outpatient Medications   Medication Sig Dispense Refill     amLODIPine (NORVASC) 5 MG tablet TAKE 1 TABLET (5 MG) BY MOUTH DAILY 90 tablet 3     ELIQUIS ANTICOAGULANT 5 MG tablet TAKE 1 TABLET (5 MG) BY MOUTH 2 TIMES DAILY 180 tablet 3     gabapentin (NEURONTIN) 300 MG capsule TAKE 3 CAPSULES THREE TIMES DAILY 270 capsule 0     GLUCOSAMINE SULFATE PO Take 1,000 mg by mouth 2 times daily       levothyroxine (SYNTHROID/LEVOTHROID) 100 MCG tablet TAKE 1 TABLET (100 MCG) BY MOUTH DAILY 90 tablet 0     losartan (COZAAR) 100 MG tablet TAKE 1 TABLET (100 MG) BY MOUTH DAILY 90 tablet 3     metoprolol succinate ER (TOPROL-XL) 50 MG 24 hr tablet TAKE 1 TABLET (50 MG) BY MOUTH DAILY 90 tablet 1     Omega-3 Fatty Acids (OMEGA-3 FISH OIL PO) Take 3 g by mouth daily        omeprazole (PRILOSEC) 40 MG DR capsule TAKE 1 CAPSULE (40 MG) BY MOUTH DAILY TAKE 30-60 MINUTES BEFORE A MEAL. 90 capsule 1     sertraline (ZOLOFT) 50 MG tablet TAKE 1 TABLET DAILY 30 tablet 2     solifenacin (VESICARE) 5 MG tablet TAKE 1 TABLET (5 MG) BY MOUTH DAILY 90 tablet 2     STATIN NOT PRESCRIBED (INTENTIONAL) Please choose reason not prescribed, below       VITAMIN D, CHOLECALCIFEROL, PO Take by mouth daily       Multiple Vitamins-Minerals (PRESERVISION AREDS 2 PO)          Allergies   Allergen Reactions     Atorvastatin      Ezetimibe      Gentamicin      Hydroxyzine      Lorazepam      Ranitidine      Simvastatin      Lopid [Gemfibrozil] GI Disturbance     Bloating, constipation,      No Clinical Screening - See Comments      anticholesterol medicine caused muscle aches     Pravastatin Muscle Pain (Myalgia)        Social History     Tobacco Use     Smoking status: Never Smoker     Smokeless tobacco: Never Used   Substance Use Topics     Alcohol use: Never     Frequency: Never       History   Drug Use No         Objective     /68 (BP Location: Left arm, Patient Position: Chair, Cuff  "Size: Adult Large)   Pulse 53   Temp 96.8  F (36  C) (Tympanic)   Resp 16   Ht 1.575 m (5' 2\")   Wt 84.3 kg (185 lb 12.8 oz)   SpO2 97%   BMI 33.98 kg/m      Physical Exam    GENERAL APPEARANCE: healthy, alert and no distress     EYES: EOMI, PERRL     HENT: ear canals and TM's normal and nose and mouth without ulcers or lesions     NECK: no adenopathy, no asymmetry, masses, or scars and thyroid normal to palpation     RESP: lungs clear to auscultation - no rales, rhonchi or wheezes     CV: regular rates and rhythm, normal S1 S2, no S3 or S4 and no murmur, click or rub     ABDOMEN:  soft, nontender, no HSM or masses and bowel sounds normal     MS: extremities normal- no gross deformities noted, no evidence of inflammation in joints, FROM in all extremities.     SKIN: no suspicious lesions or rashes     NEURO: Normal strength and tone, sensory exam grossly normal, mentation intact and speech normal     PSYCH: mentation appears normal. and affect normal/bright     LYMPHATICS: No cervical adenopathy    Recent Labs   Lab Test 11/02/20  1320 10/29/20  0931   HGB 13.9 13.9    328    143   POTASSIUM 4.3 4.1   CR 0.93 1.00        Diagnostics:  Results for orders placed or performed in visit on 02/01/21   XR CHEST 2 VW (Clinic Performed)     Status: None    Narrative    PROCEDURE: XR CHEST 2 VW 2/1/2021 11:28 AM    HISTORY: Pre-operative examination    COMPARISONS: 8/12/2019.    TECHNIQUE: 2 views.    FINDINGS: Heart is stable in size. There is stable elongation of the  thoracic aorta. Lungs are clear and no pleural effusion is seen.    Moderate degenerative changes seen in the mid thoracic spine and there  is a mild left convex scoliosis.         Impression    IMPRESSION: No acute infiltrate.    LEOPOLDO BORJAS MD   Results for orders placed or performed in visit on 02/01/21   Comprehensive metabolic panel     Status: Abnormal   Result Value Ref Range    Sodium 143 133 - 144 mmol/L    Potassium 4.3 3.4 " - 5.3 mmol/L    Chloride 113 (H) 94 - 109 mmol/L    Carbon Dioxide 25 20 - 32 mmol/L    Anion Gap 5 3 - 14 mmol/L    Glucose 95 70 - 99 mg/dL    Urea Nitrogen 17 7 - 30 mg/dL    Creatinine 1.00 0.52 - 1.04 mg/dL    GFR Estimate 55 (L) >60 mL/min/[1.73_m2]    GFR Estimate If Black 64 >60 mL/min/[1.73_m2]    Calcium 8.7 8.5 - 10.1 mg/dL    Bilirubin Total 0.7 0.2 - 1.3 mg/dL    Albumin 3.4 3.4 - 5.0 g/dL    Protein Total 7.1 6.8 - 8.8 g/dL    Alkaline Phosphatase 124 40 - 150 U/L    ALT 27 0 - 50 U/L    AST 15 0 - 45 U/L   CBC with platelets differential     Status: Abnormal   Result Value Ref Range    WBC 12.9 (H) 4.0 - 11.0 10e9/L    RBC Count 5.33 (H) 3.8 - 5.2 10e12/L    Hemoglobin 14.6 11.7 - 15.7 g/dL    Hematocrit 44.8 35.0 - 47.0 %    MCV 84 78 - 100 fl    MCH 27.4 26.5 - 33.0 pg    MCHC 32.6 31.5 - 36.5 g/dL    RDW 13.5 10.0 - 15.0 %    Platelet Count 262 150 - 450 10e9/L    % Neutrophils 77.3 %    % Lymphocytes 15.6 %    % Monocytes 5.8 %    % Eosinophils 0.9 %    % Basophils 0.4 %    Absolute Neutrophil 10.0 (H) 1.6 - 8.3 10e9/L    Absolute Lymphocytes 2.0 0.8 - 5.3 10e9/L    Absolute Monocytes 0.8 0.0 - 1.3 10e9/L    Absolute Eosinophils 0.1 0.0 - 0.7 10e9/L    Absolute Basophils 0.1 0.0 - 0.2 10e9/L    Diff Method Automated Method    *UA reflex to Microscopic and Culture - [facility]     Status: Abnormal    Specimen: Midstream Urine   Result Value Ref Range    Color Urine Yellow     Appearance Urine Clear     Glucose Urine Negative NEG^Negative mg/dL    Bilirubin Urine Negative NEG^Negative    Ketones Urine Negative NEG^Negative mg/dL    Specific Gravity Urine 1.025 1.003 - 1.035    Blood Urine Negative NEG^Negative    pH Urine 5.5 5.0 - 7.0 pH    Protein Albumin Urine Negative NEG^Negative mg/dL    Urobilinogen Urine 0.2 0.2 - 1.0 EU/dL    Nitrite Urine Negative NEG^Negative    Leukocyte Esterase Urine Small (A) NEG^Negative    Source Midstream Urine    Urine Microscopic     Status: None   Result  Value Ref Range    WBC Urine 0 - 5 OTO5^0 - 5 /HPF    RBC Urine O - 2 OTO2^O - 2 /HPF        EKG: sinus bradycardia, nonspecific ST-T changes, unchanged from previous tracings    Revised Cardiac Risk Index (RCRI):  The patient has the following serious cardiovascular risks for perioperative complications:   A-fib    RCRI Interpretation: 0 points: Class I (very low risk - 0.4% complication rate)           Assessment & Plan   The proposed surgical procedure is considered INTERMEDIATE risk.    1. Pre-operative examination  - Comprehensive metabolic panel  - CBC with platelets differential  - EKG 12-lead complete w/read - (Clinic Performed)  - XR CHEST 2 VW (Clinic Performed); Future  - *UA reflex to Microscopic and Culture - [facility]  - Urine Microscopic    2. Encounter for screening colonoscopy    3. Mixed hyperlipidemia    4. Benign essential hypertension    5. Paroxysmal atrial fibrillation (H)    6. Postoperative hypothyroidism    7. CKD (chronic kidney disease) stage 2, GFR 60-89 ml/min    8. LA NENA (generalized anxiety disorder)          Medication Instructions:   - apixaban (Eliquis), edoxaban (Savaysa), rivaroxaban (Xarelto): Bleeding risk is low for this procedure AND CrCl (>=) 50 mL/min. HOLD 1 day before surgery.      RECOMMENDATION:  APPROVAL GIVEN to proceed with proposed procedure, without further diagnostic evaluation.    Signed Electronically by: Randi Haddad NP    Copy of this evaluation report is provided to requesting physician.    [facility] Guidelines    Revised Cardiac Risk Index

## 2021-02-01 ENCOUNTER — ANCILLARY PROCEDURE (OUTPATIENT)
Dept: GENERAL RADIOLOGY | Facility: OTHER | Age: 76
End: 2021-02-01
Attending: NURSE PRACTITIONER
Payer: COMMERCIAL

## 2021-02-01 ENCOUNTER — OFFICE VISIT (OUTPATIENT)
Dept: FAMILY MEDICINE | Facility: OTHER | Age: 76
End: 2021-02-01
Attending: NURSE PRACTITIONER
Payer: COMMERCIAL

## 2021-02-01 VITALS
TEMPERATURE: 96.8 F | OXYGEN SATURATION: 97 % | RESPIRATION RATE: 16 BRPM | BODY MASS INDEX: 34.19 KG/M2 | SYSTOLIC BLOOD PRESSURE: 118 MMHG | HEIGHT: 62 IN | WEIGHT: 185.8 LBS | DIASTOLIC BLOOD PRESSURE: 68 MMHG | HEART RATE: 53 BPM

## 2021-02-01 DIAGNOSIS — Z01.818 PRE-OPERATIVE EXAMINATION: Primary | ICD-10-CM

## 2021-02-01 DIAGNOSIS — I48.0 PAROXYSMAL ATRIAL FIBRILLATION (H): Chronic | ICD-10-CM

## 2021-02-01 DIAGNOSIS — E78.2 MIXED HYPERLIPIDEMIA: Chronic | ICD-10-CM

## 2021-02-01 DIAGNOSIS — N18.2 CKD (CHRONIC KIDNEY DISEASE) STAGE 2, GFR 60-89 ML/MIN: Chronic | ICD-10-CM

## 2021-02-01 DIAGNOSIS — E89.0 POSTOPERATIVE HYPOTHYROIDISM: ICD-10-CM

## 2021-02-01 DIAGNOSIS — Z12.11 ENCOUNTER FOR SCREENING COLONOSCOPY: ICD-10-CM

## 2021-02-01 DIAGNOSIS — Z01.818 PRE-OPERATIVE EXAMINATION: ICD-10-CM

## 2021-02-01 DIAGNOSIS — F41.1 GAD (GENERALIZED ANXIETY DISORDER): Chronic | ICD-10-CM

## 2021-02-01 DIAGNOSIS — I10 BENIGN ESSENTIAL HYPERTENSION: Chronic | ICD-10-CM

## 2021-02-01 LAB
ALBUMIN SERPL-MCNC: 3.4 G/DL (ref 3.4–5)
ALBUMIN UR-MCNC: NEGATIVE MG/DL
ALP SERPL-CCNC: 124 U/L (ref 40–150)
ALT SERPL W P-5'-P-CCNC: 27 U/L (ref 0–50)
ANION GAP SERPL CALCULATED.3IONS-SCNC: 5 MMOL/L (ref 3–14)
APPEARANCE UR: CLEAR
AST SERPL W P-5'-P-CCNC: 15 U/L (ref 0–45)
BASOPHILS # BLD AUTO: 0.1 10E9/L (ref 0–0.2)
BASOPHILS NFR BLD AUTO: 0.4 %
BILIRUB SERPL-MCNC: 0.7 MG/DL (ref 0.2–1.3)
BILIRUB UR QL STRIP: NEGATIVE
BUN SERPL-MCNC: 17 MG/DL (ref 7–30)
CALCIUM SERPL-MCNC: 8.7 MG/DL (ref 8.5–10.1)
CHLORIDE SERPL-SCNC: 113 MMOL/L (ref 94–109)
CO2 SERPL-SCNC: 25 MMOL/L (ref 20–32)
COLOR UR AUTO: YELLOW
CREAT SERPL-MCNC: 1 MG/DL (ref 0.52–1.04)
DIFFERENTIAL METHOD BLD: ABNORMAL
EOSINOPHIL # BLD AUTO: 0.1 10E9/L (ref 0–0.7)
EOSINOPHIL NFR BLD AUTO: 0.9 %
ERYTHROCYTE [DISTWIDTH] IN BLOOD BY AUTOMATED COUNT: 13.5 % (ref 10–15)
GFR SERPL CREATININE-BSD FRML MDRD: 55 ML/MIN/{1.73_M2}
GLUCOSE SERPL-MCNC: 95 MG/DL (ref 70–99)
GLUCOSE UR STRIP-MCNC: NEGATIVE MG/DL
HCT VFR BLD AUTO: 44.8 % (ref 35–47)
HGB BLD-MCNC: 14.6 G/DL (ref 11.7–15.7)
HGB UR QL STRIP: NEGATIVE
KETONES UR STRIP-MCNC: NEGATIVE MG/DL
LEUKOCYTE ESTERASE UR QL STRIP: ABNORMAL
LYMPHOCYTES # BLD AUTO: 2 10E9/L (ref 0.8–5.3)
LYMPHOCYTES NFR BLD AUTO: 15.6 %
MCH RBC QN AUTO: 27.4 PG (ref 26.5–33)
MCHC RBC AUTO-ENTMCNC: 32.6 G/DL (ref 31.5–36.5)
MCV RBC AUTO: 84 FL (ref 78–100)
MONOCYTES # BLD AUTO: 0.8 10E9/L (ref 0–1.3)
MONOCYTES NFR BLD AUTO: 5.8 %
NEUTROPHILS # BLD AUTO: 10 10E9/L (ref 1.6–8.3)
NEUTROPHILS NFR BLD AUTO: 77.3 %
NITRATE UR QL: NEGATIVE
PH UR STRIP: 5.5 PH (ref 5–7)
PLATELET # BLD AUTO: 262 10E9/L (ref 150–450)
POTASSIUM SERPL-SCNC: 4.3 MMOL/L (ref 3.4–5.3)
PROT SERPL-MCNC: 7.1 G/DL (ref 6.8–8.8)
RBC # BLD AUTO: 5.33 10E12/L (ref 3.8–5.2)
RBC #/AREA URNS AUTO: NORMAL /HPF
SODIUM SERPL-SCNC: 143 MMOL/L (ref 133–144)
SOURCE: ABNORMAL
SP GR UR STRIP: 1.02 (ref 1–1.03)
UROBILINOGEN UR STRIP-ACNC: 0.2 EU/DL (ref 0.2–1)
WBC # BLD AUTO: 12.9 10E9/L (ref 4–11)
WBC #/AREA URNS AUTO: NORMAL /HPF

## 2021-02-01 PROCEDURE — 36415 COLL VENOUS BLD VENIPUNCTURE: CPT | Mod: ZL | Performed by: NURSE PRACTITIONER

## 2021-02-01 PROCEDURE — 85025 COMPLETE CBC W/AUTO DIFF WBC: CPT | Mod: ZL | Performed by: NURSE PRACTITIONER

## 2021-02-01 PROCEDURE — 93005 ELECTROCARDIOGRAM TRACING: CPT

## 2021-02-01 PROCEDURE — 93010 ELECTROCARDIOGRAM REPORT: CPT | Performed by: INTERNAL MEDICINE

## 2021-02-01 PROCEDURE — 99214 OFFICE O/P EST MOD 30 MIN: CPT | Mod: 25 | Performed by: NURSE PRACTITIONER

## 2021-02-01 PROCEDURE — G0463 HOSPITAL OUTPT CLINIC VISIT: HCPCS | Mod: 25

## 2021-02-01 PROCEDURE — 80053 COMPREHEN METABOLIC PANEL: CPT | Mod: ZL | Performed by: NURSE PRACTITIONER

## 2021-02-01 PROCEDURE — 71046 X-RAY EXAM CHEST 2 VIEWS: CPT | Mod: TC,FY

## 2021-02-01 PROCEDURE — G0463 HOSPITAL OUTPT CLINIC VISIT: HCPCS

## 2021-02-01 PROCEDURE — 81001 URINALYSIS AUTO W/SCOPE: CPT | Mod: ZL | Performed by: NURSE PRACTITIONER

## 2021-02-01 ASSESSMENT — MIFFLIN-ST. JEOR: SCORE: 1296.03

## 2021-02-01 ASSESSMENT — PAIN SCALES - GENERAL: PAINLEVEL: NO PAIN (0)

## 2021-02-01 NOTE — NURSING NOTE
"Chief Complaint   Patient presents with     Pre-Op Exam       Initial /68 (BP Location: Left arm, Patient Position: Chair, Cuff Size: Adult Large)   Pulse 53   Temp 96.8  F (36  C) (Tympanic)   Resp 16   Ht 1.575 m (5' 2\")   Wt 84.3 kg (185 lb 12.8 oz)   SpO2 97%   BMI 33.98 kg/m   Estimated body mass index is 33.98 kg/m  as calculated from the following:    Height as of this encounter: 1.575 m (5' 2\").    Weight as of this encounter: 84.3 kg (185 lb 12.8 oz).  Medication Reconciliation: complete  Pamela M. Lechevalier, LPN    "

## 2021-02-01 NOTE — PATIENT INSTRUCTIONS
1. Pre-operative examination  - Comprehensive metabolic panel  - CBC with platelets differential  - EKG 12-lead complete w/read - (Clinic Performed)  - XR CHEST 2 VW (Clinic Performed); Future  - *UA reflex to Microscopic and Culture - [facility]    2. Encounter for screening colonoscopy    3. Mixed hyperlipidemia    4. Benign essential hypertension    5. Paroxysmal atrial fibrillation (H)    6. Postoperative hypothyroidism    7. CKD (chronic kidney disease) stage 2, GFR 60-89 ml/min    8. LA NENA (generalized anxiety disorder)      Stop all supplements 1 week prior to scheduled procedure  Stop Eliquis 2 days before procedure  The day of procedure ONLY TAKE metoprolol.  You can take the rest after your procedure is completed.     Randi Haddad,   Certified Adult Nurse Practitioner  [phone]

## 2021-02-03 ENCOUNTER — TELEPHONE (OUTPATIENT)
Dept: SURGERY | Facility: OTHER | Age: 76
End: 2021-02-03

## 2021-02-03 DIAGNOSIS — M79.10 MYALGIA: ICD-10-CM

## 2021-02-03 RX ORDER — GABAPENTIN 300 MG/1
CAPSULE ORAL
Qty: 270 CAPSULE | Refills: 0 | Status: SHIPPED | OUTPATIENT
Start: 2021-02-03 | End: 2021-03-04

## 2021-02-03 NOTE — TELEPHONE ENCOUNTER
Patient contacted regarding her questions.  Patient was told that the [facility] is closed on the weekend, but that if the weather kept her from getting her covid test on Sunday, to call on Monday to see if we could get her in on Monday for testing, as the last resort.  Patient understood and stated she would try her best to get in over the weekend, even being that the weather is going to be so cold.    CASEY MONACO LPN

## 2021-02-03 NOTE — TELEPHONE ENCOUNTER
Patient called asking if there is any place closer to [place] that can get the covid test done. Patient is worried about it being cold and car not starting and driving all the way to [place] for this test. Any ideas would be appreciated. Please call [phone]

## 2021-02-04 ENCOUNTER — ANESTHESIA EVENT (OUTPATIENT)
Dept: SURGERY | Facility: HOSPITAL | Age: 76
End: 2021-02-04
Payer: COMMERCIAL

## 2021-02-04 ASSESSMENT — ENCOUNTER SYMPTOMS: DYSRHYTHMIAS: 1

## 2021-02-04 NOTE — ANESTHESIA PREPROCEDURE EVALUATION
Anesthesia Pre-Procedure Evaluation    Patient: Nella Lemon   MRN: [id number] : 1945        Preoperative Diagnosis: Colon cancer screening [Z12.11]   Procedure : Procedure(s):  screening colonoscopy possible biopsy possible polypectomy     Past Medical History:   Diagnosis Date     Anxiety state, unspecified 2003     Atypical chest pain 3/28/2018     Carpal tunnel syndrome 2002     Endometrial hyperplasia 2003     Metrorrhagia 10/22/2003     Nonallopathic lesion of thoracic region, not elsewhere classified 2003     Palpitations 2001     Postmenopausal bleeding 2002     Precordial pain 2001     Urgency of urination 2007      Past Surgical History:   Procedure Laterality Date     ADENOIDECTOMY       BIOPSY      FNA left thyroid     CHOLECYSTECTOMY       COLONOSCOPY       COLONOSCOPY       COLONOSCOPY N/A 2014    Procedure: COLONOSCOPY;  Surgeon: Lavell Pavon DO;  Location: [facility]     CYSTOSCOPY      Cystitis chronic     EYE SURGERY      right cataract     ORTHOPEDIC SURGERY  2002    carpal tunnel; RT, LT     THYROIDECTOMY Left 2019    Procedure: LEFT THYROID LOBECTOMY AND ISTHMUS WITH FROZENS;  Surgeon: Mildred Pineda MD;  Location: [facility]     TONSILLECTOMY        Allergies   Allergen Reactions     Atorvastatin      Ezetimibe      Gentamicin      Hydroxyzine      Lorazepam      Ranitidine      Simvastatin      Lopid [Gemfibrozil] GI Disturbance     Bloating, constipation,      No Clinical Screening - See Comments      anticholesterol medicine caused muscle aches     Pravastatin Muscle Pain (Myalgia)      Social History     Tobacco Use     Smoking status: Never Smoker     Smokeless tobacco: Never Used   Substance Use Topics     Alcohol use: Never     Frequency: Never      Wt Readings from Last 1 Encounters:   21 84.3 kg (185 lb 12.8 oz)        Anesthesia Evaluation   Pt has had prior anesthetic. Type:  General.    No history of anesthetic complications       ROS/MED HX  ENT/Pulmonary: Comment: Nasal turbinate hypertrophy  Hearing loss      Neurologic:     (+) peripheral neuropathy, - bilateral feet.     Cardiovascular:     (+) Dyslipidemia hypertension-range: off norvasc per bastinelli due to LE edema, per patient better now/ -angina---Taking blood thinners Pt has received instructions: dysrhythmias, a-fib, Irregular Heartbeat/Palpitations, Previous cardiac testing   Echo: Date: Results:    Stress Test: Date: Results:    ECG Reviewed: Date: HP Results:  Sinus roxanne  Nonspecific ST T changes  Unchanged from previous  Cath: Date: Results:      METS/Exercise Tolerance: 4 - Raking leaves, gardening    Hematologic:  - neg hematologic  ROS     Musculoskeletal: Comment: fibromyalgia      GI/Hepatic:     (+) GERD, Asymptomatic on medication, bowel prep,     Renal/Genitourinary: Comment: Stage 2    (+) renal disease, type: CRI,     Endo:     (+) thyroid problem, hypothyroidism, Obesity,     Psychiatric/Substance Use:     (+) psychiatric history anxiety     Infectious Disease:  - neg infectious disease ROS     Malignancy:   (+) Malignancy, History of Other.Other CA thyroid status post.    Other:  - neg other ROS    (+) , H/O Chronic Pain,        Physical Exam    Airway        Mallampati: II   TM distance: > 3 FB   Neck ROM: full   Mouth opening: > 3 cm    Respiratory Devices and Support         Dental  no notable dental history         Cardiovascular          Rhythm and rate: regular and bradycardia     Pulmonary   pulmonary exam normal        breath sounds clear to auscultation           OUTSIDE LABS:  CBC:   Lab Results   Component Value Date    WBC 12.9 (H) 02/01/2021    WBC 13.1 (H) 11/02/2020    HGB 14.6 02/01/2021    HGB 13.9 11/02/2020    HCT 44.8 02/01/2021    HCT 43.0 11/02/2020     02/01/2021     11/02/2020     BMP:   Lab Results   Component Value Date     02/01/2021     11/02/2020     POTASSIUM 4.3 02/01/2021    POTASSIUM 4.3 11/02/2020    CHLORIDE 113 (H) 02/01/2021    CHLORIDE 110 (H) 11/02/2020    CO2 25 02/01/2021    CO2 27 11/02/2020    BUN 17 02/01/2021    BUN 11 11/02/2020    CR 1.00 02/01/2021    CR 0.93 11/02/2020    GLC 95 02/01/2021    GLC 97 11/02/2020     COAGS: No results found for: PTT, INR, FIBR  POC: No results found for: BGM, HCG, HCGS  HEPATIC:   Lab Results   Component Value Date    ALBUMIN 3.4 02/01/2021    PROTTOTAL 7.1 02/01/2021    ALT 27 02/01/2021    AST 15 02/01/2021    ALKPHOS 124 02/01/2021    BILITOTAL 0.7 02/01/2021     OTHER:   Lab Results   Component Value Date    THOMAS 8.7 02/01/2021    TSH 1.42 11/02/2020    T4 1.20 11/02/2020    CRP 3.1 03/27/2019    SED 10 04/15/2019       Anesthesia Plan    ASA Status:  3   NPO Status:  NPO Appropriate    Anesthesia Type: MAC.     - Reason for MAC: Deep or markedly invasive procedure (G8)   Induction: Intravenous.   Maintenance: Balanced.        Consents    Anesthesia Plan(s) and associated risks, benefits, and realistic alternatives discussed. Questions answered and patient/representative(s) expressed understanding.     - Discussed with:  Patient         Postoperative Care            Comments:    Yani 2/1/21  Risks and benefits of MAC anesthetic discussed including dental damage, aspiration, loss of airway, conversion to general anesthetic, CV complications, MI, stroke, death. Pt wishes to proceed.             KG Stevens CRNA

## 2021-02-05 ENCOUNTER — TELEPHONE (OUTPATIENT)
Dept: SURGERY | Facility: OTHER | Age: 76
End: 2021-02-05

## 2021-02-05 NOTE — TELEPHONE ENCOUNTER
Patient called stating that she wishes to reschedule her colonoscopy with Dr Dow.  Patient is scheduled for 2/11/21 and is now rescheduled to 3/1/21.  Covid has been rescheduled to 2/25/21.  Surgery notified and the patient has been sent a new letter with the changed dates.  CASEY MONACO LPN

## 2021-02-05 NOTE — LETTER
We want to your Colonoscopy to be as pleasant as possible. Please review the instructions below. If you have questions, you may contact us at the any of the following numbers:     [facility] Health Unit Coordinator: [phone]    Clinic Nurse (Brielle): [phone]    Surgery Education Nurse: [phone]        Date of procedure: March 1, 2021 with Dr. Dow     Admit Time: Hospital Surgery will call you the day before your procedure by 5pm with your arrival time. If your surgery is on Monday, expect a call on Friday.  If you are not contacted before 5PM, please call admitting at [phone].   After hours or on weekends, please call [phone] to postpone.   Call the clinic nurse if you become ill within 1 week of your procedure to reschedule.      Covid-19 test is scheduled for:  February 25 at 9:45 am in [place] at the [facility] in the [facility].     COVID-19 test is needed 4-5 days before procedure in the morning. This testing is done in the [facility] on the West side of [facility] (weekdays and weekends) or in the [facility] at the [facility] (weekdays only).  Follow the signage for parking and bring your mobile phone if you have one to call the phone number on the sign outside the testing site for check-in. If you do not have a cell phone, please call the nurse for instructions on checking in.      Please  the following over the counter items for your bowel prep:                 Two 5 mg Dulcolax (bisacodyl) tablets              One 8.3 ounce (238 g) bottle of Miralax              One 10 ounce bottle of liquid Magnesium Citrate-not capsules.              One 64 ounce bottle of Gatorade-Not red, purple, or powdered.        7 DAYS BEFORE THE EXAM:   February 22, 2021  Call the Surgery Education Nurse at [phone] and have a medication list ready.   Stop Aspirin or NSAIDS (Ibuprofen, Celebrex, Naproxen, etc) 7 days before surgery.  Stop fiber  "supplements, herbals, vitamins, and iron. Stop eating corn, nuts and seeds.  If you are prescribed a daily 81mg Aspirin, you may continue this.  If you are prescribed blood thinners or insulin, talk to your primary provider for instructions.          2 DAYS BEFORE THE EXAM:   February 27, 2021  Low fiber diet.   See list of low fiber foods on page 3 of the \"Miralax, Dulcolax and Magnesium Citrate\" packet.   Drink at least 4-6 large glasses of sports drink today and tomorrow. Avoid red and purple.     1 DAY BEFORE THE EXAM:   February 28, 2021  No solid food/milk products after 12:01 AM. Drink only clear liquids all day, at least 8-10 glasses.   Please see list of clear liquids on page 2 of \"Miralax, Dulcolax and Magnesium Citrate\" packet.    Avoid anything red or purple. No alcohol.            AT 12:00 PM NOON THE DAY BEFORE EXAM:  Take 2 Dulcolax tablets by mouth with clear liquids.            AT 6:00 PM THE DAY BEFORE EXAM:  Mix the bottle of Miralax and 64 oz. of Gatorade in a pitcher.   Drink one 8 oz. glass every 10-15 minutes until gone. Stay near a toilet.      DAY OF COLONOSCOPY:   March 1, 2021            6 HOURS PRIOR TO EXAM ON DAY OF PROCEDURE:  Drink the bottle of Magnesium Citrate followed by a full glass of water.   You may have clear liquids up until 2 hours before arrival.  If you need to take any medications after this, take them with a tiny sip of water.   If you have asthma, bring your inhaler with you.  Shower before arrival and wear clean, comfortable clothes.   No jewelry, make-up, nail polish, hair spray, lotions, or perfumes.   [place] in Admitting through the [facility].   You must have a responsible adult to drive and to stay with you for 4 hours at home.            TIPS FOR COLON CLEANSING BEFORE YOUR COLONOSCOPY  To get accurate results from your exam, your colon must be completely empty or you may need to repeat the colon prep and exam. If you followed instructions and your stool " is clear or yellow liquid, you are ready. If you are not sure if your colon is clean, please call the clinic nurse.     You may use Tucks wipes, hemorrhoid treatments, hydrocortisone cream or alcohol-free baby wipes to ease anal irritation. You may also use Vaseline to help protect the skin.      Quickly drink each glass. Even when you are sitting on the toilet, keep drinking every 15 minutes. If you have nausea or vomiting, take a break for 30 minutes and then resume drinking.     You will have loose watery stools and may also have chills. Dress for comfort. Expect to feel bloating, nausea and other discomfort until the stool clears from your colon.

## 2021-02-05 NOTE — LETTER
We want to your Colonoscopy to be as pleasant as possible. Please review the instructions below. If you have questions, you may contact us at the any of the following numbers:   [facility] Health Unit Coordinator: [phone]  Clinic Nurse (Brielle): [phone]  Surgery Education Nurse: [phone]      Date of procedure: March 1, 2021 with Dr. Dow    Admit Time: Hospital Surgery will call you the day before your procedure by 5pm with your arrival time. If your surgery is on Monday, expect a call on Friday.  If you are not contacted before 5PM, please call admitting at [phone].   After hours or on weekends, please call [phone] to postpone.   Call the clinic nurse if you become ill within 1 week of your procedure to reschedule.     Covid-19 test is scheduled for:  February 21 at 8:45 am in [place] at the [facility] in the [facility].    COVID-19 test is needed 4-5 days before procedure in the morning. This testing is done in the [facility] on the West side [facility] (weekdays and weekends) or in the [facility] (weekdays only).  Follow the signage for parking and bring your mobile phone if you have one to call the phone number on the sign outside the testing site for check-in. If you do not have a cell phone, please call the nurse for instructions on checking in.     Please  the following over the counter items for your bowel prep:     Two 5 mg Dulcolax (bisacodyl) tablets   One 8.3 ounce (238 g) bottle of Miralax   One 10 ounce bottle of liquid Magnesium Citrate-not capsules.   One 64 ounce bottle of Gatorade-Not red, purple, or powdered.      7 DAYS BEFORE THE EXAM:   February 22, 2021  Call the Surgery Education Nurse at [phone] and have a medication list ready.   Stop Aspirin or NSAIDS (Ibuprofen, Celebrex, Naproxen, etc) 7 days before surgery.  Stop fiber supplements, herbals, vitamins, and iron. Stop eating corn,  "nuts and seeds.  If you are prescribed a daily 81mg Aspirin, you may continue this.  If you are prescribed blood thinners or insulin, talk to your primary provider for instructions.      2 DAYS BEFORE THE EXAM:   February 28, 2021  Low fiber diet.   See list of low fiber foods on page 3 of the \"Miralax, Dulcolax and Magnesium Citrate\" packet.   Drink at least 4-6 large glasses of sports drink today and tomorrow. Avoid red and purple.    1 DAY BEFORE THE EXAM:   February 29, 2021  No solid food/milk products after 12:01 AM. Drink only clear liquids all day, at least 8-10 glasses.   Please see list of clear liquids on page 2 of \"Miralax, Dulcolax and Magnesium Citrate\" packet.    Avoid anything red or purple. No alcohol.            AT 12:00 PM NOON THE DAY BEFORE EXAM:  Take 2 Dulcolax tablets by mouth with clear liquids.            AT 6:00 PM THE DAY BEFORE EXAM:  Mix the bottle of Miralax and 64 oz. of Gatorade in a pitcher.   Drink one 8 oz. glass every 10-15 minutes until gone. Stay near a toilet.     DAY OF COLONOSCOPY:   March 1, 2021            6 HOURS PRIOR TO EXAM ON DAY OF PROCEDURE:  Drink the bottle of Magnesium Citrate followed by a full glass of water.   You may have clear liquids up until 2 hours before arrival.  If you need to take any medications after this, take them with a tiny sip of water.   If you have asthma, bring your inhaler with you.  Shower before arrival and wear clean, comfortable clothes.   No jewelry, make-up, nail polish, hair spray, lotions, or perfumes.   [place] in Admitting through the [facility].   You must have a responsible adult to drive and to stay with you for 4 hours at home.         TIPS FOR COLON CLEANSING BEFORE YOUR COLONOSCOPY  To get accurate results from your exam, your colon must be completely empty or you may need to repeat the colon prep and exam. If you followed instructions and your stool is clear or yellow liquid, you are ready. If you are not sure if your " colon is clean, please call the clinic nurse.    You may use Tucks wipes, hemorrhoid treatments, hydrocortisone cream or alcohol-free baby wipes to ease anal irritation. You may also use Vaseline to help protect the skin.     Quickly drink each glass. Even when you are sitting on the toilet, keep drinking every 15 minutes. If you have nausea or vomiting, take a break for 30 minutes and then resume drinking.    You will have loose watery stools and may also have chills. Dress for comfort. Expect to feel bloating, nausea and other discomfort until the stool clears from your colon.

## 2021-02-08 DIAGNOSIS — I48.0 PAROXYSMAL ATRIAL FIBRILLATION (H): ICD-10-CM

## 2021-02-09 RX ORDER — METOPROLOL SUCCINATE 50 MG/1
50 TABLET, EXTENDED RELEASE ORAL DAILY
Qty: 90 TABLET | Refills: 3 | Status: SHIPPED | OUTPATIENT
Start: 2021-02-09 | End: 2022-02-06

## 2021-02-09 NOTE — TELEPHONE ENCOUNTER
METOPROLOL      Last Written Prescription Date:  8-  Last Fill Quantity: 90,   # refills: 1  Last Office Visit: 5-  Future Office visit:    Next 5 appointments (look out 90 days)    Feb 25, 2021 10:00 AM  (Arrive by 9:45 AM)  SHORT with MT FLU [facility] ([facility] ) [address]  [facility] [address]  [phone]   Apr 28, 2021 10:00 AM  (Arrive by 9:45 AM)  SHORT with Randi Haddad NP  [facility] ([facility] ) [address]  [facility] [address]  [phone]   May 03, 2021  3:00 PM  (Arrive by 2:45 PM)  Return Visit with Rosa Venegas MD  [facility] ([facility] ) [address] [facility] [address]  [phone]           Routing refill request to provider for review/approval because:

## 2021-02-11 ENCOUNTER — ANESTHESIA (OUTPATIENT)
Dept: SURGERY | Facility: HOSPITAL | Age: 76
End: 2021-02-11
Payer: COMMERCIAL

## 2021-02-24 ENCOUNTER — TELEPHONE (OUTPATIENT)
Dept: FAMILY MEDICINE | Facility: OTHER | Age: 76
End: 2021-02-24

## 2021-02-24 NOTE — TELEPHONE ENCOUNTER
10:13 AM    Reason for Call: Phone Call    Description: Patient called with concerns that her feet are swollen and retaining water. Patient has surgery scheduled for 03/01/2021and is concerned she will not be able to have the surgery. Please call patient back for further discussion/advisement.    Was an appointment offered for this call? No  If yes : Appointment type              Date    Preferred method for responding to this message: Telephone Call  What is your phone number ? [phone]    If we cannot reach you directly, may we leave a detailed response at the number you provided? Yes    Can this message wait until your PCP/provider returns, if available today? Nicole Vega

## 2021-02-25 ENCOUNTER — OFFICE VISIT (OUTPATIENT)
Dept: FAMILY MEDICINE | Facility: OTHER | Age: 76
End: 2021-02-25
Attending: NURSE PRACTITIONER
Payer: COMMERCIAL

## 2021-02-25 ENCOUNTER — OFFICE VISIT (OUTPATIENT)
Dept: INTERNAL MEDICINE | Facility: OTHER | Age: 76
End: 2021-02-25
Attending: INTERNAL MEDICINE
Payer: COMMERCIAL

## 2021-02-25 VITALS
BODY MASS INDEX: 34.75 KG/M2 | SYSTOLIC BLOOD PRESSURE: 146 MMHG | OXYGEN SATURATION: 96 % | DIASTOLIC BLOOD PRESSURE: 82 MMHG | WEIGHT: 190 LBS | TEMPERATURE: 97.3 F | HEART RATE: 55 BPM

## 2021-02-25 DIAGNOSIS — I10 ESSENTIAL HYPERTENSION: ICD-10-CM

## 2021-02-25 DIAGNOSIS — Z11.52 ENCOUNTER FOR SCREENING FOR COVID-19: Primary | ICD-10-CM

## 2021-02-25 DIAGNOSIS — Z01.818 PREOP TESTING: ICD-10-CM

## 2021-02-25 DIAGNOSIS — E03.9 ACQUIRED HYPOTHYROIDISM: Primary | ICD-10-CM

## 2021-02-25 LAB
SARS-COV-2 RNA RESP QL NAA+PROBE: NORMAL
SPECIMEN SOURCE: NORMAL
T4 FREE SERPL-MCNC: 1.09 NG/DL (ref 0.76–1.46)
TSH SERPL DL<=0.005 MIU/L-ACNC: 4.23 MU/L (ref 0.4–4)

## 2021-02-25 PROCEDURE — U0005 INFEC AGEN DETEC AMPLI PROBE: HCPCS | Mod: ZL | Performed by: SURGERY

## 2021-02-25 PROCEDURE — G0463 HOSPITAL OUTPT CLINIC VISIT: HCPCS

## 2021-02-25 PROCEDURE — U0003 INFECTIOUS AGENT DETECTION BY NUCLEIC ACID (DNA OR RNA); SEVERE ACUTE RESPIRATORY SYNDROME CORONAVIRUS 2 (SARS-COV-2) (CORONAVIRUS DISEASE [COVID-19]), AMPLIFIED PROBE TECHNIQUE, MAKING USE OF HIGH THROUGHPUT TECHNOLOGIES AS DESCRIBED BY CMS-2020-01-R: HCPCS | Mod: ZL

## 2021-02-25 PROCEDURE — 84439 ASSAY OF FREE THYROXINE: CPT | Mod: ZL | Performed by: INTERNAL MEDICINE

## 2021-02-25 PROCEDURE — 99213 OFFICE O/P EST LOW 20 MIN: CPT | Performed by: INTERNAL MEDICINE

## 2021-02-25 PROCEDURE — U0003 INFECTIOUS AGENT DETECTION BY NUCLEIC ACID (DNA OR RNA); SEVERE ACUTE RESPIRATORY SYNDROME CORONAVIRUS 2 (SARS-COV-2) (CORONAVIRUS DISEASE [COVID-19]), AMPLIFIED PROBE TECHNIQUE, MAKING USE OF HIGH THROUGHPUT TECHNOLOGIES AS DESCRIBED BY CMS-2020-01-R: HCPCS | Mod: ZL | Performed by: SURGERY

## 2021-02-25 PROCEDURE — 36415 COLL VENOUS BLD VENIPUNCTURE: CPT | Mod: ZL | Performed by: INTERNAL MEDICINE

## 2021-02-25 PROCEDURE — 84443 ASSAY THYROID STIM HORMONE: CPT | Mod: ZL | Performed by: INTERNAL MEDICINE

## 2021-02-25 PROCEDURE — U0005 INFEC AGEN DETEC AMPLI PROBE: HCPCS | Mod: ZL

## 2021-02-25 ASSESSMENT — PAIN SCALES - GENERAL: PAINLEVEL: NO PAIN (0)

## 2021-02-25 NOTE — H&P (VIEW-ONLY)
Assessment & Plan   Problem List Items Addressed This Visit     None      Visit Diagnoses     Acquired hypothyroidism    -  Primary    Relevant Orders    TSH (Completed)    T4, free (Completed)    Essential hypertension             She will take her Metoprolol and Norvasc with a small sip of water on day of procedure.  However, she will hold her Norvasc the next several days as I believe this is the reason she has the mild LE edema.      20 minutes spent on the date of the encounter doing chart review, review of test results, interpretation of tests, patient visit and documentation     Srinivas Hurtado, [facility]   Nella is a 75 year old who presents for the following health issues     HPI   Nella presents today for follow up.  She is scheduled for a colonoscopy on Monday March 1st but she reports some edema in her ankles as of late. She otherwise feels fine.  Denies any chest pain or SOB.  She is due for thyroid labs today.  She is also on Amlodipine.    Concern - Swelling   Onset: since yesterday  Description: bilateral feet/ankle swelling  Intensity: mild  Progression of Symptoms:  same  Accompanying Signs & Symptoms: none  Previous history of similar problem: yes  Precipitating factors:        Worsened by: none   Alleviating factors:        Improved by: none   Therapies tried and outcome:  none         Review of Systems   Constitutional, HEENT, cardiovascular, pulmonary, gi and gu systems are negative, except as otherwise noted.      Objective    BP (!) 146/82 (BP Location: Left arm, Patient Position: Chair, Cuff Size: Adult Regular)   Pulse 55   Temp 97.3  F (36.3  C) (Tympanic)   Wt 86.2 kg (190 lb)   SpO2 96%   BMI 34.75 kg/m    Body mass index is 34.75 kg/m .  Physical Exam   GENERAL: Alert and no distress  EYES: Eyes grossly normal to inspection, PERRL and conjunctivae and sclerae normal  RESP: lungs clear to auscultation - no rales, rhonchi or  wheezes  CV: bradycardic and regular, normal S1 S2, no S3 or S4, no murmur, click or rub  ABDOMEN: soft, nontender, no hepatosplenomegaly, no masses and bowel sounds normal  MS: Trace to +1 LE edema  SKIN: no suspicious lesions or rashes  NEURO: No focal deficits noted  PSYCH: mentation appears normal, affect normal/bright    Office Visit on 02/01/2021   Component Date Value Ref Range Status     Sodium 02/01/2021 143  133 - 144 mmol/L Final     Potassium 02/01/2021 4.3  3.4 - 5.3 mmol/L Final     Chloride 02/01/2021 113* 94 - 109 mmol/L Final     Carbon Dioxide 02/01/2021 25  20 - 32 mmol/L Final     Anion Gap 02/01/2021 5  3 - 14 mmol/L Final     Glucose 02/01/2021 95  70 - 99 mg/dL Final     Urea Nitrogen 02/01/2021 17  7 - 30 mg/dL Final     Creatinine 02/01/2021 1.00  0.52 - 1.04 mg/dL Final     GFR Estimate 02/01/2021 55* >60 mL/min/[1.73_m2] Final    Comment: Non  GFR Calc  Starting 12/18/2018, serum creatinine based estimated GFR (eGFR) will be   calculated using the Chronic Kidney Disease Epidemiology Collaboration   (CKD-EPI) equation.       GFR Estimate If Black 02/01/2021 64  >60 mL/min/[1.73_m2] Final    Comment:  GFR Calc  Starting 12/18/2018, serum creatinine based estimated GFR (eGFR) will be   calculated using the Chronic Kidney Disease Epidemiology Collaboration   (CKD-EPI) equation.       Calcium 02/01/2021 8.7  8.5 - 10.1 mg/dL Final     Bilirubin Total 02/01/2021 0.7  0.2 - 1.3 mg/dL Final     Albumin 02/01/2021 3.4  3.4 - 5.0 g/dL Final     Protein Total 02/01/2021 7.1  6.8 - 8.8 g/dL Final     Alkaline Phosphatase 02/01/2021 124  40 - 150 U/L Final     ALT 02/01/2021 27  0 - 50 U/L Final     AST 02/01/2021 15  0 - 45 U/L Final     WBC 02/01/2021 12.9* 4.0 - 11.0 10e9/L Final     RBC Count 02/01/2021 5.33* 3.8 - 5.2 10e12/L Final     Hemoglobin 02/01/2021 14.6  11.7 - 15.7 g/dL Final     Hematocrit 02/01/2021 44.8  35.0 - 47.0 % Final     MCV 02/01/2021 84  78 -  100 fl Final     MCH 02/01/2021 27.4  26.5 - 33.0 pg Final     MCHC 02/01/2021 32.6  31.5 - 36.5 g/dL Final     RDW 02/01/2021 13.5  10.0 - 15.0 % Final     Platelet Count 02/01/2021 262  150 - 450 10e9/L Final     % Neutrophils 02/01/2021 77.3  % Final     % Lymphocytes 02/01/2021 15.6  % Final     % Monocytes 02/01/2021 5.8  % Final     % Eosinophils 02/01/2021 0.9  % Final     % Basophils 02/01/2021 0.4  % Final     Absolute Neutrophil 02/01/2021 10.0* 1.6 - 8.3 10e9/L Final     Absolute Lymphocytes 02/01/2021 2.0  0.8 - 5.3 10e9/L Final     Absolute Monocytes 02/01/2021 0.8  0.0 - 1.3 10e9/L Final     Absolute Eosinophils 02/01/2021 0.1  0.0 - 0.7 10e9/L Final     Absolute Basophils 02/01/2021 0.1  0.0 - 0.2 10e9/L Final     Diff Method 02/01/2021 Automated Method   Final     Color Urine 02/01/2021 Yellow   Final     Appearance Urine 02/01/2021 Clear   Final     Glucose Urine 02/01/2021 Negative  NEG^Negative mg/dL Final     Bilirubin Urine 02/01/2021 Negative  NEG^Negative Final     Ketones Urine 02/01/2021 Negative  NEG^Negative mg/dL Final     Specific Gravity Urine 02/01/2021 1.025  1.003 - 1.035 Final     Blood Urine 02/01/2021 Negative  NEG^Negative Final     pH Urine 02/01/2021 5.5  5.0 - 7.0 pH Final     Protein Albumin Urine 02/01/2021 Negative  NEG^Negative mg/dL Final     Urobilinogen Urine 02/01/2021 0.2  0.2 - 1.0 EU/dL Final     Nitrite Urine 02/01/2021 Negative  NEG^Negative Final     Leukocyte Esterase Urine 02/01/2021 Small* NEG^Negative Final     Source 02/01/2021 Midstream Urine   Final     WBC Urine 02/01/2021 0 - 5  OTO5^0 - 5 /HPF Final     RBC Urine 02/01/2021 O - 2  OTO2^O - 2 /HPF Final     No results found for any visits on 02/25/21.  No results found for this or any previous visit (from the past 24 hour(s)).

## 2021-02-25 NOTE — PROGRESS NOTES
Assessment & Plan   Problem List Items Addressed This Visit     None      Visit Diagnoses     Acquired hypothyroidism    -  Primary    Relevant Orders    TSH (Completed)    T4, free (Completed)    Essential hypertension             She will take her Metoprolol and Norvasc with a small sip of water on day of procedure.  However, she will hold her Norvasc the next several days as I believe this is the reason she has the mild LE edema.      20 minutes spent on the date of the encounter doing chart review, review of test results, interpretation of tests, patient visit and documentation     Srinivas Hurtado, [facility]   Nella is a 75 year old who presents for the following health issues     HPI   Nella presents today for follow up.  She is scheduled for a colonoscopy on Monday March 1st but she reports some edema in her ankles as of late. She otherwise feels fine.  Denies any chest pain or SOB.  She is due for thyroid labs today.  She is also on Amlodipine.    Concern - Swelling   Onset: since yesterday  Description: bilateral feet/ankle swelling  Intensity: mild  Progression of Symptoms:  same  Accompanying Signs & Symptoms: none  Previous history of similar problem: yes  Precipitating factors:        Worsened by: none   Alleviating factors:        Improved by: none   Therapies tried and outcome:  none         Review of Systems   Constitutional, HEENT, cardiovascular, pulmonary, gi and gu systems are negative, except as otherwise noted.      Objective    BP (!) 146/82 (BP Location: Left arm, Patient Position: Chair, Cuff Size: Adult Regular)   Pulse 55   Temp 97.3  F (36.3  C) (Tympanic)   Wt 86.2 kg (190 lb)   SpO2 96%   BMI 34.75 kg/m    Body mass index is 34.75 kg/m .  Physical Exam   GENERAL: Alert and no distress  EYES: Eyes grossly normal to inspection, PERRL and conjunctivae and sclerae normal  RESP: lungs clear to auscultation - no rales, rhonchi or  wheezes  CV: bradycardic and regular, normal S1 S2, no S3 or S4, no murmur, click or rub  ABDOMEN: soft, nontender, no hepatosplenomegaly, no masses and bowel sounds normal  MS: Trace to +1 LE edema  SKIN: no suspicious lesions or rashes  NEURO: No focal deficits noted  PSYCH: mentation appears normal, affect normal/bright    Office Visit on 02/01/2021   Component Date Value Ref Range Status     Sodium 02/01/2021 143  133 - 144 mmol/L Final     Potassium 02/01/2021 4.3  3.4 - 5.3 mmol/L Final     Chloride 02/01/2021 113* 94 - 109 mmol/L Final     Carbon Dioxide 02/01/2021 25  20 - 32 mmol/L Final     Anion Gap 02/01/2021 5  3 - 14 mmol/L Final     Glucose 02/01/2021 95  70 - 99 mg/dL Final     Urea Nitrogen 02/01/2021 17  7 - 30 mg/dL Final     Creatinine 02/01/2021 1.00  0.52 - 1.04 mg/dL Final     GFR Estimate 02/01/2021 55* >60 mL/min/[1.73_m2] Final    Comment: Non  GFR Calc  Starting 12/18/2018, serum creatinine based estimated GFR (eGFR) will be   calculated using the Chronic Kidney Disease Epidemiology Collaboration   (CKD-EPI) equation.       GFR Estimate If Black 02/01/2021 64  >60 mL/min/[1.73_m2] Final    Comment:  GFR Calc  Starting 12/18/2018, serum creatinine based estimated GFR (eGFR) will be   calculated using the Chronic Kidney Disease Epidemiology Collaboration   (CKD-EPI) equation.       Calcium 02/01/2021 8.7  8.5 - 10.1 mg/dL Final     Bilirubin Total 02/01/2021 0.7  0.2 - 1.3 mg/dL Final     Albumin 02/01/2021 3.4  3.4 - 5.0 g/dL Final     Protein Total 02/01/2021 7.1  6.8 - 8.8 g/dL Final     Alkaline Phosphatase 02/01/2021 124  40 - 150 U/L Final     ALT 02/01/2021 27  0 - 50 U/L Final     AST 02/01/2021 15  0 - 45 U/L Final     WBC 02/01/2021 12.9* 4.0 - 11.0 10e9/L Final     RBC Count 02/01/2021 5.33* 3.8 - 5.2 10e12/L Final     Hemoglobin 02/01/2021 14.6  11.7 - 15.7 g/dL Final     Hematocrit 02/01/2021 44.8  35.0 - 47.0 % Final     MCV 02/01/2021 84  78 -  100 fl Final     MCH 02/01/2021 27.4  26.5 - 33.0 pg Final     MCHC 02/01/2021 32.6  31.5 - 36.5 g/dL Final     RDW 02/01/2021 13.5  10.0 - 15.0 % Final     Platelet Count 02/01/2021 262  150 - 450 10e9/L Final     % Neutrophils 02/01/2021 77.3  % Final     % Lymphocytes 02/01/2021 15.6  % Final     % Monocytes 02/01/2021 5.8  % Final     % Eosinophils 02/01/2021 0.9  % Final     % Basophils 02/01/2021 0.4  % Final     Absolute Neutrophil 02/01/2021 10.0* 1.6 - 8.3 10e9/L Final     Absolute Lymphocytes 02/01/2021 2.0  0.8 - 5.3 10e9/L Final     Absolute Monocytes 02/01/2021 0.8  0.0 - 1.3 10e9/L Final     Absolute Eosinophils 02/01/2021 0.1  0.0 - 0.7 10e9/L Final     Absolute Basophils 02/01/2021 0.1  0.0 - 0.2 10e9/L Final     Diff Method 02/01/2021 Automated Method   Final     Color Urine 02/01/2021 Yellow   Final     Appearance Urine 02/01/2021 Clear   Final     Glucose Urine 02/01/2021 Negative  NEG^Negative mg/dL Final     Bilirubin Urine 02/01/2021 Negative  NEG^Negative Final     Ketones Urine 02/01/2021 Negative  NEG^Negative mg/dL Final     Specific Gravity Urine 02/01/2021 1.025  1.003 - 1.035 Final     Blood Urine 02/01/2021 Negative  NEG^Negative Final     pH Urine 02/01/2021 5.5  5.0 - 7.0 pH Final     Protein Albumin Urine 02/01/2021 Negative  NEG^Negative mg/dL Final     Urobilinogen Urine 02/01/2021 0.2  0.2 - 1.0 EU/dL Final     Nitrite Urine 02/01/2021 Negative  NEG^Negative Final     Leukocyte Esterase Urine 02/01/2021 Small* NEG^Negative Final     Source 02/01/2021 Midstream Urine   Final     WBC Urine 02/01/2021 0 - 5  OTO5^0 - 5 /HPF Final     RBC Urine 02/01/2021 O - 2  OTO2^O - 2 /HPF Final     No results found for any visits on 02/25/21.  No results found for this or any previous visit (from the past 24 hour(s)).

## 2021-02-25 NOTE — NURSING NOTE
"Chief Complaint   Patient presents with     Musculoskeletal Problem       Initial BP (!) 146/82 (BP Location: Left arm, Patient Position: Chair, Cuff Size: Adult Regular)   Pulse 55   Temp 97.3  F (36.3  C) (Tympanic)   Wt 86.2 kg (190 lb)   SpO2 96%   BMI 34.75 kg/m   Estimated body mass index is 34.75 kg/m  as calculated from the following:    Height as of 2/1/21: 1.575 m (5' 2\").    Weight as of this encounter: 86.2 kg (190 lb).  Medication Reconciliation: complete  LANE ZENG LPN  "

## 2021-02-26 LAB
LABORATORY COMMENT REPORT: NORMAL
SARS-COV-2 RNA RESP QL NAA+PROBE: NEGATIVE
SPECIMEN SOURCE: NORMAL

## 2021-03-01 ENCOUNTER — HOSPITAL ENCOUNTER (OUTPATIENT)
Facility: HOSPITAL | Age: 76
Discharge: HOME | End: 2021-03-01
Attending: SURGERY | Admitting: SURGERY
Payer: COMMERCIAL

## 2021-03-01 VITALS
WEIGHT: 185 LBS | OXYGEN SATURATION: 97 % | RESPIRATION RATE: 16 BRPM | BODY MASS INDEX: 34.04 KG/M2 | HEIGHT: 62 IN | TEMPERATURE: 97.8 F | DIASTOLIC BLOOD PRESSURE: 84 MMHG | HEART RATE: 53 BPM | SYSTOLIC BLOOD PRESSURE: 156 MMHG

## 2021-03-01 DIAGNOSIS — Z12.11 COLON CANCER SCREENING: ICD-10-CM

## 2021-03-01 PROCEDURE — 99100 ANES PT EXTEME AGE<1 YR&>70: CPT | Performed by: NURSE ANESTHETIST, CERTIFIED REGISTERED

## 2021-03-01 PROCEDURE — G0105 COLORECTAL SCRN; HI RISK IND: HCPCS | Performed by: SURGERY

## 2021-03-01 PROCEDURE — 250N000009 HC RX 250: Performed by: NURSE ANESTHETIST, CERTIFIED REGISTERED

## 2021-03-01 PROCEDURE — 258N000003 HC RX IP 258 OP 636: Performed by: NURSE ANESTHETIST, CERTIFIED REGISTERED

## 2021-03-01 PROCEDURE — 710N000012 HC RECOVERY PHASE 2, PER MINUTE: Performed by: SURGERY

## 2021-03-01 PROCEDURE — 999N000141 HC STATISTIC PRE-PROCEDURE NURSING ASSESSMENT: Performed by: SURGERY

## 2021-03-01 PROCEDURE — 360N000075 HC SURGERY LEVEL 2, PER MIN: Performed by: SURGERY

## 2021-03-01 PROCEDURE — 370N000017 HC ANESTHESIA TECHNICAL FEE, PER MIN: Performed by: SURGERY

## 2021-03-01 PROCEDURE — 45378 DIAGNOSTIC COLONOSCOPY: CPT | Performed by: NURSE ANESTHETIST, CERTIFIED REGISTERED

## 2021-03-01 PROCEDURE — 250N000011 HC RX IP 250 OP 636: Performed by: NURSE ANESTHETIST, CERTIFIED REGISTERED

## 2021-03-01 RX ORDER — MEPERIDINE HYDROCHLORIDE 25 MG/ML
12.5 INJECTION INTRAMUSCULAR; INTRAVENOUS; SUBCUTANEOUS
Status: DISCONTINUED | OUTPATIENT
Start: 2021-03-01 | End: 2021-03-01 | Stop reason: HOSPADM

## 2021-03-01 RX ORDER — PROPOFOL 10 MG/ML
INJECTION, EMULSION INTRAVENOUS PRN
Status: DISCONTINUED | OUTPATIENT
Start: 2021-03-01 | End: 2021-03-01

## 2021-03-01 RX ORDER — NALOXONE HYDROCHLORIDE 0.4 MG/ML
0.2 INJECTION, SOLUTION INTRAMUSCULAR; INTRAVENOUS; SUBCUTANEOUS
Status: DISCONTINUED | OUTPATIENT
Start: 2021-03-01 | End: 2021-03-01 | Stop reason: HOSPADM

## 2021-03-01 RX ORDER — ALBUTEROL SULFATE 0.83 MG/ML
2.5 SOLUTION RESPIRATORY (INHALATION) EVERY 4 HOURS PRN
Status: DISCONTINUED | OUTPATIENT
Start: 2021-03-01 | End: 2021-03-01 | Stop reason: HOSPADM

## 2021-03-01 RX ORDER — LIDOCAINE 40 MG/G
CREAM TOPICAL
Status: DISCONTINUED | OUTPATIENT
Start: 2021-03-01 | End: 2021-03-01 | Stop reason: HOSPADM

## 2021-03-01 RX ORDER — ONDANSETRON 4 MG/1
4 TABLET, ORALLY DISINTEGRATING ORAL EVERY 30 MIN PRN
Status: DISCONTINUED | OUTPATIENT
Start: 2021-03-01 | End: 2021-03-01 | Stop reason: HOSPADM

## 2021-03-01 RX ORDER — NALOXONE HYDROCHLORIDE 0.4 MG/ML
0.4 INJECTION, SOLUTION INTRAMUSCULAR; INTRAVENOUS; SUBCUTANEOUS
Status: DISCONTINUED | OUTPATIENT
Start: 2021-03-01 | End: 2021-03-01 | Stop reason: HOSPADM

## 2021-03-01 RX ORDER — FENTANYL CITRATE 50 UG/ML
25-50 INJECTION, SOLUTION INTRAMUSCULAR; INTRAVENOUS EVERY 5 MIN PRN
Status: DISCONTINUED | OUTPATIENT
Start: 2021-03-01 | End: 2021-03-01 | Stop reason: HOSPADM

## 2021-03-01 RX ORDER — LABETALOL 20 MG/4 ML (5 MG/ML) INTRAVENOUS SYRINGE
10
Status: DISCONTINUED | OUTPATIENT
Start: 2021-03-01 | End: 2021-03-01 | Stop reason: HOSPADM

## 2021-03-01 RX ORDER — SODIUM CHLORIDE, SODIUM LACTATE, POTASSIUM CHLORIDE, CALCIUM CHLORIDE 600; 310; 30; 20 MG/100ML; MG/100ML; MG/100ML; MG/100ML
INJECTION, SOLUTION INTRAVENOUS CONTINUOUS
Status: DISCONTINUED | OUTPATIENT
Start: 2021-03-01 | End: 2021-03-01 | Stop reason: HOSPADM

## 2021-03-01 RX ORDER — HYDROMORPHONE HYDROCHLORIDE 1 MG/ML
.3-.5 INJECTION, SOLUTION INTRAMUSCULAR; INTRAVENOUS; SUBCUTANEOUS EVERY 10 MIN PRN
Status: DISCONTINUED | OUTPATIENT
Start: 2021-03-01 | End: 2021-03-01 | Stop reason: HOSPADM

## 2021-03-01 RX ORDER — LIDOCAINE HYDROCHLORIDE 20 MG/ML
INJECTION, SOLUTION INFILTRATION; PERINEURAL PRN
Status: DISCONTINUED | OUTPATIENT
Start: 2021-03-01 | End: 2021-03-01

## 2021-03-01 RX ORDER — ONDANSETRON 2 MG/ML
4 INJECTION INTRAMUSCULAR; INTRAVENOUS EVERY 30 MIN PRN
Status: DISCONTINUED | OUTPATIENT
Start: 2021-03-01 | End: 2021-03-01 | Stop reason: HOSPADM

## 2021-03-01 RX ADMIN — LIDOCAINE HYDROCHLORIDE 40 MG: 20 INJECTION, SOLUTION INFILTRATION; PERINEURAL at 07:48

## 2021-03-01 RX ADMIN — PROPOFOL 40 MG: 10 INJECTION, EMULSION INTRAVENOUS at 07:53

## 2021-03-01 RX ADMIN — PROPOFOL 40 MG: 10 INJECTION, EMULSION INTRAVENOUS at 07:55

## 2021-03-01 RX ADMIN — PROPOFOL 20 MG: 10 INJECTION, EMULSION INTRAVENOUS at 07:52

## 2021-03-01 RX ADMIN — SODIUM CHLORIDE, POTASSIUM CHLORIDE, SODIUM LACTATE AND CALCIUM CHLORIDE: 600; 310; 30; 20 INJECTION, SOLUTION INTRAVENOUS at 07:41

## 2021-03-01 RX ADMIN — PROPOFOL 40 MG: 10 INJECTION, EMULSION INTRAVENOUS at 07:48

## 2021-03-01 RX ADMIN — PROPOFOL 20 MG: 10 INJECTION, EMULSION INTRAVENOUS at 07:50

## 2021-03-01 ASSESSMENT — MIFFLIN-ST. JEOR: SCORE: 1292.4

## 2021-03-01 NOTE — OP NOTE
Nella Lemon MRN# [id number]   YOB: 1945 Age: 75 year old      Date of Admission:  3/1/2021  Date of Service:   3/01/21    Primary care provider: Randi Haddad    PREOPERATIVE DIAGNOSIS:  Colon Cancer Screening        POSTOPERATIVE DIAGNOSIS:  Normal colon          PROCEDURE:  Colonoscopy           INDICATIONS:  Screening colonoscopy.      Specimen: * No specimens in log *    SURGEON: Sandro Dow MD    DESCRIPTION OF PROCEDURE: Nella Lemon was brought into the endoscopy suite and placed in the left lateral decubitus position. After preprocedural pause and attended monitored anesthesia was administered, the external anus was inspected and was normal . Digital rectal exam was normal. The colonoscope was inserted and advanced under direct visualization to the level of the cecum which was identified by the appendiceal orifice and the ileocecal valve. The prep was excellent.. Upon slow withdrawal of the colonoscope, approximately 95% of the mucosa was directly visualized. The colon was without mucosal abnormality. There was no evidence of further polyps, inflammation, bleeding or AVMs. Retroflexion of the rectum was normal. The extra air was removed from the colon, and the colonoscope withdrawn. The patient tolerated the procedure well and was taken to postanesthesia care unit.     We invite the patient to return in 5 years for follow up screening evaluation, if PCP wishes to continue with colon cancer screening     Sandro Dow MD

## 2021-03-01 NOTE — OR NURSING
Patient and responsible adult given discharge instructions with no questions regarding instructions. Rani score 18/18. Pain level 0/10.  Discharged from unit via dtr Mariia. Patient discharged to home.

## 2021-03-01 NOTE — DISCHARGE INSTRUCTIONS

## 2021-03-01 NOTE — ANESTHESIA POSTPROCEDURE EVALUATION
Patient: Nella Lemon    Procedure(s):  screening colonoscopy    Diagnosis:Colon cancer screening [Z12.11]  Diagnosis Additional Information: No value filed.    Anesthesia Type:  MAC    Note:  Disposition: Outpatient   Postop Pain Control: Uneventful            Sign Out: Well controlled pain   PONV: No   Neuro/Psych: Uneventful            Sign Out: Acceptable/Baseline neuro status   Airway/Respiratory: Uneventful            Sign Out: Acceptable/Baseline resp. status   CV/Hemodynamics: Uneventful            Sign Out: Acceptable CV status   Other NRE: NONE   DID A NON-ROUTINE EVENT OCCUR? No         Last vitals:  Vitals:    03/01/21 0835 03/01/21 0840 03/01/21 0845   BP: 150/68 (!) 146/116 156/84   Pulse: 53 54 53   Resp: 16 16 16   Temp:      SpO2: 96% 96% 97%       Last vitals prior to Anesthesia Care Transfer:  CRNA VITALS  3/1/2021 0739 - 3/1/2021 0839      3/1/2021             Pulse:  53    SpO2:  98 %    Resp Rate (set):  8          Electronically Signed By: KG Stevens CRNA  March 1, 2021  11:09 AM

## 2021-03-01 NOTE — ANESTHESIA CARE TRANSFER NOTE
Patient: Nella Lemon    Procedure(s):  screening colonoscopy    Diagnosis: Colon cancer screening [Z12.11]  Diagnosis Additional Information: No value filed.    Anesthesia Type:   MAC     Note:      Level of Consciousness: awake  Oxygen Supplementation: nasal cannula  Level of Supplemental Oxygen (L/min / FiO2): 2  Independent Airway: airway patency satisfactory and stable  Dentition: dentition unchanged  Vital Signs Stable: post-procedure vital signs reviewed and stable  Report to RN Given: handoff report given  Patient transferred to: Phase II    Handoff Report: Identifed the Patient, Identified the Reponsible Provider, Reviewed the pertinent medical history, Discussed the surgical course, Reviewed Intra-OP anesthesia mangement and issues during anesthesia, Set expectations for post-procedure period and Allowed opportunity for questions and acknowledgement of understanding      Vitals: (Last set prior to Anesthesia Care Transfer)  CRNA VITALS  3/1/2021 0739 - 3/1/2021 0810      3/1/2021             Pulse:  53    SpO2:  98 %    Resp Rate (set):  8        Electronically Signed By: KG Stevens CRNA  March 1, 2021  8:10 AM

## 2021-03-04 DIAGNOSIS — E03.4 HYPOTHYROIDISM DUE TO ACQUIRED ATROPHY OF THYROID: ICD-10-CM

## 2021-03-04 DIAGNOSIS — M79.10 MYALGIA: ICD-10-CM

## 2021-03-04 RX ORDER — GABAPENTIN 300 MG/1
CAPSULE ORAL
Qty: 270 CAPSULE | Refills: 0 | Status: SHIPPED | OUTPATIENT
Start: 2021-03-04 | End: 2021-04-07

## 2021-03-04 RX ORDER — LEVOTHYROXINE SODIUM 100 UG/1
100 TABLET ORAL DAILY
Qty: 90 TABLET | Refills: 0 | Status: SHIPPED | OUTPATIENT
Start: 2021-03-04 | End: 2021-04-29

## 2021-03-04 NOTE — TELEPHONE ENCOUNTER
SYNTHROID      Last Written Prescription Date:  11-  Last Fill Quantity: 90,   # refills: 0  Last Office Visit: 2-  Future Office visit:          NEURONTIN      Last Written Prescription Date:  2-3-2021  Last Fill Quantity: 270,   # refills: 0  Last Office Visit: 2-  Future Office visit:    Next 5 appointments (look out 90 days)    Apr 28, 2021 10:00 AM  (Arrive by 9:45 AM)  SHORT with Randi Haddad NP  [facility] ([facility] ) [address]  [facility] [address]  [phone]   May 03, 2021  3:00 PM  (Arrive by 2:45 PM)  Return Visit with Rosa Venegas MD  [facility] ([facility] ) [address] [facility] [place] [address]  [phone]           Routing refill request to provider for review/approval because:

## 2021-03-22 DIAGNOSIS — F41.9 ANXIETY: ICD-10-CM

## 2021-04-06 NOTE — PROGRESS NOTES
"    Assessment & Plan     1. Mixed hyperlipidemia  - Lipid Profile    2. Benign essential hypertension  Continue losartan, metoprolol and amlodipine, will add hydrochlorothiazide .    - Comprehensive metabolic panel  - hydrochlorothiazide (HYDRODIURIL) 12.5 MG tablet; Take 1 tablet (12.5 mg) by mouth daily  Dispense: 30 tablet; Refill: 1    3. Paroxysmal atrial fibrillation (H)  Continue eliquis    4. Postoperative hypothyroidism  - TSH with free T4 reflex    5. CKD (chronic kidney disease) stage 2, GFR 60-89 ml/min  Do not use NSAIDS     6. GERD  Continue omeprazole but will decrease to 20mg daily     7. Class 2 severe obesity due to excess calories with serious comorbidity and body mass index (BMI) of 35.0 to 35.9 in adult (H)  Weight loss encouraged       Review of the result(s) of each unique test - Creatinine         BMI:   Estimated body mass index is 34.57 kg/m  as calculated from the following:    Height as of this encounter: 1.575 m (5' 2\").    Weight as of this encounter: 85.7 kg (189 lb).   Weight management plan: Discussed healthy diet and exercise guidelines        Return in about 2 weeks (around 5/12/2021) for hypertension.    Randi Haddad NP  [facility]    Subjective   Nella is a 75 year old who presents for the following health issues     HPI     Hyperlipidemia Follow-Up      Are you regularly taking any medication or supplement to lower your cholesterol?   No    Are you having muscle aches or other side effects that you think could be caused by your cholesterol lowering medication?  No    Hypertension Follow-up      Do you check your blood pressure regularly outside of the clinic? No     Are you following a low salt diet? Yes    Are your blood pressures ever more than 140 on the top number (systolic) OR more   than 90 on the bottom number (diastolic), for example 140/90? Yes    Chronic Kidney Disease Follow-up    Do you take any over the counter pain medicine?: " Yes  What over the counter medicine are you taking for your pain?:tylenol     How often do you take this medicine?:  A few times a month    Hypothyroidism Follow-up      Since last visit, patient describes the following symptoms: Weight stable, no hair loss, no skin changes, no constipation, no loose stools      How many servings of fruits and vegetables do you eat daily?  2-3    On average, how many sweetened beverages do you drink each day (Examples: soda, juice, sweet tea, etc.  Do NOT count diet or artificially sweetened beverages)?   2    How many days per week do you exercise enough to make your heart beat faster? 6    How many minutes a day do you exercise enough to make your heart beat faster? 10 - 19    How many days per week do you miss taking your medication? 0    She continues following with Dr Hyman.      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, left     Family history of colon cancer     ACP (advance care planning)     Mixed hyperlipidemia     Paroxysmal atrial fibrillation (H)     CKD (chronic kidney disease) stage 2, GFR 60-89 ml/min     Leukocytosis     Class 2 severe obesity due to excess calories with serious comorbidity and body mass index (BMI) of 35.0 to 35.9 in adult (H)     Lower extremity edema     Fibromyalgia     Sensorineural hearing loss (SNHL) of both ears     Papillary thyroid carcinoma (H)     LA NENA (generalized anxiety disorder)     Colon cancer screening     Postoperative hypothyroidism     Past Surgical History:   Procedure Laterality Date     ADENOIDECTOMY       BIOPSY  2019    FNA left thyroid     CHOLECYSTECTOMY  1981     COLONOSCOPY  2002     COLONOSCOPY  2012     COLONOSCOPY N/A 9/22/2014    Procedure: COLONOSCOPY;  Surgeon: Lavell Pavon, DO;   Location: [facility] OR     COLONOSCOPY N/A 3/1/2021    Procedure: screening colonoscopy;  Surgeon: Sandro Dow MD;  Location: [facility] OR     CYSTOSCOPY  2007    Cystitis chronic     EYE SURGERY      right cataract     ORTHOPEDIC SURGERY  2002    carpal tunnel; RT, LT     THYROIDECTOMY Left 8/27/2019    Procedure: LEFT THYROID LOBECTOMY AND ISTHMUS WITH FROZENS;  Surgeon: Mildred Pineda MD;  Location: [facility] OR     TONSILLECTOMY         Social History     Tobacco Use     Smoking status: Never Smoker     Smokeless tobacco: Never Used   Substance Use Topics     Alcohol use: Never     Frequency: Never     Family History   Problem Relation Age of Onset     Breast Cancer Mother      Alcohol/Drug Mother         Cirrhosis     Other - See Comments Mother         goiter     Cerebrovascular Disease Father      Circulatory Father      Alcohol/Drug Son      Cancer - colorectal Brother      Unknown/Adopted No family hx of      Asthma No family hx of      C.A.D. No family hx of      Diabetes No family hx of      Hypertension No family hx of      Prostate Cancer No family hx of      Allergies No family hx of      Alzheimer Disease No family hx of      Anesthesia Reaction No family hx of      Arthritis No family hx of      Blood Disease No family hx of      Cardiovascular No family hx of      Congenital Anomalies No family hx of      Connective Tissue Disorder No family hx of      Depression No family hx of      Endocrine Disease No family hx of      Eye Disorder No family hx of      Genetic Disorder No family hx of      Gastrointestinal Disease No family hx of      Genitourinary Problems No family hx of      Gynecology No family hx of      Heart Disease No family hx of      Lipids No family hx of      Musculoskeletal Disorder No family hx of      Neurologic Disorder No family hx of      Obesity No family hx of      Osteoporosis No family hx of      Psychotic Disorder No family hx of      Respiratory No family hx of      Thyroid Disease  No family hx of      Family History Negative No family hx of      Hearing Loss No family hx of          Current Outpatient Medications   Medication Sig Dispense Refill     amLODIPine (NORVASC) 5 MG tablet TAKE 1 TABLET (5 MG) BY MOUTH DAILY 90 tablet 3     ELIQUIS ANTICOAGULANT 5 MG tablet TAKE 1 TABLET (5 MG) BY MOUTH 2 TIMES DAILY 180 tablet 3     gabapentin (NEURONTIN) 300 MG capsule TAKE 3 CAPSULES THREE TIMES DAILY 270 capsule 0     GLUCOSAMINE SULFATE PO Take 1,000 mg by mouth 2 times daily       hydrochlorothiazide (HYDRODIURIL) 12.5 MG tablet Take 1 tablet (12.5 mg) by mouth daily 30 tablet 1     levothyroxine (SYNTHROID/LEVOTHROID) 100 MCG tablet TAKE 1 TABLET (100 MCG) BY MOUTH DAILY 90 tablet 0     losartan (COZAAR) 100 MG tablet TAKE 1 TABLET (100 MG) BY MOUTH DAILY 90 tablet 3     metoprolol succinate ER (TOPROL-XL) 50 MG 24 hr tablet TAKE 1 TABLET (50 MG) BY MOUTH DAILY 90 tablet 3     Multiple Vitamins-Minerals (PRESERVISION AREDS 2 PO)        Omega-3 Fatty Acids (OMEGA-3 FISH OIL PO) Take 3 g by mouth daily        omeprazole (PRILOSEC) 20 MG DR capsule Take 1 capsule (20 mg) by mouth daily 90 capsule 1     sertraline (ZOLOFT) 50 MG tablet TAKE 1 TABLET DAILY 30 tablet 4     solifenacin (VESICARE) 5 MG tablet TAKE 1 TABLET (5 MG) BY MOUTH DAILY 90 tablet 2     STATIN NOT PRESCRIBED (INTENTIONAL) Please choose reason not prescribed, below       VITAMIN D, CHOLECALCIFEROL, PO Take by mouth daily       Allergies   Allergen Reactions     Atorvastatin      Ezetimibe      Gentamicin      Hydroxyzine      Lorazepam      Ranitidine      Simvastatin      Lopid [Gemfibrozil] GI Disturbance     Bloating, constipation,      No Clinical Screening - See Comments      anticholesterol medicine caused muscle aches     Pravastatin Muscle Pain (Myalgia)     Recent Labs   Lab Test 02/25/21  1002 02/01/21  1108 11/02/20  1320 10/29/20  0931 10/30/19  1026 10/30/19  1026 01/23/19  0952 01/23/19  0952   LDL  --   --   --   "130*  --  145*  --  56   HDL  --   --   --  42*  --  41*  --  47*   TRIG  --   --   --  138  --  192*  --  112   ALT  --  27 32 34   < > 45   < > 26   CR  --  1.00 0.93 1.00   < > 0.83   < > 0.87   GFRESTIMATED  --  55* 60* 55*   < > 70   < > 66   GFRESTBLACK  --  64 70 64   < > 81   < > 77   POTASSIUM  --  4.3 4.3 4.1   < > 3.9   < > 4.1   TSH 4.23*  --  1.42 3.67   < > 10.11*   < > 4.41*    < > = values in this interval not displayed.      BP Readings from Last 3 Encounters:   04/28/21 (!) 144/68   03/01/21 156/84   02/25/21 (!) 146/82    Wt Readings from Last 3 Encounters:   04/28/21 85.7 kg (189 lb)   03/01/21 83.9 kg (185 lb)   02/25/21 86.2 kg (190 lb)                      Review of Systems   Constitutional, HEENT, cardiovascular, pulmonary, gi and gu systems are negative, except as otherwise noted.      Objective    BP (!) 144/68 (BP Location: Right arm, Patient Position: Chair, Cuff Size: Adult Regular)   Pulse 51   Temp 97.5  F (36.4  C) (Tympanic)   Resp 18   Ht 1.575 m (5' 2\")   Wt 85.7 kg (189 lb)   SpO2 97%   BMI 34.57 kg/m    Body mass index is 34.57 kg/m .  Physical Exam   GENERAL: healthy, alert and no distress  NECK: no adenopathy, no asymmetry, masses, or scars, thyroid normal to palpation and no carotid bruits  RESP: lungs clear to auscultation - no rales, rhonchi or wheezes  CV: regular rate and rhythm, normal S1 S2, no S3 or S4, no murmur, click or rub, no peripheral edema and peripheral pulses strong  MS: no gross musculoskeletal defects noted, no edema  PSYCH: mentation appears normal, affect normal/bright                "

## 2021-04-07 DIAGNOSIS — M79.10 MYALGIA: ICD-10-CM

## 2021-04-07 RX ORDER — GABAPENTIN 300 MG/1
CAPSULE ORAL
Qty: 270 CAPSULE | Refills: 0 | Status: SHIPPED | OUTPATIENT
Start: 2021-04-07 | End: 2021-05-06

## 2021-04-28 ENCOUNTER — OFFICE VISIT (OUTPATIENT)
Dept: FAMILY MEDICINE | Facility: OTHER | Age: 76
End: 2021-04-28
Attending: NURSE PRACTITIONER
Payer: COMMERCIAL

## 2021-04-28 VITALS
BODY MASS INDEX: 34.78 KG/M2 | HEIGHT: 62 IN | TEMPERATURE: 97.5 F | DIASTOLIC BLOOD PRESSURE: 68 MMHG | WEIGHT: 189 LBS | OXYGEN SATURATION: 97 % | HEART RATE: 51 BPM | RESPIRATION RATE: 18 BRPM | SYSTOLIC BLOOD PRESSURE: 144 MMHG

## 2021-04-28 DIAGNOSIS — I48.0 PAROXYSMAL ATRIAL FIBRILLATION (H): Chronic | ICD-10-CM

## 2021-04-28 DIAGNOSIS — K21.9 GASTROESOPHAGEAL REFLUX DISEASE WITHOUT ESOPHAGITIS: Chronic | ICD-10-CM

## 2021-04-28 DIAGNOSIS — N18.2 CKD (CHRONIC KIDNEY DISEASE) STAGE 2, GFR 60-89 ML/MIN: Chronic | ICD-10-CM

## 2021-04-28 DIAGNOSIS — E66.01 CLASS 2 SEVERE OBESITY DUE TO EXCESS CALORIES WITH SERIOUS COMORBIDITY AND BODY MASS INDEX (BMI) OF 35.0 TO 35.9 IN ADULT (H): Chronic | ICD-10-CM

## 2021-04-28 DIAGNOSIS — E66.812 CLASS 2 SEVERE OBESITY DUE TO EXCESS CALORIES WITH SERIOUS COMORBIDITY AND BODY MASS INDEX (BMI) OF 35.0 TO 35.9 IN ADULT (H): Chronic | ICD-10-CM

## 2021-04-28 DIAGNOSIS — E78.2 MIXED HYPERLIPIDEMIA: Primary | Chronic | ICD-10-CM

## 2021-04-28 DIAGNOSIS — I10 BENIGN ESSENTIAL HYPERTENSION: Chronic | ICD-10-CM

## 2021-04-28 DIAGNOSIS — E89.0 POSTOPERATIVE HYPOTHYROIDISM: ICD-10-CM

## 2021-04-28 DIAGNOSIS — C73 PAPILLARY THYROID CARCINOMA (H): Chronic | ICD-10-CM

## 2021-04-28 LAB
ALBUMIN SERPL-MCNC: 3.5 G/DL (ref 3.4–5)
ALP SERPL-CCNC: 129 U/L (ref 40–150)
ALT SERPL W P-5'-P-CCNC: 30 U/L (ref 0–50)
ANION GAP SERPL CALCULATED.3IONS-SCNC: 7 MMOL/L (ref 3–14)
AST SERPL W P-5'-P-CCNC: 14 U/L (ref 0–45)
BASOPHILS # BLD AUTO: 0 10E9/L (ref 0–0.2)
BASOPHILS NFR BLD AUTO: 0.2 %
BILIRUB SERPL-MCNC: 0.7 MG/DL (ref 0.2–1.3)
BUN SERPL-MCNC: 17 MG/DL (ref 7–30)
CALCIUM SERPL-MCNC: 8.5 MG/DL (ref 8.5–10.1)
CHLORIDE SERPL-SCNC: 110 MMOL/L (ref 94–109)
CHOLEST SERPL-MCNC: 237 MG/DL
CO2 SERPL-SCNC: 24 MMOL/L (ref 20–32)
CREAT SERPL-MCNC: 1.04 MG/DL (ref 0.52–1.04)
DIFFERENTIAL METHOD BLD: ABNORMAL
EOSINOPHIL # BLD AUTO: 0.2 10E9/L (ref 0–0.7)
EOSINOPHIL NFR BLD AUTO: 1.3 %
ERYTHROCYTE [DISTWIDTH] IN BLOOD BY AUTOMATED COUNT: 14.2 % (ref 10–15)
GFR SERPL CREATININE-BSD FRML MDRD: 53 ML/MIN/{1.73_M2}
GLUCOSE SERPL-MCNC: 100 MG/DL (ref 70–99)
HCT VFR BLD AUTO: 42.8 % (ref 35–47)
HDLC SERPL-MCNC: 47 MG/DL
HGB BLD-MCNC: 14.2 G/DL (ref 11.7–15.7)
LDH SERPL L TO P-CCNC: 213 U/L (ref 81–234)
LDLC SERPL CALC-MCNC: 154 MG/DL
LYMPHOCYTES # BLD AUTO: 2.2 10E9/L (ref 0.8–5.3)
LYMPHOCYTES NFR BLD AUTO: 17.1 %
MCH RBC QN AUTO: 27.6 PG (ref 26.5–33)
MCHC RBC AUTO-ENTMCNC: 33.2 G/DL (ref 31.5–36.5)
MCV RBC AUTO: 83 FL (ref 78–100)
MONOCYTES # BLD AUTO: 0.7 10E9/L (ref 0–1.3)
MONOCYTES NFR BLD AUTO: 5.5 %
NEUTROPHILS # BLD AUTO: 9.6 10E9/L (ref 1.6–8.3)
NEUTROPHILS NFR BLD AUTO: 75.9 %
NONHDLC SERPL-MCNC: 190 MG/DL
PLATELET # BLD AUTO: 293 10E9/L (ref 150–450)
POTASSIUM SERPL-SCNC: 4 MMOL/L (ref 3.4–5.3)
PROT SERPL-MCNC: 7.3 G/DL (ref 6.8–8.8)
RBC # BLD AUTO: 5.14 10E12/L (ref 3.8–5.2)
SODIUM SERPL-SCNC: 141 MMOL/L (ref 133–144)
T4 FREE SERPL-MCNC: 1.05 NG/DL (ref 0.76–1.46)
TRIGL SERPL-MCNC: 178 MG/DL
TSH SERPL DL<=0.005 MIU/L-ACNC: 4.14 MU/L (ref 0.4–4)
WBC # BLD AUTO: 12.6 10E9/L (ref 4–11)

## 2021-04-28 PROCEDURE — 99214 OFFICE O/P EST MOD 30 MIN: CPT | Performed by: NURSE PRACTITIONER

## 2021-04-28 PROCEDURE — 84439 ASSAY OF FREE THYROXINE: CPT | Mod: ZL | Performed by: NURSE PRACTITIONER

## 2021-04-28 PROCEDURE — 36415 COLL VENOUS BLD VENIPUNCTURE: CPT | Mod: ZL | Performed by: NURSE PRACTITIONER

## 2021-04-28 PROCEDURE — 83615 LACTATE (LD) (LDH) ENZYME: CPT | Mod: ZL | Performed by: NURSE PRACTITIONER

## 2021-04-28 PROCEDURE — 80061 LIPID PANEL: CPT | Mod: ZL | Performed by: NURSE PRACTITIONER

## 2021-04-28 PROCEDURE — 84443 ASSAY THYROID STIM HORMONE: CPT | Mod: ZL | Performed by: NURSE PRACTITIONER

## 2021-04-28 PROCEDURE — 80053 COMPREHEN METABOLIC PANEL: CPT | Mod: ZL | Performed by: NURSE PRACTITIONER

## 2021-04-28 PROCEDURE — G0463 HOSPITAL OUTPT CLINIC VISIT: HCPCS

## 2021-04-28 PROCEDURE — 85025 COMPLETE CBC W/AUTO DIFF WBC: CPT | Mod: ZL | Performed by: NURSE PRACTITIONER

## 2021-04-28 RX ORDER — HYDROCHLOROTHIAZIDE 12.5 MG/1
12.5 TABLET ORAL DAILY
Qty: 30 TABLET | Refills: 1 | Status: SHIPPED | OUTPATIENT
Start: 2021-04-28 | End: 2021-05-12

## 2021-04-28 ASSESSMENT — ANXIETY QUESTIONNAIRES
2. NOT BEING ABLE TO STOP OR CONTROL WORRYING: SEVERAL DAYS
6. BECOMING EASILY ANNOYED OR IRRITABLE: NOT AT ALL
IF YOU CHECKED OFF ANY PROBLEMS ON THIS QUESTIONNAIRE, HOW DIFFICULT HAVE THESE PROBLEMS MADE IT FOR YOU TO DO YOUR WORK, TAKE CARE OF THINGS AT HOME, OR GET ALONG WITH OTHER PEOPLE: NOT DIFFICULT AT ALL
3. WORRYING TOO MUCH ABOUT DIFFERENT THINGS: SEVERAL DAYS
4. TROUBLE RELAXING: NOT AT ALL
1. FEELING NERVOUS, ANXIOUS, OR ON EDGE: SEVERAL DAYS
GAD7 TOTAL SCORE: 4
5. BEING SO RESTLESS THAT IT IS HARD TO SIT STILL: NOT AT ALL
7. FEELING AFRAID AS IF SOMETHING AWFUL MIGHT HAPPEN: SEVERAL DAYS

## 2021-04-28 ASSESSMENT — MIFFLIN-ST. JEOR: SCORE: 1310.55

## 2021-04-28 ASSESSMENT — PATIENT HEALTH QUESTIONNAIRE - PHQ9: SUM OF ALL RESPONSES TO PHQ QUESTIONS 1-9: 2

## 2021-04-28 ASSESSMENT — PAIN SCALES - GENERAL: PAINLEVEL: NO PAIN (0)

## 2021-04-28 NOTE — PATIENT INSTRUCTIONS
"    Assessment & Plan     1. Mixed hyperlipidemia  - Lipid Profile    2. Benign essential hypertension  Continue losartan, metoprolol and amlodipine, will add hydrochlorothiazide .    - Comprehensive metabolic panel  - hydrochlorothiazide (HYDRODIURIL) 12.5 MG tablet; Take 1 tablet (12.5 mg) by mouth daily  Dispense: 30 tablet; Refill: 1    3. Paroxysmal atrial fibrillation (H)  Continue eliquis    4. Postoperative hypothyroidism  - TSH with free T4 reflex    5. CKD (chronic kidney disease) stage 2, GFR 60-89 ml/min  Do not use NSAIDS     6. GERD  Continue omeprazole but will decrease to 20mg daily     7. Class 2 severe obesity due to excess calories with serious comorbidity and body mass index (BMI) of 35.0 to 35.9 in adult (H)  Weight loss encouraged       Review of the result(s) of each unique test - Creatinine         BMI:   Estimated body mass index is 34.57 kg/m  as calculated from the following:    Height as of this encounter: 1.575 m (5' 2\").    Weight as of this encounter: 85.7 kg (189 lb).   Weight management plan: Discussed healthy diet and exercise guidelines        Return in about 2 weeks (around 5/12/2021) for hypertension.    Randi Haddad NP  [facility]    "

## 2021-04-28 NOTE — LETTER
"My Heart Failure Action Plan   Name: Nella Lemon    YOB: 1945   Date: 4/6/2021    My doctor: Randi Haddad     [facility]     [address]  [place] [address]  [phone]  My Diagnosis: {HF STAGES:130422}   My Exercise Goal: 30 minutes daily  .     My Weight Plan:   Wt Readings from Last 2 Encounters:   03/01/21 83.9 kg (185 lb)   02/25/21 86.2 kg (190 lb)     Weigh yourself daily using the same scale. If you gain more than 2 pounds in 24 hours or 5 pounds in a week {HF WEIGHT GOAL:318233}    My Diet Goal: { :489089::\"No added salt\"}    Emergency Room Visits:    Our goal is to improve your quality of life and help you avoid a visit to the emergency room or hospital.  If we work together, we can achieve this goal. But, if you feel you need to call 911 or go to the emergency room, please do so.  If you go to the emergency room, please bring your list of medicines and your daily weight chart with you.       GREEN ZONE     Doing well today    Weight gained is no more than 2 pounds a day or 5 pounds a week.    No swelling in feet, ankles, legs or stomach.    No more swelling than usual.    No more trouble breathing than usual.    No change in my sleep.    No other problems. Actions:    I am doing fine.  I will take my medicine, follow my diet, see my doctor, exercise, and watch for symptoms.           YELLOW ZONE         Having a bad day or flare up    Weight gain of more than 2 pounds in one day or 5 pounds in one week.    New swelling in ankle, leg, knee or thigh.    Bloating in belly, pants feel tighter.    Swelling in hands or face.    Coughing or trouble breathing while walking or talking.    Harder to breathe last night.    Have trouble sleeping, wake up short of breath.    Much more tired than usual.    Not eating.    Pain in my chest or bad leg cramps.    Feel weak or dizzy. Actions:    I need to take action and call my doctor or nurse " today.                 RED ZONE         Need medical care now    Weight gain of 5 pounds overnight.    Chest pain or pressure that does not go away.    Feel less alert.    Wheezing or have trouble breathing when at rest.    Cannot sleep lying down.    Cannot take my water pill.    Pass out or faint. Actions:    I need to call my doctor or nurse now!    Call 911 if I have chest pain or cannot breathe.

## 2021-04-29 DIAGNOSIS — E03.4 HYPOTHYROIDISM DUE TO ACQUIRED ATROPHY OF THYROID: ICD-10-CM

## 2021-04-29 RX ORDER — LEVOTHYROXINE SODIUM 112 UG/1
112 TABLET ORAL DAILY
Qty: 90 TABLET | Refills: 1 | Status: SHIPPED | OUTPATIENT
Start: 2021-04-29 | End: 2021-07-23

## 2021-04-29 ASSESSMENT — ANXIETY QUESTIONNAIRES: GAD7 TOTAL SCORE: 4

## 2021-05-03 ENCOUNTER — ONCOLOGY VISIT (OUTPATIENT)
Dept: ONCOLOGY | Facility: OTHER | Age: 76
End: 2021-05-03
Attending: INTERNAL MEDICINE
Payer: COMMERCIAL

## 2021-05-03 VITALS
BODY MASS INDEX: 34.48 KG/M2 | TEMPERATURE: 97.1 F | SYSTOLIC BLOOD PRESSURE: 126 MMHG | HEIGHT: 62 IN | OXYGEN SATURATION: 96 % | WEIGHT: 187.39 LBS | DIASTOLIC BLOOD PRESSURE: 62 MMHG | RESPIRATION RATE: 20 BRPM | HEART RATE: 58 BPM

## 2021-05-03 DIAGNOSIS — R93.89 ABNORMAL FINDINGS ON DIAGNOSTIC IMAGING OF OTHER SPECIFIED BODY STRUCTURES: ICD-10-CM

## 2021-05-03 DIAGNOSIS — D72.829 LEUKOCYTOSIS, UNSPECIFIED TYPE: Primary | ICD-10-CM

## 2021-05-03 PROCEDURE — 99215 OFFICE O/P EST HI 40 MIN: CPT | Performed by: INTERNAL MEDICINE

## 2021-05-03 PROCEDURE — G0463 HOSPITAL OUTPT CLINIC VISIT: HCPCS

## 2021-05-03 ASSESSMENT — MIFFLIN-ST. JEOR: SCORE: 1303.25

## 2021-05-03 ASSESSMENT — PAIN SCALES - GENERAL: PAINLEVEL: NO PAIN (0)

## 2021-05-03 NOTE — PATIENT INSTRUCTIONS
We would like to see you back in 1 year with labs the week prior.When you are in need of a refill of your medications, please call your pharmacy and they will send us the request. If you have any questions please call [phone]

## 2021-05-03 NOTE — NURSING NOTE
"Chief Complaint   Patient presents with     RECHECK     Follow up Papillary thyroid carcinoma        Initial /62   Pulse 58   Temp 97.1  F (36.2  C) (Tympanic)   Resp 20   Ht 1.575 m (5' 2\")   Wt 85 kg (187 lb 6.3 oz)   SpO2 96%   BMI 34.27 kg/m   Estimated body mass index is 34.27 kg/m  as calculated from the following:    Height as of this encounter: 1.575 m (5' 2\").    Weight as of this encounter: 85 kg (187 lb 6.3 oz).  Medication Reconciliation: complete.  Immunizations and advance directives status reviewed. Pain scale =0 , PHQ-2=0.            Jina Juarez LPN    "

## 2021-05-04 NOTE — PROGRESS NOTES
Visit Date: 05/03/2021    HISTORY OF PRESENT ILLNESS:  Ms. Lemon returns for followup of leukocytosis and newly diagnosed papillary thyroid carcinoma.  We had seen the patient in consultation at the request of Aroldo Haddad, nurse practitioner on 04/15/2019.  At that time, she was a 73-year-old white female with history of hypothyroidism, atrial fibrillation, and hypertension, whom we were asked to evaluate concerning a diagnosis of leukocytosis.  According to the patient, she had this chronically for years.  She has been seen by Dr. Presley approximately 5 years prior, who noted an elevated white count and treated her empirically with antibiotics.  She stated her white count went down, then it went back up.  She her white count evaluated by Aroldo Haddad, nurse practitioner, who noted the patient on 06/28/2014 had a white count that was elevated with a white count of 13.4.  She ordered a peripheral blood smear.  This came back, and there were mild neutrophilia reactive lymphocytes, mild reticulocytosis.  The patient had a CBC repeated on 01/28.  At that time, her white count had dropped to 12.8 with 72.7% neutrophils, consistent with neutrophilia.  Hemoglobin, hematocrit and platelet count were normal.  When we saw the patient, we felt likely she had a reactive process.  Nonetheless, we wanted to rule out other etiology and obtained a BCR/ABL transcript.  This came back negative.  We also obtained sed rate, rheumatoid factor, MALIA, serum protein electrophoresis; all came back negative.  Lyme titers were negative.  Wendy-Barr virus profile was negative.  CT chest, abdomen and pelvis was negative except that there was a 1 cm heavily calcified nodule in the left lobe of the thyroid.  Thyroid ultrasound was recommended.  The patient underwent a thyroid ultrasound that revealed a left thyroid nodule.  Malignancy could not be ruled out.  Subsequently, the patient underwent a biopsy, which came back as  papillary thyroid carcinoma.  The patient was referred to Dr. Pineda for further management.  The patient underwent a left hemithyroidectomy performed on 08/27.  The findings were the patient had papillary thyroid carcinoma that measured 1 x 1 x 1 cm.  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  The patient was staged pathologic T1a, NX.  The patient underwent cataract surgery.  Otherwise, the plan was for a thyroid ultrasound to be done in 08/2020.  The patient apparently missed her appointment to follow up with us, but nonetheless did have a thyroid ultrasound on 08/12/2020, and the findings were there was heterogeneous right lobe without discrete nodule noted.  The patient otherwise is doing relatively well.  She complains of some hair loss and recently has been diagnosed with hypothyroidism.  She plans to see Ratna Haddad for Synthroid replacement.  Otherwise, no other complaints.  Her white count continues to drop.    PHYSICAL EXAMINATION:    GENERAL:  She is an elderly white female in no acute distress.  VITAL SIGNS:  Reveal blood pressure 126/62, pulse 58, respirations 20, temperature 97.1.  HEENT:  Atraumatic, normocephalic.  Oropharynx nonerythematous.  NECK:  Supple.  LUNGS:  Clear to auscultation and percussion.  HEART:  Regular rhythm.  S1, S2 normal.  ABDOMEN:  Soft.  Normoactive bowel sounds.  No masses.  LYMPHATICS:  No supraclavicular, axillary nodes.  EXTREMITIES:  No edema.  NEUROLOGIC:  Nonfocal.    LABORATORY DATA:  Reveal CBC -- white count 12.6, H and H 14.0 and 42.8, platelet count 293.  Thyroid functions are pending.    ASSESSMENT AND PLAN:    1.  Leukocytosis with neutrophilia, suspect reactive process.  If neutrophil count continues to drop, no need for further workup.  We will see the patient in 1 year, repeat CBC, CMP, LDH.  2.  Papillary thyroid carcinoma, pathologic T1a, status post left hemithyroidectomy.  Plan is to continue Synthroid as per Arlodo Haddad.  3.   Hair loss, likely due to hypothyroidism.  The patient is being managed by Aroldo Haddad.    TIME SPENT:  Sixty-eight minutes was spent on this patient.  That time was spent reviewing the chart, reviewing all records and provider notes, performing history and physical,    Rosa Venegas MD        D: 2021   T: 2021   MT: [facility]    Name:     BULL ELI  MRN:      [id number]        Account:    [id number]   :      1945           Visit Date: 2021     Document: [id number]    cc:  Randi Haddad NP

## 2021-05-04 NOTE — PROGRESS NOTES
Assessment & Plan     1. Benign essential hypertension  Continue norvasc, losartan and metoprolol  - hydrochlorothiazide (HYDRODIURIL) 12.5 MG tablet; Take 1 tablet (12.5 mg) by mouth daily  Dispense: 90 tablet; Refill: 1  - Basic metabolic panel    2. Mixed hyperlipidemia  Lipids reviewed   - start gemfibrozil 300mg twice daily     3. Hypothyroidism due to acquired atrophy of thyroid  Continue 112mcg levothyroxine    4. Difficulty swallowing pills  Dr oDw for EGD  - GENERAL SURG ADULT REFERRAL    5. Stage 3a chronic kidney disease  Do not use NSAIDS      Review of the result(s) of each unique test - previous          Return in about 3 months (around 8/12/2021) for hypertension and lipids, thyroid.    Randi Haddad NP  [facility]    Nigel Carmen is a 75 year old who presents for the following health issues     HPI     Hypertension Follow-up      Do you check your blood pressure regularly outside of the clinic? No     Are you following a low salt diet? Yes    Are your blood pressures ever more than 140 on the top number (systolic) OR more   than 90 on the bottom number (diastolic), for example 140/90? Yes      How many servings of fruits and vegetables do you eat daily?  0-1    On average, how many sweetened beverages do you drink each day (Examples: soda, juice, sweet tea, etc.  Do NOT count diet or artificially sweetened beverages)?   0    How many days per week do you exercise enough to make your heart beat faster? 3 or less    How many minutes a day do you exercise enough to make your heart beat faster? 9 or less    How many days per week do you miss taking your medication? 0    She continues to experience difficulty swallowing, especially pills.  She has had this in the past, last EGD was several years ago.  She has had thyroid removed within the past year.  She I taking omeprazole daily.      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, left     Family history of colon cancer     ACP (advance care planning)     Mixed hyperlipidemia     Paroxysmal atrial fibrillation (H)     CKD (chronic kidney disease) stage 2, GFR 60-89 ml/min     Leukocytosis     Class 2 severe obesity due to excess calories with serious comorbidity and body mass index (BMI) of 35.0 to 35.9 in adult (H)     Lower extremity edema     Fibromyalgia     Sensorineural hearing loss (SNHL) of both ears     Papillary thyroid carcinoma (H)     LA NENA (generalized anxiety disorder)     Colon cancer screening     Postoperative hypothyroidism     Chronic kidney disease, stage 3     Past Surgical History:   Procedure Laterality Date     ADENOIDECTOMY       BIOPSY  2019    FNA left thyroid     CHOLECYSTECTOMY  1981     COLONOSCOPY  2002     COLONOSCOPY  2012     COLONOSCOPY N/A 9/22/2014    Procedure: COLONOSCOPY;  Surgeon: Lavell Pavon DO;  Location: [facility] OR     COLONOSCOPY N/A 3/1/2021    Procedure: screening colonoscopy;  Surgeon: Sandro Dow MD;  Location: [facility] OR     CYSTOSCOPY  2007    Cystitis chronic     EYE SURGERY      right cataract     ORTHOPEDIC SURGERY  2002    carpal tunnel; RT, LT     THYROIDECTOMY Left 8/27/2019    Procedure: LEFT THYROID LOBECTOMY AND ISTHMUS WITH FROZENS;  Surgeon: Mildred Pineda MD;  Location: [facility] OR     TONSILLECTOMY         Social History     Tobacco Use     Smoking status: Never Smoker     Smokeless tobacco: Never Used   Substance Use Topics     Alcohol use: Never     Frequency: Never     Family History   Problem Relation Age of Onset     Breast Cancer Mother      Alcohol/Drug Mother         Cirrhosis     Other - See Comments Mother         goiter     Cerebrovascular Disease Father      Circulatory Father      Alcohol/Drug Son      Cancer -  colorectal Brother      Unknown/Adopted No family hx of      Asthma No family hx of      C.A.D. No family hx of      Diabetes No family hx of      Hypertension No family hx of      Prostate Cancer No family hx of      Allergies No family hx of      Alzheimer Disease No family hx of      Anesthesia Reaction No family hx of      Arthritis No family hx of      Blood Disease No family hx of      Cardiovascular No family hx of      Congenital Anomalies No family hx of      Connective Tissue Disorder No family hx of      Depression No family hx of      Endocrine Disease No family hx of      Eye Disorder No family hx of      Genetic Disorder No family hx of      Gastrointestinal Disease No family hx of      Genitourinary Problems No family hx of      Gynecology No family hx of      Heart Disease No family hx of      Lipids No family hx of      Musculoskeletal Disorder No family hx of      Neurologic Disorder No family hx of      Obesity No family hx of      Osteoporosis No family hx of      Psychotic Disorder No family hx of      Respiratory No family hx of      Thyroid Disease No family hx of      Family History Negative No family hx of      Hearing Loss No family hx of          Current Outpatient Medications   Medication Sig Dispense Refill     amLODIPine (NORVASC) 5 MG tablet TAKE 1 TABLET (5 MG) BY MOUTH DAILY 90 tablet 3     ELIQUIS ANTICOAGULANT 5 MG tablet TAKE 1 TABLET (5 MG) BY MOUTH 2 TIMES DAILY 180 tablet 3     gabapentin (NEURONTIN) 300 MG capsule TAKE 3 CAPSULES THREE TIMES DAILY 270 capsule 0     gemfibrozil (LOPID) 600 MG tablet Take 0.5 tablets (300 mg) by mouth 2 times daily 90 tablet 1     GLUCOSAMINE SULFATE PO Take 1,000 mg by mouth 2 times daily       hydrochlorothiazide (HYDRODIURIL) 12.5 MG tablet Take 1 tablet (12.5 mg) by mouth daily 90 tablet 1     levothyroxine (SYNTHROID/LEVOTHROID) 112 MCG tablet Take 1 tablet (112 mcg) by mouth daily 90 tablet 1     losartan (COZAAR) 100 MG tablet TAKE 1  TABLET (100 MG) BY MOUTH DAILY 90 tablet 3     metoprolol succinate ER (TOPROL-XL) 50 MG 24 hr tablet TAKE 1 TABLET (50 MG) BY MOUTH DAILY 90 tablet 3     Multiple Vitamins-Minerals (PRESERVISION AREDS 2 PO)        Omega-3 Fatty Acids (OMEGA-3 FISH OIL PO) Take 3 g by mouth daily        omeprazole (PRILOSEC) 20 MG DR capsule Take 1 capsule (20 mg) by mouth daily 90 capsule 1     sertraline (ZOLOFT) 50 MG tablet TAKE 1 TABLET DAILY 30 tablet 4     solifenacin (VESICARE) 5 MG tablet TAKE 1 TABLET (5 MG) BY MOUTH DAILY 90 tablet 2     VITAMIN D, CHOLECALCIFEROL, PO Take by mouth daily       STATIN NOT PRESCRIBED (INTENTIONAL) Please choose reason not prescribed, below (Patient not taking: Reported on 5/3/2021)       Allergies   Allergen Reactions     Atorvastatin      Ezetimibe      Gentamicin      Hydroxyzine      Lorazepam      Ranitidine      Simvastatin      Lopid [Gemfibrozil] GI Disturbance     Bloating, constipation,      No Clinical Screening - See Comments      anticholesterol medicine caused muscle aches     Pravastatin Muscle Pain (Myalgia)     Recent Labs   Lab Test 04/28/21  1017 02/25/21  1002 02/01/21  1108 11/02/20  1320 10/29/20  0931 10/30/19  1026 10/30/19  1026   *  --   --   --  130*  --  145*   HDL 47*  --   --   --  42*  --  41*   TRIG 178*  --   --   --  138  --  192*   ALT 30  --  27 32 34   < > 45   CR 1.04  --  1.00 0.93 1.00   < > 0.83   GFRESTIMATED 53*  --  55* 60* 55*   < > 70   GFRESTBLACK 61  --  64 70 64   < > 81   POTASSIUM 4.0  --  4.3 4.3 4.1   < > 3.9   TSH 4.14* 4.23*  --  1.42 3.67   < > 10.11*    < > = values in this interval not displayed.      BP Readings from Last 3 Encounters:   05/12/21 126/68   05/03/21 126/62   04/28/21 (!) 144/68    Wt Readings from Last 3 Encounters:   05/12/21 86.2 kg (190 lb)   05/03/21 85 kg (187 lb 6.3 oz)   04/28/21 85.7 kg (189 lb)                      Review of Systems   Constitutional, HEENT, cardiovascular, pulmonary, gi and gu systems  "are negative, except as otherwise noted.      Objective    /68 (BP Location: Right arm, Patient Position: Chair, Cuff Size: Adult Regular)   Pulse 85   Temp 97.7  F (36.5  C) (Tympanic)   Ht 1.575 m (5' 2\")   Wt 86.2 kg (190 lb)   SpO2 98%   BMI 34.75 kg/m    Body mass index is 34.75 kg/m .  Physical Exam   GENERAL: healthy, alert and no distress  NECK: no adenopathy, no asymmetry, masses, or scars and no carotid bruits  RESP: lungs clear to auscultation - no rales, rhonchi or wheezes  CV: regular rate and rhythm, normal S1 S2, no S3 or S4, no murmur, click or rub, no peripheral edema and peripheral pulses strong  MS: no gross musculoskeletal defects noted, no edema  PSYCH: mentation appears normal, affect normal/bright    Office Visit on 04/28/2021   Component Date Value Ref Range Status     Cholesterol 04/28/2021 237* <200 mg/dL Final    Desirable:       <200 mg/dl     Triglycerides 04/28/2021 178* <150 mg/dL Final    Comment: Borderline high:  150-199 mg/dl  High:             200-499 mg/dl  Very high:       >499 mg/dl  Fasting specimen       HDL Cholesterol 04/28/2021 47* >49 mg/dL Final     LDL Cholesterol Calculated 04/28/2021 154* <100 mg/dL Final    Comment: Above desirable:  100-129 mg/dl  Borderline High:  130-159 mg/dL  High:             160-189 mg/dL  Very high:       >189 mg/dl       Non HDL Cholesterol 04/28/2021 190* <130 mg/dL Final    Comment: Above Desirable:  130-159 mg/dl  Borderline high:  160-189 mg/dl  High:             190-219 mg/dl  Very high:       >219 mg/dl       Sodium 04/28/2021 141  133 - 144 mmol/L Final     Potassium 04/28/2021 4.0  3.4 - 5.3 mmol/L Final     Chloride 04/28/2021 110* 94 - 109 mmol/L Final     Carbon Dioxide 04/28/2021 24  20 - 32 mmol/L Final     Anion Gap 04/28/2021 7  3 - 14 mmol/L Final     Glucose 04/28/2021 100* 70 - 99 mg/dL Final    Fasting specimen     Urea Nitrogen 04/28/2021 17  7 - 30 mg/dL Final     Creatinine 04/28/2021 1.04  0.52 - 1.04 mg/dL " Final     GFR Estimate 04/28/2021 53* >60 mL/min/[1.73_m2] Final    Comment: Non  GFR Calc  Starting 12/18/2018, serum creatinine based estimated GFR (eGFR) will be   calculated using the Chronic Kidney Disease Epidemiology Collaboration   (CKD-EPI) equation.       GFR Estimate If Black 04/28/2021 61  >60 mL/min/[1.73_m2] Final    Comment:  GFR Calc  Starting 12/18/2018, serum creatinine based estimated GFR (eGFR) will be   calculated using the Chronic Kidney Disease Epidemiology Collaboration   (CKD-EPI) equation.       Calcium 04/28/2021 8.5  8.5 - 10.1 mg/dL Final     Bilirubin Total 04/28/2021 0.7  0.2 - 1.3 mg/dL Final     Albumin 04/28/2021 3.5  3.4 - 5.0 g/dL Final     Protein Total 04/28/2021 7.3  6.8 - 8.8 g/dL Final     Alkaline Phosphatase 04/28/2021 129  40 - 150 U/L Final     ALT 04/28/2021 30  0 - 50 U/L Final     AST 04/28/2021 14  0 - 45 U/L Final     TSH 04/28/2021 4.14* 0.40 - 4.00 mU/L Final     T4 Free 04/28/2021 1.05  0.76 - 1.46 ng/dL Final     Lactate Dehydrogenase 04/28/2021 213  81 - 234 U/L Final     WBC 04/28/2021 12.6* 4.0 - 11.0 10e9/L Final     RBC Count 04/28/2021 5.14  3.8 - 5.2 10e12/L Final     Hemoglobin 04/28/2021 14.2  11.7 - 15.7 g/dL Final     Hematocrit 04/28/2021 42.8  35.0 - 47.0 % Final     MCV 04/28/2021 83  78 - 100 fl Final     MCH 04/28/2021 27.6  26.5 - 33.0 pg Final     MCHC 04/28/2021 33.2  31.5 - 36.5 g/dL Final     RDW 04/28/2021 14.2  10.0 - 15.0 % Final     Platelet Count 04/28/2021 293  150 - 450 10e9/L Final     % Neutrophils 04/28/2021 75.9  % Final     % Lymphocytes 04/28/2021 17.1  % Final     % Monocytes 04/28/2021 5.5  % Final     % Eosinophils 04/28/2021 1.3  % Final     % Basophils 04/28/2021 0.2  % Final     Absolute Neutrophil 04/28/2021 9.6* 1.6 - 8.3 10e9/L Final     Absolute Lymphocytes 04/28/2021 2.2  0.8 - 5.3 10e9/L Final     Absolute Monocytes 04/28/2021 0.7  0.0 - 1.3 10e9/L Final     Absolute Eosinophils  04/28/2021 0.2  0.0 - 0.7 10e9/L Final     Absolute Basophils 04/28/2021 0.0  0.0 - 0.2 10e9/L Final     Diff Method 04/28/2021 Automated Method   Final

## 2021-05-05 DIAGNOSIS — M79.10 MYALGIA: ICD-10-CM

## 2021-05-06 RX ORDER — GABAPENTIN 300 MG/1
CAPSULE ORAL
Qty: 270 CAPSULE | Refills: 0 | Status: SHIPPED | OUTPATIENT
Start: 2021-05-06 | End: 2021-06-07

## 2021-05-06 NOTE — TELEPHONE ENCOUNTER
gabapentin (NEURONTIN) 300 MG capsule      Last Written Prescription Date:  4/7/21  Last Fill Quantity: 270,   # refills: 0  Last Office Visit: 4/28/21  Future Office visit:    Next 5 appointments (look out 90 days)    May 12, 2021 10:15 AM  (Arrive by 10:00 AM)  SHORT with Randi Haddad NP  [facility] ([facility] ) [address]  [facility] [address]  [phone]           Routing refill request to provider for review/approval because:  Drug not on the [facility], [facility] or  Health refill protocol or controlled substance

## 2021-05-12 ENCOUNTER — OFFICE VISIT (OUTPATIENT)
Dept: FAMILY MEDICINE | Facility: OTHER | Age: 76
End: 2021-05-12
Attending: NURSE PRACTITIONER
Payer: COMMERCIAL

## 2021-05-12 VITALS
WEIGHT: 190 LBS | HEIGHT: 62 IN | HEART RATE: 85 BPM | SYSTOLIC BLOOD PRESSURE: 126 MMHG | DIASTOLIC BLOOD PRESSURE: 68 MMHG | OXYGEN SATURATION: 98 % | BODY MASS INDEX: 34.96 KG/M2 | TEMPERATURE: 97.7 F

## 2021-05-12 DIAGNOSIS — N18.31 STAGE 3A CHRONIC KIDNEY DISEASE (H): ICD-10-CM

## 2021-05-12 DIAGNOSIS — E03.4 HYPOTHYROIDISM DUE TO ACQUIRED ATROPHY OF THYROID: ICD-10-CM

## 2021-05-12 DIAGNOSIS — E78.2 MIXED HYPERLIPIDEMIA: ICD-10-CM

## 2021-05-12 DIAGNOSIS — R13.10 DIFFICULTY SWALLOWING PILLS: ICD-10-CM

## 2021-05-12 DIAGNOSIS — I10 BENIGN ESSENTIAL HYPERTENSION: Primary | ICD-10-CM

## 2021-05-12 PROBLEM — N18.30 CHRONIC KIDNEY DISEASE, STAGE 3 (H): Status: ACTIVE | Noted: 2021-05-12

## 2021-05-12 LAB
ANION GAP SERPL CALCULATED.3IONS-SCNC: 6 MMOL/L (ref 3–14)
BUN SERPL-MCNC: 25 MG/DL (ref 7–30)
CALCIUM SERPL-MCNC: 8.9 MG/DL (ref 8.5–10.1)
CHLORIDE SERPL-SCNC: 108 MMOL/L (ref 94–109)
CO2 SERPL-SCNC: 27 MMOL/L (ref 20–32)
CREAT SERPL-MCNC: 1.19 MG/DL (ref 0.52–1.04)
GFR SERPL CREATININE-BSD FRML MDRD: 45 ML/MIN/{1.73_M2}
GLUCOSE SERPL-MCNC: 99 MG/DL (ref 70–99)
POTASSIUM SERPL-SCNC: 4.2 MMOL/L (ref 3.4–5.3)
SODIUM SERPL-SCNC: 141 MMOL/L (ref 133–144)

## 2021-05-12 PROCEDURE — G0463 HOSPITAL OUTPT CLINIC VISIT: HCPCS

## 2021-05-12 PROCEDURE — 80048 BASIC METABOLIC PNL TOTAL CA: CPT | Mod: ZL | Performed by: NURSE PRACTITIONER

## 2021-05-12 PROCEDURE — 36415 COLL VENOUS BLD VENIPUNCTURE: CPT | Mod: ZL | Performed by: NURSE PRACTITIONER

## 2021-05-12 PROCEDURE — 99214 OFFICE O/P EST MOD 30 MIN: CPT | Performed by: NURSE PRACTITIONER

## 2021-05-12 RX ORDER — HYDROCHLOROTHIAZIDE 12.5 MG/1
12.5 TABLET ORAL DAILY
Qty: 90 TABLET | Refills: 1 | Status: SHIPPED | OUTPATIENT
Start: 2021-05-12 | End: 2021-05-24

## 2021-05-12 RX ORDER — GEMFIBROZIL 600 MG/1
300 TABLET, FILM COATED ORAL 2 TIMES DAILY
Qty: 90 TABLET | Refills: 1 | Status: SHIPPED | OUTPATIENT
Start: 2021-05-12 | End: 2021-05-21

## 2021-05-12 ASSESSMENT — PAIN SCALES - GENERAL: PAINLEVEL: NO PAIN (0)

## 2021-05-12 ASSESSMENT — MIFFLIN-ST. JEOR: SCORE: 1315.08

## 2021-05-12 NOTE — NURSING NOTE
"Chief Complaint   Patient presents with     Hypertension       Initial /68 (BP Location: Right arm, Patient Position: Chair, Cuff Size: Adult Regular)   Pulse 85   Temp 97.7  F (36.5  C) (Tympanic)   Ht 1.575 m (5' 2\")   Wt 86.2 kg (190 lb)   SpO2 98%   BMI 34.75 kg/m   Estimated body mass index is 34.75 kg/m  as calculated from the following:    Height as of this encounter: 1.575 m (5' 2\").    Weight as of this encounter: 86.2 kg (190 lb).  Medication Reconciliation: complete  Enma Smith LPN    "

## 2021-05-12 NOTE — PATIENT INSTRUCTIONS
Assessment & Plan     1. Benign essential hypertension  Continue norvasc, losartan and metoprolol  - hydrochlorothiazide (HYDRODIURIL) 12.5 MG tablet; Take 1 tablet (12.5 mg) by mouth daily  Dispense: 90 tablet; Refill: 1  - Basic metabolic panel    2. Mixed hyperlipidemia  Lipids reviewed   - start gemfibrozil 300mg twice daily     3. Hypothyroidism due to acquired atrophy of thyroid  Continue 112mcg levothyroxine    4. Difficulty swallowing pills  Dr Dow for EGD  - GENERAL SURG ADULT REFERRAL    5. Stage 3a chronic kidney disease  Do not use NSAIDS      Review of the result(s) of each unique test - previous          Return in about 3 months (around 8/12/2021) for hypertension and lipids, thyroid.    Randi Haddad, NP  [facility]

## 2021-05-12 NOTE — LETTER
"My Heart Failure Action Plan   Name: Nella Lemon    YOB: 1945   Date: 5/10/2021    My doctor: Randi Haddad     [facility]     [address]  [address]  [place] [address]  [phone]  My Diagnosis: {HF STAGES:574309}   My Exercise Goal: 30 minutes daily  .     My Weight Plan:   Wt Readings from Last 2 Encounters:   05/03/21 85 kg (187 lb 6.3 oz)   04/28/21 85.7 kg (189 lb)     Weigh yourself daily using the same scale. If you gain more than 2 pounds in 24 hours or 5 pounds in a week {HF WEIGHT GOAL:542925}    My Diet Goal: { :711957::\"No added salt\"}    Emergency Room Visits:    Our goal is to improve your quality of life and help you avoid a visit to the emergency room or hospital.  If we work together, we can achieve this goal. But, if you feel you need to call 911 or go to the emergency room, please do so.  If you go to the emergency room, please bring your list of medicines and your daily weight chart with you.       GREEN ZONE     Doing well today    Weight gained is no more than 2 pounds a day or 5 pounds a week.    No swelling in feet, ankles, legs or stomach.    No more swelling than usual.    No more trouble breathing than usual.    No change in my sleep.    No other problems. Actions:    I am doing fine.  I will take my medicine, follow my diet, see my doctor, exercise, and watch for symptoms.           YELLOW ZONE         Having a bad day or flare up    Weight gain of more than 2 pounds in one day or 5 pounds in one week.    New swelling in ankle, leg, knee or thigh.    Bloating in belly, pants feel tighter.    Swelling in hands or face.    Coughing or trouble breathing while walking or talking.    Harder to breathe last night.    Have trouble sleeping, wake up short of breath.    Much more tired than usual.    Not eating.    Pain in my chest or bad leg cramps.    Feel weak or dizzy. Actions:    I need to take action and call my doctor or " nurse today.                 RED ZONE         Need medical care now    Weight gain of 5 pounds overnight.    Chest pain or pressure that does not go away.    Feel less alert.    Wheezing or have trouble breathing when at rest.    Cannot sleep lying down.    Cannot take my water pill.    Pass out or faint. Actions:    I need to call my doctor or nurse now!    Call 911 if I have chest pain or cannot breathe.

## 2021-05-17 ENCOUNTER — TELEPHONE (OUTPATIENT)
Dept: FAMILY MEDICINE | Facility: OTHER | Age: 76
End: 2021-05-17

## 2021-05-17 NOTE — TELEPHONE ENCOUNTER
"Pt called, reports that she was recently prescribed gemfibrozil. Pt notes that she is allergic to this medication, reaction- bloating and constipation . Unknown why system did not pull up allergy warning when medication was prescribed. Pt notes that she is aware that she is allergic to this med but decided to take it anyway as it is a low dose. Pt notes that she does not feel \"right\" since starting med. Denies any specific side effects. Pt wondering if something else can be prescribed. Does not tolerate statins. Please advise. Thank you!    "

## 2021-05-21 ENCOUNTER — OFFICE VISIT (OUTPATIENT)
Dept: SURGERY | Facility: OTHER | Age: 76
End: 2021-05-21
Attending: NURSE PRACTITIONER
Payer: COMMERCIAL

## 2021-05-21 VITALS
SYSTOLIC BLOOD PRESSURE: 128 MMHG | WEIGHT: 190 LBS | HEIGHT: 62 IN | HEART RATE: 57 BPM | RESPIRATION RATE: 16 BRPM | TEMPERATURE: 97.3 F | OXYGEN SATURATION: 96 % | DIASTOLIC BLOOD PRESSURE: 68 MMHG | BODY MASS INDEX: 34.96 KG/M2

## 2021-05-21 DIAGNOSIS — N39.46 MIXED INCONTINENCE URGE AND STRESS (MALE)(FEMALE): ICD-10-CM

## 2021-05-21 DIAGNOSIS — R13.10 DIFFICULTY SWALLOWING PILLS: ICD-10-CM

## 2021-05-21 PROCEDURE — G0463 HOSPITAL OUTPT CLINIC VISIT: HCPCS

## 2021-05-21 PROCEDURE — 99212 OFFICE O/P EST SF 10 MIN: CPT | Performed by: SURGERY

## 2021-05-21 RX ORDER — SOLIFENACIN SUCCINATE 5 MG/1
TABLET, FILM COATED ORAL
Qty: 90 TABLET | Refills: 2 | Status: SHIPPED | OUTPATIENT
Start: 2021-05-21 | End: 2021-08-18

## 2021-05-21 ASSESSMENT — PAIN SCALES - GENERAL: PAINLEVEL: NO PAIN (0)

## 2021-05-21 ASSESSMENT — MIFFLIN-ST. JEOR: SCORE: 1315.08

## 2021-05-21 NOTE — NURSING NOTE
"Chief Complaint   Patient presents with     Consult For     difficulty swallowing pills. Referred by Aroldo Haddad.       Initial /68 (BP Location: Left arm, Patient Position: Chair, Cuff Size: Adult Large)   Pulse 57   Temp 97.3  F (36.3  C) (Tympanic)   Resp 16   Ht 1.575 m (5' 2\")   Wt 86.2 kg (190 lb)   SpO2 96%   BMI 34.75 kg/m   Estimated body mass index is 34.75 kg/m  as calculated from the following:    Height as of this encounter: 1.575 m (5' 2\").    Weight as of this encounter: 86.2 kg (190 lb).  Medication Reconciliation: complete  Katie Saavedra LPN    "

## 2021-05-21 NOTE — PATIENT INSTRUCTIONS
Thank you for allowing Dr. Dow and our surgical team to participate in your care. Please call our health unit coordinator at [phone] with scheduling questions or the nurse at [phone] with any other questions or concerns.    Follow-up with ENT.

## 2021-05-21 NOTE — TELEPHONE ENCOUNTER
Vesicare 5mg      Last Written Prescription Date:  8/28/20  Last Fill Quantity: 90,   # refills: 2  Last Office Visit: 5/12/21  Future Office visit:    Next 5 appointments (look out 90 days)    Jun 28, 2021  8:30 AM  (Arrive by 8:15 AM)  Return Visit with Mildred Pineda MD  [facility] ([facility] ) [address] [place] [address]  [phone]   Aug 18, 2021  8:45 AM  (Arrive by 8:30 AM)  SHORT with Randi Haddad NP  [facility] ([facility] ) [address]  [place] [address]  [phone]           Routing refill request to provider for review/approval because:

## 2021-05-24 DIAGNOSIS — I10 BENIGN ESSENTIAL HYPERTENSION: ICD-10-CM

## 2021-05-24 RX ORDER — HYDROCHLOROTHIAZIDE 12.5 MG/1
12.5 TABLET ORAL DAILY
Qty: 30 TABLET | Refills: 1 | Status: SHIPPED | OUTPATIENT
Start: 2021-05-24 | End: 2021-07-28

## 2021-05-24 NOTE — TELEPHONE ENCOUNTER
Hydrochlorothiazide 12.5 mg      Last Written Prescription Date:  5/12/21  Last Fill Quantity: 90,   # refills: 1  Last Office Visit: 5/12/21  Future Office visit:    Next 5 appointments (look out 90 days)    Jun 28, 2021  8:30 AM  (Arrive by 8:15 AM)  Return Visit with Mildred Pineda MD  [facility] ([facility] ) [address] [place] [address]  [phone]   Aug 18, 2021  8:45 AM  (Arrive by 8:30 AM)  SHORT with Randi Haddad NP  [facility] ([facility] ) [address]  [facility] [address]  [phone]           Routing refill request to provider for review/approval because:  Drug not on the [facility], [facility] or [facility] refill protocol or controlled substance

## 2021-05-25 NOTE — PROGRESS NOTES
[facility] Surgery Consultation    CC:  Difficulty swallowing pills     HPI:  This 75 year old year old female is seen at the request of Aroldo Lomeli for evaluation of oropharyngeal dysphagia. She reports this has been going on several months. It happens in the am when she is taking her pills. She does admit that she takes all nine of her pills at the same time. She denies any difficulty eating or drinking She did have half her thyroid removed in 2019. She is on a PPI. She has had an upper endoscopy in the past.     Past Medical History:   Diagnosis Date     Anxiety state, unspecified 09/25/2003     Arthritis      Atypical chest pain 3/28/2018     Cancer (H)      Carpal tunnel syndrome 07/02/2002     Endometrial hyperplasia 01/27/2003     Hypertension      Metrorrhagia 10/22/2003     Nonallopathic lesion of thoracic region, not elsewhere classified 05/19/2003     Palpitations 04/11/2001     Postmenopausal bleeding 09/09/2002     Precordial pain 04/05/2001     Thyroid disease      Urgency of urination 06/06/2007       Past Surgical History:   Procedure Laterality Date     ADENOIDECTOMY       BIOPSY  2019    FNA left thyroid     CHOLECYSTECTOMY  1981     COLONOSCOPY  2002     COLONOSCOPY  2012     COLONOSCOPY N/A 9/22/2014    Procedure: COLONOSCOPY;  Surgeon: Lavell Pavon DO;  Location: [facility] OR     COLONOSCOPY N/A 3/1/2021    Procedure: screening colonoscopy;  Surgeon: Sandro Dow MD;  Location: [facility] OR     CYSTOSCOPY  2007    Cystitis chronic     EYE SURGERY      right cataract     ORTHOPEDIC SURGERY  2002    carpal tunnel; RT, LT     THYROIDECTOMY Left 8/27/2019    Procedure: LEFT THYROID LOBECTOMY AND ISTHMUS WITH FROZENS;  Surgeon: Mildred Pineda MD;  Location: [facility] OR     TONSILLECTOMY         Allergies   Allergen Reactions     Atorvastatin      Ezetimibe      Gentamicin      Hydroxyzine      Lorazepam      Ranitidine      Simvastatin      Lopid [Gemfibrozil] GI Disturbance     Bloating,  constipation,      No Clinical Screening - See Comments      anticholesterol medicine caused muscle aches     Pravastatin Muscle Pain (Myalgia)       Current Outpatient Medications   Medication     amLODIPine (NORVASC) 5 MG tablet     ELIQUIS ANTICOAGULANT 5 MG tablet     gabapentin (NEURONTIN) 300 MG capsule     GLUCOSAMINE SULFATE PO     levothyroxine (SYNTHROID/LEVOTHROID) 112 MCG tablet     losartan (COZAAR) 100 MG tablet     metoprolol succinate ER (TOPROL-XL) 50 MG 24 hr tablet     Multiple Vitamins-Minerals (PRESERVISION AREDS 2 PO)     Omega-3 Fatty Acids (OMEGA-3 FISH OIL PO)     omeprazole (PRILOSEC) 20 MG DR capsule     sertraline (ZOLOFT) 50 MG tablet     VITAMIN D, CHOLECALCIFEROL, PO     hydrochlorothiazide (HYDRODIURIL) 12.5 MG tablet     solifenacin (VESICARE) 5 MG tablet     No current facility-administered medications for this visit.        HABITS:    Social History     Tobacco Use     Smoking status: Never Smoker     Smokeless tobacco: Never Used   Substance Use Topics     Alcohol use: Never     Frequency: Never     Drug use: No       Family History   Problem Relation Age of Onset     Breast Cancer Mother      Alcohol/Drug Mother         Cirrhosis     Other - See Comments Mother         goiter     Cerebrovascular Disease Father      Circulatory Father      Alcohol/Drug Son      Cancer - colorectal Brother      Unknown/Adopted No family hx of      Asthma No family hx of      C.A.D. No family hx of      Diabetes No family hx of      Hypertension No family hx of      Prostate Cancer No family hx of      Allergies No family hx of      Alzheimer Disease No family hx of      Anesthesia Reaction No family hx of      Arthritis No family hx of      Blood Disease No family hx of      Cardiovascular No family hx of      Congenital Anomalies No family hx of      Connective Tissue Disorder No family hx of      Depression No family hx of      Endocrine Disease No family hx of      Eye Disorder No family hx of   "    Genetic Disorder No family hx of      Gastrointestinal Disease No family hx of      Genitourinary Problems No family hx of      Gynecology No family hx of      Heart Disease No family hx of      Lipids No family hx of      Musculoskeletal Disorder No family hx of      Neurologic Disorder No family hx of      Obesity No family hx of      Osteoporosis No family hx of      Psychotic Disorder No family hx of      Respiratory No family hx of      Thyroid Disease No family hx of      Family History Negative No family hx of      Hearing Loss No family hx of        REVIEW OF SYSTEMS:  Ten point review of systems negative except those mentioned in the HPI.     OBJECTIVE:    /68 (BP Location: Left arm, Patient Position: Chair, Cuff Size: Adult Large)   Pulse 57   Temp 97.3  F (36.3  C) (Tympanic)   Resp 16   Ht 1.575 m (5' 2\")   Wt 86.2 kg (190 lb)   SpO2 96%   BMI 34.75 kg/m      GENERAL: Generally appears well, in no distress with appropriate affect.  No exam performed.     IMPRESSION:    75 y.o. female with history of thyroid resection presents with concerns for dane-pharyngeal dysphagia with difficulty with swallowing pills in the am. She feels as though they are getting stuck up high in her throat and will cough due to this. She denies any issues with swallowing solids or liquids. Did discuss her not taking all her pills at the same time to see if this might help. I do think that naso pharyngoscopy would be of greater benefit than esophagoscopy for her symptoms so would like her to go back and see ENT.     PLAN:  See above.     Sandro Dow MD,     5/25/2021  9:22 AM    15 minutes spent coordinating patient care, including counseling the patient on treatment options, natural history of disease process, answering all questions, reviewing labs and imaging.      "

## 2021-06-07 DIAGNOSIS — M79.10 MYALGIA: ICD-10-CM

## 2021-06-07 RX ORDER — GABAPENTIN 300 MG/1
CAPSULE ORAL
Qty: 270 CAPSULE | Refills: 0 | Status: SHIPPED | OUTPATIENT
Start: 2021-06-07 | End: 2021-07-07

## 2021-06-07 NOTE — TELEPHONE ENCOUNTER
Gabapentin 300 mg      Last Written Prescription Date:  5/06/21  Last Fill Quantity: 270,   # refills: 0  Last Office Visit: 5/12/21  Future Office visit:    Next 5 appointments (look out 90 days)    Jun 28, 2021  8:30 AM  (Arrive by 8:15 AM)  Return Visit with Mildred Pineda MD  [facility] ([facility] ) [address] [place] [address]  [phone]   Aug 18, 2021  8:45 AM  (Arrive by 8:30 AM)  SHORT with Randi Haddad NP  [facility] ([facility] ) [address]  [facility] [address]  [phone]           Routing refill request to provider for review/approval because:  Drug not on the [facility], UMP or  Health refill protocol or controlled substance

## 2021-06-23 ENCOUNTER — ALLIED HEALTH/NURSE VISIT (OUTPATIENT)
Dept: AUDIOLOGY | Facility: OTHER | Age: 76
End: 2021-06-23
Attending: AUDIOLOGIST
Payer: COMMERCIAL

## 2021-06-23 DIAGNOSIS — H90.3 SENSORINEURAL HEARING LOSS (SNHL) OF BOTH EARS: Primary | ICD-10-CM

## 2021-06-23 PROCEDURE — V5266 BATTERY FOR HEARING DEVICE: HCPCS

## 2021-06-23 NOTE — PROGRESS NOTES
Per guidelines of patient's insurance, 32 units of size 312 batteries were dispensed today, Battery Modifier: NU (New). Reviewed that patent is eligible for batteries once every 90 days, and how they can request new batteries.    Berenice Pitt  Audiology Assistant  [facility]  [phone]

## 2021-06-28 ENCOUNTER — OFFICE VISIT (OUTPATIENT)
Dept: OTOLARYNGOLOGY | Facility: OTHER | Age: 76
End: 2021-06-28
Attending: OTOLARYNGOLOGY
Payer: COMMERCIAL

## 2021-06-28 ENCOUNTER — TELEPHONE (OUTPATIENT)
Dept: OTOLARYNGOLOGY | Facility: OTHER | Age: 76
End: 2021-06-28

## 2021-06-28 VITALS
TEMPERATURE: 97.1 F | WEIGHT: 180 LBS | HEART RATE: 59 BPM | OXYGEN SATURATION: 95 % | DIASTOLIC BLOOD PRESSURE: 72 MMHG | HEIGHT: 62 IN | SYSTOLIC BLOOD PRESSURE: 124 MMHG | BODY MASS INDEX: 33.13 KG/M2

## 2021-06-28 DIAGNOSIS — C73 PAPILLARY THYROID CARCINOMA (H): Primary | ICD-10-CM

## 2021-06-28 DIAGNOSIS — E89.0 HISTORY OF THYROIDECTOMY: ICD-10-CM

## 2021-06-28 DIAGNOSIS — K21.00 GASTROESOPHAGEAL REFLUX DISEASE WITH ESOPHAGITIS, UNSPECIFIED WHETHER HEMORRHAGE: ICD-10-CM

## 2021-06-28 DIAGNOSIS — C73 PAPILLARY THYROID CARCINOMA (H): Chronic | ICD-10-CM

## 2021-06-28 DIAGNOSIS — R13.14 PHARYNGOESOPHAGEAL DYSPHAGIA: Primary | ICD-10-CM

## 2021-06-28 PROCEDURE — G0463 HOSPITAL OUTPT CLINIC VISIT: HCPCS

## 2021-06-28 PROCEDURE — 31575 DIAGNOSTIC LARYNGOSCOPY: CPT | Performed by: OTOLARYNGOLOGY

## 2021-06-28 PROCEDURE — 99214 OFFICE O/P EST MOD 30 MIN: CPT | Mod: 25 | Performed by: OTOLARYNGOLOGY

## 2021-06-28 ASSESSMENT — MIFFLIN-ST. JEOR: SCORE: 1264.72

## 2021-06-28 ASSESSMENT — PAIN SCALES - GENERAL: PAINLEVEL: NO PAIN (0)

## 2021-06-28 NOTE — PROGRESS NOTES
"Otolaryngology Progress Note          Nella Lemon is a 76 year old female  Status post left thyroid lobectomy on 8/27/2019.  She had a 1.5 cm papillary thyroid carcinoma with no extracapsular invasion stage T1 NX M0.    Surgery was uncomplicated and flexible nasolaryngoscopy postoperatively confirmed normal vocal cord mobility.  Postoperative visits with Rosemary also confirmed normal vocal cord mobility.    And 9/26/2019 she saw Rosemary with a sensation that food was catching in her throat she denied heartburn and was taking Prilosec she was to complete a barium swallow GERD reflections were reviewed.    10/3/2019 showed a normal barium swallow she also had a repeat thyroid ultrasound dated 8/12/2020 she is post left lobectomy the right lobe measures 4.2 x 2.1 x 2.2 cm with a 3 mm isthmus remnant there are no nodules the impression was a heterogeneous right lobe      If she does not take her PPI she notes reflux.  She takes 20 mg prior to breakfast.  She continues to feel medications lodge in midline upper throat  Nella also notes thick mucous in back of throat  No congestion or rhinorrhea    No weight loss  No otalgia  No dysphonia  No tobacco use  Adequate adequate water intake    She is on hydrochlorothyazide  She takes gabapentin tid    Nella notes dry mouth and dry eyes  No parotid or smg swelling    She denies ZACHARAIH or heroic snoring    I do not see a prior EGD in epic, she denies prior EGD    ROS :  12 point negative aside from HPI    Physical Exam  /72 (Cuff Size: Adult Regular)   Pulse 59   Temp 97.1  F (36.2  C) (Tympanic)   Ht 1.575 m (5' 2\")   Wt 81.6 kg (180 lb)   SpO2 95%   BMI 32.92 kg/m    General - The patient is well nourished and well developed, and appears to have good nutritional status.  Alert and oriented to person and place, interactive.  Head and Face - Normocephalic and atraumatic, with no gross asymmetry noted of the contour of the facial features.  The facial nerve is " intact, with strong symmetric movements.  Neck-well healed collar incision.  no palpable lymphadenopathy or thyroid mass.  Right lobe without nodules. Non tender.  Normal gross mobility neck , some strap tightness.  Trachea is midline.  Eyes - Extraocular movements intact.   Ears- External auditory canals are patent, tympanic membranes are intact without effusion or worrisome retractions   Nose - Nasal mucosa is pink and moist with no abnormal mucus.  The septum was grossly midline and non-obstructive, turbinates of normal size and position.  No polyps, masses, or purulence noted on examination.  Mouth - Examination of the oral cavity shows pink, healthy, moist mucosa.  No lesions or ulceration noted.  The dentition are in good repair.  The tongue is mobile and midline.  Throat - The walls of the oropharynx were smooth, pink, moist, symmetric, and had no lesions or ulcerations.  The tonsillar pillars and soft palate were symmetric.  The uvula was midline on elevation.  Tonsillar fossa clear      Attempts at mirror laryngoscopy were not possible due to gag reflex.  Therefore I proceeded with a fiberoptic examination after informed consent.  First I applied topical nasal lidocaine and neosynephrine.  I then passed the scope through the nasal cavity.     The nasopharynx was mucosally covered and symmetric.  The eustachian tube openings were unobstructed.  Going further down I had a clear view of the base of tongue which had normal appearing lingual tonsillar tissue.  The base of tongue was free of lesions, and the vallecula was open.  The epiglottis was smooth and mucosally covered.  The supraglottic larynx was then clearly visualized.  The vocal cords moved smoothly and symmetrically and were without mass or nodules.  thin stringy mucous above cords.   Vocal cord mobility was normal bilaterally with phonation and respiration..  The pyriform sinuses were open and without mayra mass or pooling of secretions upon  valsalva, and the limited view of the postcricoid region did not show any lesions.  The patient tolerated the procedure well.      Impression/Plan  Nella Lemon is a 76 year old female    ICD-10-CM    1. Pharyngoesophageal dysphagia  R13.14    2. History of thyroidectomy  Z90.09    3. Papillary thyroid carcinoma (H) stage T1NxM0  C73     US negative 18/12/20   4. GERD  K21.00        Continue to increase water    No eating within 2 hours of bedtime, GERD precautions reinforced    Stop hydrochlorothiazide per Aroldo Lomeli, she is on several medications that may cause drying of her mucous membranes and lead to dysphagia    Work on home neck stretches and anterior neck massage  Consider neck PT if no improvement    If no improvement after 2 months, follow up for an in office transnasal esophagoscopy.  She may require EGD which is also discussed as an initial option.  If necessary she would like to start with an office transnasal esophagoscopy.    Reassured normal vocal cord mobility no endolaryngeal masses    Her papillary thyroid carcinoma has been treated surgically no additional treatments are necessary.  Recommend annual thyroid ultrasound and if ultrasound is stable repeat exam only for palpable mass.    Mildred Pineda D.O.  Otolaryngology/Head and Neck Surgery  Allergy

## 2021-06-28 NOTE — LETTER
"    6/28/2021         RE: Nella Lemon  [address] [place] [address]        Dear Colleague,    Thank you for referring your patient, Nella Lemon, to the [facility]. Please see a copy of my visit note below.    Otolaryngology Progress Note          Nella Lemon is a 76 year old female  Status post left thyroid lobectomy on 8/27/2019.  She had a 1.5 cm papillary thyroid carcinoma with no extracapsular invasion stage T1 NX M0.    Surgery was uncomplicated and flexible nasolaryngoscopy postoperatively confirmed normal vocal cord mobility.  Postoperative visits with Rosemary also confirmed normal vocal cord mobility.    And 9/26/2019 she saw Rosemary with a sensation that food was catching in her throat she denied heartburn and was taking Prilosec she was to complete a barium swallow GERD reflections were reviewed.    10/3/2019 showed a normal barium swallow she also had a repeat thyroid ultrasound dated 8/12/2020 she is post left lobectomy the right lobe measures 4.2 x 2.1 x 2.2 cm with a 3 mm isthmus remnant there are no nodules the impression was a heterogeneous right lobe      If she does not take her PPI she notes reflux.  She takes 20 mg prior to breakfast.  She continues to feel medications lodge in midline upper throat  Nella also notes thick mucous in back of throat  No congestion or rhinorrhea    No weight loss  No otalgia  No dysphonia  No tobacco use  Adequate adequate water intake    She is on hydrochlorothyazide  She takes gabapentin tid    Nella notes dry mouth and dry eyes  No parotid or smg swelling    She denies ZACHARIAH or heroic snoring    I do not see a prior EGD in epic, she denies prior EGD    ROS :  12 point negative aside from HPI    Physical Exam  /72 (Cuff Size: Adult Regular)   Pulse 59   Temp 97.1  F (36.2  C) (Tympanic)   Ht 1.575 m (5' 2\")   Wt 81.6 kg (180 lb)   SpO2 95%   BMI 32.92 kg/m    General - The patient is well " nourished and well developed, and appears to have good nutritional status.  Alert and oriented to person and place, interactive.  Head and Face - Normocephalic and atraumatic, with no gross asymmetry noted of the contour of the facial features.  The facial nerve is intact, with strong symmetric movements.  Neck-well healed collar incision.  no palpable lymphadenopathy or thyroid mass.  Right lobe without nodules. Non tender.  Normal gross mobility neck , some strap tightness.  Trachea is midline.  Eyes - Extraocular movements intact.   Ears- External auditory canals are patent, tympanic membranes are intact without effusion or worrisome retractions   Nose - Nasal mucosa is pink and moist with no abnormal mucus.  The septum was grossly midline and non-obstructive, turbinates of normal size and position.  No polyps, masses, or purulence noted on examination.  Mouth - Examination of the oral cavity shows pink, healthy, moist mucosa.  No lesions or ulceration noted.  The dentition are in good repair.  The tongue is mobile and midline.  Throat - The walls of the oropharynx were smooth, pink, moist, symmetric, and had no lesions or ulcerations.  The tonsillar pillars and soft palate were symmetric.  The uvula was midline on elevation.  Tonsillar fossa clear      Attempts at mirror laryngoscopy were not possible due to gag reflex.  Therefore I proceeded with a fiberoptic examination after informed consent.  First I applied topical nasal lidocaine and neosynephrine.  I then passed the scope through the nasal cavity.     The nasopharynx was mucosally covered and symmetric.  The eustachian tube openings were unobstructed.  Going further down I had a clear view of the base of tongue which had normal appearing lingual tonsillar tissue.  The base of tongue was free of lesions, and the vallecula was open.  The epiglottis was smooth and mucosally covered.  The supraglottic larynx was then clearly visualized.  The vocal cords moved  smoothly and symmetrically and were without mass or nodules.  thin stringy mucous above cords.   Vocal cord mobility was normal bilaterally with phonation and respiration..  The pyriform sinuses were open and without mayra mass or pooling of secretions upon valsalva, and the limited view of the postcricoid region did not show any lesions.  The patient tolerated the procedure well.      Impression/Plan  Nella Lemon is a 76 year old female    ICD-10-CM    1. Pharyngoesophageal dysphagia  R13.14    2. History of thyroidectomy  Z90.09    3. Papillary thyroid carcinoma (H) stage T1NxM0  C73     US negative 18/12/20   4. GERD  K21.00        Continue to increase water    No eating within 2 hours of bedtime, GERD precautions reinforced    Stop hydrochlorothiazide per Aroldo Lomeli, she is on several medications that may cause drying of her mucous membranes and lead to dysphagia    Work on home neck stretches and anterior neck massage  Consider neck PT if no improvement    If no improvement after 2 months, follow up for an in office transnasal esophagoscopy.  She may require EGD which is also discussed as an initial option.  If necessary she would like to start with an office transnasal esophagoscopy.    Reassured normal vocal cord mobility no endolaryngeal masses    Her papillary thyroid carcinoma has been treated surgically no additional treatments are necessary.  Recommend annual thyroid ultrasound and if ultrasound is stable repeat exam only for palpable mass.    Mildred Pineda D.O.  Otolaryngology/Head and Neck Surgery  Allergy              Again, thank you for allowing me to participate in the care of your patient.        Sincerely,        Mildred Pineda MD

## 2021-06-28 NOTE — PATIENT INSTRUCTIONS
Thank you for allowing Dr. Pineda and our ENT team to participate in your care.  If your medications are too expensive, please give the nurse a call.  We can possibly change this medication.  If you have a scheduling or an appointment question please contact our Health Unit Coordinator at their direct line [phone] ext [phone].   ALL nursing questions or concerns can be directed to your ENT nurse at: [phone] Bridgette Torres    Continue to increase water    No eating within 2 hours of bedtime    Stop hydrochlorothiazide per Aroldo Lomeli    Work on home neck stretches and anterior neck massage    If no improvement after 2 months, follow up for an in office transnasal esophagoscopy

## 2021-06-28 NOTE — NURSING NOTE
"Chief Complaint   Patient presents with     Follow Up     Pharyngoesophageal Dysphagia, Throat Irritation       Initial /72 (Cuff Size: Adult Regular)   Pulse 59   Temp 97.1  F (36.2  C) (Tympanic)   Ht 1.575 m (5' 2\")   Wt 81.6 kg (180 lb)   SpO2 95%   BMI 32.92 kg/m   Estimated body mass index is 32.92 kg/m  as calculated from the following:    Height as of this encounter: 1.575 m (5' 2\").    Weight as of this encounter: 81.6 kg (180 lb).  Medication Reconciliation: complete  Melissa Bosch LPN    "

## 2021-07-07 DIAGNOSIS — M79.10 MYALGIA: ICD-10-CM

## 2021-07-07 RX ORDER — GABAPENTIN 300 MG/1
CAPSULE ORAL
Qty: 270 CAPSULE | Refills: 0 | Status: SHIPPED | OUTPATIENT
Start: 2021-07-07 | End: 2021-08-09

## 2021-07-07 NOTE — TELEPHONE ENCOUNTER
Neurontin 300mg      Last Written Prescription Date:  6/7/21  Last Fill Quantity: 270,   # refills: 0  Last Office Visit: 5/12/21  Future Office visit:    Next 5 appointments (look out 90 days)    Jul 28, 2021  8:15 AM  (Arrive by 8:00 AM)  SHORT with Randi Haddad NP  [facility] ([facility] ) [address]  [place] [address]  [phone]   Aug 18, 2021  8:45 AM  (Arrive by 8:30 AM)  SHORT with Randi Haddad NP  [facility] ([facility] ) [address]  [place] [address]  [phone]           Routing refill request to provider for review/approval because:

## 2021-07-19 NOTE — PROGRESS NOTES
SUBJECTIVE:   Nella Lemon is a 76 year old female who presents for Preventive Visit.      Patient has been advised of split billing requirements and indicates understanding: Yes   Are you in the first 12 months of your Medicare coverage?  No    HPI  Do you feel safe in your environment? Yes    Have you ever done Advance Care Planning? (For example, a Health Directive, POLST, or a discussion with a medical provider or your loved ones about your wishes): Yes, advance care planning is on file.       Fall risk  Fallen 2 or more times in the past year?: No  Any fall with injury in the past year?: No    Cognitive Screening   1) Repeat 3 items (Leader, Season, Table)    2) Clock draw: NORMAL  3) 3 item recall: Recalls 1 object   Results: NORMAL clock, 1-2 items recalled: COGNITIVE IMPAIRMENT LESS LIKELY    Mini-CogTM Copyright S Ela. Licensed by the author for use in [facility]; reprinted with permission (pierre@[facility]). All rights reserved.      Do you have sleep apnea, excessive snoring or daytime drowsiness?: no    Reviewed and updated as needed this visit by clinical staff  Tobacco  Allergies  Meds              Reviewed and updated as needed this visit by Provider                Social History     Tobacco Use     Smoking status: Never Smoker     Smokeless tobacco: Never Used   Substance Use Topics     Alcohol use: Never     If you drink alcohol do you typically have >3 drinks per day or >7 drinks per week? No    No flowsheet data found.No flowsheet data found.        She is feeling well, no new concerns.  Here for blood pressure recheck since Dr Pineda stopped hydrochlorothiazide.      Current providers sharing in care for this patient include:   Patient Care Team:  Radni Haddad NP as PCP - General (Family Practice)  Derrell Mccormack MD as MD (Otolaryngology)  Randi Haddad NP as Assigned PCP  Chris Walsh, DO as MD (Cardiology)  Chris Walsh  DO [facility] as Assigned Heart and Vascular Provider  Rosa Venegas MD as Assigned Cancer Care Provider  Mildred Pineda MD as Assigned Surgical Provider    The following health maintenance items are reviewed in Epic and correct as of today:  Health Maintenance Due   Topic Date Due     HF ACTION PLAN  Never done     BP Readings from Last 3 Encounters:   07/28/21 128/60   06/28/21 124/72   05/21/21 128/68    Wt Readings from Last 3 Encounters:   07/28/21 86.3 kg (190 lb 3.2 oz)   06/28/21 81.6 kg (180 lb)   05/21/21 86.2 kg (190 lb)                  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, left     Family history of colon cancer     ACP (advance care planning)     Mixed hyperlipidemia     Paroxysmal atrial fibrillation (H)     CKD (chronic kidney disease) stage 2, GFR 60-89 ml/min     Leukocytosis     Class 2 severe obesity due to excess calories with serious comorbidity and body mass index (BMI) of 35.0 to 35.9 in adult (H)     Lower extremity edema     Fibromyalgia     Sensorineural hearing loss (SNHL) of both ears     Papillary thyroid carcinoma (H)     LA NENA (generalized anxiety disorder)     Colon cancer screening     Postoperative hypothyroidism     Chronic kidney disease, stage 3     Past Surgical History:   Procedure Laterality Date     ADENOIDECTOMY       BIOPSY  2019    FNA left thyroid     CHOLECYSTECTOMY  1981     COLONOSCOPY  2002     COLONOSCOPY  2012     COLONOSCOPY N/A 9/22/2014    Procedure: COLONOSCOPY;  Surgeon: Lavell Pavon DO;  Location: [facility]     COLONOSCOPY N/A 3/1/2021    Procedure: screening colonoscopy;  Surgeon: Sandro Dow MD;  Location: [facility]     CYSTOSCOPY  2007    Cystitis chronic     EYE SURGERY      right cataract     ORTHOPEDIC SURGERY  2002     carpal tunnel; RT, LT     THYROIDECTOMY Left 8/27/2019    Procedure: LEFT THYROID LOBECTOMY AND ISTHMUS WITH FROZENS;  Surgeon: Mildred Pineda MD;  Location: [facility]     TONSILLECTOMY         Social History     Tobacco Use     Smoking status: Never Smoker     Smokeless tobacco: Never Used   Substance Use Topics     Alcohol use: Never     Family History   Problem Relation Age of Onset     Breast Cancer Mother      Alcohol/Drug Mother         Cirrhosis     Other - See Comments Mother         goiter     Cerebrovascular Disease Father      Circulatory Father      Alcohol/Drug Son      Cancer - colorectal Brother      Unknown/Adopted No family hx of      Asthma No family hx of      C.A.D. No family hx of      Diabetes No family hx of      Hypertension No family hx of      Prostate Cancer No family hx of      Allergies No family hx of      Alzheimer Disease No family hx of      Anesthesia Reaction No family hx of      Arthritis No family hx of      Blood Disease No family hx of      Cardiovascular No family hx of      Congenital Anomalies No family hx of      Connective Tissue Disorder No family hx of      Depression No family hx of      Endocrine Disease No family hx of      Eye Disorder No family hx of      Genetic Disorder No family hx of      Gastrointestinal Disease No family hx of      Genitourinary Problems No family hx of      Gynecology No family hx of      Heart Disease No family hx of      Lipids No family hx of      Musculoskeletal Disorder No family hx of      Neurologic Disorder No family hx of      Obesity No family hx of      Osteoporosis No family hx of      Psychotic Disorder No family hx of      Respiratory No family hx of      Thyroid Disease No family hx of      Family History Negative No family hx of      Hearing Loss No family hx of          Current Outpatient Medications   Medication Sig Dispense Refill     amLODIPine (NORVASC) 5 MG tablet TAKE 1 TABLET (5 MG) BY MOUTH DAILY 90 tablet 3      ELIQUIS ANTICOAGULANT 5 MG tablet TAKE 1 TABLET (5 MG) BY MOUTH 2 TIMES DAILY 180 tablet 3     gabapentin (NEURONTIN) 300 MG capsule TAKE 3 CAPSULES THREE TIMES DAILY 270 capsule 0     GLUCOSAMINE SULFATE PO Take 1,000 mg by mouth 2 times daily       levothyroxine (SYNTHROID/LEVOTHROID) 112 MCG tablet TAKE 1 TABLET DAILY FOR THYROID 90 tablet 1     losartan (COZAAR) 100 MG tablet TAKE 1 TABLET (100 MG) BY MOUTH DAILY 90 tablet 3     metoprolol succinate ER (TOPROL-XL) 50 MG 24 hr tablet TAKE 1 TABLET (50 MG) BY MOUTH DAILY 90 tablet 3     Multiple Vitamins-Minerals (PRESERVISION AREDS 2 PO)        Omega-3 Fatty Acids (OMEGA-3 FISH OIL PO) Take 3 g by mouth daily        omeprazole (PRILOSEC) 20 MG DR capsule TAKE 1 CAPSULE (20 MG) BY MOUTH DAILY 90 capsule 1     sertraline (ZOLOFT) 50 MG tablet TAKE 1 TABLET DAILY 30 tablet 4     solifenacin (VESICARE) 5 MG tablet TAKE 1 TABLET (5 MG) BY MOUTH DAILY 90 tablet 2     VITAMIN D, CHOLECALCIFEROL, PO Take by mouth daily       hydrochlorothiazide (HYDRODIURIL) 12.5 MG tablet TAKE 1 TABLET (12.5 MG) BY MOUTH DAILY (Patient not taking: Reported on 7/28/2021) 30 tablet 1     Allergies   Allergen Reactions     Atorvastatin      Ezetimibe      Gentamicin      Hydroxyzine      Lorazepam      Ranitidine      Simvastatin      Lopid [Gemfibrozil] GI Disturbance     Bloating, constipation,      No Clinical Screening - See Comments      anticholesterol medicine caused muscle aches     Pravastatin Muscle Pain (Myalgia)     Recent Labs   Lab Test 05/12/21  1040 04/28/21  1017 02/25/21  1002 02/01/21  1108 11/02/20  1320 11/02/20  1320 10/29/20  0931 12/05/19  0846 10/30/19  1026   LDL  --  154*  --   --   --   --  130*  --  145*   HDL  --  47*  --   --   --   --  42*  --  41*   TRIG  --  178*  --   --   --   --  138  --  192*   ALT  --  30  --  27  --  32 34   < > 45   CR 1.19* 1.04  --  1.00   < > 0.93 1.00  --  0.83   GFRESTIMATED 45* 53*  --  55*   < > 60* 55*  --  70   GFRESTBLACK  "52* 61  --  64   < > 70 64  --  81   POTASSIUM 4.2 4.0  --  4.3   < > 4.3 4.1  --  3.9   TSH  --  4.14* 4.23*  --   --  1.42 3.67  --  10.11*    < > = values in this interval not displayed.      Mammogram due in December.   Pertinent mammograms are reviewed under the imaging tab.    Review of Systems  CONSTITUTIONAL: NEGATIVE for fever, chills, change in weight  INTEGUMENTARY/SKIN: NEGATIVE for worrisome rashes, moles or lesions  EYES: NEGATIVE for vision changes or irritation  ENT/MOUTH: NEGATIVE for ear, mouth and throat problems  RESP: NEGATIVE for significant cough or SOB  BREAST: NEGATIVE for masses, tenderness or discharge  CV: NEGATIVE for chest pain, palpitations or peripheral edema  GI: NEGATIVE for nausea, abdominal pain, heartburn, or change in bowel habits  : NEGATIVE for frequency, dysuria, or hematuria  MUSCULOSKELETAL: NEGATIVE for significant arthralgias or myalgia  NEURO: NEGATIVE for weakness, dizziness or paresthesias  ENDOCRINE: NEGATIVE for temperature intolerance, skin/hair changes  HEME: NEGATIVE for bleeding problems  PSYCHIATRIC: NEGATIVE for changes in mood or affect    OBJECTIVE:   /60 (BP Location: Left arm, Patient Position: Chair, Cuff Size: Adult Regular)   Pulse 53   Temp 96.8  F (36  C) (Tympanic)   Resp 16   Ht 1.575 m (5' 2\")   Wt 86.3 kg (190 lb 3.2 oz)   SpO2 97%   BMI 34.79 kg/m   Estimated body mass index is 34.79 kg/m  as calculated from the following:    Height as of this encounter: 1.575 m (5' 2\").    Weight as of this encounter: 86.3 kg (190 lb 3.2 oz).  Physical Exam  GENERAL: healthy, alert and no distress  EYES: Eyes grossly normal to inspection, PERRL and conjunctivae and sclerae normal  HENT: ear canals and TM's normal, nose and mouth without ulcers or lesions  NECK: no adenopathy, no asymmetry, masses, or scars and thyroid normal to palpation  RESP: lungs clear to auscultation - no rales, rhonchi or wheezes  CV: regular rate and rhythm, normal S1 S2, no " "S3 or S4, no murmur, click or rub, no peripheral edema and peripheral pulses strong  ABDOMEN: soft, nontender, no hepatosplenomegaly, no masses and bowel sounds normal  MS: no gross musculoskeletal defects noted, no edema  NEURO: Normal strength and tone, mentation intact and speech normal  PSYCH: mentation appears normal, affect normal/bright    Labs due next month.      ASSESSMENT / PLAN:   1. Annual physical exam  Forms completed     2. Benign essential hypertension  Discontinue hydrochlorothiazide   Continue metoprolol and losartan and amlodipine     3. Mixed hyperlipidemia  Continue fish oil    4. Hypothyroidism due to acquired atrophy of thyroid  Continue synthroid    5. GERD  Well controlled     6. Mixed incontinence urge and stress (male)(female)  Continue vesicare    7. Stage 3a chronic kidney disease  Will recheck CMP next month      Patient has been advised of split billing requirements and indicates understanding: Yes  COUNSELING:  Reviewed preventive health counseling, as reflected in patient instructions       Regular exercise       Healthy diet/nutrition       Vision screening       Hearing screening       Dental care       Bladder control       Immunizations        Estimated body mass index is 34.79 kg/m  as calculated from the following:    Height as of this encounter: 1.575 m (5' 2\").    Weight as of this encounter: 86.3 kg (190 lb 3.2 oz).    Weight management plan: Discussed healthy diet and exercise guidelines    She reports that she has never smoked. She has never used smokeless tobacco.      Appropriate preventive services were discussed with this patient, including applicable screening as appropriate for cardiovascular disease, diabetes, osteopenia/osteoporosis, and glaucoma.  As appropriate for age/gender, discussed screening for colorectal cancer, prostate cancer, breast cancer, and cervical cancer. Checklist reviewing preventive services available has been given to the patient.    Reviewed " patients plan of care and provided an AVS. The Intermediate Care Plan ( asthma action plan, low back pain action plan, and migraine action plan) for Nella meets the Care Plan requirement. This Care Plan has been established and reviewed with the Patient.    Counseling Resources:  ATP IV Guidelines  Pooled Cohorts Equation Calculator  Breast Cancer Risk Calculator  Breast Cancer: Medication to Reduce Risk  FRAX Risk Assessment  ICSI Preventive Guidelines  Dietary Guidelines for Americans, 2010  [employer]'s MyPlate  ASA Prophylaxis  Lung CA Screening    Randi Haddad NP  [facility] [place] [facility]    Identified Health Risks:

## 2021-07-20 NOTE — PROGRESS NOTES
Assessment & Plan     1. Mixed hyperlipidemia  contineu fish oil  - Lipid Profile (Chol, Trig, HDL, LDL calc); Future    2. Benign essential hypertension  2 week nurse only blood pressure recheck   - CBC with platelets and differential; Future  - Comprehensive metabolic panel (BMP + Alb, Alk Phos, ALT, AST, Total. Bili, TP); Future    3. Paroxysmal atrial fibrillation (H)  Continue eliquis    4. Postoperative hypothyroidism  - TSH with free T4 reflex; Future    5. Mixed incontinence urge and stress (male)(female)  Limit fluids after 4-5 PM to reduce using bathroom at night. If no improvement we could increase vesicare to 10mg daily.   - solifenacin (VESICARE) 5 MG tablet; Take 1 tablet (5 mg) by mouth daily  Dispense: 90 tablet; Refill: 2    Follow-up in 3 months    DWIGHT Vergara    Patient is seen in conjunction with PA student.  History is reviewed with patient and pertinent portions of the exam are repeated.  Assessment and plan is reviewed with the patient.    Randi Haddad NP  [facility] [place] [facility]    Subjective   Nella is a 76 year old who presents for the following health issues     HPI       Hyperlipidemia Follow-Up    Are you regularly taking any medication or supplement to lower your cholesterol?   Yes- fishoil     Are you having muscle aches or other side effects that you think could be caused by your cholesterol lowering medication?  No   The ASCVD Risk score (Danie PANCHITO Jr., et al., 2013) failed to calculate for the following reasons:    The patient has a prior MI or stroke diagnosis\      Hypertension Follow-up    Do you check your blood pressure regularly outside of the clinic? Yes     Are you following a low salt diet? Yes    Are your blood pressures ever more than 140 on the top number (systolic) OR more   than 90 on the bottom number (diastolic), for example 140/90? No  At home averages around 130/74. Denies: change in vision, headaches, chest pain.  Endorses  dizziness.     Hypothyroidism Follow-up    Since last visit, patient describes the following symptoms: Weight stable, no skin changes, no constipation, no loose stools. Endorses: hair loss, fatigue, and temperature intolerance.    How many servings of fruits and vegetables do you eat daily?  2-3    On average, how many sweetened beverages do you drink each day (Examples: soda, juice, sweet tea, etc.  Do NOT count diet or artificially sweetened beverages)?   0    How many days per week do you exercise enough to make your heart beat faster? 7    How many minutes a day do you exercise enough to make your heart beat faster? 30 - 60    How many days per week do you miss taking your medication? 0  Takes medication on an empty stomach in the afternoon.       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, left     Family history of colon cancer     ACP (advance care planning)     Mixed hyperlipidemia     Paroxysmal atrial fibrillation (H)     CKD (chronic kidney disease) stage 2, GFR 60-89 ml/min     Leukocytosis     Class 2 severe obesity due to excess calories with serious comorbidity and body mass index (BMI) of 35.0 to 35.9 in adult (H)     Lower extremity edema     Fibromyalgia     Sensorineural hearing loss (SNHL) of both ears     Papillary thyroid carcinoma (H)     LA NENA (generalized anxiety disorder)     Colon cancer screening     Postoperative hypothyroidism     Chronic kidney disease, stage 3     Past Surgical History:   Procedure Laterality Date     ADENOIDECTOMY       BIOPSY  2019    FNA left thyroid     CHOLECYSTECTOMY  1981     COLONOSCOPY  2002     COLONOSCOPY  2012     COLONOSCOPY N/A 9/22/2014    Procedure: COLONOSCOPY;  Surgeon: Lavell Pavon DO;  Location: [facility]     COLONOSCOPY N/A  3/1/2021    Procedure: screening colonoscopy;  Surgeon: Sandro Dow MD;  Location: [facility]     CYSTOSCOPY  2007    Cystitis chronic     EYE SURGERY      right cataract     ORTHOPEDIC SURGERY  2002    carpal tunnel; RT, LT     THYROIDECTOMY Left 8/27/2019    Procedure: LEFT THYROID LOBECTOMY AND ISTHMUS WITH FROZENS;  Surgeon: Mildred Pineda MD;  Location: [facility]     TONSILLECTOMY         Social History     Tobacco Use     Smoking status: Never Smoker     Smokeless tobacco: Never Used   Substance Use Topics     Alcohol use: Never     Family History   Problem Relation Age of Onset     Breast Cancer Mother      Alcohol/Drug Mother         Cirrhosis     Other - See Comments Mother         goiter     Cerebrovascular Disease Father      Circulatory Father      Alcohol/Drug Son      Cancer - colorectal Brother      Unknown/Adopted No family hx of      Asthma No family hx of      C.A.D. No family hx of      Diabetes No family hx of      Hypertension No family hx of      Prostate Cancer No family hx of      Allergies No family hx of      Alzheimer Disease No family hx of      Anesthesia Reaction No family hx of      Arthritis No family hx of      Blood Disease No family hx of      Cardiovascular No family hx of      Congenital Anomalies No family hx of      Connective Tissue Disorder No family hx of      Depression No family hx of      Endocrine Disease No family hx of      Eye Disorder No family hx of      Genetic Disorder No family hx of      Gastrointestinal Disease No family hx of      Genitourinary Problems No family hx of      Gynecology No family hx of      Heart Disease No family hx of      Lipids No family hx of      Musculoskeletal Disorder No family hx of      Neurologic Disorder No family hx of      Obesity No family hx of      Osteoporosis No family hx of      Psychotic Disorder No family hx of      Respiratory No family hx of      Thyroid Disease No family hx of      Family History Negative No family  hx of      Hearing Loss No family hx of          Current Outpatient Medications   Medication Sig Dispense Refill     amLODIPine (NORVASC) 5 MG tablet TAKE 1 TABLET (5 MG) BY MOUTH DAILY 90 tablet 3     ELIQUIS ANTICOAGULANT 5 MG tablet TAKE 1 TABLET (5 MG) BY MOUTH 2 TIMES DAILY 180 tablet 3     gabapentin (NEURONTIN) 300 MG capsule TAKE 3 CAPSULES THREE TIMES DAILY 270 capsule 0     GLUCOSAMINE SULFATE PO Take 1,000 mg by mouth 2 times daily       levothyroxine (SYNTHROID/LEVOTHROID) 112 MCG tablet TAKE 1 TABLET DAILY FOR THYROID 90 tablet 1     losartan (COZAAR) 100 MG tablet TAKE 1 TABLET (100 MG) BY MOUTH DAILY 90 tablet 3     metoprolol succinate ER (TOPROL-XL) 50 MG 24 hr tablet TAKE 1 TABLET (50 MG) BY MOUTH DAILY 90 tablet 3     Multiple Vitamins-Minerals (PRESERVISION AREDS 2 PO)        Omega-3 Fatty Acids (OMEGA-3 FISH OIL PO) Take 3 g by mouth daily        omeprazole (PRILOSEC) 20 MG DR capsule TAKE 1 CAPSULE (20 MG) BY MOUTH DAILY 90 capsule 1     sertraline (ZOLOFT) 50 MG tablet TAKE 1 TABLET DAILY 30 tablet 4     solifenacin (VESICARE) 5 MG tablet Take 1 tablet (5 mg) by mouth daily 90 tablet 2     VITAMIN D, CHOLECALCIFEROL, PO Take by mouth daily       Allergies   Allergen Reactions     Atorvastatin      Ezetimibe      Gentamicin      Hydroxyzine      Lorazepam      Ranitidine      Simvastatin      Lopid [Gemfibrozil] GI Disturbance     Bloating, constipation,      No Clinical Screening - See Comments      anticholesterol medicine caused muscle aches     Pravastatin Muscle Pain (Myalgia)     Recent Labs   Lab Test 08/18/21  0939 05/12/21  1040 04/28/21  1017 02/01/21  1108 10/29/20  0931   *  --  154*  --  130*   HDL 38*  --  47*  --  42*   TRIG 177*  --  178*  --  138   ALT 35  --  30 27 34   CR 1.00 1.19* 1.04 1.00 1.00   GFRESTIMATED 55* 45* 53* 55* 55*   GFRESTBLACK  --  52* 61 64 64   POTASSIUM 4.3 4.2 4.0 4.3 4.1   TSH 2.02  --  4.14*  --  3.67      BP Readings from Last 3 Encounters:  "  08/18/21 (!) 150/60   07/28/21 128/60   06/28/21 124/72    Wt Readings from Last 3 Encounters:   08/18/21 86.2 kg (190 lb)   07/28/21 86.3 kg (190 lb 3.2 oz)   06/28/21 81.6 kg (180 lb)          Review of Systems   Constitutional, HEENT, cardiovascular, pulmonary, gi and gu systems are negative, except as otherwise noted.      Objective    BP (!) 150/60 (BP Location: Right arm, Patient Position: Chair, Cuff Size: Adult Large)   Pulse (!) 48   Temp (!) 96.2  F (35.7  C) (Tympanic)   Ht 1.575 m (5' 2\")   Wt 86.2 kg (190 lb)   SpO2 98%   BMI 34.75 kg/m    Body mass index is 34.75 kg/m .     Physical Exam   GENERAL: Nella appears healthy, alert and in no acute distress  NECK: no adenopathy, no asymmetry, masses. Scar present on anterior neck. No thyromegaly, no masses/nodules to palpation  RESP: lungs clear to auscultation, no increased work in breathing. Regular rate, rhythm, depth and ease - no rales, rhonchi or wheezes  CV: regular rate and rhythm, clear S1 S2, no extra heart sounds. No murmurs, rubs or gallops. No peripheral edema and peripheral pulses strong  MS: no gross musculoskeletal defects noted, no edema  SKIN: Appears well perfused and dry to the touch. No suspicious lesions or rashes  NEURO: Normal strength and tone, mentation intact and speech normal  PSYCH: mentation appears normal, affect normal/bright            "

## 2021-07-22 DIAGNOSIS — E03.4 HYPOTHYROIDISM DUE TO ACQUIRED ATROPHY OF THYROID: ICD-10-CM

## 2021-07-22 DIAGNOSIS — K21.9 GASTROESOPHAGEAL REFLUX DISEASE WITHOUT ESOPHAGITIS: Chronic | ICD-10-CM

## 2021-07-23 RX ORDER — LEVOTHYROXINE SODIUM 112 UG/1
TABLET ORAL
Qty: 90 TABLET | Refills: 1 | Status: SHIPPED | OUTPATIENT
Start: 2021-07-23 | End: 2022-01-26

## 2021-07-23 NOTE — TELEPHONE ENCOUNTER
Omeprazole      Last Written Prescription Date:  4/28/21  Last Fill Quantity: 90,   # refills: 1  Last Office Visit: 5/12/21  Future Office visit:    Next 5 appointments (look out 90 days)    Jul 28, 2021  8:15 AM  (Arrive by 8:00 AM)  SHORT with Randi aHddad NP  [facility] ([facility] ) [address]  [place] [address]  [phone]   Aug 18, 2021  8:45 AM  (Arrive by 8:30 AM)  SHORT with Randi Haddad NP  [facility] ([facility] ) [address]  [place] [address]  [phone]   Oct 20, 2021  9:30 AM  (Arrive by 9:15 AM)  Nurse Only with Alis Pitt  [facility] [place] ([facility] [place] ) [address]  [place] [address]  [phone]           Routing refill request to provider for review/approval because:      Levothyroxine      Last Written Prescription Date:  4/29/21  Last Fill Quantity: 90,   # refills: 1  Last Office Visit: 5/12/21  Future Office visit:    Next 5 appointments (look out 90 days)    Jul 28, 2021  8:15 AM  (Arrive by 8:00 AM)  SHORT with Randi Haddad NP  [facility] ([facility] ) [address]  [place] [address]  [phone]   Aug 18, 2021  8:45 AM  (Arrive by 8:30 AM)  SHORT with Randi Haddad NP  [facility] ([facility] ) [address]  [place] [address]  [phone]   Oct 20, 2021  9:30 AM  (Arrive by 9:15 AM)  Nurse Only with Alis Pitt  [facility] [place] ([facility] [place] ) [address]  [place] [address]  [phone]           Routing refill request to provider for review/approval because:

## 2021-07-28 ENCOUNTER — OFFICE VISIT (OUTPATIENT)
Dept: FAMILY MEDICINE | Facility: OTHER | Age: 76
End: 2021-07-28
Attending: NURSE PRACTITIONER
Payer: COMMERCIAL

## 2021-07-28 VITALS
OXYGEN SATURATION: 97 % | HEART RATE: 53 BPM | RESPIRATION RATE: 16 BRPM | BODY MASS INDEX: 35 KG/M2 | HEIGHT: 62 IN | WEIGHT: 190.2 LBS | SYSTOLIC BLOOD PRESSURE: 128 MMHG | DIASTOLIC BLOOD PRESSURE: 60 MMHG | TEMPERATURE: 96.8 F

## 2021-07-28 DIAGNOSIS — E78.2 MIXED HYPERLIPIDEMIA: ICD-10-CM

## 2021-07-28 DIAGNOSIS — N18.31 STAGE 3A CHRONIC KIDNEY DISEASE (H): ICD-10-CM

## 2021-07-28 DIAGNOSIS — E03.4 HYPOTHYROIDISM DUE TO ACQUIRED ATROPHY OF THYROID: ICD-10-CM

## 2021-07-28 DIAGNOSIS — I10 BENIGN ESSENTIAL HYPERTENSION: ICD-10-CM

## 2021-07-28 DIAGNOSIS — Z00.00 ANNUAL PHYSICAL EXAM: Primary | ICD-10-CM

## 2021-07-28 DIAGNOSIS — K21.9 GASTROESOPHAGEAL REFLUX DISEASE WITHOUT ESOPHAGITIS: ICD-10-CM

## 2021-07-28 DIAGNOSIS — N39.46 MIXED INCONTINENCE URGE AND STRESS (MALE)(FEMALE): ICD-10-CM

## 2021-07-28 PROCEDURE — G0463 HOSPITAL OUTPT CLINIC VISIT: HCPCS

## 2021-07-28 PROCEDURE — G0439 PPPS, SUBSEQ VISIT: HCPCS | Performed by: NURSE PRACTITIONER

## 2021-07-28 ASSESSMENT — PAIN SCALES - GENERAL: PAINLEVEL: NO PAIN (0)

## 2021-07-28 ASSESSMENT — MIFFLIN-ST. JEOR: SCORE: 1310.99

## 2021-07-28 NOTE — NURSING NOTE
"Chief Complaint   Patient presents with     Physical     Hypertension       Initial /60 (BP Location: Left arm, Patient Position: Chair, Cuff Size: Adult Regular)   Pulse 53   Temp 96.8  F (36  C) (Tympanic)   Resp 16   Ht 1.575 m (5' 2\")   Wt 86.3 kg (190 lb 3.2 oz)   SpO2 97%   BMI 34.79 kg/m   Estimated body mass index is 34.79 kg/m  as calculated from the following:    Height as of this encounter: 1.575 m (5' 2\").    Weight as of this encounter: 86.3 kg (190 lb 3.2 oz).  Medication Reconciliation: complete  Pamela M. Lechevalier, LPN    "

## 2021-08-07 DIAGNOSIS — M79.10 MYALGIA: ICD-10-CM

## 2021-08-09 RX ORDER — GABAPENTIN 300 MG/1
CAPSULE ORAL
Qty: 270 CAPSULE | Refills: 0 | Status: SHIPPED | OUTPATIENT
Start: 2021-08-09 | End: 2021-09-08

## 2021-08-09 NOTE — TELEPHONE ENCOUNTER
Gabapentin      Last Written Prescription Date:  7/7/21  Last Fill Quantity: 270,   # refills: 0  Last Office Visit: 7/28/21  Future Office visit:    Next 5 appointments (look out 90 days)    Aug 18, 2021  8:45 AM  (Arrive by 8:30 AM)  SHORT with Randi Haddad NP  [facility] ([facility] ) [address] [facility] [address]  [phone]   Oct 20, 2021  9:30 AM  (Arrive by 9:15 AM)  Nurse Only with Alis Pitt  [facility] ([facility] [place] ) [address]  [place] [address]  [phone]           Routing refill request to provider for review/approval because:

## 2021-08-10 DIAGNOSIS — I10 BENIGN ESSENTIAL HYPERTENSION: ICD-10-CM

## 2021-08-10 DIAGNOSIS — R60.0 LOWER EXTREMITY EDEMA: Primary | ICD-10-CM

## 2021-08-10 DIAGNOSIS — N18.31 STAGE 3A CHRONIC KIDNEY DISEASE (H): ICD-10-CM

## 2021-08-10 DIAGNOSIS — N18.2 CKD (CHRONIC KIDNEY DISEASE) STAGE 2, GFR 60-89 ML/MIN: Chronic | ICD-10-CM

## 2021-08-10 RX ORDER — LOSARTAN POTASSIUM 100 MG/1
100 TABLET ORAL DAILY
Qty: 90 TABLET | Refills: 3 | Status: SHIPPED | OUTPATIENT
Start: 2021-08-10 | End: 2022-07-30

## 2021-08-10 NOTE — TELEPHONE ENCOUNTER
losartan (COZAAR) 100 MG tablet      Last Written Prescription Date:  8-  Last Fill Quantity: 90,   # refills: 3  Last Office Visit: 5- virtual  Future Office visit:    Next 5 appointments (look out 90 days)    Aug 18, 2021  8:45 AM  (Arrive by 8:30 AM)  SHORT with Randi Haddad NP  [facility] ([facility] ) [address] [facility] [address]  [phone]   Oct 20, 2021  9:30 AM  (Arrive by 9:15 AM)  Nurse Only with Alis Pitt  [facility] ([facility] [place] ) [address]  [place] [address]  [phone]           Routing refill request to provider for review/approval because:   Recent (12 mo) or future (30 days) visit within the authorizing provider's specialty Protocol Details    Normal serum creatinine on file in past 12 months        Creatinine   Date Value Ref Range Status   05/12/2021 1.19 (H) 0.52 - 1.04 mg/dL Final

## 2021-08-18 ENCOUNTER — OFFICE VISIT (OUTPATIENT)
Dept: FAMILY MEDICINE | Facility: OTHER | Age: 76
End: 2021-08-18
Attending: NURSE PRACTITIONER
Payer: COMMERCIAL

## 2021-08-18 VITALS
HEART RATE: 48 BPM | DIASTOLIC BLOOD PRESSURE: 60 MMHG | BODY MASS INDEX: 34.96 KG/M2 | OXYGEN SATURATION: 98 % | TEMPERATURE: 96.2 F | SYSTOLIC BLOOD PRESSURE: 150 MMHG | HEIGHT: 62 IN | WEIGHT: 190 LBS

## 2021-08-18 DIAGNOSIS — I48.0 PAROXYSMAL ATRIAL FIBRILLATION (H): Chronic | ICD-10-CM

## 2021-08-18 DIAGNOSIS — E89.0 POSTOPERATIVE HYPOTHYROIDISM: ICD-10-CM

## 2021-08-18 DIAGNOSIS — N39.46 MIXED INCONTINENCE URGE AND STRESS (MALE)(FEMALE): ICD-10-CM

## 2021-08-18 DIAGNOSIS — I10 BENIGN ESSENTIAL HYPERTENSION: ICD-10-CM

## 2021-08-18 DIAGNOSIS — E78.2 MIXED HYPERLIPIDEMIA: Primary | Chronic | ICD-10-CM

## 2021-08-18 LAB
ALBUMIN SERPL-MCNC: 3.5 G/DL (ref 3.4–5)
ALP SERPL-CCNC: 141 U/L (ref 40–150)
ALT SERPL W P-5'-P-CCNC: 35 U/L (ref 0–50)
ANION GAP SERPL CALCULATED.3IONS-SCNC: 3 MMOL/L (ref 3–14)
AST SERPL W P-5'-P-CCNC: 19 U/L (ref 0–45)
BASOPHILS # BLD AUTO: 0 10E3/UL (ref 0–0.2)
BASOPHILS NFR BLD AUTO: 0 %
BILIRUB SERPL-MCNC: 0.6 MG/DL (ref 0.2–1.3)
BUN SERPL-MCNC: 19 MG/DL (ref 7–30)
CALCIUM SERPL-MCNC: 8.6 MG/DL (ref 8.5–10.1)
CHLORIDE BLD-SCNC: 110 MMOL/L (ref 94–109)
CHOLEST SERPL-MCNC: 222 MG/DL
CO2 SERPL-SCNC: 28 MMOL/L (ref 20–32)
CREAT SERPL-MCNC: 1 MG/DL (ref 0.52–1.04)
EOSINOPHIL # BLD AUTO: 0.2 10E3/UL (ref 0–0.7)
EOSINOPHIL NFR BLD AUTO: 2 %
ERYTHROCYTE [DISTWIDTH] IN BLOOD BY AUTOMATED COUNT: 13.7 % (ref 10–15)
FASTING STATUS PATIENT QL REPORTED: YES
GFR SERPL CREATININE-BSD FRML MDRD: 55 ML/MIN/1.73M2
GLUCOSE BLD-MCNC: 107 MG/DL (ref 70–99)
HCT VFR BLD AUTO: 43.3 % (ref 35–47)
HDLC SERPL-MCNC: 38 MG/DL
HGB BLD-MCNC: 14.2 G/DL (ref 11.7–15.7)
LDLC SERPL CALC-MCNC: 149 MG/DL
LYMPHOCYTES # BLD AUTO: 2 10E3/UL (ref 0.8–5.3)
LYMPHOCYTES NFR BLD AUTO: 17 %
MCH RBC QN AUTO: 28.1 PG (ref 26.5–33)
MCHC RBC AUTO-ENTMCNC: 32.8 G/DL (ref 31.5–36.5)
MCV RBC AUTO: 86 FL (ref 78–100)
MONOCYTES # BLD AUTO: 0.7 10E3/UL (ref 0–1.3)
MONOCYTES NFR BLD AUTO: 6 %
NEUTROPHILS # BLD AUTO: 8.5 10E3/UL (ref 1.6–8.3)
NEUTROPHILS NFR BLD AUTO: 75 %
NONHDLC SERPL-MCNC: 184 MG/DL
PLATELET # BLD AUTO: 268 10E3/UL (ref 150–450)
POTASSIUM BLD-SCNC: 4.3 MMOL/L (ref 3.4–5.3)
PROT SERPL-MCNC: 7.2 G/DL (ref 6.8–8.8)
RBC # BLD AUTO: 5.05 10E6/UL (ref 3.8–5.2)
SODIUM SERPL-SCNC: 141 MMOL/L (ref 133–144)
TRIGL SERPL-MCNC: 177 MG/DL
TSH SERPL DL<=0.005 MIU/L-ACNC: 2.02 MU/L (ref 0.4–4)
WBC # BLD AUTO: 11.4 10E3/UL (ref 4–11)

## 2021-08-18 PROCEDURE — 99214 OFFICE O/P EST MOD 30 MIN: CPT | Performed by: NURSE PRACTITIONER

## 2021-08-18 PROCEDURE — 36415 COLL VENOUS BLD VENIPUNCTURE: CPT | Mod: ZL | Performed by: NURSE PRACTITIONER

## 2021-08-18 PROCEDURE — 84443 ASSAY THYROID STIM HORMONE: CPT | Mod: ZL | Performed by: NURSE PRACTITIONER

## 2021-08-18 PROCEDURE — 85025 COMPLETE CBC W/AUTO DIFF WBC: CPT | Mod: ZL | Performed by: NURSE PRACTITIONER

## 2021-08-18 PROCEDURE — 80061 LIPID PANEL: CPT | Mod: ZL | Performed by: NURSE PRACTITIONER

## 2021-08-18 PROCEDURE — 80053 COMPREHEN METABOLIC PANEL: CPT | Mod: ZL | Performed by: NURSE PRACTITIONER

## 2021-08-18 PROCEDURE — G0463 HOSPITAL OUTPT CLINIC VISIT: HCPCS

## 2021-08-18 RX ORDER — SOLIFENACIN SUCCINATE 5 MG/1
5 TABLET, FILM COATED ORAL DAILY
Qty: 90 TABLET | Refills: 2 | Status: SHIPPED | OUTPATIENT
Start: 2021-08-18 | End: 2022-02-18

## 2021-08-18 ASSESSMENT — MIFFLIN-ST. JEOR: SCORE: 1310.08

## 2021-08-18 ASSESSMENT — PAIN SCALES - GENERAL: PAINLEVEL: NO PAIN (0)

## 2021-08-18 NOTE — LETTER
My Heart Failure Action Plan   Name: Nella Lemon    YOB: 1945   Date: 8/17/2021    My doctor: Randi Haddad     [facility]     [address]  [place] [address]  [phone]  My Diagnosis:    My Exercise Goal: 30 minutes daily  .     My Weight Plan:   Wt Readings from Last 2 Encounters:   07/28/21 86.3 kg (190 lb 3.2 oz)   06/28/21 81.6 kg (180 lb)     Weigh yourself daily using the same scale. If you gain more than 2 pounds in 24 hours or 5 pounds in a week     My Diet Goal: No added salt and 2,000 mg of sodium    Emergency Room Visits:    Our goal is to improve your quality of life and help you avoid a visit to the emergency room or hospital.  If we work together, we can achieve this goal. But, if you feel you need to call 911 or go to the emergency room, please do so.  If you go to the emergency room, please bring your list of medicines and your daily weight chart with you.       GREEN ZONE     Doing well today    Weight gained is no more than 2 pounds a day or 5 pounds a week.    No swelling in feet, ankles, legs or stomach.    No more swelling than usual.    No more trouble breathing than usual.    No change in my sleep.    No other problems. Actions:    I am doing fine.  I will take my medicine, follow my diet, see my doctor, exercise, and watch for symptoms.           YELLOW ZONE         Having a bad day or flare up    Weight gain of more than 2 pounds in one day or 5 pounds in one week.    New swelling in ankle, leg, knee or thigh.    Bloating in belly, pants feel tighter.    Swelling in hands or face.    Coughing or trouble breathing while walking or talking.    Harder to breathe last night.    Have trouble sleeping, wake up short of breath.    Much more tired than usual.    Not eating.    Pain in my chest or bad leg cramps.    Feel weak or dizzy. Actions:    I need to take action and call my doctor or nurse today.                  RED ZONE         Need medical care now    Weight gain of 5 pounds overnight.    Chest pain or pressure that does not go away.    Feel less alert.    Wheezing or have trouble breathing when at rest.    Cannot sleep lying down.    Cannot take my water pill.    Pass out or faint. Actions:    I need to call my doctor or nurse now!    Call 911 if I have chest pain or cannot breathe.

## 2021-08-18 NOTE — PATIENT INSTRUCTIONS
Assessment & Plan     1. Mixed hyperlipidemia  contineu fish oil  - Lipid Profile (Chol, Trig, HDL, LDL calc); Future    2. Benign essential hypertension  2 week nurse only blood pressure recheck   - CBC with platelets and differential; Future  - Comprehensive metabolic panel (BMP + Alb, Alk Phos, ALT, AST, Total. Bili, TP); Future    3. Paroxysmal atrial fibrillation (H)  Continue eliquis    4. Postoperative hypothyroidism  - TSH with free T4 reflex; Future    5. Mixed incontinence urge and stress (male)(female)  Limit fluids after 4-5 PM to reduce using bathroom at night. If no improvement we could increase vesicare to 10mg daily.   - solifenacin (VESICARE) 5 MG tablet; Take 1 tablet (5 mg) by mouth daily  Dispense: 90 tablet; Refill: 2    Follow-up in 3 months    Randi Haddad NP  [facility]

## 2021-08-18 NOTE — NURSING NOTE
"Chief Complaint   Patient presents with     Lipids     Hypertension     Thyroid Disease       Initial BP (!) 160/62 (BP Location: Right arm, Patient Position: Chair, Cuff Size: Adult Regular)   Pulse (!) 49   Temp (!) 96.2  F (35.7  C) (Tympanic)   Ht 1.575 m (5' 2\")   Wt 86.2 kg (190 lb)   SpO2 98%   BMI 34.75 kg/m   Estimated body mass index is 34.75 kg/m  as calculated from the following:    Height as of this encounter: 1.575 m (5' 2\").    Weight as of this encounter: 86.2 kg (190 lb).  Medication Reconciliation: complete  Enma Smith LPN    "

## 2021-08-19 DIAGNOSIS — E78.2 MIXED HYPERLIPIDEMIA: Primary | ICD-10-CM

## 2021-09-01 ENCOUNTER — ALLIED HEALTH/NURSE VISIT (OUTPATIENT)
Dept: FAMILY MEDICINE | Facility: OTHER | Age: 76
End: 2021-09-01
Attending: NURSE PRACTITIONER
Payer: COMMERCIAL

## 2021-09-01 VITALS — SYSTOLIC BLOOD PRESSURE: 166 MMHG | DIASTOLIC BLOOD PRESSURE: 72 MMHG

## 2021-09-01 DIAGNOSIS — I10 BENIGN ESSENTIAL HYPERTENSION: Primary | ICD-10-CM

## 2021-09-01 PROCEDURE — 99207 PR NO CHARGE NURSE ONLY: CPT

## 2021-09-02 RX ORDER — AMLODIPINE BESYLATE 10 MG/1
10 TABLET ORAL DAILY
Qty: 90 TABLET | Refills: 1 | Status: SHIPPED | OUTPATIENT
Start: 2021-09-02 | End: 2022-01-17

## 2021-09-03 ENCOUNTER — OFFICE VISIT (OUTPATIENT)
Dept: FAMILY MEDICINE | Facility: OTHER | Age: 76
End: 2021-09-03
Attending: NURSE PRACTITIONER
Payer: COMMERCIAL

## 2021-09-03 DIAGNOSIS — M79.10 MYALGIA: ICD-10-CM

## 2021-09-03 RX ORDER — GABAPENTIN 300 MG/1
CAPSULE ORAL
Qty: 270 CAPSULE | Refills: 0 | Status: CANCELLED | OUTPATIENT
Start: 2021-09-03

## 2021-09-07 DIAGNOSIS — M79.10 MYALGIA: ICD-10-CM

## 2021-09-08 RX ORDER — GABAPENTIN 300 MG/1
CAPSULE ORAL
Qty: 270 CAPSULE | Refills: 0 | Status: SHIPPED | OUTPATIENT
Start: 2021-09-08 | End: 2021-10-06

## 2021-09-08 NOTE — TELEPHONE ENCOUNTER
gabapentin      Last Written Prescription Date:  8.9.21  Last Fill Quantity: #270,   # refills: 0  Last Office Visit: 8.18.21  Future Office visit:    Next 5 appointments (look out 90 days)    Oct 20, 2021  9:30 AM  (Arrive by 9:15 AM)  Nurse Only with Alis Pitt  [facility] ([facility] ) [address]  [place] [address]  [phone]   Nov 18, 2021  9:45 AM  (Arrive by 9:30 AM)  SHORT with Randi Haddad NP  [facility] ([facility] ) [address]  [facility] [address]  [phone]           Routing refill request to provider for review/approval because:  Drug not on the [facility], [facility] or [facility] refill protocol or controlled substance

## 2021-10-05 DIAGNOSIS — M79.10 MYALGIA: ICD-10-CM

## 2021-10-06 RX ORDER — GABAPENTIN 300 MG/1
CAPSULE ORAL
Qty: 270 CAPSULE | Refills: 0 | Status: SHIPPED | OUTPATIENT
Start: 2021-10-06 | End: 2021-11-03

## 2021-10-06 NOTE — TELEPHONE ENCOUNTER
Gabapentin      Last Written Prescription Date:  9.8.21  Last Fill Quantity: #270,   # refills: 0  Last Office Visit: 8.18.21  Future Office visit:    Next 5 appointments (look out 90 days)    Oct 29, 2021  9:15 AM  (Arrive by 9:00 AM)  Nurse Only with Alis Pitt  [facility] ([facility] ) [address]  [place] [address]  [phone]   Nov 18, 2021  9:45 AM  (Arrive by 9:30 AM)  SHORT with Randi Haddad NP  [facility] ([facility] ) [address]  [facility] [address]  [phone]           Routing refill request to provider for review/approval because:  Drug not on the [facility], [facility] or [facility] refill protocol or controlled substance     Z Plasty Text: The lesion was extirpated to the level of the fat with a #15 scalpel blade.  Given the location of the defect, shape of the defect and the proximity to free margins a Z-plasty was deemed most appropriate for repair.  Using a sterile surgical marker, the appropriate transposition arms of the Z-plasty were drawn incorporating the defect and placing the expected incisions within the relaxed skin tension lines where possible.    The area thus outlined was incised deep to adipose tissue with a #15 scalpel blade.  The skin margins were undermined to an appropriate distance in all directions utilizing iris scissors.  The opposing transposition arms were then transposed into place in opposite direction and anchored with interrupted buried subcutaneous sutures.

## 2021-10-22 DIAGNOSIS — H91.93 DECREASED HEARING OF BOTH EARS: Primary | ICD-10-CM

## 2021-11-03 DIAGNOSIS — M79.10 MYALGIA: ICD-10-CM

## 2021-11-03 RX ORDER — GABAPENTIN 300 MG/1
CAPSULE ORAL
Qty: 270 CAPSULE | Refills: 0 | Status: SHIPPED | OUTPATIENT
Start: 2021-11-03 | End: 2021-12-07

## 2021-11-03 NOTE — TELEPHONE ENCOUNTER
GABRIELA      Last Written Prescription Date:  10-6-2021  Last Fill Quantity: 270,   # refills: 0  Last Office Visit: 8-  Future Office visit:    Next 5 appointments (look out 90 days)    Nov 04, 2021  9:15 AM  (Arrive by 9:00 AM)  Nurse Only with Alis Pitt  [facility] ) [address]  [place] [address]  [phone]   Nov 18, 2021  9:45 AM  (Arrive by 9:30 AM)  SHORT with Randi Haddad, NP  [facility] ([facility] ) [address]  [facility] [address]  [phone]           Routing refill request to provider for review/approval because:  Drug not on the [facility], [facility] or  Health refill protocol or controlled substance

## 2021-11-13 NOTE — PROGRESS NOTES
Assessment & Plan     1. Mixed hyperlipidemia  Cannot tolerate statins.   - Lipid Profile; Future    2. Benign essential hypertension  Well controlled   - Comprehensive metabolic panel; Future    3. Anxiety  Continue zoloft     4. Paroxysmal atrial fibrillation (H)  Continue eliquis    5. Stage 3a chronic kidney disease (H)  Do not use NSAIDS, try tylenol instead.   - Albumin Random Urine Quantitative with Creat Ratio; Future    6. GERD  Continue omeprazole    7. Postoperative hypothyroidism  - TSH with free T4 reflex; Future    8. Mixed incontinence urge and stress (male)(female)  Continue vesicare.         Return in about 6 months (around 5/18/2022) for hypertension, hypertension and lipids, anxiety/depression.    Randi Haddad NP  [facility] Carmen is a 76 year old who presents for the following health issues     HPI     Hyperlipidemia Follow-Up      Are you regularly taking any medication or supplement to lower your cholesterol?   Yes- Losartan    Are you having muscle aches or other side effects that you think could be caused by your cholesterol lowering medication?  No    Hypertension Follow-up      Do you check your blood pressure regularly outside of the clinic? Yes     Are you following a low salt diet? Yes    Are your blood pressures ever more than 140 on the top number (systolic) OR more   than 90 on the bottom number (diastolic), for example 140/90? Yes    Anxiety Follow-Up    How are you doing with your anxiety since your last visit? No change    Are you having other symptoms that might be associated with anxiety? No    Have you had a significant life event? No     Are you feeling depressed? No    Do you have any concerns with your use of alcohol or other drugs? No    Social History     Tobacco Use     Smoking status: Never Smoker     Smokeless tobacco: Never Used   Substance Use Topics     Alcohol use: Never     Drug use: No     LA NENA-7 SCORE  10/29/2020 4/28/2021 11/18/2021   Total Score 0 4 0     PHQ 10/29/2020 4/28/2021 11/18/2021   PHQ-9 Total Score 0 2 0   Q9: Thoughts of better off dead/self-harm past 2 weeks Not at all Not at all Not at all     Last PHQ-9 11/18/2021   1.  Little interest or pleasure in doing things 0   2.  Feeling down, depressed, or hopeless 0   3.  Trouble falling or staying asleep, or sleeping too much 0   4.  Feeling tired or having little energy 0   5.  Poor appetite or overeating 0   6.  Feeling bad about yourself 0   7.  Trouble concentrating 0   8.  Moving slowly or restless 0   Q9: Thoughts of better off dead/self-harm past 2 weeks 0   PHQ-9 Total Score 0   Difficulty at work, home, or with people Not difficult at all     LA NENA-7  11/18/2021   1. Feeling nervous, anxious, or on edge 0   2. Not being able to stop or control worrying 0   3. Worrying too much about different things 0   4. Trouble relaxing 0   5. Being so restless that it is hard to sit still 0   6. Becoming easily annoyed or irritable 0   7. Feeling afraid, as if something awful might happen 0   LA NENA-7 Total Score 0   If you checked any problems, how difficult have they made it for you to do your work, take care of things at home, or get along with other people? Not difficult at all       Chronic Kidney Disease Follow-up    Do you take any over the counter pain medicine?: Yes  What over the counter medicine are you taking for your pain?:  Ibuprofen       How often do you take this medicine?:  maybe once a week     Hypothyroidism Follow-up      Since last visit, patient describes the following symptoms: Weight stable, no hair loss, no skin changes, no constipation, no loose stools      How many servings of fruits and vegetables do you eat daily?  2-3    On average, how many sweetened beverages do you drink each day (Examples: soda, juice, sweet tea, etc.  Do NOT count diet or artificially sweetened beverages)?   0    How many days per week do you exercise enough  to make your heart beat faster? 5    How many minutes a day do you exercise enough to make your heart beat faster? 30 - 60    How many days per week do you miss taking your medication? 0    She is feeling well, no new concerns.     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, left     Family history of colon cancer     ACP (advance care planning)     Mixed hyperlipidemia     Paroxysmal atrial fibrillation (H)     CKD (chronic kidney disease) stage 2, GFR 60-89 ml/min     Leukocytosis     Class 2 severe obesity due to excess calories with serious comorbidity and body mass index (BMI) of 35.0 to 35.9 in adult (H)     Lower extremity edema     Fibromyalgia     Sensorineural hearing loss (SNHL) of both ears     Papillary thyroid carcinoma (H)     LA NENA (generalized anxiety disorder)     Colon cancer screening     Postoperative hypothyroidism     Chronic kidney disease, stage 3 (H)     Past Surgical History:   Procedure Laterality Date     ADENOIDECTOMY       BIOPSY  2019    FNA left thyroid     CHOLECYSTECTOMY  1981     COLONOSCOPY  2002     COLONOSCOPY  2012     COLONOSCOPY N/A 9/22/2014    Procedure: COLONOSCOPY;  Surgeon: Lavell Pavon DO;  Location: [facility] OR     COLONOSCOPY N/A 3/1/2021    Procedure: screening colonoscopy;  Surgeon: Sandro Dow MD;  Location: [facility] OR     CYSTOSCOPY  2007    Cystitis chronic     EYE SURGERY      right cataract     ORTHOPEDIC SURGERY  2002    carpal tunnel; RT, LT     THYROIDECTOMY Left 8/27/2019    Procedure: LEFT THYROID LOBECTOMY AND ISTHMUS WITH FROZENS;  Surgeon: Mildred Pineda MD;  Location: [facility] OR     TONSILLECTOMY         Social History     Tobacco Use     Smoking status: Never Smoker     Smokeless tobacco: Never Used   Substance Use Topics      Alcohol use: Never     Family History   Problem Relation Age of Onset     Breast Cancer Mother      Alcohol/Drug Mother         Cirrhosis     Other - See Comments Mother         goiter     Cerebrovascular Disease Father      Circulatory Father      Alcohol/Drug Son      Cancer - colorectal Brother      Unknown/Adopted No family hx of      Asthma No family hx of      C.A.D. No family hx of      Diabetes No family hx of      Hypertension No family hx of      Prostate Cancer No family hx of      Allergies No family hx of      Alzheimer Disease No family hx of      Anesthesia Reaction No family hx of      Arthritis No family hx of      Blood Disease No family hx of      Cardiovascular No family hx of      Congenital Anomalies No family hx of      Connective Tissue Disorder No family hx of      Depression No family hx of      Endocrine Disease No family hx of      Eye Disorder No family hx of      Genetic Disorder No family hx of      Gastrointestinal Disease No family hx of      Genitourinary Problems No family hx of      Gynecology No family hx of      Heart Disease No family hx of      Lipids No family hx of      Musculoskeletal Disorder No family hx of      Neurologic Disorder No family hx of      Obesity No family hx of      Osteoporosis No family hx of      Psychotic Disorder No family hx of      Respiratory No family hx of      Thyroid Disease No family hx of      Family History Negative No family hx of      Hearing Loss No family hx of          Current Outpatient Medications   Medication Sig Dispense Refill     amLODIPine (NORVASC) 10 MG tablet Take 1 tablet (10 mg) by mouth daily 90 tablet 1     ELIQUIS ANTICOAGULANT 5 MG tablet TAKE 1 TABLET (5 MG) BY MOUTH 2 TIMES DAILY 180 tablet 3     gabapentin (NEURONTIN) 300 MG capsule TAKE 3 CAPSULES THREE TIMES DAILY 270 capsule 0     GLUCOSAMINE SULFATE PO Take 1,000 mg by mouth 2 times daily       levothyroxine (SYNTHROID/LEVOTHROID) 112 MCG tablet TAKE 1 TABLET DAILY  FOR THYROID 90 tablet 1     losartan (COZAAR) 100 MG tablet TAKE 1 TABLET (100 MG) BY MOUTH DAILY 90 tablet 3     metoprolol succinate ER (TOPROL-XL) 50 MG 24 hr tablet TAKE 1 TABLET (50 MG) BY MOUTH DAILY 90 tablet 3     Multiple Vitamins-Minerals (PRESERVISION AREDS 2 PO)        Omega-3 Fatty Acids (OMEGA-3 FISH OIL PO) Take 3 g by mouth daily        omeprazole (PRILOSEC) 20 MG DR capsule TAKE 1 CAPSULE (20 MG) BY MOUTH DAILY 90 capsule 1     sertraline (ZOLOFT) 50 MG tablet TAKE 1 TABLET DAILY 90 tablet 1     solifenacin (VESICARE) 5 MG tablet Take 1 tablet (5 mg) by mouth daily 90 tablet 2     STATIN NOT PRESCRIBED (INTENTIONAL) Please choose reason not prescribed from choices below.       VITAMIN D, CHOLECALCIFEROL, PO Take by mouth daily       Allergies   Allergen Reactions     Atorvastatin      Ezetimibe      Gentamicin      Hydroxyzine      Lorazepam      Ranitidine      Simvastatin      Lopid [Gemfibrozil] GI Disturbance     Bloating, constipation,      No Clinical Screening - See Comments      anticholesterol medicine caused muscle aches     Pravastatin Muscle Pain (Myalgia)     Recent Labs   Lab Test 08/18/21  0939 05/12/21  1040 04/28/21  1017 02/25/21  1002 02/01/21  1108 11/02/20  1320 10/29/20  0931   *  --  154*  --   --   --  130*   HDL 38*  --  47*  --   --   --  42*   TRIG 177*  --  178*  --   --   --  138   ALT 35  --  30  --  27   < > 34   CR 1.00 1.19* 1.04  --  1.00   < > 1.00   GFRESTIMATED 55* 45* 53*  --  55*   < > 55*   GFRESTBLACK  --  52* 61  --  64   < > 64   POTASSIUM 4.3 4.2 4.0  --  4.3   < > 4.1   TSH 2.02  --  4.14*   < >  --    < > 3.67    < > = values in this interval not displayed.      BP Readings from Last 3 Encounters:   11/18/21 134/66   09/01/21 (!) 166/72   08/18/21 (!) 150/60    Wt Readings from Last 3 Encounters:   11/18/21 86 kg (189 lb 9.6 oz)   08/18/21 86.2 kg (190 lb)   07/28/21 86.3 kg (190 lb 3.2 oz)                      Review of Systems   Constitutional,  "HEENT, cardiovascular, pulmonary, gi and gu systems are negative, except as otherwise noted.      Objective    /66 (BP Location: Right arm, Patient Position: Chair, Cuff Size: Adult Large)   Pulse 55   Temp 97.8  F (36.6  C) (Tympanic)   Resp 18   Ht 1.575 m (5' 2\")   Wt 86 kg (189 lb 9.6 oz)   SpO2 96%   BMI 34.68 kg/m    Body mass index is 34.68 kg/m .  Physical Exam   GENERAL: healthy, alert and no distress  NECK: no adenopathy, no asymmetry, masses, or scars and no carotid bruits  RESP: lungs clear to auscultation - no rales, rhonchi or wheezes  CV: regular rate and rhythm, normal S1 S2, no S3 or S4, no murmur, click or rub, no peripheral edema and peripheral pulses strong  MS: no gross musculoskeletal defects noted, no edema  PSYCH: mentation appears normal, affect normal/bright                "

## 2021-11-18 ENCOUNTER — OFFICE VISIT (OUTPATIENT)
Dept: FAMILY MEDICINE | Facility: OTHER | Age: 76
End: 2021-11-18
Attending: NURSE PRACTITIONER
Payer: COMMERCIAL

## 2021-11-18 VITALS
SYSTOLIC BLOOD PRESSURE: 134 MMHG | TEMPERATURE: 97.8 F | HEIGHT: 62 IN | BODY MASS INDEX: 34.89 KG/M2 | WEIGHT: 189.6 LBS | DIASTOLIC BLOOD PRESSURE: 66 MMHG | OXYGEN SATURATION: 96 % | RESPIRATION RATE: 18 BRPM | HEART RATE: 55 BPM

## 2021-11-18 DIAGNOSIS — F41.9 ANXIETY: ICD-10-CM

## 2021-11-18 DIAGNOSIS — N39.46 MIXED INCONTINENCE URGE AND STRESS (MALE)(FEMALE): ICD-10-CM

## 2021-11-18 DIAGNOSIS — K21.9 GASTROESOPHAGEAL REFLUX DISEASE WITHOUT ESOPHAGITIS: Chronic | ICD-10-CM

## 2021-11-18 DIAGNOSIS — E89.0 POSTOPERATIVE HYPOTHYROIDISM: ICD-10-CM

## 2021-11-18 DIAGNOSIS — N18.31 STAGE 3A CHRONIC KIDNEY DISEASE (H): ICD-10-CM

## 2021-11-18 DIAGNOSIS — I48.0 PAROXYSMAL ATRIAL FIBRILLATION (H): ICD-10-CM

## 2021-11-18 DIAGNOSIS — E78.2 MIXED HYPERLIPIDEMIA: Primary | ICD-10-CM

## 2021-11-18 DIAGNOSIS — I10 BENIGN ESSENTIAL HYPERTENSION: ICD-10-CM

## 2021-11-18 LAB
ALBUMIN SERPL-MCNC: 3.4 G/DL (ref 3.4–5)
ALP SERPL-CCNC: 138 U/L (ref 40–150)
ALT SERPL W P-5'-P-CCNC: 40 U/L (ref 0–50)
ANION GAP SERPL CALCULATED.3IONS-SCNC: 5 MMOL/L (ref 3–14)
AST SERPL W P-5'-P-CCNC: 24 U/L (ref 0–45)
BILIRUB SERPL-MCNC: 0.7 MG/DL (ref 0.2–1.3)
BUN SERPL-MCNC: 16 MG/DL (ref 7–30)
CALCIUM SERPL-MCNC: 8.8 MG/DL (ref 8.5–10.1)
CHLORIDE BLD-SCNC: 110 MMOL/L (ref 94–109)
CHOLEST SERPL-MCNC: 222 MG/DL
CO2 SERPL-SCNC: 27 MMOL/L (ref 20–32)
CREAT SERPL-MCNC: 1.01 MG/DL (ref 0.52–1.04)
CREAT UR-MCNC: 117 MG/DL
FASTING STATUS PATIENT QL REPORTED: YES
GFR SERPL CREATININE-BSD FRML MDRD: 55 ML/MIN/1.73M2
GLUCOSE BLD-MCNC: 97 MG/DL (ref 70–99)
HDLC SERPL-MCNC: 38 MG/DL
HOLD SPECIMEN: NORMAL
LDLC SERPL CALC-MCNC: 144 MG/DL
MICROALBUMIN UR-MCNC: 8 MG/L
MICROALBUMIN/CREAT UR: 6.84 MG/G CR (ref 0–25)
NONHDLC SERPL-MCNC: 184 MG/DL
POTASSIUM BLD-SCNC: 4 MMOL/L (ref 3.4–5.3)
PROT SERPL-MCNC: 7.4 G/DL (ref 6.8–8.8)
SODIUM SERPL-SCNC: 142 MMOL/L (ref 133–144)
TRIGL SERPL-MCNC: 198 MG/DL
TSH SERPL DL<=0.005 MIU/L-ACNC: 1.8 MU/L (ref 0.4–4)

## 2021-11-18 PROCEDURE — 82043 UR ALBUMIN QUANTITATIVE: CPT | Mod: ZL | Performed by: NURSE PRACTITIONER

## 2021-11-18 PROCEDURE — 36415 COLL VENOUS BLD VENIPUNCTURE: CPT | Mod: ZL | Performed by: NURSE PRACTITIONER

## 2021-11-18 PROCEDURE — G0463 HOSPITAL OUTPT CLINIC VISIT: HCPCS

## 2021-11-18 PROCEDURE — 99214 OFFICE O/P EST MOD 30 MIN: CPT | Performed by: NURSE PRACTITIONER

## 2021-11-18 PROCEDURE — 80061 LIPID PANEL: CPT | Mod: ZL | Performed by: NURSE PRACTITIONER

## 2021-11-18 PROCEDURE — 84443 ASSAY THYROID STIM HORMONE: CPT | Mod: ZL | Performed by: NURSE PRACTITIONER

## 2021-11-18 PROCEDURE — 80053 COMPREHEN METABOLIC PANEL: CPT | Mod: ZL | Performed by: NURSE PRACTITIONER

## 2021-11-18 ASSESSMENT — PAIN SCALES - GENERAL: PAINLEVEL: NO PAIN (0)

## 2021-11-18 ASSESSMENT — ANXIETY QUESTIONNAIRES
GAD7 TOTAL SCORE: 0
2. NOT BEING ABLE TO STOP OR CONTROL WORRYING: NOT AT ALL
5. BEING SO RESTLESS THAT IT IS HARD TO SIT STILL: NOT AT ALL
1. FEELING NERVOUS, ANXIOUS, OR ON EDGE: NOT AT ALL
7. FEELING AFRAID AS IF SOMETHING AWFUL MIGHT HAPPEN: NOT AT ALL
4. TROUBLE RELAXING: NOT AT ALL
IF YOU CHECKED OFF ANY PROBLEMS ON THIS QUESTIONNAIRE, HOW DIFFICULT HAVE THESE PROBLEMS MADE IT FOR YOU TO DO YOUR WORK, TAKE CARE OF THINGS AT HOME, OR GET ALONG WITH OTHER PEOPLE: NOT DIFFICULT AT ALL
3. WORRYING TOO MUCH ABOUT DIFFERENT THINGS: NOT AT ALL
6. BECOMING EASILY ANNOYED OR IRRITABLE: NOT AT ALL

## 2021-11-18 ASSESSMENT — MIFFLIN-ST. JEOR: SCORE: 1308.27

## 2021-11-18 NOTE — NURSING NOTE
"Chief Complaint   Patient presents with     Hypertension     Lipids     Thyroid Problem       Initial /66 (BP Location: Right arm, Patient Position: Chair, Cuff Size: Adult Large)   Pulse 55   Temp 97.8  F (36.6  C) (Tympanic)   Resp 18   Ht 1.575 m (5' 2\")   Wt 86 kg (189 lb 9.6 oz)   SpO2 96%   BMI 34.68 kg/m   Estimated body mass index is 34.68 kg/m  as calculated from the following:    Height as of this encounter: 1.575 m (5' 2\").    Weight as of this encounter: 86 kg (189 lb 9.6 oz).  Medication Reconciliation: complete  Pamela M. Lechevalier, LPN    "

## 2021-11-18 NOTE — PATIENT INSTRUCTIONS
Assessment & Plan     1. Mixed hyperlipidemia  Cannot tolerate statins.   - Lipid Profile; Future    2. Benign essential hypertension  Well controlled   - Comprehensive metabolic panel; Future    3. Anxiety  Continue zoloft     4. Paroxysmal atrial fibrillation (H)  Continue eliquis    5. Stage 3a chronic kidney disease (H)  Do not use NSAIDS, try tylenol instead.   - Albumin Random Urine Quantitative with Creat Ratio; Future    6. GERD  Continue omeprazole    7. Postoperative hypothyroidism  - TSH with free T4 reflex; Future    8. Mixed incontinence urge and stress (male)(female)  Continue vesicare.     Declined flu vaccine    Follow-up 6 months or as needed

## 2021-11-19 ASSESSMENT — PATIENT HEALTH QUESTIONNAIRE - PHQ9: SUM OF ALL RESPONSES TO PHQ QUESTIONS 1-9: 0

## 2021-11-19 ASSESSMENT — ANXIETY QUESTIONNAIRES: GAD7 TOTAL SCORE: 0

## 2021-11-23 ENCOUNTER — OFFICE VISIT (OUTPATIENT)
Dept: AUDIOLOGY | Facility: OTHER | Age: 76
End: 2021-11-23
Attending: PHYSICIAN ASSISTANT
Payer: COMMERCIAL

## 2021-11-23 DIAGNOSIS — H90.3 SENSORINEURAL HEARING LOSS (SNHL) OF BOTH EARS: Primary | ICD-10-CM

## 2021-11-23 PROCEDURE — 92550 TYMPANOMETRY & REFLEX THRESH: CPT | Performed by: AUDIOLOGIST

## 2021-11-23 PROCEDURE — 92557 COMPREHENSIVE HEARING TEST: CPT | Performed by: AUDIOLOGIST

## 2021-11-23 PROCEDURE — 92591 HC HEARING AID EXAM BINAURAL: CPT | Performed by: AUDIOLOGIST

## 2021-11-23 PROCEDURE — V5299 HEARING SERVICE: HCPCS

## 2021-11-23 NOTE — PROGRESS NOTES
1. Audiology Evaluation Completed. Please refer SCANNED AUDIOGRAM and/or TYMPANOGRAM for BACKGROUND, RESULTS, RECOMMENDATIONS.      Trini BIANCHI, [facility]  Audiologist #[id number]        2.HEARING AID CONSULT    BACKGROUND:  Nella was seen today for consult regarding hearing aids. The patient notes difficulty with communication in a variety of listening situations and would like to explore possible benefit obtained via use of amplification.      Patient has received medical clearance for hearing aid use from Aroldo Lomeli NP. Nella is a binaural hearing aid candidate and will receive a Hearing Aid Recommendation today to follow [facility] CONTRACT GUIDELINES  .    RESULTS:  Audiology Assistant reviewed below information:      Estimated insurance coverage for hearing aids reviewed.    [facility] form titled 'Legal rights and consumer information about purchasing a hearing instrument verbally reviewed and given to patient. Trial Period, cancellation fee, warranties highlighted. Patient risk factors have been provided to the patient in writing prior to the sale of the hearing aid per FDA regulation. The risk factors are also available in the User Instructional Booklet to be presented on the day of the hearing aid fitting.    ABN (Advanced Beneficiary Notice of Non-Coverage) completed and copy given to patient.       Hearing aid recommendation provided by Audiologist.    Thru use of hearing aids patient would like to improve ability to hear:    where there are groups and noise.     to hear the television at a lower volume to enjoy this activity with other people.     Nella also reports trouble hearing in the car, at home, and on the telephone if there is not a volume control.      Discussed hearing aid styles, technology, features, and options at length with Nella.  Sample devices were shown. Pros/Cons of options reviewed.  Expectations for amplification discussed. .Also  discussed the importance of binaural amplification for hearing speech in the presence of background noise and localizing the directions of sounds.  Wireless options were also discussed at length and sample devices were shown.       Hearing Aid Recommendations: Hearing Aid Recommendation reviewed with patient. Pt chose to proceed with order and Purchase Agreement completed. Copies provided to patient.      Hearing Aids:  Binaural Oticon More 2 miniRITE R  Color: light grey  Battery Size: rechargeable  Earmold/Tips: 1-85 medium open domes.         RECOMMENDATIONS:  The 45 day trial period was explained to patient, and they expressed understanding. Ms. Lemon signed the Hearing Aid Purchase Agreement and was given a copy, as well as details on her hearing aids. Patient was counseled that exact out of pocket amounts cannot be determined for hearing aid claims being sent to insurance. Any insurance coverage information presented to the patient is an estimate only, and is not a guarantee of payment. Patient has been advised to check with their own insurance.      Patient scheduled to return to clinic in 2 weeks for hearing aid fitting, programming and orientation.    Trini Dougherty M.S.,[facility]  Audiologist #[id number]

## 2021-11-23 NOTE — PROGRESS NOTES
HEARING AID CHECK    BACKGROUND:  Nella Lemon, a 76 year old female, was seen today for an hearing aid check.  Ms. Lemon wears Binaural [employer] [place] 700 hearing aids.  Ms. Lemon reported no concerns.    FINDINGS:  Visual inspection and cleaning provided.   C-stop changed with new. Medium open domes changed with new. Battery was tested and good. Weak listening check.    Reviewed cleaning, troubleshooting, and preventative care with patient. Any cleaning tools used were provided as customer courtesy.    Services performed and warranty reviewed with patient.    PLAN:  Patient to be seen next by audiologist.Ms. Lemon will return to clinic in 12 months, sooner if needed. Patient had no further questions and reports satisfaction.         Berenice Pitt  Audiology Assistant  [facility]  [phone]

## 2021-11-29 DIAGNOSIS — I48.0 PAROXYSMAL ATRIAL FIBRILLATION (H): ICD-10-CM

## 2021-12-01 RX ORDER — APIXABAN 5 MG/1
TABLET, FILM COATED ORAL
Qty: 180 TABLET | Refills: 3 | Status: SHIPPED | OUTPATIENT
Start: 2021-12-01 | End: 2022-05-27

## 2021-12-01 NOTE — TELEPHONE ENCOUNTER
Eliquis      Last Written Prescription Date:  12/29/20  Last Fill Quantity: 180,   # refills: 3  Last Office Visit: 5/13/20  Future Office visit:       Routing refill request to provider for review/approval because:

## 2021-12-06 DIAGNOSIS — M79.10 MYALGIA: ICD-10-CM

## 2021-12-07 RX ORDER — GABAPENTIN 300 MG/1
CAPSULE ORAL
Qty: 270 CAPSULE | Refills: 5 | Status: SHIPPED | OUTPATIENT
Start: 2021-12-07 | End: 2022-05-04

## 2021-12-15 ENCOUNTER — OFFICE VISIT (OUTPATIENT)
Dept: AUDIOLOGY | Facility: OTHER | Age: 76
End: 2021-12-15
Attending: AUDIOLOGIST
Payer: COMMERCIAL

## 2021-12-15 DIAGNOSIS — H90.3 SENSORINEURAL HEARING LOSS (SNHL) OF BOTH EARS: Primary | ICD-10-CM

## 2021-12-15 PROCEDURE — V5020 CONFORMITY EVALUATION: HCPCS | Mod: RT | Performed by: AUDIOLOGIST

## 2021-12-15 PROCEDURE — 92593 HC HEARING AID CHECK, BINAURAL: CPT | Performed by: AUDIOLOGIST

## 2021-12-15 PROCEDURE — V5011 HEARING AID FITTING/CHECKING: HCPCS | Mod: LT | Performed by: AUDIOLOGIST

## 2021-12-15 PROCEDURE — V5261 HEARING AID, DIGIT, BIN, BTE: HCPCS | Mod: NU | Performed by: AUDIOLOGIST

## 2021-12-15 PROCEDURE — V5160 DISPENSING FEE BINAURAL: HCPCS | Performed by: AUDIOLOGIST

## 2021-12-15 NOTE — PROGRESS NOTES
AUDIOLOGY REPORT    SUBJECTIVE: Nella Lemon, a 76 year old female, was seen in the Audiology Clinic at [facility] today for a Binaural hearing aid fitting. Previous results have revealed a bilateral  sensorineural hearing loss.     OBJECTIVE:  Prior to fitting, a hearing aid check was performed to ensure device functionality. The hearing aid conformity evaluation was completed.The hearing aids were placed and they provided a good fit. Real-ear-probe-microphone measurements were completed on the mimoOn system and were a good match to NAL-NL2 target with soft sounds audible, moderate sounds comfortable, and loud sounds below discomfort. UCLs are verified through maximum power output measures and demonstrate appropriate limiting of loud inputs.     Hearing Device Info  12/15/2021    Left Ear Make: Oticon   Left Ear Model #: More 2 miniRITE R   Left Ear Style: BTE   Left HA Warranty End Date: 12/23/2024   Left HA Serial #: [id number]   Right Ear Make: Oticon   Right Ear Model #: More 2 miniRITE R   Right Ear Style: BTE   Right HA Warranty End Date: 12/23/2024   Right HA Serial #: [id number]   Fitting Plan: Medical Assistance    12/15/2021     8mm SV domes    ASSESSMENT: Hearing aid fitting completed today. Verification measures were performed. Patient and/or caregiver demonstrated ability to insert and remove hearing aids and adjust volume toggle.    Trini Dougherty M.S., [facility]  Audiologist #[id number]      HEARING AID ORIENTATION/AUDIOLOGY ASSISTANT      Ms. Lemon was oriented to proper hearing aid use, care, cleaning (no water, dry brush), batteries (size Rechargeable, low-battery signal), aid insertion/removal, user booklet, warranty information, storage cases, and other hearing aid details. The patient confirmed understanding of hearing aid use and care, and showed proper insertion of hearing aid while in the office today. Ms. Lemon reported good volume and sound quality  today.    Berenice Pitt  Audiology Assistant  [facility]  [phone]        PLAN: Ms. Lemon will return for follow-up in 2-3 weeks for a hearing aid review appointment. Please call this clinic with questions regarding today s appointment.

## 2021-12-28 DIAGNOSIS — F41.9 ANXIETY: ICD-10-CM

## 2022-01-03 ENCOUNTER — ALLIED HEALTH/NURSE VISIT (OUTPATIENT)
Dept: AUDIOLOGY | Facility: OTHER | Age: 77
End: 2022-01-03
Attending: AUDIOLOGIST
Payer: COMMERCIAL

## 2022-01-03 DIAGNOSIS — H90.3 SENSORINEURAL HEARING LOSS (SNHL) OF BOTH EARS: Primary | ICD-10-CM

## 2022-01-03 PROCEDURE — V5299 HEARING SERVICE: HCPCS

## 2022-01-03 NOTE — PROGRESS NOTES
AUDIOLOGY ASSISTANT REPORT    SUBJECTIVE:Nella Lemon is a 76 year old female whom called the Audiology Clinic at the [facility] on 1/3/2022  for a follow-up review of their hearing aids.  The patient has been seen previously in this clinic and was fit with [employer] More 2 miniRITE R hearing aids on 12/15/2021.  Nella reports good sound quality with the hearing aid(s).     OBJECTIVE:   A follow-up review was performed.  The hearing aid conformity evaluation was previously completed by the audiologist. Based on patient report, no audiology appointment for adjustments needed.    Listening goals reviewed and patient reports they are met to satisfaction.  Patient denied concerns with retention, fit, or function.    Reviewed 45 day trial period, care, cleaning (no water, dry brush), batteries (size Rechargeable) low-battery signal), aid insertion/removal, volume adjustment (if applicable), user booklet, warranty information, storage cases, and other hearing aid details.       ASSESSMENT: A follow-up appointment for hearing aid(s) was completed today. Changes to hearing aid if shown was completed as outlined above.     PLAN:Nella will return for follow-up as needed, or in 12 months.  The patient reports satisfaction and wants to keep the hearing aids.The patient will return for hearing aid checks as needed and in 12 months.  Please call this clinic with any questions regarding today s appointment.      Berenice Pitt  Audiology Assistant  [facility]  [phone]

## 2022-01-14 ENCOUNTER — NURSE TRIAGE (OUTPATIENT)
Dept: FAMILY MEDICINE | Facility: OTHER | Age: 77
End: 2022-01-14
Payer: COMMERCIAL

## 2022-01-14 DIAGNOSIS — I10 BENIGN ESSENTIAL HYPERTENSION: ICD-10-CM

## 2022-01-14 DIAGNOSIS — K21.9 GASTROESOPHAGEAL REFLUX DISEASE WITHOUT ESOPHAGITIS: Chronic | ICD-10-CM

## 2022-01-14 NOTE — TELEPHONE ENCOUNTER
Patient called with symptoms of intermittent chest pain that last for a few seconds for the past couple weeks.  Patient originally scheduled with PCP. Per PCP patient to be seen in UC/ED. Patient agreeable to plan.    Reason for Disposition    Chest pain(s) lasting a few seconds persists > 3 days    Additional Information    Negative: Severe difficulty breathing (e.g., struggling for each breath, speaks in single words)    Negative: Passed out (i.e., fainted, collapsed and was not responding)    Negative: Difficult to awaken or acting confused (e.g., disoriented, slurred speech)    Negative: Shock suspected (e.g., cold/pale/clammy skin, too weak to stand, low BP, rapid pulse)    Negative: Chest pain lasting longer than 5 minutes and ANY of the following:* Over 45 years old* Over 30 years old and at least one cardiac risk factor (e.g., diabetes, high blood pressure, high cholesterol, smoker, or strong family history of heart disease)* History of heart disease (i.e., angina, heart attack, heart failure, bypass surgery, takes nitroglycerin)* Pain is crushing, pressure-like, or heavy    Negative: Heart beating < 50 beats per minute OR > 140 beats per minute    Negative: Visible sweat on face or sweat dripping down face    Negative: Sounds like a life-threatening emergency to the triager    Negative: Followed an injury to chest    Negative: SEVERE chest pain    Negative: Pain also in shoulder(s) or arm(s) or jaw    Negative: Difficulty breathing    Negative: Cocaine use within last 3 days    Negative: Major surgery in the past month    Negative: Hip or leg fracture (broken bone) in past month (or had cast on leg or ankle in past month)    Negative: Illness requiring prolonged bedrest in past month (e.g., immobilization, long hospital stay)    Negative: Long-distance travel in past month (e.g., car, bus, train, plane; with trip lasting 6 or more hours)    Negative: History of prior 'blood clot' in leg or lungs (i.e., deep  "vein thrombosis, pulmonary embolism)    Negative: History of inherited increased risk of blood clots (e.g., Factor 5 Leiden, Anti-thrombin 3, Protein C or Protein S deficiency, Prothrombin mutation)    Negative: Heart beating irregularly or very rapidly    Negative: Chest pain lasting longer than 5 minutes and occurred in last 3 days (72 hours) (Exception: feels exactly the same as previously diagnosed heartburn and has accompanying sour taste in mouth)    Negative: Chest pain or 'angina' comes and goes and is happening more often (increasing in frequency) or getting worse (increasing in severity) (Exception: chest pains that last only a few seconds)    Negative: Dizziness or lightheadedness    Negative: Coughing up blood    Negative: Patient sounds very sick or weak to the triager    Negative: Cancer treatment in the past two months (or has cancer now)    Negative: Patient says chest pain feels exactly the same as previously diagnosed 'heartburn'  and  describes burning in chest and accompanying sour taste in mouth    Negative: Fever > 100.4 F (38.0 C)    Answer Assessment - Initial Assessment Questions  1. LOCATION: \"Where does it hurt?\"        Across whole chest  2. RADIATION: \"Does the pain go anywhere else?\" (e.g., into neck, jaw, arms, back)      no  3. ONSET: \"When did the chest pain begin?\" (Minutes, hours or days)       A few weeks ago  4. PATTERN \"Does the pain come and go, or has it been constant since it started?\"  \"Does it get worse with exertion?\"       intermittent  5. DURATION: \"How long does it last\" (e.g., seconds, minutes, hours)      Less than thirty seconds  6. SEVERITY: \"How bad is the pain?\"  (e.g., Scale 1-10; mild, moderate, or severe)     - MILD (1-3): doesn't interfere with normal activities      - MODERATE (4-7): interferes with normal activities or awakens from sleep     - SEVERE (8-10): excruciating pain, unable to do any normal activities        mild  7. CARDIAC RISK FACTORS: \"Do you " "have any history of heart problems or risk factors for heart disease?\" (e.g., angina, prior heart attack; diabetes, high blood pressure, high cholesterol, smoker, or strong family history of heart disease)      History of a-fib  8. PULMONARY RISK FACTORS: \"Do you have any history of lung disease?\"  (e.g., blood clots in lung, asthma, emphysema, birth control pills)      no  9. CAUSE: \"What do you think is causing the chest pain?\"      unsure  10. OTHER SYMPTOMS: \"Do you have any other symptoms?\" (e.g., dizziness, nausea, vomiting, sweating, fever, difficulty breathing, cough)        no  11. PREGNANCY: \"Is there any chance you are pregnant?\" \"When was your last menstrual period?\"        no    Protocols used: CHEST PAIN-A-[place]      "

## 2022-01-17 RX ORDER — AMLODIPINE BESYLATE 10 MG/1
10 TABLET ORAL DAILY
Qty: 90 TABLET | Refills: 2 | Status: SHIPPED | OUTPATIENT
Start: 2022-01-17 | End: 2022-08-29

## 2022-02-03 DIAGNOSIS — I48.0 PAROXYSMAL ATRIAL FIBRILLATION (H): ICD-10-CM

## 2022-02-03 DIAGNOSIS — I10 BENIGN ESSENTIAL HYPERTENSION: Primary | Chronic | ICD-10-CM

## 2022-02-04 NOTE — TELEPHONE ENCOUNTER
metoprolol succinate ER (TOPROL-XL) 50 MG 24 hr tablet      Last Written Prescription Date:  2-9-21  Last Fill Quantity: 90,   # refills: 3  Last Office Visit: 5-13-20 Virtual  Future Office visit:    Next 5 appointments (look out 90 days)    May 02, 2022  3:00 PM  (Arrive by 2:45 PM)  Return Visit with Rosa Venegas MD  [facility] ([facility] ) [address] [place] [id number]  [phone]           Routing refill request to provider for review/approval because:   Recent (12 mo) or future (30 days) visit within the authorizing provider's specialty

## 2022-02-06 RX ORDER — METOPROLOL SUCCINATE 50 MG/1
50 TABLET, EXTENDED RELEASE ORAL DAILY
Qty: 90 TABLET | Refills: 3 | Status: SHIPPED | OUTPATIENT
Start: 2022-02-06 | End: 2023-02-02

## 2022-02-14 NOTE — PROGRESS NOTES
[facility]  [address]  [facility] [address]  Phone: [phone]  Primary Provider: Jeremy Guzman  Pre-op Performing Provider: JEREMY GUZMAN    PREOPERATIVE EVALUATION:  Today's date: 2/1/2021    Nella Lemon is a 75 year old female who presents for a preoperative evaluation.    Surgical Information:  Surgery/Procedure: Colonoscopy   Surgery Location: [facility]  Surgeon: Paloma  Surgery Date: 2/11/21  Time of Surgery: TBD  Where patient plans to recover: At home with family  Fax number for surgical facility: Note does not need to be faxed, will be available electronically in Epic.    Type of Anesthesia Anticipated: to be determined    Subjective     HPI related to upcoming procedure: due for screening colonoscopy.        Preop Questions 2/1/2021   1. Have you ever had a heart attack or stroke? No   2. Have you ever had surgery on your heart or blood vessels, such as a stent placement, a coronary artery bypass, or surgery on an artery in your head, neck, heart, or legs? No   3. Do you have chest pain with activity? No   4. Do you have a history of  heart failure? No   5. Do you currently have a cold, bronchitis or symptoms of other infection? No   6. Do you have a cough, shortness of breath, or wheezing? No   7. Do you or anyone in your family have previous history of blood clots? YES - father    8. Do you or does anyone in your family have a serious bleeding problem such as prolonged bleeding following surgeries or cuts? No   9. Have you ever had problems with anemia or been told to take iron pills? No   10. Have you had any abnormal blood loss such as black, tarry or bloody stools, or abnormal vaginal bleeding? No   11. Have you ever had a blood transfusion? No   12. Are you willing to have a blood transfusion if it is medically needed before, during, or after your surgery? Yes   13. Have you or any of your  Sleeping better than before   Doing more exercise to help her sleep relatives ever had problems with anesthesia? No   14. Do you have sleep apnea, excessive snoring or daytime drowsiness? No   15. Do you have any artifical heart valves or other implanted medical devices like a pacemaker, defibrillator, or continuous glucose monitor? No   16. Do you have artificial joints? No   17. Are you allergic to latex? No     Health Care Directive:  Patient has a Health Care Directive on file      Preoperative Review of :   reviewed - no record of controlled substances prescribed.      Status of Chronic Conditions:  A-FIB - Patient has a longstanding history of chronic A-fib currently on rate control. Current treatment regimen includes Apixaban for stroke prevention and denies significant symptoms of lightheadedness, palpitations or dyspnea.     HYPERLIPIDEMIA - Patient has a long history of significant Hyperlipidemia requiring medication for treatment with recent good control.   The ASCVD Risk score ([place] PANCHITO Jr., et al., 2013) failed to calculate for the following reasons:    The patient has a prior MI or stroke diagnosis      HYPERTENSION - Patient has longstanding history of HTN , currently denies any symptoms referable to elevated blood pressure. Specifically denies chest pain, palpitations, dyspnea, orthopnea, PND or peripheral edema. Blood pressure readings have been in normal range. Current medication regimen is as listed below. Patient denies any side effects of medication.   BP Readings from Last 3 Encounters:   02/01/21 118/68   11/02/20 110/64   10/29/20 126/62         HYPOTHYROIDISM - Patient has a longstanding history of chronic Hypothyroidism. Patient has been doing well, noting no tremor, insomnia, hair loss or changes in skin texture. Continues to take medications as directed, without adverse reactions or side effects. Last TSH   Lab Results   Component Value Date    TSH 1.42 11/02/2020   .      RENAL INSUFFICIENCY - Patient has a longstanding history of moderate-severe  chronic renal insufficiency.   Creatinine   Date Value Ref Range Status   02/01/2021 1.00 0.52 - 1.04 mg/dL Final          COVID test is scheduled for Sunday 7th, 2021.       Review of Systems  CONSTITUTIONAL: NEGATIVE for fever, chills, change in weight  INTEGUMENTARY/SKIN: NEGATIVE for worrisome rashes, moles or lesions  EYES: NEGATIVE for vision changes or irritation  ENT/MOUTH: NEGATIVE for ear, mouth and throat problems  RESP: NEGATIVE for significant cough or SOB  BREAST: NEGATIVE for masses, tenderness or discharge  CV: NEGATIVE for chest pain, palpitations or peripheral edema  GI: NEGATIVE for nausea, abdominal pain, heartburn, or change in bowel habits  : NEGATIVE for frequency, dysuria, or hematuria  MUSCULOSKELETAL: NEGATIVE for significant arthralgias or myalgia  NEURO: NEGATIVE for weakness, dizziness or paresthesias  ENDOCRINE: NEGATIVE for temperature intolerance, skin/hair changes  HEME: NEGATIVE for bleeding problems  PSYCHIATRIC: NEGATIVE for changes in mood or affect    Patient Active Problem List    Diagnosis Date Noted     Papillary thyroid carcinoma (H) 01/14/2020     Priority: High      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       Colon cancer screening 02/24/2020     Priority: Medium     Added automatically from request for surgery [id number]       LA NENA (generalized anxiety disorder) 01/14/2020     Priority: Medium     Lower extremity edema 11/13/2019     Priority: Medium     Class 2 severe obesity due to excess calories with serious comorbidity and body mass index (BMI) of 35.0 to 35.9 in adult (H) 06/18/2019     Priority: Medium     Leukocytosis 05/31/2019     Priority: Medium     Chronic, mild since at least 2012       CKD (chronic kidney disease) stage 2, GFR 60-89 ml/min 05/07/2019     Priority: Medium     Paroxysmal atrial fibrillation (H) 02/19/2018     Priority: Medium     Mixed hyperlipidemia 12/30/2016     Priority: Medium     ACP  (advance care planning) 12/16/2016     Priority: Medium     Advance Care Planning 12/16/2016: ACP Review of Chart / Resources Provided:  Reviewed chart for advance care plan.  Nella Lemon has been provided information and resources to begin or update their advance care plan.  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, left 02/23/2016     Priority: Medium     Nasal turbinate hypertrophy 06/07/2013     Priority: Medium     Family history of malignant neoplasm of breast 11/13/2006     Priority: Medium     Neuropathy 05/21/2002     Priority: Medium     Fibromyalgia 05/21/2002     Priority: Medium     GERD 09/28/2001     Priority: Medium     Benign essential hypertension 09/14/2001     Priority: Medium     Sensorineural hearing loss (SNHL) of both ears 01/14/2020     Priority: Low      Past Medical History:   Diagnosis Date     Anxiety state, unspecified 09/25/2003     Atypical chest pain 3/28/2018     Carpal tunnel syndrome 07/02/2002     Endometrial hyperplasia 01/27/2003     Metrorrhagia 10/22/2003     Nonallopathic lesion of thoracic region, not elsewhere classified 05/19/2003     Palpitations 04/11/2001     Postmenopausal bleeding 09/09/2002     Precordial pain 04/05/2001     Urgency of urination 06/06/2007     Past Surgical History:   Procedure Laterality Date     ADENOIDECTOMY       BIOPSY  2019    FNA left thyroid     CHOLECYSTECTOMY  1981     COLONOSCOPY  2002     COLONOSCOPY  2012     COLONOSCOPY N/A 9/22/2014    Procedure: COLONOSCOPY;  Surgeon: Lavell Pavon DO;  Location: [facility] OR     CYSTOSCOPY  2007    Cystitis chronic     EYE SURGERY      right cataract     ORTHOPEDIC SURGERY  2002    carpal tunnel; RT, LT     THYROIDECTOMY Left 8/27/2019    Procedure: LEFT THYROID LOBECTOMY AND ISTHMUS WITH FROZENS;  Surgeon: Miriam  MD Mildred;  Location: [facility] OR     TONSILLECTOMY       Current Outpatient Medications   Medication Sig Dispense Refill     amLODIPine (NORVASC) 5 MG tablet TAKE 1 TABLET (5 MG) BY MOUTH DAILY 90 tablet 3     ELIQUIS ANTICOAGULANT 5 MG tablet TAKE 1 TABLET (5 MG) BY MOUTH 2 TIMES DAILY 180 tablet 3     gabapentin (NEURONTIN) 300 MG capsule TAKE 3 CAPSULES THREE TIMES DAILY 270 capsule 0     GLUCOSAMINE SULFATE PO Take 1,000 mg by mouth 2 times daily       levothyroxine (SYNTHROID/LEVOTHROID) 100 MCG tablet TAKE 1 TABLET (100 MCG) BY MOUTH DAILY 90 tablet 0     losartan (COZAAR) 100 MG tablet TAKE 1 TABLET (100 MG) BY MOUTH DAILY 90 tablet 3     metoprolol succinate ER (TOPROL-XL) 50 MG 24 hr tablet TAKE 1 TABLET (50 MG) BY MOUTH DAILY 90 tablet 1     Omega-3 Fatty Acids (OMEGA-3 FISH OIL PO) Take 3 g by mouth daily        omeprazole (PRILOSEC) 40 MG DR capsule TAKE 1 CAPSULE (40 MG) BY MOUTH DAILY TAKE 30-60 MINUTES BEFORE A MEAL. 90 capsule 1     sertraline (ZOLOFT) 50 MG tablet TAKE 1 TABLET DAILY 30 tablet 2     solifenacin (VESICARE) 5 MG tablet TAKE 1 TABLET (5 MG) BY MOUTH DAILY 90 tablet 2     STATIN NOT PRESCRIBED (INTENTIONAL) Please choose reason not prescribed, below       VITAMIN D, CHOLECALCIFEROL, PO Take by mouth daily       Multiple Vitamins-Minerals (PRESERVISION AREDS 2 PO)          Allergies   Allergen Reactions     Atorvastatin      Ezetimibe      Gentamicin      Hydroxyzine      Lorazepam      Ranitidine      Simvastatin      Lopid [Gemfibrozil] GI Disturbance     Bloating, constipation,      No Clinical Screening - See Comments      anticholesterol medicine caused muscle aches     Pravastatin Muscle Pain (Myalgia)        Social History     Tobacco Use     Smoking status: Never Smoker     Smokeless tobacco: Never Used   Substance Use Topics     Alcohol use: Never     Frequency: Never       History   Drug Use No         Objective     /68 (BP Location: Left arm, Patient Position: Chair, Cuff  "Size: Adult Large)   Pulse 53   Temp 96.8  F (36  C) (Tympanic)   Resp 16   Ht 1.575 m (5' 2\")   Wt 84.3 kg (185 lb 12.8 oz)   SpO2 97%   BMI 33.98 kg/m      Physical Exam    GENERAL APPEARANCE: healthy, alert and no distress     EYES: EOMI, PERRL     HENT: ear canals and TM's normal and nose and mouth without ulcers or lesions     NECK: no adenopathy, no asymmetry, masses, or scars and thyroid normal to palpation     RESP: lungs clear to auscultation - no rales, rhonchi or wheezes     CV: regular rates and rhythm, normal S1 S2, no S3 or S4 and no murmur, click or rub     ABDOMEN:  soft, nontender, no HSM or masses and bowel sounds normal     MS: extremities normal- no gross deformities noted, no evidence of inflammation in joints, FROM in all extremities.     SKIN: no suspicious lesions or rashes     NEURO: Normal strength and tone, sensory exam grossly normal, mentation intact and speech normal     PSYCH: mentation appears normal. and affect normal/bright     LYMPHATICS: No cervical adenopathy    Recent Labs   Lab Test 11/02/20  1320 10/29/20  0931   HGB 13.9 13.9    328    143   POTASSIUM 4.3 4.1   CR 0.93 1.00        Diagnostics:  Results for orders placed or performed in visit on 02/01/21   XR CHEST 2 VW (Clinic Performed)     Status: None    Narrative    PROCEDURE: XR CHEST 2 VW 2/1/2021 11:28 AM    HISTORY: Pre-operative examination    COMPARISONS: 8/12/2019.    TECHNIQUE: 2 views.    FINDINGS: Heart is stable in size. There is stable elongation of the  thoracic aorta. Lungs are clear and no pleural effusion is seen.    Moderate degenerative changes seen in the mid thoracic spine and there  is a mild left convex scoliosis.         Impression    IMPRESSION: No acute infiltrate.    LEOPOLDO BORJAS MD   Results for orders placed or performed in visit on 02/01/21   Comprehensive metabolic panel     Status: Abnormal   Result Value Ref Range    Sodium 143 133 - 144 mmol/L    Potassium 4.3 3.4 " - 5.3 mmol/L    Chloride 113 (H) 94 - 109 mmol/L    Carbon Dioxide 25 20 - 32 mmol/L    Anion Gap 5 3 - 14 mmol/L    Glucose 95 70 - 99 mg/dL    Urea Nitrogen 17 7 - 30 mg/dL    Creatinine 1.00 0.52 - 1.04 mg/dL    GFR Estimate 55 (L) >60 mL/min/[1.73_m2]    GFR Estimate If Black 64 >60 mL/min/[1.73_m2]    Calcium 8.7 8.5 - 10.1 mg/dL    Bilirubin Total 0.7 0.2 - 1.3 mg/dL    Albumin 3.4 3.4 - 5.0 g/dL    Protein Total 7.1 6.8 - 8.8 g/dL    Alkaline Phosphatase 124 40 - 150 U/L    ALT 27 0 - 50 U/L    AST 15 0 - 45 U/L   CBC with platelets differential     Status: Abnormal   Result Value Ref Range    WBC 12.9 (H) 4.0 - 11.0 10e9/L    RBC Count 5.33 (H) 3.8 - 5.2 10e12/L    Hemoglobin 14.6 11.7 - 15.7 g/dL    Hematocrit 44.8 35.0 - 47.0 %    MCV 84 78 - 100 fl    MCH 27.4 26.5 - 33.0 pg    MCHC 32.6 31.5 - 36.5 g/dL    RDW 13.5 10.0 - 15.0 %    Platelet Count 262 150 - 450 10e9/L    % Neutrophils 77.3 %    % Lymphocytes 15.6 %    % Monocytes 5.8 %    % Eosinophils 0.9 %    % Basophils 0.4 %    Absolute Neutrophil 10.0 (H) 1.6 - 8.3 10e9/L    Absolute Lymphocytes 2.0 0.8 - 5.3 10e9/L    Absolute Monocytes 0.8 0.0 - 1.3 10e9/L    Absolute Eosinophils 0.1 0.0 - 0.7 10e9/L    Absolute Basophils 0.1 0.0 - 0.2 10e9/L    Diff Method Automated Method    *UA reflex to Microscopic and Culture - [facility]     Status: Abnormal    Specimen: Midstream Urine   Result Value Ref Range    Color Urine Yellow     Appearance Urine Clear     Glucose Urine Negative NEG^Negative mg/dL    Bilirubin Urine Negative NEG^Negative    Ketones Urine Negative NEG^Negative mg/dL    Specific Gravity Urine 1.025 1.003 - 1.035    Blood Urine Negative NEG^Negative    pH Urine 5.5 5.0 - 7.0 pH    Protein Albumin Urine Negative NEG^Negative mg/dL    Urobilinogen Urine 0.2 0.2 - 1.0 EU/dL    Nitrite Urine Negative NEG^Negative    Leukocyte Esterase Urine Small (A) NEG^Negative    Source Midstream Urine    Urine Microscopic     Status: None   Result  Value Ref Range    WBC Urine 0 - 5 OTO5^0 - 5 /HPF    RBC Urine O - 2 OTO2^O - 2 /HPF        EKG: sinus bradycardia, nonspecific ST-T changes, unchanged from previous tracings    Revised Cardiac Risk Index (RCRI):  The patient has the following serious cardiovascular risks for perioperative complications:   A-fib    RCRI Interpretation: 0 points: Class I (very low risk - 0.4% complication rate)           Assessment & Plan   The proposed surgical procedure is considered INTERMEDIATE risk.    1. Pre-operative examination  - Comprehensive metabolic panel  - CBC with platelets differential  - EKG 12-lead complete w/read - (Clinic Performed)  - XR CHEST 2 VW (Clinic Performed); Future  - *UA reflex to Microscopic and Culture - [facility]  - Urine Microscopic    2. Encounter for screening colonoscopy    3. Mixed hyperlipidemia    4. Benign essential hypertension    5. Paroxysmal atrial fibrillation (H)    6. Postoperative hypothyroidism    7. CKD (chronic kidney disease) stage 2, GFR 60-89 ml/min    8. LA NENA (generalized anxiety disorder)          Medication Instructions:   - apixaban (Eliquis), edoxaban (Savaysa), rivaroxaban (Xarelto): Bleeding risk is low for this procedure AND CrCl (>=) 50 mL/min. HOLD 1 day before surgery.      RECOMMENDATION:  APPROVAL GIVEN to proceed with proposed procedure, without further diagnostic evaluation.    Signed Electronically by: Randi Haddad NP    Copy of this evaluation report is provided to requesting physician.    [facility] Guidelines    Revised Cardiac Risk Index

## 2022-02-17 DIAGNOSIS — N39.46 MIXED INCONTINENCE URGE AND STRESS (MALE)(FEMALE): ICD-10-CM

## 2022-02-18 RX ORDER — SOLIFENACIN SUCCINATE 5 MG/1
5 TABLET, FILM COATED ORAL DAILY
Qty: 90 TABLET | Refills: 2 | Status: SHIPPED | OUTPATIENT
Start: 2022-02-18 | End: 2022-11-11

## 2022-02-18 NOTE — TELEPHONE ENCOUNTER
VESICARE      Last Written Prescription Date:  8-  Last Fill Quantity: 90,   # refills: 2  Last Office Visit: 11-  Future Office visit:    Next 5 appointments (look out 90 days)    May 02, 2022  3:00 PM  (Arrive by 2:45 PM)  Return Visit with Rosa Venegas MD  [facility] [address] [address]  [phone]   May 18, 2022  9:45 AM  (Arrive by 9:30 AM)  SHORT with Randi Haddad NP  [facility] ([facility] ) [address]  [place] [address]  [phone]           Routing refill request to provider for review/approval because:

## 2022-02-22 ENCOUNTER — ANCILLARY PROCEDURE (OUTPATIENT)
Dept: MAMMOGRAPHY | Facility: OTHER | Age: 77
End: 2022-02-22
Attending: NURSE PRACTITIONER
Payer: COMMERCIAL

## 2022-02-22 ENCOUNTER — TELEPHONE (OUTPATIENT)
Dept: MAMMOGRAPHY | Facility: OTHER | Age: 77
End: 2022-02-22
Payer: COMMERCIAL

## 2022-02-22 DIAGNOSIS — Z12.31 VISIT FOR SCREENING MAMMOGRAM: ICD-10-CM

## 2022-02-22 PROCEDURE — 77067 SCR MAMMO BI INCL CAD: CPT | Mod: TC

## 2022-04-26 DIAGNOSIS — L08.9 LOCAL INFECTION OF SKIN AND SUBCUTANEOUS TISSUE: Primary | ICD-10-CM

## 2022-04-26 RX ORDER — MUPIROCIN 20 MG/G
OINTMENT TOPICAL 3 TIMES DAILY
Qty: 22 G | Refills: 1 | Status: SHIPPED | OUTPATIENT
Start: 2022-04-26 | End: 2023-01-17

## 2022-04-26 NOTE — TELEPHONE ENCOUNTER
Pt calling requesting Mupirocin for under breast again. Pt declines wanting an appointment. Pt stated she has used this in the past to heal skin under breasts. Med pended as pt requested. Advised to call back directly if there are further questions, or if these symptoms fail to improve as anticipated or worsen.      mupirocin       Last Written Prescription Date:  9/30/16  Last Fill Quantity: 2,   # refills: 3  Last Office Visit: 11/18/21  Future Office visit:    Next 5 appointments (look out 90 days)    May 02, 2022  3:00 PM  (Arrive by 2:45 PM)  Return Visit with Rosa Venegas MD  [facility] ([facility] [address] [address]  [phone]   May 18, 2022  9:45 AM  (Arrive by 9:30 AM)  SHORT with Randi Haddad NP  [facility] ([facility] ) [address]  [facility] [address]  [phone]           Routing refill request to provider for review/approval because:  Drug not active on patient's medication list

## 2022-05-04 DIAGNOSIS — M79.10 MYALGIA: ICD-10-CM

## 2022-05-04 RX ORDER — GABAPENTIN 300 MG/1
CAPSULE ORAL
Qty: 270 CAPSULE | Refills: 5 | Status: SHIPPED | OUTPATIENT
Start: 2022-05-04 | End: 2022-11-03

## 2022-05-04 NOTE — TELEPHONE ENCOUNTER
Neurontin       Last Written Prescription Date:  12/7/21  Last Fill Quantity: 270,   # refills: 5  Last Office Visit: 12/15/21  Future Office visit:    Next 5 appointments (look out 90 days)    May 18, 2022  9:45 AM  (Arrive by 9:30 AM)  SHORT with Randi Haddad NP  [facility] ([facility] ) [address]  [facility] [address]  [phone]   May 24, 2022  9:00 AM  (Arrive by 8:45 AM)  Return Visit with Rosa Venegas MD  [facility] ([facility] ) [address]  [facility] [address]  [phone]

## 2022-05-18 ENCOUNTER — LAB (OUTPATIENT)
Dept: LAB | Facility: OTHER | Age: 77
End: 2022-05-18
Payer: COMMERCIAL

## 2022-05-18 DIAGNOSIS — D72.829 LEUKOCYTOSIS, UNSPECIFIED TYPE: ICD-10-CM

## 2022-05-18 DIAGNOSIS — E03.4 HYPOTHYROIDISM DUE TO ACQUIRED ATROPHY OF THYROID: ICD-10-CM

## 2022-05-18 DIAGNOSIS — R93.89 ABNORMAL FINDINGS ON DIAGNOSTIC IMAGING OF OTHER SPECIFIED BODY STRUCTURES: ICD-10-CM

## 2022-05-18 LAB
ALBUMIN SERPL-MCNC: 3.6 G/DL (ref 3.4–5)
ALP SERPL-CCNC: 140 U/L (ref 40–150)
ALT SERPL W P-5'-P-CCNC: 43 U/L (ref 0–50)
ANION GAP SERPL CALCULATED.3IONS-SCNC: 7 MMOL/L (ref 3–14)
AST SERPL W P-5'-P-CCNC: 25 U/L (ref 0–45)
BASOPHILS # BLD AUTO: 0 10E3/UL (ref 0–0.2)
BASOPHILS NFR BLD AUTO: 0 %
BILIRUB SERPL-MCNC: 0.8 MG/DL (ref 0.2–1.3)
BUN SERPL-MCNC: 17 MG/DL (ref 7–30)
CALCIUM SERPL-MCNC: 8.9 MG/DL (ref 8.5–10.1)
CHLORIDE BLD-SCNC: 105 MMOL/L (ref 94–109)
CO2 SERPL-SCNC: 28 MMOL/L (ref 20–32)
CREAT SERPL-MCNC: 1 MG/DL (ref 0.52–1.04)
EOSINOPHIL # BLD AUTO: 0.2 10E3/UL (ref 0–0.7)
EOSINOPHIL NFR BLD AUTO: 1 %
ERYTHROCYTE [DISTWIDTH] IN BLOOD BY AUTOMATED COUNT: 13.9 % (ref 10–15)
GFR SERPL CREATININE-BSD FRML MDRD: 58 ML/MIN/1.73M2
GLUCOSE BLD-MCNC: 100 MG/DL (ref 70–99)
HCT VFR BLD AUTO: 44.9 % (ref 35–47)
HGB BLD-MCNC: 15.1 G/DL (ref 11.7–15.7)
LDH SERPL L TO P-CCNC: 196 U/L (ref 81–234)
LYMPHOCYTES # BLD AUTO: 2.1 10E3/UL (ref 0.8–5.3)
LYMPHOCYTES NFR BLD AUTO: 17 %
MCH RBC QN AUTO: 28.5 PG (ref 26.5–33)
MCHC RBC AUTO-ENTMCNC: 33.6 G/DL (ref 31.5–36.5)
MCV RBC AUTO: 85 FL (ref 78–100)
MONOCYTES # BLD AUTO: 0.8 10E3/UL (ref 0–1.3)
MONOCYTES NFR BLD AUTO: 6 %
NEUTROPHILS # BLD AUTO: 9.4 10E3/UL (ref 1.6–8.3)
NEUTROPHILS NFR BLD AUTO: 75 %
PLATELET # BLD AUTO: 281 10E3/UL (ref 150–450)
POTASSIUM BLD-SCNC: 4 MMOL/L (ref 3.4–5.3)
PROT SERPL-MCNC: 7.5 G/DL (ref 6.8–8.8)
RBC # BLD AUTO: 5.3 10E6/UL (ref 3.8–5.2)
SODIUM SERPL-SCNC: 140 MMOL/L (ref 133–144)
T4 FREE SERPL-MCNC: 1.19 NG/DL (ref 0.76–1.46)
TSH SERPL DL<=0.005 MIU/L-ACNC: 4.96 MU/L (ref 0.4–4)
WBC # BLD AUTO: 12.4 10E3/UL (ref 4–11)

## 2022-05-18 PROCEDURE — 85025 COMPLETE CBC W/AUTO DIFF WBC: CPT | Mod: ZL

## 2022-05-18 PROCEDURE — 80053 COMPREHEN METABOLIC PANEL: CPT | Mod: ZL

## 2022-05-18 PROCEDURE — 84439 ASSAY OF FREE THYROXINE: CPT | Mod: ZL

## 2022-05-18 PROCEDURE — 36415 COLL VENOUS BLD VENIPUNCTURE: CPT | Mod: ZL

## 2022-05-18 PROCEDURE — 84443 ASSAY THYROID STIM HORMONE: CPT | Mod: ZL

## 2022-05-18 PROCEDURE — 83615 LACTATE (LD) (LDH) ENZYME: CPT | Mod: ZL

## 2022-05-24 ENCOUNTER — ONCOLOGY VISIT (OUTPATIENT)
Dept: ONCOLOGY | Facility: OTHER | Age: 77
End: 2022-05-24
Attending: INTERNAL MEDICINE
Payer: COMMERCIAL

## 2022-05-24 VITALS
WEIGHT: 191.8 LBS | OXYGEN SATURATION: 97 % | SYSTOLIC BLOOD PRESSURE: 122 MMHG | HEIGHT: 62 IN | RESPIRATION RATE: 19 BRPM | TEMPERATURE: 98.1 F | BODY MASS INDEX: 35.3 KG/M2 | DIASTOLIC BLOOD PRESSURE: 78 MMHG | HEART RATE: 58 BPM

## 2022-05-24 DIAGNOSIS — D72.829 LEUKOCYTOSIS, UNSPECIFIED TYPE: Primary | ICD-10-CM

## 2022-05-24 PROCEDURE — 99214 OFFICE O/P EST MOD 30 MIN: CPT | Performed by: INTERNAL MEDICINE

## 2022-05-24 PROCEDURE — G0463 HOSPITAL OUTPT CLINIC VISIT: HCPCS

## 2022-05-24 ASSESSMENT — PAIN SCALES - GENERAL: PAINLEVEL: NO PAIN (0)

## 2022-05-24 NOTE — PROGRESS NOTES
Visit Date: 05/24/2022    HISTORY OF PRESENT ILLNESS:  Mrs. Lemon returns for followup of leukocytosis and history of papillary thyroid carcinoma.  We had seen the patient in consultation at the request of Dr. Aroldo Haddad, nurse practitioner, on 04/15/2019.  At that time, she was a 73-year-old white female with history of hypothyroidism, atrial fibrillation, hypertension, we were asked to evaluate concerning diagnosis of leukocytosis.  According to the patient, she had this chronically for years.  She had been seen by Dr. Butts approximately 5 years prior, who noted an elevated white count and treated her empirically with antibiotics.  She stated her white count went down, and then it went back up.  White count was followed by Aroldo Haddad, nurse practitioner, who noted the patient on 06/28/2014, had a white count that was elevated at 13.4.  She ordered a peripheral blood smear.  This came back that there was mild neutrophilia reactive lymphocytes and mild reticulocytosis.  The patient had a CBC repeated on January 28th.  At that time, her white count had dropped to 12.8 with 72.7% neutrophils consistent with neutrophilia.  Hemoglobin, hematocrit, and platelet count were normal.  When we saw the patient, we felt likely she had a reactive process.  Nonetheless, we wanted to rule out other etiology and obtained a BCR/ABL transcript.  This came back negative.  We also obtained sed rate, rheumatoid factor, MALIA, serum protein electrophoresis, all came back negative.  Lyme titers were negative.  Wendy-Barr virus profile was negative.  CT chest, abdomen and pelvis was negative except that there was a 1 cm heavily calcified nodule in the left lobe of the thyroid.  Thyroid ultrasound was recommended.  The patient underwent a thyroid ultrasound that revealed a left thyroid nodule.  Malignancy cannot be ruled out.  Subsequently, the patient underwent biopsy, which came back as papillary thyroid carcinoma.   The patient was referred to Dr. Pineda for further management.  The patient underwent a left hemithyroidectomy performed on August 27th.  The findings were the patient had papillary thyroid carcinoma that measured 1.1 x 1 cm.  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  The patient was staged pathologic T1a, NX.  The patient underwent a left hemithyroidectomy performed on August 27th.  The findings were the patient had papillary thyroid carcinoma measuring 1.1 x 1 cm.  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  The patient was staged pathologic T1a, NX.  The patient underwent cataract surgery; otherwise, the plan was for a thyroid ultrasound to be done in 08/2020.  The patient apparently missed her appointment to follow up with us, but nonetheless did have a thyroid ultrasound on 08/12/2020, and the findings were there was heterogeneous right lobe without discrete nodule noted.  The patient otherwise doing relatively well.  She had been diagnosed with hypothyroidism and was seen Randi Haddad for Synthroid replacement.  We seen the patient last, approximately a year ago.  At that time, her leukocytosis was stable.  We felt there was a reactive process with neutrophilia, and this had been chronic.  The patient comes in today, says she is doing relatively well.  She had recent mammograms that were negative.  She plans to see Randi Haddad for general medical care followup.  She offers no complaints of shortness of breath, chest pain, abdominal pain, fevers, night sweats, weight loss.  She continues on Synthroid replacement and Xarelto anticoagulation.    PHYSICAL EXAMINATION:    GENERAL:  She is an obese, elderly white female in no acute distress, ECOG performance status is 0.  VITAL SIGNS:  Reveal blood pressure 122/78, pulse 68, respirations 19, temperature 98.1.  HEENT:  Atraumatic, normocephalic.  Oropharynx nonerythematous.  NECK:  Supple.  LUNGS:  Clear to auscultation and  percussion.  HEART:  Regular rhythm.  S1, S2 normal.  ABDOMEN:  Obese, soft, nontender.  LYMPHATICS:  No cervical or supraclavicular nodes.    EXTREMITIES:  No edema.   NEUROLOGIC:  Nonfocal.    LABORATORY DATA:  Reveal CBC with white count 12.4 with 75% neutrophils, H and H is 15.1 and 44.9, platelet count is 281.     IMPRESSION:   1.  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  White count has remained relatively stable.  We would recommend yearly CBCs.  If white count rises above 20,000, we will revisit the patient; otherwise, she follow up with Randi Haddad NP.  2.  Papillary thyroid carcinoma, pathologic T1a, status post left hemithyroidectomy.  The patient is currently on Synthroid as per Dr. Randi Haddad NP.    TIME SPENT:  Sixty minutes was spent on this patient.  Time was spent reviewing labs with the patient, performing history and physical, documenting history and physical, and recommending the patient follow up with Randi Haddad, and return to clinic p.r.n. only if her white count rises above 20,000.    Rosa Venegas MD        D: 2022   T: 2022   MT: EVANGELIST    Name:     BULL ELI  MRN:      [id number]        Account:    [id number]   :      1945           Visit Date: 2022     Document: [id number]    cc:  Randi Haddad NP

## 2022-05-24 NOTE — NURSING NOTE
"Oncology Rooming Note    May 24, 2022 8:48 AM   Nella Lemon is a 76 year old female who presents for:    Chief Complaint   Patient presents with     Oncology Clinic Visit     Follow up. Papillary thyroid carcinoma     Initial Vitals: /78   Pulse 58   Temp 98.1  F (36.7  C) (Tympanic)   Resp 19   Ht 1.575 m (5' 2\")   Wt 87 kg (191 lb 12.8 oz)   SpO2 97%   BMI 35.08 kg/m   Estimated body mass index is 35.08 kg/m  as calculated from the following:    Height as of this encounter: 1.575 m (5' 2\").    Weight as of this encounter: 87 kg (191 lb 12.8 oz). Body surface area is 1.95 meters squared.  No Pain (0) Comment: Data Unavailable   No LMP recorded. Patient is postmenopausal.  Allergies reviewed: Yes  Medications reviewed: Yes    Medications: Medication refills not needed today.  Pharmacy name entered into [facility]: [facility] - [place], [place] - [address]              "

## 2022-05-26 NOTE — PROGRESS NOTES
"  Assessment & Plan     1. Hyperlipidemia LDL goal <100  - Lipid Profile; Future    2. Benign essential hypertension  Well controlled   - Comprehensive metabolic panel; Future  - TSH with free T4 reflex; Future  - *UA reflex to Microscopic and Culture - [place] [facility]; Future  - consider compression stockings.     3. Paroxysmal atrial fibrillation (H)  - apixaban ANTICOAGULANT (ELIQUIS ANTICOAGULANT) 5 MG tablet; TAKE 1 TABLET (5 MG) BY MOUTH 2 TIMES DAILY  Dispense: 180 tablet; Refill: 3    4. CKD (chronic kidney disease) stage 2, GFR 60-89 ml/min  Stable  Decrease salt intake to less than 2000mg    5. LA NENA (generalized anxiety disorder)  Continue zoloft    6. Myalgia  - Physical Therapy Referral; Future    7. Acute left-sided low back pain without sciatica  - Physical Therapy Referral; Future    8. Anxiety  - sertraline (ZOLOFT) 50 MG tablet; Take 1 tablet (50 mg) by mouth daily  Dispense: 90 tablet; Refill: 1         BMI:   Estimated body mass index is 35.67 kg/m  as calculated from the following:    Height as of this encounter: 1.575 m (5' 2\").    Weight as of this encounter: 88.5 kg (195 lb).   Weight management plan: Discussed healthy diet and exercise guidelines        Return in about 6 months (around 11/27/2022) for hypertension and lipids, anxiety/depression.    Randi Haddad, NP  [facility]    Nigel Carmen is a 76 year old who presents for the following health issues     HPI     Hyperlipidemia Follow-Up      Are you regularly taking any medication or supplement to lower your cholesterol?   No    Are you having muscle aches or other side effects that you think could be caused by your cholesterol lowering medication?  No    Hypertension Follow-up      Do you check your blood pressure regularly outside of the clinic? Yes     Are you following a low salt diet? Yes    Are your blood pressures ever more than 140 on the top number (systolic) OR more   than 90 on the " bottom number (diastolic), for example 140/90? Yes    Depression and Anxiety Follow-Up    How are you doing with your depression since your last visit? Worsened     How are you doing with your anxiety since your last visit?  Worsened     Are you having other symptoms that might be associated with depression or anxiety? No    Have you had a significant life event? No     Do you have any concerns with your use of alcohol or other drugs? No    Social History     Tobacco Use     Smoking status: Never Smoker     Smokeless tobacco: Never Used   Substance Use Topics     Alcohol use: Never     Drug use: No     PHQ 4/28/2021 11/18/2021 5/27/2022   PHQ-9 Total Score 2 0 4   Q9: Thoughts of better off dead/self-harm past 2 weeks Not at all Not at all Not at all     LA NENA-7 SCORE 4/28/2021 11/18/2021 5/27/2022   Total Score 4 0 6     Last PHQ-9 5/27/2022   1.  Little interest or pleasure in doing things 0   2.  Feeling down, depressed, or hopeless 1   3.  Trouble falling or staying asleep, or sleeping too much 0   4.  Feeling tired or having little energy 2   5.  Poor appetite or overeating 0   6.  Feeling bad about yourself 1   7.  Trouble concentrating 0   8.  Moving slowly or restless 0   Q9: Thoughts of better off dead/self-harm past 2 weeks 0   PHQ-9 Total Score 4   Difficulty at work, home, or with people Somewhat difficult     LA NENA-7  5/27/2022   1. Feeling nervous, anxious, or on edge 1   2. Not being able to stop or control worrying 1   3. Worrying too much about different things 1   4. Trouble relaxing 1   5. Being so restless that it is hard to sit still 1   6. Becoming easily annoyed or irritable 0   7. Feeling afraid, as if something awful might happen 1   LA NENA-7 Total Score 6   If you checked any problems, how difficult have they made it for you to do your work, take care of things at home, or get along with other people? Somewhat difficult       Suicide Assessment Five-step Evaluation and Treatment  "(SAFE-T)    Chronic Kidney Disease Follow-up    Do you take any over the counter pain medicine?: Yes  What over the counter medicine are you taking for your pain?:  Ibuprofen       How often do you take this medicine?:  A few times a month    Hypothyroidism Follow-up      Since last visit, patient describes the following symptoms: Weight stable, no hair loss, no skin changes, no constipation, no loose stools        Concern - Swelling in lower extermities   Onset: yesterday tight feeling behind knees   Description: today woke up and swelling in bilateral legs   Intensity: mild  Progression of Symptoms:  worsening  Accompanying Signs & Symptoms: none   Previous history of similar problem: yes  Precipitating factors:        Worsened by: unknown   Alleviating factors:        Improved by: nothing   Therapies tried and outcome: None      Recent Labs   Lab Test 05/18/22  0857 11/18/21  1021 08/18/21  0939 08/18/21  0939 05/12/21  1040 04/28/21  1017   LDL  --  144*  --  149*  --  154*   HDL  --  38*  --  38*  --  47*   TRIG  --  198*  --  177*  --  178*   ALT 43 40  --  35  --  30   CR 1.00 1.01   < > 1.00 1.19* 1.04   GFRESTIMATED 58* 55*   < > 55* 45* 53*   GFRESTBLACK  --   --   --   --  52* 61   POTASSIUM 4.0 4.0   < > 4.3 4.2 4.0   TSH 4.96* 1.80   < > 2.02  --  4.14*    < > = values in this interval not displayed.      BP Readings from Last 3 Encounters:   05/27/22 128/58   05/24/22 122/78   11/18/21 134/66    Wt Readings from Last 3 Encounters:   05/27/22 88.5 kg (195 lb)   05/24/22 87 kg (191 lb 12.8 oz)   11/18/21 86 kg (189 lb 9.6 oz)                    Review of Systems   Constitutional, HEENT, cardiovascular, pulmonary, gi and gu systems are negative, except as otherwise noted.      Objective    /58 (BP Location: Right arm, Patient Position: Chair, Cuff Size: Adult Large)   Pulse 51   Temp 97.2  F (36.2  C) (Tympanic)   Resp 16   Ht 1.575 m (5' 2\")   Wt 88.5 kg (195 lb)   SpO2 99%   BMI 35.67 kg/m  "   Body mass index is 35.67 kg/m .  Physical Exam   GENERAL: healthy, alert and no distress  NECK: no adenopathy, no asymmetry, masses, or scars and thyroid normal to palpation  RESP: lungs clear to auscultation - no rales, rhonchi or wheezes  CV: irregular rates and rhythm, normal S1 S2, no S3 or S4, no murmur, click or rub, peripheral pulses strong and trace edema of lower extremities bilateral.   MS: no gross musculoskeletal defects noted, no edema  PSYCH: mentation appears normal, affect normal/bright

## 2022-05-27 ENCOUNTER — OFFICE VISIT (OUTPATIENT)
Dept: FAMILY MEDICINE | Facility: OTHER | Age: 77
End: 2022-05-27
Attending: NURSE PRACTITIONER
Payer: COMMERCIAL

## 2022-05-27 VITALS
SYSTOLIC BLOOD PRESSURE: 128 MMHG | TEMPERATURE: 97.2 F | HEIGHT: 62 IN | OXYGEN SATURATION: 99 % | HEART RATE: 51 BPM | BODY MASS INDEX: 35.88 KG/M2 | RESPIRATION RATE: 16 BRPM | WEIGHT: 195 LBS | DIASTOLIC BLOOD PRESSURE: 58 MMHG

## 2022-05-27 DIAGNOSIS — E78.5 HYPERLIPIDEMIA LDL GOAL <100: Primary | ICD-10-CM

## 2022-05-27 DIAGNOSIS — M54.50 ACUTE LEFT-SIDED LOW BACK PAIN WITHOUT SCIATICA: ICD-10-CM

## 2022-05-27 DIAGNOSIS — I10 BENIGN ESSENTIAL HYPERTENSION: ICD-10-CM

## 2022-05-27 DIAGNOSIS — F41.1 GAD (GENERALIZED ANXIETY DISORDER): Chronic | ICD-10-CM

## 2022-05-27 DIAGNOSIS — M79.10 MYALGIA: ICD-10-CM

## 2022-05-27 DIAGNOSIS — I48.0 PAROXYSMAL ATRIAL FIBRILLATION (H): Chronic | ICD-10-CM

## 2022-05-27 DIAGNOSIS — N18.2 CKD (CHRONIC KIDNEY DISEASE) STAGE 2, GFR 60-89 ML/MIN: Chronic | ICD-10-CM

## 2022-05-27 DIAGNOSIS — F41.9 ANXIETY: ICD-10-CM

## 2022-05-27 LAB
ALBUMIN SERPL-MCNC: 3.4 G/DL (ref 3.4–5)
ALBUMIN UR-MCNC: NEGATIVE MG/DL
ALP SERPL-CCNC: 134 U/L (ref 40–150)
ALT SERPL W P-5'-P-CCNC: 40 U/L (ref 0–50)
ANION GAP SERPL CALCULATED.3IONS-SCNC: 5 MMOL/L (ref 3–14)
APPEARANCE UR: CLEAR
AST SERPL W P-5'-P-CCNC: 21 U/L (ref 0–45)
BACTERIA #/AREA URNS HPF: ABNORMAL /HPF
BILIRUB SERPL-MCNC: 0.5 MG/DL (ref 0.2–1.3)
BILIRUB UR QL STRIP: NEGATIVE
BUN SERPL-MCNC: 19 MG/DL (ref 7–30)
CALCIUM SERPL-MCNC: 8.6 MG/DL (ref 8.5–10.1)
CHLORIDE BLD-SCNC: 109 MMOL/L (ref 94–109)
CHOLEST SERPL-MCNC: 194 MG/DL
CO2 SERPL-SCNC: 26 MMOL/L (ref 20–32)
COLOR UR AUTO: YELLOW
CREAT SERPL-MCNC: 0.96 MG/DL (ref 0.52–1.04)
FASTING STATUS PATIENT QL REPORTED: YES
GFR SERPL CREATININE-BSD FRML MDRD: 61 ML/MIN/1.73M2
GLUCOSE BLD-MCNC: 111 MG/DL (ref 70–99)
GLUCOSE UR STRIP-MCNC: NEGATIVE MG/DL
HDLC SERPL-MCNC: 39 MG/DL
HGB UR QL STRIP: NEGATIVE
KETONES UR STRIP-MCNC: NEGATIVE MG/DL
LDLC SERPL CALC-MCNC: 115 MG/DL
LEUKOCYTE ESTERASE UR QL STRIP: ABNORMAL
NITRATE UR QL: NEGATIVE
NONHDLC SERPL-MCNC: 155 MG/DL
PH UR STRIP: 5.5 [PH] (ref 5–7)
POTASSIUM BLD-SCNC: 4.3 MMOL/L (ref 3.4–5.3)
PROT SERPL-MCNC: 7.4 G/DL (ref 6.8–8.8)
RBC #/AREA URNS AUTO: ABNORMAL /HPF
SODIUM SERPL-SCNC: 140 MMOL/L (ref 133–144)
SP GR UR STRIP: 1.02 (ref 1–1.03)
SQUAMOUS #/AREA URNS AUTO: ABNORMAL /LPF
TRIGL SERPL-MCNC: 202 MG/DL
TSH SERPL DL<=0.005 MIU/L-ACNC: 2.22 MU/L (ref 0.4–4)
UROBILINOGEN UR STRIP-ACNC: 0.2 E.U./DL
WBC #/AREA URNS AUTO: ABNORMAL /HPF

## 2022-05-27 PROCEDURE — 81001 URINALYSIS AUTO W/SCOPE: CPT | Mod: ZL | Performed by: NURSE PRACTITIONER

## 2022-05-27 PROCEDURE — 99214 OFFICE O/P EST MOD 30 MIN: CPT | Performed by: NURSE PRACTITIONER

## 2022-05-27 PROCEDURE — 36415 COLL VENOUS BLD VENIPUNCTURE: CPT | Mod: ZL | Performed by: NURSE PRACTITIONER

## 2022-05-27 PROCEDURE — 81003 URINALYSIS AUTO W/O SCOPE: CPT | Mod: ZL | Performed by: NURSE PRACTITIONER

## 2022-05-27 PROCEDURE — 80053 COMPREHEN METABOLIC PANEL: CPT | Mod: ZL | Performed by: NURSE PRACTITIONER

## 2022-05-27 PROCEDURE — 80061 LIPID PANEL: CPT | Mod: ZL | Performed by: NURSE PRACTITIONER

## 2022-05-27 PROCEDURE — G0463 HOSPITAL OUTPT CLINIC VISIT: HCPCS

## 2022-05-27 PROCEDURE — 84443 ASSAY THYROID STIM HORMONE: CPT | Mod: ZL | Performed by: NURSE PRACTITIONER

## 2022-05-27 ASSESSMENT — ANXIETY QUESTIONNAIRES
5. BEING SO RESTLESS THAT IT IS HARD TO SIT STILL: SEVERAL DAYS
3. WORRYING TOO MUCH ABOUT DIFFERENT THINGS: SEVERAL DAYS
1. FEELING NERVOUS, ANXIOUS, OR ON EDGE: SEVERAL DAYS
IF YOU CHECKED OFF ANY PROBLEMS ON THIS QUESTIONNAIRE, HOW DIFFICULT HAVE THESE PROBLEMS MADE IT FOR YOU TO DO YOUR WORK, TAKE CARE OF THINGS AT HOME, OR GET ALONG WITH OTHER PEOPLE: SOMEWHAT DIFFICULT
4. TROUBLE RELAXING: SEVERAL DAYS
2. NOT BEING ABLE TO STOP OR CONTROL WORRYING: SEVERAL DAYS
7. FEELING AFRAID AS IF SOMETHING AWFUL MIGHT HAPPEN: SEVERAL DAYS
6. BECOMING EASILY ANNOYED OR IRRITABLE: NOT AT ALL
GAD7 TOTAL SCORE: 6
GAD7 TOTAL SCORE: 6

## 2022-05-27 ASSESSMENT — PATIENT HEALTH QUESTIONNAIRE - PHQ9: SUM OF ALL RESPONSES TO PHQ QUESTIONS 1-9: 4

## 2022-05-27 ASSESSMENT — PAIN SCALES - GENERAL: PAINLEVEL: MILD PAIN (2)

## 2022-05-27 NOTE — PATIENT INSTRUCTIONS
"  Assessment & Plan     1. Hyperlipidemia LDL goal <100  - Lipid Profile; Future    2. Benign essential hypertension  Well controlled   - Comprehensive metabolic panel; Future  - TSH with free T4 reflex; Future  - *UA reflex to Microscopic and Culture - [place] [facility]; Future    3. Paroxysmal atrial fibrillation (H)  - apixaban ANTICOAGULANT (ELIQUIS ANTICOAGULANT) 5 MG tablet; TAKE 1 TABLET (5 MG) BY MOUTH 2 TIMES DAILY  Dispense: 180 tablet; Refill: 3    4. CKD (chronic kidney disease) stage 2, GFR 60-89 ml/min  stable    5. LA NENA (generalized anxiety disorder)  Continue zoloft    6. Myalgia  - Physical Therapy Referral; Future    7. Acute left-sided low back pain without sciatica  - Physical Therapy Referral; Future    8. Anxiety  - sertraline (ZOLOFT) 50 MG tablet; Take 1 tablet (50 mg) by mouth daily  Dispense: 90 tablet; Refill: 1         BMI:   Estimated body mass index is 35.67 kg/m  as calculated from the following:    Height as of this encounter: 1.575 m (5' 2\").    Weight as of this encounter: 88.5 kg (195 lb).   Weight management plan: Discussed healthy diet and exercise guidelines        Return in about 6 months (around 11/27/2022) for hypertension and lipids, anxiety/depression.    Randi Haddad NP  [facility]  "

## 2022-05-27 NOTE — NURSING NOTE
"Chief Complaint   Patient presents with     Hypertension     Lipids     Depression     Anxiety     Thyroid Problem     chronic kidney disease       Initial /58 (BP Location: Right arm, Patient Position: Chair, Cuff Size: Adult Large)   Pulse 51   Temp 97.2  F (36.2  C) (Tympanic)   Resp 16   Ht 1.575 m (5' 2\")   Wt 88.5 kg (195 lb)   SpO2 99%   BMI 35.67 kg/m   Estimated body mass index is 35.67 kg/m  as calculated from the following:    Height as of this encounter: 1.575 m (5' 2\").    Weight as of this encounter: 88.5 kg (195 lb).  Medication Reconciliation: complete  Pamela M. Lechevalier, LPN    "

## 2022-06-14 ENCOUNTER — HOSPITAL ENCOUNTER (OUTPATIENT)
Dept: ULTRASOUND IMAGING | Facility: HOSPITAL | Age: 77
Discharge: HOME | End: 2022-06-14
Attending: OTOLARYNGOLOGY | Admitting: OTOLARYNGOLOGY
Payer: COMMERCIAL

## 2022-06-14 DIAGNOSIS — C73 PAPILLARY THYROID CARCINOMA (H): ICD-10-CM

## 2022-06-14 PROCEDURE — 76536 US EXAM OF HEAD AND NECK: CPT

## 2022-06-27 DIAGNOSIS — F41.9 ANXIETY: ICD-10-CM

## 2022-06-28 NOTE — TELEPHONE ENCOUNTER
Zoloft       Last Written Prescription Date:  5/2/22  Last Fill Quantity: 90,   # refills: 1  Last Office Visit: 5/27/22  Future Office visit:    Next 5 appointments (look out 90 days)    Aug 29, 2022  8:30 AM  (Arrive by 8:15 AM)  SHORT with Randi Haddad NP  [facility] ([facility] ) [address]  [facility] [address]  [phone]           Routing refill request to provider for review/approval because:

## 2022-07-22 DIAGNOSIS — E03.4 HYPOTHYROIDISM DUE TO ACQUIRED ATROPHY OF THYROID: ICD-10-CM

## 2022-07-25 RX ORDER — LEVOTHYROXINE SODIUM 112 UG/1
TABLET ORAL
Qty: 90 TABLET | Refills: 2 | Status: SHIPPED | OUTPATIENT
Start: 2022-07-25 | End: 2023-06-07

## 2022-07-29 DIAGNOSIS — I10 BENIGN ESSENTIAL HYPERTENSION: ICD-10-CM

## 2022-07-29 DIAGNOSIS — N18.31 STAGE 3A CHRONIC KIDNEY DISEASE (H): ICD-10-CM

## 2022-07-29 DIAGNOSIS — R60.0 LOWER EXTREMITY EDEMA: ICD-10-CM

## 2022-07-29 DIAGNOSIS — N18.2 CKD (CHRONIC KIDNEY DISEASE) STAGE 2, GFR 60-89 ML/MIN: Chronic | ICD-10-CM

## 2022-07-29 NOTE — TELEPHONE ENCOUNTER
losartan (COZAAR) 100 MG tablet  Last Written Prescription Date:  8-10-21  Last Fill Quantity: 90,   # refills: 3  Last Office Visit: 5-13-20  Future Office visit:    Next 5 appointments (look out 90 days)    Aug 29, 2022  8:30 AM  (Arrive by 8:15 AM)  SHORT with Randi Haddad NP  [facility] ([facility] ) [address]  [facility] [address]  [phone]           Routing refill request to provider for review/approval because:   Recent (12 mo) or future (30 days) visit within the authorizing provider's specialty

## 2022-07-30 RX ORDER — LOSARTAN POTASSIUM 100 MG/1
100 TABLET ORAL DAILY
Qty: 90 TABLET | Refills: 3 | Status: SHIPPED | OUTPATIENT
Start: 2022-07-30 | End: 2023-01-17

## 2022-08-25 NOTE — PROGRESS NOTES
Assessment & Plan     1. Hyperlipidemia LDL goal <100  - Lipid Profile; Future    2. Benign essential hypertension  Well controlled   - Comprehensive metabolic panel; Future  - amLODIPine (NORVASC) 10 MG tablet; Take 1 tablet (10 mg) by mouth daily  Dispense: 90 tablet; Refill: 2  - CBC today.     3. CKD (chronic kidney disease) stage 2, GFR 60-89 ml/min  Do not use NSAIDS     4. Hypothyroidism due to acquired atrophy of thyroid  - TSH with free T4 reflex; Future    5. Paroxysmal atrial fibrillation (H)  Continue eliquis    6. Mixed incontinence urge and stress (male)(female)  continue    7. Anxiety  Continue zoloft    8. Numbness and tingling of both upper extremities  History of neuropathy in lower extremities - most likely progression of neuropathy will will do EMG for further evaluation.   - Adult Neurology  Referral    9. GERD  - omeprazole (PRILOSEC) 20 MG DR capsule; Take 1 capsule (20 mg) by mouth daily  Dispense: 90 capsule; Refill: 2    10. Peripheral polyneuropathy  Continue gabapentin.      Follow-up in 3 months or as needed     Randi Haddad, NP  [facility] [place] [facility]    Subjective   Nella is a 77 year old, presenting for the following health issues:  Lipids, Hypertension, chronic kidney disease, and Thyroid Problem      HPI     Hyperlipidemia Follow-Up      Are you regularly taking any medication or supplement to lower your cholesterol?   No    Are you having muscle aches or other side effects that you think could be caused by your cholesterol lowering medication?  No    Hypertension Follow-up      Do you check your blood pressure regularly outside of the clinic? Yes     Are you following a low salt diet? Yes    Are your blood pressures ever more than 140 on the top number (systolic) OR more   than 90 on the bottom number (diastolic), for example 140/90? No    Chronic Kidney Disease Follow-up    Do you take any over the counter pain medicine?: Yes  What over the  "counter medicine are you taking for your pain?:  Ibuprofen       How often do you take this medicine?:  once in a while     Hypothyroidism Follow-up      Since last visit, patient describes the following symptoms: Weight stable, no hair loss, no skin changes, no constipation, no loose stools      How many servings of fruits and vegetables do you eat daily?  2-3    On average, how many sweetened beverages do you drink each day (Examples: soda, juice, sweet tea, etc.  Do NOT count diet or artificially sweetened beverages)?   0    How many days per week do you exercise enough to make your heart beat faster? 5    How many minutes a day do you exercise enough to make your heart beat faster? 30 - 60    How many days per week do you miss taking your medication? 0    Numbness/tingling of both upper extremities that is intermittent and started a couple weeks ago.  Right is worse than left, feels like \"pins and needles\" denies neck, shoulder elbow or wrist pain.  She is taking gabapentin for neuropathy fo lower extremities - upper extremities are new.      Recent Labs   Lab Test 05/27/22  0925 05/18/22  0857 11/18/21  1021 08/18/21  0939 08/18/21  0939 05/12/21  1040 04/28/21  1017   *  --  144*  --  149*  --  154*   HDL 39*  --  38*  --  38*  --  47*   TRIG 202*  --  198*  --  177*  --  178*   ALT 40 43 40   < > 35  --  30   CR 0.96 1.00 1.01   < > 1.00 1.19* 1.04   GFRESTIMATED 61 58* 55*   < > 55* 45* 53*   GFRESTBLACK  --   --   --   --   --  52* 61   POTASSIUM 4.3 4.0 4.0   < > 4.3 4.2 4.0   TSH 2.22 4.96* 1.80   < > 2.02  --  4.14*    < > = values in this interval not displayed.      BP Readings from Last 3 Encounters:   08/29/22 132/66   05/27/22 128/58   05/24/22 122/78    Wt Readings from Last 3 Encounters:   08/29/22 88.4 kg (194 lb 12.8 oz)   05/27/22 88.5 kg (195 lb)   05/24/22 87 kg (191 lb 12.8 oz)                      Review of Systems   Constitutional, HEENT, cardiovascular, pulmonary, gi and gu systems " "are negative, except as otherwise noted.      Objective    /66 (BP Location: Right arm, Patient Position: Chair, Cuff Size: Adult Regular)   Pulse 51   Temp 97  F (36.1  C) (Tympanic)   Resp 16   Ht 1.575 m (5' 2\")   Wt 88.4 kg (194 lb 12.8 oz)   SpO2 97%   BMI 35.63 kg/m    Body mass index is 35.63 kg/m .  Physical Exam   GENERAL: healthy, alert and no distress  NECK: no adenopathy, no asymmetry, masses, and no carotid bruits  RESP: lungs clear to auscultation - no rales, rhonchi or wheezes  CV: regular rate and rhythm, normal S1 S2, no S3 or S4, no murmur, click or rub, no peripheral edema and peripheral pulses strong  MS: no gross musculoskeletal defects noted, no edema  PSYCH: mentation appears normal, affect normal/bright        Results for orders placed or performed in visit on 08/29/22   Lipid Profile     Status: Abnormal   Result Value Ref Range    Cholesterol 219 (H) <200 mg/dL    Triglycerides 260 (H) <150 mg/dL    Direct Measure HDL 40 (L) >=50 mg/dL    LDL Cholesterol Calculated 127 (H) <=100 mg/dL    Non HDL Cholesterol 179 (H) <130 mg/dL    Patient Fasting > 8hrs? Yes     Narrative    Cholesterol  Desirable:  <200 mg/dL    Triglycerides  Normal:  Less than 150 mg/dL  Borderline High:  150-199 mg/dL  High:  200-499 mg/dL  Very High:  Greater than or equal to 500 mg/dL    Direct Measure HDL  Female:  Greater than or equal to 50 mg/dL   Male:  Greater than or equal to 40 mg/dL    LDL Cholesterol  Desirable:  <100mg/dL  Above Desirable:  100-129 mg/dL   Borderline High:  130-159 mg/dL   High:  160-189 mg/dL   Very High:  >= 190 mg/dL    Non HDL Cholesterol  Desirable:  130 mg/dL  Above Desirable:  130-159 mg/dL  Borderline High:  160-189 mg/dL  High:  190-219 mg/dL  Very High:  Greater than or equal to 220 mg/dL   Comprehensive metabolic panel     Status: Abnormal   Result Value Ref Range    Sodium 139 133 - 144 mmol/L    Potassium 4.2 3.4 - 5.3 mmol/L    Chloride 107 94 - 109 mmol/L    Carbon " Dioxide (CO2) 26 20 - 32 mmol/L    Anion Gap 6 3 - 14 mmol/L    Urea Nitrogen 16 7 - 30 mg/dL    Creatinine 0.95 0.52 - 1.04 mg/dL    Calcium 8.8 8.5 - 10.1 mg/dL    Glucose 112 (H) 70 - 99 mg/dL    Alkaline Phosphatase 128 40 - 150 U/L    AST 25 0 - 45 U/L    ALT 40 0 - 50 U/L    Protein Total 7.4 6.8 - 8.8 g/dL    Albumin 3.5 3.4 - 5.0 g/dL    Bilirubin Total 0.5 0.2 - 1.3 mg/dL    GFR Estimate 62 >60 mL/min/1.73m2   TSH with free T4 reflex     Status: Normal   Result Value Ref Range    TSH 3.34 0.40 - 4.00 mU/L   CBC with platelets     Status: Normal   Result Value Ref Range    WBC Count 10.4 4.0 - 11.0 10e3/uL    RBC Count 5.17 3.80 - 5.20 10e6/uL    Hemoglobin 14.6 11.7 - 15.7 g/dL    Hematocrit 43.4 35.0 - 47.0 %    MCV 84 78 - 100 fL    MCH 28.2 26.5 - 33.0 pg    MCHC 33.6 31.5 - 36.5 g/dL    RDW 14.1 10.0 - 15.0 %    Platelet Count 271 150 - 450 10e3/uL                 .  ..

## 2022-08-29 ENCOUNTER — OFFICE VISIT (OUTPATIENT)
Dept: FAMILY MEDICINE | Facility: OTHER | Age: 77
End: 2022-08-29
Attending: NURSE PRACTITIONER
Payer: COMMERCIAL

## 2022-08-29 VITALS
OXYGEN SATURATION: 97 % | TEMPERATURE: 97 F | DIASTOLIC BLOOD PRESSURE: 66 MMHG | SYSTOLIC BLOOD PRESSURE: 132 MMHG | WEIGHT: 194.8 LBS | HEIGHT: 62 IN | RESPIRATION RATE: 16 BRPM | HEART RATE: 51 BPM | BODY MASS INDEX: 35.85 KG/M2

## 2022-08-29 DIAGNOSIS — R20.0 NUMBNESS AND TINGLING OF BOTH UPPER EXTREMITIES: ICD-10-CM

## 2022-08-29 DIAGNOSIS — N39.46 MIXED INCONTINENCE URGE AND STRESS (MALE)(FEMALE): ICD-10-CM

## 2022-08-29 DIAGNOSIS — I10 BENIGN ESSENTIAL HYPERTENSION: ICD-10-CM

## 2022-08-29 DIAGNOSIS — I48.0 PAROXYSMAL ATRIAL FIBRILLATION (H): ICD-10-CM

## 2022-08-29 DIAGNOSIS — N18.2 CKD (CHRONIC KIDNEY DISEASE) STAGE 2, GFR 60-89 ML/MIN: ICD-10-CM

## 2022-08-29 DIAGNOSIS — Z23 ENCOUNTER FOR IMMUNIZATION: ICD-10-CM

## 2022-08-29 DIAGNOSIS — K21.9 GASTROESOPHAGEAL REFLUX DISEASE WITHOUT ESOPHAGITIS: Chronic | ICD-10-CM

## 2022-08-29 DIAGNOSIS — F41.9 ANXIETY: ICD-10-CM

## 2022-08-29 DIAGNOSIS — E03.4 HYPOTHYROIDISM DUE TO ACQUIRED ATROPHY OF THYROID: ICD-10-CM

## 2022-08-29 DIAGNOSIS — R20.2 NUMBNESS AND TINGLING OF BOTH UPPER EXTREMITIES: ICD-10-CM

## 2022-08-29 DIAGNOSIS — G62.9 PERIPHERAL POLYNEUROPATHY: ICD-10-CM

## 2022-08-29 DIAGNOSIS — E78.5 HYPERLIPIDEMIA LDL GOAL <100: Primary | ICD-10-CM

## 2022-08-29 LAB
ALBUMIN SERPL-MCNC: 3.5 G/DL (ref 3.4–5)
ALP SERPL-CCNC: 128 U/L (ref 40–150)
ALT SERPL W P-5'-P-CCNC: 40 U/L (ref 0–50)
ANION GAP SERPL CALCULATED.3IONS-SCNC: 6 MMOL/L (ref 3–14)
AST SERPL W P-5'-P-CCNC: 25 U/L (ref 0–45)
BILIRUB SERPL-MCNC: 0.5 MG/DL (ref 0.2–1.3)
BUN SERPL-MCNC: 16 MG/DL (ref 7–30)
CALCIUM SERPL-MCNC: 8.8 MG/DL (ref 8.5–10.1)
CHLORIDE BLD-SCNC: 107 MMOL/L (ref 94–109)
CHOLEST SERPL-MCNC: 219 MG/DL
CO2 SERPL-SCNC: 26 MMOL/L (ref 20–32)
CREAT SERPL-MCNC: 0.95 MG/DL (ref 0.52–1.04)
ERYTHROCYTE [DISTWIDTH] IN BLOOD BY AUTOMATED COUNT: 14.1 % (ref 10–15)
FASTING STATUS PATIENT QL REPORTED: YES
GFR SERPL CREATININE-BSD FRML MDRD: 62 ML/MIN/1.73M2
GLUCOSE BLD-MCNC: 112 MG/DL (ref 70–99)
HCT VFR BLD AUTO: 43.4 % (ref 35–47)
HDLC SERPL-MCNC: 40 MG/DL
HGB BLD-MCNC: 14.6 G/DL (ref 11.7–15.7)
LDLC SERPL CALC-MCNC: 127 MG/DL
MCH RBC QN AUTO: 28.2 PG (ref 26.5–33)
MCHC RBC AUTO-ENTMCNC: 33.6 G/DL (ref 31.5–36.5)
MCV RBC AUTO: 84 FL (ref 78–100)
NONHDLC SERPL-MCNC: 179 MG/DL
PLATELET # BLD AUTO: 271 10E3/UL (ref 150–450)
POTASSIUM BLD-SCNC: 4.2 MMOL/L (ref 3.4–5.3)
PROT SERPL-MCNC: 7.4 G/DL (ref 6.8–8.8)
RBC # BLD AUTO: 5.17 10E6/UL (ref 3.8–5.2)
SODIUM SERPL-SCNC: 139 MMOL/L (ref 133–144)
TRIGL SERPL-MCNC: 260 MG/DL
TSH SERPL DL<=0.005 MIU/L-ACNC: 3.34 MU/L (ref 0.4–4)
WBC # BLD AUTO: 10.4 10E3/UL (ref 4–11)

## 2022-08-29 PROCEDURE — 85027 COMPLETE CBC AUTOMATED: CPT | Mod: ZL | Performed by: NURSE PRACTITIONER

## 2022-08-29 PROCEDURE — 80053 COMPREHEN METABOLIC PANEL: CPT | Mod: ZL | Performed by: NURSE PRACTITIONER

## 2022-08-29 PROCEDURE — 90714 TD VACC NO PRESV 7 YRS+ IM: CPT

## 2022-08-29 PROCEDURE — 84443 ASSAY THYROID STIM HORMONE: CPT | Mod: ZL | Performed by: NURSE PRACTITIONER

## 2022-08-29 PROCEDURE — 80061 LIPID PANEL: CPT | Mod: ZL | Performed by: NURSE PRACTITIONER

## 2022-08-29 PROCEDURE — G0463 HOSPITAL OUTPT CLINIC VISIT: HCPCS

## 2022-08-29 PROCEDURE — 99214 OFFICE O/P EST MOD 30 MIN: CPT | Performed by: NURSE PRACTITIONER

## 2022-08-29 PROCEDURE — 36415 COLL VENOUS BLD VENIPUNCTURE: CPT | Mod: ZL | Performed by: NURSE PRACTITIONER

## 2022-08-29 RX ORDER — AMLODIPINE BESYLATE 10 MG/1
10 TABLET ORAL DAILY
Qty: 90 TABLET | Refills: 2 | Status: SHIPPED | OUTPATIENT
Start: 2022-08-29 | End: 2022-10-06

## 2022-08-29 ASSESSMENT — PAIN SCALES - GENERAL: PAINLEVEL: NO PAIN (0)

## 2022-08-29 NOTE — NURSING NOTE
"Chief Complaint   Patient presents with     Lipids     Hypertension     chronic kidney disease     Thyroid Problem       Initial /66 (BP Location: Right arm, Patient Position: Chair, Cuff Size: Adult Regular)   Pulse 51   Temp 97  F (36.1  C) (Tympanic)   Resp 16   Ht 1.575 m (5' 2\")   Wt 88.4 kg (194 lb 12.8 oz)   SpO2 97%   BMI 35.63 kg/m   Estimated body mass index is 35.63 kg/m  as calculated from the following:    Height as of this encounter: 1.575 m (5' 2\").    Weight as of this encounter: 88.4 kg (194 lb 12.8 oz).  Medication Reconciliation: complete  Pamela M. Lechevalier, LPN    "

## 2022-08-29 NOTE — PATIENT INSTRUCTIONS
Assessment & Plan     1. Hyperlipidemia LDL goal <100  - Lipid Profile; Future    2. Benign essential hypertension  Well controlled   - Comprehensive metabolic panel; Future  - amLODIPine (NORVASC) 10 MG tablet; Take 1 tablet (10 mg) by mouth daily  Dispense: 90 tablet; Refill: 2    3. CKD (chronic kidney disease) stage 2, GFR 60-89 ml/min  Do not use NSAIDS     4. Hypothyroidism due to acquired atrophy of thyroid  - TSH with free T4 reflex; Future    5. Paroxysmal atrial fibrillation (H)  Continue eliquis    6. Mixed incontinence urge and stress (male)(female)  continue    7. Anxiety  Continue zoloft    8. Numbness and tingling of both upper extremities  History of neuropathy in lower extremities - most likely progression of neuropathy will will do EMG for further evaluation.   - Adult Neurology  Referral    9. GERD  - omeprazole (PRILOSEC) 20 MG DR capsule; Take 1 capsule (20 mg) by mouth daily  Dispense: 90 capsule; Refill: 2    10. Peripheral polyneuropathy  Continue gabapentin.      Follow-up in 3 months or as needed     Randi Haddad NP  [facility] [place] [facility]

## 2022-10-06 DIAGNOSIS — I10 BENIGN ESSENTIAL HYPERTENSION: ICD-10-CM

## 2022-10-06 RX ORDER — AMLODIPINE BESYLATE 10 MG/1
TABLET ORAL
Qty: 90 TABLET | Refills: 2 | Status: SHIPPED | OUTPATIENT
Start: 2022-10-06 | End: 2023-08-09

## 2022-10-06 NOTE — TELEPHONE ENCOUNTER
amLODPine      Last Written Prescription Date:  8/29/22  Last Fill Quantity: 90,   # refills: 2  Last Office Visit: 8/29/22  Future Office visit:    Next 5 appointments (look out 90 days)    Dec 01, 2022  9:45 AM  (Arrive by 9:30 AM)  SHORT with Randi Haddad NP  [facility] ([facility] ) [address]  [facility] [address]  [phone]   Jan 04, 2023  9:45 AM  (Arrive by 9:30 AM)  Nurse Only with Alis Pitt  [facility] ([facility] [place] ) [address]  [place] [address]  [phone]           Routing refill request to provider for review/approval because:

## 2022-10-13 ENCOUNTER — TRANSFERRED RECORDS (OUTPATIENT)
Dept: HEALTH INFORMATION MANAGEMENT | Facility: CLINIC | Age: 77
End: 2022-10-13

## 2022-10-15 DIAGNOSIS — K21.9 GASTROESOPHAGEAL REFLUX DISEASE WITHOUT ESOPHAGITIS: Chronic | ICD-10-CM

## 2022-10-17 NOTE — TELEPHONE ENCOUNTER
omeprazole      Last Written Prescription Date:  8/29/22  Last Fill Quantity: 90,   # refills: 2  Last Office Visit: 8/29/22  Future Office visit:    Next 5 appointments (look out 90 days)    Dec 01, 2022  9:45 AM  (Arrive by 9:30 AM)  SHORT with Randi Haddad NP  [facility] ([facility] ) [address] [address]  [phone]   Jan 04, 2023  9:45 AM  (Arrive by 9:30 AM)  Nurse Only with Alis Pitt  [facility] ) [address]  [place] [address]  [phone]           Routing refill request to provider for review/approval because:

## 2022-11-03 DIAGNOSIS — M79.10 MYALGIA: ICD-10-CM

## 2022-11-03 RX ORDER — GABAPENTIN 300 MG/1
CAPSULE ORAL
Qty: 270 CAPSULE | Refills: 5 | Status: SHIPPED | OUTPATIENT
Start: 2022-11-03 | End: 2023-01-17

## 2022-11-03 NOTE — TELEPHONE ENCOUNTER
gabapentin      Last Written Prescription Date:  5/4/22  Last Fill Quantity: 270,   # refills: 5  Last Office Visit: 8/29/22  Future Office visit:    Next 5 appointments (look out 90 days)    Dec 01, 2022  9:45 AM  (Arrive by 9:30 AM)  SHORT with Randi Haddad NP  [facility] ([facility] ) [address] [address]  [phone]   Jan 04, 2023  9:45 AM  (Arrive by 9:30 AM)  Nurse Only with Alis Pitt  [facility] ([facility] ) [address]  [place] [address]  [phone]           Routing refill request to provider for review/approval because:

## 2022-11-10 DIAGNOSIS — N39.46 MIXED INCONTINENCE URGE AND STRESS (MALE)(FEMALE): ICD-10-CM

## 2022-11-10 NOTE — PROGRESS NOTES
"  Assessment & Plan     1. Primary osteoarthritis of both knees  Ice, heat  - Orthopedic  Referral; Future - Dr Gallagher  - acetaminophen (TYLENOL) 650 MG CR tablet; Take 1 tablet (650 mg) by mouth 3 times daily as needed for mild pain or fever  Dispense: 90 tablet; Refill: 1    2. Acute pain of both knees  - XR Knee Standing Bilateral 3 Views      follow-up as needed     Randi Haddad NP  [facility] [place] [facility] Carmen is a 77 year old presenting for the following health issues:  Pain (knee's)      HPI     Pain History:  When did you first notice your pain? - Acute Pain   Have you seen anyone else for your pain? No  Where in your body do you have pain? Musculoskeletal problem/pain - bilateral knees  Onset/Duration: 1 month  Description  Location: knee - bilateral  Joint Swelling: YES- a little bit on left  Redness: No  Pain: YES  Warmth: No  Intensity:  moderate, severe  Progression of Symptoms:  worsening  Accompanying signs and symptoms:   Fevers: No  Numbness/tingling/weakness: No  History  Trauma to the area: No  Recent illness:  No  Previous similar problem: No  Previous evaluation:  No  Precipitating or alleviating factors:  Aggravating factors include: standing, walking and climbing stairs  Therapies tried and outcome: rest/inactivity and Ibuprofen provide relief          Review of Systems   Constitutional, HEENT, cardiovascular, pulmonary, gi and gu systems are negative, except as otherwise noted.      Objective    /66 (BP Location: Left arm, Patient Position: Sitting, Cuff Size: Adult Regular)   Pulse 60   Temp 97.7  F (36.5  C) (Tympanic)   Resp 16   Ht 1.575 m (5' 2\")   Wt 86.2 kg (190 lb)   SpO2 97%   BMI 34.75 kg/m    Body mass index is 34.75 kg/m .  Physical Exam   GENERAL: healthy, alert and no distress  MS: bilateral knees are mildly edematous laterally.  Crepitus with extension and tenderness along joint line  PSYCH: mentation appears normal, " affect normal/bright        Results for orders placed or performed in visit on 11/11/22   XR Knee Standing Bilateral 3 Views     Status: None    Narrative    PROCEDURE:  XR KNEE STANDING BILATERAL 3 VIEWS    HISTORY: Acute pain of both knees; Acute pain of both knees    COMPARISON:  None.    TECHNIQUE:  3 views of the both knees were obtained.    FINDINGS:  Right knee: The articular spaces are normal in height.  Calcifications are again noted in the distal quadriceps tendon. There  is a calcification posterior to the knee that is stable from previous  examination. This rounded calcification may be an intra-articular  loose body. The distal femur and proximal tibia and fibula are intact.  There is no knee effusion.    Left knee: Distal femur and patella proximal tibia and fibula are  intact. There is no new lesion. Joint spaces are normal.       Impression    IMPRESSION: No change in the right knee from 2019.    Normal left knee    FILI MONSON MD         SYSTEM ID:  [id number]

## 2022-11-11 ENCOUNTER — ANCILLARY PROCEDURE (OUTPATIENT)
Dept: GENERAL RADIOLOGY | Facility: OTHER | Age: 77
End: 2022-11-11
Attending: NURSE PRACTITIONER
Payer: COMMERCIAL

## 2022-11-11 ENCOUNTER — OFFICE VISIT (OUTPATIENT)
Dept: FAMILY MEDICINE | Facility: OTHER | Age: 77
End: 2022-11-11
Attending: NURSE PRACTITIONER
Payer: COMMERCIAL

## 2022-11-11 VITALS
DIASTOLIC BLOOD PRESSURE: 66 MMHG | OXYGEN SATURATION: 97 % | BODY MASS INDEX: 34.96 KG/M2 | RESPIRATION RATE: 16 BRPM | WEIGHT: 190 LBS | HEIGHT: 62 IN | TEMPERATURE: 97.7 F | SYSTOLIC BLOOD PRESSURE: 126 MMHG | HEART RATE: 60 BPM

## 2022-11-11 DIAGNOSIS — M25.561 ACUTE PAIN OF BOTH KNEES: ICD-10-CM

## 2022-11-11 DIAGNOSIS — M25.562 ACUTE PAIN OF BOTH KNEES: ICD-10-CM

## 2022-11-11 DIAGNOSIS — M17.0 PRIMARY OSTEOARTHRITIS OF BOTH KNEES: Primary | ICD-10-CM

## 2022-11-11 PROCEDURE — G0463 HOSPITAL OUTPT CLINIC VISIT: HCPCS | Performed by: NURSE PRACTITIONER

## 2022-11-11 PROCEDURE — 99214 OFFICE O/P EST MOD 30 MIN: CPT | Performed by: NURSE PRACTITIONER

## 2022-11-11 PROCEDURE — 73562 X-RAY EXAM OF KNEE 3: CPT | Mod: TC,50,FY

## 2022-11-11 RX ORDER — SOLIFENACIN SUCCINATE 5 MG/1
5 TABLET, FILM COATED ORAL DAILY
Qty: 90 TABLET | Refills: 2 | Status: SHIPPED | OUTPATIENT
Start: 2022-11-11 | End: 2023-09-07

## 2022-11-11 RX ORDER — SENNOSIDES 8.6 MG
650 CAPSULE ORAL 3 TIMES DAILY PRN
Qty: 90 TABLET | Refills: 1 | Status: SHIPPED | OUTPATIENT
Start: 2022-11-11 | End: 2024-01-03

## 2022-11-11 ASSESSMENT — PATIENT HEALTH QUESTIONNAIRE - PHQ9
SUM OF ALL RESPONSES TO PHQ QUESTIONS 1-9: 1
5. POOR APPETITE OR OVEREATING: NOT AT ALL

## 2022-11-11 ASSESSMENT — ANXIETY QUESTIONNAIRES
2. NOT BEING ABLE TO STOP OR CONTROL WORRYING: SEVERAL DAYS
3. WORRYING TOO MUCH ABOUT DIFFERENT THINGS: NOT AT ALL
7. FEELING AFRAID AS IF SOMETHING AWFUL MIGHT HAPPEN: SEVERAL DAYS
GAD7 TOTAL SCORE: 3
GAD7 TOTAL SCORE: 3
IF YOU CHECKED OFF ANY PROBLEMS ON THIS QUESTIONNAIRE, HOW DIFFICULT HAVE THESE PROBLEMS MADE IT FOR YOU TO DO YOUR WORK, TAKE CARE OF THINGS AT HOME, OR GET ALONG WITH OTHER PEOPLE: NOT DIFFICULT AT ALL
6. BECOMING EASILY ANNOYED OR IRRITABLE: NOT AT ALL
1. FEELING NERVOUS, ANXIOUS, OR ON EDGE: NOT AT ALL
5. BEING SO RESTLESS THAT IT IS HARD TO SIT STILL: SEVERAL DAYS

## 2022-11-11 ASSESSMENT — PAIN SCALES - GENERAL: PAINLEVEL: EXTREME PAIN (9)

## 2022-11-11 NOTE — TELEPHONE ENCOUNTER
solifenacin      Last Written Prescription Date:  2/18/22  Last Fill Quantity: 90,   # refills: 2  Last Office Visit: 8/29/22  Future Office visit:    Next 5 appointments (look out 90 days)    Nov 11, 2022 10:00 AM  (Arrive by 9:45 AM)  SHORT with Randi Haddad NP  [facility] ([facility] ) [address]  [place] [address]  [phone]   Dec 01, 2022  9:45 AM  (Arrive by 9:30 AM)  SHORT with Randi Haddad NP  [facility] ([facility] ) [address]  [place] [address]  [phone]   Jan 04, 2023  9:45 AM  (Arrive by 9:30 AM)  Nurse Only with Alis Pitt  [facility] [place] [facility] ) [address]  [place] [address]  [phone]           Routing refill request to provider for review/approval because:

## 2022-11-11 NOTE — PATIENT INSTRUCTIONS
Assessment & Plan     1. Primary osteoarthritis of both knees  Ice, heat  - Orthopedic  Referral; Future - Dr Gallagher  - acetaminophen (TYLENOL) 650 MG CR tablet; Take 1 tablet (650 mg) by mouth 3 times daily as needed for mild pain or fever  Dispense: 90 tablet; Refill: 1    2. Acute pain of both knees  - XR Knee Standing Bilateral 3 Views      follow-up as needed     Randi Haddad, NP  [facility]

## 2022-11-11 NOTE — NURSING NOTE
"Chief Complaint   Patient presents with     Pain     knee's       Initial /66 (BP Location: Left arm, Patient Position: Sitting, Cuff Size: Adult Regular)   Pulse 60   Temp 97.7  F (36.5  C) (Tympanic)   Resp 16   Ht 1.575 m (5' 2\")   Wt 86.2 kg (190 lb)   SpO2 97%   BMI 34.75 kg/m   Estimated body mass index is 34.75 kg/m  as calculated from the following:    Height as of this encounter: 1.575 m (5' 2\").    Weight as of this encounter: 86.2 kg (190 lb).  Medication Reconciliation: complete  Fiona Orellana LPN    "

## 2022-11-29 NOTE — PROGRESS NOTES
"  Assessment & Plan     1. Hyperlipidemia LDL goal <100  Statin not prescribed   - Lipid Profile; Future    2. Benign essential hypertension  - Comprehensive metabolic panel; Future  - TSH with free T4 reflex; Future    3. Stage 3a chronic kidney disease (H)  Do not use NSAIDS  - Albumin Random Urine Quantitative with Creat Ratio; Future    4. Paroxysmal atrial fibrillation (H)  Continue eliquis    5. Postoperative hypothyroidism  Stable, TSH today     6. LA NENA (generalized anxiety disorder)  Continue zoloft     7. Need for prophylactic vaccination and inoculation against influenza  Declined flu vaccine today    8. Elevated glucose level  - Hemoglobin A1c; Future    9. Morbid obesity (H)  Weight loss encouraged     10. Facial twitching  Dr Holguin - evaluation.   - Adult Neurology  Referral           BMI:   Estimated body mass index is 35.5 kg/m  as calculated from the following:    Height as of this encounter: 1.575 m (5' 2\").    Weight as of this encounter: 88 kg (194 lb 1.6 oz).   Weight management plan: Discussed healthy diet and exercise guidelines        Return in about 3 months (around 3/1/2023) for anxiety/depression, lipids, HTN.    Randi Haddad, NP  [facility] [place] [facility]    Subjective   Nella is a 77 year old, presenting for the following health issues:  Hypertension, Lipids, Anxiety, chronic kidney disease , and Thyroid Problem      HPI     Hyperlipidemia Follow-Up    Are you regularly taking any medication or supplement to lower your cholesterol?   Yes- fish oil    Are you having muscle aches or other side effects that you think could be caused by your cholesterol lowering medication?  No   The 10-year ASCVD risk score (Hemal ZAVALA, et al., 2019) is: 27.6%    Values used to calculate the score:      Age: 77 years      Sex: Female      Is Non- : No      Diabetic: No      Tobacco smoker: No      Systolic Blood Pressure: 142 mmHg      Is BP treated: Yes   "    HDL Cholesterol: 42 mg/dL      Total Cholesterol: 222 mg/dL          Hypertension Follow-up    Do you check your blood pressure regularly outside of the clinic? Yes     Are you following a low salt diet? Yes    Are your blood pressures ever more than 140 on the top number (systolic) OR more   than 90 on the bottom number (diastolic), for example 140/90? Yes      Anxiety Follow-Up    How are you doing with your anxiety since your last visit? No change    Are you having other symptoms that might be associated with anxiety? No    Have you had a significant life event? No     Are you feeling depressed? No    Do you have any concerns with your use of alcohol or other drugs? No    Social History     Tobacco Use     Smoking status: Never     Smokeless tobacco: Never   Substance Use Topics     Alcohol use: Never     Drug use: No     LA NENA-7 SCORE 5/27/2022 11/11/2022 12/1/2022   Total Score 6 3 4     PHQ 11/18/2021 5/27/2022 11/11/2022   PHQ-9 Total Score 0 4 1   Q9: Thoughts of better off dead/self-harm past 2 weeks Not at all Not at all Not at all     Last PHQ-9 11/11/2022   1.  Little interest or pleasure in doing things 0   2.  Feeling down, depressed, or hopeless 1   3.  Trouble falling or staying asleep, or sleeping too much 0   4.  Feeling tired or having little energy 0   5.  Poor appetite or overeating 0   6.  Feeling bad about yourself 0   7.  Trouble concentrating 0   8.  Moving slowly or restless 0   Q9: Thoughts of better off dead/self-harm past 2 weeks 0   PHQ-9 Total Score 1   Difficulty at work, home, or with people Not difficult at all     LA NENA-7  12/1/2022   1. Feeling nervous, anxious, or on edge 1   2. Not being able to stop or control worrying 1   3. Worrying too much about different things 1   4. Trouble relaxing 0   5. Being so restless that it is hard to sit still 0   6. Becoming easily annoyed or irritable 0   7. Feeling afraid, as if something awful might happen 1   LA NENA-7 Total Score 4   If you  "checked any problems, how difficult have they made it for you to do your work, take care of things at home, or get along with other people? -       Chronic Kidney Disease Follow-up    Do you take any over the counter pain medicine?: Yes  What over the counter medicine are you taking for your pain?:  Tylenol and ibuprofen      How often do you take this medicine?:  A few times a month      Hypothyroidism Follow-up    Since last visit, patient describes the following symptoms: Weight stable, no hair loss, no skin changes, no constipation, no loose stools    A fib - [id number] 4 - taking eliquis.     Facial twitching  It will start from right corner of mouth, twitches up into right cheek.  Started one week ago and is getting worse.       Recent Labs   Lab Test 12/01/22  1015 08/29/22  0912 05/27/22  0925 08/18/21  0939 05/12/21  1040 04/28/21  1017   A1C 5.6  --   --   --   --   --    * 127* 115*   < >  --  154*   HDL 42* 40* 39*   < >  --  47*   TRIG 209* 260* 202*   < >  --  178*   ALT 53* 40 40   < >  --  30   CR 0.99* 0.95 0.96   < > 1.19* 1.04   GFRESTIMATED 59* 62 61   < > 45* 53*   GFRESTBLACK  --   --   --   --  52* 61   POTASSIUM 4.2 4.2 4.3   < > 4.2 4.0   TSH 3.32 3.34 2.22   < >  --  4.14*    < > = values in this interval not displayed.      BP Readings from Last 3 Encounters:   12/01/22 (!) 142/76   11/11/22 126/66   08/29/22 132/66    Wt Readings from Last 3 Encounters:   12/01/22 88 kg (194 lb 1.6 oz)   11/11/22 86.2 kg (190 lb)   08/29/22 88.4 kg (194 lb 12.8 oz)                   Review of Systems   Constitutional, HEENT, cardiovascular, pulmonary, gi and gu systems are negative, except as otherwise noted.        Objective    BP (!) 142/76 (BP Location: Right arm, Patient Position: Chair, Cuff Size: Adult Regular)   Pulse 54   Temp 96.8  F (36  C) (Tympanic)   Resp 20   Ht 1.575 m (5' 2\")   Wt 88 kg (194 lb 1.6 oz)   SpO2 95%   BMI 35.50 kg/m    Body mass index is 35.5 kg/m .  Physical " Exam   GENERAL: healthy, alert and no distress  NECK: no adenopathy, no asymmetry, masses, or scars, thyroid normal to palpation and no carotid bruits  RESP: lungs clear to auscultation - no rales, rhonchi or wheezes  CV: regular rate and rhythm, normal S1 S2, no S3 or S4, no murmur, click or rub, no peripheral edema and peripheral pulses strong  MS: no gross musculoskeletal defects noted, no edema  NEURO: Normal strength and tone and mentation intact.  Right facial twitching visualized during our visit today.  It started corner of right mouth up into right cheek.  No hand rolling or other tremors noted.    PSYCH: mentation appears normal, affect normal/bright

## 2022-12-01 ENCOUNTER — OFFICE VISIT (OUTPATIENT)
Dept: FAMILY MEDICINE | Facility: OTHER | Age: 77
End: 2022-12-01
Attending: NURSE PRACTITIONER
Payer: COMMERCIAL

## 2022-12-01 VITALS
TEMPERATURE: 96.8 F | HEART RATE: 54 BPM | HEIGHT: 62 IN | OXYGEN SATURATION: 95 % | RESPIRATION RATE: 20 BRPM | DIASTOLIC BLOOD PRESSURE: 76 MMHG | BODY MASS INDEX: 35.72 KG/M2 | WEIGHT: 194.1 LBS | SYSTOLIC BLOOD PRESSURE: 142 MMHG

## 2022-12-01 DIAGNOSIS — F41.1 GAD (GENERALIZED ANXIETY DISORDER): Chronic | ICD-10-CM

## 2022-12-01 DIAGNOSIS — G51.4 FACIAL TWITCHING: ICD-10-CM

## 2022-12-01 DIAGNOSIS — E78.5 HYPERLIPIDEMIA LDL GOAL <100: Primary | ICD-10-CM

## 2022-12-01 DIAGNOSIS — E89.0 POSTOPERATIVE HYPOTHYROIDISM: ICD-10-CM

## 2022-12-01 DIAGNOSIS — I48.0 PAROXYSMAL ATRIAL FIBRILLATION (H): Chronic | ICD-10-CM

## 2022-12-01 DIAGNOSIS — R73.09 ELEVATED GLUCOSE LEVEL: ICD-10-CM

## 2022-12-01 DIAGNOSIS — N18.31 STAGE 3A CHRONIC KIDNEY DISEASE (H): ICD-10-CM

## 2022-12-01 DIAGNOSIS — Z23 NEED FOR PROPHYLACTIC VACCINATION AND INOCULATION AGAINST INFLUENZA: ICD-10-CM

## 2022-12-01 DIAGNOSIS — I10 BENIGN ESSENTIAL HYPERTENSION: Chronic | ICD-10-CM

## 2022-12-01 DIAGNOSIS — E66.01 MORBID OBESITY (H): ICD-10-CM

## 2022-12-01 LAB
ALBUMIN SERPL BCG-MCNC: 4.1 G/DL (ref 3.5–5.2)
ALP SERPL-CCNC: 132 U/L (ref 35–104)
ALT SERPL W P-5'-P-CCNC: 53 U/L (ref 10–35)
ANION GAP SERPL CALCULATED.3IONS-SCNC: 13 MMOL/L (ref 7–15)
AST SERPL W P-5'-P-CCNC: 45 U/L (ref 10–35)
BILIRUB SERPL-MCNC: 0.5 MG/DL
BUN SERPL-MCNC: 15.5 MG/DL (ref 8–23)
CALCIUM SERPL-MCNC: 9.1 MG/DL (ref 8.8–10.2)
CHLORIDE SERPL-SCNC: 107 MMOL/L (ref 98–107)
CHOLEST SERPL-MCNC: 222 MG/DL
CREAT SERPL-MCNC: 0.99 MG/DL (ref 0.51–0.95)
CREAT UR-MCNC: 122.9 MG/DL
DEPRECATED HCO3 PLAS-SCNC: 23 MMOL/L (ref 22–29)
EST. AVERAGE GLUCOSE BLD GHB EST-MCNC: 114 MG/DL
GFR SERPL CREATININE-BSD FRML MDRD: 59 ML/MIN/1.73M2
GLUCOSE SERPL-MCNC: 110 MG/DL (ref 70–99)
HBA1C MFR BLD: 5.6 %
HDLC SERPL-MCNC: 42 MG/DL
LDLC SERPL CALC-MCNC: 138 MG/DL
MAGNESIUM SERPL-MCNC: 2.2 MG/DL (ref 1.7–2.3)
MICROALBUMIN UR-MCNC: 25.2 MG/L
MICROALBUMIN/CREAT UR: 20.5 MG/G CR (ref 0–25)
NONHDLC SERPL-MCNC: 180 MG/DL
POTASSIUM SERPL-SCNC: 4.2 MMOL/L (ref 3.4–5.3)
PROT SERPL-MCNC: 7 G/DL (ref 6.4–8.3)
SODIUM SERPL-SCNC: 143 MMOL/L (ref 136–145)
TRIGL SERPL-MCNC: 209 MG/DL
TSH SERPL DL<=0.005 MIU/L-ACNC: 3.32 UIU/ML (ref 0.3–4.2)

## 2022-12-01 PROCEDURE — 84443 ASSAY THYROID STIM HORMONE: CPT | Mod: ZL | Performed by: NURSE PRACTITIONER

## 2022-12-01 PROCEDURE — 99214 OFFICE O/P EST MOD 30 MIN: CPT | Performed by: NURSE PRACTITIONER

## 2022-12-01 PROCEDURE — 83735 ASSAY OF MAGNESIUM: CPT | Mod: ZL | Performed by: NURSE PRACTITIONER

## 2022-12-01 PROCEDURE — 82043 UR ALBUMIN QUANTITATIVE: CPT | Mod: ZL | Performed by: NURSE PRACTITIONER

## 2022-12-01 PROCEDURE — 82040 ASSAY OF SERUM ALBUMIN: CPT | Mod: ZL | Performed by: NURSE PRACTITIONER

## 2022-12-01 PROCEDURE — 80061 LIPID PANEL: CPT | Mod: ZL | Performed by: NURSE PRACTITIONER

## 2022-12-01 PROCEDURE — 36415 COLL VENOUS BLD VENIPUNCTURE: CPT | Mod: ZL | Performed by: NURSE PRACTITIONER

## 2022-12-01 PROCEDURE — 80053 COMPREHEN METABOLIC PANEL: CPT | Mod: ZL | Performed by: NURSE PRACTITIONER

## 2022-12-01 PROCEDURE — 83036 HEMOGLOBIN GLYCOSYLATED A1C: CPT | Mod: ZL | Performed by: NURSE PRACTITIONER

## 2022-12-01 PROCEDURE — G0463 HOSPITAL OUTPT CLINIC VISIT: HCPCS

## 2022-12-01 ASSESSMENT — ANXIETY QUESTIONNAIRES
7. FEELING AFRAID AS IF SOMETHING AWFUL MIGHT HAPPEN: SEVERAL DAYS
GAD7 TOTAL SCORE: 4
4. TROUBLE RELAXING: NOT AT ALL
5. BEING SO RESTLESS THAT IT IS HARD TO SIT STILL: NOT AT ALL
6. BECOMING EASILY ANNOYED OR IRRITABLE: NOT AT ALL
3. WORRYING TOO MUCH ABOUT DIFFERENT THINGS: SEVERAL DAYS
2. NOT BEING ABLE TO STOP OR CONTROL WORRYING: SEVERAL DAYS
GAD7 TOTAL SCORE: 4
1. FEELING NERVOUS, ANXIOUS, OR ON EDGE: SEVERAL DAYS

## 2022-12-01 ASSESSMENT — PAIN SCALES - GENERAL: PAINLEVEL: NO PAIN (0)

## 2022-12-01 NOTE — PATIENT INSTRUCTIONS
"  Assessment & Plan     1. Hyperlipidemia LDL goal <100  Statin not prescribed   - Lipid Profile; Future    2. Benign essential hypertension  - Comprehensive metabolic panel; Future  - TSH with free T4 reflex; Future    3. Stage 3a chronic kidney disease (H)  Do not use NSAIDS  - Albumin Random Urine Quantitative with Creat Ratio; Future    4. Paroxysmal atrial fibrillation (H)  Continue eliquis    5. Postoperative hypothyroidism  Stable, TSH today     6. LA NENA (generalized anxiety disorder)  Continue zoloft     7. Need for prophylactic vaccination and inoculation against influenza  Declined flu vaccine today    8. Elevated glucose level  - Hemoglobin A1c; Future    9. Morbid obesity (H)  Weight loss encouraged     10. Facial twitching  Dr Holguin   - Adult Neurology  Referral           BMI:   Estimated body mass index is 35.5 kg/m  as calculated from the following:    Height as of this encounter: 1.575 m (5' 2\").    Weight as of this encounter: 88 kg (194 lb 1.6 oz).   Weight management plan: Discussed healthy diet and exercise guidelines        Return in about 3 months (around 3/1/2023) for anxiety/depression, lipids, HTN.    Randi Haddad NP  [facility]  "

## 2022-12-08 ENCOUNTER — TELEPHONE (OUTPATIENT)
Dept: FAMILY MEDICINE | Facility: OTHER | Age: 77
End: 2022-12-08

## 2022-12-08 NOTE — TELEPHONE ENCOUNTER
Patient called back and would like providers nurse to call her back regarding her lab results and the Rapatha injections.

## 2022-12-12 ENCOUNTER — OFFICE VISIT (OUTPATIENT)
Dept: INTERNAL MEDICINE | Facility: OTHER | Age: 77
End: 2022-12-12
Attending: INTERNAL MEDICINE
Payer: COMMERCIAL

## 2022-12-12 VITALS — WEIGHT: 185 LBS | BODY MASS INDEX: 33.84 KG/M2 | TEMPERATURE: 99 F | OXYGEN SATURATION: 96 % | HEART RATE: 55 BPM

## 2022-12-12 DIAGNOSIS — J20.9 ACUTE BRONCHITIS, UNSPECIFIED ORGANISM: Primary | ICD-10-CM

## 2022-12-12 PROCEDURE — 99213 OFFICE O/P EST LOW 20 MIN: CPT | Performed by: INTERNAL MEDICINE

## 2022-12-12 PROCEDURE — G0463 HOSPITAL OUTPT CLINIC VISIT: HCPCS | Performed by: INTERNAL MEDICINE

## 2022-12-12 RX ORDER — AZITHROMYCIN 250 MG/1
TABLET, FILM COATED ORAL
Qty: 11 TABLET | Refills: 0 | Status: SHIPPED | OUTPATIENT
Start: 2022-12-12 | End: 2022-12-22

## 2022-12-12 RX ORDER — GUAIFENESIN 600 MG/1
1200 TABLET, EXTENDED RELEASE ORAL 2 TIMES DAILY
Qty: 20 TABLET | Refills: 0 | Status: SHIPPED | OUTPATIENT
Start: 2022-12-12 | End: 2023-01-17

## 2022-12-12 NOTE — NURSING NOTE
Pulse 55   Temp 99  F (37.2  C)   Wt 83.9 kg (185 lb)   SpO2 96%   BMI 33.84 kg/m    Jessa Behrman, LPN

## 2022-12-12 NOTE — TELEPHONE ENCOUNTER
Patient notified of self administration and will think about it. Patient will call if she decides to do injection.

## 2022-12-12 NOTE — PROGRESS NOTES
Assessment & Plan   Problem List Items Addressed This Visit    None  Visit Diagnoses     Acute bronchitis, unspecified organism    -  Primary    Relevant Medications    azithromycin (ZITHROMAX) 250 MG tablet x 10 days    guaiFENesin (MUCINEX) 600 MG 12 hr tablet BID             20 minutes spent on the date of the encounter doing chart review, review of test results, interpretation of tests, patient visit and documentation            No follow-ups on file.    Srinivas Hurtado, [facility] [place] [facility] Carmen is a 77 year old presenting for the following health issues:  URI      HPI     Nella presents with one week of illness.  She had a fever last week of 101.  She reports cough is non productive and she is wheezing.    She has tried some OTC cough medication.  No ST, No ear pain.  Hoarse voice.      Acute Illness  Acute illness concerns: cough, difficulty breathing  Onset/Duration: One week  Symptoms:  Fever: YES  Chills/Sweats: No  Headache (location?): YES  Sinus Pressure: No  Conjunctivitis:  No  Ear Pain: no  Rhinorrhea: No  Congestion: No  Sore Throat: YES  Cough: YES-non-productive  Wheeze: YES  Decreased Appetite: No  Nausea: YES  Vomiting: No  Diarrhea: YES  Dysuria/Freq.: No  Dysuria or Hematuria: No  Fatigue/Achiness: YES  Sick/Strep Exposure: No  Therapies tried and outcome: none  Negative home covid test.      Review of Systems   Constitutional, HEENT, cardiovascular, pulmonary, gi and gu systems are negative, except as otherwise noted.      Objective    Pulse 55   Temp 99  F (37.2  C)   Wt 83.9 kg (185 lb)   SpO2 96%   BMI 33.84 kg/m    Body mass index is 33.84 kg/m .  Physical Exam   GENERAL: alert and no distress  ENT:  TMs pearly white, oropharynx clear  RESP: Mild wheezing throughout.  CV: regular rate and rhythm, normal S1 S2, no S3 or S4, no murmur, click or rub, no peripheral edema and peripheral pulses strong  MS: no gross musculoskeletal defects  noted, no edema  NEURO: Normal strength and tone, mentation intact and speech normal  PSYCH: mentation appears normal, affect normal/bright    Office Visit on 12/01/2022   Component Date Value Ref Range Status     Estimated Average Glucose 12/01/2022 114  mg/dL Final     Hemoglobin A1C 12/01/2022 5.6  <5.7 % Final    Normal <5.7%   Prediabetes 5.7-6.4%    Diabetes 6.5% or higher     Note: Adopted from ADA consensus guidelines.     Cholesterol 12/01/2022 222 (H)  <200 mg/dL Final     Triglycerides 12/01/2022 209 (H)  <150 mg/dL Final     Direct Measure HDL 12/01/2022 42 (L)  >=50 mg/dL Final     LDL Cholesterol Calculated 12/01/2022 138 (H)  <=100 mg/dL Final     Non HDL Cholesterol 12/01/2022 180 (H)  <130 mg/dL Final     Sodium 12/01/2022 143  136 - 145 mmol/L Final     Potassium 12/01/2022 4.2  3.4 - 5.3 mmol/L Final     Chloride 12/01/2022 107  98 - 107 mmol/L Final     Carbon Dioxide (CO2) 12/01/2022 23  22 - 29 mmol/L Final     Anion Gap 12/01/2022 13  7 - 15 mmol/L Final     Urea Nitrogen 12/01/2022 15.5  8.0 - 23.0 mg/dL Final     Creatinine 12/01/2022 0.99 (H)  0.51 - 0.95 mg/dL Final     Calcium 12/01/2022 9.1  8.8 - 10.2 mg/dL Final     Glucose 12/01/2022 110 (H)  70 - 99 mg/dL Final     Alkaline Phosphatase 12/01/2022 132 (H)  35 - 104 U/L Final     AST 12/01/2022 45 (H)  10 - 35 U/L Final     ALT 12/01/2022 53 (H)  10 - 35 U/L Final     Protein Total 12/01/2022 7.0  6.4 - 8.3 g/dL Final     Albumin 12/01/2022 4.1  3.5 - 5.2 g/dL Final     Bilirubin Total 12/01/2022 0.5  <=1.2 mg/dL Final     GFR Estimate 12/01/2022 59 (L)  >60 mL/min/1.73m2 Final    Effective December 21, 2021 eGFRcr in adults is calculated using the 2021 CKD-EPI creatinine equation which includes age and gender (Albert et al., NEJ, DOI: 10.1056/QPTYjc7624348)     TSH 12/01/2022 3.32  0.30 - 4.20 uIU/mL Final     Magnesium 12/01/2022 2.2  1.7 - 2.3 mg/dL Final     Albumin Urine mg/L 12/01/2022 25.2  mg/L Final    The reference ranges  have not been established in urine albumin. The results should be integrated into the clinical context for interpretation.     Albumin Urine mg/g Cr 12/01/2022 20.50  0.00 - 25.00 mg/g Cr Final    Microalbuminuria is defined as an albumin:creatinine ratio of 17 to 299 for males and 25 to 299 for females. A ratio of albumin:creatinine of 300 or higher is indicative of overt proteinuria.  Due to biologic variability, positive results should be confirmed by a second, first-morning random or 24-hour timed urine specimen. If there is discrepancy, a third specimen is recommended. When 2 out of 3 results are in the microalbuminuria range, this is evidence for incipient nephropathy and warrants increased efforts at glucose control, blood pressure control, and institution of therapy with an angiotensin-converting-enzyme (ACE) inhibitor (if the patient can tolerate it).       Creatinine Urine mg/dL 12/01/2022 122.9  mg/dL Final    The reference ranges have not been established in urine creatinine. The results should be integrated into the clinical context for interpretation.     No results found for any visits on 12/12/22.  No results found for this or any previous visit (from the past 24 hour(s)).

## 2022-12-23 DIAGNOSIS — F41.9 ANXIETY: ICD-10-CM

## 2022-12-27 ENCOUNTER — TELEPHONE (OUTPATIENT)
Dept: FAMILY MEDICINE | Facility: OTHER | Age: 77
End: 2022-12-27

## 2022-12-27 DIAGNOSIS — E78.5 HYPERLIPIDEMIA LDL GOAL <100: Primary | ICD-10-CM

## 2022-12-27 NOTE — TELEPHONE ENCOUNTER
Sertraline (Zoloft) 50 MG tablet   Last Written Prescription Date:  6/29/22  Last Fill Quantity: 90,   # refills: 1  Last Office Visit: 12/1/22  Future Office visit:    Next 5 appointments (look out 90 days)    Jan 04, 2023  9:45 AM  (Arrive by 9:30 AM)  Nurse Only with Alis Pitt  [facility] [place] [facility] [address]  [place] [address]  [phone]   Mar 03, 2023  9:45 AM  (Arrive by 9:30 AM)  SHORT with Randi Haddad, NP  [facility] [address]  [facility] [address]  [phone]

## 2022-12-27 NOTE — TELEPHONE ENCOUNTER
8:58 AM    Reason for Call: Phone Call    Description: Patient called in and wants to do the injectable cholesterol shots but needs to be taught how to do it. Please call patient back.    Was an appointment offered for this call? No  If yes : Appointment type              Date    Preferred method for responding to this message: Telephone Call  What is your phone number ? [phone]    If we cannot reach you directly, may we leave a detailed response at the number you provided? Yes    Can this message wait until your PCP/provider returns, if available today? YES    Venus Gallagher

## 2022-12-28 NOTE — TELEPHONE ENCOUNTER
Patient notified medication sent to pharmacy and she can call and schedule a nurse only injection teaching when gets medication

## 2022-12-30 DIAGNOSIS — H91.93 DECREASED HEARING OF BOTH EARS: Primary | ICD-10-CM

## 2023-01-02 ENCOUNTER — TRANSFERRED RECORDS (OUTPATIENT)
Dept: HEALTH INFORMATION MANAGEMENT | Facility: CLINIC | Age: 78
End: 2023-01-02

## 2023-01-04 ENCOUNTER — ALLIED HEALTH/NURSE VISIT (OUTPATIENT)
Dept: AUDIOLOGY | Facility: OTHER | Age: 78
End: 2023-01-04
Attending: AUDIOLOGIST
Payer: COMMERCIAL

## 2023-01-04 DIAGNOSIS — H91.93 DECREASED HEARING OF BOTH EARS: Primary | ICD-10-CM

## 2023-01-04 DIAGNOSIS — H90.3 SENSORINEURAL HEARING LOSS (SNHL) OF BOTH EARS: Primary | ICD-10-CM

## 2023-01-04 DIAGNOSIS — H91.93 DECREASED HEARING OF BOTH EARS: ICD-10-CM

## 2023-01-04 PROCEDURE — V5299 HEARING SERVICE: HCPCS

## 2023-01-04 PROCEDURE — 92556 SPEECH AUDIOMETRY COMPLETE: CPT | Performed by: AUDIOLOGIST

## 2023-01-04 PROCEDURE — 92552 PURE TONE AUDIOMETRY AIR: CPT | Performed by: AUDIOLOGIST

## 2023-01-04 NOTE — PROGRESS NOTES
Audiology Evaluation Completed. Please refer SCANNED AUDIOGRAM and/or TYMPANOGRAM for BACKGROUND, RESULTS, RECOMMENDATIONS.      Trini BIANCHI, [facility]  Audiologist #[id number]

## 2023-01-04 NOTE — PROGRESS NOTES
HEARING AID CHECK    BACKGROUND:  Nella Lemon, a 77 year old female, was seen today for an hearing aid check.  Ms. Lemon wears Binaural Oticon More 2 miniRITE R hearing aids.  Ms. Lemon reported no concerns.    FINDINGS:  Visual inspection and cleaning provided.   C-stop changed with new. 8mm DV domes changed with new. Battery was tested and good. Good listening check.    Reviewed cleaning, troubleshooting, and preventative care with patient. Any cleaning tools used were provided as customer courtesy.    Services performed and warranty reviewed with patient.    PLAN:  Patient to be seen next by audiologist. Ms. Lemon will return to clinic in 12 months, sooner if needed. Patient had no further questions and reports satisfaction.         Berenice Pitt  Audiology Assistant  [facility]  [phone]

## 2023-01-10 ENCOUNTER — NURSE TRIAGE (OUTPATIENT)
Dept: FAMILY MEDICINE | Facility: OTHER | Age: 78
End: 2023-01-10

## 2023-01-10 NOTE — TELEPHONE ENCOUNTER
"Pt calling for an appt with PCP for chest pain. She has had for about one week. Upper chest and jaw pain,left back pain.8/10.Feels like pressure in her throat when she takes a breath.Present since she woke up at 7am and constant.She has been walking like she is drunk.Advised her to call 911 and go to ED. She verbalized understanding.    Eleanor Mullins RN      Reason for Disposition    [1] Chest pain lasts > 5 minutes AND [2] described as crushing, pressure-like, or heavy    [1] Chest pain lasts > 5 minutes AND [2] age > 30 AND [3] one or more cardiac risk factors (e.g., diabetes, high blood pressure, high cholesterol, smoker, or strong family history of heart disease)    Additional Information    Negative: SEVERE difficulty breathing (e.g., struggling for each breath, speaks in single words)    Negative: Difficult to awaken or acting confused (e.g., disoriented, slurred speech)    Negative: Shock suspected (e.g., cold/pale/clammy skin, too weak to stand, low BP, rapid pulse)    Negative: Passed out (i.e., lost consciousness, collapsed and was not responding)    Negative: [1] Chest pain lasts > 5 minutes AND [2] age > 44    Negative: [1] Chest pain lasts > 5 minutes AND [2] history of heart disease (i.e., angina, heart attack, heart failure, bypass surgery, takes nitroglycerin)    Negative: Heart beating < 50 beats per minute OR > 140 beats per minute    Negative: Visible sweat on face or sweat dripping down face    Negative: Sounds like a life-threatening emergency to the triager    Answer Assessment - Initial Assessment Questions  1. LOCATION: \"Where does it hurt?\"        The whole upper chest  2. RADIATION: \"Does the pain go anywhere else?\" (e.g., into neck, jaw, arms, back)      Into neck both sides, into back the left  3. ONSET: \"When did the chest pain begin?\" (Minutes, hours or days)       One week ago  4. PATTERN \"Does the pain come and go, or has it been constant since it started?\"  \"Does it get worse with " "exertion?\"       When awake it is ther  5. DURATION: \"How long does it last\" (e.g., seconds, minutes, hours)      Since she woke up 7am-constant  6. SEVERITY: \"How bad is the pain?\"  (e.g., Scale 1-10; mild, moderate, or severe)     - MILD (1-3): doesn't interfere with normal activities      - MODERATE (4-7): interferes with normal activities or awakens from sleep     - SEVERE (8-10): excruciating pain, unable to do any normal activities        8/10  7. CARDIAC RISK FACTORS: \"Do you have any history of heart problems or risk factors for heart disease?\" (e.g., angina, prior heart attack; diabetes, high blood pressure, high cholesterol, smoker, or strong family history of heart disease)      HTN,high cholesterol  8. PULMONARY RISK FACTORS: \"Do you have any history of lung disease?\"  (e.g., blood clots in lung, asthma, emphysema, birth control pills)      no  9. CAUSE: \"What do you think is causing the chest pain?\"      Doesn't know  10. OTHER SYMPTOMS: \"Do you have any other symptoms?\" (e.g., dizziness, nausea, vomiting, sweating, fever, difficulty breathing, cough)        Walk looks like she is drunk  11. PREGNANCY: \"Is there any chance you are pregnant?\" \"When was your last menstrual period?\"        na    Protocols used: CHEST PAIN-A-AH      "

## 2023-01-13 ENCOUNTER — TELEPHONE (OUTPATIENT)
Dept: FAMILY MEDICINE | Facility: OTHER | Age: 78
End: 2023-01-13

## 2023-01-13 NOTE — TELEPHONE ENCOUNTER
Received a PA from [facility] for Repatha SureClick 140MG/ML auto-injectors. Submitted on [facility]. Waiting for a response.

## 2023-01-16 NOTE — TELEPHONE ENCOUNTER
Received a DENIAL from [facility] for Repatha SureClick 140mg/mL auto-injectors.     Forms scanned to Epic.

## 2023-01-16 NOTE — PROGRESS NOTES
Assessment & Plan     Hospital discharge follow-up  Follow up with cardiology for duration of colchicine and routine follow up.   Keep appointment with your primary provider.   Discuss plan for treatment of hyperlipidemia with your cardiology team and primary. We will not start any evolocumb (Repatha) injection until it has been approved by cardiology and/or your primary.     Medication list has been updated  We will call with lab results.    - Comprehensive metabolic panel (BMP + Alb, Alk Phos, ALT, AST, Total. Bili, TP); Future  - Comprehensive metabolic panel (BMP + Alb, Alk Phos, ALT, AST, Total. Bili, TP)    Pericardial effusion  - colchicine (COLCYRS) 0.6 MG tablet; Take 1 tablet (0.6 mg) by mouth daily for 90 days  - Comprehensive metabolic panel (BMP + Alb, Alk Phos, ALT, AST, Total. Bili, TP); Future  - Comprehensive metabolic panel (BMP + Alb, Alk Phos, ALT, AST, Total. Bili, TP)    Stage 3a chronic kidney disease (H)    Increase in creatinine  - Comprehensive metabolic panel (BMP + Alb, Alk Phos, ALT, AST, Total. Bili, TP); Future  - Comprehensive metabolic panel (BMP + Alb, Alk Phos, ALT, AST, Total. Bili, TP)    Myalgia  - gabapentin (NEURONTIN) 300 MG capsule; Take 1 capsule (300 mg) by mouth 3 times daily    Paroxysmal atrial fibrillation (H)    Morbid obesity (H)      Review of external notes as documented elsewhere in note  Ordering of each unique test  Prescription drug management       Return if symptoms worsen or fail to improve.    Ingrid Gonzalez, CNP  [facility] [place] [facility]    Subjective   Nella is a 77 year old presenting for the following health issues:  No chief complaint on file.      HPI       Hospital Follow-up Visit:    Hospital/Nursing Home/IP Rehab Facility: [facility] - ICU adimission  Date of Admission: 1/10/23  Date of Discharge: 1/13/23  Reason(s) for Admission: chest pain, plural effusion with existing hypothyroidism, afib, HTN, & GERD.       Gabapentin  "was reduced from 900 mg 3 times a day down to 300 mg 3 times a day.  She reports improvement in her symptoms with the stool.  Med rec completed and current dosing reflected on medication list.  I have sent in a new prescription for this to be refilled on patient requests.  She does have ample supply at home at this time.    Was your hospitalization related to COVID-19? No   Problems taking medications regularly:  None  Medication changes since discharge: no.   Problems adhering to non-medication therapy:  None    Summary of hospitalization:  CareEverywhere information obtained and reviewed  Diagnostic Tests/Treatments reviewed.  Follow up needed: none  Other Healthcare Providers Involved in Patient s Care:         Aroldo Lomeli (primary provider), Dr. Walsh (cardiology)  Update since discharge: improved.  Plan of care communicated with patient             Review of Systems   Constitutional, HEENT, cardiovascular, pulmonary, gi and gu systems are negative, except as otherwise noted.      Recent, pertinent testing.     ECHO ADULT COMPLETE (01/12/2023 1:08 PM CST)- [facility]  Pericardial effusion, progression or tamponade suspected  Interpretation Summary  1. Trace pericardial effusion. No tamponade or pretamponade physiology.  2. Quantified left ventricle ejection fraction is 64%.  3. The right ventricle is mildly enlarged with normal systolic function. Estimated Right Ventricular Systolic Pressure 38 mmHg. No septal flattening.  4. Trileaflet sclerotic aortic valve. No aortic stenosis or regurgitation.  5. Left atrium is mildly enlarged by volume.  6. No prior echo for comparison.  ---------------------------------------------------    Objective    /68 (BP Location: Right arm, Patient Position: Sitting, Cuff Size: Adult Regular)   Pulse 60   Temp 98  F (36.7  C) (Tympanic)   Resp 12   Ht 1.575 m (5' 2\")   Wt 82.1 kg (181 lb)   SpO2 97%   BMI 33.11 kg/m    Body mass index is 33.11 kg/m .  Physical Exam "   GENERAL: healthy, alert and no distress  EYES: Eyes grossly normal to inspection, PERRL and conjunctivae and sclerae normal  NECK: no adenopathy, no asymmetry, masses, or scars and thyroid normal to palpation  RESP: lungs clear to auscultation - no rales, rhonchi or wheezes  CV: regular rate and rhythm, normal S1 S2, no S3 or S4, no murmur, click or rub, no peripheral edema and peripheral pulses strong  ABDOMEN: soft, nontender, no hepatosplenomegaly, no masses and bowel sounds normal  SKIN: no suspicious lesions or rashes  NEURO: Normal strength and tone, mentation intact and speech normal  PSYCH: mentation appears normal, affect normal/bright    Results for orders placed or performed in visit on 01/17/23   Comprehensive metabolic panel (BMP + Alb, Alk Phos, ALT, AST, Total. Bili, TP)     Status: Abnormal   Result Value Ref Range    Sodium 139 136 - 145 mmol/L    Potassium 4.1 3.4 - 5.3 mmol/L    Chloride 103 98 - 107 mmol/L    Carbon Dioxide (CO2) 23 22 - 29 mmol/L    Anion Gap 13 7 - 15 mmol/L    Urea Nitrogen 18.4 8.0 - 23.0 mg/dL    Creatinine 0.95 0.51 - 0.95 mg/dL    Calcium 8.9 8.8 - 10.2 mg/dL    Glucose 118 (H) 70 - 99 mg/dL    Alkaline Phosphatase 120 (H) 35 - 104 U/L    AST 62 (H) 10 - 35 U/L    ALT 96 (H) 10 - 35 U/L    Protein Total 7.1 6.4 - 8.3 g/dL    Albumin 4.1 3.5 - 5.2 g/dL    Bilirubin Total 0.4 <=1.2 mg/dL    GFR Estimate 62 >60 mL/min/1.73m2

## 2023-01-17 ENCOUNTER — OFFICE VISIT (OUTPATIENT)
Dept: FAMILY MEDICINE | Facility: OTHER | Age: 78
End: 2023-01-17
Attending: NURSE PRACTITIONER
Payer: COMMERCIAL

## 2023-01-17 VITALS
BODY MASS INDEX: 33.31 KG/M2 | HEIGHT: 62 IN | DIASTOLIC BLOOD PRESSURE: 68 MMHG | SYSTOLIC BLOOD PRESSURE: 118 MMHG | WEIGHT: 181 LBS | HEART RATE: 60 BPM | OXYGEN SATURATION: 97 % | TEMPERATURE: 98 F | RESPIRATION RATE: 12 BRPM

## 2023-01-17 DIAGNOSIS — N18.31 STAGE 3A CHRONIC KIDNEY DISEASE (H): ICD-10-CM

## 2023-01-17 DIAGNOSIS — M79.10 MYALGIA: ICD-10-CM

## 2023-01-17 DIAGNOSIS — I48.0 PAROXYSMAL ATRIAL FIBRILLATION (H): ICD-10-CM

## 2023-01-17 DIAGNOSIS — Z09 HOSPITAL DISCHARGE FOLLOW-UP: Primary | ICD-10-CM

## 2023-01-17 DIAGNOSIS — I31.39 PERICARDIAL EFFUSION: ICD-10-CM

## 2023-01-17 DIAGNOSIS — E66.01 MORBID OBESITY (H): ICD-10-CM

## 2023-01-17 PROBLEM — I31.9 PERICARDITIS: Status: ACTIVE | Noted: 2023-01-10

## 2023-01-17 LAB
ALBUMIN SERPL BCG-MCNC: 4.1 G/DL (ref 3.5–5.2)
ALP SERPL-CCNC: 120 U/L (ref 35–104)
ALT SERPL W P-5'-P-CCNC: 96 U/L (ref 10–35)
ANION GAP SERPL CALCULATED.3IONS-SCNC: 13 MMOL/L (ref 7–15)
AST SERPL W P-5'-P-CCNC: 62 U/L (ref 10–35)
BILIRUB SERPL-MCNC: 0.4 MG/DL
BUN SERPL-MCNC: 18.4 MG/DL (ref 8–23)
CALCIUM SERPL-MCNC: 8.9 MG/DL (ref 8.8–10.2)
CHLORIDE SERPL-SCNC: 103 MMOL/L (ref 98–107)
CREAT SERPL-MCNC: 0.95 MG/DL (ref 0.51–0.95)
DEPRECATED HCO3 PLAS-SCNC: 23 MMOL/L (ref 22–29)
GFR SERPL CREATININE-BSD FRML MDRD: 62 ML/MIN/1.73M2
GLUCOSE SERPL-MCNC: 118 MG/DL (ref 70–99)
POTASSIUM SERPL-SCNC: 4.1 MMOL/L (ref 3.4–5.3)
PROT SERPL-MCNC: 7.1 G/DL (ref 6.4–8.3)
SODIUM SERPL-SCNC: 139 MMOL/L (ref 136–145)

## 2023-01-17 PROCEDURE — 99213 OFFICE O/P EST LOW 20 MIN: CPT | Performed by: NURSE PRACTITIONER

## 2023-01-17 PROCEDURE — 36415 COLL VENOUS BLD VENIPUNCTURE: CPT | Mod: ZL | Performed by: NURSE PRACTITIONER

## 2023-01-17 PROCEDURE — G0463 HOSPITAL OUTPT CLINIC VISIT: HCPCS

## 2023-01-17 PROCEDURE — 80053 COMPREHEN METABOLIC PANEL: CPT | Mod: ZL | Performed by: NURSE PRACTITIONER

## 2023-01-17 RX ORDER — AMLODIPINE BESYLATE 10 MG/1
5 TABLET ORAL
COMMUNITY
Start: 2022-10-06 | End: 2023-01-17

## 2023-01-17 RX ORDER — LEVOTHYROXINE SODIUM 112 UG/1
112 TABLET ORAL DAILY
COMMUNITY
Start: 2022-07-25 | End: 2023-01-17

## 2023-01-17 RX ORDER — COLCHICINE 0.6 MG/1
0.6 TABLET ORAL DAILY
COMMUNITY
Start: 2023-01-13 | End: 2023-01-17

## 2023-01-17 RX ORDER — MULTIVITAMIN,THERAPEUTIC
1 TABLET ORAL DAILY
COMMUNITY

## 2023-01-17 RX ORDER — GABAPENTIN 300 MG/1
300 CAPSULE ORAL 3 TIMES DAILY
Qty: 270 CAPSULE | Refills: 0 | Status: SHIPPED | OUTPATIENT
Start: 2023-01-17 | End: 2023-06-07

## 2023-01-17 RX ORDER — COLCHICINE 0.6 MG/1
0.6 TABLET ORAL DAILY
Qty: 90 TABLET | Refills: 0 | Status: SHIPPED | OUTPATIENT
Start: 2023-01-17 | End: 2023-05-08

## 2023-01-17 RX ORDER — PREDNISONE 5 MG/1
5 TABLET ORAL DAILY
COMMUNITY
End: 2023-05-31

## 2023-01-17 ASSESSMENT — PAIN SCALES - GENERAL: PAINLEVEL: NO PAIN (0)

## 2023-01-17 NOTE — PATIENT INSTRUCTIONS
Follow up with cardiology for duration of colchicine and routine follow up. Looks like it has been a couple years.   Keep appointment with your primary provider.   Discuss plan for treatment of hyperlipidemia with your cardiology team and primary. We will not start any evolocumb (Repatha) injection until it has been approved by cardiology.     Medication list has been updated  We will call with lab results.

## 2023-02-01 DIAGNOSIS — I48.0 PAROXYSMAL ATRIAL FIBRILLATION (H): ICD-10-CM

## 2023-02-01 DIAGNOSIS — I10 BENIGN ESSENTIAL HYPERTENSION: Chronic | ICD-10-CM

## 2023-02-02 RX ORDER — METOPROLOL SUCCINATE 50 MG/1
50 TABLET, EXTENDED RELEASE ORAL DAILY
Qty: 90 TABLET | Refills: 3 | Status: SHIPPED | OUTPATIENT
Start: 2023-02-02 | End: 2023-09-07 | Stop reason: SINTOL

## 2023-02-02 NOTE — TELEPHONE ENCOUNTER
metoprolol succinate ER (TOPROL-XL) 50 MG 24 hr tablet 90 tablet 3 2/6/2022       Last Office Visit: 1/17/2023  [facility]    Future Office visit:    Next 5 appointments (look out 90 days)    Feb 03, 2023  1:30 PM  (Arrive by 1:15 PM)  Office Visit with Randi Haddad NP  [facility] ([facility] ) [address]  [place] [address]  [phone]   Feb 08, 2023  9:00 AM  (Arrive by 8:45 AM)  Return Visit with Chris Walsh,   [facility] ([facility] ) [address] [place] [address]  [phone]   Mar 03, 2023  9:45 AM  (Arrive by 9:30 AM)  SHORT with Randi Haddad NP  [facility] ([facility] ) [address]  [place] [address]  [phone]           R

## 2023-02-03 ENCOUNTER — OFFICE VISIT (OUTPATIENT)
Dept: FAMILY MEDICINE | Facility: OTHER | Age: 78
End: 2023-02-03
Attending: NURSE PRACTITIONER
Payer: COMMERCIAL

## 2023-02-03 VITALS
RESPIRATION RATE: 12 BRPM | HEART RATE: 56 BPM | WEIGHT: 179.8 LBS | BODY MASS INDEX: 32.89 KG/M2 | TEMPERATURE: 97.8 F | SYSTOLIC BLOOD PRESSURE: 140 MMHG | DIASTOLIC BLOOD PRESSURE: 68 MMHG | OXYGEN SATURATION: 98 %

## 2023-02-03 DIAGNOSIS — L91.8 SKIN TAG: Primary | ICD-10-CM

## 2023-02-03 DIAGNOSIS — I10 BENIGN ESSENTIAL HYPERTENSION: ICD-10-CM

## 2023-02-03 PROCEDURE — 17000 DESTRUCT PREMALG LESION: CPT | Performed by: NURSE PRACTITIONER

## 2023-02-03 PROCEDURE — 11200 RMVL SKIN TAGS UP TO&INC 15: CPT | Performed by: NURSE PRACTITIONER

## 2023-02-03 RX ORDER — BACITRACIN ZINC AND POLYMYXIN B SULFATE 500; 10000 [USP'U]/G; [USP'U]/G
OINTMENT OPHTHALMIC
COMMUNITY
Start: 2023-01-23 | End: 2023-04-05

## 2023-02-07 NOTE — PROGRESS NOTES
"[facility] HEART [facility]   CARDIOLOGY PROGRESS NOTE     Chief Complaint   Patient presents with     RECHECK          Diagnosis:  1.  Atrial fibrillation. Eliquis and metoprolol.   2.  Hypertension-controlled.  3.  Hyperlipidemia-uncontrolled.  4.  Palpitations.  5.  Atypical chest pain.  6.  CKD-2.  7.  GERD.  8.  Lower extremity edema.  9.  Concern for ZACHARIAH with referral for sleep study 11/13/2019. Declined sleep study on 2/27/20.  10.  CHADSVASC score of 3.  11.  Pericarditis-trace on 1/12/2023, [facility].  Admitted to the hospital put on steroids/colchicine.      Assessment/Plan:    1.  Pericarditis: Put on steroids instead of NSAID's due to kidney dysfunction per [facility] records. Currently on colchicine for 90 days and a taper of steroids at the recommendation.  Plan for repeat echocardiogram in 2 to 3 months and follow-up thereafter.  2.  A-fib: Patient asymptomatic.  Continue Eliquis and metoprolol.  No changes.  EKG today shows sinus rhythm.  3.  Hyperlipidemia: She is allergic to most statins and was given a prescription for PCSK9 inhibitors but secondary to cost never taken.  She is attempting diet and activity.  3.  ZACHARIAH: Suspicion but declining sleep study on 2/27/2020.  4.  Follow-up in 3 months after completion of echocardiogram.      Interval history:  Nella was last seen on 5/13/20.  She was admitted to [facility] on 1/10/2023 with pericarditis.  She was found to have a mildly elevated WBC count, CRP of 10.4, normal troponin, BNP, and D-dimer.  CTA of chest for PE negative.  She was noted to have a pericardial effusion.  Apparently, she was started on prednisone in place of NSAID's due to chronic kidney disease and colchicine for pericardial chest pain.  Echo did not show any evidence for tamponade or pretamponade physiology. \"She was discharged home with rx for prednisone taper, starting does 20 mg daily for 2 weeks followed by 2.5 mg decrease every 2 weeks. Also, prescribed colchicine " "0.6 mg daily, she was initially started on 0.6 mg bid but had creatinine bump with this dose. Her losartan was held and not resumed upon discharge due to creatinine bump which did normalize prior to discharge.\"  I suggest an echocardiogram in 2 to 3 months and follow-up this trivial pericardial effusion.  She will continue on the prednisone as recommended and colchicine.  I am concerned with a reoccurrence of pericarditis when the prednisone is completely discontinued.  Thankfully, she is doing well and has no symptoms of chest pain or chest tightness.  She feels that she was treated well at [facility] in [place].          Nella continues to do well.  Since last being seen on 11/13/2019, she has had no palpitations or subjective evidence for atrial fibrillation.  She has been managed well on metoprolol 50 mg XL daily and Eliquis 5 mg twice a day.  She has had no concerns for bleeding.  She has not been able to check her blood pressure and plans to get a prescription for a blood pressure cuff from her primary.  She feels she snores and stops breathing when laying flat. She has been sleeping in a recliner and her snoring and apneic episodes have declined.  She states she feels better.  She has not woken up short of breath with laying flat but feels a tightness to her chest when sleeping in bed.  She was previously referred for a sleep study but I do not believe that this has been completed at this point.  This is something we should revisit post COVID-19.  She does not need refills today.  Her labs as checked on 3/16/2020 came back normal.  She has no additional concerns.      HPI:    She had been seen secondary to paroxysmal atrial fibrillation, hypertension, and atypical chest pain. She continues on Eliquis and metoprolol 50 mg XL daily.  Previously, she was on aspirin 325 mg's as well as Coumadin. Since initiating Eliquis and metoprolol, her palpitations have improved substantially.  Since last being seen, " she has had x1 of brief palpitations.  She has had no episodes during the day.     She remains on losartan 100 mg daily, Norvasc 5 mg daily, and metoprolol 50 mg XL daily for hypertension. Her blood pressure is elevated.  She does check her blood pressure at home but is uncertain if her cuff is functioning.      Relevant testing:  ECHO on 1/12/23:  1. Trace pericardial effusion. No tamponade or pretamponade physiology.   2. Quantified left ventricle ejection fraction is 64%.   3. The right ventricle is mildly enlarged with normal systolic function. Estimated Right Ventricular Systolic Pressure 38 mmHg. No septal flattening.   4. Trileaflet sclerotic aortic valve. No aortic stenosis or regurgitation.   5. Left atrium is mildly enlarged by volume.   6. No prior echo for comparison.     ECHO on 11/20/19:  No pericardial effusion is present.  Global and regional left ventricular function is normal with an EF of 60-65%.  The right ventricle is normal size.  Mild left atrial enlargement is present.  Mild mitral insufficiency is present.  Aortic valve is normal in structure and function.  The aortic valve is tricuspid.  Trace aortic insufficiency is present.  Right ventricular systolic pressure is 30 mmHg above the right atrial pressure.  Mild tricuspid insufficiency is present.  The pulmonic valve is normal.  The aorta root is normal.        ICD-10-CM    1. Paroxysmal atrial fibrillation (H)  I48.0 EKG 12-lead complete w/read - Clinics     EKG 12-lead complete w/read - Clinics      2. Benign essential hypertension  I10 EKG 12-lead complete w/read - Clinics     EKG 12-lead complete w/read - Clinics          Past Medical History:   Diagnosis Date     Anxiety state, unspecified 09/25/2003     Arthritis      Atypical chest pain 3/28/2018     Breast mass      Cancer (H)      Carpal tunnel syndrome 07/02/2002     Endometrial hyperplasia 01/27/2003     Hypertension      Metrorrhagia 10/22/2003     Nonallopathic lesion of  thoracic region, not elsewhere classified 05/19/2003     Palpitations 04/11/2001     Postmenopausal bleeding 09/09/2002     Precordial pain 04/05/2001     Thyroid disease      Urgency of urination 06/06/2007       Past Surgical History:   Procedure Laterality Date     ADENOIDECTOMY       BIOPSY  2019    FNA left thyroid     BIOPSY BREAST       CHOLECYSTECTOMY  1981     COLONOSCOPY  2002     COLONOSCOPY  2012     COLONOSCOPY N/A 9/22/2014    Procedure: COLONOSCOPY;  Surgeon: Lavell Pavon DO;  Location: [facility] OR     COLONOSCOPY N/A 3/1/2021    Procedure: screening colonoscopy;  Surgeon: Sandro Dow MD;  Location: [facility] OR     CYSTOSCOPY  2007    Cystitis chronic     EYE SURGERY      right cataract     ORTHOPEDIC SURGERY  2002    carpal tunnel; RT, LT     THYROIDECTOMY Left 8/27/2019    Procedure: LEFT THYROID LOBECTOMY AND ISTHMUS WITH FROZENS;  Surgeon: Mildred Pineda MD;  Location: [facility] OR     TONSILLECTOMY         Allergies   Allergen Reactions     Atorvastatin      Ezetimibe      Gentamicin      Hydroxyzine      Lorazepam      Ranitidine      Simvastatin      Lopid [Gemfibrozil] GI Disturbance     Bloating, constipation,      No Clinical Screening - See Comments      anticholesterol medicine caused muscle aches     Pravastatin Muscle Pain (Myalgia)       Current Outpatient Medications   Medication Sig Dispense Refill     acetaminophen (TYLENOL) 650 MG CR tablet Take 1 tablet (650 mg) by mouth 3 times daily as needed for mild pain or fever 90 tablet 1     amLODIPine (NORVASC) 10 MG tablet TAKE 1 TABLET DAILY FOR BLOOD PRESSURE 90 tablet 2     apixaban ANTICOAGULANT (ELIQUIS ANTICOAGULANT) 5 MG tablet TAKE 1 TABLET (5 MG) BY MOUTH 2 TIMES DAILY 180 tablet 3     bacitracin-polymyxin b (POLYSPORIN) [id number] UNIT/GM ophthalmic ointment APPLY A 1/4 INCH RIBBON TO RIGHT EYE AT BEDTIME FOR 7 NIGHTS.       cholecalciferol 50 MCG (2000 UT) CAPS Take 1 capsule by mouth daily       colchicine (COLCYRS) 0.6 MG  tablet Take 1 tablet (0.6 mg) by mouth daily for 90 days 90 tablet 0     evolocumab (REPATHA) 140 MG/ML prefilled autoinjector Inject 1 mL (140 mg) Subcutaneous every 14 days 6 mL 1     gabapentin (NEURONTIN) 300 MG capsule Take 1 capsule (300 mg) by mouth 3 times daily 270 capsule 0     levothyroxine (SYNTHROID/LEVOTHROID) 112 MCG tablet TAKE 1 TABLET DAILY FOR THYROID 90 tablet 2     metoprolol succinate ER (TOPROL XL) 50 MG 24 hr tablet TAKE 1 TABLET (50 MG) BY MOUTH DAILY 90 tablet 3     multivitamin, therapeutic (THERA-VIT) TABS tablet Take 1 tablet by mouth daily       Omega-3 Fatty Acids (OMEGA-3 FISH OIL PO) Take 3 g by mouth daily        omeprazole (PRILOSEC) 20 MG DR capsule TAKE 1 CAPSULE (20 MG) BY MOUTH DAILY 90 capsule 2     predniSONE (DELTASONE) 5 MG tablet Take 5 mg by mouth daily Taper 4 tabs until 1/27; then 3.5 tabs until 2/10; then 3 tabs until 2/24; then 2.5 tabs until 3/10; then 2 tabs until 3/24; then 1.5 tabs until 4/7; then 1 tab until 4/21; then 0.5 tab until 5/5.       sertraline (ZOLOFT) 50 MG tablet TAKE 1 TABLET DAILY 90 tablet 0     solifenacin (VESICARE) 5 MG tablet TAKE 1 TABLET (5 MG) BY MOUTH DAILY 90 tablet 2     STATIN NOT PRESCRIBED (INTENTIONAL) Please choose reason not prescribed from choices below.       VITAMIN D, CHOLECALCIFEROL, PO Take by mouth daily         Social History     Socioeconomic History     Marital status:      Spouse name: Not on file     Number of children: Not on file     Years of education: Not on file     Highest education level: Not on file   Occupational History     Occupation: manager     Employer: [employer]     Comment: part-time   Tobacco Use     Smoking status: Never     Smokeless tobacco: Never   Substance and Sexual Activity     Alcohol use: Never     Drug use: No     Sexual activity: Never   Other Topics Concern     Parent/sibling w/ CABG, MI or angioplasty before 65F 55M? No      Service Not Asked     Blood Transfusions Yes      Caffeine Concern No     Occupational Exposure Not Asked     Hobby Hazards Not Asked     Sleep Concern Not Asked     Stress Concern Not Asked     Weight Concern Not Asked     Special Diet Not Asked     Back Care Not Asked     Exercise Yes     Comment: walking daily     Bike Helmet Not Asked     Seat Belt Not Asked     Self-Exams Not Asked   Social History Narrative     Not on file     Social Determinants of Health     Financial Resource Strain: Not on file   Food Insecurity: Not on file   Transportation Needs: Not on file   Physical Activity: Not on file   Stress: Not on file   Social Connections: Not on file   Intimate Partner Violence: Not on file   Housing Stability: Not on file       LAB RESULTS:   Office Visit on 01/17/2023   Component Date Value Ref Range Status     Sodium 01/17/2023 139  136 - 145 mmol/L Final     Potassium 01/17/2023 4.1  3.4 - 5.3 mmol/L Final     Chloride 01/17/2023 103  98 - 107 mmol/L Final     Carbon Dioxide (CO2) 01/17/2023 23  22 - 29 mmol/L Final     Anion Gap 01/17/2023 13  7 - 15 mmol/L Final     Urea Nitrogen 01/17/2023 18.4  8.0 - 23.0 mg/dL Final     Creatinine 01/17/2023 0.95  0.51 - 0.95 mg/dL Final     Calcium 01/17/2023 8.9  8.8 - 10.2 mg/dL Final     Glucose 01/17/2023 118 (H)  70 - 99 mg/dL Final     Alkaline Phosphatase 01/17/2023 120 (H)  35 - 104 U/L Final     AST 01/17/2023 62 (H)  10 - 35 U/L Final     ALT 01/17/2023 96 (H)  10 - 35 U/L Final     Protein Total 01/17/2023 7.1  6.4 - 8.3 g/dL Final     Albumin 01/17/2023 4.1  3.5 - 5.2 g/dL Final     Bilirubin Total 01/17/2023 0.4  <=1.2 mg/dL Final     GFR Estimate 01/17/2023 62  >60 mL/min/1.73m2 Final        Review of systems: Negative except that which was noted in the HPI.    Physical examination:  BP (!) 156/81   Pulse 52   Temp 97.2  F (36.2  C) (Tympanic)   Wt 81.2 kg (179 lb 1.6 oz)   SpO2 97%   BMI 32.76 kg/m      GENERAL APPEARANCE: healthy, alert and no distress  HEENT: no icterus, no xanthelasmas,  normal pupil size and reaction, no cyanosis.  CHEST: lungs clear to auscultation - no rales, rhonchi or wheezes, no use of accessory muscles, no retractions, respirations are unlabored, normal respiratory rate  CARDIOVASCULAR: regular rhythm, normal S1 with physiologic split S2, no S3 or S4 and no murmur, click or rub  EXTREMITIES: no clubbing, cyanosis or edema  NEURO: alert and oriented normal speech, and affect.    Total time spent on day of visit, including review of tests, obtaining/reviewing separately obtained history, ordering medications/tests/procedures, communicating with PCP/consultants, and documenting in electronic medical record: 30 minutes.             Thank you for allowing me to participate in the care of your patient. Please do not hesitate to contact me if you have any questions.     Chris Walsh, DO

## 2023-02-08 ENCOUNTER — OFFICE VISIT (OUTPATIENT)
Dept: CARDIOLOGY | Facility: OTHER | Age: 78
End: 2023-02-08
Attending: INTERNAL MEDICINE
Payer: COMMERCIAL

## 2023-02-08 VITALS
WEIGHT: 179.1 LBS | BODY MASS INDEX: 32.76 KG/M2 | HEART RATE: 47 BPM | TEMPERATURE: 97.2 F | SYSTOLIC BLOOD PRESSURE: 146 MMHG | OXYGEN SATURATION: 97 % | DIASTOLIC BLOOD PRESSURE: 63 MMHG

## 2023-02-08 DIAGNOSIS — R60.0 LOWER EXTREMITY EDEMA: ICD-10-CM

## 2023-02-08 DIAGNOSIS — I48.0 PAROXYSMAL ATRIAL FIBRILLATION (H): Primary | ICD-10-CM

## 2023-02-08 DIAGNOSIS — I10 BENIGN ESSENTIAL HYPERTENSION: ICD-10-CM

## 2023-02-08 DIAGNOSIS — I31.9 CHRONIC PERICARDITIS, UNSPECIFIED COMPLICATION STATUS, UNSPECIFIED TYPE: ICD-10-CM

## 2023-02-08 DIAGNOSIS — N18.2 CKD (CHRONIC KIDNEY DISEASE) STAGE 2, GFR 60-89 ML/MIN: Chronic | ICD-10-CM

## 2023-02-08 PROCEDURE — 99214 OFFICE O/P EST MOD 30 MIN: CPT | Mod: 25 | Performed by: INTERNAL MEDICINE

## 2023-02-08 PROCEDURE — G0463 HOSPITAL OUTPT CLINIC VISIT: HCPCS

## 2023-02-08 PROCEDURE — 93005 ELECTROCARDIOGRAM TRACING: CPT | Performed by: INTERNAL MEDICINE

## 2023-02-08 PROCEDURE — 93010 ELECTROCARDIOGRAM REPORT: CPT | Performed by: INTERNAL MEDICINE

## 2023-02-08 RX ORDER — LOSARTAN POTASSIUM 50 MG/1
50 TABLET ORAL DAILY
Qty: 90 TABLET | Refills: 3 | Status: SHIPPED | OUTPATIENT
Start: 2023-02-08 | End: 2023-12-06

## 2023-02-08 ASSESSMENT — PAIN SCALES - GENERAL: PAINLEVEL: NO PAIN (0)

## 2023-02-08 NOTE — PATIENT INSTRUCTIONS
Thank you for allowing Dr. Walsh and our  team to participate in your care. Please call our office at [phone] with scheduling questions or if you need to cancel or change your appointment. With any other questions or concerns you may call [facility] cardiology nurse at [phone].       If you experience chest pain, chest pressure, chest tightness, shortness of breath, fainting, lightheadedness, nausea, vomiting, or other concerning symptoms, please report to the Emergency Department or call 911. These symptoms may be emergent, and best treated in the Emergency Department.    Start Losartan 50 mg daily.   Recheck blood pressures 1 to 2 times daily.  Echocardiogram has been ordered. Diagnostic Imaging from the Hospital will call you.   Follow up with Dr Walsh in 3 months.

## 2023-03-02 NOTE — PROGRESS NOTES
Assessment & Plan     1. Hyperlipidemia LDL goal <100  Cannot tolerate statins.   - Lipid Profile; Future    2. Benign essential hypertension  Well controlled   - Comprehensive metabolic panel; Future    3. Paroxysmal atrial fibrillation (H)  Continue eliquis    4. Postoperative hypothyroidism  - TSH with free T4 reflex; Future    5. Class 1 obesity due to excess calories with serious comorbidity and body mass index (BMI) of 32.0 to 32.9 in adult  Continue weight loss efforts.     6. Cervical radiculopathy  Notes reviewed from Dr Holguin.  - MR Cervical Spine w/o Contrast    7. Abnormal EMG  - MR Cervical Spine w/o Contrast      Review of prior external note(s) from - [facility], cardiology notes - Dr Walsh reviewed      Return in about 3 months (around 6/3/2023) for hypertension and lipids, thyroid.    Randi Haddad, NP  [facility] - JAMIL Carmen is a 77 year old, presenting for the following health issues:  RECHECK      HPI     Hyperlipidemia Follow-Up      Are you regularly taking any medication or supplement to lower your cholesterol?   NA    Are you having muscle aches or other side effects that you think could be caused by your cholesterol lowering medication?  NA    Hypertension Follow-up      Do you check your blood pressure regularly outside of the clinic? Yes     Are you following a low salt diet? Yes    Are your blood pressures ever more than 140 on the top number (systolic) OR more   than 90 on the bottom number (diastolic), for example 140/90? No    Depression Followup    How are you doing with your depression since your last visit? No change    Are you having other symptoms that might be associated with depression? No    Have you had a significant life event?  OTHER: family concerns      Are you feeling anxious or having panic attacks?   No    Do you have any concerns with your use of alcohol or other drugs?  No    Social History     Tobacco Use     Smoking status: Never     Smokeless tobacco: Never   Substance Use Topics     Alcohol use: Never     Drug use: No     Last PHQ-9 3/3/2023   1.  Little interest or pleasure in doing things 0   2.  Feeling down, depressed, or hopeless 0   3.  Trouble falling or staying asleep, or sleeping too much 0   4.  Feeling tired or having little energy 0   5.  Poor appetite or overeating 0   6.  Feeling bad about yourself 0   7.  Trouble concentrating 0   8.  Moving slowly or restless 0   Q9: Thoughts of better off dead/self-harm past 2 weeks 0   PHQ-9 Total Score 0   Difficulty at work, home, or with people -     LA NENA-7  3/3/2023   1. Feeling nervous, anxious, or on edge 1   2. Not being able to stop or control worrying 2   3. Worrying too much about different things 1   4. Trouble relaxing 0   5. Being so restless that it is hard to sit still 1   6. Becoming easily annoyed or irritable 0   7. Feeling afraid, as if something awful might happen 1   LA NENA-7 Total Score 6   If you checked any problems, how difficult have they made it for you to do your work, take care of things at home, or get along with other people? Not difficult at all       Suicide Assessment Five-step Evaluation and Treatment (SAFE-T)    Chronic Kidney Disease Follow-up      Do you take any over the counter pain medicine?: No    Hypothyroidism Follow-up      Since last visit, patient describes the following symptoms: Weight stable, no hair loss, no skin changes, no constipation, no loose stools    Tremor and facial tic:  Saw Dr Holguin, EMG was completed.  He recommended MRI of cervical spine as results were indicative of compression of C5-C6.  MRI has not been done.  She continues with tremor and facial tic.          Recent Labs   Lab Test 01/17/23  1203 12/01/22  1015 08/29/22  0912 05/27/22  0925 08/18/21  0939 05/12/21  1040 04/28/21  1017   A1C  --  5.6  --   --   --   --   --    LDL  --  138* 127* 115*   < >  --   154*   HDL  --  42* 40* 39*   < >  --  47*   TRIG  --  209* 260* 202*   < >  --  178*   ALT 96* 53* 40 40   < >  --  30   CR 0.95 0.99* 0.95 0.96   < > 1.19* 1.04   GFRESTIMATED 62 59* 62 61   < > 45* 53*   GFRESTBLACK  --   --   --   --   --  52* 61   POTASSIUM 4.1 4.2 4.2 4.3   < > 4.2 4.0   TSH  --  3.32 3.34 2.22   < >  --  4.14*    < > = values in this interval not displayed.      BP Readings from Last 3 Encounters:   03/03/23 124/62   02/08/23 (!) 146/63   02/03/23 (!) 140/68    Wt Readings from Last 3 Encounters:   03/03/23 80.3 kg (177 lb 1.6 oz)   02/08/23 81.2 kg (179 lb 1.6 oz)   02/03/23 81.6 kg (179 lb 12.8 oz)                      Review of Systems   Constitutional, HEENT, cardiovascular, pulmonary, gi and gu systems are negative, except as otherwise noted.      Objective    /62 (BP Location: Right arm, Patient Position: Sitting, Cuff Size: Adult Regular)   Pulse 50   Temp 97.5  F (36.4  C) (Tympanic)   Resp 20   Wt 80.3 kg (177 lb 1.6 oz)   SpO2 98%   BMI 32.39 kg/m    Body mass index is 32.39 kg/m .  Physical Exam   GENERAL: healthy, alert and no distress  NECK: no adenopathy, no asymmetry, masses, or scars, thyroid normal to palpation and no carotid bruits  RESP: lungs clear to auscultation - no rales, rhonchi or wheezes  CV: regular rate and rhythm today, normal S1 S2, no S3 or S4, no murmur, no peripheral edema and peripheral pulses strong  MS: no gross musculoskeletal defects noted, no edema  PSYCH: mentation appears normal, affect normal/bright

## 2023-03-03 ENCOUNTER — OFFICE VISIT (OUTPATIENT)
Dept: FAMILY MEDICINE | Facility: OTHER | Age: 78
End: 2023-03-03
Attending: NURSE PRACTITIONER
Payer: COMMERCIAL

## 2023-03-03 VITALS
DIASTOLIC BLOOD PRESSURE: 62 MMHG | OXYGEN SATURATION: 98 % | HEART RATE: 50 BPM | TEMPERATURE: 97.5 F | RESPIRATION RATE: 20 BRPM | BODY MASS INDEX: 32.39 KG/M2 | WEIGHT: 177.1 LBS | SYSTOLIC BLOOD PRESSURE: 124 MMHG

## 2023-03-03 DIAGNOSIS — I10 BENIGN ESSENTIAL HYPERTENSION: Chronic | ICD-10-CM

## 2023-03-03 DIAGNOSIS — E89.0 POSTOPERATIVE HYPOTHYROIDISM: ICD-10-CM

## 2023-03-03 DIAGNOSIS — R94.131 ABNORMAL EMG: ICD-10-CM

## 2023-03-03 DIAGNOSIS — E66.09 CLASS 1 OBESITY DUE TO EXCESS CALORIES WITH SERIOUS COMORBIDITY AND BODY MASS INDEX (BMI) OF 32.0 TO 32.9 IN ADULT: ICD-10-CM

## 2023-03-03 DIAGNOSIS — E66.811 CLASS 1 OBESITY DUE TO EXCESS CALORIES WITH SERIOUS COMORBIDITY AND BODY MASS INDEX (BMI) OF 32.0 TO 32.9 IN ADULT: ICD-10-CM

## 2023-03-03 DIAGNOSIS — I48.0 PAROXYSMAL ATRIAL FIBRILLATION (H): Chronic | ICD-10-CM

## 2023-03-03 DIAGNOSIS — M54.12 CERVICAL RADICULOPATHY: ICD-10-CM

## 2023-03-03 DIAGNOSIS — E78.5 HYPERLIPIDEMIA LDL GOAL <100: Primary | ICD-10-CM

## 2023-03-03 LAB
ALBUMIN SERPL BCG-MCNC: 4 G/DL (ref 3.5–5.2)
ALP SERPL-CCNC: 84 U/L (ref 35–104)
ALT SERPL W P-5'-P-CCNC: 54 U/L (ref 10–35)
ANION GAP SERPL CALCULATED.3IONS-SCNC: 13 MMOL/L (ref 7–15)
AST SERPL W P-5'-P-CCNC: 24 U/L (ref 10–35)
BILIRUB SERPL-MCNC: 0.7 MG/DL
BUN SERPL-MCNC: 19.1 MG/DL (ref 8–23)
CALCIUM SERPL-MCNC: 9.5 MG/DL (ref 8.8–10.2)
CHLORIDE SERPL-SCNC: 105 MMOL/L (ref 98–107)
CHOLEST SERPL-MCNC: 204 MG/DL
CREAT SERPL-MCNC: 0.99 MG/DL (ref 0.51–0.95)
DEPRECATED HCO3 PLAS-SCNC: 25 MMOL/L (ref 22–29)
GFR SERPL CREATININE-BSD FRML MDRD: 59 ML/MIN/1.73M2
GLUCOSE SERPL-MCNC: 103 MG/DL (ref 70–99)
HDLC SERPL-MCNC: 54 MG/DL
HOLD SPECIMEN: NORMAL
LDLC SERPL CALC-MCNC: 109 MG/DL
NONHDLC SERPL-MCNC: 150 MG/DL
POTASSIUM SERPL-SCNC: 4 MMOL/L (ref 3.4–5.3)
PROT SERPL-MCNC: 6.5 G/DL (ref 6.4–8.3)
SODIUM SERPL-SCNC: 143 MMOL/L (ref 136–145)
TRIGL SERPL-MCNC: 203 MG/DL
TSH SERPL DL<=0.005 MIU/L-ACNC: 1.58 UIU/ML (ref 0.3–4.2)

## 2023-03-03 PROCEDURE — 80053 COMPREHEN METABOLIC PANEL: CPT | Mod: ZL | Performed by: NURSE PRACTITIONER

## 2023-03-03 PROCEDURE — 99214 OFFICE O/P EST MOD 30 MIN: CPT | Performed by: NURSE PRACTITIONER

## 2023-03-03 PROCEDURE — 80061 LIPID PANEL: CPT | Mod: ZL | Performed by: NURSE PRACTITIONER

## 2023-03-03 PROCEDURE — 84443 ASSAY THYROID STIM HORMONE: CPT | Mod: ZL | Performed by: NURSE PRACTITIONER

## 2023-03-03 PROCEDURE — 36415 COLL VENOUS BLD VENIPUNCTURE: CPT | Mod: ZL | Performed by: NURSE PRACTITIONER

## 2023-03-03 PROCEDURE — G0463 HOSPITAL OUTPT CLINIC VISIT: HCPCS

## 2023-03-03 ASSESSMENT — ANXIETY QUESTIONNAIRES
7. FEELING AFRAID AS IF SOMETHING AWFUL MIGHT HAPPEN: SEVERAL DAYS
6. BECOMING EASILY ANNOYED OR IRRITABLE: NOT AT ALL
IF YOU CHECKED OFF ANY PROBLEMS ON THIS QUESTIONNAIRE, HOW DIFFICULT HAVE THESE PROBLEMS MADE IT FOR YOU TO DO YOUR WORK, TAKE CARE OF THINGS AT HOME, OR GET ALONG WITH OTHER PEOPLE: NOT DIFFICULT AT ALL
3. WORRYING TOO MUCH ABOUT DIFFERENT THINGS: SEVERAL DAYS
5. BEING SO RESTLESS THAT IT IS HARD TO SIT STILL: SEVERAL DAYS
GAD7 TOTAL SCORE: 6
GAD7 TOTAL SCORE: 6
1. FEELING NERVOUS, ANXIOUS, OR ON EDGE: SEVERAL DAYS
2. NOT BEING ABLE TO STOP OR CONTROL WORRYING: MORE THAN HALF THE DAYS

## 2023-03-03 ASSESSMENT — PATIENT HEALTH QUESTIONNAIRE - PHQ9
SUM OF ALL RESPONSES TO PHQ QUESTIONS 1-9: 0
5. POOR APPETITE OR OVEREATING: NOT AT ALL

## 2023-03-03 ASSESSMENT — PAIN SCALES - GENERAL: PAINLEVEL: NO PAIN (0)

## 2023-03-03 NOTE — PATIENT INSTRUCTIONS
Assessment & Plan     1. Hyperlipidemia LDL goal <100  Cannot tolerate statins.   - Lipid Profile; Future    2. Benign essential hypertension  Well controlled   - Comprehensive metabolic panel; Future    3. Paroxysmal atrial fibrillation (H)  Continue eliquis    4. Postoperative hypothyroidism  - TSH with free T4 reflex; Future    5. Class 1 obesity due to excess calories with serious comorbidity and body mass index (BMI) of 32.0 to 32.9 in adult  Continue weight loss efforts.     6. Cervical radiculopathy  Notes reviewed from Dr Holguin.  - MR Cervical Spine w/o Contrast    7. Abnormal EMG  - MR Cervical Spine w/o Contrast      [facility] cardiology notes - Dr Walsh reviewed      Return in about 3 months (around 6/3/2023) for hypertension and lipids, thyroid.    Randi Haddad, NP  [facility] [place] [facility]

## 2023-03-06 ENCOUNTER — TELEPHONE (OUTPATIENT)
Dept: FAMILY MEDICINE | Facility: OTHER | Age: 78
End: 2023-03-06

## 2023-03-06 ENCOUNTER — ANCILLARY ORDERS (OUTPATIENT)
Dept: FAMILY MEDICINE | Facility: OTHER | Age: 78
End: 2023-03-06

## 2023-03-06 ENCOUNTER — MEDICAL CORRESPONDENCE (OUTPATIENT)
Dept: HEALTH INFORMATION MANAGEMENT | Facility: CLINIC | Age: 78
End: 2023-03-06

## 2023-03-06 DIAGNOSIS — Z12.31 VISIT FOR SCREENING MAMMOGRAM: ICD-10-CM

## 2023-03-06 NOTE — TELEPHONE ENCOUNTER
Called patient on her results today and she stated after she saw her eye doctor you wanted her to do some testing MRI? And to let you know.     She did see her doctor.

## 2023-03-07 NOTE — TELEPHONE ENCOUNTER
Will need office notes for review first.  I am not sure who her provider is.  May have to call Nella and find out.

## 2023-03-10 ENCOUNTER — TELEPHONE (OUTPATIENT)
Dept: MAMMOGRAPHY | Facility: OTHER | Age: 78
End: 2023-03-10

## 2023-03-10 ENCOUNTER — ANCILLARY PROCEDURE (OUTPATIENT)
Dept: MAMMOGRAPHY | Facility: OTHER | Age: 78
End: 2023-03-10
Attending: NURSE PRACTITIONER
Payer: COMMERCIAL

## 2023-03-10 DIAGNOSIS — Z12.31 VISIT FOR SCREENING MAMMOGRAM: ICD-10-CM

## 2023-03-10 PROCEDURE — 77067 SCR MAMMO BI INCL CAD: CPT | Mod: TC

## 2023-03-29 NOTE — PROGRESS NOTES
Assessment & Plan     1. Hair loss  Will check with Dr Walsh to see if metoprolol could be contributing to her hair loss.      2. Paroxysmal atrial fibrillation (H)  Continue eliquis    3. Benign essential hypertension  Continue metoprolol for now    4. Current use of beta blocker  As above.       Thyroid normal, HGB normal.      Randi Haddad NP  [facility] [place] [facility]    Subjective   Nella is a 77 year old, presenting for the following health issues:  Hair Loss  No flowsheet data found.  HPI     Concern - Hair brittle and falling out   Onset: about 3-4 months ago   Description: hair falling out - brush is full each morning after brushing.   Intensity: moderate  Progression of Symptoms:  same  Accompanying Signs & Symptoms: hair feels brittle and breaking mid-shaft.  Previous history of similar problem: no  Precipitating factors:        Worsened by: unknown   Alleviating factors:        Improved by: nothing   Therapies tried and outcome: None    She does have hypothyroid, takes levothyroxine   TSH   Date Value Ref Range Status   03/03/2023 1.58 0.30 - 4.20 uIU/mL Final   08/29/2022 3.34 0.40 - 4.00 mU/L Final   04/28/2021 4.14 (H) 0.40 - 4.00 mU/L Final     HGB:  Hemoglobin   Date Value Ref Range Status   08/29/2022 14.6 11.7 - 15.7 g/dL Final   04/28/2021 14.2 11.7 - 15.7 g/dL Final   ]    She is under a bit of stress with her grandson.        Recent Labs   Lab Test 03/03/23  1018 01/17/23  1203 12/01/22  1015 08/29/22  0912 08/29/22  0912 08/18/21  0939 05/12/21  1040 04/28/21  1017   A1C  --   --  5.6  --   --   --   --   --    *  --  138*  --  127*   < >  --  154*   HDL 54  --  42*  --  40*   < >  --  47*   TRIG 203*  --  209*  --  260*   < >  --  178*   ALT 54* 96* 53*  --  40   < >  --  30   CR 0.99* 0.95 0.99*  --  0.95   < > 1.19* 1.04   GFRESTIMATED 59* 62 59*  --  62   < > 45* 53*   GFRESTBLACK  --   --   --   --   --   --  52* 61   POTASSIUM 4.0 4.1 4.2   < > 4.2   < >  "4.2 4.0   TSH 1.58  --  3.32  --  3.34   < >  --  4.14*    < > = values in this interval not displayed.      BP Readings from Last 3 Encounters:   04/05/23 132/66   03/03/23 124/62   02/08/23 (!) 146/63    Wt Readings from Last 3 Encounters:   04/05/23 76.9 kg (169 lb 9.6 oz)   03/03/23 80.3 kg (177 lb 1.6 oz)   02/08/23 81.2 kg (179 lb 1.6 oz)                        Review of Systems   Constitutional, HEENT, cardiovascular, pulmonary, gi and gu systems are negative, except as otherwise noted.      Objective    /66 (BP Location: Left arm, Patient Position: Chair, Cuff Size: Adult Regular)   Pulse 56   Temp 97.6  F (36.4  C) (Tympanic)   Resp 16   Ht 1.575 m (5' 2\")   Wt 76.9 kg (169 lb 9.6 oz)   SpO2 97%   BMI 31.02 kg/m    Body mass index is 31.02 kg/m .  Physical Exam   GENERAL: healthy, alert and no distress  MS: no gross musculoskeletal defects noted, no edema  PSYCH: mentation appears normal, affect normal/bright  SCALP:  Thinning hair, breaking mid shaft.  No patches of alopecia noted.       Office Visit on 03/03/2023   Component Date Value Ref Range Status     Cholesterol 03/03/2023 204 (H)  <200 mg/dL Final     Triglycerides 03/03/2023 203 (H)  <150 mg/dL Final     Direct Measure HDL 03/03/2023 54  >=50 mg/dL Final     LDL Cholesterol Calculated 03/03/2023 109 (H)  <=100 mg/dL Final     Non HDL Cholesterol 03/03/2023 150 (H)  <130 mg/dL Final     Sodium 03/03/2023 143  136 - 145 mmol/L Final     Potassium 03/03/2023 4.0  3.4 - 5.3 mmol/L Final     Chloride 03/03/2023 105  98 - 107 mmol/L Final     Carbon Dioxide (CO2) 03/03/2023 25  22 - 29 mmol/L Final     Anion Gap 03/03/2023 13  7 - 15 mmol/L Final     Urea Nitrogen 03/03/2023 19.1  8.0 - 23.0 mg/dL Final     Creatinine 03/03/2023 0.99 (H)  0.51 - 0.95 mg/dL Final     Calcium 03/03/2023 9.5  8.8 - 10.2 mg/dL Final     Glucose 03/03/2023 103 (H)  70 - 99 mg/dL Final     Alkaline Phosphatase 03/03/2023 84  35 - 104 U/L Final     AST 03/03/2023 " 24  10 - 35 U/L Final     ALT 03/03/2023 54 (H)  10 - 35 U/L Final     Protein Total 03/03/2023 6.5  6.4 - 8.3 g/dL Final     Albumin 03/03/2023 4.0  3.5 - 5.2 g/dL Final     Bilirubin Total 03/03/2023 0.7  <=1.2 mg/dL Final     GFR Estimate 03/03/2023 59 (L)  >60 mL/min/1.73m2 Final    eGFR calculated using 2021 CKD-EPI equation.     TSH 03/03/2023 1.58  0.30 - 4.20 uIU/mL Final     Hold Specimen 03/03/2023 JI   Final       Hemoglobin   Date Value Ref Range Status   08/29/2022 14.6 11.7 - 15.7 g/dL Final   05/18/2022 15.1 11.7 - 15.7 g/dL Final   04/28/2021 14.2 11.7 - 15.7 g/dL Final   02/01/2021 14.6 11.7 - 15.7 g/dL Final     TSH   Date Value Ref Range Status   03/03/2023 1.58 0.30 - 4.20 uIU/mL Final   08/29/2022 3.34 0.40 - 4.00 mU/L Final   04/28/2021 4.14 (H) 0.40 - 4.00 mU/L Final

## 2023-04-05 ENCOUNTER — OFFICE VISIT (OUTPATIENT)
Dept: FAMILY MEDICINE | Facility: OTHER | Age: 78
End: 2023-04-05
Attending: NURSE PRACTITIONER
Payer: COMMERCIAL

## 2023-04-05 VITALS
WEIGHT: 169.6 LBS | BODY MASS INDEX: 31.21 KG/M2 | TEMPERATURE: 97.6 F | HEART RATE: 56 BPM | RESPIRATION RATE: 16 BRPM | DIASTOLIC BLOOD PRESSURE: 66 MMHG | HEIGHT: 62 IN | SYSTOLIC BLOOD PRESSURE: 132 MMHG | OXYGEN SATURATION: 97 %

## 2023-04-05 DIAGNOSIS — L65.9 HAIR LOSS: Primary | ICD-10-CM

## 2023-04-05 DIAGNOSIS — I10 BENIGN ESSENTIAL HYPERTENSION: ICD-10-CM

## 2023-04-05 DIAGNOSIS — I48.0 PAROXYSMAL ATRIAL FIBRILLATION (H): ICD-10-CM

## 2023-04-05 DIAGNOSIS — Z79.899 CURRENT USE OF BETA BLOCKER: ICD-10-CM

## 2023-04-05 PROCEDURE — G0463 HOSPITAL OUTPT CLINIC VISIT: HCPCS

## 2023-04-05 PROCEDURE — 99214 OFFICE O/P EST MOD 30 MIN: CPT | Performed by: NURSE PRACTITIONER

## 2023-04-05 ASSESSMENT — PAIN SCALES - GENERAL: PAINLEVEL: NO PAIN (0)

## 2023-04-05 NOTE — Clinical Note
HI;  can you please review note?  She came in for increased hair loss - TSH normal, HGB normal but takes metoprolol.  Can we change it to something else to see if it will reduce symptoms? Thank you in advance!

## 2023-04-13 ENCOUNTER — TELEPHONE (OUTPATIENT)
Dept: CARDIOLOGY | Facility: OTHER | Age: 78
End: 2023-04-13

## 2023-04-17 DIAGNOSIS — L08.9 LOCAL INFECTION OF SKIN AND SUBCUTANEOUS TISSUE: Primary | ICD-10-CM

## 2023-04-17 RX ORDER — MUPIROCIN 20 MG/G
OINTMENT TOPICAL 3 TIMES DAILY
Qty: 22 G | Refills: 0 | Status: SHIPPED | OUTPATIENT
Start: 2023-04-17

## 2023-04-17 NOTE — TELEPHONE ENCOUNTER
Patient is requesting that the med be refilled and sent to [facility].  Not on current med list.    mupirocin (BACTROBAN) 2 % external ointment (Discontinued) 22 g 1 4/26/2022 1/17/2023 --   Sig - Route: Apply topically 3 times daily - Topical   Patient not taking: Reported on 1/17/2023        Sent to pharmacy as: Mupirocin 2 % External Ointment (BACTROBAN)   Class: E-Prescribe   Reason for Discontinue: Med Rec(No AVS / No eCancel)   Order: [id number]   E-Prescribing Status: Receipt confirmed by pharmacy (4/26/2022 11:01 AM CDT)

## 2023-04-28 ENCOUNTER — ANCILLARY PROCEDURE (OUTPATIENT)
Dept: GENERAL RADIOLOGY | Facility: OTHER | Age: 78
End: 2023-04-28
Attending: NURSE PRACTITIONER
Payer: COMMERCIAL

## 2023-04-28 ENCOUNTER — OFFICE VISIT (OUTPATIENT)
Dept: FAMILY MEDICINE | Facility: OTHER | Age: 78
End: 2023-04-28
Attending: NURSE PRACTITIONER
Payer: COMMERCIAL

## 2023-04-28 ENCOUNTER — TELEPHONE (OUTPATIENT)
Dept: FAMILY MEDICINE | Facility: OTHER | Age: 78
End: 2023-04-28

## 2023-04-28 VITALS
RESPIRATION RATE: 16 BRPM | HEIGHT: 62 IN | OXYGEN SATURATION: 96 % | TEMPERATURE: 98.1 F | DIASTOLIC BLOOD PRESSURE: 76 MMHG | HEART RATE: 55 BPM | SYSTOLIC BLOOD PRESSURE: 138 MMHG | WEIGHT: 169 LBS | BODY MASS INDEX: 31.1 KG/M2

## 2023-04-28 DIAGNOSIS — L72.9 INFECTED CYST OF SKIN: Primary | ICD-10-CM

## 2023-04-28 DIAGNOSIS — L08.9 INFECTED CYST OF SKIN: Primary | ICD-10-CM

## 2023-04-28 DIAGNOSIS — M79.672 LEFT FOOT PAIN: ICD-10-CM

## 2023-04-28 LAB
BASOPHILS # BLD AUTO: 0 10E3/UL (ref 0–0.2)
BASOPHILS NFR BLD AUTO: 0 %
EOSINOPHIL # BLD AUTO: 0.2 10E3/UL (ref 0–0.7)
EOSINOPHIL NFR BLD AUTO: 1 %
ERYTHROCYTE [DISTWIDTH] IN BLOOD BY AUTOMATED COUNT: 14.2 % (ref 10–15)
HCT VFR BLD AUTO: 44.4 % (ref 35–47)
HGB BLD-MCNC: 14.9 G/DL (ref 11.7–15.7)
HOLD SPECIMEN: NORMAL
LYMPHOCYTES # BLD AUTO: 2.5 10E3/UL (ref 0.8–5.3)
LYMPHOCYTES NFR BLD AUTO: 21 %
MCH RBC QN AUTO: 28.5 PG (ref 26.5–33)
MCHC RBC AUTO-ENTMCNC: 33.6 G/DL (ref 31.5–36.5)
MCV RBC AUTO: 85 FL (ref 78–100)
MONOCYTES # BLD AUTO: 0.7 10E3/UL (ref 0–1.3)
MONOCYTES NFR BLD AUTO: 6 %
NEUTROPHILS # BLD AUTO: 8.6 10E3/UL (ref 1.6–8.3)
NEUTROPHILS NFR BLD AUTO: 72 %
PLATELET # BLD AUTO: 237 10E3/UL (ref 150–450)
RBC # BLD AUTO: 5.22 10E6/UL (ref 3.8–5.2)
WBC # BLD AUTO: 11.9 10E3/UL (ref 4–11)

## 2023-04-28 PROCEDURE — 85014 HEMATOCRIT: CPT | Mod: ZL | Performed by: NURSE PRACTITIONER

## 2023-04-28 PROCEDURE — 73630 X-RAY EXAM OF FOOT: CPT | Mod: TC,LT,FY

## 2023-04-28 PROCEDURE — 36415 COLL VENOUS BLD VENIPUNCTURE: CPT | Mod: ZL | Performed by: NURSE PRACTITIONER

## 2023-04-28 PROCEDURE — G0463 HOSPITAL OUTPT CLINIC VISIT: HCPCS

## 2023-04-28 PROCEDURE — 99214 OFFICE O/P EST MOD 30 MIN: CPT | Performed by: NURSE PRACTITIONER

## 2023-04-28 RX ORDER — SULFAMETHOXAZOLE/TRIMETHOPRIM 800-160 MG
1 TABLET ORAL 2 TIMES DAILY
Qty: 20 TABLET | Refills: 0 | Status: SHIPPED | OUTPATIENT
Start: 2023-04-28 | End: 2023-05-08

## 2023-04-28 ASSESSMENT — PAIN SCALES - GENERAL: PAINLEVEL: NO PAIN (0)

## 2023-04-28 NOTE — TELEPHONE ENCOUNTER
Noted lump and black and blue on top of foot vein is more notable foot was swollen but better now.  Does not remember hurting it at all

## 2023-04-28 NOTE — TELEPHONE ENCOUNTER
8:08 AM    Reason for Call: OVERBOOK    Patient is having the following symptoms: Left foot has a lump on it and the lump is turning black and blue for about 4 days.    The patient is requesting an appointment for today with Aroldo Lomeli.    Was an appointment offered for this call? No  If yes : Appointment type              Date    Preferred method for responding to this message: Telephone Call  What is your phone number ? [phone]    If we cannot reach you directly, may we leave a detailed response at the number you provided? Yes    Can this message wait until your PCP/provider returns, if unavailable today?

## 2023-04-28 NOTE — PROGRESS NOTES
"  Assessment & Plan     1. Infected cyst of skin  Warm compress  Elevation  To Urgent care over the weekend with any worsening or if develop a fever  Follow-up next week if no improvement.   - sulfamethoxazole-trimethoprim (BACTRIM DS) 800-160 MG tablet; Take 1 tablet by mouth 2 times daily for 10 days  Dispense: 20 tablet; Refill: 0    2. Left foot pain  XR normal, white count elevated.   - XR FOOT LT G/E 3 VW (Clinic Performed); Future  - CBC with platelets and differential          Randi Haddad NP  [facility]    Nigel Carmen is a 77 year old, presenting for the following health issues:  Mass (On foot )                      HPI     Concern - Lump on foot left   Onset: 4 days   Description: bruising noted from middle of foot to toes   Intensity: moderate  Progression of Symptoms:  same  Accompanying Signs & Symptoms: painful small lump noted on top of middle of left foot   Previous history of similar problem: no   Precipitating factors:        Worsened by: pressing on lump   Alleviating factors:        Improved by: nothing   Therapies tried and outcome: None          Review of Systems   Constitutional, HEENT, cardiovascular, pulmonary, gi and gu systems are negative, except as otherwise noted.      Objective    /76 (BP Location: Left arm, Patient Position: Chair, Cuff Size: Adult Regular)   Pulse 55   Temp 98.1  F (36.7  C) (Tympanic)   Resp 16   Ht 1.575 m (5' 2\")   Wt 76.7 kg (169 lb)   SpO2 96%   BMI 30.91 kg/m    Body mass index is 30.91 kg/m .  Physical Exam   GENERAL: healthy, alert and no distress  RESP: lungs clear to auscultation - no rales, rhonchi or wheezes  CV: regular rate and rhythm, normal S1 S2, no S3 or S4, no murmur, click or rub, no peripheral edema and peripheral pulses strong  MS: sensation intact, no open ulcer but warm tender cystic like structure on dorsal aspect of left foot.  Ecchymosis of dorsal aspect, no tenderness along " metatarsals.  ROM of foot intact.     PSYCH: mentation appears normal, affect normal/bright        Results for orders placed or performed in visit on 04/28/23   XR FOOT LT G/E 3 VW (Clinic Performed)     Status: None    Narrative    Exam: XR FOOT LEFT G/E 3 VIEWS     History:Female, age 77 years, Left foot pain    Comparison:  5/22/2015    Technique: Three views are submitted.    Findings: Bones are normally mineralized. No evidence of acute or  subacute fracture.  No evidence of dislocation.           Impression    Impression:  No evidence of acute or subacute bony abnormality.     Calcifications along the posterior aspect of the calcaneus without  significant change.    MARCI UMANZOR MD         SYSTEM ID:  [id number]   Results for orders placed or performed in visit on 04/28/23   Extra Tube     Status: None    Narrative    The following orders were created for panel order Extra Tube.  Procedure                               Abnormality         Status                     ---------                               -----------         ------                     Extra Green Top (Lithium...[[id number]]                      Final result                 Please view results for these tests on the individual orders.   CBC with platelets and differential     Status: Abnormal   Result Value Ref Range    WBC Count 11.9 (H) 4.0 - 11.0 10e3/uL    RBC Count 5.22 (H) 3.80 - 5.20 10e6/uL    Hemoglobin 14.9 11.7 - 15.7 g/dL    Hematocrit 44.4 35.0 - 47.0 %    MCV 85 78 - 100 fL    MCH 28.5 26.5 - 33.0 pg    MCHC 33.6 31.5 - 36.5 g/dL    RDW 14.2 10.0 - 15.0 %    Platelet Count 237 150 - 450 10e3/uL    % Neutrophils 72 %    % Lymphocytes 21 %    % Monocytes 6 %    % Eosinophils 1 %    % Basophils 0 %    Absolute Neutrophils 8.6 (H) 1.6 - 8.3 10e3/uL    Absolute Lymphocytes 2.5 0.8 - 5.3 10e3/uL    Absolute Monocytes 0.7 0.0 - 1.3 10e3/uL    Absolute Eosinophils 0.2 0.0 - 0.7 10e3/uL    Absolute Basophils 0.0 0.0 - 0.2 10e3/uL   Extra  Green Top (Lithium Heparin) Tube     Status: None   Result Value Ref Range    Hold Specimen [facility]    CBC with platelets and differential     Status: Abnormal    Narrative    The following orders were created for panel order CBC with platelets and differential.  Procedure                               Abnormality         Status                     ---------                               -----------         ------                     CBC with platelets and d...[[id number]]  Abnormal            Final result                 Please view results for these tests on the individual orders.

## 2023-04-28 NOTE — PATIENT INSTRUCTIONS
Assessment & Plan     1. Infected cyst of skin  Warm compress  Elevation  To Urgent care over the weekend with any worsening or if develop a fever  Follow-up next week if no improvement.   - sulfamethoxazole-trimethoprim (BACTRIM DS) 800-160 MG tablet; Take 1 tablet by mouth 2 times daily for 10 days  Dispense: 20 tablet; Refill: 0    2. Left foot pain  XR normal, white count elevated.   - XR FOOT LT G/E 3 VW (Clinic Performed); Future  - CBC with platelets and differential          Randi Haddad, NP  [facility]

## 2023-05-03 ENCOUNTER — HOSPITAL ENCOUNTER (OUTPATIENT)
Dept: CARDIOLOGY | Facility: HOSPITAL | Age: 78
Discharge: HOME | End: 2023-05-03
Attending: INTERNAL MEDICINE | Admitting: INTERNAL MEDICINE
Payer: COMMERCIAL

## 2023-05-03 DIAGNOSIS — I31.9 CHRONIC PERICARDITIS, UNSPECIFIED COMPLICATION STATUS, UNSPECIFIED TYPE: ICD-10-CM

## 2023-05-03 LAB — LVEF ECHO: NORMAL

## 2023-05-03 PROCEDURE — 93321 DOPPLER ECHO F-UP/LMTD STD: CPT | Mod: 26 | Performed by: INTERNAL MEDICINE

## 2023-05-03 PROCEDURE — 93321 DOPPLER ECHO F-UP/LMTD STD: CPT

## 2023-05-03 PROCEDURE — 93325 DOPPLER ECHO COLOR FLOW MAPG: CPT | Mod: 26 | Performed by: INTERNAL MEDICINE

## 2023-05-03 PROCEDURE — 93308 TTE F-UP OR LMTD: CPT | Mod: 26 | Performed by: INTERNAL MEDICINE

## 2023-05-03 PROCEDURE — 93325 DOPPLER ECHO COLOR FLOW MAPG: CPT

## 2023-05-06 DIAGNOSIS — I31.39 PERICARDIAL EFFUSION: ICD-10-CM

## 2023-05-08 RX ORDER — COLCHICINE 0.6 MG/1
TABLET ORAL
Qty: 90 TABLET | Refills: 0 | Status: SHIPPED | OUTPATIENT
Start: 2023-05-08

## 2023-05-08 NOTE — TELEPHONE ENCOUNTER
colchicine (COLCYRS) 0.6 MG tablet  Last Written Prescription Date:  Historical and Discontinued  Last Fill Quantity: 0,   # refills: 0  Last Office Visit: 4/28/2023  Future Office visit:    Next 5 appointments (look out 90 days)    May 31, 2023 11:00 AM  (Arrive by 10:45 AM)  [facility]  [facility] [place] [facility] [address]  [place] [address]  [phone]   Jun 05, 2023 11:00 AM  (Arrive by 10:45 AM)  SHORT with Randi Haddad NP  [facility] ([facility] ) [address]  [facility] [place] [address]  [phone]

## 2023-05-17 ENCOUNTER — TELEPHONE (OUTPATIENT)
Dept: FAMILY MEDICINE | Facility: OTHER | Age: 78
End: 2023-05-17

## 2023-05-17 NOTE — TELEPHONE ENCOUNTER
9:41 AM    Reason for Call: Phone Call    Description:  Patient would like to talk to Aroldo about her referrral, if it has been ordered for twitching in face.  She is now having a pulsating on temple on the same side of face.    Was an appointment offered for this call? No  If yes : Appointment type              Date    Preferred method for responding to this message: Telephone Call  What is your phone number ?  [phone]    If we cannot reach you directly, may we leave a detailed response at the number you provided? Yes    Can this message wait until your PCP/provider returns, if available today? YES, No  Provider is in today    MARY MARAVILLA

## 2023-05-23 NOTE — TELEPHONE ENCOUNTER
Patient called again, she has not heard anything back so just wondering if she could get a call back regarding this matter please and thank you. [phone].

## 2023-05-31 ENCOUNTER — OFFICE VISIT (OUTPATIENT)
Dept: CARDIOLOGY | Facility: OTHER | Age: 78
End: 2023-05-31
Attending: INTERNAL MEDICINE
Payer: COMMERCIAL

## 2023-05-31 VITALS
HEIGHT: 62 IN | DIASTOLIC BLOOD PRESSURE: 69 MMHG | SYSTOLIC BLOOD PRESSURE: 113 MMHG | BODY MASS INDEX: 32.7 KG/M2 | WEIGHT: 177.7 LBS | HEART RATE: 61 BPM | OXYGEN SATURATION: 93 %

## 2023-05-31 DIAGNOSIS — I31.9 CHRONIC PERICARDITIS, UNSPECIFIED COMPLICATION STATUS, UNSPECIFIED TYPE: Primary | ICD-10-CM

## 2023-05-31 DIAGNOSIS — I10 BENIGN ESSENTIAL HYPERTENSION: Chronic | ICD-10-CM

## 2023-05-31 DIAGNOSIS — I48.0 PAROXYSMAL ATRIAL FIBRILLATION (H): Chronic | ICD-10-CM

## 2023-05-31 PROCEDURE — G0463 HOSPITAL OUTPT CLINIC VISIT: HCPCS

## 2023-05-31 PROCEDURE — 99213 OFFICE O/P EST LOW 20 MIN: CPT | Performed by: INTERNAL MEDICINE

## 2023-05-31 ASSESSMENT — PAIN SCALES - GENERAL: PAINLEVEL: NO PAIN (0)

## 2023-05-31 NOTE — PATIENT INSTRUCTIONS
Thank you for allowing Dr. Walsh and our  team to participate in your care. Please call our office at [phone] with scheduling questions or if you need to cancel or change your appointment. With any other questions or concerns you may call [facility] cardiology nurse at [phone].       If you experience chest pain, chest pressure, chest tightness, shortness of breath, fainting, lightheadedness, nausea, vomiting, or other concerning symptoms, please report to the Emergency Department or call 911. These symptoms may be emergent, and best treated in the Emergency Department.

## 2023-05-31 NOTE — PROGRESS NOTES
"[facility] HEART CARE   CARDIOLOGY PROGRESS NOTE     Chief Complaint   Patient presents with     Heart Problem     Echocardiogram follow up           Diagnosis:  1.  Atrial fibrillation. Eliquis and metoprolol.   2.  Hypertension-controlled.  3.  Hyperlipidemia-uncontrolled.  4.  Palpitations.  5.  Atypical chest pain.  6.  CKD-2.  7.  GERD.  8.  Lower extremity edema.  9.  Concern for ZACHARIAH with referral for sleep study 11/13/2019. Declined sleep study on 2/27/20.  10.  CHADSVASC score of 3.  11.  Pericarditis-trace on 1/12/2023, [facility].  Admitted to the hospital put on steroids/colchicine.      Assessment/Plan:    1.  Pericarditis: Repeat echo on 5/3/2023 shows resolution of the pericardial effusion.  Previously, had a trace pericardial fusion on 1/12/2023.  Patient is not having concerns, denies chest discomfort.  Was treated with steroids, with taper, instead of NSAID's due to kidney dysfunction per [facility] records.  She also completed a 90-day supply of colchicine 90 days.   2.  A-fib: Remains asymptomatic.  Currently on Eliquis and metoprolol.  3.  Hyperlipidemia: She is allergic to most statins and was given a prescription for PCSK9 inhibitors but secondary to cost never taken.  She is attempting diet and activity.  We addressed today.  3.  ZACHARIAH: Suspicion but declining sleep study on 2/27/2020.  4.  Follow-up in 1 year or sooner with issues.      Interval history:  Nella was previously admitted to [facility] on 1/10/2023 with pericarditis.  She was found to have a mildly elevated WBC count, CRP of 10.4, normal troponin, BNP, and D-dimer. CTA of chest for PE negative.  She was noted to have a pericardial effusion.  Apparently, she was started on prednisone in place of NSAID's due to chronic kidney disease and colchicine for pericardial chest pain.  Echo did not show any evidence for tamponade or pretamponade physiology. \"She was discharged home with rx for prednisone taper, starting does 20 " "mg daily for 2 weeks followed by 2.5 mg decrease every 2 weeks. Also, prescribed colchicine 0.6 mg daily, she was initially started on 0.6 mg bid but had creatinine bump with this dose. Her losartan was held and not resumed upon discharge due to creatinine bump which did normalize prior to discharge.\"  She completed the prednisone as recommended and colchicine.  She had a repeat limited echo which showed resolution of the pericardial effusion.  She is not having any chest discomfort or shortness of breath.  Essentially, has no complaints today.  She is not having any issues related to A-fib.  No significant bruising on Eliquis.  Heart rate today is controlled at 61 bpm.  Again, we discussed the fact that her cholesterol is uncontrolled.  She has not tolerated statins before.  The PCSK9 inhibitors were too expensive.  She has lost 25 pounds working on diet and activity.  She will follow-up in 1 year or sooner if issues.          HPI:    She had been seen secondary to paroxysmal atrial fibrillation, hypertension, and atypical chest pain. She continues on Eliquis and metoprolol 50 mg XL daily.  Previously, she was on aspirin 325 mg's as well as Coumadin. Since initiating Eliquis and metoprolol, her palpitations have improved substantially.  Since last being seen, she has had x1 of brief palpitations.  She has had no episodes during the day.     She remains on losartan 100 mg daily, Norvasc 5 mg daily, and metoprolol 50 mg XL daily for hypertension. Her blood pressure is elevated.  She does check her blood pressure at home but is uncertain if her cuff is functioning.      Relevant testing:  Limited echo on 5/3/2023:  Global and regional left ventricular function is normal with an EF of 55-60%.  Right ventricular function, chamber size, wall motion, and thickness are  normal.  No pericardial effusion is present.    ECHO on 1/12/23:  1. Trace pericardial effusion. No tamponade or pretamponade physiology.   2. Quantified " left ventricle ejection fraction is 64%.   3. The right ventricle is mildly enlarged with normal systolic function. Estimated Right Ventricular Systolic Pressure 38 mmHg. No septal flattening.   4. Trileaflet sclerotic aortic valve. No aortic stenosis or regurgitation.   5. Left atrium is mildly enlarged by volume.   6. No prior echo for comparison.     ECHO on 11/20/19:  No pericardial effusion is present.  Global and regional left ventricular function is normal with an EF of 60-65%.  The right ventricle is normal size.  Mild left atrial enlargement is present.  Mild mitral insufficiency is present.  Aortic valve is normal in structure and function.  The aortic valve is tricuspid.  Trace aortic insufficiency is present.  Right ventricular systolic pressure is 30 mmHg above the right atrial pressure.  Mild tricuspid insufficiency is present.  The pulmonic valve is normal.  The aorta root is normal.        ICD-10-CM    1. Chronic pericarditis, unspecified complication status, unspecified type  I31.9       2. Paroxysmal atrial fibrillation (H)  I48.0 apixaban ANTICOAGULANT (ELIQUIS ANTICOAGULANT) 5 MG tablet      3. Benign essential hypertension  I10           Past Medical History:   Diagnosis Date     Anxiety state, unspecified 09/25/2003     Arthritis      Atypical chest pain 3/28/2018     Breast mass      Cancer (H)      Carpal tunnel syndrome 07/02/2002     Endometrial hyperplasia 01/27/2003     Hypertension      Metrorrhagia 10/22/2003     Nonallopathic lesion of thoracic region, not elsewhere classified 05/19/2003     Palpitations 04/11/2001     Postmenopausal bleeding 09/09/2002     Precordial pain 04/05/2001     Thyroid disease      Urgency of urination 06/06/2007       Past Surgical History:   Procedure Laterality Date     ADENOIDECTOMY       BIOPSY  2019    FNA left thyroid     BIOPSY BREAST       CHOLECYSTECTOMY  1981     COLONOSCOPY  2002     COLONOSCOPY  2012     COLONOSCOPY N/A 9/22/2014    Procedure:  COLONOSCOPY;  Surgeon: Lavell Pavon DO;  Location: [facility] OR     COLONOSCOPY N/A 3/1/2021    Procedure: screening colonoscopy;  Surgeon: Sandro Dow MD;  Location: [facility] OR     CYSTOSCOPY  2007    Cystitis chronic     EYE SURGERY      right cataract     ORTHOPEDIC SURGERY  2002    carpal tunnel; RT, LT     THYROIDECTOMY Left 8/27/2019    Procedure: LEFT THYROID LOBECTOMY AND ISTHMUS WITH FROZENS;  Surgeon: Mildred Pineda MD;  Location: [facility] OR     TONSILLECTOMY         Allergies   Allergen Reactions     Atorvastatin      Ezetimibe      Gentamicin      Hydroxyzine      Lorazepam      Ranitidine      Simvastatin      Lopid [Gemfibrozil] GI Disturbance     Bloating, constipation,      No Clinical Screening - See Comments      anticholesterol medicine caused muscle aches     Pravastatin Muscle Pain (Myalgia)       Current Outpatient Medications   Medication Sig Dispense Refill     acetaminophen (TYLENOL) 650 MG CR tablet Take 1 tablet (650 mg) by mouth 3 times daily as needed for mild pain or fever (Patient taking differently: Take 650 mg by mouth as needed for mild pain or fever) 90 tablet 1     amLODIPine (NORVASC) 10 MG tablet TAKE 1 TABLET DAILY FOR BLOOD PRESSURE 90 tablet 2     apixaban ANTICOAGULANT (ELIQUIS ANTICOAGULANT) 5 MG tablet Take 1 tablet (5 mg) by mouth 2 times daily 180 tablet 3     cholecalciferol 50 MCG (2000 UT) CAPS Take 1 capsule by mouth daily       colchicine (COLCYRS) 0.6 MG tablet TAKE 1 TABLET (0.6 MG) BY MOUTH DAILY 90 tablet 0     gabapentin (NEURONTIN) 300 MG capsule Take 1 capsule (300 mg) by mouth 3 times daily 270 capsule 0     levothyroxine (SYNTHROID/LEVOTHROID) 112 MCG tablet TAKE 1 TABLET DAILY FOR THYROID 90 tablet 2     losartan (COZAAR) 50 MG tablet Take 1 tablet (50 mg) by mouth daily 90 tablet 3     metoprolol succinate ER (TOPROL XL) 50 MG 24 hr tablet TAKE 1 TABLET (50 MG) BY MOUTH DAILY 90 tablet 3     multivitamin, therapeutic (THERA-VIT) TABS tablet Take 1  tablet by mouth daily       mupirocin (BACTROBAN) 2 % external ointment Apply topically 3 times daily 22 g 0     Omega-3 Fatty Acids (OMEGA-3 FISH OIL PO) Take 3 g by mouth daily        omeprazole (PRILOSEC) 20 MG DR capsule TAKE 1 CAPSULE (20 MG) BY MOUTH DAILY 90 capsule 2     sertraline (ZOLOFT) 50 MG tablet TAKE 1 TABLET DAILY 90 tablet 0     solifenacin (VESICARE) 5 MG tablet TAKE 1 TABLET (5 MG) BY MOUTH DAILY 90 tablet 2     STATIN NOT PRESCRIBED (INTENTIONAL) Please choose reason not prescribed from choices below.       VITAMIN D, CHOLECALCIFEROL, PO Take by mouth daily         Social History     Socioeconomic History     Marital status:      Spouse name: Not on file     Number of children: Not on file     Years of education: Not on file     Highest education level: Not on file   Occupational History     Occupation: manager     Employer: [employer]     Comment: part-time   Tobacco Use     Smoking status: Never     Smokeless tobacco: Never   Vaping Use     Vaping status: Never Used   Substance and Sexual Activity     Alcohol use: Never     Drug use: No     Sexual activity: Never   Other Topics Concern     Parent/sibling w/ CABG, MI or angioplasty before 65F 55M? No      Service Not Asked     Blood Transfusions Yes     Caffeine Concern No     Occupational Exposure Not Asked     Hobby Hazards Not Asked     Sleep Concern Not Asked     Stress Concern Not Asked     Weight Concern Not Asked     Special Diet Not Asked     Back Care Not Asked     Exercise Yes     Comment: walking daily     Bike Helmet Not Asked     Seat Belt Not Asked     Self-Exams Not Asked   Social History Narrative     Not on file     Social Determinants of Health     Financial Resource Strain: Not on file   Food Insecurity: Not on file   Transportation Needs: Not on file   Physical Activity: Not on file   Stress: Not on file   Social Connections: Not on file   Intimate Partner Violence: Not on file   Housing Stability: Not on  "file       LAB RESULTS:   Office Visit on 01/17/2023   Component Date Value Ref Range Status     Sodium 01/17/2023 139  136 - 145 mmol/L Final     Potassium 01/17/2023 4.1  3.4 - 5.3 mmol/L Final     Chloride 01/17/2023 103  98 - 107 mmol/L Final     Carbon Dioxide (CO2) 01/17/2023 23  22 - 29 mmol/L Final     Anion Gap 01/17/2023 13  7 - 15 mmol/L Final     Urea Nitrogen 01/17/2023 18.4  8.0 - 23.0 mg/dL Final     Creatinine 01/17/2023 0.95  0.51 - 0.95 mg/dL Final     Calcium 01/17/2023 8.9  8.8 - 10.2 mg/dL Final     Glucose 01/17/2023 118 (H)  70 - 99 mg/dL Final     Alkaline Phosphatase 01/17/2023 120 (H)  35 - 104 U/L Final     AST 01/17/2023 62 (H)  10 - 35 U/L Final     ALT 01/17/2023 96 (H)  10 - 35 U/L Final     Protein Total 01/17/2023 7.1  6.4 - 8.3 g/dL Final     Albumin 01/17/2023 4.1  3.5 - 5.2 g/dL Final     Bilirubin Total 01/17/2023 0.4  <=1.2 mg/dL Final     GFR Estimate 01/17/2023 62  >60 mL/min/1.73m2 Final        Review of systems: Negative except that which was noted in the HPI.    Physical examination:  /69   Pulse 61   Ht 1.575 m (5' 2\")   Wt 80.6 kg (177 lb 11.2 oz)   SpO2 93%   BMI 32.50 kg/m      GENERAL APPEARANCE: healthy, alert and no distress  CHEST: lungs clear to auscultation - no rales, rhonchi or wheezes, no use of accessory muscles, no retractions, respirations are unlabored, normal respiratory rate  CARDIOVASCULAR: regular rhythm, normal S1 with physiologic split S2, no S3 or S4 and no murmur, click or rub  EXTREMITIES: no clubbing, cyanosis or edema.    Total time spent on day of visit, including review of tests, obtaining/reviewing separately obtained history, ordering medications/tests/procedures, communicating with PCP/consultants, and documenting in electronic medical record: 20 minutes.                   Thank you for allowing me to participate in the care of your patient. Please do not hesitate to contact me if you have any questions.     Chris Walsh, " DO

## 2023-06-05 NOTE — PROGRESS NOTES
"  Assessment & Plan     1. Benign essential hypertension  Well controlled     2. CKD (chronic kidney disease) stage 2, GFR 60-89 ml/min  Do not use NSAIDS    3. LA NENA (generalized anxiety disorder)  Continue current plan   - sertraline (ZOLOFT) 50 MG tablet; Take 1 tablet (50 mg) by mouth daily  Dispense: 90 tablet; Refill: 0    4. Paroxysmal atrial fibrillation (H)  Continue eliquis    5. Hyperlipidemia LDL goal <100  Cannot tolerate statins, repatha was not covered     6. Facial twitching  - Adult Neurology  Referral  - CT Head w/o & w Contrast; Future    7. Mixed incontinence urge and stress (male)(female)  Hold vesicare for now, see if dry mouth improves. If not, then restart vesicare.     8. Myalgia  - gabapentin (NEURONTIN) 300 MG capsule; Take 1 capsule (300 mg) by mouth 3 times daily  Dispense: 270 capsule; Refill: 0    9. Hypothyroidism due to acquired atrophy of thyroid  - levothyroxine (SYNTHROID/LEVOTHROID) 112 MCG tablet; TAKE 1 TABLET DAILY FOR THYROID  Dispense: 90 tablet; Refill: 2           BMI:   Estimated body mass index is 31.28 kg/m  as calculated from the following:    Height as of 5/31/23: 1.575 m (5' 2\").    Weight as of this encounter: 77.6 kg (171 lb).   Weight management plan: Discussed healthy diet and exercise guidelines        No follow-ups on file.    Randi Haddad NP  [facility] Carmen is a 77 year old, presenting for the following health issues:  Hypertension, Lipids, and Anxiety          HPI     Hyperlipidemia Follow-Up      Are you regularly taking any medication or supplement to lower your cholesterol?   No    Are you having muscle aches or other side effects that you think could be caused by your cholesterol lowering medication?  NA  The 10-year ASCVD risk score (Hemal ZAVALA, et al., 2019) is: 20.1%    Values used to calculate the score:      Age: 77 years      Sex: Female      Is Non- : No      " Diabetic: No      Tobacco smoker: No      Systolic Blood Pressure: 118 mmHg      Is BP treated: Yes      HDL Cholesterol: 54 mg/dL        Total Cholesterol: 204 mg/dL        Hypertension Follow-up      Do you check your blood pressure regularly outside of the clinic? No     Are you following a low salt diet? Yes    Are your blood pressures ever more than 140 on the top number (systolic) OR more   than 90 on the bottom number (diastolic), for example 140/90? No    Anxiety Follow-Up    How are you doing with your anxiety since your last visit? No change    Are you having other symptoms that might be associated with anxiety? No    Have you had a significant life event? OTHER: concerns about grandson's health and wellbeing      Are you feeling depressed? No    Do you have any concerns with your use of alcohol or other drugs? No    Social History     Tobacco Use     Smoking status: Never     Smokeless tobacco: Never   Vaping Use     Vaping status: Never Used   Substance Use Topics     Alcohol use: Never     Drug use: No         12/1/2022     9:00 AM 3/3/2023     9:29 AM 6/7/2023    11:15 AM   LA NENA-7 SCORE   Total Score   4 (minimal anxiety)   Total Score 4 6 4         5/27/2022     8:00 AM 11/11/2022     2:07 PM 3/3/2023     9:29 AM   PHQ   PHQ-9 Total Score 4 1 0   Q9: Thoughts of better off dead/self-harm past 2 weeks Not at all Not at all Not at all         3/3/2023     9:29 AM   Last PHQ-9   1.  Little interest or pleasure in doing things 0   2.  Feeling down, depressed, or hopeless 0   3.  Trouble falling or staying asleep, or sleeping too much 0   4.  Feeling tired or having little energy 0   5.  Poor appetite or overeating 0   6.  Feeling bad about yourself 0   7.  Trouble concentrating 0   8.  Moving slowly or restless 0   Q9: Thoughts of better off dead/self-harm past 2 weeks 0   PHQ-9 Total Score 0         6/7/2023    11:15 AM   LA NENA-7    1. Feeling nervous, anxious, or on edge 1   2. Not being able to stop or  control worrying 1   3. Worrying too much about different things 1   4. Trouble relaxing 0   5. Being so restless that it is hard to sit still 0   6. Becoming easily annoyed or irritable 0   7. Feeling afraid, as if something awful might happen 1   LA NENA-7 Total Score 4   If you checked any problems, how difficult have they made it for you to do your work, take care of things at home, or get along with other people? Not difficult at all       Chronic Kidney Disease Follow-up      Do you take any over the counter pain medicine?: No        Facial Twitching:  Ongoing for the past 6 months and seems to be worsening.  She notices twitching of right eye and now going down into her right cheek.  She has been trying to pay attention to see if it is stress induced, as she is under a great deal of stress with her grandson who has mental health issues, drug abuse issues and is now in skilled nursing.  She believes it happens all the time and is not related to stress.  She would like to see neurology in consult.      Recent Labs   Lab Test 03/03/23  1018 01/17/23  1203 12/01/22  1015 08/29/22  0912 08/29/22  0912 08/18/21  0939 05/12/21  1040 04/28/21  1017   A1C  --   --  5.6  --   --   --   --   --    *  --  138*  --  127*   < >  --  154*   HDL 54  --  42*  --  40*   < >  --  47*   TRIG 203*  --  209*  --  260*   < >  --  178*   ALT 54* 96* 53*  --  40   < >  --  30   CR 0.99* 0.95 0.99*  --  0.95   < > 1.19* 1.04   GFRESTIMATED 59* 62 59*  --  62   < > 45* 53*   GFRESTBLACK  --   --   --   --   --   --  52* 61   POTASSIUM 4.0 4.1 4.2   < > 4.2   < > 4.2 4.0   TSH 1.58  --  3.32  --  3.34   < >  --  4.14*    < > = values in this interval not displayed.      BP Readings from Last 3 Encounters:   06/07/23 118/62   05/31/23 113/69   04/28/23 138/76    Wt Readings from Last 3 Encounters:   06/07/23 77.6 kg (171 lb)   05/31/23 80.6 kg (177 lb 11.2 oz)   04/28/23 76.7 kg (169 lb)                        Review of Systems   Constitutional,  HEENT, cardiovascular, pulmonary, gi and gu systems are negative, except as otherwise noted.      Objective    /62 (BP Location: Left arm, Patient Position: Sitting, Cuff Size: Adult Regular)   Pulse 63   Temp 97.3  F (36.3  C) (Tympanic)   Resp 18   Wt 77.6 kg (171 lb)   SpO2 96%   BMI 31.28 kg/m    Body mass index is 31.28 kg/m .  Physical Exam   GENERAL: healthy, alert and no distress  NECK: no adenopathy, no asymmetry, masses, or scars, thyroid normal to palpation and no carotid bruits  RESP: lungs clear to auscultation - no rales, rhonchi or wheezes  CV: irrregular rate and rhythm,  MS: no gross musculoskeletal defects noted, no edema  NEURO: Normal strength and tone, sensory exam grossly normal and cranial nerves 2-12 intact.  right eye and right side of face twitching intermittently during our visit today.    PSYCH: mentation appears normal, affect normal/bright                    Answers for HPI/ROS submitted by the patient on 6/7/2023  LA NENA 7 TOTAL SCORE: 4

## 2023-06-07 ENCOUNTER — OFFICE VISIT (OUTPATIENT)
Dept: FAMILY MEDICINE | Facility: OTHER | Age: 78
End: 2023-06-07
Attending: NURSE PRACTITIONER
Payer: COMMERCIAL

## 2023-06-07 VITALS
DIASTOLIC BLOOD PRESSURE: 62 MMHG | OXYGEN SATURATION: 96 % | HEART RATE: 63 BPM | BODY MASS INDEX: 31.28 KG/M2 | WEIGHT: 171 LBS | TEMPERATURE: 97.3 F | SYSTOLIC BLOOD PRESSURE: 118 MMHG | RESPIRATION RATE: 18 BRPM

## 2023-06-07 DIAGNOSIS — G51.4 FACIAL TWITCHING: ICD-10-CM

## 2023-06-07 DIAGNOSIS — I48.0 PAROXYSMAL ATRIAL FIBRILLATION (H): Chronic | ICD-10-CM

## 2023-06-07 DIAGNOSIS — N18.2 CKD (CHRONIC KIDNEY DISEASE) STAGE 2, GFR 60-89 ML/MIN: Chronic | ICD-10-CM

## 2023-06-07 DIAGNOSIS — E78.5 HYPERLIPIDEMIA LDL GOAL <100: ICD-10-CM

## 2023-06-07 DIAGNOSIS — M79.10 MYALGIA: ICD-10-CM

## 2023-06-07 DIAGNOSIS — E03.4 HYPOTHYROIDISM DUE TO ACQUIRED ATROPHY OF THYROID: ICD-10-CM

## 2023-06-07 DIAGNOSIS — I10 BENIGN ESSENTIAL HYPERTENSION: Primary | Chronic | ICD-10-CM

## 2023-06-07 DIAGNOSIS — F41.1 GAD (GENERALIZED ANXIETY DISORDER): Chronic | ICD-10-CM

## 2023-06-07 DIAGNOSIS — N39.46 MIXED INCONTINENCE URGE AND STRESS (MALE)(FEMALE): ICD-10-CM

## 2023-06-07 PROCEDURE — 99214 OFFICE O/P EST MOD 30 MIN: CPT | Performed by: NURSE PRACTITIONER

## 2023-06-07 PROCEDURE — G0463 HOSPITAL OUTPT CLINIC VISIT: HCPCS

## 2023-06-07 RX ORDER — LEVOTHYROXINE SODIUM 112 UG/1
TABLET ORAL
Qty: 90 TABLET | Refills: 2 | Status: SHIPPED | OUTPATIENT
Start: 2023-06-07 | End: 2024-07-16

## 2023-06-07 RX ORDER — GABAPENTIN 300 MG/1
300 CAPSULE ORAL 3 TIMES DAILY
Qty: 270 CAPSULE | Refills: 0 | Status: SHIPPED | OUTPATIENT
Start: 2023-06-07 | End: 2023-12-06

## 2023-06-07 ASSESSMENT — ANXIETY QUESTIONNAIRES
GAD7 TOTAL SCORE: 4
7. FEELING AFRAID AS IF SOMETHING AWFUL MIGHT HAPPEN: SEVERAL DAYS
6. BECOMING EASILY ANNOYED OR IRRITABLE: NOT AT ALL
GAD7 TOTAL SCORE: 4
GAD7 TOTAL SCORE: 4
IF YOU CHECKED OFF ANY PROBLEMS ON THIS QUESTIONNAIRE, HOW DIFFICULT HAVE THESE PROBLEMS MADE IT FOR YOU TO DO YOUR WORK, TAKE CARE OF THINGS AT HOME, OR GET ALONG WITH OTHER PEOPLE: NOT DIFFICULT AT ALL
7. FEELING AFRAID AS IF SOMETHING AWFUL MIGHT HAPPEN: SEVERAL DAYS
2. NOT BEING ABLE TO STOP OR CONTROL WORRYING: SEVERAL DAYS
8. IF YOU CHECKED OFF ANY PROBLEMS, HOW DIFFICULT HAVE THESE MADE IT FOR YOU TO DO YOUR WORK, TAKE CARE OF THINGS AT HOME, OR GET ALONG WITH OTHER PEOPLE?: NOT DIFFICULT AT ALL
1. FEELING NERVOUS, ANXIOUS, OR ON EDGE: SEVERAL DAYS
5. BEING SO RESTLESS THAT IT IS HARD TO SIT STILL: NOT AT ALL
3. WORRYING TOO MUCH ABOUT DIFFERENT THINGS: SEVERAL DAYS
4. TROUBLE RELAXING: NOT AT ALL

## 2023-06-07 ASSESSMENT — PAIN SCALES - GENERAL: PAINLEVEL: NO PAIN (0)

## 2023-06-07 NOTE — PATIENT INSTRUCTIONS
Assessment & Plan     1. Benign essential hypertension  Well controlled     2. CKD (chronic kidney disease) stage 2, GFR 60-89 ml/min  Do not use NSAIDS    3. LA NENA (generalized anxiety disorder)  Continue current plan   - sertraline (ZOLOFT) 50 MG tablet; Take 1 tablet (50 mg) by mouth daily  Dispense: 90 tablet; Refill: 0    4. Paroxysmal atrial fibrillation (H)  Continue eliquis    5. Hyperlipidemia LDL goal <100  Cannot tolerate statins, repatha was not covered     6. Facial twitching  - Adult Neurology [facility] Referral  - CT Head w/o & w Contrast; Future    7. Mixed incontinence urge and stress (male)(female)  Hold vesicare for now, see if dry mouth improves. If not, then restart vesicare.     8. Myalgia  - gabapentin (NEURONTIN) 300 MG capsule; Take 1 capsule (300 mg) by mouth 3 times daily  Dispense: 270 capsule; Refill: 0    9. Hypothyroidism due to acquired atrophy of thyroid  - levothyroxine (SYNTHROID/LEVOTHROID) 112 MCG tablet; TAKE 1 TABLET DAILY FOR THYROID  Dispense: 90 tablet; Refill: 2    Follow-up in 3 months or as needed    Randi Haddad,   Certified Adult Nurse Practitioner  [phone]

## 2023-06-09 ENCOUNTER — TELEPHONE (OUTPATIENT)
Dept: FAMILY MEDICINE | Facility: OTHER | Age: 78
End: 2023-06-09

## 2023-06-09 DIAGNOSIS — Z12.11 COLON CANCER SCREENING: Primary | ICD-10-CM

## 2023-06-09 NOTE — TELEPHONE ENCOUNTER
"9:31 AM    Reason for Call: Phone Call    Description: Patient called in requesting a referral for a colonoscopy as her \"poop is going from black to brown and back and forth.\" Please call patient.     Was an appointment offered for this call? No  If yes : Appointment type              Date    Preferred method for responding to this message: Telephone Call  What is your phone number ? [phone]    If we cannot reach you directly, may we leave a detailed response at the number you provided? Yes    Can this message wait until your PCP/provider returns, if available today? Not applicable, PROVIDER IN CLINIC     Venus Gallagher    "

## 2023-06-13 ENCOUNTER — HOSPITAL ENCOUNTER (OUTPATIENT)
Dept: CT IMAGING | Facility: HOSPITAL | Age: 78
Discharge: HOME | End: 2023-06-13
Attending: NURSE PRACTITIONER | Admitting: NURSE PRACTITIONER
Payer: COMMERCIAL

## 2023-06-13 DIAGNOSIS — G51.4 FACIAL TWITCHING: ICD-10-CM

## 2023-06-13 LAB
CREAT BLD-MCNC: 1.1 MG/DL (ref 0.5–1)
GFR SERPL CREATININE-BSD FRML MDRD: 52 ML/MIN/1.73M2

## 2023-06-13 PROCEDURE — 250N000011 HC RX IP 250 OP 636: Performed by: RADIOLOGY

## 2023-06-13 PROCEDURE — 70470 CT HEAD/BRAIN W/O & W/DYE: CPT

## 2023-06-13 PROCEDURE — 82565 ASSAY OF CREATININE: CPT

## 2023-06-13 RX ORDER — IOPAMIDOL 755 MG/ML
45 INJECTION, SOLUTION INTRAVASCULAR ONCE
Status: COMPLETED | OUTPATIENT
Start: 2023-06-13 | End: 2023-06-13

## 2023-06-13 RX ADMIN — IOPAMIDOL 45 ML: 755 INJECTION, SOLUTION INTRAVENOUS at 13:14

## 2023-07-17 DIAGNOSIS — K21.9 GASTROESOPHAGEAL REFLUX DISEASE WITHOUT ESOPHAGITIS: Chronic | ICD-10-CM

## 2023-07-18 ENCOUNTER — TELEPHONE (OUTPATIENT)
Dept: FAMILY MEDICINE | Facility: OTHER | Age: 78
End: 2023-07-18

## 2023-07-18 NOTE — TELEPHONE ENCOUNTER
Patient is wondering if it is ok to take the supplements below along with the meds she is taking.  Please call her and let her know.    Bi-flex for Joint Health and Hair, Skin and Nails.  Which contains, collagen, biotine, and Argon oil and coconut oil.

## 2023-07-25 ENCOUNTER — ALLIED HEALTH/NURSE VISIT (OUTPATIENT)
Dept: AUDIOLOGY | Facility: OTHER | Age: 78
End: 2023-07-25
Attending: AUDIOLOGIST
Payer: COMMERCIAL

## 2023-07-25 DIAGNOSIS — H91.93 DECREASED HEARING OF BOTH EARS: Primary | ICD-10-CM

## 2023-07-25 PROCEDURE — V5299 HEARING SERVICE: HCPCS

## 2023-07-25 NOTE — PROGRESS NOTES
HEARING AID CHECK    BACKGROUND:  Nella Lemon, a 78 year old female, was seen today for an hearing aid check.  Ms. Lemon wears Binaural Oticon More 2 miniRITE R hearing aids.  Ms. Lemon reported left aid not working    FINDINGS:  Visual inspection and cleaning provided. Battery was tested and no function. No function on listening check.    Reviewed cleaning, troubleshooting, and preventative care with patient. Any cleaning tools used were provided as customer courtesy.    Services performed and warranty reviewed with patient.    PLAN:  Based on patient report, no audiology appointment for adjustments needed.Ms. Lemon's left aid sent out for repair under warranty. Patient will be notified when aid comes back from repair. Patient had no further questions and reports satisfaction.         Berenice Pitt  Audiology Assistant  [facility]  [phone]

## 2023-08-02 ENCOUNTER — ALLIED HEALTH/NURSE VISIT (OUTPATIENT)
Dept: AUDIOLOGY | Facility: OTHER | Age: 78
End: 2023-08-02
Attending: PHYSICIAN ASSISTANT
Payer: COMMERCIAL

## 2023-08-02 DIAGNOSIS — H91.93 DECREASED HEARING OF BOTH EARS: Primary | ICD-10-CM

## 2023-08-02 PROCEDURE — V5299 HEARING SERVICE: HCPCS

## 2023-08-02 NOTE — PROGRESS NOTES
Background:  Received repaired Left Oticon More 2 miniRITE R hearing aid.       Procedures Performed:  The  completed the following repairs: replaced amplifier. Reprogrammed to user settings.    Follow up:   Ms. Lemon was called for hearing aid  at .  Return to clinic as needed.      Berenice Pitt  Audiology Assistant  [facility]  [phone]

## 2023-08-08 NOTE — PROGRESS NOTES
Assessment & Plan     1. Chronic pericarditis, unspecified complication status, unspecified type  ED notes reviewed  Did not complete ECHO, would like to talk with Dr Walsh.     2. Papillary thyroid carcinoma (H)  Repeat ultrasound, last done 6/22/2023.    - US Thyroid; Future    3. Postoperative hypothyroidism  - TSH with free T4 reflex; Future  - US Thyroid; Future    4. Hair loss  Possibly due to metoprolol or serious illness (pericarditis) in January/February  - CBC with Platelets & Differential; Future  - Ferritin; Future  - Vitamin B12; Future    5. CKD (chronic kidney disease) stage 2, GFR 60-89 ml/min  Do not use NSAIDS  - Comprehensive metabolic panel; Future    6. Hyperlipidemia LDL goal <100  - Lipid Profile; Future    7. Benign essential hypertension  Well controlled   - amLODIPine (NORVASC) 10 MG tablet; Take 1 tablet (10 mg) by mouth daily for blood pressure  Dispense: 90 tablet; Refill: 2    8. GERD  Dose increase   - omeprazole (PRILOSEC) 40 MG DR capsule; Take 1 capsule (40 mg) by mouth daily  Dispense: 90 capsule; Refill: 1    Reviewed all the above could contribute to her hair loss.  Will repeat labs and thyroid ultrasound.      Will check with Dr Walsh to see if he would like her to repeat cardiac echo.    Follow-up in September as scheduled.     Randi Haddad NP  [facility]   Nella is a 78 year old, presenting for the following health issues:  Hair Loss      HPI     Concern - Hair Loss  Onset: 6 months ago  Description: thyroid cancer did have removal - Sat. Hospital visit notes: Pericarditis  Intensity: severe  Progression of Symptoms:  worsening  Accompanying Signs & Symptoms: none  Previous history of similar problem: yes - ongoing since pericarditis hospitalization in January/February.   She does also take metoprolol.  History of thyroid carcinoma.    Precipitating factors:        Worsened by: none  Alleviating factors:        Improved by:  "none  Therapies tried and outcome: None        Her other concern is increased chest pressure that starts epigastric and radiates up into neck.  This is associated with large amounts of clear stringy \"phlegm.\"  She does take omeprazole for known GERD/reflux.      She was in the ED due to this pain on 8/5/2023 - notes reviewed.  Labs were unremarkable, were unable to repeat cardiac echo as no staff available.  Was encouraged to have this repeated but declined as she had one in May 2023.  States this \"pain\" feels different than when she pericarditis as this is not \"pain\" it is \"sore.\"     Denies any pain or discomfort or soreness today.        Review of Systems   Constitutional, HEENT, cardiovascular, pulmonary, gi and gu systems are negative, except as otherwise noted.      Objective    /60 (BP Location: Right arm, Patient Position: Sitting, Cuff Size: Adult Regular)   Pulse (!) 48   Temp (!) 96.4  F (35.8  C) (Tympanic)   Wt 79.1 kg (174 lb 4.8 oz)   SpO2 95%   BMI 31.88 kg/m    Body mass index is 31.88 kg/m .  Physical Exam   GENERAL: healthy, alert and no distress  NECK: no adenopathy, no asymmetry, masses, or scars, and no carotid bruits  RESP: lungs clear to auscultation - no rales, rhonchi or wheezes  CV: regular rate and rhythm, normal S1 S2, no S3 or S4, no murmur,  MS: no gross musculoskeletal defects noted, no edema  PSYCH: mentation appears normal, affect normal/bright                Procedure  Limited Echocardiogram with portions of two-dimensional, color and spectral  Doppler performed.  ______________________________________________________________________________  Interpretation Summary  Global and regional left ventricular function is normal with an EF of 55-60%.  Right ventricular function, chamber size, wall motion, and thickness are  normal.  No pericardial effusion is present.  ______________________________________________________________________________  Left Ventricle  Global and " regional left ventricular function is normal with an EF of 55-60%.     Right Ventricle  Right ventricular function, chamber size, wall motion, and thickness are  normal.     Vessels  The inferior vena cava is normal.     Pericardium  No pericardial effusion is present. Prominent epicardial fat is noted.     Miscellaneous  No significant valvular abnormalities present.       EXAM: XR CHEST 1 VIEW  LOCATION: [facility]  DATE: 8/5/2023    INDICATION: chest pain  COMPARISON: 1/10/23 CT    IMPRESSION: Negative chest. Cardiomegaly.    Electronically signed by: Waylon Lozano MD 8/5/2023 4:13 AM CDT        Answers submitted by the patient for this visit:  Patient Health Questionnaire (Submitted on 8/9/2023)  If you checked off any problems, how difficult have these problems made it for you to do your work, take care of things at home, or get along with other people?: Not difficult at all  PHQ9 TOTAL SCORE: 0  LA NENA-7 (Submitted on 8/9/2023)  LA NENA 7 TOTAL SCORE: 0

## 2023-08-09 ENCOUNTER — OFFICE VISIT (OUTPATIENT)
Dept: FAMILY MEDICINE | Facility: OTHER | Age: 78
End: 2023-08-09
Attending: NURSE PRACTITIONER
Payer: COMMERCIAL

## 2023-08-09 VITALS
DIASTOLIC BLOOD PRESSURE: 60 MMHG | SYSTOLIC BLOOD PRESSURE: 138 MMHG | HEART RATE: 48 BPM | WEIGHT: 174.3 LBS | TEMPERATURE: 96.4 F | BODY MASS INDEX: 31.88 KG/M2 | OXYGEN SATURATION: 95 %

## 2023-08-09 DIAGNOSIS — I31.9 CHRONIC PERICARDITIS, UNSPECIFIED COMPLICATION STATUS, UNSPECIFIED TYPE: Primary | ICD-10-CM

## 2023-08-09 DIAGNOSIS — E89.0 POSTOPERATIVE HYPOTHYROIDISM: ICD-10-CM

## 2023-08-09 DIAGNOSIS — L65.9 HAIR LOSS: ICD-10-CM

## 2023-08-09 DIAGNOSIS — C73 PAPILLARY THYROID CARCINOMA (H): Chronic | ICD-10-CM

## 2023-08-09 DIAGNOSIS — I10 BENIGN ESSENTIAL HYPERTENSION: ICD-10-CM

## 2023-08-09 DIAGNOSIS — N18.2 CKD (CHRONIC KIDNEY DISEASE) STAGE 2, GFR 60-89 ML/MIN: ICD-10-CM

## 2023-08-09 DIAGNOSIS — K21.9 GASTROESOPHAGEAL REFLUX DISEASE WITHOUT ESOPHAGITIS: Chronic | ICD-10-CM

## 2023-08-09 DIAGNOSIS — E78.5 HYPERLIPIDEMIA LDL GOAL <100: ICD-10-CM

## 2023-08-09 LAB
ALBUMIN SERPL BCG-MCNC: 3.8 G/DL (ref 3.5–5.2)
ALP SERPL-CCNC: 113 U/L (ref 35–104)
ALT SERPL W P-5'-P-CCNC: 33 U/L (ref 0–50)
ANION GAP SERPL CALCULATED.3IONS-SCNC: 13 MMOL/L (ref 7–15)
AST SERPL W P-5'-P-CCNC: 19 U/L (ref 0–45)
BASOPHILS # BLD AUTO: 0 10E3/UL (ref 0–0.2)
BASOPHILS NFR BLD AUTO: 0 %
BILIRUB SERPL-MCNC: 0.3 MG/DL
BUN SERPL-MCNC: 23.2 MG/DL (ref 8–23)
CALCIUM SERPL-MCNC: 9.6 MG/DL (ref 8.8–10.2)
CHLORIDE SERPL-SCNC: 104 MMOL/L (ref 98–107)
CHOLEST SERPL-MCNC: 236 MG/DL
CREAT SERPL-MCNC: 0.9 MG/DL (ref 0.51–0.95)
DEPRECATED HCO3 PLAS-SCNC: 22 MMOL/L (ref 22–29)
EOSINOPHIL # BLD AUTO: 0 10E3/UL (ref 0–0.7)
EOSINOPHIL NFR BLD AUTO: 0 %
ERYTHROCYTE [DISTWIDTH] IN BLOOD BY AUTOMATED COUNT: 13.1 % (ref 10–15)
FERRITIN SERPL-MCNC: 118 NG/ML (ref 11–328)
GFR SERPL CREATININE-BSD FRML MDRD: 66 ML/MIN/1.73M2
GLUCOSE SERPL-MCNC: 99 MG/DL (ref 70–99)
HCT VFR BLD AUTO: 42.8 % (ref 35–47)
HDLC SERPL-MCNC: 51 MG/DL
HGB BLD-MCNC: 14.1 G/DL (ref 11.7–15.7)
IMM GRANULOCYTES # BLD: 0 10E3/UL
IMM GRANULOCYTES NFR BLD: 0 %
LDLC SERPL CALC-MCNC: 156 MG/DL
LYMPHOCYTES # BLD AUTO: 2.6 10E3/UL (ref 0.8–5.3)
LYMPHOCYTES NFR BLD AUTO: 15 %
MCH RBC QN AUTO: 27.2 PG (ref 26.5–33)
MCHC RBC AUTO-ENTMCNC: 32.9 G/DL (ref 31.5–36.5)
MCV RBC AUTO: 83 FL (ref 78–100)
MONOCYTES # BLD AUTO: 1 10E3/UL (ref 0–1.3)
MONOCYTES NFR BLD AUTO: 6 %
NEUTROPHILS # BLD AUTO: 13.4 10E3/UL (ref 1.6–8.3)
NEUTROPHILS NFR BLD AUTO: 79 %
NONHDLC SERPL-MCNC: 185 MG/DL
PLATELET # BLD AUTO: 362 10E3/UL (ref 150–450)
POTASSIUM SERPL-SCNC: 3.8 MMOL/L (ref 3.4–5.3)
PROT SERPL-MCNC: 6.9 G/DL (ref 6.4–8.3)
RBC # BLD AUTO: 5.18 10E6/UL (ref 3.8–5.2)
SODIUM SERPL-SCNC: 139 MMOL/L (ref 136–145)
TRIGL SERPL-MCNC: 143 MG/DL
TSH SERPL DL<=0.005 MIU/L-ACNC: 2.59 UIU/ML (ref 0.3–4.2)
VIT B12 SERPL-MCNC: 478 PG/ML (ref 232–1245)
WBC # BLD AUTO: 17 10E3/UL (ref 4–11)

## 2023-08-09 PROCEDURE — 82728 ASSAY OF FERRITIN: CPT | Mod: ZL | Performed by: NURSE PRACTITIONER

## 2023-08-09 PROCEDURE — 99214 OFFICE O/P EST MOD 30 MIN: CPT | Performed by: NURSE PRACTITIONER

## 2023-08-09 PROCEDURE — 82607 VITAMIN B-12: CPT | Mod: ZL | Performed by: NURSE PRACTITIONER

## 2023-08-09 PROCEDURE — 84443 ASSAY THYROID STIM HORMONE: CPT | Mod: ZL | Performed by: NURSE PRACTITIONER

## 2023-08-09 PROCEDURE — 36415 COLL VENOUS BLD VENIPUNCTURE: CPT | Mod: ZL | Performed by: NURSE PRACTITIONER

## 2023-08-09 PROCEDURE — 80053 COMPREHEN METABOLIC PANEL: CPT | Mod: ZL | Performed by: NURSE PRACTITIONER

## 2023-08-09 PROCEDURE — G0463 HOSPITAL OUTPT CLINIC VISIT: HCPCS

## 2023-08-09 PROCEDURE — 80061 LIPID PANEL: CPT | Mod: ZL | Performed by: NURSE PRACTITIONER

## 2023-08-09 PROCEDURE — 85004 AUTOMATED DIFF WBC COUNT: CPT | Mod: ZL | Performed by: NURSE PRACTITIONER

## 2023-08-09 RX ORDER — OMEPRAZOLE 40 MG/1
40 CAPSULE, DELAYED RELEASE ORAL DAILY
Qty: 90 CAPSULE | Refills: 1 | Status: SHIPPED | OUTPATIENT
Start: 2023-08-09 | End: 2023-10-27

## 2023-08-09 RX ORDER — PREDNISONE 20 MG/1
40 TABLET ORAL
COMMUNITY
Start: 2023-08-05 | End: 2023-12-06

## 2023-08-09 RX ORDER — AMLODIPINE BESYLATE 10 MG/1
10 TABLET ORAL DAILY
Qty: 90 TABLET | Refills: 2 | Status: SHIPPED | OUTPATIENT
Start: 2023-08-09 | End: 2024-03-13

## 2023-08-09 ASSESSMENT — ANXIETY QUESTIONNAIRES
7. FEELING AFRAID AS IF SOMETHING AWFUL MIGHT HAPPEN: NOT AT ALL
3. WORRYING TOO MUCH ABOUT DIFFERENT THINGS: NOT AT ALL
1. FEELING NERVOUS, ANXIOUS, OR ON EDGE: NOT AT ALL
2. NOT BEING ABLE TO STOP OR CONTROL WORRYING: NOT AT ALL
5. BEING SO RESTLESS THAT IT IS HARD TO SIT STILL: NOT AT ALL
IF YOU CHECKED OFF ANY PROBLEMS ON THIS QUESTIONNAIRE, HOW DIFFICULT HAVE THESE PROBLEMS MADE IT FOR YOU TO DO YOUR WORK, TAKE CARE OF THINGS AT HOME, OR GET ALONG WITH OTHER PEOPLE: NOT DIFFICULT AT ALL
GAD7 TOTAL SCORE: 0
4. TROUBLE RELAXING: NOT AT ALL
GAD7 TOTAL SCORE: 0
6. BECOMING EASILY ANNOYED OR IRRITABLE: NOT AT ALL

## 2023-08-09 ASSESSMENT — PATIENT HEALTH QUESTIONNAIRE - PHQ9
10. IF YOU CHECKED OFF ANY PROBLEMS, HOW DIFFICULT HAVE THESE PROBLEMS MADE IT FOR YOU TO DO YOUR WORK, TAKE CARE OF THINGS AT HOME, OR GET ALONG WITH OTHER PEOPLE: NOT DIFFICULT AT ALL
SUM OF ALL RESPONSES TO PHQ QUESTIONS 1-9: 0
SUM OF ALL RESPONSES TO PHQ QUESTIONS 1-9: 0

## 2023-08-09 ASSESSMENT — PAIN SCALES - GENERAL: PAINLEVEL: NO PAIN (0)

## 2023-08-18 ENCOUNTER — HOSPITAL ENCOUNTER (OUTPATIENT)
Dept: ULTRASOUND IMAGING | Facility: HOSPITAL | Age: 78
Discharge: HOME | End: 2023-08-18
Attending: NURSE PRACTITIONER | Admitting: NURSE PRACTITIONER
Payer: COMMERCIAL

## 2023-08-18 DIAGNOSIS — C73 PAPILLARY THYROID CARCINOMA (H): Chronic | ICD-10-CM

## 2023-08-18 DIAGNOSIS — E89.0 POSTOPERATIVE HYPOTHYROIDISM: ICD-10-CM

## 2023-08-18 PROCEDURE — 76536 US EXAM OF HEAD AND NECK: CPT

## 2023-09-06 NOTE — PROGRESS NOTES
Assessment & Plan     1. Hyperlipidemia LDL goal <100  Cannot tolerate statins   - Lipid Profile; Future    2. Benign essential hypertension  Stop metoprolol - hair loss.  Will change to carvedilol.    - Comprehensive metabolic panel; Future  - TSH with free T4 reflex; Future  - carvedilol (COREG) 12.5 MG tablet; Take 1 tablet (12.5 mg) by mouth 2 times daily (with meals)  Dispense: 90 tablet; Refill: 1    3. LA NENA (generalized anxiety disorder)  Continue current plan   - sertraline (ZOLOFT) 50 MG tablet; Take 1 tablet (50 mg) by mouth daily  Dispense: 90 tablet; Refill: 0    4. Paroxysmal atrial fibrillation (H)  - continue eliquis   - carvedilol (COREG) 12.5 MG tablet; Take 1 tablet (12.5 mg) by mouth 2 times daily (with meals)  Dispense: 90 tablet; Refill: 1    5. Postoperative hypothyroidism  TSh today     6. On statin therapy  - Comprehensive metabolic panel; Future    7. Mixed incontinence urge and stress (male)(female)  - solifenacin (VESICARE) 5 MG tablet; Take 1 tablet (5 mg) by mouth daily  Dispense: 90 tablet; Refill: 2          Return in about 3 months (around 12/7/2023) for hypertension and lipids, anxiety/depression, thyroid.    Randi Haddad NP  [facility] [place] [facility]    Subjective   Nella is a 78 year old, presenting for the following health issues:  Hypertension, Lipids, and Anxiety      HPI     Hyperlipidemia Follow-Up    Are you regularly taking any medication or supplement to lower your cholesterol?   No  Are you having muscle aches or other side effects that you think could be caused by your cholesterol lowering medication?  No    Hypertension Follow-up    Do you check your blood pressure regularly outside of the clinic? Yes   Are you following a low salt diet? Yes  Are your blood pressures ever more than 140 on the top number (systolic) OR more   than 90 on the bottom number (diastolic), for example 140/90? Yes    Anxiety Follow-Up  How are you doing with your anxiety  since your last visit? No change  Are you having other symptoms that might be associated with anxiety? No  Have you had a significant life event? OTHER: grandson in the hospital    Are you feeling depressed? No  Do you have any concerns with your use of alcohol or other drugs? No    Social History     Tobacco Use    Smoking status: Never    Smokeless tobacco: Never   Vaping Use    Vaping Use: Never used   Substance Use Topics    Alcohol use: Never    Drug use: No         6/7/2023    11:15 AM 8/9/2023     9:36 AM 9/7/2023     8:43 AM   LA NENA-7 SCORE   Total Score 4 (minimal anxiety) 0 (minimal anxiety) 2 (minimal anxiety)   Total Score 4 0 2         3/3/2023     9:29 AM 8/9/2023     9:35 AM 9/7/2023     8:41 AM   PHQ   PHQ-9 Total Score 0 0 1   Q9: Thoughts of better off dead/self-harm past 2 weeks Not at all Not at all Not at all         9/7/2023     8:41 AM   Last PHQ-9   1.  Little interest or pleasure in doing things 0   2.  Feeling down, depressed, or hopeless 1   3.  Trouble falling or staying asleep, or sleeping too much 0   4.  Feeling tired or having little energy 0   5.  Poor appetite or overeating 0   6.  Feeling bad about yourself 0   7.  Trouble concentrating 0   8.  Moving slowly or restless 0   Q9: Thoughts of better off dead/self-harm past 2 weeks 0   PHQ-9 Total Score 1         9/7/2023     8:43 AM   LA NENA-7    1. Feeling nervous, anxious, or on edge 0   2. Not being able to stop or control worrying 1   3. Worrying too much about different things 1   4. Trouble relaxing 0   5. Being so restless that it is hard to sit still 0   6. Becoming easily annoyed or irritable 0   7. Feeling afraid, as if something awful might happen 0   LA NENA-7 Total Score 2   If you checked any problems, how difficult have they made it for you to do your work, take care of things at home, or get along with other people? Not difficult at all     Chronic Kidney Disease Follow-up    Do you take any over the counter pain medicine?:  No        Recent Labs   Lab Test 08/09/23  1027 06/13/23  1305 03/03/23  1018 01/17/23  1203 12/01/22  1015 08/18/21  0939 05/12/21  1040 04/28/21  1017   A1C  --   --   --   --  5.6  --   --   --    *  --  109*  --  138*   < >  --  154*   HDL 51  --  54  --  42*   < >  --  47*   TRIG 143  --  203*  --  209*   < >  --  178*   ALT 33  --  54* 96* 53*   < >  --  30   CR 0.90 1.1* 0.99* 0.95 0.99*   < > 1.19* 1.04   GFRESTIMATED 66 52* 59* 62 59*   < > 45* 53*   GFRESTBLACK  --   --   --   --   --   --  52* 61   POTASSIUM 3.8  --  4.0 4.1 4.2   < > 4.2 4.0   TSH 2.59  --  1.58  --  3.32   < >  --  4.14*    < > = values in this interval not displayed.      BP Readings from Last 3 Encounters:   09/07/23 124/66   08/09/23 138/60   06/07/23 118/62    Wt Readings from Last 3 Encounters:   09/07/23 77.7 kg (171 lb 4.8 oz)   08/09/23 79.1 kg (174 lb 4.8 oz)   06/07/23 77.6 kg (171 lb)                        Review of Systems   Constitutional, HEENT, cardiovascular, pulmonary, gi and gu systems are negative, except as otherwise noted.      Objective    /66 (BP Location: Left arm, Patient Position: Sitting, Cuff Size: Adult Regular)   Pulse 57   Temp 96.9  F (36.1  C) (Tympanic)   Resp 16   Wt 77.7 kg (171 lb 4.8 oz)   SpO2 95%   BMI 31.33 kg/m    Body mass index is 31.33 kg/m .  Physical Exam   GENERAL: healthy, alert and no distress  NECK: no adenopathy, no asymmetry, masses, or scars, thyroid normal to palpation, and no carotid bruits  RESP: lungs clear to auscultation - no rales, rhonchi or wheezes  CV: regular rate and rhythm, normal S1 S2, no S3 or S4, no murmur, no peripheral edema and peripheral pulses strong  MS: no gross musculoskeletal defects noted, no edema  PSYCH: mentation appears normal, affect normal/bright                    Answers submitted by the patient for this visit:  Patient Health Questionnaire (Submitted on 9/7/2023)  If you checked off any problems, how difficult have these  problems made it for you to do your work, take care of things at home, or get along with other people?: Not difficult at all  PHQ9 TOTAL SCORE: 1  LA NENA-7 (Submitted on 9/7/2023)  LA NENA 7 TOTAL SCORE: 2

## 2023-09-07 ENCOUNTER — OFFICE VISIT (OUTPATIENT)
Dept: FAMILY MEDICINE | Facility: OTHER | Age: 78
End: 2023-09-07
Attending: NURSE PRACTITIONER
Payer: COMMERCIAL

## 2023-09-07 VITALS
HEART RATE: 57 BPM | RESPIRATION RATE: 16 BRPM | BODY MASS INDEX: 31.33 KG/M2 | SYSTOLIC BLOOD PRESSURE: 124 MMHG | OXYGEN SATURATION: 95 % | WEIGHT: 171.3 LBS | DIASTOLIC BLOOD PRESSURE: 66 MMHG | TEMPERATURE: 96.9 F

## 2023-09-07 DIAGNOSIS — N39.46 MIXED INCONTINENCE URGE AND STRESS (MALE)(FEMALE): ICD-10-CM

## 2023-09-07 DIAGNOSIS — Z79.899 ON STATIN THERAPY: ICD-10-CM

## 2023-09-07 DIAGNOSIS — F41.1 GAD (GENERALIZED ANXIETY DISORDER): Chronic | ICD-10-CM

## 2023-09-07 DIAGNOSIS — I48.0 PAROXYSMAL ATRIAL FIBRILLATION (H): Chronic | ICD-10-CM

## 2023-09-07 DIAGNOSIS — E78.5 HYPERLIPIDEMIA LDL GOAL <100: Primary | ICD-10-CM

## 2023-09-07 DIAGNOSIS — I10 BENIGN ESSENTIAL HYPERTENSION: ICD-10-CM

## 2023-09-07 DIAGNOSIS — E89.0 POSTOPERATIVE HYPOTHYROIDISM: ICD-10-CM

## 2023-09-07 LAB
ALBUMIN SERPL BCG-MCNC: 4.1 G/DL (ref 3.5–5.2)
ALP SERPL-CCNC: 126 U/L (ref 35–104)
ALT SERPL W P-5'-P-CCNC: 63 U/L (ref 0–50)
ANION GAP SERPL CALCULATED.3IONS-SCNC: 14 MMOL/L (ref 7–15)
AST SERPL W P-5'-P-CCNC: 37 U/L (ref 0–45)
BILIRUB SERPL-MCNC: 0.7 MG/DL
BUN SERPL-MCNC: 15.3 MG/DL (ref 8–23)
CALCIUM SERPL-MCNC: 9.3 MG/DL (ref 8.8–10.2)
CHLORIDE SERPL-SCNC: 106 MMOL/L (ref 98–107)
CHOLEST SERPL-MCNC: 231 MG/DL
CREAT SERPL-MCNC: 1.1 MG/DL (ref 0.51–0.95)
DEPRECATED HCO3 PLAS-SCNC: 24 MMOL/L (ref 22–29)
EGFRCR SERPLBLD CKD-EPI 2021: 51 ML/MIN/1.73M2
GLUCOSE SERPL-MCNC: 109 MG/DL (ref 70–99)
HDLC SERPL-MCNC: 52 MG/DL
HOLD SPECIMEN: NORMAL
LDLC SERPL CALC-MCNC: 143 MG/DL
NONHDLC SERPL-MCNC: 179 MG/DL
POTASSIUM SERPL-SCNC: 4.1 MMOL/L (ref 3.4–5.3)
PROT SERPL-MCNC: 6.7 G/DL (ref 6.4–8.3)
SODIUM SERPL-SCNC: 144 MMOL/L (ref 136–145)
TRIGL SERPL-MCNC: 181 MG/DL
TSH SERPL DL<=0.005 MIU/L-ACNC: 2.99 UIU/ML (ref 0.3–4.2)

## 2023-09-07 PROCEDURE — 80053 COMPREHEN METABOLIC PANEL: CPT | Mod: ZL | Performed by: NURSE PRACTITIONER

## 2023-09-07 PROCEDURE — 80061 LIPID PANEL: CPT | Mod: ZL | Performed by: NURSE PRACTITIONER

## 2023-09-07 PROCEDURE — 84443 ASSAY THYROID STIM HORMONE: CPT | Mod: ZL | Performed by: NURSE PRACTITIONER

## 2023-09-07 PROCEDURE — 99214 OFFICE O/P EST MOD 30 MIN: CPT | Performed by: NURSE PRACTITIONER

## 2023-09-07 PROCEDURE — G0463 HOSPITAL OUTPT CLINIC VISIT: HCPCS

## 2023-09-07 PROCEDURE — 36415 COLL VENOUS BLD VENIPUNCTURE: CPT | Mod: ZL | Performed by: NURSE PRACTITIONER

## 2023-09-07 RX ORDER — CARVEDILOL 12.5 MG/1
12.5 TABLET ORAL 2 TIMES DAILY WITH MEALS
Qty: 90 TABLET | Refills: 1 | Status: SHIPPED | OUTPATIENT
Start: 2023-09-07 | End: 2023-12-04

## 2023-09-07 RX ORDER — SOLIFENACIN SUCCINATE 5 MG/1
5 TABLET, FILM COATED ORAL DAILY
Qty: 90 TABLET | Refills: 2 | Status: SHIPPED | OUTPATIENT
Start: 2023-09-07 | End: 2024-03-21

## 2023-09-07 ASSESSMENT — ANXIETY QUESTIONNAIRES
GAD7 TOTAL SCORE: 2
3. WORRYING TOO MUCH ABOUT DIFFERENT THINGS: SEVERAL DAYS
7. FEELING AFRAID AS IF SOMETHING AWFUL MIGHT HAPPEN: NOT AT ALL
6. BECOMING EASILY ANNOYED OR IRRITABLE: NOT AT ALL
4. TROUBLE RELAXING: NOT AT ALL
5. BEING SO RESTLESS THAT IT IS HARD TO SIT STILL: NOT AT ALL
GAD7 TOTAL SCORE: 2
IF YOU CHECKED OFF ANY PROBLEMS ON THIS QUESTIONNAIRE, HOW DIFFICULT HAVE THESE PROBLEMS MADE IT FOR YOU TO DO YOUR WORK, TAKE CARE OF THINGS AT HOME, OR GET ALONG WITH OTHER PEOPLE: NOT DIFFICULT AT ALL
2. NOT BEING ABLE TO STOP OR CONTROL WORRYING: SEVERAL DAYS
1. FEELING NERVOUS, ANXIOUS, OR ON EDGE: NOT AT ALL

## 2023-09-07 ASSESSMENT — PATIENT HEALTH QUESTIONNAIRE - PHQ9
10. IF YOU CHECKED OFF ANY PROBLEMS, HOW DIFFICULT HAVE THESE PROBLEMS MADE IT FOR YOU TO DO YOUR WORK, TAKE CARE OF THINGS AT HOME, OR GET ALONG WITH OTHER PEOPLE: NOT DIFFICULT AT ALL
SUM OF ALL RESPONSES TO PHQ QUESTIONS 1-9: 1
SUM OF ALL RESPONSES TO PHQ QUESTIONS 1-9: 1

## 2023-09-07 ASSESSMENT — PAIN SCALES - GENERAL: PAINLEVEL: NO PAIN (0)

## 2023-09-07 NOTE — PATIENT INSTRUCTIONS
Assessment & Plan     1. Hyperlipidemia LDL goal <100  Cannot tolerate statins   - Lipid Profile; Future    2. Benign essential hypertension  Stop metoprolol - hair loss.  Will change to carvedilol.    - Comprehensive metabolic panel; Future  - TSH with free T4 reflex; Future  - carvedilol (COREG) 12.5 MG tablet; Take 1 tablet (12.5 mg) by mouth 2 times daily (with meals)  Dispense: 90 tablet; Refill: 1    3. LA NENA (generalized anxiety disorder)  Continue current plan   - sertraline (ZOLOFT) 50 MG tablet; Take 1 tablet (50 mg) by mouth daily  Dispense: 90 tablet; Refill: 0    4. Paroxysmal atrial fibrillation (H)  - continue eliquis   - carvedilol (COREG) 12.5 MG tablet; Take 1 tablet (12.5 mg) by mouth 2 times daily (with meals)  Dispense: 90 tablet; Refill: 1    5. Postoperative hypothyroidism  TSh today     6. On statin therapy  - Comprehensive metabolic panel; Future    7. Mixed incontinence urge and stress (male)(female)  - solifenacin (VESICARE) 5 MG tablet; Take 1 tablet (5 mg) by mouth daily  Dispense: 90 tablet; Refill: 2          Return in about 3 months (around 12/7/2023) for hypertension and lipids, anxiety/depression, thyroid.    Randi Haddad NP  [facility]

## 2023-09-14 ENCOUNTER — TELEPHONE (OUTPATIENT)
Dept: FAMILY MEDICINE | Facility: OTHER | Age: 78
End: 2023-09-14

## 2023-09-14 NOTE — TELEPHONE ENCOUNTER
11:56 AM    Reason for Call: Phone Call    Description: Patient called in and would like a referral to have someone look at the cyst on in groin area. Please call patient back.     Was an appointment offered for this call? No  If yes : Appointment type              Date    Preferred method for responding to this message: Telephone Call  What is your phone number ? [phone]     If we cannot reach you directly, may we leave a detailed response at the number you provided? Yes    Can this message wait until your PCP/provider returns, if available today? Not applicable, provider in clinic    Venus Gallagher

## 2023-09-15 NOTE — PROGRESS NOTES
Assessment & Plan     1. Bartholin cyst  Referral to OB/GYN for evaluation.    - Ob/Gyn Referral    2. Venous (peripheral) insufficiency  Compression stockings encouraged  Low salt diet  Elevate lower extremities while sitting.      3. Papillary thyroid carcinoma (H)  Recent ultrasound reviewed, stable.        Follow-up as scheduled.     Randi Haddad NP  [facility] [place] [facility] Carmen is a 78 year old, presenting for the following health issues:  Cyst        9/18/2023     9:06 AM   Additional Questions   Roomed by jack   Accompanied by none       HPI     Concern - Lump in left labia  Onset: noted about a month ago   Description: seems to move around, getting larger.  Notices fullness when sitting but denies any pain or fever.    Intensity: no pain   Progression of Symptoms:  same  Accompanying Signs & Symptoms: none   Previous history of similar problem: no   Precipitating factors:        Worsened by: unknown   Alleviating factors:        Improved by: nothing   Therapies tried and outcome: None      Her other concern is slight swelling around ankles today.  She drove round trip 6 hours to visit her grandson, ate fast foods and now today ankles are slightly swollen.  Denies pain, redness or skin breakdown.      Recent Labs   Lab Test 09/07/23  0925 08/09/23  1027 06/13/23  1305 03/03/23  1018 01/17/23  1203 12/01/22  1015 08/18/21  0939 05/12/21  1040 04/28/21  1017   A1C  --   --   --   --   --  5.6  --   --   --    * 156*  --  109*  --  138*   < >  --  154*   HDL 52 51  --  54  --  42*   < >  --  47*   TRIG 181* 143  --  203*  --  209*   < >  --  178*   ALT 63* 33  --  54*   < > 53*   < >  --  30   CR 1.10* 0.90   < > 0.99*   < > 0.99*   < > 1.19* 1.04   GFRESTIMATED 51* 66   < > 59*   < > 59*   < > 45* 53*   GFRESTBLACK  --   --   --   --   --   --   --  52* 61   POTASSIUM 4.1 3.8  --  4.0   < > 4.2   < > 4.2 4.0   TSH 2.99 2.59  --  1.58  --  3.32   < >  --  4.14*  "   < > = values in this interval not displayed.      BP Readings from Last 3 Encounters:   09/18/23 122/68   09/07/23 124/66   08/09/23 138/60    Wt Readings from Last 3 Encounters:   09/18/23 82.3 kg (181 lb 8 oz)   09/07/23 77.7 kg (171 lb 4.8 oz)   08/09/23 79.1 kg (174 lb 4.8 oz)                        Review of Systems   Constitutional, HEENT, cardiovascular, pulmonary, gi and gu systems are negative, except as otherwise noted.      Objective    /68 (BP Location: Left arm, Patient Position: Chair, Cuff Size: Adult Regular)   Pulse 55   Temp 96.8  F (36  C) (Tympanic)   Resp 16   Ht 1.575 m (5' 2\")   Wt 82.3 kg (181 lb 8 oz)   SpO2 98%   BMI 33.20 kg/m    Body mass index is 33.2 kg/m .  Physical Exam   GENERAL: healthy, alert and no distress  RESP: lungs clear to auscultation - no rales, rhonchi or wheezes  CV: regular rate and rhythm, normal S1 S2, no S3 or S4, no murmur, lower extremities edematous about ankles bilateral, skin normal temp and color, pedal pulses intact.     (female): external genitalia left labia majora slightly swollen, normal urethral meatus  and left Bartholin's gland edematous, somewhat purple but non-tender with palpation.    MS: no gross musculoskeletal defects noted,   PSYCH: mentation appears normal, affect normal/bright                PROCEDURE: US THYROID 8/18/2023 1:41 PM     HISTORY: Papillary thyroid carcinoma (H); Postoperative hypothyroidism     COMPARISONS: 6/14/2022     TECHNIQUE: Ultrasound of the thyroid     FINDINGS: The right lobe of thyroid measures 4.6 x 2 x 2.3 cm. The  left lobe of the thyroid has been resected. No thyroid nodules are  seen.                                                                        IMPRESSION: No thyroid nodules.     FILI MONSON MD          "

## 2023-09-18 ENCOUNTER — OFFICE VISIT (OUTPATIENT)
Dept: FAMILY MEDICINE | Facility: OTHER | Age: 78
End: 2023-09-18
Attending: NURSE PRACTITIONER
Payer: COMMERCIAL

## 2023-09-18 VITALS
HEART RATE: 55 BPM | RESPIRATION RATE: 16 BRPM | HEIGHT: 62 IN | DIASTOLIC BLOOD PRESSURE: 68 MMHG | WEIGHT: 181.5 LBS | OXYGEN SATURATION: 98 % | SYSTOLIC BLOOD PRESSURE: 122 MMHG | TEMPERATURE: 96.8 F | BODY MASS INDEX: 33.4 KG/M2

## 2023-09-18 DIAGNOSIS — C73 PAPILLARY THYROID CARCINOMA (H): ICD-10-CM

## 2023-09-18 DIAGNOSIS — I87.2 VENOUS (PERIPHERAL) INSUFFICIENCY: ICD-10-CM

## 2023-09-18 DIAGNOSIS — N75.0 BARTHOLIN CYST: Primary | ICD-10-CM

## 2023-09-18 PROCEDURE — 99213 OFFICE O/P EST LOW 20 MIN: CPT | Performed by: NURSE PRACTITIONER

## 2023-09-18 PROCEDURE — G0463 HOSPITAL OUTPT CLINIC VISIT: HCPCS

## 2023-09-18 ASSESSMENT — PAIN SCALES - GENERAL: PAINLEVEL: NO PAIN (0)

## 2023-09-28 ENCOUNTER — OFFICE VISIT (OUTPATIENT)
Dept: OBGYN | Facility: OTHER | Age: 78
End: 2023-09-28
Attending: OBSTETRICS & GYNECOLOGY
Payer: COMMERCIAL

## 2023-09-28 VITALS
RESPIRATION RATE: 16 BRPM | TEMPERATURE: 97.4 F | BODY MASS INDEX: 33.31 KG/M2 | DIASTOLIC BLOOD PRESSURE: 68 MMHG | SYSTOLIC BLOOD PRESSURE: 110 MMHG | HEIGHT: 62 IN | WEIGHT: 181 LBS

## 2023-09-28 DIAGNOSIS — N90.7 LABIAL CYST: Primary | ICD-10-CM

## 2023-09-28 PROCEDURE — 99203 OFFICE O/P NEW LOW 30 MIN: CPT | Performed by: OBSTETRICS & GYNECOLOGY

## 2023-09-28 PROCEDURE — G0463 HOSPITAL OUTPT CLINIC VISIT: HCPCS | Mod: 25

## 2023-09-28 PROCEDURE — G0463 HOSPITAL OUTPT CLINIC VISIT: HCPCS

## 2023-09-28 ASSESSMENT — PAIN SCALES - GENERAL: PAINLEVEL: NO PAIN (0)

## 2023-09-28 NOTE — PROGRESS NOTES
CHIEF COMPLAINT / REASON FOR VISIT  Patient presents for Gynecology visit for left labial cyst.    HISTORY OF PRESENT ILLNESS  Nella Lemon is a 78 year old  with No LMP recorded. Patient is postmenopausal. who presents for left labial cyst.  She is postmenopausal female and denies hormone replacement therapy.  She presents complaining of a 4 to 5-week history of a left labial cyst that is now about the size of a small strawberry.  She notices a fullness in the left labia when she is wiping after going to the bathroom.  She has not noticed this at any other time.  No pain, tenderness, or discomfort.  No difficulty urinating, dysuria, hematuria, or flank pain.  No nausea, vomiting, diarrhea, constipation, difficulty with bowel movement.  She denies abnormal vaginal discharge, itching, irritation, malodor, or bleeding.  No postmenopausal bleeding.  She is no longer sexually active.  She denies a prior labial cyst.    MENSTRUAL HISTORY  Postmenopausal: Yes.  Menopause was at age 51.  She denies bleeding since menopause.  She is not sexually active and denies issues with intercourse.   Current contraception: Postmenopausal.  STD History: No STD history.  Last Pap smear history: Normal.  Mammogram history: Normal.    ALLERGIES     Allergies   Allergen Reactions    Atorvastatin     Ezetimibe     Gentamicin     Hydroxyzine     Lorazepam     Ranitidine     Simvastatin     Lopid [Gemfibrozil] GI Disturbance     Bloating, constipation,     No Clinical Screening - See Comments      anticholesterol medicine caused muscle aches    Pravastatin Muscle Pain (Myalgia)       MEDICATIONS    Current Outpatient Medications:     acetaminophen (TYLENOL) 650 MG CR tablet, Take 1 tablet (650 mg) by mouth 3 times daily as needed for mild pain or fever (Patient taking differently: Take 650 mg by mouth as needed for mild pain or fever), Disp: 90 tablet, Rfl: 1    amLODIPine (NORVASC) 10 MG tablet, Take 1 tablet (10 mg) by mouth  "daily for blood pressure, Disp: 90 tablet, Rfl: 2    apixaban ANTICOAGULANT (ELIQUIS ANTICOAGULANT) 5 MG tablet, Take 1 tablet (5 mg) by mouth 2 times daily, Disp: 180 tablet, Rfl: 3    carvedilol (COREG) 12.5 MG tablet, Take 1 tablet (12.5 mg) by mouth 2 times daily (with meals), Disp: 90 tablet, Rfl: 1    cholecalciferol 50 MCG (2000 UT) CAPS, Take 1 capsule by mouth daily, Disp: , Rfl:     colchicine (COLCYRS) 0.6 MG tablet, TAKE 1 TABLET (0.6 MG) BY MOUTH DAILY, Disp: 90 tablet, Rfl: 0    gabapentin (NEURONTIN) 300 MG capsule, Take 1 capsule (300 mg) by mouth 3 times daily, Disp: 270 capsule, Rfl: 0    levothyroxine (SYNTHROID/LEVOTHROID) 112 MCG tablet, TAKE 1 TABLET DAILY FOR THYROID, Disp: 90 tablet, Rfl: 2    losartan (COZAAR) 50 MG tablet, Take 1 tablet (50 mg) by mouth daily, Disp: 90 tablet, Rfl: 3    multivitamin, therapeutic (THERA-VIT) TABS tablet, Take 1 tablet by mouth daily, Disp: , Rfl:     mupirocin (BACTROBAN) 2 % external ointment, Apply topically 3 times daily, Disp: 22 g, Rfl: 0    Omega-3 Fatty Acids (OMEGA-3 FISH OIL PO), Take 3 g by mouth daily , Disp: , Rfl:     omeprazole (PRILOSEC) 40 MG DR capsule, Take 1 capsule (40 mg) by mouth daily, Disp: 90 capsule, Rfl: 1    predniSONE (DELTASONE) 20 MG tablet, Take 40 mg by mouth, Disp: , Rfl:     sertraline (ZOLOFT) 50 MG tablet, Take 1 tablet (50 mg) by mouth daily, Disp: 90 tablet, Rfl: 0    solifenacin (VESICARE) 5 MG tablet, Take 1 tablet (5 mg) by mouth daily, Disp: 90 tablet, Rfl: 2    STATIN NOT PRESCRIBED (INTENTIONAL), Please choose reason not prescribed from choices below., Disp: , Rfl:     VITAMIN D, CHOLECALCIFEROL, PO, Take by mouth daily, Disp: , Rfl:     REVIEW OF SYSTEMS  As per HPI, otherwise negative    The patient's Medical Hx, Surgical Hx, Obstetrical Hx, Social Hx, and Family Hx have been reviewed and updated in the electronic medical record.    OBJECTIVE  /68   Temp 97.4  F (36.3  C)   Resp 16   Ht 1.575 m (5' 2\")  "  Wt 82.1 kg (181 lb)   BMI 33.11 kg/m      General:  Well-developed, well-nourished female in no apparent distress.  Neurological: Alert and oriented x3.  Lungs:  Clear to auscultation bilaterally with good inspiratory effort.  No wheezing rhonchi or rales noted. Breathing nonlabored.  Heart:  Regular rate and rhythm without murmur. No JVD.  No peripheral vascular disease.  Abdomen: Soft, nontender, nondistended, positive bowel sounds.  No organomegaly. No rebound, no guarding.  Pelvic exam: Atrophic external female genitalia appropriate for age and menopausal status.  There is a 3 cm left Bartholin's gland cyst without erythema, induration, tenderness, or signs of infection.  No drainage, discharge, or bleeding with palpation. Normal pubic hair distribution. Urethral meatus normal in appearance and without masses. Vaginal mucosa is atrophic without vaginal lesions.  No cystocele or rectocele. The bladder and urethra are nontender to palpation.  Bimanual exam deferred.  Rectal exam:  No external hemorrhoids noted.  Extremities:  No clubbing cyanosis or edema. Nontender bilaterally.     Chaperone: Rita Allen LPN    ASSESSMENT / PLAN  Nella Lemon is a 78 year old  female who presents for left labial cyst.    1 Left Bartholin's gland cyst    - I discussed labial cyst with the patient.  This appears to be a left Bartholin's gland cyst.  It is 3 cm in size without erythema, induration, tenderness, or signs of infection.  There is no drainage, discharge, or bleeding.  It does not appear to be infected.  - I discussed management options with the patient including incision and drainage as well as expectant management.  I discussed expected outcome, risk, benefits, alternatives, and indication of each with the patient.  Patient declines incision and drainage at this time.  She desires expectant management.  - I therefore recommend warm sitz bath as 2 times per day for the next week.  I discussed using  Epsom salt with the warm sitz bath.  - I discussed using warm packs to the area.  - Perineal hygiene recommendations are reviewed the patient.  - I recommend the patient to return to clinic if she develops pain, tenderness, drainage or discharge or any sign of infection.  - I recommend the patient return to clinic for incision and drainage if the cyst does not resolve in the next 10 days.  - All of the patient's questions are answered.    - Problem list reviewed and updated.  - Follow up in 2 weeks or sooner as needed.    Ben Garcia MD  Obstetrics and Gynecology

## 2023-10-25 DIAGNOSIS — I48.0 PAROXYSMAL ATRIAL FIBRILLATION (H): ICD-10-CM

## 2023-10-25 DIAGNOSIS — I10 BENIGN ESSENTIAL HYPERTENSION: Chronic | ICD-10-CM

## 2023-10-25 DIAGNOSIS — K21.9 GASTROESOPHAGEAL REFLUX DISEASE WITHOUT ESOPHAGITIS: Chronic | ICD-10-CM

## 2023-10-25 NOTE — TELEPHONE ENCOUNTER
TOPROL PRILOSEC      Last Written Prescription Date:  8-9--23  Last Fill Quantity: 90,   # refills: 1  Last Office Visit: 9-18-23  Future Office visit:    Next 5 appointments (look out 90 days)      Dec 06, 2023  9:30 AM  (Arrive by 9:15 AM)  SHORT with Ranid Haddad NP  [facility] ([facility] ) [address]  [facility] [address]  [phone]     Aroldo 10, 2024  9:45 AM  (Arrive by 9:30 AM)  Nurse Only with Alis Pitt  [facility] ([facility] [place] ) [address]  [place] [address]  [phone]             Routing refill request to provider for review/approval because:  TWO MEDICAITONS

## 2023-10-27 RX ORDER — OMEPRAZOLE 40 MG/1
40 CAPSULE, DELAYED RELEASE ORAL DAILY
Qty: 90 CAPSULE | Refills: 3 | Status: SHIPPED | OUTPATIENT
Start: 2023-10-27 | End: 2024-09-11

## 2023-10-27 RX ORDER — METOPROLOL SUCCINATE 50 MG/1
50 TABLET, EXTENDED RELEASE ORAL DAILY
Qty: 90 TABLET | Refills: 3 | OUTPATIENT
Start: 2023-10-27

## 2023-10-30 DIAGNOSIS — I10 BENIGN ESSENTIAL HYPERTENSION: Chronic | ICD-10-CM

## 2023-10-30 DIAGNOSIS — I48.0 PAROXYSMAL ATRIAL FIBRILLATION (H): ICD-10-CM

## 2023-10-31 ENCOUNTER — TELEPHONE (OUTPATIENT)
Dept: FAMILY MEDICINE | Facility: OTHER | Age: 78
End: 2023-10-31

## 2023-10-31 RX ORDER — METOPROLOL SUCCINATE 50 MG/1
50 TABLET, EXTENDED RELEASE ORAL DAILY
Qty: 90 TABLET | Refills: 3 | OUTPATIENT
Start: 2023-10-31

## 2023-10-31 NOTE — TELEPHONE ENCOUNTER
Metoprolol      Last Written Prescription Date:  02/02/2023 discontinued due to side effects per provider   Last Fill Quantity: 90,   # refills: 3  Last Office Visit: 9/18/2023  Future Office visit:    Next 5 appointments (look out 90 days)      Dec 06, 2023  9:30 AM  (Arrive by 9:15 AM)  SHORT with Randi Haddad NP  [facility] ([facility] [facility] ) [address] [address]  [phone]     Aroldo 10, 2024  9:45 AM  (Arrive by 9:30 AM)  Nurse Only with Alis Pitt  [facility] ([facility] [place] ) [address]  [place] [address]  [phone]

## 2023-10-31 NOTE — TELEPHONE ENCOUNTER
Reason for call:  Medication      Have you contacted your pharmacy? Yes   [facility] said Clinic denied request  Medication: metaprolol? Patient was unclear of exact name of med.   What Pharmacy do you use? [facility]

## 2023-11-01 NOTE — TELEPHONE ENCOUNTER
Metoprolol was discontinued when she was changed to carvedilol in September due to hair loss.      Is she taking carvedilol?

## 2023-11-29 DIAGNOSIS — I48.0 PAROXYSMAL ATRIAL FIBRILLATION (H): Chronic | ICD-10-CM

## 2023-11-29 DIAGNOSIS — I10 BENIGN ESSENTIAL HYPERTENSION: ICD-10-CM

## 2023-11-29 NOTE — TELEPHONE ENCOUNTER
Coreg      Last Written Prescription Date:  09/07/2023  Last Fill Quantity: 90,   # refills: 1  Last Office Visit: 09/18/2023  Future Office visit:    Next 5 appointments (look out 90 days)      Dec 06, 2023  9:30 AM  (Arrive by 9:15 AM)  SHORT with Randi Haddad NP  [facility] ([facility] ) [address]  [facility] [address]  [phone]     Jan 17, 2024  9:45 AM  (Arrive by 9:30 AM)  Nurse Only with Alis Pitt  [facility] ([facility] [place] ) [address]  [place] [address]  [phone]             Routing refill request to provider for review/approval because:

## 2023-11-30 RX ORDER — APIXABAN 5 MG/1
5 TABLET, FILM COATED ORAL 2 TIMES DAILY
Qty: 180 TABLET | Refills: 3 | Status: SHIPPED | OUTPATIENT
Start: 2023-11-30 | End: 2024-09-11

## 2023-11-30 NOTE — TELEPHONE ENCOUNTER
apixaban ANTICOAGULANT (ELIQUIS ANTICOAGULANT) 5 MG tablet 180 tablet 3 5/31/2023     Lov in cardiology 05/31/2023    Future Office visit:    Next 5 appointments (look out 90 days)      Dec 06, 2023  9:30 AM  (Arrive by 9:15 AM)  SHORT with Randi Haddad NP  [facility] ([facility] ) [address] [address]  [phone]     Jan 17, 2024  9:45 AM  (Arrive by 9:30 AM)  Nurse Only with Alis Pitt  [facility] ([facility] [place] ) [address]  [place] [address]  [phone]             Routing refill request to provider for review/approval because:

## 2023-12-04 RX ORDER — CARVEDILOL 12.5 MG/1
12.5 TABLET ORAL 2 TIMES DAILY WITH MEALS
Qty: 180 TABLET | Refills: 2 | Status: SHIPPED | OUTPATIENT
Start: 2023-12-04 | End: 2024-03-13 | Stop reason: SINTOL

## 2023-12-05 NOTE — PROGRESS NOTES
"  Assessment & Plan     1. Hyperlipidemia LDL goal <100  Cannot tolerate statins, repatha was not covered by insurance.   - Lipid Profile    2. Benign essential hypertension  Well controlled   - Comprehensive metabolic panel    3. Paroxysmal atrial fibrillation (H)  Continue eliquis    4. Mixed incontinence urge and stress (male)(female)  Continue vesicare, working well     5. Papillary thyroid carcinoma (H)  - TSH with free T4 reflex    6. LA NENA (generalized anxiety disorder)  stable  - sertraline (ZOLOFT) 50 MG tablet; Take 1 tablet (50 mg) by mouth daily  Dispense: 90 tablet; Refill: 0    7. On statin therapy  - Comprehensive metabolic panel    8. Stage 3a chronic kidney disease (H)  Do not use NSAIDS  - CBC with Platelets & Differential  - Albumin Random Urine Quantitative with Creat Ratio    9. Bilateral primary osteoarthritis of knee  - XR Knee Standing Bilateral 3 Views  - declined referral to ortho for now.      10. Myalgia  - gabapentin (NEURONTIN) 300 MG capsule; Take one  capsule in the morning and afternoon and 2 at bedtime.  Dispense: 360 capsule; Refill: 1      Cardiology notes reviewed. OB/GYN notes reviewed.          BMI:   Estimated body mass index is 31.86 kg/m  as calculated from the following:    Height as of 9/28/23: 1.575 m (5' 2\").    Weight as of this encounter: 79 kg (174 lb 3.2 oz).     Follow-up in 3 months or as needed     Randi Haddad NP  [facility]    Nigel Carmen is a 78 year old, presenting for the following health issues:  Hyperlipidemia, Hypertension, Kidney Problem, and Thyroid Problem      HPI     Hyperlipidemia Follow-Up    Are you regularly taking any medication or supplement to lower your cholesterol?   No  Are you having muscle aches or other side effects that you think could be caused by your cholesterol lowering medication?  No Statins not ordered due to side effects- muscle aches    Hypertension Follow-up    Do you check your blood " pressure regularly outside of the clinic? Yes   Are you following a low salt diet? Yes  Are your blood pressures ever more than 140 on the top number (systolic) OR more   than 90 on the bottom number (diastolic), for example 140/90? No    Chronic Kidney Disease Follow-up    Do you take any over the counter pain medicine?: Yes  What over the counter medicine are you taking for your pain?:  Tylenol    How often do you take this medicine?:   One time per week  Hypothyroidism Follow-up    Since last visit, patient describes the following symptoms: hair loss- Stopped taking Losartan and hair loss has improved    Wants to discuss bilateral knee pain  Started a few years ago  Reports 8/10 today  Describes as an ache  Uses ibuprofen as needed- once a week    Recent Labs   Lab Test 09/07/23  0925 08/09/23  1027 06/13/23  1305 03/03/23  1018 01/17/23  1203 12/01/22  1015 08/18/21  0939 05/12/21  1040 04/28/21  1017   A1C  --   --   --   --   --  5.6  --   --   --    * 156*  --  109*  --  138*   < >  --  154*   HDL 52 51  --  54  --  42*   < >  --  47*   TRIG 181* 143  --  203*  --  209*   < >  --  178*   ALT 63* 33  --  54*   < > 53*   < >  --  30   CR 1.10* 0.90   < > 0.99*   < > 0.99*   < > 1.19* 1.04   GFRESTIMATED 51* 66   < > 59*   < > 59*   < > 45* 53*   GFRESTBLACK  --   --   --   --   --   --   --  52* 61   POTASSIUM 4.1 3.8  --  4.0   < > 4.2   < > 4.2 4.0   TSH 2.99 2.59  --  1.58  --  3.32   < >  --  4.14*    < > = values in this interval not displayed.      BP Readings from Last 3 Encounters:   12/06/23 127/62   09/28/23 110/68   09/18/23 122/68    Wt Readings from Last 3 Encounters:   12/06/23 79 kg (174 lb 3.2 oz)   09/28/23 82.1 kg (181 lb)   09/18/23 82.3 kg (181 lb 8 oz)                        Review of Systems   Constitutional, HEENT, cardiovascular, pulmonary, gi and gu systems are negative, except as otherwise noted.      Objective    /62 (BP Location: Left arm, Patient Position: Sitting, Cuff  Size: Adult Regular)   Pulse 50   Temp 97.4  F (36.3  C) (Tympanic)   Resp 18   Wt 79 kg (174 lb 3.2 oz)   SpO2 97%   BMI 31.86 kg/m    Body mass index is 31.86 kg/m .  Physical Exam   GENERAL: healthy, alert and no distress  NECK: no adenopathy, no asymmetry, masses, or scars, thyroid normal to palpation, and no carotid bruits  RESP: lungs clear to auscultation - no rales, rhonchi or wheezes  CV: regular rate and rhythm, normal S1 S2, no S3 or S4, no murmur  MS: bilateral knees edematous, crepitus on flexion/extension.  Negative varus/valgus.    PSYCH: mentation appears normal, affect normal/bright                PROCEDURE:  XR KNEE STANDING BILATERAL 3 VIEWS     HISTORY: Chronic pain of both knees; Chronic pain of both knees;  Chronic pain of both knees     COMPARISON:  11/11/2022     TECHNIQUE:  3 views of the both knees were obtained.     FINDINGS:  Right knee: There is calcification in the quadriceps  tendon. There is calcification posterior to the knee which is stable  from previous examination possibly an intra-articular loose body. The  distal femur and patella proximal tibia and fibula are intact.     Left knee the distal femur and patella proximal tibia and fibula are  intact. The articular spaces are normal in height.                                                                       IMPRESSION: No change from previous examination on 11/11/2022       FILI MONSON MD

## 2023-12-06 ENCOUNTER — ANCILLARY PROCEDURE (OUTPATIENT)
Dept: GENERAL RADIOLOGY | Facility: OTHER | Age: 78
End: 2023-12-06
Attending: NURSE PRACTITIONER
Payer: COMMERCIAL

## 2023-12-06 ENCOUNTER — OFFICE VISIT (OUTPATIENT)
Dept: FAMILY MEDICINE | Facility: OTHER | Age: 78
End: 2023-12-06
Attending: NURSE PRACTITIONER
Payer: COMMERCIAL

## 2023-12-06 VITALS
DIASTOLIC BLOOD PRESSURE: 62 MMHG | SYSTOLIC BLOOD PRESSURE: 127 MMHG | WEIGHT: 174.2 LBS | TEMPERATURE: 97.4 F | HEART RATE: 50 BPM | OXYGEN SATURATION: 97 % | RESPIRATION RATE: 18 BRPM | BODY MASS INDEX: 31.86 KG/M2

## 2023-12-06 DIAGNOSIS — E78.5 HYPERLIPIDEMIA LDL GOAL <100: Primary | ICD-10-CM

## 2023-12-06 DIAGNOSIS — C73 PAPILLARY THYROID CARCINOMA (H): ICD-10-CM

## 2023-12-06 DIAGNOSIS — M17.0 BILATERAL PRIMARY OSTEOARTHRITIS OF KNEE: ICD-10-CM

## 2023-12-06 DIAGNOSIS — N39.46 MIXED INCONTINENCE URGE AND STRESS (MALE)(FEMALE): ICD-10-CM

## 2023-12-06 DIAGNOSIS — N18.31 STAGE 3A CHRONIC KIDNEY DISEASE (H): ICD-10-CM

## 2023-12-06 DIAGNOSIS — Z79.899 ON STATIN THERAPY: ICD-10-CM

## 2023-12-06 DIAGNOSIS — I48.0 PAROXYSMAL ATRIAL FIBRILLATION (H): ICD-10-CM

## 2023-12-06 DIAGNOSIS — I10 BENIGN ESSENTIAL HYPERTENSION: ICD-10-CM

## 2023-12-06 DIAGNOSIS — F41.1 GAD (GENERALIZED ANXIETY DISORDER): ICD-10-CM

## 2023-12-06 DIAGNOSIS — M79.10 MYALGIA: ICD-10-CM

## 2023-12-06 LAB
ALBUMIN SERPL BCG-MCNC: 4.3 G/DL (ref 3.5–5.2)
ALP SERPL-CCNC: 122 U/L (ref 40–150)
ALT SERPL W P-5'-P-CCNC: 22 U/L (ref 0–50)
ANION GAP SERPL CALCULATED.3IONS-SCNC: 12 MMOL/L (ref 7–15)
AST SERPL W P-5'-P-CCNC: 18 U/L (ref 0–45)
BASOPHILS # BLD AUTO: 0 10E3/UL (ref 0–0.2)
BASOPHILS NFR BLD AUTO: 0 %
BILIRUB SERPL-MCNC: 0.4 MG/DL
BUN SERPL-MCNC: 17.1 MG/DL (ref 8–23)
CALCIUM SERPL-MCNC: 9.6 MG/DL (ref 8.8–10.2)
CHLORIDE SERPL-SCNC: 104 MMOL/L (ref 98–107)
CHOLEST SERPL-MCNC: 228 MG/DL
CREAT SERPL-MCNC: 0.96 MG/DL (ref 0.51–0.95)
CREAT UR-MCNC: 112.5 MG/DL
DEPRECATED HCO3 PLAS-SCNC: 25 MMOL/L (ref 22–29)
EGFRCR SERPLBLD CKD-EPI 2021: 61 ML/MIN/1.73M2
EOSINOPHIL # BLD AUTO: 0.2 10E3/UL (ref 0–0.7)
EOSINOPHIL NFR BLD AUTO: 2 %
ERYTHROCYTE [DISTWIDTH] IN BLOOD BY AUTOMATED COUNT: 13.2 % (ref 10–15)
FASTING STATUS PATIENT QL REPORTED: YES
GLUCOSE SERPL-MCNC: 103 MG/DL (ref 70–99)
HCT VFR BLD AUTO: 43.8 % (ref 35–47)
HDLC SERPL-MCNC: 49 MG/DL
HGB BLD-MCNC: 14.8 G/DL (ref 11.7–15.7)
IMM GRANULOCYTES # BLD: 0 10E3/UL
IMM GRANULOCYTES NFR BLD: 0 %
LDLC SERPL CALC-MCNC: 142 MG/DL
LYMPHOCYTES # BLD AUTO: 2.3 10E3/UL (ref 0.8–5.3)
LYMPHOCYTES NFR BLD AUTO: 21 %
MCH RBC QN AUTO: 27.9 PG (ref 26.5–33)
MCHC RBC AUTO-ENTMCNC: 33.8 G/DL (ref 31.5–36.5)
MCV RBC AUTO: 83 FL (ref 78–100)
MICROALBUMIN UR-MCNC: 14.4 MG/L
MICROALBUMIN/CREAT UR: 12.8 MG/G CR (ref 0–25)
MONOCYTES # BLD AUTO: 0.6 10E3/UL (ref 0–1.3)
MONOCYTES NFR BLD AUTO: 6 %
NEUTROPHILS # BLD AUTO: 7.7 10E3/UL (ref 1.6–8.3)
NEUTROPHILS NFR BLD AUTO: 71 %
NONHDLC SERPL-MCNC: 179 MG/DL
PLATELET # BLD AUTO: 264 10E3/UL (ref 150–450)
POTASSIUM SERPL-SCNC: 4.2 MMOL/L (ref 3.4–5.3)
PROT SERPL-MCNC: 7 G/DL (ref 6.4–8.3)
RBC # BLD AUTO: 5.3 10E6/UL (ref 3.8–5.2)
SODIUM SERPL-SCNC: 141 MMOL/L (ref 135–145)
TRIGL SERPL-MCNC: 185 MG/DL
TSH SERPL DL<=0.005 MIU/L-ACNC: 2.89 UIU/ML (ref 0.3–4.2)
WBC # BLD AUTO: 10.9 10E3/UL (ref 4–11)

## 2023-12-06 PROCEDURE — 80053 COMPREHEN METABOLIC PANEL: CPT | Mod: ZL | Performed by: NURSE PRACTITIONER

## 2023-12-06 PROCEDURE — 80061 LIPID PANEL: CPT | Mod: ZL | Performed by: NURSE PRACTITIONER

## 2023-12-06 PROCEDURE — G0463 HOSPITAL OUTPT CLINIC VISIT: HCPCS

## 2023-12-06 PROCEDURE — 84443 ASSAY THYROID STIM HORMONE: CPT | Mod: ZL | Performed by: NURSE PRACTITIONER

## 2023-12-06 PROCEDURE — 85025 COMPLETE CBC W/AUTO DIFF WBC: CPT | Mod: ZL | Performed by: NURSE PRACTITIONER

## 2023-12-06 PROCEDURE — 73562 X-RAY EXAM OF KNEE 3: CPT | Mod: TC,50,FY

## 2023-12-06 PROCEDURE — 82570 ASSAY OF URINE CREATININE: CPT | Mod: ZL | Performed by: NURSE PRACTITIONER

## 2023-12-06 PROCEDURE — 36415 COLL VENOUS BLD VENIPUNCTURE: CPT | Mod: ZL | Performed by: NURSE PRACTITIONER

## 2023-12-06 PROCEDURE — 99214 OFFICE O/P EST MOD 30 MIN: CPT | Performed by: NURSE PRACTITIONER

## 2023-12-06 RX ORDER — GABAPENTIN 300 MG/1
CAPSULE ORAL
Qty: 360 CAPSULE | Refills: 1 | Status: SHIPPED | OUTPATIENT
Start: 2023-12-06 | End: 2024-03-13

## 2023-12-06 ASSESSMENT — PAIN SCALES - GENERAL: PAINLEVEL: SEVERE PAIN (6)

## 2023-12-21 DIAGNOSIS — F41.1 GAD (GENERALIZED ANXIETY DISORDER): ICD-10-CM

## 2023-12-21 NOTE — TELEPHONE ENCOUNTER
ZOLOFT      Last Written Prescription Date:  12-6-2023  Last Fill Quantity: 90,   # refills: 0  Last Office Visit: 12-6-23  Future Office visit:    Next 5 appointments (look out 90 days)      Jan 17, 2024  9:45 AM  (Arrive by 9:30 AM)  Nurse Only with Alis Pitt  [facility] ([facility] ) [address]  [place] [address]  [phone]     Mar 13, 2024  9:00 AM  (Arrive by 8:45 AM)  SHORT with Randi Haddad, NP  [facility] ([facility] ) [address]  [facility] [address]  [phone]             Routing refill request to provider for review/approval because:  Drug not on the [facility], [facility] or  Health refill protocol or controlled substance

## 2024-01-02 DIAGNOSIS — M17.0 PRIMARY OSTEOARTHRITIS OF BOTH KNEES: ICD-10-CM

## 2024-01-03 RX ORDER — ACETAMINOPHEN 650 MG/1
TABLET, FILM COATED, EXTENDED RELEASE ORAL
Qty: 50 TABLET | Refills: 3 | Status: SHIPPED | OUTPATIENT
Start: 2024-01-03

## 2024-01-03 NOTE — TELEPHONE ENCOUNTER
Tylenol      Last Written Prescription Date:  9.16.23  Last Fill Quantity: #50,   # refills: 0  Last Office Visit: 12.6.23  Future Office visit:    Next 5 appointments (look out 90 days)      Jan 17, 2024  9:45 AM  (Arrive by 9:30 AM)  Nurse Only with Alis Pitt  [facility] ([facility] ) [address]  [place] [address]  [phone]     Mar 13, 2024  9:00 AM  (Arrive by 8:45 AM)  SHORT with Randi Haddad, NP  [facility] ([facility] ) [address]  [place] [address]  [phone]             Routing refill request to provider for review/approval because:

## 2024-01-10 DIAGNOSIS — H91.93 DECREASED HEARING OF BOTH EARS: Primary | ICD-10-CM

## 2024-01-17 ENCOUNTER — ALLIED HEALTH/NURSE VISIT (OUTPATIENT)
Dept: AUDIOLOGY | Facility: OTHER | Age: 79
End: 2024-01-17
Attending: FAMILY MEDICINE
Payer: COMMERCIAL

## 2024-01-17 DIAGNOSIS — H91.93 DECREASED HEARING OF BOTH EARS: Primary | ICD-10-CM

## 2024-01-17 DIAGNOSIS — H91.93 DECREASED HEARING OF BOTH EARS: ICD-10-CM

## 2024-01-17 PROCEDURE — 92556 SPEECH AUDIOMETRY COMPLETE: CPT | Performed by: AUDIOLOGIST

## 2024-01-17 PROCEDURE — V5299 HEARING SERVICE: HCPCS

## 2024-01-17 PROCEDURE — 92552 PURE TONE AUDIOMETRY AIR: CPT | Performed by: AUDIOLOGIST

## 2024-01-17 NOTE — PROGRESS NOTES
Audiology Evaluation Completed. Please refer SCANNED AUDIOGRAM and/or TYMPANOGRAM for BACKGROUND, RESULTS, RECOMMENDATIONS.      Trini BIANCHI, [facility]  Audiologist #[id number]

## 2024-01-17 NOTE — PROGRESS NOTES
HEARING AID CHECK    BACKGROUND:  Nella Lemon, a 78 year old female, was seen today for an hearing aid check.  Ms. Lemon wears Binaural Oticon More 2 miniRITE R hearing aids.  Ms. Lemon reported no concerns.    FINDINGS:  Visual inspection and cleaning provided.   C-stop changed with new. 8mm DV domes changed with new. Battery was tested and good. Good listening check.    Reviewed cleaning, troubleshooting, and preventative care with patient. Any cleaning tools used were provided as customer courtesy.    Services performed and warranty reviewed with patient.    PLAN:  Patient seen next by audiologist. Ms. Lemon will return to clinic in 12 months, sooner if needed. Patient had no further questions and reports satisfaction.         Berenice Pitt  Audiology Assistant  [facility]  [phone]

## 2024-02-19 NOTE — PROGRESS NOTES
Patient was informed of the providers message. Her BP is better and will go to the ER if it gets low./wh   Reason for Call:  Other appointment    Detailed comments: Patient was seen by Dr. Saba today and he recommended physical therapy. When mom called to  Schedule they are out until 03/30/2020. Mom states Dr Saba said she should call if they were scheduling out and he would  See what he could do. Please advise. Thank you     Phone Number Patient can be reached at: Home number on file [phone] (home)    Best Time: Any    Can we leave a detailed message on this number? YES    Call taken on 3/19/2020 at 2:08 PM by Lorelei Melendez     Visit Date:   04/15/2019      HEMATOLOGY/ONCOLOGY CONSULTATION       REASON FOR CONSULTATION:  Leukocytosis.      REQUESTING PHYSICIAN:  Randi Haddad NP       HISTORY OF PRESENT ILLNESS:  Ms. Lemon is a 73-year-old white female with a history of hypothyroidism, atrial fibrillation and hypertension we were asked to evaluate concerning diagnosis of leukocytosis.  According to the patient, she has had this chronically for years.  She said she had been seen by Dr. Presley approximately 5 years ago who noted an elevated white count and treated her empirically with antibiotics.  She said her white count would go down and then it would come back up.  Most recently she had seen Nurse Practitioner Randi Haddad who saw the patient on June 28 and noted that her white count was elevated.  On January 23 it was drawn and her white count was 13.4, hemoglobin 13.8, hematocrit 42.2, platelet count 326.  She ordered a peripheral blood smear and this came back that there was mild neutrophilia, reactive lymphocytes, mild reticulocytosis.  The patient had a CBC repeated on January 28 and that time her white count had dropped to 12.8 with 73.7% neutrophils and 20.2% lymphocytes.  There was neutrophilia.  Hemoglobin was 14.5, hematocrit 44.8, platelet count 342.  The patient is now here for further evaluation.  She offers no major complaints.  She says that she has a constant runny nose.  She denies tobacco abuse or secondhand smoke.  She does note that she has had palpitations or AFib as well as dyspnea on exertion.  She denies any change in bowel habits, bright red blood per rectum, hematemesis.  There is no family history of leukemia or blood disorders.  She denies easy bruisability, gingival bleeding, epistaxis, fevers, night sweats, weight loss.  She denies any dysuria, urgency, frequency.  She denies any productive cough or cough.  She may have had a tick bite in the recent past, is unclear.      PAST MEDICAL  HISTORY:  Significant for paroxysmal atrial fibrillation, hypertension, history of neuropathy, hypothyroidism, hyperlipidemia, family history of colon cancer, first branchial cleft cyst, nasal turbinate hypertrophy, nasal tenderness.      ALLERGIES:  LIPITOR, EZETIMIBE, GENTAMICIN, HYDROXYZINE, LORAZEPAM, RANITIDINE, SIMVASTATIN, LOPID, PRAVASTATIN.        CURRENT MEDICATIONS:   1.  Neurontin 300 mg 3 times daily.   2.  Toprol-XL 50 mg daily.   3.  Eliquis 5 mg b.i.d.   4.  Crestor 10 mg daily.   5.  Cozaar 100 mg daily.   6.  Synthroid 1 tablet daily.   7.  Zoloft 50 mg daily.   8.  Glucosamine sulfate 1000 mg twice daily.   9.  Omega-3 fatty acids 3 grams daily.   10.  Vitamin D 1 tab daily.      SOCIAL HISTORY:  Tobacco is negative.  Alcohol is negative.  She has worked in various occupations.  She said she worked at [employer] in the past.  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  She denies any exposure to chemicals or toxic substances.      FAMILY HISTORY:  Significant for mother with breast cancer.      REVIEW OF SYSTEMS:   CENTRAL NERVOUS SYSTEM:  Negative.   RESPIRATORY:  Significant for dyspnea on exertion.   ENT:  Significant for nasal congestion.  No hearing loss.  No sore throat.   CARDIAC:  Negative for chest pain, palpitations, orthopnea, PND.   GASTROINTESTINAL:  Negative for change in bowel habits, bright red blood per rectum, hematemesis, diarrhea, vomiting.   MUSCULOSKELETAL:  Occasional arthritic pain in both knees.   GENITOURINARY:  Negative for dysuria, urgency, frequency, hematuria.   CONSTITUTIONAL:  Negative for fevers, night sweats, weight loss.   HEMATOLOGIC:  Negative for easy bruisability, gingival bleeding, epistaxis.      PHYSICAL EXAMINATION:   GENERAL:  She is an elderly white female in no acute distress.   VITAL SIGNS:  Blood pressure 128/62, pulse 69, respirations 18, temperature 98.1.   HEENT:  Significant for nasal congestion as well as postnasal drip.   NECK:   Supple.   LUNGS:  Clear to auscultation and percussion.   HEART:  Regular rhythm, S1, S2 normal.   ABDOMEN:  Soft, normoactive bowel sounds.  No mass, nontender.   LYMPHATICS:  No cervical, supraclavicular, axillary or inguinal nodes.   EXTREMITIES:  No edema.   NEUROLOGIC:  Nonfocal.      LABORATORY DATA:  Pending.      IMPRESSION:  Leukocytosis and neutrophilia, suspect reactive process, i.e., secondary to allergic rhinitis.  Otherwise, would like to rule out other etiologies.  We will obtain BCR-ABL transcript to rule out underlying CML, doubt.  Also obtain Sed rate, rheumatoid factor, MALIA, as well as serum protein electrophoresis.  We will also obtain Lyme titers as well as Wendy-Barr virus profile.  We will also obtain CT chest, abdomen and pelvis to rule out occult malignancy.  Otherwise, we will see the patient after the above workup.      Seventy minutes was spent with the patient, greater than half the time spent in counseling and coordination of care.         JULIO PARSON MD             D: 04/15/2019   T: 04/15/2019   MT:       Name:     BULL ELI   MRN:      [id number]        Account:      [id number]   :      1945           Visit Date:   04/15/2019      Document: [id number]       cc: Randi Haddad NP    [phone]

## 2024-03-05 ENCOUNTER — TELEPHONE (OUTPATIENT)
Dept: FAMILY MEDICINE | Facility: OTHER | Age: 79
End: 2024-03-05

## 2024-03-05 NOTE — TELEPHONE ENCOUNTER
10:57 AM    Reason for Call: Phone Call    Description: patient is calling about a reaction to Carvedilol 12.5 mg and she is having joint pain with this med.  She is wondering if she should stop taking this now or wait until her appt next week.    Was an appointment offered for this call? No  If yes : Appointment type              Date    Preferred method for responding to this message: Telephone Call  What is your phone number ? [phone]    If we cannot reach you directly, may we leave a detailed response at the number you provided? Yes    Can this message wait until your PCP/provider returns, if available today? YES, No provider is in today    MARY MARAVILLA

## 2024-03-08 NOTE — PROGRESS NOTES
Assessment & Plan     1. Hyperlipidemia LDL goal <70  Cannot tolerate statins   - Lipid Profile; Future    2. Benign essential hypertension  Stop carvedilol change to atenolol  - Comprehensive metabolic panel; Future  - atenolol (TENORMIN) 25 MG tablet; Take 1 tablet (25 mg) by mouth daily  Dispense: 90 tablet; Refill: 1  - amLODIPine (NORVASC) 10 MG tablet; Take 1 tablet (10 mg) by mouth daily for blood pressure  Dispense: 90 tablet; Refill: 2    3. Paroxysmal atrial fibrillation (H)  - atenolol (TENORMIN) 25 MG tablet; Take 1 tablet (25 mg) by mouth daily  Dispense: 90 tablet; Refill: 1    4. Stage 3a chronic kidney disease (H)  Start jardiance   - empagliflozin (JARDIANCE) 10 MG TABS tablet; Take 1 tablet (10 mg) by mouth daily  Dispense: 90 tablet; Refill: 1    5. LA NENA (generalized anxiety disorder)  - sertraline (ZOLOFT) 50 MG tablet; Take 1 tablet (50 mg) by mouth daily  Dispense: 90 tablet; Refill: 0    6. Postoperative hypothyroidism  - TSH with free T4 reflex; Future    7. Fibromyalgia  - gabapentin (NEURONTIN) 300 MG capsule; Take one  capsule in the morning and afternoon and 2 at bedtime.  Dispense: 360 capsule; Refill: 1    8. GERD  Continue omeprazole     9. Myalgia  - gabapentin (NEURONTIN) 300 MG capsule; Take one  capsule in the morning and afternoon and 2 at bedtime.  Dispense: 360 capsule; Refill: 1    10. Encounter for screening mammogram for malignant neoplasm of breast  - MA Screen Bilateral w/Osito; Future    11. Pain of right hand  Suspect osteoarthritis.   - XR Hand Right 2 Views (Clinic Performed); Future  - XR Wrist Right 2 Views (Clinic Performed); Future    12. Dermatitis  - mupirocin (BACTROBAN) 2 % external ointment; Apply topically 2 times daily as needed (dermatitis)  Dispense: 30 g; Refill: 1    13. Class 1 obesity due to excess calories with serious comorbidity and body mass index (BMI) of 32.0 to 32.9 in adult  Weight loss encouraged.       BMI  Estimated body mass index is 32.43  "kg/m  as calculated from the following:    Height as of this encounter: 1.575 m (5' 2\").    Weight as of this encounter: 80.4 kg (177 lb 4.8 oz).   Weight management plan: Discussed healthy diet and exercise guidelines      Return in about 3 months (around 6/13/2024) for hypertension and lipids, thyroid, anxiety/depression.    The longitudinal plan of care for the diagnosis(es)/condition(s) as documented were addressed during this visit. Due to the added complexity in care, I will continue to support Nella in the subsequent management and with ongoing continuity of care.      Randi Haddad,   Certified Adult Nurse Practitioner  [phone]      Nigel Carmen is a 78 year old, presenting for the following health issues:  Hypertension, Lipids, Atrial Fib, and Anxiety        3/13/2024     8:39 AM   Additional Questions   Roomed by jack   Accompanied by none     HPI     Hyperlipidemia Follow-Up    Are you regularly taking any medication or supplement to lower your cholesterol?   No  Are you having muscle aches or other side effects that you think could be caused by your cholesterol lowering medication?  No    Hypertension Follow-up    Do you check your blood pressure regularly outside of the clinic? Yes   Are you following a low salt diet? Yes  Are your blood pressures ever more than 140 on the top number (systolic) OR more   than 90 on the bottom number (diastolic), for example 140/90? Yes  Has not been taking carvedilol due to increased joint pain.  Stopped it 6 days ago - joint pain is improved.  She temple have visible osteoarthritis of hands and right wrist.      Atrial Fibrillation Follow-up    Symptoms: no recent chest pain, significant palpitations, dizziness/lightheadedness, dyspnea, or increased peripheral edema.  Stroke prevention: DOAC (Eliquis, Xarelto, Pradaxa)        8/9/2023     9:44 AM 9/7/2023     8:49 AM 9/18/2023     9:07 AM 12/6/2023     9:56 AM 3/13/2024     8:39 AM   Date   Pulse 48 " 57 55 50 61     Current [id number] Score: 4 points, which represents a 4.8% annual risk of major embolic event, without anti-coagulation or an LAAO device.       Anxiety Follow-Up  How are you doing with your anxiety since your last visit? No change  Are you having other symptoms that might be associated with anxiety? No  Have you had a significant life event? No   Are you feeling depressed? No  Do you have any concerns with your use of alcohol or other drugs? No    Social History     Tobacco Use    Smoking status: Never    Smokeless tobacco: Never   Vaping Use    Vaping Use: Never used   Substance Use Topics    Alcohol use: Never    Drug use: No         8/9/2023     9:36 AM 9/7/2023     8:43 AM 3/13/2024     8:40 AM   LA NENA-7 SCORE   Total Score 0 (minimal anxiety) 2 (minimal anxiety) 4 (minimal anxiety)   Total Score 0 2 4         8/9/2023     9:35 AM 9/7/2023     8:41 AM 3/13/2024     8:39 AM   PHQ   PHQ-9 Total Score 0 1 0   Q9: Thoughts of better off dead/self-harm past 2 weeks Not at all Not at all Not at all         3/13/2024     8:39 AM   Last PHQ-9   1.  Little interest or pleasure in doing things 0   2.  Feeling down, depressed, or hopeless 0   3.  Trouble falling or staying asleep, or sleeping too much 0   4.  Feeling tired or having little energy 0   5.  Poor appetite or overeating 0   6.  Feeling bad about yourself 0   7.  Trouble concentrating 0   8.  Moving slowly or restless 0   Q9: Thoughts of better off dead/self-harm past 2 weeks 0   PHQ-9 Total Score 0         3/13/2024     8:40 AM   LA NENA-7    1. Feeling nervous, anxious, or on edge 1   2. Not being able to stop or control worrying 1   3. Worrying too much about different things 1   4. Trouble relaxing 0   5. Being so restless that it is hard to sit still 0   6. Becoming easily annoyed or irritable 0   7. Feeling afraid, as if something awful might happen 1   LA NENA-7 Total Score 4   If you checked any problems, how difficult have they made it for  "you to do your work, take care of things at home, or get along with other people? Not difficult at all       She notes increased pain of right hand, she has several joints with obvious osteoarthritic changes.  She notes new right wrist pain.      Recent Labs   Lab Test 03/13/24  0932 12/06/23  1032 09/07/23  0925 01/17/23  1203 12/01/22  1015 08/18/21  0939 05/12/21  1040 04/28/21  1017   A1C  --   --   --   --  5.6  --   --   --    * 142* 143*   < > 138*   < >  --  154*   HDL 51 49* 52   < > 42*   < >  --  47*   TRIG 170* 185* 181*   < > 209*   < >  --  178*   ALT 22 22 63*   < > 53*   < >  --  30   CR 0.96* 0.96* 1.10*   < > 0.99*   < > 1.19* 1.04   GFRESTIMATED 61 61 51*   < > 59*   < > 45* 53*   GFRESTBLACK  --   --   --   --   --   --  52* 61   POTASSIUM 4.1 4.2 4.1   < > 4.2   < > 4.2 4.0   TSH 4.04 2.89 2.99   < > 3.32   < >  --  4.14*    < > = values in this interval not displayed.      BP Readings from Last 3 Encounters:   03/13/24 (!) 140/66   12/06/23 127/62   09/28/23 110/68    Wt Readings from Last 3 Encounters:   03/13/24 80.4 kg (177 lb 4.8 oz)   12/06/23 79 kg (174 lb 3.2 oz)   09/28/23 82.1 kg (181 lb)                        Review of Systems  Constitutional, neuro, ENT, endocrine, pulmonary, cardiac, gastrointestinal, genitourinary, musculoskeletal, integument and psychiatric systems are negative, except as otherwise noted.      Objective    BP (!) 140/66 (BP Location: Left arm, Patient Position: Chair, Cuff Size: Adult Large)   Pulse 61   Temp 97.1  F (36.2  C) (Tympanic)   Resp 16   Ht 1.575 m (5' 2\")   Wt 80.4 kg (177 lb 4.8 oz)   SpO2 96%   BMI 32.43 kg/m    Body mass index is 32.43 kg/m .  Physical Exam   GENERAL: alert and no distress  NECK: no carotid bruits  RESP: lungs clear to auscultation - no rales, rhonchi or wheezes  CV: regular rate and rhythm, normal S1 S2, no S3 or S4, no murmur, click or rub, no peripheral edema  MS: bilateral hands with osteoarthritic changes.   No " redness or warmth today.    PSYCH: mentation appears normal, affect normal/bright    Results for orders placed or performed in visit on 03/13/24   XR Wrist Right 2 Views (Clinic Performed)     Status: None    Narrative    PROCEDURE:  XR WRIST RIGHT 2 VIEWS    HISTORY: Pain of right hand    COMPARISON:  None.    TECHNIQUE:  2 views of the right wrist were obtained.    FINDINGS:  No fracture or dislocation is identified. There are mild  degenerative changes at the trapezium first metacarpal articulation.      Impression    IMPRESSION: Arthritic changes at the trapezium first metacarpal  articulation    FILI MONSON MD         SYSTEM ID:  [id number]   Results for orders placed or performed in visit on 03/13/24   XR Hand Right 2 Views (Clinic Performed)     Status: None    Narrative    PROCEDURE:  XR HAND RIGHT 2 VIEWS    HISTORY: Pain of right hand    COMPARISON:  None.    TECHNIQUE:  2 views of the right hand were obtained.    FINDINGS:  Osteoarthritic changes are seen at the interphalangeal  joint of the thumb and at the distal interphalangeal joint of the  second finger. Moderate degenerative changes are seen at the proximal  interphalangeal joints of the third and fourth fingers. Degenerative  changes are seen at the trapezium first metacarpal articulation. There  are no fractures or destructive lesions noted.       Impression    IMPRESSION: Osteoarthritic changes in the right hand      FILI MONSON MD         SYSTEM ID:  [id number]   Results for orders placed or performed in visit on 03/13/24   Lipid Profile     Status: Abnormal   Result Value Ref Range    Cholesterol 209 (H) <200 mg/dL    Triglycerides 170 (H) <150 mg/dL    Direct Measure HDL 51 >=50 mg/dL    LDL Cholesterol Calculated 124 (H) <=100 mg/dL    Non HDL Cholesterol 158 (H) <130 mg/dL    Patient Fasting > 8hrs? Yes     Narrative    Cholesterol  Desirable:  <200 mg/dL    Triglycerides  Normal:  Less than 150 mg/dL  Borderline High:  150-199  mg/dL  High:  200-499 mg/dL  Very High:  Greater than or equal to 500 mg/dL    Direct Measure HDL  Female:  Greater than or equal to 50 mg/dL   Male:  Greater than or equal to 40 mg/dL    LDL Cholesterol  Desirable:  <100mg/dL  Above Desirable:  100-129 mg/dL   Borderline High:  130-159 mg/dL   High:  160-189 mg/dL   Very High:  >= 190 mg/dL    Non HDL Cholesterol  Desirable:  130 mg/dL  Above Desirable:  130-159 mg/dL  Borderline High:  160-189 mg/dL  High:  190-219 mg/dL  Very High:  Greater than or equal to 220 mg/dL   Comprehensive metabolic panel     Status: Abnormal   Result Value Ref Range    Sodium 140 135 - 145 mmol/L    Potassium 4.1 3.4 - 5.3 mmol/L    Carbon Dioxide (CO2) 22 22 - 29 mmol/L    Anion Gap 15 7 - 15 mmol/L    Urea Nitrogen 18.4 8.0 - 23.0 mg/dL    Creatinine 0.96 (H) 0.51 - 0.95 mg/dL    GFR Estimate 61 >60 mL/min/1.73m2    Calcium 9.3 8.8 - 10.2 mg/dL    Chloride 103 98 - 107 mmol/L    Glucose 102 (H) 70 - 99 mg/dL    Alkaline Phosphatase 123 40 - 150 U/L    AST 18 0 - 45 U/L    ALT 22 0 - 50 U/L    Protein Total 7.3 6.4 - 8.3 g/dL    Albumin 4.2 3.5 - 5.2 g/dL    Bilirubin Total 0.5 <=1.2 mg/dL   TSH with free T4 reflex     Status: Normal   Result Value Ref Range    TSH 4.04 0.30 - 4.20 uIU/mL   Extra Tube     Status: None    Narrative    The following orders were created for panel order Extra Tube.  Procedure                               Abnormality         Status                     ---------                               -----------         ------                     Extra Purple Top Tube[[id number]]                            Final result                 Please view results for these tests on the individual orders.   Extra Purple Top Tube     Status: None   Result Value Ref Range    Hold Specimen [facility]              Answers submitted by the patient for this visit:  Patient Health Questionnaire (Submitted on 3/13/2024)  If you checked off any problems, how difficult have these problems  made it for you to do your work, take care of things at home, or get along with other people?: Not difficult at all  PHQ9 TOTAL SCORE: 0  LA NENA-7 (Submitted on 3/13/2024)  LA NENA 7 TOTAL SCORE: 4    Signed Electronically by: Randi Haddad NP

## 2024-03-13 ENCOUNTER — ANCILLARY PROCEDURE (OUTPATIENT)
Dept: GENERAL RADIOLOGY | Facility: OTHER | Age: 79
End: 2024-03-13
Attending: NURSE PRACTITIONER
Payer: COMMERCIAL

## 2024-03-13 ENCOUNTER — OFFICE VISIT (OUTPATIENT)
Dept: FAMILY MEDICINE | Facility: OTHER | Age: 79
End: 2024-03-13
Attending: NURSE PRACTITIONER
Payer: COMMERCIAL

## 2024-03-13 VITALS
WEIGHT: 177.3 LBS | SYSTOLIC BLOOD PRESSURE: 140 MMHG | BODY MASS INDEX: 32.63 KG/M2 | OXYGEN SATURATION: 96 % | HEART RATE: 61 BPM | HEIGHT: 62 IN | RESPIRATION RATE: 16 BRPM | TEMPERATURE: 97.1 F | DIASTOLIC BLOOD PRESSURE: 66 MMHG

## 2024-03-13 DIAGNOSIS — N18.31 STAGE 3A CHRONIC KIDNEY DISEASE (H): ICD-10-CM

## 2024-03-13 DIAGNOSIS — F41.1 GAD (GENERALIZED ANXIETY DISORDER): Chronic | ICD-10-CM

## 2024-03-13 DIAGNOSIS — E89.0 POSTOPERATIVE HYPOTHYROIDISM: ICD-10-CM

## 2024-03-13 DIAGNOSIS — M79.641 PAIN OF RIGHT HAND: ICD-10-CM

## 2024-03-13 DIAGNOSIS — M79.7 FIBROMYALGIA: ICD-10-CM

## 2024-03-13 DIAGNOSIS — I48.0 PAROXYSMAL ATRIAL FIBRILLATION (H): Chronic | ICD-10-CM

## 2024-03-13 DIAGNOSIS — L30.9 DERMATITIS: ICD-10-CM

## 2024-03-13 DIAGNOSIS — I10 BENIGN ESSENTIAL HYPERTENSION: Chronic | ICD-10-CM

## 2024-03-13 DIAGNOSIS — Z12.31 ENCOUNTER FOR SCREENING MAMMOGRAM FOR MALIGNANT NEOPLASM OF BREAST: ICD-10-CM

## 2024-03-13 DIAGNOSIS — E78.5 HYPERLIPIDEMIA LDL GOAL <70: Primary | ICD-10-CM

## 2024-03-13 DIAGNOSIS — K21.9 GASTROESOPHAGEAL REFLUX DISEASE WITHOUT ESOPHAGITIS: Chronic | ICD-10-CM

## 2024-03-13 DIAGNOSIS — E66.811 CLASS 1 OBESITY DUE TO EXCESS CALORIES WITH SERIOUS COMORBIDITY AND BODY MASS INDEX (BMI) OF 32.0 TO 32.9 IN ADULT: ICD-10-CM

## 2024-03-13 DIAGNOSIS — M79.10 MYALGIA: ICD-10-CM

## 2024-03-13 DIAGNOSIS — E66.09 CLASS 1 OBESITY DUE TO EXCESS CALORIES WITH SERIOUS COMORBIDITY AND BODY MASS INDEX (BMI) OF 32.0 TO 32.9 IN ADULT: ICD-10-CM

## 2024-03-13 LAB
ALBUMIN SERPL BCG-MCNC: 4.2 G/DL (ref 3.5–5.2)
ALP SERPL-CCNC: 123 U/L (ref 40–150)
ALT SERPL W P-5'-P-CCNC: 22 U/L (ref 0–50)
ANION GAP SERPL CALCULATED.3IONS-SCNC: 15 MMOL/L (ref 7–15)
AST SERPL W P-5'-P-CCNC: 18 U/L (ref 0–45)
BILIRUB SERPL-MCNC: 0.5 MG/DL
BUN SERPL-MCNC: 18.4 MG/DL (ref 8–23)
CALCIUM SERPL-MCNC: 9.3 MG/DL (ref 8.8–10.2)
CHLORIDE SERPL-SCNC: 103 MMOL/L (ref 98–107)
CHOLEST SERPL-MCNC: 209 MG/DL
CREAT SERPL-MCNC: 0.96 MG/DL (ref 0.51–0.95)
DEPRECATED HCO3 PLAS-SCNC: 22 MMOL/L (ref 22–29)
EGFRCR SERPLBLD CKD-EPI 2021: 61 ML/MIN/1.73M2
FASTING STATUS PATIENT QL REPORTED: YES
GLUCOSE SERPL-MCNC: 102 MG/DL (ref 70–99)
HDLC SERPL-MCNC: 51 MG/DL
HOLD SPECIMEN: NORMAL
LDLC SERPL CALC-MCNC: 124 MG/DL
NONHDLC SERPL-MCNC: 158 MG/DL
POTASSIUM SERPL-SCNC: 4.1 MMOL/L (ref 3.4–5.3)
PROT SERPL-MCNC: 7.3 G/DL (ref 6.4–8.3)
SODIUM SERPL-SCNC: 140 MMOL/L (ref 135–145)
TRIGL SERPL-MCNC: 170 MG/DL
TSH SERPL DL<=0.005 MIU/L-ACNC: 4.04 UIU/ML (ref 0.3–4.2)

## 2024-03-13 PROCEDURE — 73120 X-RAY EXAM OF HAND: CPT | Mod: TC,RT

## 2024-03-13 PROCEDURE — 73100 X-RAY EXAM OF WRIST: CPT | Mod: TC,RT,FY

## 2024-03-13 PROCEDURE — G0463 HOSPITAL OUTPT CLINIC VISIT: HCPCS

## 2024-03-13 PROCEDURE — G2211 COMPLEX E/M VISIT ADD ON: HCPCS | Performed by: NURSE PRACTITIONER

## 2024-03-13 PROCEDURE — 80061 LIPID PANEL: CPT | Mod: ZL | Performed by: NURSE PRACTITIONER

## 2024-03-13 PROCEDURE — 36415 COLL VENOUS BLD VENIPUNCTURE: CPT | Mod: ZL | Performed by: NURSE PRACTITIONER

## 2024-03-13 PROCEDURE — 84443 ASSAY THYROID STIM HORMONE: CPT | Mod: ZL | Performed by: NURSE PRACTITIONER

## 2024-03-13 PROCEDURE — 80053 COMPREHEN METABOLIC PANEL: CPT | Mod: ZL | Performed by: NURSE PRACTITIONER

## 2024-03-13 PROCEDURE — 99214 OFFICE O/P EST MOD 30 MIN: CPT | Performed by: NURSE PRACTITIONER

## 2024-03-13 RX ORDER — MUPIROCIN 20 MG/G
OINTMENT TOPICAL 2 TIMES DAILY PRN
Qty: 30 G | Refills: 1 | Status: SHIPPED | OUTPATIENT
Start: 2024-03-13 | End: 2024-06-26

## 2024-03-13 RX ORDER — GABAPENTIN 300 MG/1
CAPSULE ORAL
Qty: 360 CAPSULE | Refills: 1 | Status: SHIPPED | OUTPATIENT
Start: 2024-03-13 | End: 2024-09-11

## 2024-03-13 RX ORDER — ATENOLOL 25 MG/1
25 TABLET ORAL DAILY
Qty: 90 TABLET | Refills: 1 | Status: SHIPPED | OUTPATIENT
Start: 2024-03-13 | End: 2024-09-03

## 2024-03-13 RX ORDER — AMLODIPINE BESYLATE 10 MG/1
10 TABLET ORAL DAILY
Qty: 90 TABLET | Refills: 2 | Status: SHIPPED | OUTPATIENT
Start: 2024-03-13

## 2024-03-13 ASSESSMENT — ANXIETY QUESTIONNAIRES
7. FEELING AFRAID AS IF SOMETHING AWFUL MIGHT HAPPEN: SEVERAL DAYS
1. FEELING NERVOUS, ANXIOUS, OR ON EDGE: SEVERAL DAYS
8. IF YOU CHECKED OFF ANY PROBLEMS, HOW DIFFICULT HAVE THESE MADE IT FOR YOU TO DO YOUR WORK, TAKE CARE OF THINGS AT HOME, OR GET ALONG WITH OTHER PEOPLE?: NOT DIFFICULT AT ALL
IF YOU CHECKED OFF ANY PROBLEMS ON THIS QUESTIONNAIRE, HOW DIFFICULT HAVE THESE PROBLEMS MADE IT FOR YOU TO DO YOUR WORK, TAKE CARE OF THINGS AT HOME, OR GET ALONG WITH OTHER PEOPLE: NOT DIFFICULT AT ALL
GAD7 TOTAL SCORE: 4
3. WORRYING TOO MUCH ABOUT DIFFERENT THINGS: SEVERAL DAYS
GAD7 TOTAL SCORE: 4
GAD7 TOTAL SCORE: 4
7. FEELING AFRAID AS IF SOMETHING AWFUL MIGHT HAPPEN: SEVERAL DAYS
6. BECOMING EASILY ANNOYED OR IRRITABLE: NOT AT ALL
2. NOT BEING ABLE TO STOP OR CONTROL WORRYING: SEVERAL DAYS
5. BEING SO RESTLESS THAT IT IS HARD TO SIT STILL: NOT AT ALL
4. TROUBLE RELAXING: NOT AT ALL

## 2024-03-13 ASSESSMENT — PATIENT HEALTH QUESTIONNAIRE - PHQ9
SUM OF ALL RESPONSES TO PHQ QUESTIONS 1-9: 0
SUM OF ALL RESPONSES TO PHQ QUESTIONS 1-9: 0
10. IF YOU CHECKED OFF ANY PROBLEMS, HOW DIFFICULT HAVE THESE PROBLEMS MADE IT FOR YOU TO DO YOUR WORK, TAKE CARE OF THINGS AT HOME, OR GET ALONG WITH OTHER PEOPLE: NOT DIFFICULT AT ALL

## 2024-03-13 ASSESSMENT — PAIN SCALES - GENERAL: PAINLEVEL: NO PAIN (0)

## 2024-03-13 NOTE — PATIENT INSTRUCTIONS
"  Assessment & Plan     1. Hyperlipidemia LDL goal <70  Cannot tolerate statins   - Lipid Profile; Future    2. Benign essential hypertension  Stop carvedilol change to atenolol  - Comprehensive metabolic panel; Future  - atenolol (TENORMIN) 25 MG tablet; Take 1 tablet (25 mg) by mouth daily  Dispense: 90 tablet; Refill: 1  - amLODIPine (NORVASC) 10 MG tablet; Take 1 tablet (10 mg) by mouth daily for blood pressure  Dispense: 90 tablet; Refill: 2    3. Paroxysmal atrial fibrillation (H)  - atenolol (TENORMIN) 25 MG tablet; Take 1 tablet (25 mg) by mouth daily  Dispense: 90 tablet; Refill: 1    4. Stage 3a chronic kidney disease (H)  Start jardiance   - empagliflozin (JARDIANCE) 10 MG TABS tablet; Take 1 tablet (10 mg) by mouth daily  Dispense: 90 tablet; Refill: 1    5. LA NENA (generalized anxiety disorder)  - sertraline (ZOLOFT) 50 MG tablet; Take 1 tablet (50 mg) by mouth daily  Dispense: 90 tablet; Refill: 0    6. Postoperative hypothyroidism  - TSH with free T4 reflex; Future    7. Fibromyalgia  - gabapentin (NEURONTIN) 300 MG capsule; Take one  capsule in the morning and afternoon and 2 at bedtime.  Dispense: 360 capsule; Refill: 1    8. GERD  Continue omeprazole     9. Myalgia  - gabapentin (NEURONTIN) 300 MG capsule; Take one  capsule in the morning and afternoon and 2 at bedtime.  Dispense: 360 capsule; Refill: 1    10. Encounter for screening mammogram for malignant neoplasm of breast  - MA Screen Bilateral w/Osito; Future    11. Pain of right hand  Suspect osteoarthritis.   - XR Hand Right 2 Views (Clinic Performed); Future  - XR Wrist Right 2 Views (Clinic Performed); Future    12. Dermatitis  - mupirocin (BACTROBAN) 2 % external ointment; Apply topically 2 times daily as needed (dermatitis)  Dispense: 30 g; Refill: 1        BMI  Estimated body mass index is 32.43 kg/m  as calculated from the following:    Height as of this encounter: 1.575 m (5' 2\").    Weight as of this encounter: 80.4 kg (177 lb 4.8 oz). "   Weight management plan: Discussed healthy diet and exercise guidelines

## 2024-03-21 DIAGNOSIS — N39.46 MIXED INCONTINENCE URGE AND STRESS (MALE)(FEMALE): ICD-10-CM

## 2024-03-21 RX ORDER — SOLIFENACIN SUCCINATE 5 MG/1
5 TABLET, FILM COATED ORAL DAILY
Qty: 90 TABLET | Refills: 2 | Status: SHIPPED | OUTPATIENT
Start: 2024-03-21

## 2024-04-14 NOTE — TELEPHONE ENCOUNTER
[facility]  Internal Medicine Residency Program  CONSULT NOTE  MICU    Patient:  Ayesha Keane 72 y.o. female MRN: [id number]     Date of Service: 4/14/2024    Hospital Day: 4      Chief complaint: chest pain,  History of Present Illness   The patient is a 72 y.o. female  has a past medical history of Asthma, Chronic pancreatitis (HCC), COPD (chronic obstructive pulmonary disease) (HCC), Depression, Dysphagia, Emphysema lung (HCC), Del Rio catheter in place, and Oxygen dependent.     ED Course: Patient was at her follow-up appointment with her doctor when she started complaining of chest pain radiating to bilateral arms.  She was asked to come to the ED.  At the ED, blood pressure 171/79, baseline of 3 L of oxygen, troponin 10> 11> 11.  EKG was unremarkable for STEMI.  Patient was admitted for cardiac workup.  Stress test was unremarkable.    Today, patient was noted to be lethargic. ABG was ordered and revealed hypercapnic respiratory acidosis.  Patient was sent to the ICU by pulmonologist. She was placed on BiPAP.      Past Medical History:      Diagnosis Date    Asthma     COPD (chronic obstructive pulmonary disease) (HCC)     uses )3 prn daily and nightly / Dr. Blackwell F/U monthly    Depression     Dysphagia 2021    Del Rio catheter in place     continuous since 2013, changed monthly at [facility]    Oxygen dependent 2017       Past Surgical History:        Procedure Laterality Date    COLONOSCOPY      COLONOSCOPY N/A 9/4/2019    COLONOSCOPY DIAGNOSTIC performed by Emmett Gleason MD at [facility] ENDOSCOPY    COLONOSCOPY N/A 8/4/2023    COLORECTAL CANCER SCREENING, NOT HIGH RISK   (HOLD TIME 0900) performed by Hernandez TORRES MD at [facility] ENDOSCOPY    HYSTERECTOMY (CERVIX STATUS UNKNOWN)      KIDNEY SURGERY Left 06/16/2014    ABLATION PERFORMED UNDER CT SCAN    IA CYSTOURETHROSCOPY WITH BIOPSY N/A 9/14/2018    CYSTOSCOPY RETROGRADE PYELOGRAM, CLOT EVACATION , POSS.  BLADDER BIOPSY ---DR WRIGHT 2 :30 
Levothyroxine 100 MCG       Last Written Prescription Date:  12-5-19  Last Fill Quantity: 90,   # refills: 1  Last Office Visit: 12-5-19  Future Office visit:    Next 5 appointments (look out 90 days)    Mar 16, 2020  8:45 AM CDT  (Arrive by 8:30 AM)  SHORT with Randi Haddad NP  [facility] ([facility] ) [address]  [facility] [address]  [phone]   May 13, 2020 10:00 AM CDT  (Arrive by 9:45 AM)  Return Visit with Chris Walsh DO  [facility] ([facility] ) [address]  [place] [address]  [phone]           Routing refill request to provider for review/approval because:      
DO   40 mg at 04/14/24 1144    albuterol (PROVENTIL) (2.5 MG/3ML) 0.083% nebulizer solution 2.5 mg  2.5 mg Nebulization Q6H PRN Veronique Delgado APRN - CNP   2.5 mg at 04/14/24 1732    arformoterol tartrate (BROVANA) nebulizer solution 15 mcg  15 mcg Nebulization BID Veronique Delgado APRN - CNP   15 mcg at 04/14/24 0848    budesonide (PULMICORT) nebulizer suspension 500 mcg  500 mcg Nebulization BID Veronique Delgado APRN - CNP   500 mcg at 04/14/24 0848    folic acid (FOLVITE) tablet 1 mg  1 mg Oral Daily Veronique Delgado APRN - CNP   1 mg at 04/14/24 0910    gabapentin (NEURONTIN) capsule 300 mg  300 mg Oral Nightly Veronique Delgado APRN - CNP   300 mg at 04/13/24 2108    lipase-protease-amylas (ZENPEP 15,000) delayed release capsule 15,000 Units  15,000 Units Oral TID  Veronique Delgado APRN - CNP   15,000 Units at 04/14/24 1144    lipase-protease-amylase (ZENPEP) [address]-69807 units delayed release capsule 20,000 Units  20,000 Units Oral TID  Veronique Delgado APRN - CNP   20,000 Units at 04/14/24 1144    therapeutic multivitamin-minerals 1 tablet  1 tablet Oral Daily Veronique Delgado APRN - CNP   1 tablet at 04/14/24 0909    OLANZapine (ZYPREXA) tablet 5 mg  5 mg Oral Nightly Veronique Delgado APRN - CNP   5 mg at 04/13/24 2108    pantoprazole (PROTONIX) tablet 40 mg  40 mg Oral QAM AC Veronique Delgado APRN - CNP   40 mg at 04/14/24 0910    QUEtiapine (SEROQUEL XR) extended release tablet 50 mg  50 mg Oral Nightly Veronique Delgado APRN - CNP   50 mg at 04/13/24 2108    sertraline (ZOLOFT) tablet 50 mg  50 mg Oral Nightly Veronique Delgado APRN - CNP   50 mg at 04/13/24 2108    sodium chloride flush 0.9 % injection 5-40 mL  5-40 mL IntraVENous 2 times per day Veronique Delgado APRN - CNP   10 mL at 04/14/24 0911    sodium chloride flush 0.9 % injection 5-40 mL  5-40 mL IntraVENous PRN Veronique Delgado APRN - CNP        0.9 % sodium chloride infusion   IntraVENous PRN Veronique Delgado APRN - CNP

## 2024-06-12 ENCOUNTER — OFFICE VISIT (OUTPATIENT)
Dept: CARDIOLOGY | Facility: OTHER | Age: 79
End: 2024-06-12
Attending: INTERNAL MEDICINE
Payer: COMMERCIAL

## 2024-06-12 ENCOUNTER — TELEPHONE (OUTPATIENT)
Dept: CARDIOLOGY | Facility: OTHER | Age: 79
End: 2024-06-12

## 2024-06-12 VITALS
RESPIRATION RATE: 16 BRPM | DIASTOLIC BLOOD PRESSURE: 69 MMHG | WEIGHT: 175 LBS | HEART RATE: 54 BPM | SYSTOLIC BLOOD PRESSURE: 127 MMHG | OXYGEN SATURATION: 95 % | HEIGHT: 62 IN | BODY MASS INDEX: 32.2 KG/M2

## 2024-06-12 DIAGNOSIS — E78.2 MIXED HYPERLIPIDEMIA: ICD-10-CM

## 2024-06-12 DIAGNOSIS — I10 BENIGN ESSENTIAL HYPERTENSION: ICD-10-CM

## 2024-06-12 DIAGNOSIS — I31.9 CHRONIC PERICARDITIS, UNSPECIFIED COMPLICATION STATUS, UNSPECIFIED TYPE: ICD-10-CM

## 2024-06-12 DIAGNOSIS — I48.0 PAROXYSMAL ATRIAL FIBRILLATION (H): Primary | ICD-10-CM

## 2024-06-12 LAB
ATRIAL RATE - MUSE: 51 BPM
DIASTOLIC BLOOD PRESSURE - MUSE: NORMAL MMHG
INTERPRETATION ECG - MUSE: NORMAL
P AXIS - MUSE: 89 DEGREES
PR INTERVAL - MUSE: 210 MS
QRS DURATION - MUSE: 92 MS
QT - MUSE: 488 MS
QTC - MUSE: 449 MS
R AXIS - MUSE: 76 DEGREES
SYSTOLIC BLOOD PRESSURE - MUSE: NORMAL MMHG
T AXIS - MUSE: 99 DEGREES
VENTRICULAR RATE- MUSE: 51 BPM

## 2024-06-12 PROCEDURE — 93005 ELECTROCARDIOGRAM TRACING: CPT | Performed by: INTERNAL MEDICINE

## 2024-06-12 PROCEDURE — 93010 ELECTROCARDIOGRAM REPORT: CPT | Performed by: INTERNAL MEDICINE

## 2024-06-12 PROCEDURE — G0463 HOSPITAL OUTPT CLINIC VISIT: HCPCS

## 2024-06-12 PROCEDURE — 99213 OFFICE O/P EST LOW 20 MIN: CPT | Performed by: INTERNAL MEDICINE

## 2024-06-12 RX ORDER — EVOLOCUMAB 140 MG/ML
140 INJECTION, SOLUTION SUBCUTANEOUS
Qty: 6 ML | Refills: 3 | Status: SHIPPED | OUTPATIENT
Start: 2024-06-12 | End: 2024-08-22

## 2024-06-12 ASSESSMENT — PAIN SCALES - GENERAL: PAINLEVEL: NO PAIN (0)

## 2024-06-12 NOTE — PATIENT INSTRUCTIONS
Thank you for allowing Dr MARSHALL Walsh and our  team to participate in your care. Please call our office at [phone] with scheduling questions or if you need to cancel or change your appointment. With any other questions or concerns you may call cardiology nurse at  [phone].       If you experience chest pain, chest pressure, chest tightness, shortness of breath, fainting, lightheadedness, nausea, vomiting, or other concerning symptoms, please report to the Emergency Department or call 911. These symptoms may be emergent, and best treated in the Emergency Department.

## 2024-06-12 NOTE — PROGRESS NOTES
[facility] HEART CARE   CARDIOLOGY PROGRESS NOTE     Chief Complaint   Patient presents with    Follow Up          Diagnosis:  1.  Atrial fibrillation.   -Eliquis and atenolol.   2.  Hypertension-controlled.  3.  Hyperlipidemia-uncontrolled.  4.  Palpitations.  5.  Atypical chest pain.  6.  CKD-2.  7.  GERD.  8.  Lower extremity edema.  9.  Concern for ZACHARIAH with referral for sleep study 11/13/2019.   -Declined sleep study on 2/27/20.  10.  CHADSVASC score of 3.  11.  Pericardial effusion.   -None on 5/3/23.  -Trace on 1/12/2023, [facility].    -Admitted to the hospital put on steroids/colchicine.      Assessment/Plan:    1.  Pericardial effusion/pericarditis: Now resolved.  Had an echo on 5/3/2023 that shows resolution of this finding. Previously, had a trace pericardial fusion on 1/12/2023.  Patient is not having concerns, denies chest discomfort.  Was treated with steroids, with taper, instead of NSAID's due to kidney dysfunction per [facility] records.  She also completed a 90-day supply of colchicine 90 days.  No changes.  2.  A-fib: Remains asymptomatic.  Currently on Eliquis and metoprolol.  No changes.  Briefly, discussed management/treatment for A-fib.  3.  Hyperlipidemia: She is allergic to most statins.  Also given prescription for Zetia but was unable to tolerate.  Will send a prescription for PCSK9 inhibitor [place]ard to pharmacy.  They do the prior authorization.  If covered, they will mail it to her.  Discussed her cholesterol levels.  3.  ZACHARIAH: Suspicion but declining sleep study on 2/27/2020.  4.  Follow-up in 1 year or sooner with issues.      Interval history:  See above.          HPI:    She had been seen secondary to paroxysmal atrial fibrillation, hypertension, and atypical chest pain. She continues on Eliquis and metoprolol 50 mg XL daily.  Previously, she was on aspirin 325 mg's as well as Coumadin. Since initiating Eliquis and metoprolol, her palpitations have improved substantially.  Since  last being seen, she has had x1 of brief palpitations.  She has had no episodes during the day.     She remains on losartan 100 mg daily, Norvasc 5 mg daily, and metoprolol 50 mg XL daily for hypertension. Her blood pressure is elevated.  She does check her blood pressure at home but is uncertain if her cuff is functioning.      Relevant testing:  Limited echo on 5/3/2023:  Global and regional left ventricular function is normal with an EF of 55-60%.  Right ventricular function, chamber size, wall motion, and thickness are  normal.  No pericardial effusion is present.    ECHO on 1/12/23:  1. Trace pericardial effusion. No tamponade or pretamponade physiology.   2. Quantified left ventricle ejection fraction is 64%.   3. The right ventricle is mildly enlarged with normal systolic function. Estimated Right Ventricular Systolic Pressure 38 mmHg. No septal flattening.   4. Trileaflet sclerotic aortic valve. No aortic stenosis or regurgitation.   5. Left atrium is mildly enlarged by volume.   6. No prior echo for comparison.     ECHO on 11/20/19:  No pericardial effusion is present.  Global and regional left ventricular function is normal with an EF of 60-65%.  The right ventricle is normal size.  Mild left atrial enlargement is present.  Mild mitral insufficiency is present.  Aortic valve is normal in structure and function.  The aortic valve is tricuspid.  Trace aortic insufficiency is present.  Right ventricular systolic pressure is 30 mmHg above the right atrial pressure.  Mild tricuspid insufficiency is present.  The pulmonic valve is normal.  The aorta root is normal.        ICD-10-CM    1. Paroxysmal atrial fibrillation (H)  I48.0 EKG 12-lead complete w/read - (Clinic Performed)      2. Benign essential hypertension  I10 EKG 12-lead complete w/read - (Clinic Performed)      3. Chronic pericarditis, unspecified complication status, unspecified type  I31.9 EKG 12-lead complete w/read - (Clinic Performed)      4.  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, left 02/23/2016    Breast mass     Cancer (H)     Carpal tunnel syndrome 07/02/2002    Chronic kidney disease, stage 3 (H) 05/12/2021    CKD (chronic kidney disease) stage 2, GFR 60-89 ml/min 05/07/2019    Endometrial hyperplasia 01/27/2003    Family history of malignant neoplasm of breast 11/13/2006    Fibromyalgia 05/21/2002    LA NENA (generalized anxiety disorder) 01/14/2020    GERD 09/28/2001    Hypertension     Lower extremity edema 11/13/2019    Metrorrhagia 10/22/2003    Nasal turbinate hypertrophy 06/07/2013    Nonallopathic lesion of thoracic region, not elsewhere classified 05/19/2003    Palpitations 04/11/2001    Papillary thyroid carcinoma (H) 01/14/2020     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    Precordial pain 04/05/2001    Sensorineural hearing loss (SNHL) of both ears 01/14/2020    Thyroid disease     Urgency of urination 06/06/2007       Past Surgical History:   Procedure Laterality Date    ADENOIDECTOMY      BIOPSY  2019    FNA left thyroid    BIOPSY BREAST      CHOLECYSTECTOMY  1981    COLONOSCOPY  2002    COLONOSCOPY  2012    COLONOSCOPY N/A 9/22/2014    Procedure: COLONOSCOPY;  Surgeon: Lavell Pavon DO;  Location: [facility] OR    COLONOSCOPY N/A 3/1/2021    Procedure: screening colonoscopy;  Surgeon: Sandro Dow MD;  Location: [facility] OR    CYSTOSCOPY  2007    Cystitis chronic    EYE SURGERY      right cataract    ORTHOPEDIC SURGERY  2002    carpal tunnel; RT, LT     THYROIDECTOMY Left 8/27/2019    Procedure: LEFT THYROID LOBECTOMY AND ISTHMUS WITH FROZENS;  Surgeon: Mildred Pineda MD;  Location: [facility] OR    TONSILLECTOMY         Allergies   Allergen Reactions    Atorvastatin     Ezetimibe     Gentamicin     Hydroxyzine     Lorazepam     Ranitidine     Simvastatin     Lopid [Gemfibrozil] GI Disturbance     Bloating, constipation,     No Clinical Screening - See Comments      anticholesterol medicine caused muscle aches    Pravastatin Muscle Pain (Myalgia)       Current Outpatient Medications   Medication Sig Dispense Refill    acetaminophen (GNP 8 HOUR PAIN RELIEVER) 650 MG CR tablet TAKE 1 TABLET (650 MG) BY MOUTH 3 TIMES DAILY AS NEEDED FOR MILD PAIN OR FEVER 50 tablet 3    amLODIPine (NORVASC) 10 MG tablet Take 1 tablet (10 mg) by mouth daily for blood pressure 90 tablet 2    apixaban ANTICOAGULANT (ELIQUIS ANTICOAGULANT) 5 MG tablet TAKE 1 TABLET (5 MG) BY MOUTH 2 TIMES DAILY 180 tablet 3    atenolol (TENORMIN) 25 MG tablet Take 1 tablet (25 mg) by mouth daily 90 tablet 1    cholecalciferol 50 MCG (2000 UT) CAPS Take 1 capsule by mouth daily      colchicine (COLCYRS) 0.6 MG tablet TAKE 1 TABLET (0.6 MG) BY MOUTH DAILY 90 tablet 0    empagliflozin (JARDIANCE) 10 MG TABS tablet Take 1 tablet (10 mg) by mouth daily 90 tablet 1    evolocumab (REPATHA SURECLICK) 140 MG/ML prefilled autoinjector Inject 1 mL (140 mg) Subcutaneous every 14 days 6 mL 3    gabapentin (NEURONTIN) 300 MG capsule Take one  capsule in the morning and afternoon and 2 at bedtime. 360 capsule 1    levothyroxine (SYNTHROID/LEVOTHROID) 112 MCG tablet TAKE 1 TABLET DAILY FOR THYROID 90 tablet 2    multivitamin, therapeutic (THERA-VIT) TABS tablet Take 1 tablet by mouth daily      mupirocin (BACTROBAN) 2 % external ointment Apply topically 2 times daily as needed (dermatitis) 30 g 1    mupirocin (BACTROBAN) 2 % external ointment Apply topically 3 times daily 22 g 0    Omega-3 Fatty Acids (OMEGA-3 FISH OIL  PO) Take 3 g by mouth daily       omeprazole (PRILOSEC) 40 MG DR capsule TAKE 1 CAPSULE (40 MG) BY MOUTH DAILY 90 capsule 3    sertraline (ZOLOFT) 50 MG tablet Take 1 tablet (50 mg) by mouth daily 90 tablet 0    solifenacin (VESICARE) 5 MG tablet TAKE 1 TABLET (5 MG) BY MOUTH DAILY 90 tablet 2    STATIN NOT PRESCRIBED (INTENTIONAL) Please choose reason not prescribed from choices below.         Social History     Socioeconomic History    Marital status:      Spouse name: Not on file    Number of children: Not on file    Years of education: Not on file    Highest education level: Not on file   Occupational History    Occupation: manager     Employer: [employer]     Comment: part-time   Tobacco Use    Smoking status: Never    Smokeless tobacco: Never   Vaping Use    Vaping status: Never Used   Substance and Sexual Activity    Alcohol use: Never    Drug use: No    Sexual activity: Not Currently     Partners: Male     Birth control/protection: Post-menopausal   Other Topics Concern    Parent/sibling w/ CABG, MI or angioplasty before 65F 55M? No     Service Not Asked    Blood Transfusions Yes    Caffeine Concern No    Occupational Exposure Not Asked    Hobby Hazards Not Asked    Sleep Concern Not Asked    Stress Concern Not Asked    Weight Concern Not Asked    Special Diet Not Asked    Back Care Not Asked    Exercise Yes     Comment: walking daily    Bike Helmet Not Asked    Seat Belt Not Asked    Self-Exams Not Asked   Social History Narrative    Not on file     Social Determinants of Health     Financial Resource Strain: Low Risk  (3/13/2024)    Financial Resource Strain     Within the past 12 months, have you or your family members you live with been unable to get utilities (heat, electricity) when it was really needed?: No   Food Insecurity: Low Risk  (3/13/2024)    Food Insecurity     Within the past 12 months, did you worry that your food would run out before you got money to buy more?: No      "Within the past 12 months, did the food you bought just not last and you didn t have money to get more?: No   Transportation Needs: Low Risk  (3/13/2024)    Transportation Needs     Within the past 12 months, has lack of transportation kept you from medical appointments, getting your medicines, non-medical meetings or appointments, work, or from getting things that you need?: No   Physical Activity: Not on file   Stress: Not on file   Social Connections: Not on file   Interpersonal Safety: Not At Risk (12/8/2023)    Received from [facility]     Do you feel UNSAFE in any of your personal relationships with your family members or any other acquaintances?: No   Housing Stability: Low Risk  (3/13/2024)    Housing Stability     Do you have housing? : Yes     Are you worried about losing your housing?: No       LAB RESULTS:   Office Visit on 01/17/2023   Component Date Value Ref Range Status    Sodium 01/17/2023 139  136 - 145 mmol/L Final    Potassium 01/17/2023 4.1  3.4 - 5.3 mmol/L Final    Chloride 01/17/2023 103  98 - 107 mmol/L Final    Carbon Dioxide (CO2) 01/17/2023 23  22 - 29 mmol/L Final    Anion Gap 01/17/2023 13  7 - 15 mmol/L Final    Urea Nitrogen 01/17/2023 18.4  8.0 - 23.0 mg/dL Final    Creatinine 01/17/2023 0.95  0.51 - 0.95 mg/dL Final    Calcium 01/17/2023 8.9  8.8 - 10.2 mg/dL Final    Glucose 01/17/2023 118 (H)  70 - 99 mg/dL Final    Alkaline Phosphatase 01/17/2023 120 (H)  35 - 104 U/L Final    AST 01/17/2023 62 (H)  10 - 35 U/L Final    ALT 01/17/2023 96 (H)  10 - 35 U/L Final    Protein Total 01/17/2023 7.1  6.4 - 8.3 g/dL Final    Albumin 01/17/2023 4.1  3.5 - 5.2 g/dL Final    Bilirubin Total 01/17/2023 0.4  <=1.2 mg/dL Final    GFR Estimate 01/17/2023 62  >60 mL/min/1.73m2 Final        Review of systems: Negative except that which was noted in the HPI.    Physical examination:  /69   Pulse 54   Resp 16   Ht 1.575 m (5' 2\")   Wt " 79.4 kg (175 lb)   SpO2 95%   BMI 32.01 kg/m      GENERAL APPEARANCE: healthy, alert and no distress  CHEST: lungs clear to auscultation - no rales, rhonchi or wheezes, no use of accessory muscles, no retractions, respirations are unlabored, normal respiratory rate  CARDIOVASCULAR: regular rhythm, normal S1 with physiologic split S2, no S3 or S4 and no murmur, click or rub  EXTREMITIES: no clubbing, cyanosis or edema.    Total time spent on day of visit, including review of tests, obtaining/reviewing separately obtained history, ordering medications/tests/procedures, communicating with PCP/consultants, and documenting in electronic medical record: 22 minutes.                   Thank you for allowing me to participate in the care of your patient. Please do not hesitate to contact me if you have any questions.     Chris Walsh, DO

## 2024-06-12 NOTE — TELEPHONE ENCOUNTER
Central Prior Authorization Team   Phone: [phone]    PA Initiation    Medication: Repatha 140mg/ml  Insurance Company: [employer] - Phone [phone] Fax [phone]  Pharmacy Filling the Rx: [facility] - [place], [place] - [address]  Filling Pharmacy Phone: [phone]  Filling Pharmacy Fax:    Start Date: 6/12/2024

## 2024-06-13 NOTE — TELEPHONE ENCOUNTER
Prior Authorization Approval    Authorization Effective Date: 6/12/2024  Authorization Expiration Date: 6/12/2025  Medication: Repatha 140mg/ml  Approved Dose/Quantity:   Reference #:     Insurance Company: [employer] - Phone [phone] Fax [phone]  Expected CoPay:       CoPay Card Available:      Foundation Assistance Needed:    Which Pharmacy is filling the prescription (Not needed for infusion/clinic administered): [facility] - [place], [place] - [facility] [address]  Pharmacy Notified:  YES  Patient Notified:  yes- Pharmacy will contact patient when ready to /ship

## 2024-06-14 ENCOUNTER — TELEPHONE (OUTPATIENT)
Dept: MAMMOGRAPHY | Facility: HOSPITAL | Age: 79
End: 2024-06-14

## 2024-06-14 ENCOUNTER — ANCILLARY PROCEDURE (OUTPATIENT)
Dept: MAMMOGRAPHY | Facility: OTHER | Age: 79
End: 2024-06-14
Attending: NURSE PRACTITIONER
Payer: COMMERCIAL

## 2024-06-14 DIAGNOSIS — Z12.31 ENCOUNTER FOR SCREENING MAMMOGRAM FOR MALIGNANT NEOPLASM OF BREAST: ICD-10-CM

## 2024-06-14 PROCEDURE — 77063 BREAST TOMOSYNTHESIS BI: CPT | Mod: TC

## 2024-06-24 DIAGNOSIS — F41.1 GAD (GENERALIZED ANXIETY DISORDER): Chronic | ICD-10-CM

## 2024-06-24 NOTE — TELEPHONE ENCOUNTER
Sertraline (Zoloft)  50 MG tablet    Last Written Prescription Date:  03/13/2024  Last Fill Quantity: 90,   # refills: 0  Last Office Visit: 03/13/2024

## 2024-06-25 NOTE — PROGRESS NOTES
Preventive Care Visit  RANGE JAMIL REIS CECILY Haddad, Family Medicine  Jun 26, 2024      Assessment & Plan     1. Annual physical exam  Exam completed     2. Hyperlipidemia LDL goal <70  Continue repatha   - Lipid Profile; Future  - CBC with Platelets & Differential; Future    3. Paroxysmal atrial fibrillation (H)  Continue eliquis    4. Benign essential hypertension  Well controlled   - Comprehensive metabolic panel; Future    5. Postoperative hypothyroidism  - TSH with free T4 reflex; Future    6. GERD  Continue omeprazole     7. LA NENA (generalized anxiety disorder)  Continue zoloft     8. Stage 3a chronic kidney disease (H)  Limit NSAID use    9. Class 1 obesity due to excess calories with serious comorbidity and body mass index (BMI) of 31.0 to 31.9 in adult  Weight loss encouraged.       Patient has been advised of split billing requirements and indicates understanding: Yes        Counseling  Appropriate preventive services were discussed with this patient, including applicable screening as appropriate for fall prevention, nutrition, physical activity, Tobacco-use cessation, weight loss and cognition.  Checklist reviewing preventive services available has been given to the patient.  Reviewed patient's diet, addressing concerns and/or questions.   Discussed possible causes of fatigue. Information on urinary incontinence and treatment options given to patient.           Return in about 3 months (around 9/26/2024) for hypertension and lipids, anxiety/depression.    Nigel Carmen is a 79 year old, presenting for the following:  Physical        6/26/2024    11:11 AM   Additional Questions   Roomed by jack   Accompanied by none         Health Care Directive  Patient has a Health Care Directive on file  Advance care planning document is on file and is current.    HPI    Hyperlipidemia Follow-Up    Are you regularly taking any medication or supplement to lower your cholesterol?   Yes- repatha  Are you  having muscle aches or other side effects that you think could be caused by your cholesterol lowering medication?  No    Hypertension Follow-up    Do you check your blood pressure regularly outside of the clinic? No   Are you following a low salt diet? Yes  Are your blood pressures ever more than 140 on the top number (systolic) OR more   than 90 on the bottom number (diastolic), for example 140/90? No    Atrial Fibrillation Follow-up    Symptoms: no recent chest pain, significant palpitations, dizziness/lightheadedness, dyspnea, or increased peripheral edema.  Stroke prevention: DOAC (Eliquis, Xarelto, Pradaxa)        9/18/2023     9:07 AM 12/6/2023     9:56 AM 3/13/2024     8:39 AM 6/12/2024    10:23 AM 6/26/2024    10:54 AM   Date   Pulse 55 50 61 54 54     Current [id number] Score: 4 points, which represents a 4.8% annual risk of major embolic event, without anti-coagulation or an LAAO device.       Hypothyroidism Follow-up    Since last visit, patient describes the following symptoms: Weight stable, no hair loss, no skin changes, no constipation, no loose stools        6/26/2024   General Health   How would you rate your overall physical health? Good   Feel stress (tense, anxious, or unable to sleep) Not at all            6/26/2024   Nutrition   Diet: Regular (no restrictions)            6/26/2024   Exercise   Days per week of moderate/strenous exercise 4 days   Average minutes spent exercising at this level 20 min            6/26/2024   Social Factors   Frequency of gathering with friends or relatives More than three times a week   Worry food won't last until get money to buy more No   Food not last or not have enough money for food? No   Do you have housing? (Housing is defined as stable permanent housing and does not include staying ouside in a car, in a tent, in an abandoned building, in an overnight shelter, or couch-surfing.) Yes   Are you worried about losing your housing? No   Lack of transportation?  No   Unable to get utilities (heat,electricity)? No            6/26/2024   Fall Risk   Fallen 2 or more times in the past year? No   Trouble with walking or balance? No             6/26/2024   Activities of Daily Living- Home Safety   Needs help with the following daily activites None of the above   Safety concerns in the home None of the above            6/26/2024   Dental   Dentist two times every year? Yes            6/26/2024   Hearing Screening   Hearing concerns? None of the above            6/26/2024   Driving Risk Screening   Patient/family members have concerns about driving No            6/26/2024   General Alertness/Fatigue Screening   Have you been more tired than usual lately? (!) YES            6/26/2024   Urinary Incontinence Screening   Bothered by leaking urine in past 6 months Yes            6/26/2024   TB Screening   Were you born outside of the [place]? No          Today's PHQ-9 Score:       6/26/2024    11:09 AM   PHQ-9 SCORE   PHQ-9 Total Score MyChart 0   PHQ-9 Total Score 0         6/26/2024   Substance Use   Alcohol more than 3/day or more than 7/wk No   Do you have a current opioid prescription? No   How severe/bad is pain from 1 to 10? 0/10 (No Pain)   Do you use any other substances recreationally? No        Social History     Tobacco Use    Smoking status: Never    Smokeless tobacco: Never   Vaping Use    Vaping status: Never Used   Substance Use Topics    Alcohol use: Never    Drug use: No           6/14/2024   LAST FHS-7 RESULTS   1st degree relative breast or ovarian cancer Yes   Any relative bilateral breast cancer Unknown   Any male have breast cancer No   Any ONE woman have BOTH breast AND ovarian cancer No   Any woman with breast cancer before 50yrs Yes   2 or more relatives with breast AND/OR ovarian cancer No   2 or more relatives with breast AND/OR bowel cancer Yes      Mammogram annually.      ASCVD Risk   The 10-year ASCVD risk score (Hemal DK, et al., 2019) is: 26.5%     Values used to calculate the score:      Age: 79 years      Sex: Female      Is Non- : No      Diabetic: No      Tobacco smoker: No      Systolic Blood Pressure: 124 mmHg      Is BP treated: Yes      HDL Cholesterol: 51 mg/dL      Total Cholesterol: 209 mg/dL      Recent Labs   Lab Test 03/13/24  0932 12/06/23  1032 09/07/23  0925 01/17/23  1203 12/01/22  1015 08/18/21  0939 05/12/21  1040 04/28/21  1017   A1C  --   --   --   --  5.6  --   --   --    * 142* 143*   < > 138*   < >  --  154*   HDL 51 49* 52   < > 42*   < >  --  47*   TRIG 170* 185* 181*   < > 209*   < >  --  178*   ALT 22 22 63*   < > 53*   < >  --  30   CR 0.96* 0.96* 1.10*   < > 0.99*   < > 1.19* 1.04   GFRESTIMATED 61 61 51*   < > 59*   < > 45* 53*   GFRESTBLACK  --   --   --   --   --   --  52* 61   POTASSIUM 4.1 4.2 4.1   < > 4.2   < > 4.2 4.0   TSH 4.04 2.89 2.99   < > 3.32   < >  --  4.14*    < > = values in this interval not displayed.      BP Readings from Last 3 Encounters:   06/26/24 124/70   06/12/24 127/69   03/13/24 (!) 140/66    Wt Readings from Last 3 Encounters:   06/26/24 79.2 kg (174 lb 9.6 oz)   06/12/24 79.4 kg (175 lb)   03/13/24 80.4 kg (177 lb 4.8 oz)                        Reviewed and updated as needed this visit by Provider                    BP Readings from Last 3 Encounters:   06/26/24 124/70   06/12/24 127/69   03/13/24 (!) 140/66    Wt Readings from Last 3 Encounters:   06/26/24 79.2 kg (174 lb 9.6 oz)   06/12/24 79.4 kg (175 lb)   03/13/24 80.4 kg (177 lb 4.8 oz)                  Current providers sharing in care for this patient include:  Patient Care Team:  Randi Haddad, NP as PCP - General (Family Practice)  Derrell Mccormack MD as MD (Otolaryngology)  Randi Haddad, NP as Assigned PCP  Chris Walsh DO as MD (Cardiology)  Chris Walsh DO as Assigned Heart and Vascular Provider  Ben Garcia MD as Assigned OBGYN Provider    The following  health maintenance items are reviewed in Epic and correct as of today:  Health Maintenance   Topic Date Due    MEDICARE ANNUAL WELLNESS VISIT  07/28/2022    COVID-19 Vaccine (6 - 2023-24 season) 03/10/2024    INFLUENZA VACCINE (Season Ended) 09/01/2024    MICROALBUMIN  12/06/2024    HEMOGLOBIN  12/06/2024    LA NENA ASSESSMENT  12/26/2024    PHQ-9  12/26/2024    BMP  03/13/2025    LIPID  03/13/2025    TSH W/FREE T4 REFLEX  03/13/2025    FALL RISK ASSESSMENT  06/26/2025    COLORECTAL CANCER SCREENING  03/01/2026    GLUCOSE  03/13/2027    ADVANCE CARE PLANNING  06/26/2029    DTAP/TDAP/TD IMMUNIZATION (3 - Td or Tdap) 08/29/2032    DEXA  12/21/2035    HEPATITIS C SCREENING  Completed    PHQ-2 (once per calendar year)  Completed    Pneumococcal Vaccine: 65+ Years  Completed    URINALYSIS  Completed    ZOSTER IMMUNIZATION  Completed    RSV VACCINE (Pregnancy & 60+)  Completed    IPV IMMUNIZATION  Aged Out    HPV IMMUNIZATION  Aged Out    MENINGITIS IMMUNIZATION  Aged Out    RSV MONOCLONAL ANTIBODY  Aged Out    MAMMO SCREENING  Discontinued         Review of Systems  CONSTITUTIONAL: NEGATIVE for fever, chills, change in weight  INTEGUMENTARY/SKIN: NEGATIVE for worrisome rashes, moles or lesions  EYES: NEGATIVE for vision changes or irritation  ENT/MOUTH: NEGATIVE for ear, mouth and throat problems  RESP: NEGATIVE for significant cough or SOB  BREAST: NEGATIVE for masses, tenderness or discharge  CV: NEGATIVE for chest pain, palpitations or peripheral edema  GI: NEGATIVE for nausea, abdominal pain, heartburn, or change in bowel habits  : NEGATIVE for frequency, dysuria, or hematuria  MUSCULOSKELETAL: NEGATIVE for significant arthralgias or myalgia  NEURO: NEGATIVE for weakness, dizziness or paresthesias  ENDOCRINE: NEGATIVE for temperature intolerance, skin/hair changes  HEME: NEGATIVE for bleeding problems  PSYCHIATRIC: NEGATIVE for changes in mood or affect     Objective    Exam  /70 (BP Location: Left arm, Patient  "Position: Chair, Cuff Size: Adult Regular)   Pulse 54   Temp 97.5  F (36.4  C) (Tympanic)   Resp 16   Ht 1.575 m (5' 2\")   Wt 79.2 kg (174 lb 9.6 oz)   SpO2 97%   BMI 31.93 kg/m     Estimated body mass index is 31.93 kg/m  as calculated from the following:    Height as of this encounter: 1.575 m (5' 2\").    Weight as of this encounter: 79.2 kg (174 lb 9.6 oz).    Physical Exam  GENERAL: alert and no distress  HENT: ear canals and TM's normal, nose and mouth without ulcers or lesions  NECK: no adenopathy, no asymmetry, masses, or scars  RESP: lungs clear to auscultation - no rales, rhonchi or wheezes  CV: regular rate and rhythm, normal S1 S2, no S3 or S4, no murmur  MS: no gross musculoskeletal defects noted, no edema  PSYCH: mentation appears normal, affect normal/bright        6/26/2024   Mini Cog   Clock Draw Score 2 Normal   3 Item Recall 3 objects recalled   Mini Cog Total Score 5                 Signed Electronically by: Randi Haddad NP    "

## 2024-06-26 ENCOUNTER — OFFICE VISIT (OUTPATIENT)
Dept: FAMILY MEDICINE | Facility: OTHER | Age: 79
End: 2024-06-26
Attending: NURSE PRACTITIONER
Payer: COMMERCIAL

## 2024-06-26 VITALS
OXYGEN SATURATION: 97 % | TEMPERATURE: 97.5 F | DIASTOLIC BLOOD PRESSURE: 70 MMHG | BODY MASS INDEX: 32.13 KG/M2 | HEIGHT: 62 IN | SYSTOLIC BLOOD PRESSURE: 124 MMHG | WEIGHT: 174.6 LBS | RESPIRATION RATE: 16 BRPM | HEART RATE: 54 BPM

## 2024-06-26 DIAGNOSIS — E66.09 CLASS 1 OBESITY DUE TO EXCESS CALORIES WITH SERIOUS COMORBIDITY AND BODY MASS INDEX (BMI) OF 31.0 TO 31.9 IN ADULT: ICD-10-CM

## 2024-06-26 DIAGNOSIS — E66.811 CLASS 1 OBESITY DUE TO EXCESS CALORIES WITH SERIOUS COMORBIDITY AND BODY MASS INDEX (BMI) OF 31.0 TO 31.9 IN ADULT: ICD-10-CM

## 2024-06-26 DIAGNOSIS — E89.0 POSTOPERATIVE HYPOTHYROIDISM: ICD-10-CM

## 2024-06-26 DIAGNOSIS — I48.0 PAROXYSMAL ATRIAL FIBRILLATION (H): Chronic | ICD-10-CM

## 2024-06-26 DIAGNOSIS — I10 BENIGN ESSENTIAL HYPERTENSION: Chronic | ICD-10-CM

## 2024-06-26 DIAGNOSIS — Z00.00 ANNUAL PHYSICAL EXAM: Primary | ICD-10-CM

## 2024-06-26 DIAGNOSIS — K21.9 GASTROESOPHAGEAL REFLUX DISEASE WITHOUT ESOPHAGITIS: Chronic | ICD-10-CM

## 2024-06-26 DIAGNOSIS — N18.31 STAGE 3A CHRONIC KIDNEY DISEASE (H): ICD-10-CM

## 2024-06-26 DIAGNOSIS — E78.5 HYPERLIPIDEMIA LDL GOAL <70: ICD-10-CM

## 2024-06-26 DIAGNOSIS — F41.1 GAD (GENERALIZED ANXIETY DISORDER): ICD-10-CM

## 2024-06-26 LAB
ALBUMIN SERPL BCG-MCNC: 4 G/DL (ref 3.5–5.2)
ALP SERPL-CCNC: 130 U/L (ref 40–150)
ALT SERPL W P-5'-P-CCNC: 26 U/L (ref 0–50)
ANION GAP SERPL CALCULATED.3IONS-SCNC: 13 MMOL/L (ref 7–15)
AST SERPL W P-5'-P-CCNC: 20 U/L (ref 0–45)
BASOPHILS # BLD AUTO: 0 10E3/UL (ref 0–0.2)
BASOPHILS NFR BLD AUTO: 0 %
BILIRUB SERPL-MCNC: 0.5 MG/DL
BUN SERPL-MCNC: 13.6 MG/DL (ref 8–23)
CALCIUM SERPL-MCNC: 8.9 MG/DL (ref 8.8–10.2)
CHLORIDE SERPL-SCNC: 108 MMOL/L (ref 98–107)
CHOLEST SERPL-MCNC: 192 MG/DL
CREAT SERPL-MCNC: 1.09 MG/DL (ref 0.51–0.95)
DEPRECATED HCO3 PLAS-SCNC: 22 MMOL/L (ref 22–29)
EGFRCR SERPLBLD CKD-EPI 2021: 52 ML/MIN/1.73M2
EOSINOPHIL # BLD AUTO: 0.1 10E3/UL (ref 0–0.7)
EOSINOPHIL NFR BLD AUTO: 1 %
ERYTHROCYTE [DISTWIDTH] IN BLOOD BY AUTOMATED COUNT: 13 % (ref 10–15)
FASTING STATUS PATIENT QL REPORTED: YES
FASTING STATUS PATIENT QL REPORTED: YES
GLUCOSE SERPL-MCNC: 98 MG/DL (ref 70–99)
HCT VFR BLD AUTO: 45.3 % (ref 35–47)
HDLC SERPL-MCNC: 49 MG/DL
HGB BLD-MCNC: 15.2 G/DL (ref 11.7–15.7)
IMM GRANULOCYTES # BLD: 0 10E3/UL
IMM GRANULOCYTES NFR BLD: 0 %
LDLC SERPL CALC-MCNC: 115 MG/DL
LYMPHOCYTES # BLD AUTO: 2.4 10E3/UL (ref 0.8–5.3)
LYMPHOCYTES NFR BLD AUTO: 19 %
MCH RBC QN AUTO: 27.6 PG (ref 26.5–33)
MCHC RBC AUTO-ENTMCNC: 33.6 G/DL (ref 31.5–36.5)
MCV RBC AUTO: 82 FL (ref 78–100)
MONOCYTES # BLD AUTO: 0.6 10E3/UL (ref 0–1.3)
MONOCYTES NFR BLD AUTO: 5 %
NEUTROPHILS # BLD AUTO: 9.6 10E3/UL (ref 1.6–8.3)
NEUTROPHILS NFR BLD AUTO: 75 %
NONHDLC SERPL-MCNC: 143 MG/DL
PLATELET # BLD AUTO: 274 10E3/UL (ref 150–450)
POTASSIUM SERPL-SCNC: 4.2 MMOL/L (ref 3.4–5.3)
PROT SERPL-MCNC: 7 G/DL (ref 6.4–8.3)
RBC # BLD AUTO: 5.5 10E6/UL (ref 3.8–5.2)
SODIUM SERPL-SCNC: 143 MMOL/L (ref 135–145)
TRIGL SERPL-MCNC: 140 MG/DL
TSH SERPL DL<=0.005 MIU/L-ACNC: 3.33 UIU/ML (ref 0.3–4.2)
WBC # BLD AUTO: 12.7 10E3/UL (ref 4–11)

## 2024-06-26 PROCEDURE — 36415 COLL VENOUS BLD VENIPUNCTURE: CPT | Mod: ZL | Performed by: NURSE PRACTITIONER

## 2024-06-26 PROCEDURE — 85025 COMPLETE CBC W/AUTO DIFF WBC: CPT | Mod: ZL | Performed by: NURSE PRACTITIONER

## 2024-06-26 PROCEDURE — G0463 HOSPITAL OUTPT CLINIC VISIT: HCPCS

## 2024-06-26 PROCEDURE — 99213 OFFICE O/P EST LOW 20 MIN: CPT | Mod: 25 | Performed by: NURSE PRACTITIONER

## 2024-06-26 PROCEDURE — 84443 ASSAY THYROID STIM HORMONE: CPT | Mod: ZL | Performed by: NURSE PRACTITIONER

## 2024-06-26 PROCEDURE — 82565 ASSAY OF CREATININE: CPT | Mod: ZL | Performed by: NURSE PRACTITIONER

## 2024-06-26 PROCEDURE — 80061 LIPID PANEL: CPT | Mod: ZL | Performed by: NURSE PRACTITIONER

## 2024-06-26 PROCEDURE — 99397 PER PM REEVAL EST PAT 65+ YR: CPT | Performed by: NURSE PRACTITIONER

## 2024-06-26 SDOH — HEALTH STABILITY: PHYSICAL HEALTH: ON AVERAGE, HOW MANY DAYS PER WEEK DO YOU ENGAGE IN MODERATE TO STRENUOUS EXERCISE (LIKE A BRISK WALK)?: 4 DAYS

## 2024-06-26 SDOH — HEALTH STABILITY: PHYSICAL HEALTH: ON AVERAGE, HOW MANY MINUTES DO YOU ENGAGE IN EXERCISE AT THIS LEVEL?: 20 MIN

## 2024-06-26 ASSESSMENT — ANXIETY QUESTIONNAIRES
7. FEELING AFRAID AS IF SOMETHING AWFUL MIGHT HAPPEN: SEVERAL DAYS
5. BEING SO RESTLESS THAT IT IS HARD TO SIT STILL: NOT AT ALL
GAD7 TOTAL SCORE: 3
GAD7 TOTAL SCORE: 3
7. FEELING AFRAID AS IF SOMETHING AWFUL MIGHT HAPPEN: SEVERAL DAYS
IF YOU CHECKED OFF ANY PROBLEMS ON THIS QUESTIONNAIRE, HOW DIFFICULT HAVE THESE PROBLEMS MADE IT FOR YOU TO DO YOUR WORK, TAKE CARE OF THINGS AT HOME, OR GET ALONG WITH OTHER PEOPLE: NOT DIFFICULT AT ALL
8. IF YOU CHECKED OFF ANY PROBLEMS, HOW DIFFICULT HAVE THESE MADE IT FOR YOU TO DO YOUR WORK, TAKE CARE OF THINGS AT HOME, OR GET ALONG WITH OTHER PEOPLE?: NOT DIFFICULT AT ALL
4. TROUBLE RELAXING: NOT AT ALL
1. FEELING NERVOUS, ANXIOUS, OR ON EDGE: NOT AT ALL
2. NOT BEING ABLE TO STOP OR CONTROL WORRYING: SEVERAL DAYS
4. TROUBLE RELAXING: NOT AT ALL
GAD7 TOTAL SCORE: 3
GAD7 TOTAL SCORE: 3
6. BECOMING EASILY ANNOYED OR IRRITABLE: NOT AT ALL
2. NOT BEING ABLE TO STOP OR CONTROL WORRYING: SEVERAL DAYS
5. BEING SO RESTLESS THAT IT IS HARD TO SIT STILL: NOT AT ALL
3. WORRYING TOO MUCH ABOUT DIFFERENT THINGS: SEVERAL DAYS
1. FEELING NERVOUS, ANXIOUS, OR ON EDGE: NOT AT ALL
3. WORRYING TOO MUCH ABOUT DIFFERENT THINGS: SEVERAL DAYS
7. FEELING AFRAID AS IF SOMETHING AWFUL MIGHT HAPPEN: SEVERAL DAYS
IF YOU CHECKED OFF ANY PROBLEMS ON THIS QUESTIONNAIRE, HOW DIFFICULT HAVE THESE PROBLEMS MADE IT FOR YOU TO DO YOUR WORK, TAKE CARE OF THINGS AT HOME, OR GET ALONG WITH OTHER PEOPLE: NOT DIFFICULT AT ALL
6. BECOMING EASILY ANNOYED OR IRRITABLE: NOT AT ALL

## 2024-06-26 ASSESSMENT — SOCIAL DETERMINANTS OF HEALTH (SDOH): HOW OFTEN DO YOU GET TOGETHER WITH FRIENDS OR RELATIVES?: MORE THAN THREE TIMES A WEEK

## 2024-06-26 ASSESSMENT — PATIENT HEALTH QUESTIONNAIRE - PHQ9
SUM OF ALL RESPONSES TO PHQ QUESTIONS 1-9: 0
10. IF YOU CHECKED OFF ANY PROBLEMS, HOW DIFFICULT HAVE THESE PROBLEMS MADE IT FOR YOU TO DO YOUR WORK, TAKE CARE OF THINGS AT HOME, OR GET ALONG WITH OTHER PEOPLE: NOT DIFFICULT AT ALL

## 2024-06-26 ASSESSMENT — PAIN SCALES - GENERAL: PAINLEVEL: NO PAIN (0)

## 2024-06-26 NOTE — PATIENT INSTRUCTIONS
Assessment & Plan     1. Annual physical exam  Exam completed     2. Hyperlipidemia LDL goal <70  Continue repatha   - Lipid Profile; Future  - CBC with Platelets & Differential; Future    3. Paroxysmal atrial fibrillation (H)  Continue eliquis    4. Benign essential hypertension  Well controlled   - Comprehensive metabolic panel; Future    5. Postoperative hypothyroidism  - TSH with free T4 reflex; Future    6. GERD  Continue omeprazole     7. LA NENA (generalized anxiety disorder)  Continue zoloft     8. Stage 3a chronic kidney disease (H)  Limit NSAID use    9. Class 1 obesity due to excess calories with serious comorbidity and body mass index (BMI) of 31.0 to 31.9 in adult  Weight loss encouraged.       Patient has been advised of split billing requirements and indicates understanding: Yes    Follow-up in 3 months or as needed    Randi Haddad,   Certified Adult Nurse Practitioner  [phone]

## 2024-07-16 DIAGNOSIS — E03.4 HYPOTHYROIDISM DUE TO ACQUIRED ATROPHY OF THYROID: ICD-10-CM

## 2024-07-16 RX ORDER — LEVOTHYROXINE SODIUM 112 UG/1
TABLET ORAL
Qty: 90 TABLET | Refills: 2 | Status: SHIPPED | OUTPATIENT
Start: 2024-07-16

## 2024-07-16 NOTE — TELEPHONE ENCOUNTER
Thyroid Protocol Failed07/16/2024 09:19 AM   Protocol Details Medication indicated for associated diagnosis

## 2024-07-16 NOTE — TELEPHONE ENCOUNTER
Synthroid      Last Written Prescription Date:  6/7/23  Last Fill Quantity: 90,   # refills: 2  Last Office Visit: 6/26/24  Future Office visit:    Next 5 appointments (look out 90 days)      Sep 11, 2024 8:30 AM  (Arrive by 8:15 AM)  Provider Visit with Randi Haddad NP  [facility] ([facility] ) [address]  [facility] [address]  [phone]             Routing refill request to provider for review/approval because:

## 2024-07-23 ENCOUNTER — TRANSFERRED RECORDS (OUTPATIENT)
Dept: HEALTH INFORMATION MANAGEMENT | Facility: CLINIC | Age: 79
End: 2024-07-23

## 2024-08-22 DIAGNOSIS — E78.2 MIXED HYPERLIPIDEMIA: ICD-10-CM

## 2024-08-22 RX ORDER — EVOLOCUMAB 140 MG/ML
140 INJECTION, SOLUTION SUBCUTANEOUS
Qty: 6 ML | Refills: 3 | Status: SHIPPED | OUTPATIENT
Start: 2024-08-22

## 2024-08-22 NOTE — TELEPHONE ENCOUNTER
Reason for call:  Medication      Have you contacted your pharmacy? No  - PATIENT ISN'T SURE IF SHE HAS ANY REFILLS AND WANTS TO CHANGE PHARMACIES   Medication REPATHA SURECLICK 140 MG/ML PREFILLED AUTOINJECTOR   What Pharmacy do you use? [facility] IN [place] IF POSSIBLE      (Please note that the turn-around-time for prescriptions is 72 business hours; I am sending your request at this time. SEND TO appropriate Care Team Pool )

## 2024-08-31 DIAGNOSIS — N18.31 STAGE 3A CHRONIC KIDNEY DISEASE (H): ICD-10-CM

## 2024-09-03 DIAGNOSIS — I48.0 PAROXYSMAL ATRIAL FIBRILLATION (H): Chronic | ICD-10-CM

## 2024-09-03 DIAGNOSIS — I10 BENIGN ESSENTIAL HYPERTENSION: Chronic | ICD-10-CM

## 2024-09-03 RX ORDER — EMPAGLIFLOZIN 10 MG/1
10 TABLET, FILM COATED ORAL DAILY
Qty: 30 TABLET | Refills: 1 | Status: SHIPPED | OUTPATIENT
Start: 2024-09-03 | End: 2024-09-11

## 2024-09-03 RX ORDER — ATENOLOL 25 MG/1
TABLET ORAL
Qty: 90 TABLET | Refills: 1 | Status: SHIPPED | OUTPATIENT
Start: 2024-09-03

## 2024-09-04 NOTE — PROGRESS NOTES
"  Assessment & Plan     1. Hyperlipidemia LDL goal <70  Continue repatha  - Lipid Profile; Future    2. Benign essential hypertension  Well controlled   - Comprehensive metabolic panel; Future  - CBC with platelets and differential; Future    3. Stage 3a chronic kidney disease (H)  Stable   - Albumin Random Urine Quantitative with Creat Ratio; Future  - empagliflozin (JARDIANCE) 10 MG TABS tablet; Take 1 tablet (10 mg) by mouth daily.  Dispense: 90 tablet; Refill: 1    4. Hypothyroidism due to acquired atrophy of thyroid  - TSH with free T4 reflex; Future    5. Paroxysmal atrial fibrillation (H)  - apixaban ANTICOAGULANT (ELIQUIS ANTICOAGULANT) 5 MG tablet; Take 1 tablet (5 mg) by mouth 2 times daily.  Dispense: 180 tablet; Refill: 3    6. Fibromyalgia  - gabapentin (NEURONTIN) 300 MG capsule; Take one  capsule in the morning and afternoon and 2 at bedtime.  Dispense: 360 capsule; Refill: 1    7. Class 1 obesity due to excess calories with serious comorbidity and body mass index (BMI) of 32.0 to 32.9 in adult  Weight loss encouraged     8. LA NENA (generalized anxiety disorder)  - sertraline (ZOLOFT) 50 MG tablet; Take 1 tablet (50 mg) by mouth daily.  Dispense: 90 tablet; Refill: 0    9. Mixed incontinence urge and stress (male)(female)  Continue vesicare    10. GERD  - omeprazole (PRILOSEC) 40 MG DR capsule; Take 1 capsule (40 mg) by mouth daily.  Dispense: 90 capsule; Refill: 3    11. Myalgia  - gabapentin (NEURONTIN) 300 MG capsule; Take one  capsule in the morning and afternoon and 2 at bedtime.  Dispense: 360 capsule; Refill: 1    12. History of thyroid cancer  Repeat ultrasound - previous reviewed.   - US Thyroid; Future          BMI  Estimated body mass index is 32.23 kg/m  as calculated from the following:    Height as of this encounter: 1.575 m (5' 2\").    Weight as of this encounter: 79.9 kg (176 lb 3.2 oz).   Weight management plan: Discussed healthy diet and exercise guidelines      Return in about 3 months " (around 12/11/2024) for hypertension and lipids, anxiety/depression.    The longitudinal plan of care for the diagnosis(es)/condition(s) as documented were addressed during this visit. Due to the added complexity in care, I will continue to support Nella in the subsequent management and with ongoing continuity of care.    Randi Haddad,   Certified Adult Nurse Practitioner  [phone]      Nigel Carmen is a 79 year old, presenting for the following health issues:  Hypertension, Lipids, Anxiety, and chronic kidney disease        9/11/2024     8:01 AM   Additional Questions   Roomed by jack   Accompanied by none     HPI     Hyperlipidemia Follow-Up    Are you regularly taking any medication or supplement to lower your cholesterol?   Yes- Repatha   Are you having muscle aches or other side effects that you think could be caused by your cholesterol lowering medication?  No    Hypertension Follow-up    Do you check your blood pressure regularly outside of the clinic? Yes   Are you following a low salt diet? Yes  Are your blood pressures ever more than 140 on the top number (systolic) OR more   than 90 on the bottom number (diastolic), for example 140/90? No    Anxiety   How are you doing with your anxiety since your last visit? No change  Are you having other symptoms that might be associated with anxiety? No  Have you had a significant life event? OTHER: constantly concerned about grandson     Are you feeling depressed? No  Do you have any concerns with your use of alcohol or other drugs? No    Social History     Tobacco Use    Smoking status: Never    Smokeless tobacco: Never   Vaping Use    Vaping status: Never Used   Substance Use Topics    Alcohol use: Never    Drug use: No         6/26/2024    10:58 AM 6/26/2024    11:09 AM 9/11/2024     8:03 AM   LA NENA-7 SCORE   Total Score  3 (minimal anxiety) 2 (minimal anxiety)   Total Score 3 3 2         6/26/2024    10:57 AM 6/26/2024    11:09 AM 9/11/2024      8:02 AM   PHQ   PHQ-9 Total Score 0 0 0   Q9: Thoughts of better off dead/self-harm past 2 weeks Not at all Not at all Not at all         9/11/2024     8:02 AM   Last PHQ-9   1.  Little interest or pleasure in doing things 0   2.  Feeling down, depressed, or hopeless 0   3.  Trouble falling or staying asleep, or sleeping too much 0   4.  Feeling tired or having little energy 0   5.  Poor appetite or overeating 0   6.  Feeling bad about yourself 0   7.  Trouble concentrating 0   8.  Moving slowly or restless 0   Q9: Thoughts of better off dead/self-harm past 2 weeks 0   PHQ-9 Total Score 0         9/11/2024     8:03 AM   LA NENA-7    1. Feeling nervous, anxious, or on edge 0   2. Not being able to stop or control worrying 1   3. Worrying too much about different things 1   4. Trouble relaxing 0   5. Being so restless that it is hard to sit still 0   6. Becoming easily annoyed or irritable 0   7. Feeling afraid, as if something awful might happen 0   LA NENA-7 Total Score 2   If you checked any problems, how difficult have they made it for you to do your work, take care of things at home, or get along with other people? Not difficult at all     Chronic Kidney Disease Follow-up    Do you take any over the counter pain medicine?: Yes  What over the counter medicine are you taking for your pain?:  ibuprofen     How often do you take this medicine?:   only when needed has not taken in a long time     Hypothyroid:  history of thyroid cancer, last  ultrasound last year.  Will repeat this year.  Is taking levothyroxine 112mcg.      BP Readings from Last 3 Encounters:   09/11/24 126/58   06/26/24 124/70   06/12/24 127/69    Wt Readings from Last 3 Encounters:   09/11/24 79.9 kg (176 lb 3.2 oz)   06/26/24 79.2 kg (174 lb 9.6 oz)   06/12/24 79.4 kg (175 lb)                        Review of Systems  Constitutional, neuro, ENT, endocrine, pulmonary, cardiac, gastrointestinal, genitourinary, musculoskeletal, integument and psychiatric  "systems are negative, except as otherwise noted.      Objective    /58 (BP Location: Right arm, Patient Position: Chair, Cuff Size: Adult Regular)   Pulse (!) 49   Temp 96.8  F (36  C) (Tympanic)   Resp 16   Ht 1.575 m (5' 2\")   Wt 79.9 kg (176 lb 3.2 oz)   SpO2 98%   BMI 32.23 kg/m    Body mass index is 32.23 kg/m .  Physical Exam   GENERAL: alert and no distress  NECK: no adenopathy, no asymmetry, masses, no carotid bruits  RESP: lungs clear to auscultation - no rales, rhonchi or wheezes  CV: regular rate and rhythm, normal S1 S2, no S3 or S4, no murmur  MS: no gross musculoskeletal defects noted, no edema  PSYCH: mentation appears normal, affect normal/bright    Results for orders placed or performed in visit on 09/11/24   Lipid Profile     Status: None   Result Value Ref Range    Cholesterol 127 <200 mg/dL    Triglycerides 123 <150 mg/dL    Direct Measure HDL 57 >=50 mg/dL    LDL Cholesterol Calculated 45 <100 mg/dL    Non HDL Cholesterol 70 <130 mg/dL    Patient Fasting > 8hrs? Yes     Narrative    Cholesterol  Desirable: < 200 mg/dL  Borderline High: 200 - 239 mg/dL  High: >= 240 mg/dL    Triglycerides  Normal: < 150 mg/dL  Borderline High: 150 - 199 mg/dL  High: 200-499 mg/dL  Very High: >= 500 mg/dL    Direct Measure HDL  Female: >= 50 mg/dL   Male: >= 40 mg/dL    LDL Cholesterol  Desirable: < 100 mg/dL  Above Desirable: 100 - 129 mg/dL   Borderline High: 130 - 159 mg/dL   High:  160 - 189 mg/dL   Very High: >= 190 mg/dL    Non HDL Cholesterol  Desirable: < 130 mg/dL  Above Desirable: 130 - 159 mg/dL  Borderline High: 160 - 189 mg/dL  High: 190 - 219 mg/dL  Very High: >= 220 mg/dL   Comprehensive metabolic panel     Status: Abnormal   Result Value Ref Range    Sodium 143 135 - 145 mmol/L    Potassium 4.4 3.4 - 5.3 mmol/L    Carbon Dioxide (CO2) 22 22 - 29 mmol/L    Anion Gap 14 7 - 15 mmol/L    Urea Nitrogen 20.3 8.0 - 23.0 mg/dL    Creatinine 1.08 (H) 0.51 - 0.95 mg/dL    GFR Estimate 52 (L) " >60 mL/min/1.73m2    Calcium 9.2 8.8 - 10.4 mg/dL    Chloride 107 98 - 107 mmol/L    Glucose 108 (H) 70 - 99 mg/dL    Alkaline Phosphatase 135 40 - 150 U/L    AST 24 0 - 45 U/L    ALT 27 0 - 50 U/L    Protein Total 7.1 6.4 - 8.3 g/dL    Albumin 4.1 3.5 - 5.2 g/dL    Bilirubin Total 0.4 <=1.2 mg/dL    Patient Fasting > 8hrs? Yes    TSH with free T4 reflex     Status: Normal   Result Value Ref Range    TSH 3.78 0.30 - 4.20 uIU/mL   CBC with platelets and differential     Status: Abnormal   Result Value Ref Range    WBC Count 11.7 (H) 4.0 - 11.0 10e3/uL    RBC Count 5.59 (H) 3.80 - 5.20 10e6/uL    Hemoglobin 15.4 11.7 - 15.7 g/dL    Hematocrit 47.0 35.0 - 47.0 %    MCV 84 78 - 100 fL    MCH 27.5 26.5 - 33.0 pg    MCHC 32.8 31.5 - 36.5 g/dL    RDW 13.9 10.0 - 15.0 %    Platelet Count 246 150 - 450 10e3/uL    % Neutrophils 73 %    % Lymphocytes 19 %    % Monocytes 6 %    % Eosinophils 1 %    % Basophils 0 %    % Immature Granulocytes 0 %    Absolute Neutrophils 8.6 (H) 1.6 - 8.3 10e3/uL    Absolute Lymphocytes 2.3 0.8 - 5.3 10e3/uL    Absolute Monocytes 0.7 0.0 - 1.3 10e3/uL    Absolute Eosinophils 0.2 0.0 - 0.7 10e3/uL    Absolute Basophils 0.0 0.0 - 0.2 10e3/uL    Absolute Immature Granulocytes 0.0 <=0.4 10e3/uL   Albumin Random Urine Quantitative with Creat Ratio     Status: None   Result Value Ref Range    Creatinine Urine mg/dL 141.9 mg/dL    Albumin Urine mg/L 18.8 mg/L    Albumin Urine mg/g Cr 13.25 0.00 - 25.00 mg/g Cr   CBC with platelets and differential     Status: Abnormal    Narrative    The following orders were created for panel order CBC with platelets and differential.  Procedure                               Abnormality         Status                     ---------                               -----------         ------                     CBC with platelets and d...[[id number]]  Abnormal            Final result                 Please view results for these tests on the individual orders.            Answers submitted by the patient for this visit:  Patient Health Questionnaire (Submitted on 9/11/2024)  If you checked off any problems, how difficult have these problems made it for you to do your work, take care of things at home, or get along with other people?: Not difficult at all  PHQ9 TOTAL SCORE: 0  Patient Health Questionnaire (G7) (Submitted on 9/11/2024)  LA NENA 7 TOTAL SCORE: 2    Signed Electronically by: Randi Haddad NP

## 2024-09-11 ENCOUNTER — OFFICE VISIT (OUTPATIENT)
Dept: FAMILY MEDICINE | Facility: OTHER | Age: 79
End: 2024-09-11
Attending: NURSE PRACTITIONER
Payer: COMMERCIAL

## 2024-09-11 VITALS
RESPIRATION RATE: 16 BRPM | HEART RATE: 49 BPM | HEIGHT: 62 IN | BODY MASS INDEX: 32.42 KG/M2 | OXYGEN SATURATION: 98 % | DIASTOLIC BLOOD PRESSURE: 58 MMHG | TEMPERATURE: 96.8 F | SYSTOLIC BLOOD PRESSURE: 126 MMHG | WEIGHT: 176.2 LBS

## 2024-09-11 DIAGNOSIS — E03.4 HYPOTHYROIDISM DUE TO ACQUIRED ATROPHY OF THYROID: ICD-10-CM

## 2024-09-11 DIAGNOSIS — M79.7 FIBROMYALGIA: ICD-10-CM

## 2024-09-11 DIAGNOSIS — F41.1 GAD (GENERALIZED ANXIETY DISORDER): Chronic | ICD-10-CM

## 2024-09-11 DIAGNOSIS — N18.31 STAGE 3A CHRONIC KIDNEY DISEASE (H): ICD-10-CM

## 2024-09-11 DIAGNOSIS — K21.9 GASTROESOPHAGEAL REFLUX DISEASE WITHOUT ESOPHAGITIS: Chronic | ICD-10-CM

## 2024-09-11 DIAGNOSIS — N39.46 MIXED INCONTINENCE URGE AND STRESS (MALE)(FEMALE): ICD-10-CM

## 2024-09-11 DIAGNOSIS — I48.0 PAROXYSMAL ATRIAL FIBRILLATION (H): ICD-10-CM

## 2024-09-11 DIAGNOSIS — I10 BENIGN ESSENTIAL HYPERTENSION: ICD-10-CM

## 2024-09-11 DIAGNOSIS — Z85.850 HISTORY OF THYROID CANCER: ICD-10-CM

## 2024-09-11 DIAGNOSIS — E78.5 HYPERLIPIDEMIA LDL GOAL <70: Primary | ICD-10-CM

## 2024-09-11 DIAGNOSIS — M79.10 MYALGIA: ICD-10-CM

## 2024-09-11 DIAGNOSIS — E66.811 CLASS 1 OBESITY DUE TO EXCESS CALORIES WITH SERIOUS COMORBIDITY AND BODY MASS INDEX (BMI) OF 32.0 TO 32.9 IN ADULT: ICD-10-CM

## 2024-09-11 DIAGNOSIS — E66.09 CLASS 1 OBESITY DUE TO EXCESS CALORIES WITH SERIOUS COMORBIDITY AND BODY MASS INDEX (BMI) OF 32.0 TO 32.9 IN ADULT: ICD-10-CM

## 2024-09-11 LAB
ALBUMIN SERPL BCG-MCNC: 4.1 G/DL (ref 3.5–5.2)
ALP SERPL-CCNC: 135 U/L (ref 40–150)
ALT SERPL W P-5'-P-CCNC: 27 U/L (ref 0–50)
ANION GAP SERPL CALCULATED.3IONS-SCNC: 14 MMOL/L (ref 7–15)
AST SERPL W P-5'-P-CCNC: 24 U/L (ref 0–45)
BASOPHILS # BLD AUTO: 0 10E3/UL (ref 0–0.2)
BASOPHILS NFR BLD AUTO: 0 %
BILIRUB SERPL-MCNC: 0.4 MG/DL
BUN SERPL-MCNC: 20.3 MG/DL (ref 8–23)
CALCIUM SERPL-MCNC: 9.2 MG/DL (ref 8.8–10.4)
CHLORIDE SERPL-SCNC: 107 MMOL/L (ref 98–107)
CHOLEST SERPL-MCNC: 127 MG/DL
CREAT SERPL-MCNC: 1.08 MG/DL (ref 0.51–0.95)
CREAT UR-MCNC: 141.9 MG/DL
EGFRCR SERPLBLD CKD-EPI 2021: 52 ML/MIN/1.73M2
EOSINOPHIL # BLD AUTO: 0.2 10E3/UL (ref 0–0.7)
EOSINOPHIL NFR BLD AUTO: 1 %
ERYTHROCYTE [DISTWIDTH] IN BLOOD BY AUTOMATED COUNT: 13.9 % (ref 10–15)
FASTING STATUS PATIENT QL REPORTED: YES
FASTING STATUS PATIENT QL REPORTED: YES
GLUCOSE SERPL-MCNC: 108 MG/DL (ref 70–99)
HCO3 SERPL-SCNC: 22 MMOL/L (ref 22–29)
HCT VFR BLD AUTO: 47 % (ref 35–47)
HDLC SERPL-MCNC: 57 MG/DL
HGB BLD-MCNC: 15.4 G/DL (ref 11.7–15.7)
IMM GRANULOCYTES # BLD: 0 10E3/UL
IMM GRANULOCYTES NFR BLD: 0 %
LDLC SERPL CALC-MCNC: 45 MG/DL
LYMPHOCYTES # BLD AUTO: 2.3 10E3/UL (ref 0.8–5.3)
LYMPHOCYTES NFR BLD AUTO: 19 %
MCH RBC QN AUTO: 27.5 PG (ref 26.5–33)
MCHC RBC AUTO-ENTMCNC: 32.8 G/DL (ref 31.5–36.5)
MCV RBC AUTO: 84 FL (ref 78–100)
MICROALBUMIN UR-MCNC: 18.8 MG/L
MICROALBUMIN/CREAT UR: 13.25 MG/G CR (ref 0–25)
MONOCYTES # BLD AUTO: 0.7 10E3/UL (ref 0–1.3)
MONOCYTES NFR BLD AUTO: 6 %
NEUTROPHILS # BLD AUTO: 8.6 10E3/UL (ref 1.6–8.3)
NEUTROPHILS NFR BLD AUTO: 73 %
NONHDLC SERPL-MCNC: 70 MG/DL
PLATELET # BLD AUTO: 246 10E3/UL (ref 150–450)
POTASSIUM SERPL-SCNC: 4.4 MMOL/L (ref 3.4–5.3)
PROT SERPL-MCNC: 7.1 G/DL (ref 6.4–8.3)
RBC # BLD AUTO: 5.59 10E6/UL (ref 3.8–5.2)
SODIUM SERPL-SCNC: 143 MMOL/L (ref 135–145)
TRIGL SERPL-MCNC: 123 MG/DL
TSH SERPL DL<=0.005 MIU/L-ACNC: 3.78 UIU/ML (ref 0.3–4.2)
WBC # BLD AUTO: 11.7 10E3/UL (ref 4–11)

## 2024-09-11 PROCEDURE — 84443 ASSAY THYROID STIM HORMONE: CPT | Mod: ZL | Performed by: NURSE PRACTITIONER

## 2024-09-11 PROCEDURE — 82040 ASSAY OF SERUM ALBUMIN: CPT | Mod: ZL | Performed by: NURSE PRACTITIONER

## 2024-09-11 PROCEDURE — 99214 OFFICE O/P EST MOD 30 MIN: CPT | Performed by: NURSE PRACTITIONER

## 2024-09-11 PROCEDURE — 85048 AUTOMATED LEUKOCYTE COUNT: CPT | Mod: ZL | Performed by: NURSE PRACTITIONER

## 2024-09-11 PROCEDURE — G2211 COMPLEX E/M VISIT ADD ON: HCPCS | Performed by: NURSE PRACTITIONER

## 2024-09-11 PROCEDURE — 36415 COLL VENOUS BLD VENIPUNCTURE: CPT | Mod: ZL | Performed by: NURSE PRACTITIONER

## 2024-09-11 PROCEDURE — 80061 LIPID PANEL: CPT | Mod: ZL | Performed by: NURSE PRACTITIONER

## 2024-09-11 PROCEDURE — G0463 HOSPITAL OUTPT CLINIC VISIT: HCPCS

## 2024-09-11 PROCEDURE — 82043 UR ALBUMIN QUANTITATIVE: CPT | Mod: ZL | Performed by: NURSE PRACTITIONER

## 2024-09-11 RX ORDER — OMEPRAZOLE 40 MG/1
40 CAPSULE, DELAYED RELEASE ORAL DAILY
Qty: 90 CAPSULE | Refills: 3 | Status: SHIPPED | OUTPATIENT
Start: 2024-09-11

## 2024-09-11 RX ORDER — GABAPENTIN 300 MG/1
CAPSULE ORAL
Qty: 360 CAPSULE | Refills: 1 | Status: SHIPPED | OUTPATIENT
Start: 2024-09-11

## 2024-09-11 ASSESSMENT — ANXIETY QUESTIONNAIRES
7. FEELING AFRAID AS IF SOMETHING AWFUL MIGHT HAPPEN: NOT AT ALL
GAD7 TOTAL SCORE: 2
GAD7 TOTAL SCORE: 2
8. IF YOU CHECKED OFF ANY PROBLEMS, HOW DIFFICULT HAVE THESE MADE IT FOR YOU TO DO YOUR WORK, TAKE CARE OF THINGS AT HOME, OR GET ALONG WITH OTHER PEOPLE?: NOT DIFFICULT AT ALL
GAD7 TOTAL SCORE: 2

## 2024-09-11 ASSESSMENT — PAIN SCALES - GENERAL: PAINLEVEL: NO PAIN (0)

## 2024-09-11 NOTE — PATIENT INSTRUCTIONS
"  Assessment & Plan     1. Hyperlipidemia LDL goal <70  Continue repatha  - Lipid Profile; Future    2. Benign essential hypertension  Well controlled   - Comprehensive metabolic panel; Future  - CBC with platelets and differential; Future    3. Stage 3a chronic kidney disease (H)  Stable   - Albumin Random Urine Quantitative with Creat Ratio; Future  - empagliflozin (JARDIANCE) 10 MG TABS tablet; Take 1 tablet (10 mg) by mouth daily.  Dispense: 90 tablet; Refill: 1    4. Hypothyroidism due to acquired atrophy of thyroid  - TSH with free T4 reflex; Future    5. Paroxysmal atrial fibrillation (H)  - apixaban ANTICOAGULANT (ELIQUIS ANTICOAGULANT) 5 MG tablet; Take 1 tablet (5 mg) by mouth 2 times daily.  Dispense: 180 tablet; Refill: 3    6. Fibromyalgia  - gabapentin (NEURONTIN) 300 MG capsule; Take one  capsule in the morning and afternoon and 2 at bedtime.  Dispense: 360 capsule; Refill: 1    7. Class 1 obesity due to excess calories with serious comorbidity and body mass index (BMI) of 32.0 to 32.9 in adult  Weight loss encouraged     8. LA NENA (generalized anxiety disorder)  - sertraline (ZOLOFT) 50 MG tablet; Take 1 tablet (50 mg) by mouth daily.  Dispense: 90 tablet; Refill: 0    9. Mixed incontinence urge and stress (male)(female)  Continue vesicare    10. GERD  - omeprazole (PRILOSEC) 40 MG DR capsule; Take 1 capsule (40 mg) by mouth daily.  Dispense: 90 capsule; Refill: 3    11. Myalgia  - gabapentin (NEURONTIN) 300 MG capsule; Take one  capsule in the morning and afternoon and 2 at bedtime.  Dispense: 360 capsule; Refill: 1            BMI  Estimated body mass index is 32.23 kg/m  as calculated from the following:    Height as of this encounter: 1.575 m (5' 2\").    Weight as of this encounter: 79.9 kg (176 lb 3.2 oz).   Weight management plan: Discussed healthy diet and exercise guidelines      Return in about 3 months (around 12/11/2024) for hypertension and lipids, anxiety/depression.    Randi Haddad, "   Certified Adult Nurse Practitioner  [phone]

## 2024-09-18 ENCOUNTER — HOSPITAL ENCOUNTER (OUTPATIENT)
Dept: ULTRASOUND IMAGING | Facility: HOSPITAL | Age: 79
Discharge: HOME | End: 2024-09-18
Attending: NURSE PRACTITIONER | Admitting: NURSE PRACTITIONER
Payer: COMMERCIAL

## 2024-09-18 DIAGNOSIS — Z85.850 HISTORY OF THYROID CANCER: ICD-10-CM

## 2024-09-18 PROCEDURE — 76536 US EXAM OF HEAD AND NECK: CPT

## 2024-09-25 ENCOUNTER — TELEPHONE (OUTPATIENT)
Dept: FAMILY MEDICINE | Facility: OTHER | Age: 79
End: 2024-09-25

## 2024-09-25 ENCOUNTER — TRANSFERRED RECORDS (OUTPATIENT)
Dept: HEALTH INFORMATION MANAGEMENT | Facility: CLINIC | Age: 79
End: 2024-09-25

## 2024-09-25 LAB
CREATININE (EXTERNAL): 1.06 MG/DL (ref 0.55–1.02)
GFR ESTIMATED (EXTERNAL): 54 ML/MIN/1.73M2
GLUCOSE (EXTERNAL): 97 MG/DL (ref 70–100)
POTASSIUM (EXTERNAL): 4.1 MMOL/L (ref 3.5–5.1)

## 2024-09-25 NOTE — TELEPHONE ENCOUNTER
Patient called in stating that she received a phone call from the clinic but no message was left.  is unable to see any phone calls that were made to the patient. Patient also states that she is currently in [facility] in [facility] because of her heart and asked that a note that she called in be documented.

## 2024-10-09 ENCOUNTER — TELEPHONE (OUTPATIENT)
Dept: FAMILY MEDICINE | Facility: OTHER | Age: 79
End: 2024-10-09

## 2024-10-09 DIAGNOSIS — I31.9 CHRONIC PERICARDITIS, UNSPECIFIED COMPLICATION STATUS, UNSPECIFIED TYPE: ICD-10-CM

## 2024-10-09 DIAGNOSIS — I31.39 PERICARDIAL EFFUSION: ICD-10-CM

## 2024-10-09 DIAGNOSIS — R07.9 CHEST PAIN, UNSPECIFIED TYPE: Primary | ICD-10-CM

## 2024-10-09 NOTE — TELEPHONE ENCOUNTER
10:30 AM    Reason for Call: Phone Call    Description: Patient walked in stating that her instructions from [facility] state that she needs to make an appointment for a CT coronary angiogram and a hospital follow up with her PCP and her cardiologist. Please call patient back.     Was an appointment offered for this call? No  If yes : Appointment type              Date    Preferred method for responding to this message: Telephone Call  What is your phone number ? [phone]    If we cannot reach you directly, may we leave a detailed response at the number you provided? Yes    Can this message wait until your PCP/provider returns, if available today? Not applicable, PROVIDER IN CLINIC    Venus Gallagher

## 2024-10-09 NOTE — TELEPHONE ENCOUNTER
I placed an order for CTA of coronaries and echocardiogram.  We can see her after completion of the test.  Can we schedule her with a follow-up appointment after the test are completed to discuss the results?    Dr. Walsh

## 2024-10-14 DIAGNOSIS — R73.03 PRE-DIABETES: Primary | ICD-10-CM

## 2024-10-15 ENCOUNTER — TELEPHONE (OUTPATIENT)
Dept: CARE COORDINATION | Facility: OTHER | Age: 79
End: 2024-10-15

## 2024-10-15 NOTE — TELEPHONE ENCOUNTER
Patient called stated she would like to complete the echo and CTA angio in [place], not [place],  and would like someone to call her to schedule them.     Sent to Diagnostic scheduling pool

## 2024-10-24 ENCOUNTER — TELEPHONE (OUTPATIENT)
Dept: INTERVENTIONAL RADIOLOGY/VASCULAR | Facility: HOSPITAL | Age: 79
End: 2024-10-24

## 2024-10-24 NOTE — TELEPHONE ENCOUNTER
Post bone biopsy. Patient said she was sore. She had a little blood on band aid . She called her provider . They cleaned it with peroxide and 3 new band aides.  Explained that sites should be CDI and band aids could be  removed . If drainage, bleeding . Redness, or infection to call us or reach out to her provider. Explained not to submerge into a bath tub, hot tub until sites healed fullu. She may take a shower.

## 2024-10-25 ENCOUNTER — HOSPITAL ENCOUNTER (OUTPATIENT)
Dept: CARDIOLOGY | Facility: HOSPITAL | Age: 79
Discharge: HOME | End: 2024-10-25
Attending: INTERNAL MEDICINE | Admitting: INTERNAL MEDICINE
Payer: COMMERCIAL

## 2024-10-25 DIAGNOSIS — R07.9 CHEST PAIN, UNSPECIFIED TYPE: ICD-10-CM

## 2024-10-25 DIAGNOSIS — I31.9 CHRONIC PERICARDITIS, UNSPECIFIED COMPLICATION STATUS, UNSPECIFIED TYPE: ICD-10-CM

## 2024-10-25 DIAGNOSIS — I31.39 PERICARDIAL EFFUSION: ICD-10-CM

## 2024-10-25 LAB — LVEF ECHO: NORMAL

## 2024-10-25 PROCEDURE — 93306 TTE W/DOPPLER COMPLETE: CPT

## 2024-10-25 PROCEDURE — 93306 TTE W/DOPPLER COMPLETE: CPT | Mod: 26 | Performed by: INTERNAL MEDICINE

## 2024-10-28 ENCOUNTER — TELEPHONE (OUTPATIENT)
Dept: INTERVENTIONAL RADIOLOGY/VASCULAR | Facility: HOSPITAL | Age: 79
End: 2024-10-28

## 2024-10-28 RX ORDER — METOPROLOL TARTRATE 25 MG/1
25-100 TABLET, FILM COATED ORAL
Status: CANCELLED | OUTPATIENT
Start: 2024-10-28

## 2024-10-28 RX ORDER — NITROGLYCERIN 0.4 MG/1
0.4 TABLET SUBLINGUAL
Status: CANCELLED | OUTPATIENT
Start: 2024-10-28

## 2024-10-28 RX ORDER — ONDANSETRON 2 MG/ML
4 INJECTION INTRAMUSCULAR; INTRAVENOUS
Status: CANCELLED | OUTPATIENT
Start: 2024-10-28

## 2024-10-28 RX ORDER — METOPROLOL TARTRATE 1 MG/ML
5-15 INJECTION, SOLUTION INTRAVENOUS
Status: CANCELLED | OUTPATIENT
Start: 2024-10-28

## 2024-10-28 RX ORDER — LIDOCAINE 40 MG/G
CREAM TOPICAL
Status: CANCELLED | OUTPATIENT
Start: 2024-10-28

## 2024-10-28 RX ORDER — DIPHENHYDRAMINE HCL 25 MG
25 CAPSULE ORAL
Status: CANCELLED | OUTPATIENT
Start: 2024-10-28

## 2024-10-28 RX ORDER — METHYLPREDNISOLONE SODIUM SUCCINATE 125 MG/2ML
125 INJECTION INTRAMUSCULAR; INTRAVENOUS
Status: CANCELLED | OUTPATIENT
Start: 2024-10-28

## 2024-10-28 RX ORDER — DIPHENHYDRAMINE HYDROCHLORIDE 50 MG/ML
25-50 INJECTION INTRAMUSCULAR; INTRAVENOUS
Status: CANCELLED | OUTPATIENT
Start: 2024-10-28

## 2024-10-29 ENCOUNTER — HOSPITAL ENCOUNTER (OUTPATIENT)
Facility: HOSPITAL | Age: 79
Discharge: HOME | End: 2024-10-29
Attending: RADIOLOGY | Admitting: RADIOLOGY
Payer: COMMERCIAL

## 2024-10-29 ENCOUNTER — HOSPITAL ENCOUNTER (OUTPATIENT)
Dept: CT IMAGING | Facility: HOSPITAL | Age: 79
Discharge: HOME | End: 2024-10-29
Attending: INTERNAL MEDICINE | Admitting: RADIOLOGY
Payer: COMMERCIAL

## 2024-10-29 VITALS
DIASTOLIC BLOOD PRESSURE: 65 MMHG | RESPIRATION RATE: 16 BRPM | HEART RATE: 62 BPM | OXYGEN SATURATION: 93 % | SYSTOLIC BLOOD PRESSURE: 129 MMHG

## 2024-10-29 DIAGNOSIS — R07.9 CHEST PAIN, UNSPECIFIED TYPE: ICD-10-CM

## 2024-10-29 LAB
CREAT BLD-MCNC: 1.1 MG/DL (ref 0.5–1)
EGFRCR SERPLBLD CKD-EPI 2021: 51 ML/MIN/1.73M2

## 2024-10-29 PROCEDURE — 75580 N-INVAS EST C FFR SW ALY CTA: CPT

## 2024-10-29 PROCEDURE — 250N000011 HC RX IP 250 OP 636: Performed by: RADIOLOGY

## 2024-10-29 PROCEDURE — 75574 CT ANGIO HRT W/3D IMAGE: CPT

## 2024-10-29 PROCEDURE — 82565 ASSAY OF CREATININE: CPT

## 2024-10-29 PROCEDURE — 250N000013 HC RX MED GY IP 250 OP 250 PS 637: Performed by: RADIOLOGY

## 2024-10-29 RX ORDER — LIDOCAINE 40 MG/G
CREAM TOPICAL
Status: DISCONTINUED | OUTPATIENT
Start: 2024-10-29 | End: 2024-10-30 | Stop reason: HOSPADM

## 2024-10-29 RX ORDER — NITROGLYCERIN 0.4 MG/1
0.4 TABLET SUBLINGUAL
Status: DISCONTINUED | OUTPATIENT
Start: 2024-10-29 | End: 2024-10-30 | Stop reason: HOSPADM

## 2024-10-29 RX ORDER — METHYLPREDNISOLONE SODIUM SUCCINATE 125 MG/2ML
125 INJECTION INTRAMUSCULAR; INTRAVENOUS
Status: DISCONTINUED | OUTPATIENT
Start: 2024-10-29 | End: 2024-10-30 | Stop reason: HOSPADM

## 2024-10-29 RX ORDER — DIPHENHYDRAMINE HCL 25 MG
25 CAPSULE ORAL
Status: DISCONTINUED | OUTPATIENT
Start: 2024-10-29 | End: 2024-10-30 | Stop reason: HOSPADM

## 2024-10-29 RX ORDER — DIPHENHYDRAMINE HYDROCHLORIDE 50 MG/ML
25-50 INJECTION INTRAMUSCULAR; INTRAVENOUS
Status: DISCONTINUED | OUTPATIENT
Start: 2024-10-29 | End: 2024-10-30 | Stop reason: HOSPADM

## 2024-10-29 RX ORDER — METOPROLOL TARTRATE 25 MG/1
25-100 TABLET, FILM COATED ORAL
Status: DISCONTINUED | OUTPATIENT
Start: 2024-10-29 | End: 2024-10-30 | Stop reason: HOSPADM

## 2024-10-29 RX ORDER — ONDANSETRON 2 MG/ML
4 INJECTION INTRAMUSCULAR; INTRAVENOUS
Status: DISCONTINUED | OUTPATIENT
Start: 2024-10-29 | End: 2024-10-30 | Stop reason: HOSPADM

## 2024-10-29 RX ORDER — METOPROLOL TARTRATE 1 MG/ML
5-15 INJECTION, SOLUTION INTRAVENOUS
Status: DISCONTINUED | OUTPATIENT
Start: 2024-10-29 | End: 2024-10-30 | Stop reason: HOSPADM

## 2024-10-29 RX ORDER — IOPAMIDOL 755 MG/ML
98 INJECTION, SOLUTION INTRAVASCULAR ONCE
Status: COMPLETED | OUTPATIENT
Start: 2024-10-29 | End: 2024-10-29

## 2024-10-29 RX ADMIN — NITROGLYCERIN 0.4 MG: 0.4 TABLET SUBLINGUAL at 09:19

## 2024-10-29 RX ADMIN — IOPAMIDOL 98 ML: 755 INJECTION, SOLUTION INTRAVENOUS at 09:28

## 2024-10-29 ASSESSMENT — ACTIVITIES OF DAILY LIVING (ADL)
ADLS_ACUITY_SCORE: 0
ADLS_ACUITY_SCORE: 0

## 2024-10-29 NOTE — PROGRESS NOTES
Procedure: CTA    # 18 IV started in Right AC. Vital signs stable. Heart rhythm Bradycardiac.      Position:  supine    Pain:  0    Nitroglycerin 0.4 mg SL given for procedure. Yes     Tolerated procedure well, no chest pains or SOB reported.     Condition: Stable    Comments: no questions or concerns.     Discharged to home.    RANDI YADAV RN

## 2024-10-29 NOTE — PROGRESS NOTES
Procedure explained to patient. Patient has been NPO since yesterday evening around 2200 and no caffeine since 2000 yesterday evening.  Pt denies chest pain or SOB.  HR 49-53.  IV established 18 Right AC, with iSTAT @ 1.1.

## 2024-10-31 ENCOUNTER — TELEPHONE (OUTPATIENT)
Dept: CARE COORDINATION | Facility: OTHER | Age: 79
End: 2024-10-31

## 2024-10-31 DIAGNOSIS — I25.10 CORONARY ARTERY DISEASE INVOLVING NATIVE CORONARY ARTERY OF NATIVE HEART WITHOUT ANGINA PECTORIS: ICD-10-CM

## 2024-10-31 DIAGNOSIS — R94.39 ABNORMAL FRACTIONAL FLOW RESERVE (FFR) ON CARDIAC CATHETERIZATION: ICD-10-CM

## 2024-10-31 DIAGNOSIS — R07.9 CHEST PAIN, UNSPECIFIED TYPE: Primary | ICD-10-CM

## 2024-10-31 DIAGNOSIS — R93.1 ELEVATED CORONARY ARTERY CALCIUM SCORE: ICD-10-CM

## 2024-10-31 RX ORDER — POTASSIUM CHLORIDE 1500 MG/1
20 TABLET, EXTENDED RELEASE ORAL
Status: CANCELLED | OUTPATIENT
Start: 2024-10-31

## 2024-10-31 RX ORDER — LIDOCAINE 40 MG/G
CREAM TOPICAL
Status: CANCELLED | OUTPATIENT
Start: 2024-10-31

## 2024-10-31 RX ORDER — SODIUM CHLORIDE 9 MG/ML
INJECTION, SOLUTION INTRAVENOUS CONTINUOUS
Status: CANCELLED | OUTPATIENT
Start: 2024-10-31

## 2024-10-31 RX ORDER — POTASSIUM CHLORIDE 1500 MG/1
40 TABLET, EXTENDED RELEASE ORAL
Status: CANCELLED | OUTPATIENT
Start: 2024-10-31

## 2024-11-01 ENCOUNTER — TELEPHONE (OUTPATIENT)
Dept: CARE COORDINATION | Facility: OTHER | Age: 79
End: 2024-11-01

## 2024-11-01 NOTE — TELEPHONE ENCOUNTER
"Telephone call to patient to discuss scheduling for an angiogram.    Per / Nico, pt to have COR Angiogram at [facility]. Reviewed allergies, labs, and medications.          -Pt will HOLD:  Eliquis and Jardiance    Angiogram teaching done using the \"Coronary Angiogram, Angioplasty and Stent Placement\" patient guide provided by the [facility].  Packet sent by mail, instructions reviewed during conversation, questions answered.  Pt verbalizes understanding. Now scheduled for  11/8/24 @ 7:00 a.m       Pt is agreeable to plan.   "

## 2024-11-01 NOTE — TELEPHONE ENCOUNTER
Spoke with cath lab and patient.  Angiogram scheduled for 11/8/24 @ 7 a.m. with a follow up after angiogram scheduled for 11/25/24 @ 8:30 a.m.

## 2024-11-01 NOTE — TELEPHONE ENCOUNTER
Telephone call to Berenice in cath lab scheduling.  Patient scheduled/confirmed for 11/8/24 @ 7 a.m.

## 2024-11-05 ENCOUNTER — TELEPHONE (OUTPATIENT)
Dept: CARDIOLOGY | Facility: OTHER | Age: 79
End: 2024-11-05

## 2024-11-05 ENCOUNTER — TELEPHONE (OUTPATIENT)
Dept: CARE COORDINATION | Facility: OTHER | Age: 79
End: 2024-11-05

## 2024-11-05 NOTE — TELEPHONE ENCOUNTER
Patient called under the impression someone in Cardiology needs to speak with her. I didn't see any notes in her chart outstanding.     Patient cancelled tomorrow's follow up with Nico. She is in [facility] for procedure on Friday.     Please call patient regarding procedure steps again in preparation for Friday.

## 2024-11-05 NOTE — TELEPHONE ENCOUNTER
Telephone call to patient to go over angio instructions again.  [place] before her instructions arrived in the mail.  Instrutions were read to patient.  Questions answered.   Patient verbalized understanding.

## 2024-11-05 NOTE — TELEPHONE ENCOUNTER
Tessie Mayorga LPN  You2 hours ago (9:21 AM)     JV  Can you please call her again with instructions for the Angio     Adelita Keller routed conversation to  Cardiology2 hours ago (9:19 AM

## 2024-11-08 ENCOUNTER — HOSPITAL ENCOUNTER (OUTPATIENT)
Facility: CLINIC | Age: 79
Discharge: HOME | End: 2024-11-08
Attending: INTERNAL MEDICINE | Admitting: INTERNAL MEDICINE
Payer: COMMERCIAL

## 2024-11-08 ENCOUNTER — APPOINTMENT (OUTPATIENT)
Dept: LAB | Facility: CLINIC | Age: 79
End: 2024-11-08
Attending: INTERNAL MEDICINE
Payer: COMMERCIAL

## 2024-11-08 ENCOUNTER — APPOINTMENT (OUTPATIENT)
Dept: MEDSURG UNIT | Facility: CLINIC | Age: 79
End: 2024-11-08
Attending: INTERNAL MEDICINE
Payer: COMMERCIAL

## 2024-11-08 VITALS
HEIGHT: 62 IN | HEART RATE: 49 BPM | BODY MASS INDEX: 31.47 KG/M2 | OXYGEN SATURATION: 98 % | RESPIRATION RATE: 17 BRPM | SYSTOLIC BLOOD PRESSURE: 115 MMHG | WEIGHT: 171 LBS | DIASTOLIC BLOOD PRESSURE: 82 MMHG

## 2024-11-08 DIAGNOSIS — R07.9 CHEST PAIN, UNSPECIFIED TYPE: ICD-10-CM

## 2024-11-08 DIAGNOSIS — R94.39 ABNORMAL FRACTIONAL FLOW RESERVE (FFR) ON CARDIAC CATHETERIZATION: ICD-10-CM

## 2024-11-08 DIAGNOSIS — I25.10 CORONARY ARTERY DISEASE INVOLVING NATIVE CORONARY ARTERY OF NATIVE HEART WITHOUT ANGINA PECTORIS: ICD-10-CM

## 2024-11-08 DIAGNOSIS — R93.1 ELEVATED CORONARY ARTERY CALCIUM SCORE: ICD-10-CM

## 2024-11-08 PROBLEM — Z98.890 STATUS POST CORONARY ANGIOGRAM: Status: ACTIVE | Noted: 2024-11-08

## 2024-11-08 LAB
ACT BLD: 413 SECONDS (ref 74–150)
ANION GAP SERPL CALCULATED.3IONS-SCNC: 12 MMOL/L (ref 7–15)
APTT PPP: 28 SECONDS (ref 22–38)
BUN SERPL-MCNC: 14.2 MG/DL (ref 8–23)
CALCIUM SERPL-MCNC: 9.2 MG/DL (ref 8.8–10.4)
CHLORIDE SERPL-SCNC: 104 MMOL/L (ref 98–107)
CHOLEST SERPL-MCNC: 136 MG/DL
CREAT SERPL-MCNC: 1.1 MG/DL (ref 0.51–0.95)
EGFRCR SERPLBLD CKD-EPI 2021: 51 ML/MIN/1.73M2
ERYTHROCYTE [DISTWIDTH] IN BLOOD BY AUTOMATED COUNT: 13.8 % (ref 10–15)
GLUCOSE SERPL-MCNC: 102 MG/DL (ref 70–99)
HCO3 SERPL-SCNC: 23 MMOL/L (ref 22–29)
HCT VFR BLD AUTO: 49.6 % (ref 35–47)
HDLC SERPL-MCNC: 51 MG/DL
HGB BLD-MCNC: 16.2 G/DL (ref 11.7–15.7)
INR PPP: 1.03 (ref 0.85–1.15)
LDLC SERPL CALC-MCNC: 50 MG/DL
MCH RBC QN AUTO: 27.9 PG (ref 26.5–33)
MCHC RBC AUTO-ENTMCNC: 32.7 G/DL (ref 31.5–36.5)
MCV RBC AUTO: 86 FL (ref 78–100)
NONHDLC SERPL-MCNC: 85 MG/DL
PLATELET # BLD AUTO: 283 10E3/UL (ref 150–450)
POTASSIUM SERPL-SCNC: 4.5 MMOL/L (ref 3.4–5.3)
RBC # BLD AUTO: 5.8 10E6/UL (ref 3.8–5.2)
SODIUM SERPL-SCNC: 139 MMOL/L (ref 135–145)
TRIGL SERPL-MCNC: 177 MG/DL
WBC # BLD AUTO: 12 10E3/UL (ref 4–11)

## 2024-11-08 PROCEDURE — 93005 ELECTROCARDIOGRAM TRACING: CPT

## 2024-11-08 PROCEDURE — C9600 PERC DRUG-EL COR STENT SING: HCPCS | Performed by: INTERNAL MEDICINE

## 2024-11-08 PROCEDURE — 85610 PROTHROMBIN TIME: CPT | Performed by: INTERNAL MEDICINE

## 2024-11-08 PROCEDURE — 99207 PR NO CHARGE LOS: CPT

## 2024-11-08 PROCEDURE — 93454 CORONARY ARTERY ANGIO S&I: CPT | Performed by: INTERNAL MEDICINE

## 2024-11-08 PROCEDURE — 82465 ASSAY BLD/SERUM CHOLESTEROL: CPT | Performed by: STUDENT IN AN ORGANIZED HEALTH CARE EDUCATION/TRAINING PROGRAM

## 2024-11-08 PROCEDURE — 999N000054 HC STATISTIC EKG NON-CHARGEABLE

## 2024-11-08 PROCEDURE — 250N000013 HC RX MED GY IP 250 OP 250 PS 637: Performed by: INTERNAL MEDICINE

## 2024-11-08 PROCEDURE — 272N000001 HC OR GENERAL SUPPLY STERILE: Performed by: INTERNAL MEDICINE

## 2024-11-08 PROCEDURE — C1894 INTRO/SHEATH, NON-LASER: HCPCS | Performed by: INTERNAL MEDICINE

## 2024-11-08 PROCEDURE — 99153 MOD SED SAME PHYS/QHP EA: CPT | Performed by: INTERNAL MEDICINE

## 2024-11-08 PROCEDURE — 92928 PRQ TCAT PLMT NTRAC ST 1 LES: CPT | Mod: LD | Performed by: INTERNAL MEDICINE

## 2024-11-08 PROCEDURE — 85347 COAGULATION TIME ACTIVATED: CPT

## 2024-11-08 PROCEDURE — 36415 COLL VENOUS BLD VENIPUNCTURE: CPT | Performed by: INTERNAL MEDICINE

## 2024-11-08 PROCEDURE — 258N000003 HC RX IP 258 OP 636: Performed by: INTERNAL MEDICINE

## 2024-11-08 PROCEDURE — 85730 THROMBOPLASTIN TIME PARTIAL: CPT | Performed by: INTERNAL MEDICINE

## 2024-11-08 PROCEDURE — C1887 CATHETER, GUIDING: HCPCS | Performed by: INTERNAL MEDICINE

## 2024-11-08 PROCEDURE — 85027 COMPLETE CBC AUTOMATED: CPT | Performed by: INTERNAL MEDICINE

## 2024-11-08 PROCEDURE — 250N000011 HC RX IP 250 OP 636: Performed by: INTERNAL MEDICINE

## 2024-11-08 PROCEDURE — 99152 MOD SED SAME PHYS/QHP 5/>YRS: CPT | Performed by: INTERNAL MEDICINE

## 2024-11-08 PROCEDURE — C1874 STENT, COATED/COV W/DEL SYS: HCPCS | Performed by: INTERNAL MEDICINE

## 2024-11-08 PROCEDURE — 250N000009 HC RX 250: Performed by: INTERNAL MEDICINE

## 2024-11-08 PROCEDURE — 999N000142 HC STATISTIC PROCEDURE PREP ONLY

## 2024-11-08 PROCEDURE — C1769 GUIDE WIRE: HCPCS | Performed by: INTERNAL MEDICINE

## 2024-11-08 PROCEDURE — 99152 MOD SED SAME PHYS/QHP 5/>YRS: CPT | Mod: GC | Performed by: INTERNAL MEDICINE

## 2024-11-08 PROCEDURE — 999N000134 HC STATISTIC PP CARE STAGE 3

## 2024-11-08 PROCEDURE — 80048 BASIC METABOLIC PNL TOTAL CA: CPT | Performed by: INTERNAL MEDICINE

## 2024-11-08 PROCEDURE — 93010 ELECTROCARDIOGRAM REPORT: CPT | Mod: XU | Performed by: INTERNAL MEDICINE

## 2024-11-08 PROCEDURE — 93454 CORONARY ARTERY ANGIO S&I: CPT | Mod: 26 | Performed by: INTERNAL MEDICINE

## 2024-11-08 PROCEDURE — C1725 CATH, TRANSLUMIN NON-LASER: HCPCS | Performed by: INTERNAL MEDICINE

## 2024-11-08 PROCEDURE — 82310 ASSAY OF CALCIUM: CPT | Performed by: INTERNAL MEDICINE

## 2024-11-08 DEVICE — STENT CORONARY DES SYNERGY XD MR US 2.75X16MM H7493941816270: Type: IMPLANTABLE DEVICE | Status: FUNCTIONAL

## 2024-11-08 RX ORDER — FENTANYL CITRATE 50 UG/ML
INJECTION, SOLUTION INTRAMUSCULAR; INTRAVENOUS
Status: DISCONTINUED | OUTPATIENT
Start: 2024-11-08 | End: 2024-11-08 | Stop reason: HOSPADM

## 2024-11-08 RX ORDER — ATROPINE SULFATE 0.1 MG/ML
0.5 INJECTION INTRAVENOUS
Status: DISCONTINUED | OUTPATIENT
Start: 2024-11-08 | End: 2024-11-08 | Stop reason: HOSPADM

## 2024-11-08 RX ORDER — FLUMAZENIL 0.1 MG/ML
0.2 INJECTION, SOLUTION INTRAVENOUS
Status: DISCONTINUED | OUTPATIENT
Start: 2024-11-08 | End: 2024-11-08 | Stop reason: HOSPADM

## 2024-11-08 RX ORDER — ONDANSETRON 2 MG/ML
4 INJECTION INTRAMUSCULAR; INTRAVENOUS EVERY 6 HOURS PRN
Status: DISCONTINUED | OUTPATIENT
Start: 2024-11-08 | End: 2024-11-08 | Stop reason: HOSPADM

## 2024-11-08 RX ORDER — POTASSIUM CHLORIDE 750 MG/1
20 TABLET, EXTENDED RELEASE ORAL
Status: DISCONTINUED | OUTPATIENT
Start: 2024-11-08 | End: 2024-11-08 | Stop reason: HOSPADM

## 2024-11-08 RX ORDER — ASPIRIN 81 MG/1
81 TABLET ORAL DAILY
Qty: 7 TABLET | Refills: 0 | Status: SHIPPED | OUTPATIENT
Start: 2024-11-09 | End: 2024-11-16

## 2024-11-08 RX ORDER — ONDANSETRON 2 MG/ML
INJECTION INTRAMUSCULAR; INTRAVENOUS
Status: DISCONTINUED | OUTPATIENT
Start: 2024-11-08 | End: 2024-11-08 | Stop reason: HOSPADM

## 2024-11-08 RX ORDER — HEPARIN SODIUM 200 [USP'U]/100ML
INJECTION, SOLUTION INTRAVENOUS
Status: DISCONTINUED | OUTPATIENT
Start: 2024-11-08 | End: 2024-11-08 | Stop reason: HOSPADM

## 2024-11-08 RX ORDER — ASPIRIN 325 MG
325 TABLET, DELAYED RELEASE (ENTERIC COATED) ORAL DAILY
COMMUNITY
End: 2024-11-20 | Stop reason: ALTCHOICE

## 2024-11-08 RX ORDER — ONDANSETRON 4 MG/1
4 TABLET, ORALLY DISINTEGRATING ORAL EVERY 6 HOURS PRN
Status: DISCONTINUED | OUTPATIENT
Start: 2024-11-08 | End: 2024-11-08 | Stop reason: HOSPADM

## 2024-11-08 RX ORDER — NALOXONE HYDROCHLORIDE 0.4 MG/ML
0.4 INJECTION, SOLUTION INTRAMUSCULAR; INTRAVENOUS; SUBCUTANEOUS
Status: DISCONTINUED | OUTPATIENT
Start: 2024-11-08 | End: 2024-11-08 | Stop reason: HOSPADM

## 2024-11-08 RX ORDER — SODIUM CHLORIDE 9 MG/ML
INJECTION, SOLUTION INTRAVENOUS CONTINUOUS
Status: ACTIVE | OUTPATIENT
Start: 2024-11-08 | End: 2024-11-08

## 2024-11-08 RX ORDER — ASPIRIN 81 MG/1
81 TABLET ORAL DAILY
Status: DISCONTINUED | OUTPATIENT
Start: 2024-11-09 | End: 2024-11-08 | Stop reason: HOSPADM

## 2024-11-08 RX ORDER — METOPROLOL TARTRATE 1 MG/ML
5 INJECTION, SOLUTION INTRAVENOUS
Status: DISCONTINUED | OUTPATIENT
Start: 2024-11-08 | End: 2024-11-08 | Stop reason: HOSPADM

## 2024-11-08 RX ORDER — OXYCODONE HYDROCHLORIDE 5 MG/1
5 TABLET ORAL EVERY 4 HOURS PRN
Status: DISCONTINUED | OUTPATIENT
Start: 2024-11-08 | End: 2024-11-08 | Stop reason: HOSPADM

## 2024-11-08 RX ORDER — FENTANYL CITRATE 50 UG/ML
25 INJECTION, SOLUTION INTRAMUSCULAR; INTRAVENOUS
Status: DISCONTINUED | OUTPATIENT
Start: 2024-11-08 | End: 2024-11-08 | Stop reason: HOSPADM

## 2024-11-08 RX ORDER — HYDRALAZINE HYDROCHLORIDE 20 MG/ML
10 INJECTION INTRAMUSCULAR; INTRAVENOUS EVERY 4 HOURS PRN
Status: DISCONTINUED | OUTPATIENT
Start: 2024-11-08 | End: 2024-11-08 | Stop reason: HOSPADM

## 2024-11-08 RX ORDER — DIPHENHYDRAMINE HYDROCHLORIDE 50 MG/ML
INJECTION INTRAMUSCULAR; INTRAVENOUS
Status: DISCONTINUED | OUTPATIENT
Start: 2024-11-08 | End: 2024-11-08 | Stop reason: HOSPADM

## 2024-11-08 RX ORDER — HEPARIN SODIUM 1000 [USP'U]/ML
INJECTION, SOLUTION INTRAVENOUS; SUBCUTANEOUS
Status: DISCONTINUED | OUTPATIENT
Start: 2024-11-08 | End: 2024-11-08 | Stop reason: HOSPADM

## 2024-11-08 RX ORDER — OXYCODONE HYDROCHLORIDE 10 MG/1
10 TABLET ORAL EVERY 4 HOURS PRN
Status: DISCONTINUED | OUTPATIENT
Start: 2024-11-08 | End: 2024-11-08 | Stop reason: HOSPADM

## 2024-11-08 RX ORDER — SODIUM CHLORIDE 9 MG/ML
INJECTION, SOLUTION INTRAVENOUS CONTINUOUS
Status: DISCONTINUED | OUTPATIENT
Start: 2024-11-08 | End: 2024-11-08 | Stop reason: HOSPADM

## 2024-11-08 RX ORDER — LIDOCAINE 40 MG/G
CREAM TOPICAL
Status: DISCONTINUED | OUTPATIENT
Start: 2024-11-08 | End: 2024-11-08 | Stop reason: HOSPADM

## 2024-11-08 RX ORDER — NALOXONE HYDROCHLORIDE 0.4 MG/ML
0.2 INJECTION, SOLUTION INTRAMUSCULAR; INTRAVENOUS; SUBCUTANEOUS
Status: DISCONTINUED | OUTPATIENT
Start: 2024-11-08 | End: 2024-11-08 | Stop reason: HOSPADM

## 2024-11-08 RX ORDER — NITROGLYCERIN 5 MG/ML
VIAL (ML) INTRAVENOUS
Status: DISCONTINUED | OUTPATIENT
Start: 2024-11-08 | End: 2024-11-08 | Stop reason: HOSPADM

## 2024-11-08 RX ORDER — POTASSIUM CHLORIDE 750 MG/1
40 TABLET, EXTENDED RELEASE ORAL
Status: DISCONTINUED | OUTPATIENT
Start: 2024-11-08 | End: 2024-11-08 | Stop reason: HOSPADM

## 2024-11-08 RX ORDER — ASPIRIN 81 MG/1
81 TABLET, CHEWABLE ORAL ONCE
Status: DISCONTINUED | OUTPATIENT
Start: 2024-11-08 | End: 2024-11-08 | Stop reason: HOSPADM

## 2024-11-08 RX ORDER — IOPAMIDOL 755 MG/ML
INJECTION, SOLUTION INTRAVASCULAR
Status: DISCONTINUED | OUTPATIENT
Start: 2024-11-08 | End: 2024-11-08 | Stop reason: HOSPADM

## 2024-11-08 RX ORDER — ACETAMINOPHEN 325 MG/1
650 TABLET ORAL EVERY 4 HOURS PRN
Status: DISCONTINUED | OUTPATIENT
Start: 2024-11-08 | End: 2024-11-08 | Stop reason: HOSPADM

## 2024-11-08 RX ORDER — NITROGLYCERIN 0.4 MG/1
0.4 TABLET SUBLINGUAL EVERY 5 MIN PRN
Status: DISCONTINUED | OUTPATIENT
Start: 2024-11-08 | End: 2024-11-08 | Stop reason: HOSPADM

## 2024-11-08 RX ADMIN — SODIUM CHLORIDE: 9 INJECTION, SOLUTION INTRAVENOUS at 07:58

## 2024-11-08 ASSESSMENT — ACTIVITIES OF DAILY LIVING (ADL)
ADLS_ACUITY_SCORE: 0

## 2024-11-08 NOTE — PROGRESS NOTES
Returned from cardiac cath lab s/p coronary angiogram with stent to LAD placed.  Nella continues in sinus bradycardia in the 40's as was prior to angiogram and throughout.  Denies pain or shortness of breath.  Right radial access site soft and intact with a TR band placed with 12cc of air.  Resting comfortably in bed.  Will continue to monitor.

## 2024-11-08 NOTE — H&P
"History and Physical: Cardiology Cath Lab Service    Nella Eli MRN# [id number]   YOB: 1945 Age: 79 year old         Assessment and plan:     # Coronary Artery Disease    - No contraindications noted, will move forward with procedure  - Patient will be admitted as OP to unit 3C post procedure, with plans to discharge home with her daughter after post procedure orders have been met    Patient discussed with Dr. Schultz.    Clinically Significant Risk Factors Present on Admission                # Drug Induced Coagulation Defect: home medication list includes an anticoagulant medication  # Drug Induced Platelet Defect: home medication list includes an antiplatelet medication   # Hypertension: Noted on problem list           # Obesity: Estimated body mass index is 31.28 kg/m  as calculated from the following:    Height as of this encounter: 1.575 m (5' 2\").    Weight as of this encounter: 77.6 kg (171 lb).               Mindi SAUL CNP  [facility] Cardiology        HPI:  Nella ELI is a 79 y.o. female with a history of pericarditis, CKD, hyperlipidemia, hypertension, papillary thyroid carcinoma s/p thyroidectomy, and Afib anticoagulated on Eliquis.    Recent ER visit for chest pain and subsequent outpatient CTA with 2 severe focal stenoses seen in the LAD. Presents today for coronary angiogram and possible PCI.    Patient reports feeling well today, denies recent illness, chest pain, shortness of breath, abdominal pain, headache, vision change, or weakness. No major changes in health history since previous visit.     Past Medical History:   Diagnosis Date    Anxiety state, unspecified 09/25/2003    Arthritis     Atypical chest pain 03/28/2018    Benign essential hypertension 09/14/2001    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, left 02/23/2016    Breast mass 2018    bx, benign    Cancer (H)     Carpal tunnel syndrome 07/02/2002    Chronic kidney disease, stage 3 (H) " 05/12/2021    CKD (chronic kidney disease) stage 2, GFR 60-89 ml/min 05/07/2019    Coronary artery disease involving native coronary artery of native heart without angina pectoris 10/31/2024    Endometrial hyperplasia 01/27/2003    Family history of malignant neoplasm of breast 11/13/2006    Fibromyalgia 05/21/2002    LA NENA (generalized anxiety disorder) 01/14/2020    GERD 09/28/2001    Hypertension     Lower extremity edema 11/13/2019    Metrorrhagia 10/22/2003    Nasal turbinate hypertrophy 06/07/2013    Nonallopathic lesion of thoracic region, not elsewhere classified 05/19/2003    Palpitations 04/11/2001    Papillary thyroid carcinoma (H) 01/14/2020     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    Precordial pain 04/05/2001    Sensorineural hearing loss (SNHL) of both ears 01/14/2020    Thyroid disease     Urgency of urination 06/06/2007       Past Surgical History:   Procedure Laterality Date    ADENOIDECTOMY      BIOPSY  2019    FNA left thyroid    BIOPSY BREAST Right 2019    benign    CHOLECYSTECTOMY  1981    COLONOSCOPY  2002    COLONOSCOPY  2012    COLONOSCOPY N/A 09/22/2014    Procedure: COLONOSCOPY;  Surgeon: Lavell Pavon DO;  Location: [facility] OR    COLONOSCOPY N/A 03/01/2021    Procedure: screening colonoscopy;  Surgeon: Sandro Dow MD;  Location: [facility] OR    CYSTOSCOPY  2007    Cystitis chronic    EYE SURGERY      right cataract    ORTHOPEDIC SURGERY  2002    carpal tunnel; RT, LT    THYROIDECTOMY Left 08/27/2019    Procedure: LEFT THYROID LOBECTOMY AND ISTHMUS WITH FROZENS;  Surgeon: Mildred Pineda MD;  Location: [facility] OR    TONSILLECTOMY         Current Facility-Administered Medications   Medication Dose Route Frequency Provider Last Rate Last Admin    HOLD: apixaban (ELIQUIS) 48 hours  pre-procedure   Does not apply HOLD Chris Walsh, DO        lidocaine (LMX4) cream   Topical Q1H PRN Chris Walsh, DO        lidocaine 1 % 0.1-1 mL  0.1-1 mL Other Q1H PRN Chris Walsh, DO        Patient is NOT allergic to contrast dye   Does not apply DOES NOT GO TO MAR Chris Walsh, DO        potassium chloride janell ER (KLOR-CON M10) CR tablet 20 mEq  20 mEq Oral Once PRN Chris Walsh, DO        potassium chloride janell ER (KLOR-CON M10) CR tablet 40 mEq  40 mEq Oral Once PRN Chris Walsh, DO        sodium chloride (PF) 0.9% PF flush 3 mL  3 mL Intracatheter Q8H Chris Walsh, DO        sodium chloride (PF) 0.9% PF flush 3 mL  3 mL Intracatheter Q1H PRN Chris Walsh, DO        sodium chloride (PF) 0.9% PF flush 3 mL  3 mL Intracatheter q1 min prn Chris Walsh, DO        sodium chloride 0.9 % infusion   Intravenous Continuous Chris Walsh,  mL/hr at 11/08/24 0758 New Bag at 11/08/24 0758       Family History   Problem Relation Age of Onset    Breast Cancer Mother         40's, unknown if 1 or both breasts    Alcohol/Drug Mother         Cirrhosis    Other - See Comments Mother         goiter    Cerebrovascular Disease Father     Circulatory Father     Cancer - colorectal Brother     Alcohol/Drug Son     Unknown/Adopted No family hx of     Asthma No family hx of     C.A.D. No family hx of     Diabetes No family hx of     Hypertension No family hx of     Prostate Cancer No family hx of     Allergies No family hx of     Alzheimer Disease No family hx of     Anesthesia Reaction No family hx of     Arthritis No family hx of     Blood Disease No family hx of     Cardiovascular No family hx of     Congenital Anomalies No family hx of     Connective Tissue Disorder No family hx of     Depression No family hx of     Endocrine Disease No family hx of     Eye Disorder No family hx of     Genetic Disorder No family hx of     Gastrointestinal Disease No family hx of  "    Genitourinary Problems No family hx of     Gynecology No family hx of     Heart Disease No family hx of     Lipids No family hx of     Musculoskeletal Disorder No family hx of     Neurologic Disorder No family hx of     Obesity No family hx of     Osteoporosis No family hx of     Psychotic Disorder No family hx of     Respiratory No family hx of     Thyroid Disease No family hx of     Family History Negative No family hx of     Hearing Loss No family hx of        Social History     Tobacco Use    Smoking status: Never    Smokeless tobacco: Never   Substance Use Topics    Alcohol use: Never       Allergies   Allergen Reactions    Atorvastatin     Ezetimibe     Gentamicin     Hydroxyzine     Lorazepam     Ranitidine     Simvastatin     Lopid [Gemfibrozil] GI Disturbance     Bloating, constipation,     No Clinical Screening - See Comments      anticholesterol medicine caused muscle aches    Pravastatin Muscle Pain (Myalgia)         ROS:   ROS negative except as listed in HPI    Physical Examination:  Vitals: /64 (BP Location: Right arm)   Pulse (!) 46   Resp 16   Ht 1.575 m (5' 2\")   Wt 77.6 kg (171 lb)   SpO2 95%   BMI 31.28 kg/m    BMI= Body mass index is 31.28 kg/m .    Constitutional: awake, alert, cooperative, NAD  HEENT: normocephalic, anicteric sclerae  Pulmonary: no increased work of breathing on room air, lungs clear to auscultation bilaterally without wheezes or rales   Cardiovascular: RRR, no murmur   GI: soft, non-tender, non-distended  Skin: warm, dry  Extrem: no peripheral edema bilaterally   Neuro: AAOx3, no gross focal neurologic deficits noted       Laboratory:  CMP  Recent Labs   Lab 11/08/24  0720      POTASSIUM 4.5   CHLORIDE 104   CO2 23   ANIONGAP 12   *   BUN 14.2   CR 1.10*   GFRESTIMATED 51*   THOMAS 9.2     CBC  Recent Labs   Lab 11/08/24  0720   WBC 12.0*   RBC 5.80*   HGB 16.2*   HCT 49.6*   MCV 86   MCH 27.9   MCHC 32.7   RDW 13.8          No results found " "for: \"TROPI\", \"TROPONIN\", \"TROPR\", \"TROPN\"      EKG 11/8/24:      TTE 10/25/24:  Global and regional left ventricular function is normal with an EF of 55-60%.  Global right ventricular function is normal.  No significant valvular abnormalities present.  Pulmonary hypertension is not present.  The inferior vena cava was normal in size with preserved respiratory  variability.  Compared with the prior study on 5/3/2023, there are no significant changes.    CTA 10/29/24:  Total calcium score of 1.8. The observed calcium score is at the 19th  percentile for subjects of the same age, gender, and race/ethnicity  who are free of clinical cardiovascular disease and treated diabetes.     There are 2 severe focal stenoses in the LAD as above, which are both  at sites of motion and are likely accentuated due to the motion. The  CT FFR measures 0.73 distal to the second stenoses, possibly  functionally significant.     There is a moderate stenoses in the proximal left circumflex measuring  64%. The CT FFR measuring 0.77 distal to the stenosis, possibly  functionally significant.     "

## 2024-11-08 NOTE — DISCHARGE INSTRUCTIONS
Going Home after a Coronary Angiogram with Stent Placement        After you go home:  Have an adult stay with you for 24 hours.  Drink plenty of fluids.  You may eat your normal diet, unless your doctor tells you otherwise.  For 24 hours:  - Relax and take it easy.  - Do NOT smoke.  - Do NOT make any important or legal decisions.  - Do NOT drive or operate machines at home or at work.  - Do NOT drink alcohol.    Remove the Band-Aid after 24 hours. If there is minor oozing, apply another Band-aid and remove it after 12 hours.  For 2 days, do NOT have sex or do any heavy exercise.  Do NOT take a bath, or use a hot tub or pool for at least 3 days. You may shower.  .  Care of wrist or arm site  It is normal to have soreness at the puncture site and mild tingling in your hand for up to 3 days.  For 2 days, do not use your hand or arm to support your weight (such as rising from a chair) or bend your wrist (such as lifting a garage door).  For 2 days, do not lift more than 5 pounds or exercise your arm (tennis, golf or bowling).      If you start bleeding from the site in your arm:  Sit down and press firmly on the site with your fingers for 10 minutes. Call your doctor as soon as you can.  If the bleeding stops, sit still and keep your wrist straight for 2 hours.  Medicines  You will continue taking aspirin daily for one week.  You will stop taking aspirin on 11/15/2024  You will continue to take your Eliquis daily  You will start taking Brilinta daily    Call your doctor if:  You have a large or growing hard lump around the site.    The site is red, swollen, hot or tender.  Blood or fluid is draining from the site.  You have chills or a fever greater than 101 F (38 C).  Your leg or arm turns bluish, feels numb or cool.  You have hives, a rash or unusual itching.  Call 911 right away if you have:   Bleeding that does not stop.   Heavy bleeding.  [facility] [place]:  [phone] (7 days a  week)

## 2024-11-08 NOTE — Clinical Note
The first balloon was inserted into the left anterior descending and proximal left anterior descending.Max pressure = 8 krystal. Total duration = 10 seconds.

## 2024-11-08 NOTE — PRE-PROCEDURE
"Consenting/Education for Cardiology Procedure: Possible percutaneous intervention and Coronary angiogram    Patient understands we would like to perform the listed procedure(s) due to chest pain and LAD lesions on CTA.    The patient understands the following:     The procedure was described to the patient in detail.    Moderate sedation is required for this procedure and the risks, benefits and alternatives to moderate sedation were discussed. Patient also understands risks and complications of the procedure which include but are not limited to bruising/swelling around the incision site, infection, bleeding, allergic reaction to local anesthetic, air embolism, arterial puncture, stroke, heart attack, need for emergency surgery, death.    Patient verbalized understanding of risks and benefits and has elected to proceed with the procedure or procedures listed above.    KG Faulkner CNP  Cardiology           Clinically Significant Risk Factors Present on Admission               # Drug Induced Coagulation Defect: home medication list includes an anticoagulant medication  # Drug Induced Platelet Defect: home medication list includes an antiplatelet medication   # Hypertension: Noted on problem list           # Obesity: Estimated body mass index is 31.28 kg/m  as calculated from the following:    Height as of this encounter: 1.575 m (5' 2\").    Weight as of this encounter: 77.6 kg (171 lb).             "

## 2024-11-08 NOTE — Clinical Note
Stent deployed in the proximal left anterior descending. Max pressure = 12 krystal. Total duration = 10 seconds.

## 2024-11-08 NOTE — PROGRESS NOTES
Pt arrived to 2A for coronary angiogram with possible PCI. VSS, denies pain. PIV infusing. Labs resulted. Consent signed. Pt prepped.   Rosemary, daughter, available for ride home.

## 2024-11-08 NOTE — PROGRESS NOTES
Tolerated bedrest and TR band deflation and removal without complications.  Right radial access site soft and intact.  Denies pain, numbness or tingling.  Tolerated food, fluids, ambulation and urination without problems.  Reviewed discharge instructions with Nella and her daughter Rosemary via phone.  Resting in bed.  Will discharge to home when her daughter arrives.

## 2024-11-08 NOTE — Clinical Note
The first balloon was inserted into the left anterior descending and proximal left anterior descending.Max pressure = 12 krystal. Total duration = 10 seconds.     Max pressure = 12 krystal. Total duration = 10 seconds.    Balloon reinflated a second time: Max pressure = 12 krystal. Total duration = 10 seconds.  Balloon reinflated a third time: Max pressure = 12 krystal. Total duration = 9 seconds.

## 2024-11-08 NOTE — PRE-PROCEDURE
GENERAL PRE-PROCEDURE:   Procedure:  Coronary angiogram with possible percutnaneous coronary intervention  Date/Time:  11/8/2024 8:25 AM    Verbal consent obtained?: Yes    Written consent obtained?: Yes    Risks and benefits: Risks, benefits and alternatives were discussed    DC Plan: Appropriate discharge home plan in place for patients who are going home after procedure   Consent given by:  Patient  Patient states understanding of procedure being performed: Yes    Patient's understanding of procedure matches consent: Yes    Procedure consent matches procedure scheduled: Yes    Expected level of sedation:  Moderate  Appropriately NPO:  Yes  ASA Class:  2  Mallampati  :  Grade 2- soft palate, base of uvula, tonsillar pillars, and portion of posterior pharyngeal wall visible  Lungs:  Lungs clear with good breath sounds bilaterally  Heart:  Normal heart sounds and rate  History & Physical reviewed:  History and physical reviewed and no updates needed  Statement of review:  I have reviewed the lab findings, diagnostic data, medications, and the plan for sedation

## 2024-11-09 LAB
ATRIAL RATE - MUSE: 47 BPM
DIASTOLIC BLOOD PRESSURE - MUSE: NORMAL MMHG
INTERPRETATION ECG - MUSE: NORMAL
P AXIS - MUSE: NORMAL DEGREES
PR INTERVAL - MUSE: 226 MS
QRS DURATION - MUSE: 84 MS
QT - MUSE: 484 MS
QTC - MUSE: 428 MS
R AXIS - MUSE: 70 DEGREES
SYSTOLIC BLOOD PRESSURE - MUSE: NORMAL MMHG
T AXIS - MUSE: 82 DEGREES
VENTRICULAR RATE- MUSE: 47 BPM

## 2024-11-10 NOTE — TELEPHONE ENCOUNTER
Can you please place an order for her colonoscopy.  She would like to have this done with Dr. Pavon in [place].     She will be canceling the one she has scheduled with Dr. Holguin in March.    Subjective   Chief Complaint   Patient presents with    GI Problem        Tracy Rodriguez is a 28 y.o. female.    History of Present Illness  The patient presents for evaluation of multiple medical concerns.    Two weeks ago, she experienced GI virus, which caused her to wake up during the night due to watery diarrhea. Although she felt nauseous, she did not vomit. The infection lasted for a week. Since then, she has been experiencing bloating and upper abdominal pain, which she suspects might be due to gastritis or gallbladder issues. She has been taking probiotics and omeprazole, which have provided some relief. However, she still experiences heartburn, belching, and a pressure-like sensation in her stomach after eating. She has lost some weight and has been maintaining a healthy diet. She does not have diarrhea but experiences pain when drinking water with meals. She is concerned about potential issues with her pancreas. She also experiences discomfort in the middle of her abdomen, which she suspects might be due to gastritis, gallbladder issues, or a hiatal hernia. She has been taking Benefiber and probiotics, which have helped with her bowel movements. She had a stomach virus on 10/22/2024, which lasted for six days. She also experienced dizziness, which was alleviated by taking electrolytes. She has not had a hiatal hernia before. She stopped taking semaglutide two months ago. It was helping with weight loss. It didn't cause any GI side effects or problems.         I have reviewed the following portions of the patient's history and confirmed they are accurate: allergies, current medications, past family history, past medical history, past social history, past surgical history, and problem list    Review of Systems  Pertinent items are noted in HPI.     Current Outpatient Medications on File Prior to Visit   Medication Sig    Ventolin  (90 Base) MCG/ACT inhaler INHALE 1 TO 2 PUFFS BY MOUTH EVERY 4 TO 6  "HOURS AS NEEDED FOR COUGH AND FOR WHEEZING    Vyvanse 40 MG capsule      No current facility-administered medications on file prior to visit.       Objective   Vitals:    11/04/24 1527   BP: 118/82   BP Location: Left arm   Patient Position: Sitting   Cuff Size: Adult   Pulse: 100   Temp: 98.2 °F (36.8 °C)   SpO2: 97%   Weight: 83.6 kg (184 lb 6.4 oz)   Height: 154.9 cm (60.98\")     Body mass index is 34.86 kg/m².    Physical Exam  Vitals reviewed.   Constitutional:       Appearance: Normal appearance. She is well-developed.   HENT:      Head: Normocephalic and atraumatic.      Nose: Nose normal.   Eyes:      General: Lids are normal.      Conjunctiva/sclera: Conjunctivae normal.      Pupils: Pupils are equal, round, and reactive to light.   Neck:      Thyroid: No thyromegaly.      Trachea: Trachea normal.   Cardiovascular:      Rate and Rhythm: Normal rate and regular rhythm.      Heart sounds: Normal heart sounds.   Pulmonary:      Effort: Pulmonary effort is normal. No respiratory distress.      Breath sounds: Normal breath sounds.   Abdominal:      Palpations: Abdomen is soft.      Tenderness: There is abdominal tenderness in the right upper quadrant.   Skin:     General: Skin is warm and dry.   Neurological:      Mental Status: She is alert and oriented to person, place, and time.      GCS: GCS eye subscore is 4. GCS verbal subscore is 5. GCS motor subscore is 6.   Psychiatric:         Attention and Perception: Attention normal.         Mood and Affect: Mood normal.         Speech: Speech normal.         Behavior: Behavior normal. Behavior is cooperative.         Thought Content: Thought content normal.         Results  Imaging  CT scan of abdomen showed normal pancreas and spleen, gallbladder appeared fine.    Lab Results   Component Value Date    CHOL 198 07/13/2024    TRIG 80 07/13/2024    HDL 46 07/13/2024     (H) 07/13/2024     Lab Results   Component Value Date    GLUCOSE 86 07/13/2024    BUN 8 " 07/13/2024    CREATININE 0.63 07/13/2024     07/13/2024    K 4.2 07/13/2024     07/13/2024    CALCIUM 9.6 07/13/2024    PROTEINTOT 7.3 07/13/2024    ALBUMIN 4.4 07/13/2024    ALT 22 07/13/2024    AST 14 07/13/2024    ALKPHOS 68 07/13/2024    BILITOT 0.3 07/13/2024    GLOB 2.9 07/13/2024    AGRATIO 1.5 07/13/2024    BCR 12.7 07/13/2024    ANIONGAP 9.6 07/13/2024    EGFR 124.9 07/13/2024         Assessment & Plan   Problem List Items Addressed This Visit    None  Visit Diagnoses       Acute gastritis without hemorrhage, unspecified gastritis type    -  Primary    Relevant Medications    omeprazole (priLOSEC) 40 MG capsule    famotidine (PEPCID) 20 MG tablet    Class 2 obesity without serious comorbidity with body mass index (BMI) of 35.0 to 35.9 in adult, unspecified obesity type        Relevant Medications    Semaglutide-Weight Management 0.25 MG/0.5ML solution auto-injector    Hypercholesteremia        Relevant Orders    Lipid Panel    Right lateral abdominal pain        Relevant Orders    CBC Auto Differential    Lipase    Right sided abdominal pain        Relevant Orders    US Abdomen Limited    CBC Auto Differential    Lipase    Screening for blood disease        Relevant Orders    CBC Auto Differential    Comprehensive Metabolic Panel    Lipid Panel    Hemoglobin A1c    TSH Rfx On Abnormal To Free T4    Thyroid disorder screening        Relevant Orders    TSH Rfx On Abnormal To Free T4    Lipid screening        Relevant Orders    Lipid Panel    Screening for diabetes mellitus        Relevant Orders    Hemoglobin A1c    Thyroid antibody positive        Relevant Orders    TSH Rfx On Abnormal To Free T4    High risk medication use        Relevant Orders    Hemoglobin A1c               Current Outpatient Medications:     Semaglutide-Weight Management 0.25 MG/0.5ML solution auto-injector, Inject 0.5 mL under the skin into the appropriate area as directed 1 (One) Time Per Week., Disp: 2 mL, Rfl: 1     Ventolin  (90 Base) MCG/ACT inhaler, INHALE 1 TO 2 PUFFS BY MOUTH EVERY 4 TO 6 HOURS AS NEEDED FOR COUGH AND FOR WHEEZING, Disp: , Rfl:     Vyvanse 40 MG capsule, , Disp: , Rfl:     famotidine (PEPCID) 20 MG tablet, Take 1 tablet by mouth twice daily OR take 2 tablets by mouth once daily as needed for heartburn, Disp: 60 tablet, Rfl: 5    omeprazole (priLOSEC) 40 MG capsule, Take 1 capsule by mouth Daily., Disp: 30 capsule, Rfl: 5    Assessment & Plan  Start Prilosec daily and Pepcid as needed for gastritis.  Ordering abdominal ultrasound to rule out problems with gallbladder.  Completing CBC, CMP, and lipase.  If has any worsening of diarrhea patient will follow-up for stool study.  Patient requesting to restart semaglutide for weight loss.  Will fill this prescription for patient but discussed to not start this until her GI symptoms have resolved.  Checking A1c.  She will follow low calorie diet and increase physical activity as tolerated.  Follow heart healthy low-cholesterol high-fiber diet.  Checking fasting lipid panel CMP.    Educated on possible side effects of GLP-1. Encourage health healthy, low calorie diet and increasing physical activity as tolerated. If patient has any significant nausea, vomiting, abdominal pain, or changes in bowel habits will discontinue and follow up.           Plan of care reviewed with the patient at the conclusion of today's visit.  Education was provided regarding diagnosis, management, and any prescribed or recommended OTC medications.  Patient verbalized understanding of and agreement with management plan.     Return in about 3 months (around 2/4/2025), or if symptoms worsen or fail to improve, for Follow-up.      Transcribed from ambient dictation for PRAMOD Castro by PRAMOD Castro.  11/10/24   00:46 EST    Patient or patient representative verbalized consent for the use of Ambient Listening during the visit with  Arabella Epstein  PRAMOD Matos for chart documentation. 11/10/2024  12:33 EST

## 2024-11-11 LAB
ATRIAL RATE - MUSE: 43 BPM
DIASTOLIC BLOOD PRESSURE - MUSE: NORMAL MMHG
INTERPRETATION ECG - MUSE: NORMAL
P AXIS - MUSE: 101 DEGREES
PR INTERVAL - MUSE: 212 MS
QRS DURATION - MUSE: 86 MS
QT - MUSE: 516 MS
QTC - MUSE: 436 MS
R AXIS - MUSE: 71 DEGREES
SYSTOLIC BLOOD PRESSURE - MUSE: NORMAL MMHG
T AXIS - MUSE: 75 DEGREES
VENTRICULAR RATE- MUSE: 43 BPM

## 2024-11-18 ENCOUNTER — TELEPHONE (OUTPATIENT)
Dept: CARE COORDINATION | Facility: OTHER | Age: 79
End: 2024-11-18

## 2024-11-18 NOTE — TELEPHONE ENCOUNTER
Patient called requesting to change her appt with Dr. Walsh to an earlier day/time, stating she would not be in town to be able to come to her previously scheduled appt.  Patient rescheduled for 11/20/24 @ 10:30.

## 2024-11-19 ENCOUNTER — TELEPHONE (OUTPATIENT)
Dept: CARDIAC REHAB | Facility: HOSPITAL | Age: 79
End: 2024-11-19

## 2024-11-19 NOTE — TELEPHONE ENCOUNTER
Pt politely declines cardiac rehab at this time and has been informed the referral is good for 1 year. Pt accepted department phone number [phone] ad will choose to call back if she changes her mind.

## 2024-11-19 NOTE — PROGRESS NOTES
[facility] HEART [facility]   CARDIOLOGY PROGRESS NOTE     Chief Complaint   Patient presents with    Follow Up          Diagnosis:  1.  Atrial fibrillation.   -Eliquis and atenolol.   2.  Hypertension-controlled.  3.  Hyperlipidemia-uncontrolled.  4.  Palpitations.  5.  CAD.   -Cardiac cath on 11/8/2024.    -LM 0%, LAD 80%, LCx 30%, and RCA irregularities.    -Stent to LAD.   -CTA coronaries on 10/29/2024.    -Calcium score of 1.8.  LAD 87% with FFR of 0.73 LCx 64% with FFR of 0.77.  6.  CKD-3.  7.  GERD.  8.  CHF-diastolic.   -Indeterminant on 10/25/2024  9.  Concern for ZACHARIAH.    -Referral for sleep study 11/13/2019.   -Declined sleep study on 2/27/20.  10.  CHADSVASC score of 3.  11.  Pericardial effusion.   -None on 5/3/23.  -Trace on 1/12/2023, [facility].    -Admitted to the hospital put on steroids/colchicine.      Assessment/Plan:    1.  CAD: Was seen in the ER 9/25/2024 for chest discomfort.  She was having left sided chest discomfort with radiating to her neck bilaterally.  Symptoms were affected by activity and laying down.  EKG with mild abnormality with ST changes in the inferior leads.  Troponin negative.  Was recommended for an outpatient coronary angiogram.  She underwent CTA coronaries on 10/29/2024.  Coronary calcium score was 1.8.  LAD had up to 87% blockage with FFR of 0.73.  LCx had up to 64% blockage with FFR of 0.77.  She underwent cardiac catheterization at the [facility] on 11/8/2024.  She had a 80% lesion to her LAD with stenting.  Circumflex was 30%, no stent placed.  Findings discussed.  Will continue oral Brilinta and Eliquis.  Will remove aspirin 325 from medication list and was told not to take any aspirin.  She will start cardiac rehab.  Will be given SL nitro as needed for chest pain.  2.  Pericardial effusion/pericarditis: Now resolved.  Had an echo on 5/3/2023 that shows resolution of this finding. Previously, had a trace pericardial fusion on 1/12/2023.  Patient is not  having concerns, denies chest discomfort.  Was treated with steroids, with taper, instead of NSAID's due to kidney dysfunction per [facility] records.  She also completed a 90-day supply of colchicine 90 days.  No changes.  3.  A-fib: Remains asymptomatic.  Currently on Eliquis and metoprolol.  No changes.  Briefly, discussed management/treatment for A-fib.  4.  Hyperlipidemia: She is allergic to most statins.  Also, given prescription for Zetia but was unable to tolerate.  Repatha sent to [place] to pharmacy and they do the prior authorization.  She is taking Repatha and tolerating it.  3.  ZACHARIAH: Suspicion but declining sleep study on 2020.  4.  Follow-up in 1 year or sooner with issues.      Interval history:  See above.          HPI:    She had been seen secondary to paroxysmal atrial fibrillation, hypertension, and atypical chest pain. She continues on Eliquis and metoprolol 50 mg XL daily.  Previously, she was on aspirin 325 mg's as well as Coumadin. Since initiating Eliquis and metoprolol, her palpitations have improved substantially.  Since last being seen, she has had x1 of brief palpitations.  She has had no episodes during the day.     She remains on losartan 100 mg daily, Norvasc 5 mg daily, and metoprolol 50 mg XL daily for hypertension. Her blood pressure is elevated.  She does check her blood pressure at home but is uncertain if her cuff is functioning.      Relevant testing:  CTA coronaries on 10/29/2024:  Left Main:                            0.0  Left anterior descendin.2  Left Circumflex:                  0.6  Right coronary artery:        0.0  Posterior descendin.0  TOTAL:                               1.8  Total calcium score of 1.8. The observed calcium score is at the 19th  percentile for subjects of the same age, gender, and race/ethnicity  who are free of clinical cardiovascular disease and treated diabetes.  There are 2 severe focal stenoses in the LAD as above, which  are both  at sites of motion and are likely accentuated due to the motion. The  CT FFR measures 0.73 distal to the second stenoses, possibly  functionally significant.  There is a moderate stenoses in the proximal left circumflex measuring  64%. The CT FFR measuring 0.77 distal to the stenosis, possibly  functionally significant.    Echocardiogram on 10/25/2024:  Global and regional left ventricular function is normal with an EF of 55-60%.  Left ventricular diastolic function is indeterminate.   Global right ventricular function is normal.  No significant valvular abnormalities present.  Pulmonary hypertension is not present.  The inferior vena cava was normal in size with preserved respiratory  variability.  Compared with the prior study on 5/3/2023, there are no significant changes.    Limited echo on 5/3/2023:  Global and regional left ventricular function is normal with an EF of 55-60%.  Right ventricular function, chamber size, wall motion, and thickness are  normal.  No pericardial effusion is present.    ECHO on 1/12/23:  1. Trace pericardial effusion. No tamponade or pretamponade physiology.   2. Quantified left ventricle ejection fraction is 64%.   3. The right ventricle is mildly enlarged with normal systolic function. Estimated Right Ventricular Systolic Pressure 38 mmHg. No septal flattening.   4. Trileaflet sclerotic aortic valve. No aortic stenosis or regurgitation.   5. Left atrium is mildly enlarged by volume.   6. No prior echo for comparison.     ECHO on 11/20/19:  No pericardial effusion is present.  Global and regional left ventricular function is normal with an EF of 60-65%.  The right ventricle is normal size.  Mild left atrial enlargement is present.  Mild mitral insufficiency is present.  Aortic valve is normal in structure and function.  The aortic valve is tricuspid.  Trace aortic insufficiency is present.  Right ventricular systolic pressure is 30 mmHg above the right atrial  pressure.  Mild tricuspid insufficiency is present.  The pulmonic valve is normal.  The aorta root is normal.      No diagnosis found.        Past Medical History:   Diagnosis Date    Anxiety state, unspecified 09/25/2003    Arthritis     Atypical chest pain 03/28/2018    Benign essential hypertension 09/14/2001    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, left 02/23/2016    Breast mass 2018    bx, benign    Cancer (H)     Carpal tunnel syndrome 07/02/2002    Chronic kidney disease, stage 3 (H) 05/12/2021    CKD (chronic kidney disease) stage 2, GFR 60-89 ml/min 05/07/2019    Coronary artery disease involving native coronary artery of native heart without angina pectoris 10/31/2024    Endometrial hyperplasia 01/27/2003    Family history of malignant neoplasm of breast 11/13/2006    Fibromyalgia 05/21/2002    LA NENA (generalized anxiety disorder) 01/14/2020    GERD 09/28/2001    Hypertension     Lower extremity edema 11/13/2019    Metrorrhagia 10/22/2003    Nasal turbinate hypertrophy 06/07/2013    Nonallopathic lesion of thoracic region, not elsewhere classified 05/19/2003    Palpitations 04/11/2001    Papillary thyroid carcinoma (H) 01/14/2020     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    Precordial pain 04/05/2001    Sensorineural hearing loss (SNHL) of both ears 01/14/2020    Thyroid disease     Urgency of urination 06/06/2007       Past Surgical History:   Procedure Laterality Date    ADENOIDECTOMY      BIOPSY  2019    FNA left thyroid    BIOPSY BREAST Right 2019    benign    CHOLECYSTECTOMY  1981    COLONOSCOPY  2002    COLONOSCOPY  2012    COLONOSCOPY N/A 09/22/2014    Procedure: COLONOSCOPY;  Surgeon: Lavell Pavon DO;  Location: [facility] OR    COLONOSCOPY N/A 03/01/2021    Procedure:  screening colonoscopy;  Surgeon: Sandro Dow MD;  Location: [facility] OR    CV CORONARY ANGIOGRAM N/A 11/8/2024    Procedure: Coronary Angiogram;  Surgeon: Holden Schultz MD;  Location:  HEART CARDIAC CATH LAB    CV PCI N/A 11/8/2024    Procedure: Percutaneous Coronary Intervention;  Surgeon: Holden Schultz MD;  Location:  HEART CARDIAC CATH LAB    CYSTOSCOPY  2007    Cystitis chronic    EYE SURGERY      right cataract    ORTHOPEDIC SURGERY  2002    carpal tunnel; RT, LT    THYROIDECTOMY Left 08/27/2019    Procedure: LEFT THYROID LOBECTOMY AND ISTHMUS WITH FROZENS;  Surgeon: Mildred Pineda MD;  Location: [facility] OR    TONSILLECTOMY         Allergies   Allergen Reactions    Atorvastatin     Ezetimibe     Gentamicin     Hydroxyzine     Lorazepam     Ranitidine     Simvastatin     Lopid [Gemfibrozil] GI Disturbance     Bloating, constipation,     No Clinical Screening - See Comments      anticholesterol medicine caused muscle aches    Pravastatin Muscle Pain (Myalgia)       Current Outpatient Medications   Medication Sig Dispense Refill    amLODIPine (NORVASC) 10 MG tablet Take 1 tablet (10 mg) by mouth daily for blood pressure 90 tablet 2    apixaban ANTICOAGULANT (ELIQUIS ANTICOAGULANT) 5 MG tablet Take 1 tablet (5 mg) by mouth 2 times daily. 180 tablet 3    atenolol (TENORMIN) 25 MG tablet TAKE 1 TABLET (25 MG) BY MOUTH DAILY STOP CARVEDILOL. 90 tablet 1    cholecalciferol 50 MCG (2000 UT) CAPS Take 1 capsule by mouth daily      colchicine (COLCYRS) 0.6 MG tablet TAKE 1 TABLET (0.6 MG) BY MOUTH DAILY 90 tablet 0    empagliflozin (JARDIANCE) 10 MG TABS tablet Take 1 tablet (10 mg) by mouth daily. 90 tablet 1    evolocumab (REPATHA SURECLICK) 140 MG/ML prefilled autoinjector Inject 1 mL (140 mg) subcutaneously every 14 days. 6 mL 3    gabapentin (NEURONTIN) 300 MG capsule Take one  capsule in the morning and afternoon and 2 at bedtime. 360 capsule 1    levothyroxine (SYNTHROID/LEVOTHROID) 112 MCG  tablet TAKE 1 TABLET DAILY FOR THYROID 90 tablet 2    multivitamin, therapeutic (THERA-VIT) TABS tablet Take 1 tablet by mouth daily      mupirocin (BACTROBAN) 2 % external ointment Apply topically 3 times daily 22 g 0    Omega-3 Fatty Acids (OMEGA-3 FISH OIL PO) Take 3 g by mouth daily       omeprazole (PRILOSEC) 40 MG DR capsule Take 1 capsule (40 mg) by mouth daily. 90 capsule 3    sertraline (ZOLOFT) 50 MG tablet Take 1 tablet (50 mg) by mouth daily. 90 tablet 0    solifenacin (VESICARE) 5 MG tablet TAKE 1 TABLET (5 MG) BY MOUTH DAILY 90 tablet 2    ticagrelor (BRILINTA) 90 MG tablet Take 1 tablet (90 mg) by mouth 2 times daily. Start this evening. 180 tablet 3    acetaminophen (GNP 8 HOUR PAIN RELIEVER) 650 MG CR tablet TAKE 1 TABLET (650 MG) BY MOUTH 3 TIMES DAILY AS NEEDED FOR MILD PAIN OR FEVER (Patient not taking: No sig reported) 50 tablet 3       Social History     Socioeconomic History    Marital status:      Spouse name: Not on file    Number of children: Not on file    Years of education: Not on file    Highest education level: Not on file   Occupational History    Occupation: manager     Employer: [employer]     Comment: part-time   Tobacco Use    Smoking status: Never    Smokeless tobacco: Never   Vaping Use    Vaping status: Never Used   Substance and Sexual Activity    Alcohol use: Never    Drug use: No    Sexual activity: Not Currently     Partners: Male     Birth control/protection: Post-menopausal   Other Topics Concern    Parent/sibling w/ CABG, MI or angioplasty before 65F 55M? No     Service Not Asked    Blood Transfusions Yes    Caffeine Concern No    Occupational Exposure Not Asked    Hobby Hazards Not Asked    Sleep Concern Not Asked    Stress Concern Not Asked    Weight Concern Not Asked    Special Diet Not Asked    Back Care Not Asked    Exercise Yes     Comment: walking daily    Bike Helmet Not Asked    Seat Belt Not Asked    Self-Exams Not Asked   Social History  Narrative    Not on file     Social Drivers of Health     Financial Resource Strain: Low Risk  (6/26/2024)    Financial Resource Strain     Within the past 12 months, have you or your family members you live with been unable to get utilities (heat, electricity) when it was really needed?: No   Food Insecurity: Low Risk  (6/26/2024)    Food Insecurity     Within the past 12 months, did you worry that your food would run out before you got money to buy more?: No     Within the past 12 months, did the food you bought just not last and you didn t have money to get more?: No   Transportation Needs: Low Risk  (6/26/2024)    Transportation Needs     Within the past 12 months, has lack of transportation kept you from medical appointments, getting your medicines, non-medical meetings or appointments, work, or from getting things that you need?: No   Physical Activity: Insufficiently Active (6/26/2024)    Exercise Vital Sign     Days of Exercise per Week: 4 days     Minutes of Exercise per Session: 20 min   Stress: No Stress Concern Present (6/26/2024)    [employer] [employer] - Occupational Stress Questionnaire     Feeling of Stress : Not at all   Social Connections: Unknown (6/26/2024)    Social Connection and Isolation Panel [NHANES]     Frequency of Communication with Friends and Family: Not on file     Frequency of Social Gatherings with Friends and Family: More than three times a week     Attends Yazdanism Services: Not on file     Active Member of Clubs or Organizations: Not on file     Attends Club or Organization Meetings: Not on file     Marital Status: Not on file   Interpersonal Safety: Low Risk  (11/8/2024)    Interpersonal Safety     Do you feel physically and emotionally safe where you currently live?: Yes     Within the past 12 months, have you been hit, slapped, kicked or otherwise physically hurt by someone?: No     Within the past 12 months, have you been humiliated or emotionally abused  "in other ways by your partner or ex-partner?: No   Housing Stability: Low Risk  (6/26/2024)    Housing Stability     Do you have housing? : Yes     Are you worried about losing your housing?: No       LAB RESULTS:   Office Visit on 01/17/2023   Component Date Value Ref Range Status    Sodium 01/17/2023 139  136 - 145 mmol/L Final    Potassium 01/17/2023 4.1  3.4 - 5.3 mmol/L Final    Chloride 01/17/2023 103  98 - 107 mmol/L Final    Carbon Dioxide (CO2) 01/17/2023 23  22 - 29 mmol/L Final    Anion Gap 01/17/2023 13  7 - 15 mmol/L Final    Urea Nitrogen 01/17/2023 18.4  8.0 - 23.0 mg/dL Final    Creatinine 01/17/2023 0.95  0.51 - 0.95 mg/dL Final    Calcium 01/17/2023 8.9  8.8 - 10.2 mg/dL Final    Glucose 01/17/2023 118 (H)  70 - 99 mg/dL Final    Alkaline Phosphatase 01/17/2023 120 (H)  35 - 104 U/L Final    AST 01/17/2023 62 (H)  10 - 35 U/L Final    ALT 01/17/2023 96 (H)  10 - 35 U/L Final    Protein Total 01/17/2023 7.1  6.4 - 8.3 g/dL Final    Albumin 01/17/2023 4.1  3.5 - 5.2 g/dL Final    Bilirubin Total 01/17/2023 0.4  <=1.2 mg/dL Final    GFR Estimate 01/17/2023 62  >60 mL/min/1.73m2 Final        Review of systems: Negative except that which was noted in the HPI.    Physical examination:  /54 (BP Location: Right arm, Patient Position: Sitting, Cuff Size: Adult Regular)   Pulse 63   Temp 96.9  F (36.1  C) (Tympanic)   Resp 16   Ht 1.575 m (5' 2\")   Wt 78.6 kg (173 lb 4.8 oz)   SpO2 96%   BMI 31.70 kg/m      GENERAL APPEARANCE: healthy, alert and no distress  CHEST: lungs clear to auscultation.  CARDIOVASCULAR: regular rhythm, normal S1 with physiologic split S2, no S3 or S4 and no murmur, click or rub  EXTREMITIES: no clubbing, cyanosis or edema.    Total time spent on day of visit, including review of tests, obtaining/reviewing separately obtained history, ordering medications/tests/procedures, communicating with PCP/consultants, and documenting in electronic medical record: 27 minutes. "                   Thank you for allowing me to participate in the care of your patient. Please do not hesitate to contact me if you have any questions.     Chris Walsh, DO

## 2024-11-20 ENCOUNTER — OFFICE VISIT (OUTPATIENT)
Dept: CARDIOLOGY | Facility: OTHER | Age: 79
End: 2024-11-20
Attending: INTERNAL MEDICINE
Payer: COMMERCIAL

## 2024-11-20 VITALS
WEIGHT: 173.3 LBS | SYSTOLIC BLOOD PRESSURE: 132 MMHG | RESPIRATION RATE: 16 BRPM | BODY MASS INDEX: 31.89 KG/M2 | TEMPERATURE: 96.9 F | HEIGHT: 62 IN | DIASTOLIC BLOOD PRESSURE: 54 MMHG | HEART RATE: 63 BPM | OXYGEN SATURATION: 96 %

## 2024-11-20 DIAGNOSIS — R07.9 CHEST PAIN, UNSPECIFIED TYPE: Primary | ICD-10-CM

## 2024-11-20 DIAGNOSIS — R60.0 LOWER EXTREMITY EDEMA: ICD-10-CM

## 2024-11-20 DIAGNOSIS — R93.1 ELEVATED CORONARY ARTERY CALCIUM SCORE: ICD-10-CM

## 2024-11-20 DIAGNOSIS — I48.0 PAROXYSMAL ATRIAL FIBRILLATION (H): Chronic | ICD-10-CM

## 2024-11-20 DIAGNOSIS — Z98.890 STATUS POST CORONARY ANGIOGRAM: ICD-10-CM

## 2024-11-20 DIAGNOSIS — N18.31 STAGE 3A CHRONIC KIDNEY DISEASE (H): ICD-10-CM

## 2024-11-20 DIAGNOSIS — I25.10 CORONARY ARTERY DISEASE INVOLVING NATIVE CORONARY ARTERY OF NATIVE HEART WITHOUT ANGINA PECTORIS: ICD-10-CM

## 2024-11-20 DIAGNOSIS — R94.39 ABNORMAL FRACTIONAL FLOW RESERVE (FFR) ON CARDIAC CATHETERIZATION: ICD-10-CM

## 2024-11-20 DIAGNOSIS — Z95.5 H/O HEART ARTERY STENT: ICD-10-CM

## 2024-11-20 DIAGNOSIS — I10 BENIGN ESSENTIAL HYPERTENSION: Chronic | ICD-10-CM

## 2024-11-20 RX ORDER — NITROGLYCERIN 0.4 MG/1
TABLET SUBLINGUAL
Qty: 25 TABLET | Refills: 3 | Status: SHIPPED | OUTPATIENT
Start: 2024-11-20

## 2024-11-20 ASSESSMENT — PAIN SCALES - GENERAL: PAINLEVEL_OUTOF10: NO PAIN (0)

## 2024-11-20 NOTE — PATIENT INSTRUCTIONS
Thank you for allowing Dr ALBERT Walsh and our  team to participate in your care. Please call our office at [phone] with scheduling questions or if you need to cancel or change your appointment. With any other questions or concerns you may call cardiology nurse at  [phone].       If you experience chest pain, chest pressure, chest tightness, shortness of breath, fainting, lightheadedness, nausea, vomiting, or other concerning symptoms, please report to the Emergency Department or call 911. These symptoms may be emergent, and best treated in the Emergency Department.

## 2024-12-05 NOTE — PROGRESS NOTES
Assessment & Plan     1. Hyperlipidemia LDL goal <70 (Primary)  Continue repatha    2. Hypertensive heart disease without heart failure  Continue amlodipine, atenolol and jardiance.     3. Paroxysmal atrial fibrillation (H)  Continue eliquis    4. Stage 3a chronic kidney disease (H)  Do not use NSAIDS    5. LA NENA (generalized anxiety disorder)  Continue zoloft    6. Hypothyroidism due to acquired atrophy of thyroid  Continue levothyroxine     7. Mixed incontinence urge and stress (male)(female)  Continue vesicare    8. GERD  Continue omeprazole.        Follow-up in 3 months or as needed       The longitudinal plan of care for the diagnosis(es)/condition(s) as documented were addressed during this visit. Due to the added complexity in care, I will continue to support Nella in the subsequent management and with ongoing continuity of care.    Randi Haddad,   Certified Adult Nurse Practitioner  [phone]      Nigel Carmen is a 79 year old, presenting for the following health issues:  Hypertension, Anxiety, and chronic  kidney disease         12/11/2024     8:41 AM   Additional Questions   Roomed by jack   Accompanied by none     HPI     She met with Dr Walsh in follow-up of cardiac stent placement.  Overall she is doing well.  Does have some questions about medications.  All questions were answered to the best of my ability.      Hyperlipidemia Follow-Up    Are you regularly taking any medication or supplement to lower your cholesterol?   Yes- Repatha injection every 14 days   Are you having muscle aches or other side effects that you think could be caused by your cholesterol lowering medication?  No       Hypertension Follow-up    Do you check your blood pressure regularly outside of the clinic? Yes   Are you following a low salt diet? Yes  Are your blood pressures ever more than 140 on the top number (systolic) OR more   than 90 on the bottom number (diastolic), for example 140/90? Yes at times      Atrial Fibrillation Follow-up    Symptoms: no recent chest pain, significant palpitations, dizziness/lightheadedness, dyspnea, or increased peripheral edema.  Stroke prevention: DOAC (Eliquis, Xarelto, Pradaxa)        11/8/2024     1:30 PM 11/8/2024     1:45 PM 11/8/2024     2:00 PM 11/20/2024    10:19 AM 12/11/2024     8:42 AM   Date   Pulse 49 47 49 63 51     Current [id number] Score: 5 points - A score of 5 or greater represents a 7.2 - 12.2% annual risk of major embolic event, without anti-coagulation or an LAAO device.     Vascular Disease Follow-up    How often do you take nitroglycerin? Never  Do you take an aspirin every day? No      Anxiety   How are you doing with your anxiety since your last visit? No change  Are you having other symptoms that might be associated with anxiety? No  Have you had a significant life event? No   Are you feeling depressed? No  Do you have any concerns with your use of alcohol or other drugs? No    Social History     Tobacco Use    Smoking status: Never    Smokeless tobacco: Never   Vaping Use    Vaping status: Never Used   Substance Use Topics    Alcohol use: Never    Drug use: No         6/26/2024    11:09 AM 9/11/2024     8:03 AM 12/11/2024     8:42 AM   LA NENA-7 SCORE   Total Score 3 (minimal anxiety) 2 (minimal anxiety) 3 (minimal anxiety)   Total Score 3 2 3        Patient-reported         6/26/2024    11:09 AM 9/11/2024     8:02 AM 12/11/2024     8:41 AM   PHQ   PHQ-9 Total Score 0 0 1    Q9: Thoughts of better off dead/self-harm past 2 weeks Not at all  Not at all  Not at all        Patient-reported         12/11/2024     8:41 AM   Last PHQ-9   1.  Little interest or pleasure in doing things 0    2.  Feeling down, depressed, or hopeless 1    3.  Trouble falling or staying asleep, or sleeping too much 0    4.  Feeling tired or having little energy 0    5.  Poor appetite or overeating 0    6.  Feeling bad about yourself 0    7.  Trouble concentrating 0    8.  Moving  slowly or restless 0    Q9: Thoughts of better off dead/self-harm past 2 weeks 0    PHQ-9 Total Score 1        Patient-reported         12/11/2024     8:42 AM   LA NENA-7    1. Feeling nervous, anxious, or on edge 0    2. Not being able to stop or control worrying 1    3. Worrying too much about different things 1    4. Trouble relaxing 0    5. Being so restless that it is hard to sit still 0    6. Becoming easily annoyed or irritable 0    7. Feeling afraid, as if something awful might happen 1    LA NENA-7 Total Score 3    If you checked any problems, how difficult have they made it for you to do your work, take care of things at home, or get along with other people? Not difficult at all        Patient-reported     Chronic Kidney Disease Follow-up    Do you take any over the counter pain medicine?: Yes  What over the counter medicine are you taking for your pain?:  Ibuprofen     How often do you take this medicine?:   just when needs maybe a couple times a month not very often         Recent Labs   Lab Test 11/08/24  0720 10/29/24  0816 09/11/24  0919 06/26/24  1144 03/13/24  0932 01/17/23  1203 12/01/22  1015 08/18/21  0939 05/12/21  1040 04/28/21  1017   A1C  --   --   --   --   --   --  5.6  --   --   --    LDL 50  --  45 115* 124*   < > 138*   < >  --  154*   HDL 51  --  57 49* 51   < > 42*   < >  --  47*   TRIG 177*  --  123 140 170*   < > 209*   < >  --  178*   ALT  --   --  27 26 22   < > 53*   < >  --  30   CR 1.10* 1.1* 1.08* 1.09* 0.96*   < > 0.99*   < > 1.19* 1.04   GFRESTIMATED 51* 51* 52* 52* 61   < > 59*   < > 45* 53*   GFRESTBLACK  --   --   --   --   --   --   --   --  52* 61   POTASSIUM 4.5  --  4.4 4.2 4.1   < > 4.2   < > 4.2 4.0   TSH  --   --  3.78 3.33 4.04   < > 3.32   < >  --  4.14*    < > = values in this interval not displayed.      BP Readings from Last 3 Encounters:   12/11/24 128/68   11/20/24 132/54   11/08/24 115/82    Wt Readings from Last 3 Encounters:   12/11/24 77.6 kg (171 lb 1.6 oz)  "  11/20/24 78.6 kg (173 lb 4.8 oz)   11/08/24 77.6 kg (171 lb)                        Review of Systems  Constitutional, neuro, ENT, endocrine, pulmonary, cardiac, gastrointestinal, genitourinary, musculoskeletal, integument and psychiatric systems are negative, except as otherwise noted.      Objective    /68 (BP Location: Left arm, Patient Position: Chair, Cuff Size: Adult Regular)   Pulse 51   Temp 96.8  F (36  C) (Tympanic)   Resp 16   Ht 1.575 m (5' 2\")   Wt 77.6 kg (171 lb 1.6 oz)   SpO2 98%   BMI 31.29 kg/m    Body mass index is 31.29 kg/m .  Physical Exam   GENERAL: alert and no distress  NECK: no adenopathy, no asymmetry, masses, or scars, thyroid normal to palpation, and no carotid bruits  RESP: lungs clear to auscultation - no rales, rhonchi or wheezes  CV: regular rate and rhythm, normal S1 S2, no S3 or S4, no murmur  MS: no gross musculoskeletal defects noted, no edema  PSYCH: mentation appears normal, affect normal/bright            Signed Electronically by: Randi Haddad NP    "

## 2024-12-10 DIAGNOSIS — M79.10 MYALGIA: ICD-10-CM

## 2024-12-10 DIAGNOSIS — M79.7 FIBROMYALGIA: ICD-10-CM

## 2024-12-10 RX ORDER — GABAPENTIN 300 MG/1
CAPSULE ORAL
Qty: 360 CAPSULE | Refills: 1 | Status: SHIPPED | OUTPATIENT
Start: 2024-12-10

## 2024-12-10 NOTE — TELEPHONE ENCOUNTER
Routing refill request to provider for review/approval because:  Drug not on the [facility], [facility] or [facility] refill protocol or controlled substance

## 2024-12-10 NOTE — TELEPHONE ENCOUNTER
Disp Refills Start End IAIN   gabapentin (NEURONTIN) 300 MG capsule 360 capsule 1 9/11/2024 -- No     Last Office Visit: 09/11/2024  Future Office visit:    Next 5 appointments (look out 90 days)      Dec 11, 2024 9:00 AM  (Arrive by 8:45 AM)  Provider Visit with Randi Haddad NP  [facility] ([facility] ) [address]  [facility] [address]  [phone]             Routing refill request to provider for review/approval because:

## 2024-12-11 ENCOUNTER — OFFICE VISIT (OUTPATIENT)
Dept: FAMILY MEDICINE | Facility: OTHER | Age: 79
End: 2024-12-11
Attending: NURSE PRACTITIONER
Payer: COMMERCIAL

## 2024-12-11 VITALS
HEIGHT: 62 IN | BODY MASS INDEX: 31.49 KG/M2 | TEMPERATURE: 96.8 F | WEIGHT: 171.1 LBS | RESPIRATION RATE: 16 BRPM | DIASTOLIC BLOOD PRESSURE: 68 MMHG | HEART RATE: 51 BPM | OXYGEN SATURATION: 98 % | SYSTOLIC BLOOD PRESSURE: 128 MMHG

## 2024-12-11 DIAGNOSIS — I11.9 HYPERTENSIVE HEART DISEASE WITHOUT HEART FAILURE: ICD-10-CM

## 2024-12-11 DIAGNOSIS — F41.1 GAD (GENERALIZED ANXIETY DISORDER): Chronic | ICD-10-CM

## 2024-12-11 DIAGNOSIS — N18.31 STAGE 3A CHRONIC KIDNEY DISEASE (H): ICD-10-CM

## 2024-12-11 DIAGNOSIS — N39.46 MIXED INCONTINENCE URGE AND STRESS (MALE)(FEMALE): ICD-10-CM

## 2024-12-11 DIAGNOSIS — E03.4 HYPOTHYROIDISM DUE TO ACQUIRED ATROPHY OF THYROID: ICD-10-CM

## 2024-12-11 DIAGNOSIS — K21.9 GASTROESOPHAGEAL REFLUX DISEASE WITHOUT ESOPHAGITIS: ICD-10-CM

## 2024-12-11 DIAGNOSIS — E78.5 HYPERLIPIDEMIA LDL GOAL <70: Primary | ICD-10-CM

## 2024-12-11 DIAGNOSIS — I48.0 PAROXYSMAL ATRIAL FIBRILLATION (H): Chronic | ICD-10-CM

## 2024-12-11 PROCEDURE — G0463 HOSPITAL OUTPT CLINIC VISIT: HCPCS

## 2024-12-11 ASSESSMENT — ANXIETY QUESTIONNAIRES
6. BECOMING EASILY ANNOYED OR IRRITABLE: NOT AT ALL
7. FEELING AFRAID AS IF SOMETHING AWFUL MIGHT HAPPEN: SEVERAL DAYS
IF YOU CHECKED OFF ANY PROBLEMS ON THIS QUESTIONNAIRE, HOW DIFFICULT HAVE THESE PROBLEMS MADE IT FOR YOU TO DO YOUR WORK, TAKE CARE OF THINGS AT HOME, OR GET ALONG WITH OTHER PEOPLE: NOT DIFFICULT AT ALL
3. WORRYING TOO MUCH ABOUT DIFFERENT THINGS: SEVERAL DAYS
8. IF YOU CHECKED OFF ANY PROBLEMS, HOW DIFFICULT HAVE THESE MADE IT FOR YOU TO DO YOUR WORK, TAKE CARE OF THINGS AT HOME, OR GET ALONG WITH OTHER PEOPLE?: NOT DIFFICULT AT ALL
1. FEELING NERVOUS, ANXIOUS, OR ON EDGE: NOT AT ALL
5. BEING SO RESTLESS THAT IT IS HARD TO SIT STILL: NOT AT ALL
GAD7 TOTAL SCORE: 3
2. NOT BEING ABLE TO STOP OR CONTROL WORRYING: SEVERAL DAYS
GAD7 TOTAL SCORE: 3
GAD7 TOTAL SCORE: 3
7. FEELING AFRAID AS IF SOMETHING AWFUL MIGHT HAPPEN: SEVERAL DAYS
4. TROUBLE RELAXING: NOT AT ALL

## 2024-12-11 ASSESSMENT — PATIENT HEALTH QUESTIONNAIRE - PHQ9
SUM OF ALL RESPONSES TO PHQ QUESTIONS 1-9: 1
10. IF YOU CHECKED OFF ANY PROBLEMS, HOW DIFFICULT HAVE THESE PROBLEMS MADE IT FOR YOU TO DO YOUR WORK, TAKE CARE OF THINGS AT HOME, OR GET ALONG WITH OTHER PEOPLE: NOT DIFFICULT AT ALL
SUM OF ALL RESPONSES TO PHQ QUESTIONS 1-9: 1

## 2024-12-11 ASSESSMENT — PAIN SCALES - GENERAL: PAINLEVEL_OUTOF10: NO PAIN (0)

## 2024-12-12 DIAGNOSIS — F41.1 GAD (GENERALIZED ANXIETY DISORDER): Chronic | ICD-10-CM

## 2024-12-18 DIAGNOSIS — N39.46 MIXED INCONTINENCE URGE AND STRESS (MALE)(FEMALE): ICD-10-CM

## 2024-12-18 DIAGNOSIS — I10 BENIGN ESSENTIAL HYPERTENSION: Chronic | ICD-10-CM

## 2024-12-19 RX ORDER — AMLODIPINE BESYLATE 10 MG/1
10 TABLET ORAL DAILY
Qty: 90 TABLET | Refills: 2 | Status: SHIPPED | OUTPATIENT
Start: 2024-12-19

## 2024-12-19 RX ORDER — SOLIFENACIN SUCCINATE 5 MG/1
5 TABLET, FILM COATED ORAL DAILY
Qty: 90 TABLET | Refills: 3 | Status: SHIPPED | OUTPATIENT
Start: 2024-12-19

## 2024-12-19 NOTE — TELEPHONE ENCOUNTER
AMLODIPINE 10MG TABLET 10 Tablet       Last Written Prescription Date:  3/13/24  Last Fill Quantity: 90,   # refills: 2  Last Office Visit: 12/11/24  Future Office visit:    Next 5 appointments (look out 90 days)      Mar 12, 2025 9:30 AM  (Arrive by 9:15 AM)  Provider Visit with Randi Haddad NP  [facility] [address]  [place] [address]  [phone]             Routing refill request to provider for review/approval because:  Calcium Channel Blockers Protocol  [id number]/18/2024 03:04 PM   Protocol Details GFR is on file in the past 12 months and most recent GFR is normal     GFR Estimate   Date Value Ref Range Status   11/08/2024 51 (L) >60 mL/min/1.73m2 Final     Comment:     eGFR calculated using 2021 CKD-EPI equation.   05/12/2021 45 (L) >60 mL/min/[1.73_m2] Final     Comment:     Non  GFR Calc  Starting 12/18/2018, serum creatinine based estimated GFR (eGFR) will be   calculated using the Chronic Kidney Disease Epidemiology Collaboration   (CKD-EPI) equation.       GFR, ESTIMATED POCT   Date Value Ref Range Status   10/29/2024 51 (L) >60 mL/min/1.73m2 Final       SOLIFENACIN 5MG TABLET 5 Tablet       Last Written Prescription Date:  3/31/24  Last Fill Quantity: 90,   # refills: 2  Last Office Visit: 12/11/24  Future Office visit:    Next 5 appointments (look out 90 days)      Mar 12, 2025 9:30 AM  (Arrive by 9:15 AM)  Provider Visit with Randi Haddad NP  [facility] [address]  [place] [address]  [phone]

## 2025-02-03 ENCOUNTER — TELEPHONE (OUTPATIENT)
Dept: CARDIOLOGY | Facility: OTHER | Age: 80
End: 2025-02-03

## 2025-02-03 NOTE — TELEPHONE ENCOUNTER
States when she had her stent placed, they had prescribed Brlinta for her she was out of medication and wanted to know if she should continue the medication.  Writer told her yes, to stay on her Brilinta.  Patient stated that must be why they put 3 refills on the bottle.  She also said it couldn't be refilled at her pharmacy because of the yellow number.   Writer instructed her to call her pharmacy, tell them she wants the Rx transferred from the [place] pharmacy at the , and they will do a pharmacy to pharmacy transfer for her.  She verbalized understanding.

## 2025-03-01 DIAGNOSIS — I48.0 PAROXYSMAL ATRIAL FIBRILLATION (H): Chronic | ICD-10-CM

## 2025-03-01 DIAGNOSIS — I10 BENIGN ESSENTIAL HYPERTENSION: Chronic | ICD-10-CM

## 2025-03-03 RX ORDER — ATENOLOL 25 MG/1
TABLET ORAL
Qty: 90 TABLET | Refills: 2 | Status: SHIPPED | OUTPATIENT
Start: 2025-03-03

## 2025-03-10 DIAGNOSIS — H91.93 DECREASED HEARING OF BOTH EARS: Primary | ICD-10-CM

## 2025-03-10 NOTE — PROGRESS NOTES
"  Assessment & Plan     1. Hyperlipidemia LDL goal <70 (Primary)  Continue fish oil  Cannot tolerate statins.   - Lipid Profile; Future    2. Hypertensive heart disease without heart failure  stable  - Comprehensive metabolic panel  - CBC with Platelets & Differential    3. Stage 3a chronic kidney disease (H)  Do not use NSAIDS    4. Hypothyroidism due to acquired atrophy of thyroid  Continue levothyroxine.   - TSH with free T4 reflex  - T4 free    5. LA NENA (generalized anxiety disorder)  stable    6. Paroxysmal atrial fibrillation (H)  Continue eliquis and brilinta    7. Mixed incontinence urge and stress (male)(female)  Continue vesicare     8. Breast cancer screening by mammogram  - MA Screen Bilateral w/Osito; Future    9. Fall, initial encounter  Fall - on anticoagulants.  Did not go to ED last night, will order head CT for today - appt at [facility] at 12:15 today.    - CT Head w/o Contrast; Future  - XR Ribs and Chest Left 3 Views - no fracture noted   - XR Hand Right G/E 3 Views (Clinic Performed); Future - artritic but no fracture noted.     10. Rib pain on left side  As above  - XR Ribs and Chest Left 3 Views    11. Hematoma of skin  As above  - CT Head w/o Contrast; Future    12. Pain of finger of right hand  As above  - XR Hand Right G/E 3 Views (Clinic Performed); Future    13. Coronary artery disease involving native coronary artery of native heart without angina pectoris  - Lipid Profile    14. Long term current use of anticoagulant therapy  - CT Head w/o Contrast; Future    15. Class 1 obesity due to excess calories with serious comorbidity and body mass index (BMI) of 30.0 to 30.9 in adult  Weight loss encouraged.       BMI  Estimated body mass index is 30.62 kg/m  as calculated from the following:    Height as of this encounter: 1.575 m (5' 2\").    Weight as of this encounter: 75.9 kg (167 lb 6.4 oz).   Weight management plan: Discussed healthy diet and exercise guidelines    Will notify of results as " they are returned  Follow-up in 3 months or as needed     The longitudinal plan of care for the diagnosis(es)/condition(s) as documented were addressed during this visit. Due to the added complexity in care, I will continue to support Nella in the subsequent management and with ongoing continuity of care.    Randi Haddad,   Certified Adult Nurse Practitioner  [phone]      Nigel Carmen is a 79 year old, presenting for the following health issues:  Hypertension, Lipids, Vascular Disease, and chronic kidney disease         3/12/2025     9:20 AM   Additional Questions   Roomed by jack   Accompanied by none     HPI      Hyperlipidemia Follow-Up    Are you regularly taking any medication or supplement to lower your cholesterol?   Yes- Repatha   Are you having muscle aches or other side effects that you think could be caused by your cholesterol lowering medication?  No    Hypertension Follow-up    Do you check your blood pressure regularly outside of the clinic? No   Are you following a low salt diet? Yes  Are your blood pressures ever more than 140 on the top number (systolic) OR more   than 90 on the bottom number (diastolic), for example 140/90? N/A    Vascular Disease Follow-up    How often do you take nitroglycerin? Never  Do you take an aspirin every day? No    Chronic Kidney Disease Follow-up    Do you take any over the counter pain medicine?: No    Concern - Fall   Onset: yesterday   Description:   Tripped on extension cord   Intensity: severe  Progression of Symptoms:  same  Accompanying Signs & Symptoms:  Bruising all over pain bilateral knees left side of back and head without loss of consciousness.  On anticoagulants, did not go to ED for evaluation.    Previous history of similar problem:   no  Precipitating factors:   Worsened by: unknown   Alleviating factors:  Improved by: nothing     Therapies Tried and outcome: nothing            Recent Labs   Lab Test 11/08/24  0720 10/29/24  0816  "09/11/24  0919 06/26/24  1144 03/13/24  0932 01/17/23  1203 12/01/22  1015 08/18/21  0939 05/12/21  1040 04/28/21  1017   A1C  --   --   --   --   --   --  5.6  --   --   --    LDL 50  --  45 115* 124*   < > 138*   < >  --  154*   HDL 51  --  57 49* 51   < > 42*   < >  --  47*   TRIG 177*  --  123 140 170*   < > 209*   < >  --  178*   ALT  --   --  27 26 22   < > 53*   < >  --  30   CR 1.10* 1.1* 1.08* 1.09* 0.96*   < > 0.99*   < > 1.19* 1.04   GFRESTIMATED 51* 51* 52* 52* 61   < > 59*   < > 45* 53*   GFRESTBLACK  --   --   --   --   --   --   --   --  52* 61   POTASSIUM 4.5  --  4.4 4.2 4.1   < > 4.2   < > 4.2 4.0   TSH  --   --  3.78 3.33 4.04   < > 3.32   < >  --  4.14*    < > = values in this interval not displayed.      BP Readings from Last 3 Encounters:   03/12/25 112/52   12/11/24 128/68   11/20/24 132/54    Wt Readings from Last 3 Encounters:   03/12/25 75.9 kg (167 lb 6.4 oz)   12/11/24 77.6 kg (171 lb 1.6 oz)   11/20/24 78.6 kg (173 lb 4.8 oz)               Review of Systems  Constitutional, neuro, ENT, endocrine, pulmonary, cardiac, gastrointestinal, genitourinary, musculoskeletal, integument and psychiatric systems are negative, except as otherwise noted.      Objective    /52 (BP Location: Left arm, Patient Position: Chair, Cuff Size: Adult Regular)   Pulse 52   Temp 97.8  F (36.6  C) (Tympanic)   Resp 18   Ht 1.575 m (5' 2\")   Wt 75.9 kg (167 lb 6.4 oz)   SpO2 98%   BMI 30.62 kg/m    Body mass index is 30.62 kg/m .  Physical Exam   GENERAL: alert and no distress  HENT: ear canals and TM's normal, nose and mouth without ulcers or lesions  NECK: no adenopathy, no asymmetry, masses, or scars  RESP: lungs clear to auscultation - no rales, rhonchi or wheezes  CV: regular rate and rhythm, normal S1 S2, no S3 or S4, no murmur, click or rub, no peripheral edema  MS: no gross musculoskeletal defects noted, no edema  SKIN:hematoma of forehead, ecchymosis over knees bilateral and left lateral chest. "    PSYCH: mentation appears normal, affect normal/bright    Results for orders placed or performed in visit on 03/12/25   XR Hand Right G/E 3 Views (Clinic Performed)     Status: None    Narrative    PROCEDURE:  XR HAND RIGHT G/E 3 VIEWS    HISTORY: Fall, initial encounter; Pain of finger of right hand    COMPARISON:  None.    TECHNIQUE:  3 views of the right hand were obtained.    FINDINGS:  There are advanced degenerative changes seen at the  scaphotrapezium scaphotrapezoid trapezium first metacarpal  articulations. Moderate degenerative arthritic changes are seen at the  interphalangeal joint of the thumb. There are moderate degenerative  changes seen at the proximal interphalangeal joints of the second  through fifth fingers. There are degenerative changes in the distal  interphalangeal joints of the second through fifth fingers. There are  no acute fractures or destructive lesions.       Impression    IMPRESSION: Advanced into arthritic changes in the hand. No acute  fracture.      FILI MONSON MD         SYSTEM ID:  [id number]   Results for orders placed or performed in visit on 03/12/25   XR Ribs and Chest Left 3 Views     Status: None    Narrative    PROCEDURE: XR RIBS AND CHEST LEFT 3 VIEWS 3/12/2025 10:30 AM    HISTORY: Fall, initial encounter; Rib pain on left side    COMPARISONS: 2021    TECHNIQUE: Chest one view, left RIBS 2 views    FINDINGS: Chest: The lungs are clear. The heart and pulmonary vessels  are within normal limits. There is no pneumothorax or pleural  effusion.    2 views of the left lower ribs are seen. There are no fractures or  destructive lesions noted.         Impression    IMPRESSION: The left lower rib fracture or destructive lesion    FILI MONSON MD         SYSTEM ID:  [id number]   Lipid Profile     Status: None   Result Value Ref Range    Cholesterol 108 <200 mg/dL    Triglycerides 112 <150 mg/dL    Direct Measure HDL 50 >=50 mg/dL    LDL Cholesterol Calculated 36 <100 mg/dL     Non HDL Cholesterol 58 <130 mg/dL    Patient Fasting > 8hrs? Yes     Narrative    Cholesterol  Desirable: < 200 mg/dL  Borderline High: 200 - 239 mg/dL  High: >= 240 mg/dL    Triglycerides  Normal: < 150 mg/dL  Borderline High: 150 - 199 mg/dL  High: 200-499 mg/dL  Very High: >= 500 mg/dL    Direct Measure HDL  Female: >= 50 mg/dL   Male: >= 40 mg/dL    LDL Cholesterol  Desirable: < 100 mg/dL  Above Desirable: 100 - 129 mg/dL   Borderline High: 130 - 159 mg/dL   High:  160 - 189 mg/dL   Very High: >= 190 mg/dL    Non HDL Cholesterol  Desirable: < 130 mg/dL  Above Desirable: 130 - 159 mg/dL  Borderline High: 160 - 189 mg/dL  High: 190 - 219 mg/dL  Very High: >= 220 mg/dL   Comprehensive metabolic panel     Status: Abnormal   Result Value Ref Range    Sodium 145 135 - 145 mmol/L    Potassium 3.9 3.4 - 5.3 mmol/L    Carbon Dioxide (CO2) 19 (L) 22 - 29 mmol/L    Anion Gap 16 (H) 7 - 15 mmol/L    Urea Nitrogen 16.2 8.0 - 23.0 mg/dL    Creatinine 0.96 (H) 0.51 - 0.95 mg/dL    GFR Estimate 60 (L) >60 mL/min/1.73m2    Calcium 8.9 8.8 - 10.4 mg/dL    Chloride 110 (H) 98 - 107 mmol/L    Glucose 105 (H) 70 - 99 mg/dL    Alkaline Phosphatase 132 40 - 150 U/L    AST 27 0 - 45 U/L    ALT 32 0 - 50 U/L    Protein Total 6.9 6.4 - 8.3 g/dL    Albumin 4.3 3.5 - 5.2 g/dL    Bilirubin Total 0.9 <=1.2 mg/dL    Patient Fasting > 8hrs? Yes    TSH with free T4 reflex     Status: Abnormal   Result Value Ref Range    TSH 5.71 (H) 0.30 - 4.20 uIU/mL   CBC with platelets and differential     Status: Abnormal   Result Value Ref Range    WBC Count 14.1 (H) 4.0 - 11.0 10e3/uL    RBC Count 5.55 (H) 3.80 - 5.20 10e6/uL    Hemoglobin 15.5 11.7 - 15.7 g/dL    Hematocrit 46.7 35.0 - 47.0 %    MCV 84 78 - 100 fL    MCH 27.9 26.5 - 33.0 pg    MCHC 33.2 31.5 - 36.5 g/dL    RDW 13.8 10.0 - 15.0 %    Platelet Count 295 150 - 450 10e3/uL    % Neutrophils 82 %    % Lymphocytes 11 %    % Monocytes 6 %    % Eosinophils 1 %    % Basophils 0 %    % Immature  Granulocytes 0 %    Absolute Neutrophils 11.5 (H) 1.6 - 8.3 10e3/uL    Absolute Lymphocytes 1.6 0.8 - 5.3 10e3/uL    Absolute Monocytes 0.8 0.0 - 1.3 10e3/uL    Absolute Eosinophils 0.1 0.0 - 0.7 10e3/uL    Absolute Basophils 0.0 0.0 - 0.2 10e3/uL    Absolute Immature Granulocytes 0.0 <=0.4 10e3/uL   Extra Tube     Status: None    Narrative    The following orders were created for panel order Extra Tube.  Procedure                               Abnormality         Status                     ---------                               -----------         ------                     Extra Serum Separator T...[[id number]]                      Final result                 Please view results for these tests on the individual orders.   Extra Serum Separator Tube (SST)     Status: None   Result Value Ref Range    Hold Specimen [facility]    T4 free     Status: Normal   Result Value Ref Range    Free T4 1.28 0.90 - 1.70 ng/dL   CBC with Platelets & Differential     Status: Abnormal    Narrative    The following orders were created for panel order CBC with Platelets & Differential.  Procedure                               Abnormality         Status                     ---------                               -----------         ------                     CBC with platelets and ...[[id number]]  Abnormal            Final result                 Please view results for these tests on the individual orders.             Signed Electronically by: Randi Haddad NP

## 2025-03-12 ENCOUNTER — TRANSFERRED RECORDS (OUTPATIENT)
Dept: HEALTH INFORMATION MANAGEMENT | Facility: CLINIC | Age: 80
End: 2025-03-12
Payer: COMMERCIAL

## 2025-03-12 ENCOUNTER — OFFICE VISIT (OUTPATIENT)
Dept: FAMILY MEDICINE | Facility: OTHER | Age: 80
End: 2025-03-12
Attending: NURSE PRACTITIONER
Payer: COMMERCIAL

## 2025-03-12 ENCOUNTER — ANCILLARY PROCEDURE (OUTPATIENT)
Dept: GENERAL RADIOLOGY | Facility: OTHER | Age: 80
End: 2025-03-12
Attending: NURSE PRACTITIONER
Payer: COMMERCIAL

## 2025-03-12 VITALS
HEIGHT: 62 IN | BODY MASS INDEX: 30.8 KG/M2 | HEART RATE: 52 BPM | SYSTOLIC BLOOD PRESSURE: 112 MMHG | RESPIRATION RATE: 18 BRPM | WEIGHT: 167.4 LBS | DIASTOLIC BLOOD PRESSURE: 52 MMHG | OXYGEN SATURATION: 98 % | TEMPERATURE: 97.8 F

## 2025-03-12 DIAGNOSIS — E03.4 HYPOTHYROIDISM DUE TO ACQUIRED ATROPHY OF THYROID: ICD-10-CM

## 2025-03-12 DIAGNOSIS — Z12.31 BREAST CANCER SCREENING BY MAMMOGRAM: ICD-10-CM

## 2025-03-12 DIAGNOSIS — Z79.01 LONG TERM CURRENT USE OF ANTICOAGULANT THERAPY: ICD-10-CM

## 2025-03-12 DIAGNOSIS — E66.09 CLASS 1 OBESITY DUE TO EXCESS CALORIES WITH SERIOUS COMORBIDITY AND BODY MASS INDEX (BMI) OF 30.0 TO 30.9 IN ADULT: ICD-10-CM

## 2025-03-12 DIAGNOSIS — N39.46 MIXED INCONTINENCE URGE AND STRESS (MALE)(FEMALE): ICD-10-CM

## 2025-03-12 DIAGNOSIS — E66.811 CLASS 1 OBESITY DUE TO EXCESS CALORIES WITH SERIOUS COMORBIDITY AND BODY MASS INDEX (BMI) OF 30.0 TO 30.9 IN ADULT: ICD-10-CM

## 2025-03-12 DIAGNOSIS — T14.8XXA HEMATOMA OF SKIN: ICD-10-CM

## 2025-03-12 DIAGNOSIS — E78.5 HYPERLIPIDEMIA LDL GOAL <70: Primary | ICD-10-CM

## 2025-03-12 DIAGNOSIS — R07.81 RIB PAIN ON LEFT SIDE: ICD-10-CM

## 2025-03-12 DIAGNOSIS — D72.829 LEUKOCYTOSIS, UNSPECIFIED TYPE: Primary | ICD-10-CM

## 2025-03-12 DIAGNOSIS — M79.644 PAIN OF FINGER OF RIGHT HAND: ICD-10-CM
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
## 2025-03-12 DIAGNOSIS — I48.0 PAROXYSMAL ATRIAL FIBRILLATION (H): ICD-10-CM

## 2025-03-12 DIAGNOSIS — I11.9 HYPERTENSIVE HEART DISEASE WITHOUT HEART FAILURE: ICD-10-CM

## 2025-03-12 DIAGNOSIS — F41.1 GAD (GENERALIZED ANXIETY DISORDER): ICD-10-CM

## 2025-03-12 DIAGNOSIS — N18.31 STAGE 3A CHRONIC KIDNEY DISEASE (H): ICD-10-CM

## 2025-03-12 DIAGNOSIS — I25.10 CORONARY ARTERY DISEASE INVOLVING NATIVE CORONARY ARTERY OF NATIVE HEART WITHOUT ANGINA PECTORIS: ICD-10-CM

## 2025-03-12 LAB
ALBUMIN SERPL BCG-MCNC: 4.3 G/DL (ref 3.5–5.2)
ALP SERPL-CCNC: 132 U/L (ref 40–150)
ALT SERPL W P-5'-P-CCNC: 32 U/L (ref 0–50)
ANION GAP SERPL CALCULATED.3IONS-SCNC: 16 MMOL/L (ref 7–15)
AST SERPL W P-5'-P-CCNC: 27 U/L (ref 0–45)
BASOPHILS # BLD AUTO: 0 10E3/UL (ref 0–0.2)
BASOPHILS NFR BLD AUTO: 0 %
BILIRUB SERPL-MCNC: 0.9 MG/DL
BUN SERPL-MCNC: 16.2 MG/DL (ref 8–23)
CALCIUM SERPL-MCNC: 8.9 MG/DL (ref 8.8–10.4)
CHLORIDE SERPL-SCNC: 110 MMOL/L (ref 98–107)
CHOLEST SERPL-MCNC: 108 MG/DL
CREAT SERPL-MCNC: 0.96 MG/DL (ref 0.51–0.95)
EGFRCR SERPLBLD CKD-EPI 2021: 60 ML/MIN/1.73M2
EOSINOPHIL # BLD AUTO: 0.1 10E3/UL (ref 0–0.7)
EOSINOPHIL NFR BLD AUTO: 1 %
ERYTHROCYTE [DISTWIDTH] IN BLOOD BY AUTOMATED COUNT: 13.8 % (ref 10–15)
FASTING STATUS PATIENT QL REPORTED: YES
FASTING STATUS PATIENT QL REPORTED: YES
GLUCOSE SERPL-MCNC: 105 MG/DL (ref 70–99)
HCO3 SERPL-SCNC: 19 MMOL/L (ref 22–29)
HCT VFR BLD AUTO: 46.7 % (ref 35–47)
HDLC SERPL-MCNC: 50 MG/DL
HGB BLD-MCNC: 15.5 G/DL (ref 11.7–15.7)
HOLD SPECIMEN: NORMAL
IMM GRANULOCYTES # BLD: 0 10E3/UL
IMM GRANULOCYTES NFR BLD: 0 %
LDLC SERPL CALC-MCNC: 36 MG/DL
LYMPHOCYTES # BLD AUTO: 1.6 10E3/UL (ref 0.8–5.3)
LYMPHOCYTES NFR BLD AUTO: 11 %
MCH RBC QN AUTO: 27.9 PG (ref 26.5–33)
MCHC RBC AUTO-ENTMCNC: 33.2 G/DL (ref 31.5–36.5)
MCV RBC AUTO: 84 FL (ref 78–100)
MONOCYTES # BLD AUTO: 0.8 10E3/UL (ref 0–1.3)
MONOCYTES NFR BLD AUTO: 6 %
NEUTROPHILS # BLD AUTO: 11.5 10E3/UL (ref 1.6–8.3)
NEUTROPHILS NFR BLD AUTO: 82 %
NONHDLC SERPL-MCNC: 58 MG/DL
PLATELET # BLD AUTO: 295 10E3/UL (ref 150–450)
POTASSIUM SERPL-SCNC: 3.9 MMOL/L (ref 3.4–5.3)
PROT SERPL-MCNC: 6.9 G/DL (ref 6.4–8.3)
RBC # BLD AUTO: 5.55 10E6/UL (ref 3.8–5.2)
SODIUM SERPL-SCNC: 145 MMOL/L (ref 135–145)
T4 FREE SERPL-MCNC: 1.28 NG/DL (ref 0.9–1.7)
TRIGL SERPL-MCNC: 112 MG/DL
TSH SERPL DL<=0.005 MIU/L-ACNC: 5.71 UIU/ML (ref 0.3–4.2)
WBC # BLD AUTO: 14.1 10E3/UL (ref 4–11)

## 2025-03-12 PROCEDURE — 80061 LIPID PANEL: CPT | Mod: ZL | Performed by: NURSE PRACTITIONER

## 2025-03-12 PROCEDURE — 82435 ASSAY OF BLOOD CHLORIDE: CPT | Mod: ZL | Performed by: NURSE PRACTITIONER

## 2025-03-12 PROCEDURE — G0463 HOSPITAL OUTPT CLINIC VISIT: HCPCS

## 2025-03-12 PROCEDURE — 71101 X-RAY EXAM UNILAT RIBS/CHEST: CPT | Mod: TC,LT,FY

## 2025-03-12 PROCEDURE — 84443 ASSAY THYROID STIM HORMONE: CPT | Mod: ZL | Performed by: NURSE PRACTITIONER

## 2025-03-12 PROCEDURE — 82465 ASSAY BLD/SERUM CHOLESTEROL: CPT | Mod: ZL | Performed by: NURSE PRACTITIONER

## 2025-03-12 PROCEDURE — 84155 ASSAY OF PROTEIN SERUM: CPT | Mod: ZL | Performed by: NURSE PRACTITIONER

## 2025-03-12 PROCEDURE — 36415 COLL VENOUS BLD VENIPUNCTURE: CPT | Mod: ZL | Performed by: NURSE PRACTITIONER

## 2025-03-12 PROCEDURE — 85025 COMPLETE CBC W/AUTO DIFF WBC: CPT | Mod: ZL | Performed by: NURSE PRACTITIONER

## 2025-03-12 PROCEDURE — 84439 ASSAY OF FREE THYROXINE: CPT | Mod: ZL | Performed by: NURSE PRACTITIONER

## 2025-03-12 PROCEDURE — 73130 X-RAY EXAM OF HAND: CPT | Mod: TC,RT,FY

## 2025-03-12 ASSESSMENT — PAIN SCALES - GENERAL: PAINLEVEL_OUTOF10: SEVERE PAIN (10)

## 2025-03-17 DIAGNOSIS — F41.1 GAD (GENERALIZED ANXIETY DISORDER): Chronic | ICD-10-CM

## 2025-03-17 NOTE — TELEPHONE ENCOUNTER
Sertraline 50 mg tab      Last Written Prescription Date:  12-12-24  Last Fill Quantity: 90,   # refills: 1  Last Office Visit: 3-12-25  Future Office visit:

## 2025-03-26 ENCOUNTER — TELEPHONE (OUTPATIENT)
Dept: FAMILY MEDICINE | Facility: OTHER | Age: 80
End: 2025-03-26

## 2025-03-26 NOTE — PROGRESS NOTES
Assessment & Plan     1. OME (otitis media with effusion), left (Primary)  Continue warm compress as needed   Fluids, rest  - amoxicillin (AMOXIL) 875 MG tablet; Take 1 tablet (875 mg) by mouth 2 times daily for 10 days.  Dispense: 20 tablet; Refill: 0  - ciprofloxacin-dexAMETHasone (CIPRODEX) 0.3-0.1 % otic suspension; Place 4 drops Into the left ear 2 times daily for 7 days.  Dispense: 7.5 mL; Refill: 0      Follow-up if no improvement or any worsening.       The longitudinal plan of care for the diagnosis(es)/condition(s) as documented were addressed during this visit. Due to the added complexity in care, I will continue to support Nella in the subsequent management and with ongoing continuity of care.    Randi Haddad,   Certified Adult Nurse Practitioner  [phone]      Subjective   Nella is a 79 year old, presenting for the following health issues:  Ear Problem    HPI      Concern - Left ear pain   Onset: 3 days  Description: plugged feeling and pain  Intensity: moderate  Progression of Symptoms:  same  Accompanying Signs & Symptoms: none  Previous history of similar problem: yes cyst in left ear  Precipitating factors:        Worsened by: nothing  Alleviating factors:        Improved by: hot water clothe   Therapies tried and outcome: Tylenol , hot water compress           Recent Labs   Lab Test 03/12/25  1012 11/08/24  0720 10/29/24  0816 09/11/24  0919 06/26/24  1144 01/17/23  1203 12/01/22  1015 08/18/21  0939 05/12/21  1040 04/28/21  1017   A1C  --   --   --   --   --   --  5.6  --   --   --    LDL 36 50  --  45 115*   < > 138*   < >  --  154*   HDL 50 51  --  57 49*   < > 42*   < >  --  47*   TRIG 112 177*  --  123 140   < > 209*   < >  --  178*   ALT 32  --   --  27 26   < > 53*   < >  --  30   CR 0.96* 1.10*   < > 1.08* 1.09*   < > 0.99*   < > 1.19* 1.04   GFRESTIMATED 60* 51*   < > 52* 52*   < > 59*   < > 45* 53*   GFRESTBLACK  --   --   --   --   --   --   --   --  52* 61  "  POTASSIUM 3.9 4.5  --  4.4 4.2   < > 4.2   < > 4.2 4.0   TSH 5.71*  --   --  3.78 3.33   < > 3.32   < >  --  4.14*    < > = values in this interval not displayed.      BP Readings from Last 3 Encounters:   03/27/25 116/62   03/12/25 112/52   12/11/24 128/68    Wt Readings from Last 3 Encounters:   03/27/25 77.1 kg (170 lb)   03/12/25 75.9 kg (167 lb 6.4 oz)   12/11/24 77.6 kg (171 lb 1.6 oz)               Review of Systems  Constitutional, neuro, ENT, endocrine, pulmonary, cardiac, gastrointestinal, genitourinary, musculoskeletal, integument and psychiatric systems are negative, except as otherwise noted.      Objective    /62 (BP Location: Left arm, Patient Position: Sitting, Cuff Size: Adult Regular)   Pulse 68   Temp 98.1  F (36.7  C) (Tympanic)   Resp 18   Ht 1.575 m (5' 2\")   Wt 77.1 kg (170 lb)   SpO2 98%   Breastfeeding No   BMI 31.09 kg/m    Body mass index is 31.09 kg/m .  Physical Exam   GENERAL: alert and no distress  HENT: normal cephalic/atraumatic, right ear: normal: no effusions, no erythema, normal landmarks, left ear: erythematous and bulging membrane, nasal mucosa edematous , rhinorrhea clear, and oropharynx mildly erythematous without exudate.   NECK: no adenopathy, no asymmetry, masses, or scars  RESP: lungs clear to auscultation - no rales, rhonchi or wheezes  CV: regular rate and rhythm, normal S1 S2, no S3 or S4, no murmur, click or rub, no peripheral edema  MS: no gross musculoskeletal defects noted, no edema  PSYCH: mentation appears normal, affect normal/bright            Signed Electronically by: Randi Haddad NP    "

## 2025-03-26 NOTE — TELEPHONE ENCOUNTER
12:14 PM    Reason for Call: Phone Call    Description: pt is experiencing ear pain for a couple of days not sure if NP Yani would like to see her, pt states has had a cyst in the ear in the past or wondering if pt needs to see ear doctor for this please call pt    Was an appointment offered for this call? No  If yes : Appointment type              Date    Preferred method for responding to this message: Telephone Call  What is your phone number ? [phone]    If we cannot reach you directly, may we leave a detailed response at the number you provided? Yes    Can this message wait until your PCP/provider returns, if available today? Nicole Sky

## 2025-03-27 ENCOUNTER — OFFICE VISIT (OUTPATIENT)
Dept: FAMILY MEDICINE | Facility: OTHER | Age: 80
End: 2025-03-27
Attending: NURSE PRACTITIONER
Payer: COMMERCIAL

## 2025-03-27 VITALS
HEIGHT: 62 IN | OXYGEN SATURATION: 98 % | HEART RATE: 68 BPM | TEMPERATURE: 98.1 F | WEIGHT: 170 LBS | SYSTOLIC BLOOD PRESSURE: 116 MMHG | RESPIRATION RATE: 18 BRPM | DIASTOLIC BLOOD PRESSURE: 62 MMHG | BODY MASS INDEX: 31.28 KG/M2

## 2025-03-27 DIAGNOSIS — H65.92 OME (OTITIS MEDIA WITH EFFUSION), LEFT: Primary | ICD-10-CM

## 2025-03-27 PROCEDURE — G0463 HOSPITAL OUTPT CLINIC VISIT: HCPCS

## 2025-03-27 RX ORDER — CIPROFLOXACIN AND DEXAMETHASONE 3; 1 MG/ML; MG/ML
4 SUSPENSION/ DROPS AURICULAR (OTIC) 2 TIMES DAILY
Qty: 7.5 ML | Refills: 0 | Status: SHIPPED | OUTPATIENT
Start: 2025-03-27 | End: 2025-04-03

## 2025-03-27 RX ORDER — AMOXICILLIN 875 MG/1
875 TABLET, COATED ORAL 2 TIMES DAILY
Qty: 20 TABLET | Refills: 0 | Status: SHIPPED | OUTPATIENT
Start: 2025-03-27 | End: 2025-04-06

## 2025-03-27 ASSESSMENT — PAIN SCALES - GENERAL: PAINLEVEL_OUTOF10: SEVERE PAIN (7)

## 2025-03-29 DIAGNOSIS — N18.31 STAGE 3A CHRONIC KIDNEY DISEASE (H): ICD-10-CM

## 2025-03-31 RX ORDER — EMPAGLIFLOZIN 10 MG/1
10 TABLET, FILM COATED ORAL DAILY
Qty: 90 TABLET | Refills: 1 | Status: SHIPPED | OUTPATIENT
Start: 2025-03-31

## 2025-03-31 NOTE — TELEPHONE ENCOUNTER
JARDIANCE 10 MG TABS 10 Tablet       Last Written Prescription Date:  9/11/24  Last Fill Quantity: 90,   # refills: 1  Last Office Visit: 3/27/25  Future Office visit:    Next 5 appointments (look out 90 days)      Jun 18, 2025 9:30 AM  (Arrive by 9:15 AM)  Provider Visit with Randi Haddad NP  [facility] ([facility] ) [address]  [facility] [address]  [phone]             Routing refill request to provider for review/approval because:  Hemoglobin A1C   Date Value Ref Range Status   12/01/2022 5.6 <5.7 % Final     Comment:     Normal <5.7%   Prediabetes 5.7-6.4%    Diabetes 6.5% or higher     Note: Adopted from ADA consensus guidelines.   '

## 2025-04-14 DIAGNOSIS — E03.4 HYPOTHYROIDISM DUE TO ACQUIRED ATROPHY OF THYROID: ICD-10-CM

## 2025-04-14 RX ORDER — LEVOTHYROXINE SODIUM 112 UG/1
TABLET ORAL
Qty: 90 TABLET | Refills: 3 | Status: SHIPPED | OUTPATIENT
Start: 2025-04-14

## 2025-04-14 NOTE — TELEPHONE ENCOUNTER
Thyroid Protocol [id number]/14/2025 09:39 AM   Protocol Details Normal TSH on file in past 12 months     TSH   Date Value Ref Range Status   03/12/2025 5.71 (H) 0.30 - 4.20 uIU/mL Final   08/29/2022 3.34 0.40 - 4.00 mU/L Final   04/28/2021 4.14 (H) 0.40 - 4.00 mU/L Final

## 2025-04-14 NOTE — TELEPHONE ENCOUNTER
Levothyroxine       Last Written Prescription Date:  7/16/2024  Last Fill Quantity: 90,   # refills: 2  Last Office Visit: 3/27/2025  Future Office visit:    Next 5 appointments (look out 90 days)      Jun 18, 2025 9:30 AM  (Arrive by 9:15 AM)  Provider Visit with Randi Haddad NP  [facility] ([facility] ) [address]  [facility] [address]  [phone]

## 2025-05-19 ENCOUNTER — OFFICE VISIT (OUTPATIENT)
Dept: AUDIOLOGY | Facility: OTHER | Age: 80
End: 2025-05-19
Attending: NURSE PRACTITIONER
Payer: COMMERCIAL

## 2025-05-19 ENCOUNTER — ALLIED HEALTH/NURSE VISIT (OUTPATIENT)
Dept: AUDIOLOGY | Facility: OTHER | Age: 80
End: 2025-05-19
Attending: AUDIOLOGIST
Payer: COMMERCIAL

## 2025-05-19 DIAGNOSIS — H91.93 DECREASED HEARING OF BOTH EARS: Primary | ICD-10-CM

## 2025-05-19 DIAGNOSIS — H90.3 SENSORINEURAL HEARING LOSS (SNHL) OF BOTH EARS: Primary | ICD-10-CM

## 2025-05-19 DIAGNOSIS — H91.93 DECREASED HEARING OF BOTH EARS: ICD-10-CM

## 2025-05-19 PROCEDURE — 92552 PURE TONE AUDIOMETRY AIR: CPT | Performed by: AUDIOLOGIST

## 2025-05-19 PROCEDURE — V5299 HEARING SERVICE: HCPCS

## 2025-05-19 PROCEDURE — 92556 SPEECH AUDIOMETRY COMPLETE: CPT | Performed by: AUDIOLOGIST

## 2025-05-19 NOTE — PROGRESS NOTES
HEARING AID CHECK    BACKGROUND:  Nella Lemon, a 79 year old female, was seen today for an hearing aid check.  Ms. Lemon wears Binaural Oticon More 2 miniRITE R.  Ms. Lemon reported no concerns.    FINDINGS:  Visual inspection and cleaning provided.   C-stop changed with new. 8 mm DV domes changed with new. Battery was tested and good. Good listening check.    Reviewed cleaning, troubleshooting, and preventative care with patient. Any cleaning tools used were provided as customer courtesy.    Services performed and warranty reviewed with patient.    PLAN:  Based on patient report, no audiology appointment for adjustments needed.Ms. Lemon will return to clinic in 12 months, sooner if needed. Patient had no further questions and reports satisfaction.         Natali More  Audiology Assistant  [facility]  [phone]

## 2025-05-19 NOTE — PROGRESS NOTES
Audiology Evaluation Completed. Please refer SCANNED AUDIOGRAM and/or TYMPANOGRAM for BACKGROUND, RESULTS, RECOMMENDATIONS.      Trini BIANCHI, [facility]  Audiologist #[id number]

## 2025-06-04 ENCOUNTER — PATIENT OUTREACH (OUTPATIENT)
Dept: CARE COORDINATION | Facility: CLINIC | Age: 80
End: 2025-06-04
Payer: COMMERCIAL

## 2025-06-06 ENCOUNTER — OFFICE VISIT (OUTPATIENT)
Dept: FAMILY MEDICINE | Facility: OTHER | Age: 80
End: 2025-06-06
Attending: NURSE PRACTITIONER
Payer: COMMERCIAL

## 2025-06-06 VITALS
OXYGEN SATURATION: 97 % | BODY MASS INDEX: 31.9 KG/M2 | WEIGHT: 174.4 LBS | RESPIRATION RATE: 20 BRPM | SYSTOLIC BLOOD PRESSURE: 116 MMHG | TEMPERATURE: 96.7 F | DIASTOLIC BLOOD PRESSURE: 62 MMHG | HEART RATE: 51 BPM

## 2025-06-06 DIAGNOSIS — J30.2 SEASONAL ALLERGIC RHINITIS, UNSPECIFIED TRIGGER: Primary | ICD-10-CM

## 2025-06-06 PROCEDURE — 1126F AMNT PAIN NOTED NONE PRSNT: CPT | Performed by: NURSE PRACTITIONER

## 2025-06-06 PROCEDURE — 3074F SYST BP LT 130 MM HG: CPT | Performed by: NURSE PRACTITIONER

## 2025-06-06 PROCEDURE — 99213 OFFICE O/P EST LOW 20 MIN: CPT | Performed by: NURSE PRACTITIONER

## 2025-06-06 PROCEDURE — G0463 HOSPITAL OUTPT CLINIC VISIT: HCPCS | Mod: 25

## 2025-06-06 PROCEDURE — 3078F DIAST BP <80 MM HG: CPT | Performed by: NURSE PRACTITIONER

## 2025-06-06 PROCEDURE — G0463 HOSPITAL OUTPT CLINIC VISIT: HCPCS

## 2025-06-06 ASSESSMENT — ANXIETY QUESTIONNAIRES
3. WORRYING TOO MUCH ABOUT DIFFERENT THINGS: SEVERAL DAYS
7. FEELING AFRAID AS IF SOMETHING AWFUL MIGHT HAPPEN: NOT AT ALL
6. BECOMING EASILY ANNOYED OR IRRITABLE: NOT AT ALL
5. BEING SO RESTLESS THAT IT IS HARD TO SIT STILL: NOT AT ALL
8. IF YOU CHECKED OFF ANY PROBLEMS, HOW DIFFICULT HAVE THESE MADE IT FOR YOU TO DO YOUR WORK, TAKE CARE OF THINGS AT HOME, OR GET ALONG WITH OTHER PEOPLE?: NOT DIFFICULT AT ALL
1. FEELING NERVOUS, ANXIOUS, OR ON EDGE: NOT AT ALL
2. NOT BEING ABLE TO STOP OR CONTROL WORRYING: SEVERAL DAYS
4. TROUBLE RELAXING: NOT AT ALL
IF YOU CHECKED OFF ANY PROBLEMS ON THIS QUESTIONNAIRE, HOW DIFFICULT HAVE THESE PROBLEMS MADE IT FOR YOU TO DO YOUR WORK, TAKE CARE OF THINGS AT HOME, OR GET ALONG WITH OTHER PEOPLE: NOT DIFFICULT AT ALL
GAD7 TOTAL SCORE: 2
7. FEELING AFRAID AS IF SOMETHING AWFUL MIGHT HAPPEN: NOT AT ALL

## 2025-06-06 ASSESSMENT — PAIN SCALES - GENERAL: PAINLEVEL_OUTOF10: NO PAIN (0)

## 2025-06-06 NOTE — PROGRESS NOTES
Assessment & Plan     1. Seasonal allergic rhinitis, unspecified trigger (Primary)  Symptom of cough that has improved and throat irritation.  No physical findings for concern of infection.  Education provided on recommended loratadine (CLARITIN) 10 MG tablet; Take 1 tablet (10 mg) by mouth daily and supportive care (lozenges and honey).           Subjective   Nella is a 79 year old, presenting for the following health issues:  Cough        6/6/2025    10:10 AM   Additional Questions   Roomed by Petty RAMIREZ RN   Accompanied by Grandson         6/6/2025    10:10 AM   Patient Reported Additional Medications   Patient reports taking the following new medications none           Acute Illness  Acute illness concerns: cough, sore throat   Onset/Duration: 3-4 days ago   Symptoms:  Fever: No  Chills/Sweats: No  Headache (location?): No  Sinus Pressure: No  Conjunctivitis:  No  Ear Pain: no  Rhinorrhea: No  Congestion: a little - mild  Sore Throat: YES  Cough: YES - dry  Wheeze: No  Decreased Appetite: No  Nausea: No  Vomiting: No  Diarrhea: No  Dysuria/Freq.: No  Dysuria or Hematuria: No  Fatigue/Achiness: No  Sick/Strep Exposure: YES- not to strep - grandson being treated with antibiotics for cough  Therapies tried and outcome: OTC cough/congestion medication     Nella presents to the clinic today for concerns of a cough that started over a week ago, which has now resolved, and a sore throat that just developed within the last few days.  She denies symptoms of: fever, body aches and nasal drainage.  She has note tried taking anything for symptoms, but states that they improved naturally with time.               Objective    /62 (BP Location: Left arm, Patient Position: Sitting, Cuff Size: Adult Regular)   Pulse 51   Temp (!) 96.7  F (35.9  C) (Tympanic)   Resp 20   Wt 79.1 kg (174 lb 6.4 oz)   SpO2 97%   BMI 31.90 kg/m    Body mass index is 31.9 kg/m .        Physical Exam   GENERAL: alert and no  distress  HENT: ear canals and TM's normal, nose and mouth without ulcers or lesions  NECK: no adenopathy, no asymmetry, masses, or scars  RESP: lungs clear to auscultation - no rales, rhonchi or wheezes  CV: regular rate and rhythm, normal S1 S2, no S3 or S4, no murmur, click or rub, no peripheral edema      Nurse Practitioner Student participated care of patient and documentation of the note with direct supervision by me on date of visit. I  attest that I have verified HPI and contributing factors and personally performed a physical exam.  Final medical decision making completed by me. Notes reviewed an approved by me, KG Mon, CNP.      Cruzito Jacob [facility]  Signed Electronically by: Ingrid Gonzalez, CNP

## 2025-06-06 NOTE — PATIENT INSTRUCTIONS
Try loratidine (claritin) or zyrtec to reduce your level of reaction to allergens in the environment.       To support your body's ability to stay well, The following are encouraged:   Fluids- Water or low-sugar sports type drink are good choices  Rest as much as you are able. Your body needs   If you need fever control- you may use acetaminophen and/or ibuprofen per instructions on the package unless you have been told by a provider not to take one of these medications.    Use a cool mist humidifier. Please follow manufacture's cleaning instructions.  You may try guaifenesin, loratadine, and/or fluticasone nasal spray per package instructions. Please read all active ingredients on all packages. Many contain multiple drugs and it is very easy to accidentally take the same active ingredient from multiple sources.   May use honey in if over the age of 12 months to soothe your throat. Either swallow plain or you may gargle.   Commercial, over the counter saline nasal washes or rinses have been proven effective for sinus congestion. Saline sprays may be helpful if you can not tolerate the full sinus rinse.     Go to the emergency department with persistent, worsening, or worrisome symptoms. Some worrisome symptoms include difficulty breathing, high fever that does not respond to medications, not urinating normally (at least 4 times per day), not tolerating fluids, or change in mental status (example: confusion).

## 2025-06-11 NOTE — PROGRESS NOTES
Assessment & Plan     1. Hyperlipidemia LDL goal <70 (Primary)  Continue repatha  - Lipid Profile; Future    2. Hypertensive heart disease without heart failure  Stable   - Comprehensive metabolic panel; Future  - CBC with platelets and differential; Future    3. Paroxysmal atrial fibrillation (H)  Continue eliquis     4. Hypothyroidism due to acquired atrophy of thyroid  Continue levothyroxine.   - TSH with free T4 reflex; Future    5. Stage 3a chronic kidney disease (H)  Stable     6. LA NENA (generalized anxiety disorder)  Continue zoloft     7. Mixed incontinence urge and stress (male)(female)  Continue vesicare     8. Class 1 obesity due to excess calories with serious comorbidity and body mass index (BMI) of 31.0 to 31.9 in adult  Weight loss encouraged     9. Chronic rhinitis  - cetirizine (ZYRTEC) 10 MG tablet; Take 1 tablet (10 mg) by mouth daily.  Dispense: 90 tablet; Refill: 1  - triamcinolone (NASACORT) 55 MCG/ACT nasal aerosol; Spray 2 sprays into both nostrils daily.  Dispense: 16.9 mL; Refill: 1      Follow-up   Return in about 3 months (around 9/18/2025) for hypertension and lipids, anxiety/depression, thyroid.    The longitudinal plan of care for the diagnosis(es)/condition(s) as documented were addressed during this visit. Due to the added complexity in care, I will continue to support Nella in the subsequent management and with ongoing continuity of care.    Randi Haddad,   Certified Adult Nurse Practitioner  [phone]      Nigel Carmen is a 79 year old, presenting for the following health issues:  Hypertension, Lipids, Vascular Disease, and chronic kidney disease         6/18/2025     9:17 AM   Additional Questions   Roomed by jack   Accompanied by livia     HPI      Hyperlipidemia Follow-Up    Are you regularly taking any medication or supplement to lower your cholesterol?   Yes- Repatha   Are you having muscle aches or other side effects that you think could be caused by  your cholesterol lowering medication?  No  Has been more tired than usual.      Hypertension Follow-up    Do you check your blood pressure regularly outside of the clinic? No   Are you following a low salt diet? Yes  Are your blood pressures ever more than 140 on the top number (systolic) OR more   than 90 on the bottom number (diastolic), for example 140/90? N/A    Recent Labs   Lab Test 03/12/25  1012 11/08/24  0720 10/29/24  0816 09/11/24  0919 06/26/24  1144 01/17/23  1203 12/01/22  1015 08/18/21  0939 05/12/21  1040 04/28/21  1017   A1C  --   --   --   --   --   --  5.6  --   --   --    LDL 36 50  --  45 115*   < > 138*   < >  --  154*   HDL 50 51  --  57 49*   < > 42*   < >  --  47*   TRIG 112 177*  --  123 140   < > 209*   < >  --  178*   ALT 32  --   --  27 26   < > 53*   < >  --  30   CR 0.96* 1.10*   < > 1.08* 1.09*   < > 0.99*   < > 1.19* 1.04   GFRESTIMATED 60* 51*   < > 52* 52*   < > 59*   < > 45* 53*   GFRESTBLACK  --   --   --   --   --   --   --   --  52* 61   POTASSIUM 3.9 4.5  --  4.4 4.2   < > 4.2   < > 4.2 4.0   TSH 5.71*  --   --  3.78 3.33   < > 3.32   < >  --  4.14*    < > = values in this interval not displayed.      BP Readings from Last 3 Encounters:   06/18/25 124/62   06/06/25 116/62   03/27/25 116/62    Wt Readings from Last 3 Encounters:   06/18/25 78.3 kg (172 lb 11.2 oz)   06/06/25 79.1 kg (174 lb 6.4 oz)   03/27/25 77.1 kg (170 lb)              Vascular Disease Follow-up    How often do you take nitroglycerin? Never  Do you take an aspirin every day? No    Chronic Kidney Disease Follow-up    Do you take any over the counter pain medicine?: Yes  What over the counter medicine are you taking for your pain?:  tylenol     How often do you take this medicine?:  not often         She is feeling well, no new concerns other than worsening allergies.  She is taking zyrtec, wonders other options.  Has not tried nasal spray.        Review of Systems  Constitutional, HEENT, cardiovascular,  "pulmonary, GI, , musculoskeletal, neuro, skin, endocrine and psych systems are negative, except as otherwise noted.      Objective    /62 (BP Location: Left arm, Patient Position: Chair, Cuff Size: Adult Regular)   Pulse 57   Temp 98.4  F (36.9  C) (Tympanic)   Resp 16   Ht 1.575 m (5' 2\")   Wt 78.3 kg (172 lb 11.2 oz)   SpO2 98%   BMI 31.59 kg/m    Body mass index is 31.59 kg/m .  Physical Exam   GENERAL: alert and no distress  NECK: no adenopathy, no asymmetry, masses, or scars, thyroid normal to palpation, and no carotid bruits  RESP: lungs clear to auscultation - no rales, rhonchi or wheezes  CV: regular rate and rhythm, normal S1 S2, no S3 or S4, no murmur  MS: no gross musculoskeletal defects noted, no edema  PSYCH: mentation appears normal, affect normal/bright            Signed Electronically by: Randi Haddad NP    "

## 2025-06-18 ENCOUNTER — OFFICE VISIT (OUTPATIENT)
Dept: FAMILY MEDICINE | Facility: OTHER | Age: 80
End: 2025-06-18
Attending: NURSE PRACTITIONER
Payer: COMMERCIAL

## 2025-06-18 VITALS
BODY MASS INDEX: 31.78 KG/M2 | WEIGHT: 172.7 LBS | TEMPERATURE: 98.4 F | HEART RATE: 57 BPM | SYSTOLIC BLOOD PRESSURE: 124 MMHG | RESPIRATION RATE: 16 BRPM | DIASTOLIC BLOOD PRESSURE: 62 MMHG | HEIGHT: 62 IN | OXYGEN SATURATION: 98 %

## 2025-06-18 DIAGNOSIS — E78.5 HYPERLIPIDEMIA LDL GOAL <70: Primary | ICD-10-CM

## 2025-06-18 DIAGNOSIS — N39.46 MIXED INCONTINENCE URGE AND STRESS (MALE)(FEMALE): ICD-10-CM

## 2025-06-18 DIAGNOSIS — I48.0 PAROXYSMAL ATRIAL FIBRILLATION (H): ICD-10-CM

## 2025-06-18 DIAGNOSIS — F41.1 GAD (GENERALIZED ANXIETY DISORDER): ICD-10-CM

## 2025-06-18 DIAGNOSIS — I11.9 HYPERTENSIVE HEART DISEASE WITHOUT HEART FAILURE: ICD-10-CM

## 2025-06-18 DIAGNOSIS — N18.31 STAGE 3A CHRONIC KIDNEY DISEASE (H): ICD-10-CM

## 2025-06-18 DIAGNOSIS — E66.811 CLASS 1 OBESITY DUE TO EXCESS CALORIES WITH SERIOUS COMORBIDITY AND BODY MASS INDEX (BMI) OF 31.0 TO 31.9 IN ADULT: ICD-10-CM

## 2025-06-18 DIAGNOSIS — E03.4 HYPOTHYROIDISM DUE TO ACQUIRED ATROPHY OF THYROID: ICD-10-CM

## 2025-06-18 DIAGNOSIS — E66.09 CLASS 1 OBESITY DUE TO EXCESS CALORIES WITH SERIOUS COMORBIDITY AND BODY MASS INDEX (BMI) OF 31.0 TO 31.9 IN ADULT: ICD-10-CM

## 2025-06-18 DIAGNOSIS — J31.0 CHRONIC RHINITIS: ICD-10-CM

## 2025-06-18 LAB
ALBUMIN SERPL BCG-MCNC: 4.2 G/DL (ref 3.5–5.2)
ALP SERPL-CCNC: 139 U/L (ref 40–150)
ALT SERPL W P-5'-P-CCNC: 28 U/L (ref 0–50)
ANION GAP SERPL CALCULATED.3IONS-SCNC: 12 MMOL/L (ref 7–15)
AST SERPL W P-5'-P-CCNC: 28 U/L (ref 0–45)
BASOPHILS # BLD AUTO: 0.1 10E3/UL (ref 0–0.2)
BASOPHILS NFR BLD AUTO: 0 %
BILIRUB SERPL-MCNC: 0.5 MG/DL
BUN SERPL-MCNC: 26 MG/DL (ref 8–23)
CALCIUM SERPL-MCNC: 9.3 MG/DL (ref 8.8–10.4)
CHLORIDE SERPL-SCNC: 108 MMOL/L (ref 98–107)
CHOLEST SERPL-MCNC: 130 MG/DL
CREAT SERPL-MCNC: 1.02 MG/DL (ref 0.51–0.95)
EGFRCR SERPLBLD CKD-EPI 2021: 56 ML/MIN/1.73M2
EOSINOPHIL # BLD AUTO: 0.2 10E3/UL (ref 0–0.7)
EOSINOPHIL NFR BLD AUTO: 1 %
ERYTHROCYTE [DISTWIDTH] IN BLOOD BY AUTOMATED COUNT: 13.1 % (ref 10–15)
FASTING STATUS PATIENT QL REPORTED: YES
FASTING STATUS PATIENT QL REPORTED: YES
GLUCOSE SERPL-MCNC: 106 MG/DL (ref 70–99)
HCO3 SERPL-SCNC: 21 MMOL/L (ref 22–29)
HCT VFR BLD AUTO: 46 % (ref 35–47)
HDLC SERPL-MCNC: 50 MG/DL
HGB BLD-MCNC: 15.6 G/DL (ref 11.7–15.7)
IMM GRANULOCYTES # BLD: 0 10E3/UL
IMM GRANULOCYTES NFR BLD: 0 %
LDLC SERPL CALC-MCNC: 47 MG/DL
LYMPHOCYTES # BLD AUTO: 2.1 10E3/UL (ref 0.8–5.3)
LYMPHOCYTES NFR BLD AUTO: 16 %
MCH RBC QN AUTO: 28.4 PG (ref 26.5–33)
MCHC RBC AUTO-ENTMCNC: 33.9 G/DL (ref 31.5–36.5)
MCV RBC AUTO: 84 FL (ref 78–100)
MONOCYTES # BLD AUTO: 0.8 10E3/UL (ref 0–1.3)
MONOCYTES NFR BLD AUTO: 6 %
NEUTROPHILS # BLD AUTO: 10.1 10E3/UL (ref 1.6–8.3)
NEUTROPHILS NFR BLD AUTO: 77 %
NONHDLC SERPL-MCNC: 80 MG/DL
PLATELET # BLD AUTO: 297 10E3/UL (ref 150–450)
POTASSIUM SERPL-SCNC: 4.7 MMOL/L (ref 3.4–5.3)
PROT SERPL-MCNC: 7.3 G/DL (ref 6.4–8.3)
RBC # BLD AUTO: 5.5 10E6/UL (ref 3.8–5.2)
SODIUM SERPL-SCNC: 141 MMOL/L (ref 135–145)
T4 FREE SERPL-MCNC: 1.31 NG/DL (ref 0.9–1.7)
TRIGL SERPL-MCNC: 163 MG/DL
TSH SERPL DL<=0.005 MIU/L-ACNC: 4.89 UIU/ML (ref 0.3–4.2)
WBC # BLD AUTO: 13.1 10E3/UL (ref 4–11)

## 2025-06-18 PROCEDURE — G0463 HOSPITAL OUTPT CLINIC VISIT: HCPCS

## 2025-06-18 PROCEDURE — 85004 AUTOMATED DIFF WBC COUNT: CPT | Mod: ZL | Performed by: NURSE PRACTITIONER

## 2025-06-18 PROCEDURE — 82310 ASSAY OF CALCIUM: CPT | Mod: ZL | Performed by: NURSE PRACTITIONER

## 2025-06-18 PROCEDURE — 82465 ASSAY BLD/SERUM CHOLESTEROL: CPT | Mod: ZL | Performed by: NURSE PRACTITIONER

## 2025-06-18 PROCEDURE — 84439 ASSAY OF FREE THYROXINE: CPT | Mod: ZL | Performed by: NURSE PRACTITIONER

## 2025-06-18 PROCEDURE — 84443 ASSAY THYROID STIM HORMONE: CPT | Mod: ZL | Performed by: NURSE PRACTITIONER

## 2025-06-18 PROCEDURE — 36415 COLL VENOUS BLD VENIPUNCTURE: CPT | Mod: ZL | Performed by: NURSE PRACTITIONER

## 2025-06-18 RX ORDER — TRIAMCINOLONE ACETONIDE 55 UG/1
2 SPRAY, METERED NASAL DAILY
Qty: 16.9 ML | Refills: 1 | Status: SHIPPED | OUTPATIENT
Start: 2025-06-18

## 2025-06-18 RX ORDER — CETIRIZINE HYDROCHLORIDE 10 MG/1
10 TABLET ORAL DAILY
Qty: 90 TABLET | Refills: 1 | Status: SHIPPED | OUTPATIENT
Start: 2025-06-18

## 2025-06-18 ASSESSMENT — PAIN SCALES - GENERAL: PAINLEVEL_OUTOF10: NO PAIN (0)

## 2025-06-18 NOTE — PATIENT INSTRUCTIONS
Assessment & Plan     1. Hyperlipidemia LDL goal <70 (Primary)  Continue repatha  - Lipid Profile; Future    2. Hypertensive heart disease without heart failure  Stable   - Comprehensive metabolic panel; Future  - CBC with platelets and differential; Future    3. Paroxysmal atrial fibrillation (H)  Continue eliquis     4. Hypothyroidism due to acquired atrophy of thyroid  Continue levothyroxine.   - TSH with free T4 reflex; Future    5. Stage 3a chronic kidney disease (H)  Stable     6. LA NENA (generalized anxiety disorder)  Continue zoloft     7. Mixed incontinence urge and stress (male)(female)  Continue vesicare     8. Class 1 obesity due to excess calories with serious comorbidity and body mass index (BMI) of 31.0 to 31.9 in adult  Weight loss encouraged     9. Chronic rhinitis  - cetirizine (ZYRTEC) 10 MG tablet; Take 1 tablet (10 mg) by mouth daily.  Dispense: 90 tablet; Refill: 1  - triamcinolone (NASACORT) 55 MCG/ACT nasal aerosol; Spray 2 sprays into both nostrils daily.  Dispense: 16.9 mL; Refill: 1      Follow-up   Return in about 3 months for hypertension and lipids, anxiety/depression, thyroid.    Randi Haddad,   Certified Adult Nurse Practitioner  [phone]

## 2025-06-19 ENCOUNTER — TELEPHONE (OUTPATIENT)
Dept: MAMMOGRAPHY | Facility: HOSPITAL | Age: 80
End: 2025-06-19

## 2025-06-19 ENCOUNTER — RESULTS FOLLOW-UP (OUTPATIENT)
Dept: FAMILY MEDICINE | Facility: OTHER | Age: 80
End: 2025-06-19

## 2025-06-19 ENCOUNTER — ANCILLARY PROCEDURE (OUTPATIENT)
Dept: MAMMOGRAPHY | Facility: OTHER | Age: 80
End: 2025-06-19
Attending: NURSE PRACTITIONER
Payer: COMMERCIAL

## 2025-06-19 DIAGNOSIS — Z12.31 BREAST CANCER SCREENING BY MAMMOGRAM: ICD-10-CM

## 2025-06-19 PROCEDURE — 77067 SCR MAMMO BI INCL CAD: CPT | Mod: 26 | Performed by: RADIOLOGY

## 2025-06-19 PROCEDURE — 77063 BREAST TOMOSYNTHESIS BI: CPT | Mod: TC

## 2025-06-19 PROCEDURE — 77063 BREAST TOMOSYNTHESIS BI: CPT | Mod: 26 | Performed by: RADIOLOGY

## 2025-06-23 ENCOUNTER — RESULTS FOLLOW-UP (OUTPATIENT)
Dept: FAMILY MEDICINE | Facility: OTHER | Age: 80
End: 2025-06-23

## 2025-08-04 DIAGNOSIS — E78.2 MIXED HYPERLIPIDEMIA: ICD-10-CM

## 2025-08-05 RX ORDER — EVOLOCUMAB 140 MG/ML
140 INJECTION, SOLUTION SUBCUTANEOUS
Qty: 6 ML | Refills: 3 | Status: SHIPPED | OUTPATIENT
Start: 2025-08-05

## 2025-09-02 DIAGNOSIS — L08.9 LOCAL INFECTION OF SKIN AND SUBCUTANEOUS TISSUE: ICD-10-CM

## 2025-09-03 RX ORDER — MUPIROCIN 2 %
OINTMENT (GRAM) TOPICAL
Qty: 22 G | Refills: 1 | Status: SHIPPED | OUTPATIENT
Start: 2025-09-03

## (undated) DEVICE — TUBING PRESSURE 30"

## (undated) DEVICE — CONNECTOR-ERBEFLO 2 PORT

## (undated) DEVICE — GW VASC .035IN DIA 260CML 7CML 3 MM RADIUS J CURVE 502455

## (undated) DEVICE — SHTH INTRO 0.021IN ID 6FR DIA

## (undated) DEVICE — PACK HEART LEFT CUSTOM

## (undated) DEVICE — HOLSTER-STERILE FOR CAUTERY

## (undated) DEVICE — SUTURE-VICRYL 4-0 SH J315H

## (undated) DEVICE — POUCH-INSTRUMENT 2 COMP. 7 X 11IN

## (undated) DEVICE — KIT HAND CONTROL ACIST 014644 AR-P54

## (undated) DEVICE — GLV-6.5 PROTEXIS PI CLASSIC LF/PF

## (undated) DEVICE — CATH GUIDING BLUE YELLOW PTFE XB3.5 6FRX100CM 67005400

## (undated) DEVICE — SWABSTICK-BENZOIN

## (undated) DEVICE — INSTRUMENT WIPE-VISIWIPE

## (undated) DEVICE — LIGASURE-SMALL JAW SEALER/DIVIDER

## (undated) DEVICE — Device

## (undated) DEVICE — KIT DVC ANGIO IBASIXCOMPAK 13INX20ML 3WAY IN4430

## (undated) DEVICE — NDL COUNTER-20-40 CT MAGNET/FOAM BLOCK

## (undated) DEVICE — IRRIGATION-H2O 1000ML

## (undated) DEVICE — PROBE-PRASS STIMULATOR ENT

## (undated) DEVICE — CAUTERY PAD-POLYHESIVE II ADULT

## (undated) DEVICE — CAUTERY-INSULATED 2.75" EDGE

## (undated) DEVICE — STERI-STRIP-1/2" X 4"

## (undated) DEVICE — SLEEVE TR BAND RADIAL COMPRESSION DEVICE 24CM TRB24-REG

## (undated) DEVICE — PACK-LAPAROTOMY-CUSTOM

## (undated) DEVICE — CANISTER-SUCTION 2000CC

## (undated) DEVICE — NDL-BLUNT FILL 18G 1.5"

## (undated) DEVICE — SYRINGE-10CC FINGER

## (undated) DEVICE — WIRE GUIDE HI-TRQ BALANCE MDWT JTIP 0.014"X190CM 1001780J-HC

## (undated) DEVICE — GOWN-SURG XL LVL 3 REINFORCED

## (undated) DEVICE — SCD SLEEVE-KNEE REG.

## (undated) DEVICE — ELECTRODE-INTRAMUSCULAR PRASS PAIRED

## (undated) DEVICE — TAPE-MEDIPORE 4" X 2YD

## (undated) DEVICE — TOPICAL SKIN ADHESIVE EXOFIN

## (undated) DEVICE — SPONGE-PEANUT 3/8"

## (undated) DEVICE — SUTURE-SILK 2-0 SH K833H

## (undated) DEVICE — DRSG-NON ADHERING 3 X 8 TELFA

## (undated) DEVICE — DRSG-KERLIX 6 X 6 3/4 FLUFF

## (undated) DEVICE — SUTURE-VICRYL 3-0 SH J416H

## (undated) DEVICE — MANIFOLD KIT ANGIO AUTOMATED 014613

## (undated) DEVICE — CATH BALLOON EMERGE 2.5X12MM H7493918912250

## (undated) DEVICE — BLADE-SCALPEL #15

## (undated) DEVICE — LABEL-STERILE PREPRINTED FOR OR

## (undated) DEVICE — LIGHT HANDLE COVER

## (undated) DEVICE — NDL-27G X 1 1/4" NON-SAFETY

## (undated) DEVICE — TRAY-SKIN PREP POVIDONE/IODINE

## (undated) DEVICE — SUTURE-VICRYL 4-0 SH-1 J218H

## (undated) DEVICE — TUBING-SUCTION 20FT

## (undated) DEVICE — SUTURE-SILK 2-0 TIE A305H

## (undated) DEVICE — VALVE HEMOSTASIS .096" COPILOT MECH 1003331

## (undated) DEVICE — BLANKET-BAIR LOWER EXTREMITY

## (undated) DEVICE — SUTURE-SILK 0 TIE A306H

## (undated) DEVICE — MARKER-SKIN REG

## (undated) DEVICE — CATH BALLOON NC EMERGE 2.75X8MM H7493926708270

## (undated) DEVICE — IRRIGATION-NACL 1000ML

## (undated) DEVICE — BIN-ENT BIN

## (undated) DEVICE — SUTURE-SILK 0 SH K834H

## (undated) RX ORDER — LIDOCAINE HYDROCHLORIDE 20 MG/ML
INJECTION, SOLUTION EPIDURAL; INFILTRATION; INTRACAUDAL; PERINEURAL
Status: DISPENSED
Start: 2021-03-01

## (undated) RX ORDER — NICARDIPINE HCL-0.9% SOD CHLOR 1 MG/10 ML
SYRINGE (ML) INTRAVENOUS
Status: DISPENSED
Start: 2024-11-08

## (undated) RX ORDER — DIPHENHYDRAMINE HYDROCHLORIDE 50 MG/ML
INJECTION INTRAMUSCULAR; INTRAVENOUS
Status: DISPENSED
Start: 2024-11-08

## (undated) RX ORDER — LABETALOL 20 MG/4 ML (5 MG/ML) INTRAVENOUS SYRINGE
Status: DISPENSED
Start: 2019-08-27

## (undated) RX ORDER — PROPOFOL 10 MG/ML
INJECTION, EMULSION INTRAVENOUS
Status: DISPENSED
Start: 2019-08-27

## (undated) RX ORDER — PROPOFOL 10 MG/ML
INJECTION, EMULSION INTRAVENOUS
Status: DISPENSED
Start: 2021-03-01

## (undated) RX ORDER — HEPARIN SODIUM 1000 [USP'U]/ML
INJECTION, SOLUTION INTRAVENOUS; SUBCUTANEOUS
Status: DISPENSED
Start: 2024-11-08

## (undated) RX ORDER — FENTANYL CITRATE 50 UG/ML
INJECTION, SOLUTION INTRAMUSCULAR; INTRAVENOUS
Status: DISPENSED
Start: 2019-08-27

## (undated) RX ORDER — ONDANSETRON 2 MG/ML
INJECTION INTRAMUSCULAR; INTRAVENOUS
Status: DISPENSED
Start: 2024-11-08

## (undated) RX ORDER — NITROGLYCERIN 5 MG/ML
VIAL (ML) INTRAVENOUS
Status: DISPENSED
Start: 2024-11-08

## (undated) RX ORDER — ONDANSETRON 2 MG/ML
INJECTION INTRAMUSCULAR; INTRAVENOUS
Status: DISPENSED
Start: 2019-08-27

## (undated) RX ORDER — LIDOCAINE HYDROCHLORIDE 20 MG/ML
INJECTION, SOLUTION EPIDURAL; INFILTRATION; INTRACAUDAL; PERINEURAL
Status: DISPENSED
Start: 2019-08-27

## (undated) RX ORDER — FENTANYL CITRATE 50 UG/ML
INJECTION, SOLUTION INTRAMUSCULAR; INTRAVENOUS
Status: DISPENSED
Start: 2024-11-08

## (undated) RX ORDER — DEXAMETHASONE SODIUM PHOSPHATE 10 MG/ML
INJECTION, SOLUTION INTRAMUSCULAR; INTRAVENOUS
Status: DISPENSED
Start: 2019-08-27

## (undated) RX ORDER — HEPARIN SODIUM 200 [USP'U]/100ML
INJECTION, SOLUTION INTRAVENOUS
Status: DISPENSED
Start: 2024-11-08